# Patient Record
Sex: FEMALE | Race: WHITE | NOT HISPANIC OR LATINO | Employment: OTHER | URBAN - METROPOLITAN AREA
[De-identification: names, ages, dates, MRNs, and addresses within clinical notes are randomized per-mention and may not be internally consistent; named-entity substitution may affect disease eponyms.]

---

## 2017-01-12 ENCOUNTER — APPOINTMENT (OUTPATIENT)
Dept: GENERAL RADIOLOGY | Facility: HOSPITAL | Age: 82
End: 2017-01-12

## 2017-01-12 ENCOUNTER — HOSPITAL ENCOUNTER (INPATIENT)
Facility: HOSPITAL | Age: 82
LOS: 7 days | Discharge: NURSING, REHAB OR LONG-TERM CARE | End: 2017-01-19
Attending: EMERGENCY MEDICINE | Admitting: INTERNAL MEDICINE

## 2017-01-12 DIAGNOSIS — E86.0 DEHYDRATION: ICD-10-CM

## 2017-01-12 DIAGNOSIS — Z74.09 IMPAIRED FUNCTIONAL MOBILITY, BALANCE, GAIT, AND ENDURANCE: ICD-10-CM

## 2017-01-12 DIAGNOSIS — R62.7 FAILURE TO THRIVE IN ADULT: ICD-10-CM

## 2017-01-12 DIAGNOSIS — R53.1 WEAKNESS: ICD-10-CM

## 2017-01-12 DIAGNOSIS — Z78.9 IMPAIRED MOBILITY AND ADLS: ICD-10-CM

## 2017-01-12 DIAGNOSIS — Z74.09 IMPAIRED MOBILITY AND ADLS: ICD-10-CM

## 2017-01-12 DIAGNOSIS — D64.9 SYMPTOMATIC ANEMIA: Primary | ICD-10-CM

## 2017-01-12 PROBLEM — R07.9 CHEST PAIN: Status: ACTIVE | Noted: 2017-01-12

## 2017-01-12 PROBLEM — J96.01 ACUTE RESPIRATORY FAILURE WITH HYPOXIA (HCC): Status: ACTIVE | Noted: 2017-01-12

## 2017-01-12 LAB
ABO GROUP BLD: NORMAL
ABO GROUP BLD: NORMAL
ALBUMIN SERPL-MCNC: 3.4 G/DL (ref 3.2–4.8)
ALBUMIN/GLOB SERPL: 0.9 G/DL (ref 1.5–2.5)
ALP SERPL-CCNC: 70 U/L (ref 25–100)
ALT SERPL W P-5'-P-CCNC: 10 U/L (ref 7–40)
ANION GAP SERPL CALCULATED.3IONS-SCNC: 11 MMOL/L (ref 3–11)
APTT PPP: 28.8 SECONDS (ref 24–31)
AST SERPL-CCNC: 26 U/L (ref 0–33)
BACTERIA UR QL AUTO: ABNORMAL /HPF
BASOPHILS # BLD AUTO: 0.01 10*3/MM3 (ref 0–0.2)
BASOPHILS NFR BLD AUTO: 0.1 % (ref 0–1)
BILIRUB SERPL-MCNC: 0.8 MG/DL (ref 0.3–1.2)
BILIRUB UR QL STRIP: ABNORMAL
BLD GP AB SCN SERPL QL: NEGATIVE
BNP SERPL-MCNC: 237 PG/ML (ref 0–100)
BUN BLD-MCNC: 30 MG/DL (ref 9–23)
BUN/CREAT SERPL: 42.9 (ref 7–25)
CALCIUM SPEC-SCNC: 9.1 MG/DL (ref 8.7–10.4)
CHLORIDE SERPL-SCNC: 107 MMOL/L (ref 99–109)
CLARITY UR: ABNORMAL
CO2 SERPL-SCNC: 22 MMOL/L (ref 20–31)
COLOR UR: ABNORMAL
CREAT BLD-MCNC: 0.7 MG/DL (ref 0.6–1.3)
DEPRECATED RDW RBC AUTO: 62.8 FL (ref 37–54)
DEVELOPER EXPIRATION DATE: NORMAL
DEVELOPER LOT NUMBER: NORMAL
EOSINOPHIL # BLD AUTO: 0.05 10*3/MM3 (ref 0.1–0.3)
EOSINOPHIL NFR BLD AUTO: 0.6 % (ref 0–3)
ERYTHROCYTE [DISTWIDTH] IN BLOOD BY AUTOMATED COUNT: 17.5 % (ref 11.3–14.5)
ERYTHROCYTE [SEDIMENTATION RATE] IN BLOOD: 67 MM/HR (ref 0–30)
EXPIRATION DATE: NORMAL
FECAL OCCULT BLOOD SCREEN, POC: NEGATIVE
FOLATE SERPL-MCNC: >24 NG/ML (ref 3.2–20)
GFR SERPL CREATININE-BSD FRML MDRD: 80 ML/MIN/1.73
GLOBULIN UR ELPH-MCNC: 3.8 GM/DL
GLUCOSE BLD-MCNC: 84 MG/DL (ref 70–100)
GLUCOSE UR STRIP-MCNC: NEGATIVE MG/DL
HCT VFR BLD AUTO: 27.3 % (ref 34.5–44)
HGB BLD-MCNC: 8.2 G/DL (ref 11.5–15.5)
HGB UR QL STRIP.AUTO: NEGATIVE
HOLD SPECIMEN: NORMAL
HOLD SPECIMEN: NORMAL
HYALINE CASTS UR QL AUTO: ABNORMAL /LPF
IMM GRANULOCYTES # BLD: 0.06 10*3/MM3 (ref 0–0.03)
IMM GRANULOCYTES NFR BLD: 0.7 % (ref 0–0.6)
INR PPP: 1.06
IRON 24H UR-MRATE: 32 MCG/DL (ref 50–175)
IRON SATN MFR SERPL: 13 % (ref 15–50)
KETONES UR QL STRIP: ABNORMAL
LEUKOCYTE ESTERASE UR QL STRIP.AUTO: ABNORMAL
LIPASE SERPL-CCNC: 29 U/L (ref 6–51)
LYMPHOCYTES # BLD AUTO: 3.08 10*3/MM3 (ref 0.6–4.8)
LYMPHOCYTES NFR BLD AUTO: 36.4 % (ref 24–44)
Lab: NORMAL
MAGNESIUM SERPL-MCNC: 2.2 MG/DL (ref 1.3–2.7)
MCH RBC QN AUTO: 30.1 PG (ref 27–31)
MCHC RBC AUTO-ENTMCNC: 30 G/DL (ref 32–36)
MCV RBC AUTO: 100.4 FL (ref 80–99)
MONOCYTES # BLD AUTO: 0.73 10*3/MM3 (ref 0–1)
MONOCYTES NFR BLD AUTO: 8.6 % (ref 0–12)
NEGATIVE CONTROL: NEGATIVE
NEUTROPHILS # BLD AUTO: 4.54 10*3/MM3 (ref 1.5–8.3)
NEUTROPHILS NFR BLD AUTO: 53.6 % (ref 41–71)
NITRITE UR QL STRIP: POSITIVE
PH UR STRIP.AUTO: <=5 [PH] (ref 5–8)
PLATELET # BLD AUTO: 244 10*3/MM3 (ref 150–450)
PMV BLD AUTO: 9 FL (ref 6–12)
POSITIVE CONTROL: POSITIVE
POTASSIUM BLD-SCNC: 4.1 MMOL/L (ref 3.5–5.5)
PROT SERPL-MCNC: 7.2 G/DL (ref 5.7–8.2)
PROT UR QL STRIP: ABNORMAL
PROTHROMBIN TIME: 11.6 SECONDS (ref 9.6–11.5)
RBC # BLD AUTO: 2.72 10*6/MM3 (ref 3.89–5.14)
RBC # UR: ABNORMAL /HPF
REF LAB TEST METHOD: ABNORMAL
RETICS/RBC NFR AUTO: 5.96 % (ref 0.5–1.5)
RH BLD: POSITIVE
RH BLD: POSITIVE
SODIUM BLD-SCNC: 140 MMOL/L (ref 132–146)
SP GR UR STRIP: 1.02 (ref 1–1.03)
SQUAMOUS #/AREA URNS HPF: ABNORMAL /HPF
T4 FREE SERPL-MCNC: 1.33 NG/DL (ref 0.89–1.76)
TIBC SERPL-MCNC: 256 MCG/DL (ref 250–450)
TROPONIN I SERPL-MCNC: 0.26 NG/ML
TROPONIN I SERPL-MCNC: 0.29 NG/ML
TSH SERPL DL<=0.05 MIU/L-ACNC: 4.57 MIU/ML (ref 0.35–5.35)
UROBILINOGEN UR QL STRIP: ABNORMAL
VIT B12 BLD-MCNC: 513 PG/ML (ref 211–911)
WBC NRBC COR # BLD: 8.47 10*3/MM3 (ref 3.5–10.8)
WBC UR QL AUTO: ABNORMAL /HPF
WHOLE BLOOD HOLD SPECIMEN: NORMAL
WHOLE BLOOD HOLD SPECIMEN: NORMAL

## 2017-01-12 PROCEDURE — 85730 THROMBOPLASTIN TIME PARTIAL: CPT | Performed by: EMERGENCY MEDICINE

## 2017-01-12 PROCEDURE — 84443 ASSAY THYROID STIM HORMONE: CPT | Performed by: EMERGENCY MEDICINE

## 2017-01-12 PROCEDURE — 85025 COMPLETE CBC W/AUTO DIFF WBC: CPT | Performed by: EMERGENCY MEDICINE

## 2017-01-12 PROCEDURE — 83880 ASSAY OF NATRIURETIC PEPTIDE: CPT | Performed by: EMERGENCY MEDICINE

## 2017-01-12 PROCEDURE — 71010 HC CHEST PA OR AP: CPT

## 2017-01-12 PROCEDURE — 82607 VITAMIN B-12: CPT | Performed by: EMERGENCY MEDICINE

## 2017-01-12 PROCEDURE — 85045 AUTOMATED RETICULOCYTE COUNT: CPT | Performed by: EMERGENCY MEDICINE

## 2017-01-12 PROCEDURE — 80053 COMPREHEN METABOLIC PANEL: CPT | Performed by: EMERGENCY MEDICINE

## 2017-01-12 PROCEDURE — 83550 IRON BINDING TEST: CPT | Performed by: EMERGENCY MEDICINE

## 2017-01-12 PROCEDURE — P9016 RBC LEUKOCYTES REDUCED: HCPCS

## 2017-01-12 PROCEDURE — 36430 TRANSFUSION BLD/BLD COMPNT: CPT

## 2017-01-12 PROCEDURE — 86900 BLOOD TYPING SEROLOGIC ABO: CPT

## 2017-01-12 PROCEDURE — 86920 COMPATIBILITY TEST SPIN: CPT

## 2017-01-12 PROCEDURE — 99285 EMERGENCY DEPT VISIT HI MDM: CPT

## 2017-01-12 PROCEDURE — 99223 1ST HOSP IP/OBS HIGH 75: CPT | Performed by: INTERNAL MEDICINE

## 2017-01-12 PROCEDURE — 36415 COLL VENOUS BLD VENIPUNCTURE: CPT

## 2017-01-12 PROCEDURE — 87086 URINE CULTURE/COLONY COUNT: CPT | Performed by: EMERGENCY MEDICINE

## 2017-01-12 PROCEDURE — 84484 ASSAY OF TROPONIN QUANT: CPT | Performed by: EMERGENCY MEDICINE

## 2017-01-12 PROCEDURE — 86850 RBC ANTIBODY SCREEN: CPT

## 2017-01-12 PROCEDURE — 84439 ASSAY OF FREE THYROXINE: CPT | Performed by: EMERGENCY MEDICINE

## 2017-01-12 PROCEDURE — 93005 ELECTROCARDIOGRAM TRACING: CPT | Performed by: EMERGENCY MEDICINE

## 2017-01-12 PROCEDURE — 81001 URINALYSIS AUTO W/SCOPE: CPT | Performed by: EMERGENCY MEDICINE

## 2017-01-12 PROCEDURE — 86901 BLOOD TYPING SEROLOGIC RH(D): CPT

## 2017-01-12 PROCEDURE — 82746 ASSAY OF FOLIC ACID SERUM: CPT | Performed by: EMERGENCY MEDICINE

## 2017-01-12 PROCEDURE — 83735 ASSAY OF MAGNESIUM: CPT | Performed by: EMERGENCY MEDICINE

## 2017-01-12 PROCEDURE — 83540 ASSAY OF IRON: CPT | Performed by: EMERGENCY MEDICINE

## 2017-01-12 PROCEDURE — 85652 RBC SED RATE AUTOMATED: CPT | Performed by: EMERGENCY MEDICINE

## 2017-01-12 PROCEDURE — 85610 PROTHROMBIN TIME: CPT | Performed by: EMERGENCY MEDICINE

## 2017-01-12 PROCEDURE — 83690 ASSAY OF LIPASE: CPT | Performed by: EMERGENCY MEDICINE

## 2017-01-12 RX ORDER — MELATONIN
2000 DAILY
COMMUNITY
End: 2017-01-12

## 2017-01-12 RX ORDER — ACETAMINOPHEN 500 MG
1000 TABLET ORAL 4 TIMES DAILY PRN
Status: ON HOLD | COMMUNITY
End: 2017-07-05

## 2017-01-12 RX ORDER — LEVOTHYROXINE SODIUM 0.07 MG/1
75 TABLET ORAL
COMMUNITY

## 2017-01-12 RX ORDER — SODIUM CHLORIDE 9 MG/ML
125 INJECTION, SOLUTION INTRAVENOUS CONTINUOUS
Status: DISCONTINUED | OUTPATIENT
Start: 2017-01-12 | End: 2017-01-13

## 2017-01-12 RX ORDER — MIRTAZAPINE 30 MG/1
30 TABLET, FILM COATED ORAL NIGHTLY
COMMUNITY
End: 2019-03-08

## 2017-01-12 RX ORDER — POLYETHYLENE GLYCOL 3350 17 G/17G
17 POWDER, FOR SOLUTION ORAL DAILY PRN
COMMUNITY
End: 2017-07-12 | Stop reason: HOSPADM

## 2017-01-12 RX ORDER — MULTIVIT WITH MINERALS/LUTEIN
1000 TABLET ORAL DAILY
COMMUNITY
End: 2017-03-27

## 2017-01-12 RX ORDER — NITROGLYCERIN 0.4 MG/1
0.4 TABLET SUBLINGUAL
Status: DISCONTINUED | OUTPATIENT
Start: 2017-01-12 | End: 2017-01-19 | Stop reason: HOSPADM

## 2017-01-12 RX ORDER — SODIUM CHLORIDE 0.9 % (FLUSH) 0.9 %
1-10 SYRINGE (ML) INJECTION AS NEEDED
Status: DISCONTINUED | OUTPATIENT
Start: 2017-01-12 | End: 2017-01-19 | Stop reason: HOSPADM

## 2017-01-12 RX ORDER — ONDANSETRON 2 MG/ML
4 INJECTION INTRAMUSCULAR; INTRAVENOUS EVERY 6 HOURS PRN
Status: DISCONTINUED | OUTPATIENT
Start: 2017-01-12 | End: 2017-01-19 | Stop reason: HOSPADM

## 2017-01-12 RX ORDER — METHYLPHENIDATE HYDROCHLORIDE 5 MG/1
2.5 TABLET ORAL DAILY
COMMUNITY
End: 2017-01-19 | Stop reason: HOSPADM

## 2017-01-12 RX ORDER — FERROUS SULFATE 325(65) MG
325 TABLET ORAL
Status: ON HOLD | COMMUNITY
End: 2017-02-24

## 2017-01-12 RX ORDER — SODIUM CHLORIDE 0.9 % (FLUSH) 0.9 %
10 SYRINGE (ML) INJECTION AS NEEDED
Status: DISCONTINUED | OUTPATIENT
Start: 2017-01-12 | End: 2017-01-19 | Stop reason: HOSPADM

## 2017-01-12 RX ADMIN — SODIUM CHLORIDE 250 ML: 9 INJECTION, SOLUTION INTRAVENOUS at 18:29

## 2017-01-12 RX ADMIN — SODIUM CHLORIDE 125 ML/HR: 9 INJECTION, SOLUTION INTRAVENOUS at 18:30

## 2017-01-12 RX ADMIN — SODIUM CHLORIDE 125 ML/HR: 9 INJECTION, SOLUTION INTRAVENOUS at 20:18

## 2017-01-12 NOTE — ED PROVIDER NOTES
Subjective   HPI Comments: 85 y.o. female presents to the ED w/ c/o generalized weakness and low H&H. Pt is s/p left hip replacement and [facility]. Her son states she has had progressive decline over this period with progressive weakness and generalized fatigue. She has had a UTI for several weeks which has not improved on antibiotics. She was seen by her PCP Dr. Kieran Cottrell earlier today and received lab work which revealed an H&H of 8.0/24.8. Pt has a hx iron-deficiency anemia and required a transfusion at  last month. She states she has had a decreased appetite as well. She denies feeling dizzy or light-headed, but reports palpitations with exertion.    Patient is a 85 y.o. female presenting with weakness.   History provided by:  Patient and relative (son)  Weakness - Generalized   Severity:  Severe  Onset quality:  Gradual  Duration: several weeks.  Timing:  Constant  Progression:  Worsening  Chronicity:  New  Context: urinary tract infection    Associated symptoms: no chest pain, no dizziness and no shortness of breath    Risk factors: anemia        Review of Systems   Constitutional: Positive for fatigue.   Respiratory: Negative for shortness of breath.    Cardiovascular: Positive for palpitations. Negative for chest pain.   Neurological: Positive for weakness (generalized). Negative for dizziness.   All other systems reviewed and are negative.      Past Medical History   Diagnosis Date   • Anemia    • Anxiety    • Depression    • Disease of thyroid gland    • Failure to thrive in adult    • Hyperlipidemia    • Hypertension    • Osteoarthritis    • Stress incontinence    • Urinary retention        Allergies   Allergen Reactions   • Alendronate    • Aripiprazole    • Celecoxib    • Nitrofurantoin    • Procaine    • Sulfa Antibiotics        Past Surgical History   Procedure Laterality Date   • Appendectomy     • Back surgery     • Cataract extraction     • Elbow  procedure     • Knee surgery     • Joint replacement         Family History   Problem Relation Age of Onset   • Alcohol abuse Other    • Hypertension Other    • Stroke Other    • Heart disease Son        Social History     Social History   • Marital status:      Spouse name: N/A   • Number of children: N/A   • Years of education: N/A     Social History Main Topics   • Smoking status: Never Smoker   • Smokeless tobacco: None   • Alcohol use Yes      Comment: social   • Drug use: None   • Sexual activity: Not Asked     Other Topics Concern   • None     Social History Narrative   • None         Objective   Physical Exam   Constitutional: She appears well-developed. No distress.   Thin, frail appearing   HENT:   Head: Atraumatic.   Pale oropharynx   Eyes: Pupils are equal, round, and reactive to light.   Pale conjunctiva   Neck: Normal range of motion. Neck supple.   Cardiovascular: Normal rate, regular rhythm and normal heart sounds.    Pulmonary/Chest: Effort normal and breath sounds normal. No respiratory distress. She has no wheezes. She has no rales. She exhibits no tenderness.   Abdominal: Soft. There is no tenderness.   Mildly protuberant abdomen   Genitourinary:   Genitourinary Comments: Rectal exam reveals normal tone without mass or impaction.   Musculoskeletal: She exhibits no edema.   Neurological: She is alert.   Skin: Skin is warm and dry. She is not diaphoretic.   Psychiatric: She has a normal mood and affect. Her behavior is normal.   Nursing note and vitals reviewed.      Procedures         ED Course  ED Course   Comment By Time   Discussed case in depth over the phone with hospitalist Dr. Brand. Will admit to [facility]. [facility] Rose Ashley 01/12 2015   Discussed findings at length with patient and son.  Discussed with Dr. Brand will admit for definitive inpatient care. Ward Barkley MD 01/12 3905         Course of Care      Lab Results (last 24 hours)     Procedure Component Value Units Date/Time     POCT Occult Blood, stool [[id number]]  (Normal) Collected:  01/12/17 1823    Specimen:  Stool from Per Rectum Updated:  01/12/17 1825     Fecal Occult Blood Negative      Lot Number [id number]      Expiration Date 12/18      DEVELOPER LOT NUMBER [id number]      DEVELOPER EXPIRATION DATE 11/19      Positive Control Positive      Negative Control Negative     CBC & Differential [[id number]] Collected:  01/12/17 1845    Specimen:  Blood Updated:  01/12/17 1903    Narrative:       The following orders were created for panel order CBC & Differential.  Procedure                               Abnormality         Status                     ---------                               -----------         ------                     CBC Auto Differential[[id number]]         Abnormal            Final result                 Please view results for these tests on the individual orders.    Comprehensive Metabolic Panel [[id number]]  (Abnormal) Collected:  01/12/17 1845    Specimen:  Blood Updated:  01/12/17 1932     Glucose 84 mg/dL      BUN 30 (H) mg/dL      Creatinine 0.70 mg/dL      Sodium 140 mmol/L      Potassium 4.1 mmol/L      Chloride 107 mmol/L      CO2 22.0 mmol/L      Calcium 9.1 mg/dL      Total Protein 7.2 g/dL      Albumin 3.40 g/dL      ALT (SGPT) 10 U/L      AST (SGOT) 26 U/L      Alkaline Phosphatase 70 U/L      Total Bilirubin 0.8 mg/dL      eGFR Non African Amer 80 mL/min/1.73      Globulin 3.8 gm/dL      A/G Ratio 0.9 (L) g/dL      BUN/Creatinine Ratio 42.9 (H)      Anion Gap 11.0 mmol/L     Narrative:       [employer] Guidelines    Stage                           Description                             GFR                      1                               Normal or High                          90+  2                               Mild decrease                            60-89  3                               Moderate decrease                   30-59  4                               Severe decrease                        15-29  5                               Kidney failure                             <15    Protime-INR [[id number]]  (Abnormal) Collected:  01/12/17 1845    Specimen:  Blood Updated:  01/12/17 1933     Protime 11.6 (H) Seconds      INR 1.06     Narrative:       Therapeutic Ranges for INR: 2.0-3.0 (PT 20-30)                              2.5-3.5 (PT 25-34)    aPTT [[id number]]  (Normal) Collected:  01/12/17 1845    Specimen:  Blood Updated:  01/12/17 1933     PTT 28.8 seconds     Narrative:       PTT = The equivalent PTT values for the therapeutic range of heparin levels at 0.3 to 0.5 U/ml are 45 to 60 seconds.    Lipase [[id number]]  (Normal) Collected:  01/12/17 1845    Specimen:  Blood Updated:  01/12/17 1932     Lipase 29 U/L     Magnesium [[id number]]  (Normal) Collected:  01/12/17 1845    Specimen:  Blood Updated:  01/12/17 1932     Magnesium 2.2 mg/dL     TSH [[id number]]  (Normal) Collected:  01/12/17 1845    Specimen:  Blood Updated:  01/12/17 1932     TSH 4.567 mIU/mL     T4, Free [[id number]]  (Normal) Collected:  01/12/17 1845    Specimen:  Blood Updated:  01/12/17 1932     Free T4 1.33 ng/dL     BNP [[id number]]  (Abnormal) Collected:  01/12/17 1845    Specimen:  Blood Updated:  01/12/17 1933     .0 (H) pg/mL     Troponin [[id number]]  (Abnormal) Collected:  01/12/17 1845    Specimen:  Blood Updated:  01/12/17 1932     Troponin I 0.255 (H) ng/mL     Narrative:       Ultra Troponin I Reference Range:         <=0.039 ng/mL: Negative    0.04-0.779 ng/mL: Indeterminate Range. Suspicious of MI.  Clinical correlation required.       >=0.78  ng/mL: Consistent with myocardial injury.  Clinical correlation required.    CBC Auto Differential [[id number]]  (Abnormal) Collected:  01/12/17 1845    Specimen:  Blood Updated:  01/12/17 1903     WBC 8.47 10*3/mm3      RBC 2.72 (L) 10*6/mm3      Hemoglobin 8.2 (L) g/dL      Hematocrit 27.3 (L) %      .4 (H) fL      MCH 30.1 pg      MCHC 30.0 (L) g/dL      RDW 17.5 (H) %       RDW-SD 62.8 (H) fl      MPV 9.0 fL      Platelets 244 10*3/mm3      Neutrophil % 53.6 %      Lymphocyte % 36.4 %      Monocyte % 8.6 %      Eosinophil % 0.6 %      Basophil % 0.1 %      Immature Grans % 0.7 (H) %      Neutrophils, Absolute 4.54 10*3/mm3      Lymphocytes, Absolute 3.08 10*3/mm3      Monocytes, Absolute 0.73 10*3/mm3      Eosinophils, Absolute 0.05 (L) 10*3/mm3      Basophils, Absolute 0.01 10*3/mm3      Immature Grans, Absolute 0.06 (H) 10*3/mm3     Iron Profile [[id number]]  (Abnormal) Collected:  01/12/17 1845    Specimen:  Blood Updated:  01/12/17 1926     Iron 32 (L) mcg/dL      TIBC 256 mcg/dL      Iron Saturation 13 (L) %     Vitamin B12 [[id number]]  (Normal) Collected:  01/12/17 1845    Specimen:  Blood Updated:  01/12/17 1926     Vitamin B-12 513 pg/mL     Folate [[id number]]  (Abnormal) Collected:  01/12/17 1845    Specimen:  Blood Updated:  01/12/17 1926     Folate >24.00 (H) ng/mL     Narrative:         Folate Reference Ranges:    Deficient:            Less than 1.2 ng/mL  Indeterminant:        1.2-3.1 ng/mL  Normal:               3.2-20.0 ng/mL    Urinalysis With / Culture If Indicated [[id number]]  (Abnormal) Collected:  01/12/17 1855    Specimen:  Urine from Urine, Catheter Updated:  01/12/17 1917     Color, UA Red (A)      Appearance, UA Cloudy (A)      pH, UA <=5.0      Specific Gravity, UA 1.023      Glucose, UA Negative      Ketones, UA 40 mg/dL (2+) (A)      Bilirubin, UA Large (3+) (A)      Blood, UA Negative      Protein,  mg/dL (2+) (A)      Leuk Esterase, UA Moderate (2+) (A)      Nitrite, UA Positive (A)      Urobilinogen, UA >=8.0 E.U./dL (A)     Urinalysis, Microscopic Only [[id number]]  (Abnormal) Collected:  01/12/17 1855    Specimen:  Urine from Urine, Catheter Updated:  01/12/17 1918     RBC, UA 3-6 (A) /HPF      WBC, UA 3-5 (A) /HPF      Bacteria, UA None Seen /HPF      Squamous Epithelial Cells, UA 3-6 (A) /HPF      Hyaline Casts, UA 7-12 /LPF      Methodology Manual  Light Microscopy     Urine Culture [[id number]] Collected:  01/12/17 1855    Specimen:  Urine from Urine, Catheter Updated:  01/12/17 1916    Sedimentation Rate [[id number]]  (Abnormal) Collected:  01/12/17 1925    Specimen:  Blood Updated:  01/12/17 2012     Sed Rate 67 (H) mm/hr     Reticulocytes [[id number]]  (Abnormal) Collected:  01/12/17 1925    Specimen:  Blood Updated:  01/12/17 2001     Reticulocyte % 5.96 (H) %     Troponin [[id number]]  (Abnormal) Collected:  01/12/17 2114    Specimen:  Blood Updated:  01/12/17 2146     Troponin I 0.293 (H) ng/mL     Narrative:       Ultra Troponin I Reference Range:         <=0.039 ng/mL: Negative    0.04-0.779 ng/mL: Indeterminate Range. Suspicious of MI.  Clinical correlation required.       >=0.78  ng/mL: Consistent with myocardial injury.  Clinical correlation required.          Note: In addition to lab results from this visit, the labs listed above may include labs taken at another facility or during a different encounter within the last 24 hours. Please correlate lab times with ED admission and discharge times for further clarification of the services performed during this visit.    XR Chest 1 View   ED Interpretation   No acute disease          Vitals:    01/12/17 2159 01/12/17 2200 01/12/17 2230 01/12/17 2237   BP:  139/80  144/87   BP Location:    Left arm   Patient Position:    Lying   Pulse: 80  80 78   Resp:    16   Temp:    97.8 °F (36.6 °C)   TempSrc:    Oral   SpO2: 94%  94% 97%   Weight:       Height:           Medications   sodium chloride 0.9 % flush 10 mL (not administered)   sodium chloride 0.9 % infusion (0 mL/hr Intravenous Stopped 1/12/17 2131)   sodium chloride 0.9 % bolus 250 mL (250 mL Intravenous Not Given 1/12/17 2018)   sodium chloride 0.9 % bolus 250 mL (0 mL Intravenous Stopped 1/12/17 2017)       ECG/EMG Results (last 24 hours)     ** No results found for the last 24 hours. **                      MDM    Final diagnoses:   Symptomatic anemia    Weakness   Failure to thrive in adult   Dehydration       Documentation assistance provided by víctor Ashley.  Information recorded by the scribe was done at my direction and has been verified and validated by me.     Rose Ashley  01/12/17 8922       Ward Barkley MD  01/12/17 2281

## 2017-01-12 NOTE — Clinical Note
Level of Care: Telemetry [5]   Admitting Physician: MARIE KATZ [[id number]]   Attending Physician: MARIE KATZ [[id number]]

## 2017-01-12 NOTE — IP AVS SNAPSHOT
AFTER VISIT SUMMARY             Florinda Knapp           About your hospitalization     You were admitted on:  January 12, 2017 You last received care in the:  [facility]       Procedures & Surgeries         Medications    If you or your caregiver advised us that you are currently taking a medication and that medication is marked below as “Resume”, this simply indicates that we have reviewed those medications to make sure our new therapy recommendations do not interfere.  If you have concerns about medications other than those new ones which we are prescribing today, please consult the physician who prescribed them (or your primary physician).  Our review of your home medications is not meant to indicate that we are directing their use.             Your Medications      START taking these medications     amoxicillin-clavulanate 500-125 MG per tablet   Take 1 tablet by mouth Every 12 (Twelve) Hours for 3 days. Indications: Infection Within the Abdomen   Last time this was given:  1/19/2017  9:39 AM   Commonly known as:  AUGMENTIN           aspirin 81 MG chewable tablet   Chew 1 tablet Daily.   Last time this was given:  1/19/2017  9:39 AM   Replaces:  aspirin 325 MG tablet           atorvastatin 20 MG tablet   Take 1 tablet by mouth Every Night.   Last time this was given:  1/18/2017  9:29 PM   Commonly known as:  LIPITOR           loperamide 2 MG capsule   Take 1 capsule by mouth 3 (Three) Times a Day As Needed for diarrhea.   Commonly known as:  IMODIUM           metroNIDAZOLE 500 MG tablet   Take 1 tablet by mouth Every 8 (Eight) Hours for 3 days. Indications: Infection Within the Abdomen   Last time this was given:  1/19/2017  5:26 AM   Commonly known as:  FLAGYL           saccharomyces boulardii 250 MG capsule   Take 1 capsule by mouth 2 (Two) Times a Day.   Last time this was given:  1/19/2017  9:39 AM   Commonly known as:  FLORASTOR           tamsulosin 0.4 MG capsule 24 hr capsule   Take  1 capsule by mouth Daily.   Last time this was given:  1/19/2017  9:39 AM   Commonly known as:  FLOMAX             CONTINUE taking these medications     acetaminophen 500 MG tablet   Take 1,000 mg by mouth 4 (Four) Times a Day As Needed for mild pain (1-3).   Commonly known as:  TYLENOL           carbidopa-levodopa  MG per tablet   TAKE ONE TABLET BY MOUTH THREE TIMES A DAY **MAY TAKE 1 EXTRA TABLET BY MOUTH AT NIGHT AS NEEDED**   Last time this was given:  1/19/2017  5:26 AM   Commonly known as:  SINEMET           Cholecalciferol 2000 UNITS capsule   Take 2,000 Units by mouth Daily.           ferrous sulfate 325 (65 FE) MG tablet   Take 325 mg by mouth Daily With Breakfast.   Last time this was given:  1/19/2017  9:39 AM           levothyroxine 75 MCG tablet   Take 75 mcg by mouth Daily.   Last time this was given:  1/19/2017  5:26 AM   Commonly known as:  SYNTHROID, LEVOTHROID           mirtazapine 30 MG tablet   Take 30 mg by mouth Every Night.   Commonly known as:  REMERON           polyethylene glycol packet   Take 17 g by mouth Daily As Needed.   Last time this was given:  1/16/2017  8:57 AM   Commonly known as:  MIRALAX           vitamin E 1000 UNIT capsule   Take 1,000 Units by mouth Daily.             STOP taking these medications     amLODIPine 5 MG tablet   Commonly known as:  NORVASC           aspirin 325 MG tablet   Replaced by:  aspirin 81 MG chewable tablet           methylphenidate 5 MG tablet   Commonly known as:  RITALIN                Where to Get Your Medications      Information about where to get these medications is not yet available     ! Ask your nurse or doctor about these medications     amoxicillin-clavulanate 500-125 MG per tablet    aspirin 81 MG chewable tablet    atorvastatin 20 MG tablet    loperamide 2 MG capsule    metroNIDAZOLE 500 MG tablet    saccharomyces boulardii 250 MG capsule    tamsulosin 0.4 MG capsule 24 hr capsule                  Your Medications      Your  Medication List           Morning Noon Evening Bedtime As Needed    acetaminophen 500 MG tablet   Take 1,000 mg by mouth 4 (Four) Times a Day As Needed for mild pain (1-3).   Commonly known as:  TYLENOL                                amoxicillin-clavulanate 500-125 MG per tablet   Take 1 tablet by mouth Every 12 (Twelve) Hours for 3 days. Indications: Infection Within the Abdomen   Commonly known as:  AUGMENTIN                                aspirin 81 MG chewable tablet   Chew 1 tablet Daily.                                atorvastatin 20 MG tablet   Take 1 tablet by mouth Every Night.   Commonly known as:  LIPITOR                                carbidopa-levodopa  MG per tablet   TAKE ONE TABLET BY MOUTH THREE TIMES A DAY **MAY TAKE 1 EXTRA TABLET BY MOUTH AT NIGHT AS NEEDED**   Commonly known as:  SINEMET                                Cholecalciferol 2000 UNITS capsule   Take 2,000 Units by mouth Daily.                                ferrous sulfate 325 (65 FE) MG tablet   Take 325 mg by mouth Daily With Breakfast.                                levothyroxine 75 MCG tablet   Take 75 mcg by mouth Daily.   Commonly known as:  SYNTHROID, LEVOTHROID                                loperamide 2 MG capsule   Take 1 capsule by mouth 3 (Three) Times a Day As Needed for diarrhea.   Commonly known as:  IMODIUM                                metroNIDAZOLE 500 MG tablet   Take 1 tablet by mouth Every 8 (Eight) Hours for 3 days. Indications: Infection Within the Abdomen   Commonly known as:  FLAGYL                                mirtazapine 30 MG tablet   Take 30 mg by mouth Every Night.   Commonly known as:  REMERON                                polyethylene glycol packet   Take 17 g by mouth Daily As Needed.   Commonly known as:  MIRALAX                                saccharomyces boulardii 250 MG capsule   Take 1 capsule by mouth 2 (Two) Times a Day.   Commonly known as:  FLORASTOR                                 tamsulosin 0.4 MG capsule 24 hr capsule   Take 1 capsule by mouth Daily.   Commonly known as:  FLOMAX                                vitamin E 1000 UNIT capsule   Take 1,000 Units by mouth Daily.                                         Instructions for After Discharge        Activity Instructions     Activity as Tolerated                 Diet Instructions     Diet: Regular; Thin Liquids, No Restrictions       Discharge Diet:  Regular   Fluid Consistency:  Thin Liquids, No Restrictions             Discharge References/Attachments     ANEMIA, NONSPECIFIC (ENGLISH)       Follow-ups for After Discharge        Follow-up Information     Follow up with Dino Flynn MD. Schedule an appointment as soon as possible for a visit in 1 week(s).    Specialty:  Urology    Why:  please make appointment for Thursday or Friday next week  01/26/17  3 p.m.    Contact information:    [address] [place] [id number]  [phone]          Follow up with Kieran Cottrell MD. Schedule an appointment as soon as possible for a visit in 1 week(s).    Specialty:  Internal Medicine    Why:  7-10 days  01/26/17  1 p.m. with aicha martinez    Contact information:    [address] [facility] [address]  [place] [id number]  [phone]          Follow up with Drew Packer MD .    Specialty:  Orthopedic Surgery    Why:  as scheduled   01/25/17  3:15 p.m.    Contact information:    [address] [facility] [address]  [place] [id number]  [phone]        Scheduled Appointments     Feb 28, 2017 11:30 AM EST   Follow Up with Elisa Castro MD   [facility] (--)    [address]  [facility] [id number] [phone]           Arrive 15 minutes prior to appointment.              [employer] Signup     Our records indicate that your [facility] [employer] account has been deactivated. If you would like to reactivate your account, please email [email or web address] or call  [phone] to talk to our [employer] staff.         Summary of Your Hospitalization        Reason for Hospitalization     Your primary diagnosis was:  Symptomatic Anemia    Your diagnoses also included:  Parkinson Disease, Respiratory Insufficiency, Generalized Weakness, Chest Pain, Symptomatic Anemia, Elevated Troponin I Level, Status Post Total Replacement Of Left Hip, H/O Urinary Retention, History Of Recurrent Utis, High Blood Pressure, Anxiety Problem, Heart Failure, Inflammation Of Large Intestine, Hypokalemia      Care Providers     Provider Service Role Specialty    Sharee Travis MD Medicine Attending Provider Hospitalist    Dino Flynn MD Urology Consulting Physician  Urology      Your Allergies  Date Reviewed: 1/13/2017    Allergen Reactions    Alendronate Not Noted         Aripiprazole Not Noted         Celecoxib Not Noted         Nitrofurantoin Not Noted         Procaine Not Noted         Sulfa Antibiotics Not Noted      Patient Belongings Returned     Document Return of Belongings Flowsheet     Were the patient bedside belongings sent home?   --   Belongings Retrieved from Security & Sent Home   --    Belongings Sent to Safe   --   Medications Retrieved from Pharmacy & Sent Home   --              More Information      Anemia, Nonspecific  Anemia is a condition in which the concentration of red blood cells or hemoglobin in the blood is below normal. Hemoglobin is a substance in red blood cells that carries oxygen to the tissues of the body. Anemia results in not enough oxygen reaching these tissues.   CAUSES   Common causes of anemia include:   · Excessive bleeding. Bleeding may be internal or external. This includes excessive bleeding from periods (in women) or from the intestine.    · Poor nutrition.    · Chronic kidney, thyroid, and liver disease.   · Bone marrow disorders that decrease red blood cell production.  · Cancer and treatments for cancer.  · HIV, AIDS, and their treatments.  · Spleen  problems that increase red blood cell destruction.  · Blood disorders.  · Excess destruction of red blood cells due to infection, medicines, and autoimmune disorders.  SIGNS AND SYMPTOMS   · Minor weakness.    · Dizziness.    · Headache.  · Palpitations.    · Shortness of breath, especially with exercise.    · Paleness.  · Cold sensitivity.  · Indigestion.  · Nausea.  · Difficulty sleeping.  · Difficulty concentrating.  Symptoms may occur suddenly or they may develop slowly.   DIAGNOSIS   Additional blood tests are often needed. These help your health care provider determine the best treatment. Your health care provider will check your stool for blood and look for other causes of blood loss.   TREATMENT   Treatment varies depending on the cause of the anemia. Treatment can include:   · Supplements of iron, vitamin B12, or folic acid.    · Hormone medicines.    · A blood transfusion. This may be needed if blood loss is severe.    · Hospitalization. This may be needed if there is significant continual blood loss.    · Dietary changes.  · Spleen removal.  HOME CARE INSTRUCTIONS  Keep all follow-up appointments. It often takes many weeks to correct anemia, and having your health care provider check on your condition and your response to treatment is very important.  SEEK IMMEDIATE MEDICAL CARE IF:   · You develop extreme weakness, shortness of breath, or chest pain.    · You become dizzy or have trouble concentrating.  · You develop heavy vaginal bleeding.    · You develop a rash.    · You have bloody or black, tarry stools.    · You faint.    · You vomit up blood.    · You vomit repeatedly.    · You have abdominal pain.  · You have a fever or persistent symptoms for more than 2-3 days.    · You have a fever and your symptoms suddenly get worse.    · You are dehydrated.    MAKE SURE YOU:  · Understand these instructions.  · Will watch your condition.  · Will get help right away if you are not doing well or get worse.      This information is not intended to replace advice given to you by your health care provider. Make sure you discuss any questions you have with your health care provider.     Document Released: 01/25/2006 Document Revised: 08/20/2014 Document Reviewed: 06/13/2014  [employer] Interactive Patient Education ©2016 [employer]            SYMPTOMS OF A STROKE    Call 911 or have someone take you to the Emergency Department if you have any of the following:    · Sudden numbness or weakness of your face, arm or leg especially on one side of the body  · Sudden confusion, diffiiculty speaking or trouble understanding   · Changes in your vision or loss of sight in one eye  · Sudden severe headache with no known cause  · sudden dizziness, trouble walking, loss of balance or coordination    It is important to seek emergency care right away if you have further stroke symptoms. If you get emergency help quickly, the powerful clot-dissolving medicines can reduce the disabilities caused by a stroke.     For more information:    American Stroke Association  2-646-8-STROKE  www.strokeassociation.org           IF YOU SMOKE OR USE TOBACCO PLEASE READ THE FOLLOWING:    Why is smoking bad for me?  Smoking increases the risk of heart disease, lung disease, vascular disease, stroke, and cancer.     If you smoke, STOP!    If you would like more information on quitting smoking, please visit the GetMyRx website: [email or web address]   This link will provide additional resources including the QUIT line and the Beat the Pack support groups.     For more information:    American Cancer Society  (231) 390-8597    American Heart Association  1-352.816.3983               YOU ARE THE MOST IMPORTANT FACTOR IN YOUR RECOVERY.     Follow all instructions carefully.     I have reviewed my discharge instructions with my nurse, including the following information, if applicable:     Information about my  illness and diagnosis   Follow up appointments (including lab draws)   Wound Care   Equipment Needs   Medications (new and continuing) along with side effects   Preventative information such as vaccines and smoking cessations   Diet   Pain   I know when to contact my Doctor's office or seek emergency care      I want my nurse to describe the side effects of my medications: YES NO   If the answer is no, I understand the side effects of my medications: YES NO   My nurse described the side effects of my medications in a way that I could understand: YES NO   I have taken my personal belongings and my own medications with me at discharge: YES NO            I have received this information and my questions have been answered. I have discussed any concerns I see with this plan with the nurse or physician. I understand these instructions.    Signature of Patient or Responsible Person: _____________________________________    Date: _________________  Time: __________________    Signature of Healthcare Provider: _______________________________________  Date: _________________  Time: __________________

## 2017-01-13 ENCOUNTER — APPOINTMENT (OUTPATIENT)
Dept: CARDIOLOGY | Facility: HOSPITAL | Age: 82
End: 2017-01-13
Attending: INTERNAL MEDICINE

## 2017-01-13 ENCOUNTER — APPOINTMENT (OUTPATIENT)
Dept: CT IMAGING | Facility: HOSPITAL | Age: 82
End: 2017-01-13

## 2017-01-13 PROBLEM — R79.89 ELEVATED TROPONIN I LEVEL: Status: ACTIVE | Noted: 2017-01-13

## 2017-01-13 PROBLEM — Z87.898 H/O URINARY RETENTION: Status: ACTIVE | Noted: 2017-01-13

## 2017-01-13 PROBLEM — R77.8 ELEVATED TROPONIN I LEVEL: Status: ACTIVE | Noted: 2017-01-13

## 2017-01-13 PROBLEM — Z96.642 STATUS POST TOTAL REPLACEMENT OF LEFT HIP: Status: ACTIVE | Noted: 2017-01-13

## 2017-01-13 PROBLEM — Z87.440 HISTORY OF RECURRENT UTIS: Status: ACTIVE | Noted: 2017-01-13

## 2017-01-13 PROBLEM — I10 ESSENTIAL HYPERTENSION: Status: ACTIVE | Noted: 2017-01-13

## 2017-01-13 LAB
ANION GAP SERPL CALCULATED.3IONS-SCNC: 8 MMOL/L (ref 3–11)
ARTICHOKE IGE QN: 65 MG/DL (ref 0–130)
BACTERIA UR QL AUTO: ABNORMAL /HPF
BH CV ECHO MEAS - AI DEC SLOPE: 202 CM/SEC^2
BH CV ECHO MEAS - AI MAX PG: 59.3 MMHG
BH CV ECHO MEAS - AI MAX VEL: 384.9 CM/SEC
BH CV ECHO MEAS - AI P1/2T: 558 MSEC
BH CV ECHO MEAS - AO MAX PG (FULL): 3.7 MMHG
BH CV ECHO MEAS - AO MAX PG: 7 MMHG
BH CV ECHO MEAS - AO MEAN PG (FULL): 2.1 MMHG
BH CV ECHO MEAS - AO MEAN PG: 3.7 MMHG
BH CV ECHO MEAS - AO ROOT AREA (BSA CORRECTED): 8
BH CV ECHO MEAS - AO ROOT AREA: 8.9 CM^2
BH CV ECHO MEAS - AO ROOT DIAM: 3.4 CM
BH CV ECHO MEAS - AO V2 MAX: 132.7 CM/SEC
BH CV ECHO MEAS - AO V2 MEAN: 90.2 CM/SEC
BH CV ECHO MEAS - AO V2 VTI: 24.1 CM
BH CV ECHO MEAS - AVA(I,A): 1.9 CM^2
BH CV ECHO MEAS - AVA(I,D): 1.9 CM^2
BH CV ECHO MEAS - AVA(V,A): 2.1 CM^2
BH CV ECHO MEAS - AVA(V,D): 2.1 CM^2
BH CV ECHO MEAS - BSA(HAYCOCK): 0.74 M^2
BH CV ECHO MEAS - BSA: 0.42 M^2
BH CV ECHO MEAS - BZI_BMI: 639.2 KILOGRAMS/M^2
BH CV ECHO MEAS - BZI_METRIC_HEIGHT: 27.9 CM
BH CV ECHO MEAS - BZI_METRIC_WEIGHT: 49.9 KG
BH CV ECHO MEAS - CONTRAST EF (2CH): 53.9 ML/M^2
BH CV ECHO MEAS - CONTRAST EF 4CH: 65 ML/M^2
BH CV ECHO MEAS - EDV(CUBED): 133.4 ML
BH CV ECHO MEAS - EDV(MOD-SP2): 76 ML
BH CV ECHO MEAS - EDV(MOD-SP4): 100 ML
BH CV ECHO MEAS - EDV(TEICH): 124.3 ML
BH CV ECHO MEAS - EF(CUBED): 74.1 %
BH CV ECHO MEAS - EF(MOD-SP2): 53.9 %
BH CV ECHO MEAS - EF(MOD-SP4): 65 %
BH CV ECHO MEAS - EF(TEICH): 65.6 %
BH CV ECHO MEAS - ESV(CUBED): 34.5 ML
BH CV ECHO MEAS - ESV(MOD-SP2): 35 ML
BH CV ECHO MEAS - ESV(MOD-SP4): 35 ML
BH CV ECHO MEAS - ESV(TEICH): 42.7 ML
BH CV ECHO MEAS - FS: 36.3 %
BH CV ECHO MEAS - IVS/LVPW: 0.71
BH CV ECHO MEAS - IVSD: 1.1 CM
BH CV ECHO MEAS - LA DIMENSION: 2.6 CM
BH CV ECHO MEAS - LA/AO: 0.78
BH CV ECHO MEAS - LAT PEAK E' VEL: 7.9 CM/SEC
BH CV ECHO MEAS - LV DIASTOLIC VOL/BSA (35-75): 236.3 ML/M^2
BH CV ECHO MEAS - LV MASS(C)D: 173.2 GRAMS
BH CV ECHO MEAS - LV MASS(C)DI: 409.2 GRAMS/M^2
BH CV ECHO MEAS - LV MAX PG: 3.3 MMHG
BH CV ECHO MEAS - LV MEAN PG: 1.7 MMHG
BH CV ECHO MEAS - LV SYSTOLIC VOL/BSA (12-30): 82.7 ML/M^2
BH CV ECHO MEAS - LV V1 MAX: 91.4 CM/SEC
BH CV ECHO MEAS - LV V1 MEAN: 58.3 CM/SEC
BH CV ECHO MEAS - LV V1 VTI: 15.4 CM
BH CV ECHO MEAS - LVIDD: 5.1 CM
BH CV ECHO MEAS - LVIDS: 3.8 CM
BH CV ECHO MEAS - LVLD AP2: 7 CM
BH CV ECHO MEAS - LVLD AP4: 7 CM
BH CV ECHO MEAS - LVLS AP2: 6.2 CM
BH CV ECHO MEAS - LVLS AP4: 5.8 CM
BH CV ECHO MEAS - LVOT AREA (M): 3.1 CM^2
BH CV ECHO MEAS - LVOT AREA: 3 CM^2
BH CV ECHO MEAS - LVOT DIAM: 2 CM
BH CV ECHO MEAS - LVPWD: 1.1 CM
BH CV ECHO MEAS - MED PEAK E' VEL: 4.5 CM/SEC
BH CV ECHO MEAS - MV A MAX VEL: 77.5 CM/SEC
BH CV ECHO MEAS - MV E MAX VEL: 50.9 CM/SEC
BH CV ECHO MEAS - MV E/A: 0.66
BH CV ECHO MEAS - PA ACC SLOPE: 510.7 CM/SEC^2
BH CV ECHO MEAS - PA ACC TIME: 0.12 SEC
BH CV ECHO MEAS - PA MAX PG: 2.4 MMHG
BH CV ECHO MEAS - PA PR(ACCEL): 23.5 MMHG
BH CV ECHO MEAS - PA V2 MAX: 76.8 CM/SEC
BH CV ECHO MEAS - RVDD: 2.6 CM
BH CV ECHO MEAS - SI(AO): 509.6 ML/M^2
BH CV ECHO MEAS - SI(CUBED): 233.5 ML/M^2
BH CV ECHO MEAS - SI(LVOT): 109.2 ML/M^2
BH CV ECHO MEAS - SI(MOD-SP2): 96.9 ML/M^2
BH CV ECHO MEAS - SI(MOD-SP4): 153.6 ML/M^2
BH CV ECHO MEAS - SI(TEICH): 192.8 ML/M^2
BH CV ECHO MEAS - SV(AO): 215.7 ML
BH CV ECHO MEAS - SV(CUBED): 98.8 ML
BH CV ECHO MEAS - SV(LVOT): 46.2 ML
BH CV ECHO MEAS - SV(MOD-SP2): 41 ML
BH CV ECHO MEAS - SV(MOD-SP4): 65 ML
BH CV ECHO MEAS - SV(TEICH): 81.6 ML
BH CV XLRA - TDI S': 8.11 CM/SEC
BILIRUB UR QL STRIP: ABNORMAL
BUN BLD-MCNC: 22 MG/DL (ref 9–23)
BUN/CREAT SERPL: 44 (ref 7–25)
CALCIUM SPEC-SCNC: 8.3 MG/DL (ref 8.7–10.4)
CHLORIDE SERPL-SCNC: 108 MMOL/L (ref 99–109)
CHOLEST SERPL-MCNC: 131 MG/DL (ref 0–200)
CLARITY UR: ABNORMAL
CO2 SERPL-SCNC: 19 MMOL/L (ref 20–31)
COLOR UR: ABNORMAL
CREAT BLD-MCNC: 0.5 MG/DL (ref 0.6–1.3)
DEPRECATED RDW RBC AUTO: 62.3 FL (ref 37–54)
E/E' RATIO: 8.5
ERYTHROCYTE [DISTWIDTH] IN BLOOD BY AUTOMATED COUNT: 18.8 % (ref 11.3–14.5)
FERRITIN SERPL-MCNC: 465 NG/ML (ref 10–291)
GFR SERPL CREATININE-BSD FRML MDRD: 117 ML/MIN/1.73
GLUCOSE BLD-MCNC: 72 MG/DL (ref 70–100)
GLUCOSE UR STRIP-MCNC: NEGATIVE MG/DL
HCT VFR BLD AUTO: 32.1 % (ref 34.5–44)
HDLC SERPL-MCNC: 34 MG/DL (ref 40–60)
HGB BLD-MCNC: 10 G/DL (ref 11.5–15.5)
HGB UR QL STRIP.AUTO: NEGATIVE
HYALINE CASTS UR QL AUTO: ABNORMAL /LPF
KETONES UR QL STRIP: ABNORMAL
LEUKOCYTE ESTERASE UR QL STRIP.AUTO: ABNORMAL
LV EF 2D ECHO EST: 45 %
MCH RBC QN AUTO: 29.3 PG (ref 27–31)
MCHC RBC AUTO-ENTMCNC: 31.2 G/DL (ref 32–36)
MCV RBC AUTO: 94.1 FL (ref 80–99)
NITRITE UR QL STRIP: POSITIVE
PH UR STRIP.AUTO: 6 [PH] (ref 5–8)
PLATELET # BLD AUTO: 199 10*3/MM3 (ref 150–450)
PMV BLD AUTO: 9.1 FL (ref 6–12)
POTASSIUM BLD-SCNC: 4.1 MMOL/L (ref 3.5–5.5)
PROT UR QL STRIP: ABNORMAL
RBC # BLD AUTO: 3.41 10*6/MM3 (ref 3.89–5.14)
RBC # UR: ABNORMAL /HPF
REF LAB TEST METHOD: ABNORMAL
SODIUM BLD-SCNC: 135 MMOL/L (ref 132–146)
SP GR UR STRIP: 1.02 (ref 1–1.03)
SQUAMOUS #/AREA URNS HPF: ABNORMAL /HPF
TRIGL SERPL-MCNC: 94 MG/DL (ref 0–150)
TROPONIN I SERPL-MCNC: 0.32 NG/ML
TROPONIN I SERPL-MCNC: 0.46 NG/ML
UROBILINOGEN UR QL STRIP: ABNORMAL
WBC NRBC COR # BLD: 6.48 10*3/MM3 (ref 3.5–10.8)
WBC UR QL AUTO: ABNORMAL /HPF

## 2017-01-13 PROCEDURE — 74176 CT ABD & PELVIS W/O CONTRAST: CPT

## 2017-01-13 PROCEDURE — 85027 COMPLETE CBC AUTOMATED: CPT | Performed by: INTERNAL MEDICINE

## 2017-01-13 PROCEDURE — 87086 URINE CULTURE/COLONY COUNT: CPT | Performed by: NURSE PRACTITIONER

## 2017-01-13 PROCEDURE — 97162 PT EVAL MOD COMPLEX 30 MIN: CPT

## 2017-01-13 PROCEDURE — 25010000002 SULFUR HEXAFLUORIDE MICROSPH 60.7-25 MG RECONSTITUTED SUSPENSION: Performed by: HOSPITALIST

## 2017-01-13 PROCEDURE — 80048 BASIC METABOLIC PNL TOTAL CA: CPT | Performed by: NURSE PRACTITIONER

## 2017-01-13 PROCEDURE — 81001 URINALYSIS AUTO W/SCOPE: CPT | Performed by: NURSE PRACTITIONER

## 2017-01-13 PROCEDURE — 93306 TTE W/DOPPLER COMPLETE: CPT | Performed by: INTERNAL MEDICINE

## 2017-01-13 PROCEDURE — 86900 BLOOD TYPING SEROLOGIC ABO: CPT

## 2017-01-13 PROCEDURE — 80061 LIPID PANEL: CPT | Performed by: NURSE PRACTITIONER

## 2017-01-13 PROCEDURE — P9016 RBC LEUKOCYTES REDUCED: HCPCS

## 2017-01-13 PROCEDURE — 97116 GAIT TRAINING THERAPY: CPT

## 2017-01-13 PROCEDURE — C8929 TTE W OR WO FOL WCON,DOPPLER: HCPCS

## 2017-01-13 PROCEDURE — 36430 TRANSFUSION BLD/BLD COMPNT: CPT

## 2017-01-13 PROCEDURE — 84484 ASSAY OF TROPONIN QUANT: CPT | Performed by: NURSE PRACTITIONER

## 2017-01-13 PROCEDURE — 82728 ASSAY OF FERRITIN: CPT | Performed by: HOSPITALIST

## 2017-01-13 PROCEDURE — 99233 SBSQ HOSP IP/OBS HIGH 50: CPT | Performed by: HOSPITALIST

## 2017-01-13 RX ORDER — AMLODIPINE BESYLATE 2.5 MG/1
2.5 TABLET ORAL DAILY
Status: DISCONTINUED | OUTPATIENT
Start: 2017-01-13 | End: 2017-01-14

## 2017-01-13 RX ORDER — ASPIRIN 325 MG
325 TABLET ORAL DAILY
Status: DISCONTINUED | OUTPATIENT
Start: 2017-01-13 | End: 2017-01-14

## 2017-01-13 RX ORDER — ATORVASTATIN CALCIUM 20 MG/1
20 TABLET, FILM COATED ORAL NIGHTLY
Status: DISCONTINUED | OUTPATIENT
Start: 2017-01-13 | End: 2017-01-19 | Stop reason: HOSPADM

## 2017-01-13 RX ORDER — BISACODYL 10 MG
10 SUPPOSITORY, RECTAL RECTAL 2 TIMES DAILY PRN
Status: DISCONTINUED | OUTPATIENT
Start: 2017-01-13 | End: 2017-01-19 | Stop reason: HOSPADM

## 2017-01-13 RX ORDER — POLYETHYLENE GLYCOL 3350 17 G/17G
17 POWDER, FOR SOLUTION ORAL DAILY PRN
Status: DISCONTINUED | OUTPATIENT
Start: 2017-01-13 | End: 2017-01-19 | Stop reason: HOSPADM

## 2017-01-13 RX ORDER — FERROUS SULFATE 325(65) MG
325 TABLET ORAL
Status: DISCONTINUED | OUTPATIENT
Start: 2017-01-13 | End: 2017-01-19 | Stop reason: HOSPADM

## 2017-01-13 RX ORDER — CARVEDILOL 3.12 MG/1
3.12 TABLET ORAL EVERY 12 HOURS SCHEDULED
Status: DISCONTINUED | OUTPATIENT
Start: 2017-01-13 | End: 2017-01-16

## 2017-01-13 RX ORDER — LEVOTHYROXINE SODIUM 0.07 MG/1
75 TABLET ORAL DAILY
Status: DISCONTINUED | OUTPATIENT
Start: 2017-01-13 | End: 2017-01-19 | Stop reason: HOSPADM

## 2017-01-13 RX ADMIN — LEVOTHYROXINE SODIUM 75 MCG: 75 TABLET ORAL at 06:26

## 2017-01-13 RX ADMIN — AMLODIPINE BESYLATE 2.5 MG: 2.5 TABLET ORAL at 08:43

## 2017-01-13 RX ADMIN — CARBIDOPA AND LEVODOPA 1 TABLET: 25; 100 TABLET ORAL at 21:30

## 2017-01-13 RX ADMIN — CARVEDILOL 3.12 MG: 3.12 TABLET, FILM COATED ORAL at 08:43

## 2017-01-13 RX ADMIN — CARBIDOPA AND LEVODOPA 1 TABLET: 25; 100 TABLET ORAL at 14:08

## 2017-01-13 RX ADMIN — SULFUR HEXAFLUORIDE 2 ML: KIT at 15:52

## 2017-01-13 RX ADMIN — CARVEDILOL 3.12 MG: 3.12 TABLET, FILM COATED ORAL at 21:30

## 2017-01-13 RX ADMIN — Medication 325 MG: at 08:43

## 2017-01-13 RX ADMIN — CARBIDOPA AND LEVODOPA 1 TABLET: 25; 100 TABLET ORAL at 06:26

## 2017-01-13 RX ADMIN — ASPIRIN 325 MG ORAL TABLET 325 MG: 325 PILL ORAL at 08:44

## 2017-01-13 RX ADMIN — ATORVASTATIN CALCIUM 20 MG: 20 TABLET, FILM COATED ORAL at 21:30

## 2017-01-13 NOTE — PROGRESS NOTES
Discharge Planning Assessment  [facility]     Patient Name: Florinda Knapp  MRN: [id number]  Today's Date: 1/13/2017    Admit Date: 1/12/2017          Discharge Needs Assessment       01/13/17 1446    Living Environment    Lives With alone    Living Arrangements independent living facility    Provides Primary Care For no one, unable/limited ability to care for self    Quality Of Family Relationships supportive    Able to Return to Prior Living Arrangements yes    Discharge Needs Assessment    Concerns To Be Addressed no discharge needs identified    Readmission Within The Last 30 Days no previous admission in last 30 days    Equipment Currently Used at Home wheelchair;walker, rolling    Equipment Needed After Discharge none    Transportation Available car;family or friend will provide    Discharge Disposition rehab facility            Discharge Plan       01/13/17 1447    Case Management/Social Work Plan    Plan [facility]    Patient/Family In Agreement With Plan yes    Additional Comments Spoke with pt at bedside. Pt reports she lives alone at the [facility] in the independent living community, however had gone to the [facility] rehab unit recently. Pt reports she will probably need to return to rehab at the [facility]. SW called Ketty at the [facility] [phone] and updated her that pt is at [facility] and plans to return to the [facility].     Final Note    Final Note SW is following        Discharge Placement     No information found        Expected Discharge Date and Time     Expected Discharge Date Expected Discharge Time    Jan 14, 2017               Demographic Summary       01/13/17 1444    Referral Information    Reason For Consult discharge planning            Functional Status       01/13/17 1444    Functional Status Prior    Ambulation 1-->assistive equipment    Transferring 1-->assistive equipment    Toileting 1-->assistive equipment    Bathing 1-->assistive equipment    Dressing 0-->independent    Eating  0-->independent    Communication 0-->understands/communicates without difficulty    IADL    Medications independent    Meal Preparation assistive person    Housekeeping assistive person    Laundry assistive person    Shopping assistive person    Oral Care independent    Cognitive/Perceptual/Developmental    Current Mental Status/Cognitive Functioning no deficits noted    Recent Changes in Mental Status/Cognitive Functioning no changes    Developmental Stage (Eriksson's Stages of Development) Stage 8 (65 years-death/Late Adulthood) Integrity vs. Despair            Psychosocial     None            Abuse/Neglect     None            Legal     None            Substance Abuse     None            Patient Forms     None          UNIQUE Yadav

## 2017-01-13 NOTE — PLAN OF CARE
Problem: Patient Care Overview (Adult)  Goal: Plan of Care Review  Outcome: Ongoing (interventions implemented as appropriate)    01/13/17 0508   Coping/Psychosocial Response Interventions   Plan Of Care Reviewed With patient   Patient Care Overview   Progress no change   Outcome Evaluation   Outcome Summary/Follow up Plan pt received 2 units PRBC and tolerated it fine. pt in NSR with VSS. pt slept comfortably since having arrived on the floor. no distress noted at this time.         Problem: Functional Deficit (Adult,Obstetrics,Pediatric)  Goal: Signs and Symptoms of Listed Potential Problems Will be Absent or Manageable (Functional Deficit)  Outcome: Ongoing (interventions implemented as appropriate)

## 2017-01-13 NOTE — PROGRESS NOTES
[facility]  INPATIENT PROGRESS NOTE    Date of Admission: 1/12/2017  Length of Stay: 1  Primary Care Physician: Kieran Cottrell MD    Subjective     Chief Complaint: f/u anemia  HPI:  Pt resting in chair with sons at bedside when seen. She feels better overnight but reports drowsiness and fatigue today. Tolerated transfusion. Denies dyspnea and has had no further chest pain since admission. No abdominal pain. Denies melena or hematochezia. Reports last c-scope > 10 yrs ago by Dr. Montero.    Review Of Systems:   Review of Systems   Constitutional: Negative for chills and fever.   Respiratory: Negative for cough and shortness of breath.    Cardiovascular: Negative for chest pain.   Gastrointestinal: Negative for abdominal pain, blood in stool, nausea and vomiting.   Genitourinary: Negative for dysuria and hematuria.     Objective      Temp:  [97.3 °F (36.3 °C)-98.2 °F (36.8 °C)] 97.8 °F (36.6 °C)  Heart Rate:  [65-94] 82  Resp:  [16-20] 20  BP: (114-152)/(68-95) 114/68  Physical Exam  Constitutional: no acute distress, awake, alert, frail  Respiratory: Clear to auscultation bilaterally, nonlabored respirations   Cardiovascular: RRR, no murmurs, rubs, or gallops, palpable pedal pulses bilaterally  Gastrointestinal: Positive bowel sounds, soft, nontender, nondistended  Musculoskeletal: No bilateral ankle edema; left hip incision c/d/i, well-healed  Psychiatric: oriented x 3, flat affect, cooperative  Neurologic: Strength symmetric in all extremities       Results Review:    I have reviewed the labs, radiology results and diagnostic studies.      Results from last 7 days  Lab Units 01/13/17  0446 01/12/17  1845   WBC 10*3/mm3 6.48 8.47   HEMOGLOBIN g/dL 10.0* 8.2*   HEMATOCRIT % 32.1* 27.3*   PLATELETS 10*3/mm3 199 244         Results from last 7 days  Lab Units 01/13/17  0446 01/12/17  1845   SODIUM mmol/L 135 140   POTASSIUM mmol/L 4.1 4.1   CHLORIDE mmol/L 108 107    TOTAL CO2 mmol/L 19.0* 22.0   BUN mg/dL 22 30*   CREATININE mg/dL 0.50* 0.70   GLUCOSE mg/dL 72 84   CALCIUM mg/dL 8.3* 9.1       Results for VAIBHAV KNAPP (MRN [id number]) as of 1/13/2017 11:49   Ref. Range 1/12/2017 18:45 1/12/2017 21:14 1/13/2017 04:46   Troponin I Latest Ref Range: <=0.040 ng/mL 0.255 (H) 0.293 (H) 0.461 (H)         Culture Data:   Cultures:    URINE CULTURE   Date Value Ref Range Status   01/12/2017 No growth  Preliminary       Radiology Data: CT abd/pelvis completed with official interp pending. On my review/interp, I see no evidence of acute hemorrhage / hematoma    I have reviewed the medications.    Assessment/Plan   Mrs. Knapp is an 85 year-old F with a past medical history of parkinson's disease, recent left JALEN at [facility] 12/6/16, iron deficiency anemia, chronic urinary retention, HTN, HLP, anxiety/depression, and osteoarthritis who presented from the [facility] where she is undergoing rehab with symptomatic anemia, chest pain/dyspnea x 2-3 days, fatigue, and generalized weakness. ER evaluation with grey zone troponins. Pt also with recently diagnosed UTI which is a recurrent concern for her.       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 I suspect recent blood loss from surgery is contributing and she may also have some underlying myelodysplasia given high normal MCV. Unable to rule out occult  GI bleed without repeat C-scope. D/w pt and children. Given no active hemorrhage, recommend inpt monitoring to ensure stability of Hgb and then, after full physical recovery made, she can consider outpt c-scope.   - Troponins trended and flat but in grey zone. No ECG findings of ischemia. Will monitor for recurrent pain and involve Cardiology if warranted  - Continue aspirin, coreg, lipitor, and prn nitro  - Echo ordered and pending  - She remains weak and debilitated in spite of transfusion, suspect this is mainly deconditioning from recent surgery and in the setting of parkinson's   - PT/OT as able   - Follow up urine cx, treat based on its results  - Follow Bp   - Consult CM, pt should likely return to rehab on 1/15-1/16 depending on progress     DVT prophylaxis: lovenox as pt is high risk  AND/DNR but full medical treatment  Discharge Planning: I expect patient to be discharged to rehab in 2-3 days.    Alexandro Day MD   01/13/17   8:59 AM    Please note that portions of this note may have been completed with a voice recognition program. Efforts were made to edit the dictations, but occasionally words are mistranscribed.

## 2017-01-13 NOTE — PROGRESS NOTES
Acute Care - Physical Therapy Initial Evaluation  [facility]     Patient Name: Florinda Knapp  : 3/5/1931  MRN: [id number]  Today's Date: 2017   Onset of Illness/Injury or Date of Surgery Date: 17  Date of Referral to PT: 17  Referring Physician: ARIEL Oshea      Admit Date: 2017     Visit Dx:    ICD-10-CM ICD-9-CM   1. Symptomatic anemia D64.9 285.9   2. Weakness R53.1 780.79   3. Failure to thrive in adult R62.7 783.7   4. Dehydration E86.0 276.51   5. Impaired functional mobility, balance, gait, and endurance Z74.09 V49.89     Patient Active Problem List   Diagnosis   • Dysuria   • Elbow injury   • Fracture of left olecranon process   • Parkinson's disease   • Trauma left hip   • Symptomatic anemia   • Acute respiratory failure with hypoxia   • Generalized weakness   • Chest pain   • Symptomatic anemia     Past Medical History   Diagnosis Date   • Anemia    • Anxiety    • Depression    • Disease of thyroid gland    • Failure to thrive in adult    • Hyperlipidemia    • Hypertension    • Osteoarthritis    • Stress incontinence    • Urinary retention      Past Surgical History   Procedure Laterality Date   • Appendectomy     • Back surgery     • Cataract extraction     • Elbow procedure     • Knee surgery     • Joint replacement            PT ASSESSMENT (last 72 hours)      PT Evaluation       17 0850 17 2330    Rehab Evaluation    Document Type evaluation  -KM     Subjective Information agree to therapy;complains of;weakness;fatigue   Needing encouragement to get oob and participate in rx  -KM     Patient Effort, Rehab Treatment good  -KM     Symptoms Noted During/After Treatment --   expresses anxiety re: falls  -KM     General Information    Patient Profile Review yes  -KM     Onset of Illness/Injury or Date of Surgery Date 17  -KM     Referring Physician ARIEL Oshea  -KM     Pertinent History Of Current Problem Patient admiltted from SNF where she was  undergoing rehab following THR 12/06/2016 and [facility] stay. Pt admitted with fatigue, weakness and S.O.A. , lab work revealed low H and H. pt is s/p transfusion yesterday.  -KM     Precautions/Limitations fall precautions;hip precautions- left  -KM     Prior Level of Function mod assist:;gait;transfer;bed mobility;ADL's  -KM     Equipment Currently Used at Home walker, rolling;wheelchair  -KM wheelchair;bath bench;grab bar  -CB    Risks Reviewed patient and family:;increased discomfort  -KM     Benefits Reviewed patient and family:;improve function  -KM     Living Environment    Lives With facility resident   Previously lived in assisted living prior to fall  -KM facility resident  -CB    Living Arrangements assisted living   anticipate return to snf for rehab  -KM assisted living  -CB    Home Accessibility no concerns  -KM no concerns  -CB    Stair Railings at Home  none  -CB    Type of Financial/Environmental Concern  none  -CB    Transportation Available  family or friend will provide  -CB    Clinical Impression    Date of Referral to PT 01/12/17  -KM     PT Diagnosis impaired mobility  -KM     Patient/Family Goals Statement regain PLOF, return to assisted living  -KM     Criteria for Skilled Therapeutic Interventions Met yes  -KM     Rehab Potential fair, will monitor progress closely  -KM     Pain Assessment    Pain Assessment No/denies pain  -KM     Cognitive Assessment/Intervention    Current Cognitive/Communication Assessment functional  -KM     Follows Commands/Answers Questions able to follow single-step instructions;100% of the time  -KM     Personal Safety fully aware of deficits;WNL/WFL  -KM     Personal Safety Interventions fall prevention program maintained;gait belt;nonskid shoes/slippers when out of bed  -KM     ROM (Range of Motion)    General ROM lower extremity range of motion deficits identified   l ankle 10-45 degrees pf actively, passive df to 0 degrees  -KM     MMT (Manual Muscle Testing)     General MMT Assessment lower extremity strength deficits identified  -KM     General MMT Assessment Detail --   R l/e grossly 4/5, L l/e hip 3-/5, knee 4-/5, l ankle df 1/5  -KM     Bed Mobility, Assessment/Treatment    Bed Mobility, Assistive Device bed rails;head of bed elevated;draw sheet  -KM     Bed Mobility, Roll Right, [place] moderate assist (50% patient effort);verbal cues required  -KM     Bed Mobility, Scoot/Bridge, [place] moderate assist (50% patient effort)  -KM     Bed Mob, Supine to Sit, [place] moderate assist (50% patient effort)  -KM     Transfer Assessment/Treatment    Transfers, Sit-Stand [place] moderate assist (50% patient effort);2 person assist required;verbal cues required   cues to stand erect  -KM     Transfers, Sit-Stand-Sit, Assist Device rolling walker  -KM     Gait Assessment/Treatment    Gait, [place] Level moderate assist (50% patient effort);2 person assist required;verbal cues required  -KM     Gait, Assistive Device rolling walker  -KM     Gait, Distance (Feet) 5  -KM     Gait, Gait Deviations bernice decreased;decreased heel strike;left:;forward flexed posture;step length decreased  -KM     Gait, Safety Issues weight-shifting ability decreased;step length decreased;balance decreased during turns;loses balance backward  -KM     Gait, Impairments postural control impaired;impaired balance  -KM     Therapy Exercises    Bilateral Lower Extremities AAROM:;10 reps;sitting;ankle pumps/circles;calf stretch;hip flexion;LAQ;heel slides;hip abduction/adduction;quad sets;glut sets  -KM     Positioning and Restraints    Pre-Treatment Position in bed  -KM     Post Treatment Position chair  -KM     In Chair notified nsg;reclined;call light within reach;encouraged to call for assist;with family/caregiver  -KM       User Key  (r) = Recorded By, (t) = Taken By, (c) = Cosigned By    Initials Name Provider Type    PATEL Howe, PT Physical Therapist    CB  Abigail Mirza, RN Registered Nurse          Physical Therapy Education     Title: PT OT SLP Therapies (Done)     Topic: Physical Therapy (Done)     Point: Mobility training (Done)    Learning Progress Summary    Learner Readiness Method Response Comment Documented by Status   Patient Acceptance E VU,NR   01/13/17 0953 Done   Family Acceptance E VU,NR   01/13/17 0953 Done               Point: Home exercise program (Done)    Learning Progress Summary    Learner Readiness Method Response Comment Documented by Status   Patient Acceptance E VU,NR   01/13/17 0953 Done   Family Acceptance E VU,NR   01/13/17 0953 Done               Point: Body mechanics (Done)    Learning Progress Summary    Learner Readiness Method Response Comment Documented by Status   Patient Acceptance E VU,NR   01/13/17 0953 Done   Family Acceptance E VU,NR   01/13/17 0953 Done               Point: Precautions (Done)    Learning Progress Summary    Learner Readiness Method Response Comment Documented by Status   Patient Acceptance E VU,NR   01/13/17 0953 Done   Family Acceptance E VU,NR   01/13/17 0953 Done                      User Key     Initials Effective Dates Name Provider Type Discipline     06/19/15 -  Mary Howe, JINNY Physical Therapist PT                PT Recommendation and Plan  Anticipated Discharge Disposition: skilled nursing facility  PT Frequency: daily, per priority policy  Plan of Care Review  Plan Of Care Reviewed With: patient  Progress: no change  Outcome Summary/Follow up Plan: P.T. carlton completed revealing limitation in functional mobility, fear of falling, decreased postural control. Pt would benfit from skilled svcs to address instability in function  and address multiple complex problems affectting decline in function, assist with d/c plan to continue rx at snf.          IP PT Goals       01/13/17 0954          Bed Mobility PT LTG    Bed Mobility PT LTG, Date Established 01/13/17  -KM      Bed  Mobility PT LTG, Time to Achieve 1 wk  -KM      Bed Mobility PT LTG, Activity Type all bed mobility  -KM      Bed Mobility PT LTG, [place] Level verbal cues required  -KM      Bed Mobility PT Goal  LTG, Assist Device bed rails  -KM      Transfer Training PT LTG    Transfer Training PT LTG, Date Established 01/13/17  -KM      Transfer Training PT LTG, Time to Achieve 1 wk  -KM      Transfer Training PT LTG, Activity Type all transfers  -KM      Transfer Training PT LTG, [place] Level minimum assist (75% patient effort)  -KM      Transfer Training PT LTG, Assist Device walker, rolling  -KM      Gait Training PT LTG    Gait Training Goal PT LTG, Date Established 01/13/17  -KM      Gait Training Goal PT LTG, Time to Achieve 1 wk  -KM      Gait Training Goal PT LTG, [place] Level minimum assist (75% patient effort)  -KM      Gait Training Goal PT LTG, Assist Device walker, rolling  -KM      Gait Training Goal PT LTG, Distance to Achieve 50  -KM        User Key  (r) = Recorded By, (t) = Taken By, (c) = Cosigned By    Initials Name Provider Type    PATEL Howe, PT Physical Therapist                Outcome Measures       01/13/17 0850          How much help from another person do you currently need...    Turning from your back to your side while in flat bed without using bedrails? 2  -KM      Moving from lying on back to sitting on the side of a flat bed without bedrails? 2  -KM      Moving to and from a bed to a chair (including a wheelchair)? 2  -KM      Standing up from a chair using your arms (e.g., wheelchair, bedside chair)? 2  -KM      Climbing 3-5 steps with a railing? 1  -KM      To walk in hospital room? 2  -KM      AM-PAC 6 Clicks Score 11  -KM      Functional Assessment    Outcome Measure Options AM-PAC 6 Clicks Basic Mobility (PT)  -KM        User Key  (r) = Recorded By, (t) = Taken By, (c) = Cosigned By    Initials Name Provider Type    PATEL Howe, PT Physical  Therapist           Time Calculation:         PT Charges       01/13/17 0959          Time Calculation    Start Time 0850  -KM      PT Received On 01/13/17  -KM      PT Goal Re-Cert Due Date 01/23/17  -KM      Time Calculation- PT    Total Timed Code Minutes- PT 15 minute(s)  -KM        User Key  (r) = Recorded By, (t) = Taken By, (c) = Cosigned By    Initials Name Provider Type     Mary Howe, PT Physical Therapist          Therapy Charges for Today     Code Description Service Date Service Provider Modifiers Qty    33192608649 HC PT EVAL MOD COMPLEXITY 3 1/13/2017 Mary Howe, PT GP 1    77301291393 HC GAIT TRAINING EA 15 MIN 1/13/2017 Mary Howe, PT GP 1    49336537947 HC PT THER SUPP EA 15 MIN 1/13/2017 Mary Howe, PT GP 2          PT G-Codes  Outcome Measure Options: AM-PAC 6 Clicks Basic Mobility (PT)      Mary Howe, PT  1/13/2017

## 2017-01-13 NOTE — H&P
[facility] Medicine Services  HISTORY AND PHYSICAL    Primary Care Physician: Kieran Cottrell MD    Subjective     Chief Complaint:  Abnormal labs    History of Present Illness    85 year old female s/p left hip replacement 12/6/16 at  currently receiving rehab at the [facility], presents to the ED with generalized weakness and low H&H.  Was seen today by Dr. Cottrell who referred her to the ED secondary to her having symptomatic anemia.  Patient states that she has had intermittent chest pain described as heaviness, without radiation, with associated shortness of breath for the last 2-3 days.  She complains of extreme fatigue and generalized weakness.  She denies any n/v/d, abdominal pain, melena, diaphoresis, cough, or dysuria.  Son states that she has had a UTI for several weeks which has not improved on antibiotics. Pt has a hx iron-deficiency anemia and required a transfusion at  last month.  Labs reveals an H&H of 8.2/27.3 and the ED physician ordered 2uPRBC, first troponin was elevated second one is pending, EKG negative.  She is being admitted to the hospitalist for further evaluation.    Review of Systems   Constitutional: Positive for activity change, appetite change and fatigue. Negative for chills, diaphoresis and unexpected weight change.   HENT: Negative.    Eyes: Negative.    Respiratory: Positive for shortness of breath. Negative for apnea, cough, choking, chest tightness, wheezing and stridor.    Cardiovascular: Positive for chest pain and palpitations. Negative for leg swelling.   Gastrointestinal: Negative.    Endocrine: Negative.    Genitourinary: Negative.    Musculoskeletal: Negative.    Skin: Negative.    Allergic/Immunologic: Negative.    Neurological: Positive for weakness (generalized weakness). Negative for dizziness, tremors, seizures, syncope, facial asymmetry, speech difficulty, light-headedness, numbness and headaches.   Hematological: Negative.   "  Psychiatric/Behavioral: Negative.       Otherwise complete ROS reviewed and negative except as mentioned in the HPI.      Past Medical History:   Past Medical History   Diagnosis Date   • Anemia    • Anxiety    • Depression    • Disease of thyroid gland    • Failure to thrive in adult    • Hyperlipidemia    • Hypertension    • Osteoarthritis    • Stress incontinence    • Urinary retention        Past Surgical History:  Past Surgical History   Procedure Laterality Date   • Appendectomy     • Back surgery     • Cataract extraction     • Elbow procedure     • Knee surgery     • Joint replacement         Family History:   Family History   Problem Relation Age of Onset   • Alcohol abuse Other    • Hypertension Other    • Stroke Other      Social History:   Social History     Social History   • Marital status:      Spouse name: N/A   • Number of children: N/A   • Years of education: N/A     Occupational History   • Not on file.     Social History Main Topics   • Smoking status: Never Smoker   • Smokeless tobacco: Not on file   • Alcohol use Yes      Comment: social   • Drug use: Not on file   • Sexual activity: Not on file     Other Topics Concern   • Not on file     Social History Narrative   • No narrative on file       Medications:    (Not in a hospital admission)  Allergies:  Allergies   Allergen Reactions   • Alendronate    • Aripiprazole    • Celecoxib    • Nitrofurantoin    • Procaine    • Sulfa Antibiotics        Objective     Vital Signs:   Visit Vitals   • /83   • Pulse 84   • Temp 98.2 °F (36.8 °C)   • Resp 16   • Ht 59\" (149.9 cm)   • Wt 110 lb (49.9 kg)   • SpO2 93%   • BMI 22.22 kg/m2     Physical Exam   Constitutional: She is oriented to person, place, and time. She appears well-developed and well-nourished. No distress.   HENT:   Head: Normocephalic.   Eyes: Pupils are equal, round, and reactive to light.   Neck: Normal range of motion. Neck supple.   Cardiovascular: Normal rate, regular " rhythm, normal heart sounds and intact distal pulses.  Exam reveals no gallop and no friction rub.    No murmur heard.  Pulmonary/Chest: Effort normal and breath sounds normal. No respiratory distress. She has no wheezes. She has no rales. She exhibits no tenderness.   Abdominal: Soft. Bowel sounds are normal. She exhibits no distension. There is no tenderness. There is no rebound and no guarding.   Musculoskeletal: She exhibits no edema, tenderness or deformity.   Limited ROM LLE 2/2 s/p left hip repair   Neurological: She is oriented to person, place, and time. No cranial nerve deficit.   Very drowsy   Skin: Skin is warm and dry. No rash noted. She is not diaphoretic. No erythema. There is pallor.   Psychiatric: She has a normal mood and affect. Her behavior is normal. Judgment and thought content normal.             Results Reviewed:    Results from last 7 days  Lab Units 01/12/17  1845   WBC 10*3/mm3 8.47   HEMOGLOBIN g/dL 8.2*   PLATELETS 10*3/mm3 244       Results from last 7 days  Lab Units 01/12/17  1845   SODIUM mmol/L 140   POTASSIUM mmol/L 4.1   TOTAL CO2 mmol/L 22.0   CREATININE mg/dL 0.70   GLUCOSE mg/dL 84   CALCIUM mg/dL 9.1       I have personally reviewed and interpreted the radiology studies and ECG obtained at time of admission.     Assessment / Plan      Assessment & Plan  Principal Problem:    Chest pain  Active Problems:    Anemia    Acute respiratory failure with hypoxia    Generalized weakness    Parkinson's disease     85 year old female s/p left hip replacement 1 month ago, referred to the ED by Dr. Cottrell for symptomatic anemia.  Was ordered 2uPRBC by the ED physician.  Has had chest heaviness for 2-3 days, first troponin elevated at 0.255, EKG negative.  Upon arrival to ED her oxygen saturation was 86% on room air, c/o severe fatigue and generalized weakness.  Discussed with patient and her son who is her POA at length her code status, she does have a living will, and wishes to be made  "an AND but wants full medical treatment up to that point.    PLAN:  -recheck cbc after blood transfused  -serial troponins  -nitro prn  -EKG in the am  -cbc, bmp, flp in the am  -oxygen to keep O2 sat >90 %  -continuous pulse ox  -pt/ot consult in the am  -fall precautions  -consult case management for discharge planning    DVT prophylaxis:  Teds, scuds, hold anticoagulation 2/2 anemia  Code Status:  AND     I discussed the patients findings and my recommendations with:  Patient, family, primary care team    Marguerite Schilling, APRN 01/12/17 9:25 PM      Brief Attending Note   I have seen and examined the patient, performing an independent face-to-face diagnostic evaluation at bedside.    The plan of care reviewed and developed with the advanced practice clinician (APC):   Marguerite schilling  Brief Summary Statement/HPI:   86 yo f w/ alverto dz, htn, hypothyroidism who suffered left hip fracture last year and initially attempts to treat non-operatively/conservatively failed due to persistent pain. Ultimately had left [facility] at the [facility] early December 2016. Post operatively did have brief icu stay due to low bp, but after fluids and blood patient responded well and ultimately has been in rehab facilities (currently [facility]. ) since that time. Has had recurrent difficulties w/ urinary retention and uti's. Over past couple weeks has had increased generalized weakness and anxiety. At follow up appointment at dr. Cottrell office today told that she was anemia and due to lethargy was sent to e.r. For further evaluation. Much of history obtained from son and patient asks to be left alone.   Son states anxiety playing large role, memory has been \"getting worse\" over past months. Recently her fatigue is biggest issue. States no fever, no chills. Has been on multiple antibiotics for uti but no current dysuria or fever. Denies constipation or diarreha. Has occasional chest pain while pushing herself up from toilet " after moving her bowels but otherwise no dyspnea or chest pain.       Attending Physical Exam:  Alert, oriented to person, month, wrong year but re-oriented, eyes closed but opens them easily/fatigued appearing  Ncat, oroph clear  rrr  ctab  abd mildly distended, mild diffuse tenderness, +bs, no rebound, soft  No cce  No hematoma left hip  Speech clear, equal , face symmetric  Flat affect      Brief Assessment/Plan :    *Symptomatic Anemia, iron deficient   -guaic negative in e.r  *Mild elevation troponins/grey zone troponin (due to above)   -currently denies chest pain; ekg unchanged from prior  *Severe Generalized weakness  *hx left JALEN (at , early December 2016)  *Mild abd Pain  *Hx urinary retention  *Hx recurrent uti's  *Parkinson's disease  *Anxiety  *HTN  *Hypothyroidism (normal tsh)  *Hx picc  *DNR/AND (full support)   -per discussion w/ son patient would not want aggressive/invasive procedures/surgeries but otherwise treat aggressively at this point      Plan:  -transfuse 2 units prbc  -ct a/p (routine) to r/o inflammation/retroperitoneal bleed/constipation, etc  -continue asa, add coreg low dose, lipitor, prn nitroglycerin  -serial troponins to trend, a.m. Echo; hold on cardiology consultation unless developing chest pain/troponins trending up. I discussed this plan w/ son and patient and they in agreement. The mild elevation troponins most likely due to anemia (currently chest pain free and ekg unchanged prior 2016)  -p.t./o.t eval  -a.m. Cbc, bmp  -keep bowels moving    Sq lovenox for dvt prophylaxis    See above for further detailed assessment and plan developed with APC which I have reviewed and/or edited.    I believe this patient requires inpatient status due to need for blood transfusion, cardiac monitoring due to elevated troponins      Marguerite Oshea, APRN 01/12/17 9:25 PM

## 2017-01-13 NOTE — PLAN OF CARE
Problem: Patient Care Overview (Adult)  Goal: Plan of Care Review  Outcome: Ongoing (interventions implemented as appropriate)    01/13/17 3442   Coping/Psychosocial Response Interventions   Plan Of Care Reviewed With patient;family   Outcome Evaluation   Outcome Summary/Follow up Plan Pt SR on tele, vitals stable. No complaints voiced. Will continue to monitor labs. Case management consulted for DC placement.       Goal: Adult Individualization and Mutuality  Outcome: Ongoing (interventions implemented as appropriate)  Goal: Discharge Needs Assessment  Outcome: Ongoing (interventions implemented as appropriate)    Problem: Pressure Ulcer Risk (Estevan Scale) (Adult,Obstetrics,Pediatric)  Goal: Identify Related Risk Factors and Signs and Symptoms  Outcome: Ongoing (interventions implemented as appropriate)  Goal: Skin Integrity  Outcome: Ongoing (interventions implemented as appropriate)    Problem: Fall Risk (Adult)  Goal: Identify Related Risk Factors and Signs and Symptoms  Outcome: Ongoing (interventions implemented as appropriate)  Goal: Absence of Falls  Outcome: Ongoing (interventions implemented as appropriate)    Problem: Fluid Volume Deficit (Adult)  Goal: Identify Related Risk Factors and Signs and Symptoms  Outcome: Ongoing (interventions implemented as appropriate)  Goal: Fluid/Electrolyte Balance  Outcome: Ongoing (interventions implemented as appropriate)  Goal: Comfort/Well Being  Outcome: Ongoing (interventions implemented as appropriate)

## 2017-01-13 NOTE — PLAN OF CARE
Problem: Patient Care Overview (Adult)  Goal: Plan of Care Review  Outcome: Ongoing (interventions implemented as appropriate)    01/13/17 0954   Coping/Psychosocial Response Interventions   Plan Of Care Reviewed With patient   Outcome Evaluation   Outcome Summary/Follow up Plan KAIT carlton completed revealing limitation in functional mobility, fear of falling, decreased postural control. Pt would benefit from skilled svcs to address instability in function and  multiple complex problems affectting decline in function, assist with d/c plan to continue rx at snf.         Problem: Inpatient Physical Therapy  Goal: Bed Mobility Goal LTG- PT  Outcome: Ongoing (interventions implemented as appropriate)    01/13/17 0954   Bed Mobility PT LTG   Bed Mobility PT LTG, Date Established 01/13/17   Bed Mobility PT LTG, Time to Achieve 1 wk   Bed Mobility PT LTG, Activity Type all bed mobility   Bed Mobility PT LTG, [place] Level verbal cues required   Bed Mobility PT Goal LTG, Assist Device bed rails       Goal: Transfer Training Goal 1 LTG- PT  Outcome: Ongoing (interventions implemented as appropriate)    01/13/17 0954   Transfer Training PT LTG   Transfer Training PT LTG, Date Established 01/13/17   Transfer Training PT LTG, Time to Achieve 1 wk   Transfer Training PT LTG, Activity Type all transfers   Transfer Training PT LTG, [place] Level minimum assist (75% patient effort)   Transfer Training PT LTG, Assist Device walker, rolling       Goal: Gait Training Goal LTG- PT  Outcome: Ongoing (interventions implemented as appropriate)    01/13/17 0954   Gait Training PT LTG   Gait Training Goal PT LTG, Date Established 01/13/17   Gait Training Goal PT LTG, Time to Achieve 1 wk   Gait Training Goal PT LTG, [place] Level minimum assist (75% patient effort)   Gait Training Goal PT LTG, Assist Device walker, rolling   Gait Training Goal PT LTG, Distance to Achieve 50

## 2017-01-14 PROBLEM — K52.9 COLITIS: Status: ACTIVE | Noted: 2017-01-14

## 2017-01-14 PROBLEM — I50.9 CHF WITH CARDIOMYOPATHY (HCC): Status: ACTIVE | Noted: 2017-01-14

## 2017-01-14 PROBLEM — F41.9 ANXIETY: Status: ACTIVE | Noted: 2017-01-14

## 2017-01-14 PROBLEM — I42.9 CHF WITH CARDIOMYOPATHY (HCC): Status: ACTIVE | Noted: 2017-01-14

## 2017-01-14 LAB
ABO + RH BLD: NORMAL
ABO + RH BLD: NORMAL
ALBUMIN SERPL-MCNC: 2.7 G/DL (ref 3.2–4.8)
ALBUMIN/GLOB SERPL: 0.8 G/DL (ref 1.5–2.5)
ALP SERPL-CCNC: 54 U/L (ref 25–100)
ALT SERPL W P-5'-P-CCNC: 5 U/L (ref 7–40)
ANION GAP SERPL CALCULATED.3IONS-SCNC: 8 MMOL/L (ref 3–11)
AST SERPL-CCNC: 18 U/L (ref 0–33)
BACTERIA SPEC AEROBE CULT: NORMAL
BH BB BLOOD EXPIRATION DATE: NORMAL
BH BB BLOOD EXPIRATION DATE: NORMAL
BH BB BLOOD TYPE BARCODE: 5100
BH BB BLOOD TYPE BARCODE: 5100
BH BB DISPENSE STATUS: NORMAL
BH BB DISPENSE STATUS: NORMAL
BH BB PRODUCT CODE: NORMAL
BH BB PRODUCT CODE: NORMAL
BH BB UNIT NUMBER: NORMAL
BH BB UNIT NUMBER: NORMAL
BILIRUB SERPL-MCNC: 1.1 MG/DL (ref 0.3–1.2)
BUN BLD-MCNC: 23 MG/DL (ref 9–23)
BUN/CREAT SERPL: 46 (ref 7–25)
CALCIUM SPEC-SCNC: 8.3 MG/DL (ref 8.7–10.4)
CHLORIDE SERPL-SCNC: 110 MMOL/L (ref 99–109)
CO2 SERPL-SCNC: 22 MMOL/L (ref 20–31)
CREAT BLD-MCNC: 0.5 MG/DL (ref 0.6–1.3)
CROSSMATCH INTERPRETATION: NORMAL
CROSSMATCH INTERPRETATION: NORMAL
DEPRECATED RDW RBC AUTO: 67.2 FL (ref 37–54)
ERYTHROCYTE [DISTWIDTH] IN BLOOD BY AUTOMATED COUNT: 20.3 % (ref 11.3–14.5)
GFR SERPL CREATININE-BSD FRML MDRD: 117 ML/MIN/1.73
GLOBULIN UR ELPH-MCNC: 3.3 GM/DL
GLUCOSE BLD-MCNC: 79 MG/DL (ref 70–100)
HCT VFR BLD AUTO: 30.7 % (ref 34.5–44)
HGB BLD-MCNC: 9.7 G/DL (ref 11.5–15.5)
MCH RBC QN AUTO: 29.5 PG (ref 27–31)
MCHC RBC AUTO-ENTMCNC: 31.6 G/DL (ref 32–36)
MCV RBC AUTO: 93.3 FL (ref 80–99)
PLATELET # BLD AUTO: 177 10*3/MM3 (ref 150–450)
PMV BLD AUTO: 8.6 FL (ref 6–12)
POTASSIUM BLD-SCNC: 3.8 MMOL/L (ref 3.5–5.5)
PROT SERPL-MCNC: 6 G/DL (ref 5.7–8.2)
RBC # BLD AUTO: 3.29 10*6/MM3 (ref 3.89–5.14)
SODIUM BLD-SCNC: 140 MMOL/L (ref 132–146)
UNIT  ABO: NORMAL
UNIT  ABO: NORMAL
UNIT  RH: NORMAL
UNIT  RH: NORMAL
WBC NRBC COR # BLD: 5.23 10*3/MM3 (ref 3.5–10.8)

## 2017-01-14 PROCEDURE — 25010000002 ENOXAPARIN PER 10 MG: Performed by: INTERNAL MEDICINE

## 2017-01-14 PROCEDURE — 80053 COMPREHEN METABOLIC PANEL: CPT | Performed by: HOSPITALIST

## 2017-01-14 PROCEDURE — 97167 OT EVAL HIGH COMPLEX 60 MIN: CPT

## 2017-01-14 PROCEDURE — 85027 COMPLETE CBC AUTOMATED: CPT | Performed by: HOSPITALIST

## 2017-01-14 PROCEDURE — 97530 THERAPEUTIC ACTIVITIES: CPT

## 2017-01-14 PROCEDURE — 99233 SBSQ HOSP IP/OBS HIGH 50: CPT | Performed by: INTERNAL MEDICINE

## 2017-01-14 PROCEDURE — 25010000002 FUROSEMIDE PER 20 MG: Performed by: INTERNAL MEDICINE

## 2017-01-14 PROCEDURE — 25010000002 PIPERACILLIN SOD-TAZOBACTAM PER 1 G: Performed by: INTERNAL MEDICINE

## 2017-01-14 RX ORDER — LORAZEPAM 0.5 MG/1
0.25 TABLET ORAL 2 TIMES DAILY PRN
Status: DISCONTINUED | OUTPATIENT
Start: 2017-01-14 | End: 2017-01-19 | Stop reason: HOSPADM

## 2017-01-14 RX ORDER — LOSARTAN POTASSIUM 25 MG/1
25 TABLET ORAL
Status: DISCONTINUED | OUTPATIENT
Start: 2017-01-14 | End: 2017-01-15

## 2017-01-14 RX ORDER — ASPIRIN 81 MG/1
81 TABLET, CHEWABLE ORAL DAILY
Status: DISCONTINUED | OUTPATIENT
Start: 2017-01-15 | End: 2017-01-19 | Stop reason: HOSPADM

## 2017-01-14 RX ORDER — SACCHAROMYCES BOULARDII 250 MG
250 CAPSULE ORAL 2 TIMES DAILY
Status: DISCONTINUED | OUTPATIENT
Start: 2017-01-14 | End: 2017-01-14 | Stop reason: SDUPTHER

## 2017-01-14 RX ORDER — FUROSEMIDE 10 MG/ML
20 INJECTION INTRAMUSCULAR; INTRAVENOUS ONCE
Status: COMPLETED | OUTPATIENT
Start: 2017-01-14 | End: 2017-01-14

## 2017-01-14 RX ORDER — SACCHAROMYCES BOULARDII 250 MG
250 CAPSULE ORAL 2 TIMES DAILY
Status: DISCONTINUED | OUTPATIENT
Start: 2017-01-14 | End: 2017-01-19 | Stop reason: HOSPADM

## 2017-01-14 RX ADMIN — TAZOBACTAM SODIUM AND PIPERACILLIN SODIUM 4.5 G: 500; 4 INJECTION, SOLUTION INTRAVENOUS at 20:26

## 2017-01-14 RX ADMIN — CARBIDOPA AND LEVODOPA 1 TABLET: 25; 100 TABLET ORAL at 13:44

## 2017-01-14 RX ADMIN — CARBIDOPA AND LEVODOPA 1 TABLET: 25; 100 TABLET ORAL at 05:33

## 2017-01-14 RX ADMIN — TAZOBACTAM SODIUM AND PIPERACILLIN SODIUM 4.5 G: 500; 4 INJECTION, SOLUTION INTRAVENOUS at 13:44

## 2017-01-14 RX ADMIN — ENOXAPARIN SODIUM 30 MG: 30 INJECTION SUBCUTANEOUS at 05:33

## 2017-01-14 RX ADMIN — AMLODIPINE BESYLATE 2.5 MG: 2.5 TABLET ORAL at 09:01

## 2017-01-14 RX ADMIN — Medication 325 MG: at 09:01

## 2017-01-14 RX ADMIN — ASPIRIN 325 MG ORAL TABLET 325 MG: 325 PILL ORAL at 09:01

## 2017-01-14 RX ADMIN — CARBIDOPA AND LEVODOPA 1 TABLET: 25; 100 TABLET ORAL at 20:28

## 2017-01-14 RX ADMIN — FUROSEMIDE 20 MG: 10 INJECTION, SOLUTION INTRAMUSCULAR; INTRAVENOUS at 17:15

## 2017-01-14 RX ADMIN — ATORVASTATIN CALCIUM 20 MG: 20 TABLET, FILM COATED ORAL at 20:27

## 2017-01-14 RX ADMIN — Medication 250 MG: at 17:15

## 2017-01-14 RX ADMIN — Medication 250 MG: at 13:44

## 2017-01-14 RX ADMIN — LEVOTHYROXINE SODIUM 75 MCG: 75 TABLET ORAL at 05:33

## 2017-01-14 RX ADMIN — POLYETHYLENE GLYCOL 3350 17 G: 17 POWDER, FOR SOLUTION ORAL at 17:24

## 2017-01-14 RX ADMIN — CARVEDILOL 3.12 MG: 3.12 TABLET, FILM COATED ORAL at 09:01

## 2017-01-14 NOTE — PLAN OF CARE
Problem: Patient Care Overview (Adult)  Goal: Plan of Care Review  Outcome: Ongoing (interventions implemented as appropriate)    01/14/17 0428   Coping/Psychosocial Response Interventions   Plan Of Care Reviewed With patient   Patient Care Overview   Progress no change   Outcome Evaluation   Outcome Summary/Follow up Plan pt slept throughout the night with no complaints or distress noted. remains NSR with VSS.         Problem: Functional Deficit (Adult,Obstetrics,Pediatric)  Goal: Signs and Symptoms of Listed Potential Problems Will be Absent or Manageable (Functional Deficit)  Outcome: Ongoing (interventions implemented as appropriate)    Problem: Pressure Ulcer Risk (Estevan Scale) (Adult,Obstetrics,Pediatric)  Goal: Identify Related Risk Factors and Signs and Symptoms  Outcome: Ongoing (interventions implemented as appropriate)  Goal: Skin Integrity  Outcome: Ongoing (interventions implemented as appropriate)    Problem: Fall Risk (Adult)  Goal: Identify Related Risk Factors and Signs and Symptoms  Outcome: Ongoing (interventions implemented as appropriate)  Goal: Absence of Falls  Outcome: Ongoing (interventions implemented as appropriate)    Problem: Fluid Volume Deficit (Adult)  Goal: Identify Related Risk Factors and Signs and Symptoms  Outcome: Ongoing (interventions implemented as appropriate)  Goal: Fluid/Electrolyte Balance  Outcome: Ongoing (interventions implemented as appropriate)  Goal: Comfort/Well Being  Outcome: Ongoing (interventions implemented as appropriate)

## 2017-01-14 NOTE — PROGRESS NOTES
Continued Stay Note  [facility]     Patient Name: Florinda Knapp  MRN: [id number]  Today's Date: 1/14/2017    Admit Date: 1/12/2017          Discharge Plan       01/14/17 1444    Case Management/Social Work Plan    Plan [facility]    Patient/Family In Agreement With Plan yes    Additional Comments CM consulted to facilitate possible transfer back to the [facility]. Spoke with [facility] at [place], patient was there prior to admission, initially in Independent Living then moved to [facility], however patient does not have a bed hold. They do still have her bed available but bc there was no bed hold she will need to be reevaluted by their admissions before they will accept her back. This will not be done until Monday. CM will follow up at that time with Ketty, the [facility] rep and fax a new referral. CM will follow.               Discharge Codes     None        Expected Discharge Date and Time     Expected Discharge Date Expected Discharge Time    Jan 14, 2017             Gabriela Orlando RN

## 2017-01-14 NOTE — PLAN OF CARE
Problem: Patient Care Overview (Adult)  Goal: Plan of Care Review  Outcome: Ongoing (interventions implemented as appropriate)    01/14/17 1524   Coping/Psychosocial Response Interventions   Plan Of Care Reviewed With patient;son   Outcome Evaluation   Outcome Summary/Follow up Plan Pt presents with fxl decline from PLOF, deficits in ADL performance, fxl mobility, occupational endurance; fear of falling; will benefit from skilled OT services to address deficits, facilitate increased fxl I, safe transition to next level of care. Recommend SNF for rehab.         Problem: Inpatient Occupational Therapy  Goal: Bed Mobility Goal LTG- OT  Outcome: Ongoing (interventions implemented as appropriate)    01/14/17 1524   Bed Mobility OT LTG   Bed Mobility OT LTG, Date Established 01/14/17   Bed Mobility OT LTG, Time to Achieve 1 wk   Bed Mobility OT LTG, Activity Type supine to sit/sit to supine   Bed Mobility OT LTG, [place] Level supervision required;verbal cues required   Bed Mobility OT LTG, Outcome goal ongoing       Goal: Transfer Training Goal 1 LTG- OT  Outcome: Ongoing (interventions implemented as appropriate)    01/14/17 1524   Transfer Training OT LTG   Transfer Training OT LTG, Date Established 01/14/17   Transfer Training OT LTG, Time to Achieve 1 wk   Transfer Training OT LTG, Activity Type bed to chair /chair to bed;sit to stand/stand to sit;toilet   Transfer Training OT LTG, [place] Level supervision required;verbal cues required   Transfer Training OT LTG, Assist Device walker, rolling   Transfer Training OT LTG, Outcome goal ongoing       Goal: Strength Goal LTG- OT  Outcome: Ongoing (interventions implemented as appropriate)    01/14/17 1524   Strength OT LTG   Strength Goal OT LTG, Date Established 01/14/17   Strength Goal OT LTG, Time to Achieve 1 wk   Strength Goal OT LTG, Measure to Achieve (Increase BUE strength by 1/2 MMG to support fxl I.)   Strength Goal OT LTG, Outcome goal ongoing        Goal: Toileting Goal LTG- OT  Outcome: Ongoing (interventions implemented as appropriate)    01/14/17 1524   Toileting OT LTG   Toileting Goal OT LTG, Date Established 01/14/17   Toileting Goal OT LTG, Time to Achieve 1 wk   Toileting Goal OT LTG, [place] Level minimum assist (75% patient effort)   Toileting Goal OT LTG, Assist Device toilet seat, raised   Toileting Goal OT LTG, Outcome goal ongoing       Goal: LB Dressing Goal LTG- OT  Outcome: Ongoing (interventions implemented as appropriate)    01/14/17 1524   LB Dressing OT LTG   LB Dressing Goal OT LTG, Date Established 01/14/17   LB Dressing Goal OT LTG, Time to Achieve 1 wk   LB Dressing Goal OT LTG, [place] Level moderate assist (50% patient effort);verbal cues required   LB Dressing Goal OT LTG, Adaptive Equipment (AE PRN)   LB Dressing Goal OT LTG, Outcome goal ongoing

## 2017-01-14 NOTE — PROGRESS NOTES
"    [facility] Medicine Services  INPATIENT PROGRESS NOTE    Date of Admission: 1/12/2017  Length of Stay: 2  Primary Care Physician: Kieran Cottrell MD    Subjective     Chief Complaint: f/u anemia  HPI:  Resting in chair, 2 sons at bedside. No pain. No current dyspnea. No chest pain. No bm today feels \"might be constipated). No dysuria. No fever.    Review Of Systems:   Review of Systems   Constitutional: Negative for chills and fever.   Respiratory: Negative for cough and shortness of breath.    Cardiovascular: Negative for chest pain.   Gastrointestinal: Negative for abdominal pain, blood in stool, nausea and vomiting.   Genitourinary: Negative for dysuria and hematuria.     Objective      Temp:  [97.9 °F (36.6 °C)-98.6 °F (37 °C)] 98 °F (36.7 °C)  Heart Rate:  [73-77] 75  Resp:  [20-22] 20  BP: ()/(59-75) 110/71  Physical Exam  Constitutional: no acute distress, awake, alert, frail  Respiratory: Clear to auscultation bilaterally, nonlabored respirations   Cardiovascular: RRR, no murmurs, rubs, or gallops, palpable pedal pulses bilaterally  Gastrointestinal: mild left sided abd tenderness but no rebound, no guarding, +bs  Musculoskeletal: No bilateral ankle edema; left hip incision c/d/i, well-healed  Psychiatric: oriented x 3, flat affect, cooperative  Neurologic: Strength symmetric in all extremities       Results Review:    I have reviewed the labs, radiology results and diagnostic studies.      Results from last 7 days  Lab Units 01/14/17  0707 01/13/17 0446 01/12/17  1845   WBC 10*3/mm3 5.23 6.48 8.47   HEMOGLOBIN g/dL 9.7* 10.0* 8.2*   HEMATOCRIT % 30.7* 32.1* 27.3*   PLATELETS 10*3/mm3 177 199 244         Results from last 7 days  Lab Units 01/14/17  0707 01/13/17 0446 01/12/17  1845   SODIUM mmol/L 140 135 140   POTASSIUM mmol/L 3.8 4.1 4.1   CHLORIDE mmol/L 110* 108 107   TOTAL CO2 mmol/L 22.0 19.0* 22.0   BUN mg/dL 23 22 30*   CREATININE mg/dL 0.50* 0.50* 0.70 "   GLUCOSE mg/dL 79 72 84   CALCIUM mg/dL 8.3* 8.3* 9.1       Results for VAIBHAV KNAPP (MRN [id number]) as of 1/13/2017 11:49   Ref. Range 1/12/2017 18:45 1/12/2017 21:14 1/13/2017 04:46   Troponin I Latest Ref Range: <=0.040 ng/mL 0.255 (H) 0.293 (H) 0.461 (H)         URINE CULTURE   Date Value Ref Range Status   01/12/2017 No growth  Preliminary     Reviewed official radiology reports      I have reviewed the medications.    Assessment/Plan   Mrs. Knapp is an 85 year-old F with a past medical history of parkinson's disease, recent left JALEN at [facility] 12/6/16, iron deficiency anemia, chronic urinary retention, HTN, HLP, anxiety/depression, and osteoarthritis who presented from the [place] where she is undergoing rehab with symptomatic anemia, chest pain/dyspnea x 2-3 days, fatigue, and generalized weakness. ER evaluation with grey zone troponins but no active chest pain. CT a/p showed some descending colitis.       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 at [facility]    H/O urinary retention    History of recurrent UTIs    Essential hypertension    Anxiety   DNR/AND (no invasive procedures; no vent/no resuscitation in case of cardiopulmonary arrest)  Plan  - Pt appears to have responded to transfusion (2 units PRBCs), no active evidence of acute GI hemorrhage, guaiac negative in ER  -zosyn for the collitis  -low dose asa (81mg), coreg, cozaar for the mildly elevated troponin (trended down with blood transfusion) and mild chf; long discussion w/ patient and 2 sons at bedside; all agree to forego heart catheterization/invasive treatment at this time and puruse purely medical management  of the mildly elevated troponin  -single dose lasix 20mg iv once now  -follow urine culture (no growth)  -a.m. Cbc, bmp, mag  -due to anxiety, try  Very low dose ativan 0.25 bid prn (they understand change of confusion/agitation/falls but wish to pursue this for quality of life reasons)  -p.t./o.t.  -stool softeners  -goal is back to rehab in next few days once eating, labs stable, up w/ p.t.  -transition to po abx in next day or two if tolerating  -  DVT prophylaxis: lovenox as pt is high risk  AND/DNR but full medical treatment  Discharge Planning: I expect patient to be discharged to rehab in 2-3 days.    Evangelista Brand MD   01/14/17   1:42 PM    Please note that portions of this note may have been completed with a voice recognition program. Efforts were made to edit the dictations, but occasionally words are mistranscribed.

## 2017-01-15 LAB
ANION GAP SERPL CALCULATED.3IONS-SCNC: 10 MMOL/L (ref 3–11)
BACTERIA SPEC AEROBE CULT: NORMAL
BACTERIA UR QL AUTO: ABNORMAL /HPF
BILIRUB UR QL STRIP: NEGATIVE
BUN BLD-MCNC: 20 MG/DL (ref 9–23)
BUN/CREAT SERPL: 33.3 (ref 7–25)
CALCIUM SPEC-SCNC: 8.2 MG/DL (ref 8.7–10.4)
CHLORIDE SERPL-SCNC: 104 MMOL/L (ref 99–109)
CLARITY UR: CLEAR
CO2 SERPL-SCNC: 22 MMOL/L (ref 20–31)
COLOR UR: YELLOW
CREAT BLD-MCNC: 0.6 MG/DL (ref 0.6–1.3)
DEPRECATED RDW RBC AUTO: 65.5 FL (ref 37–54)
ERYTHROCYTE [DISTWIDTH] IN BLOOD BY AUTOMATED COUNT: 19.8 % (ref 11.3–14.5)
GFR SERPL CREATININE-BSD FRML MDRD: 95 ML/MIN/1.73
GLUCOSE BLD-MCNC: 76 MG/DL (ref 70–100)
GLUCOSE UR STRIP-MCNC: NEGATIVE MG/DL
HCT VFR BLD AUTO: 31.2 % (ref 34.5–44)
HGB BLD-MCNC: 9.9 G/DL (ref 11.5–15.5)
HGB UR QL STRIP.AUTO: NEGATIVE
HYALINE CASTS UR QL AUTO: ABNORMAL /LPF
KETONES UR QL STRIP: NEGATIVE
LEUKOCYTE ESTERASE UR QL STRIP.AUTO: NEGATIVE
MAGNESIUM SERPL-MCNC: 2.2 MG/DL (ref 1.3–2.7)
MCH RBC QN AUTO: 29.5 PG (ref 27–31)
MCHC RBC AUTO-ENTMCNC: 31.7 G/DL (ref 32–36)
MCV RBC AUTO: 92.9 FL (ref 80–99)
NITRITE UR QL STRIP: POSITIVE
PH UR STRIP.AUTO: 6 [PH] (ref 5–8)
PLATELET # BLD AUTO: 193 10*3/MM3 (ref 150–450)
PMV BLD AUTO: 8.8 FL (ref 6–12)
POTASSIUM BLD-SCNC: 3.6 MMOL/L (ref 3.5–5.5)
PROT UR QL STRIP: NEGATIVE
RBC # BLD AUTO: 3.36 10*6/MM3 (ref 3.89–5.14)
RBC # UR: ABNORMAL /HPF
REF LAB TEST METHOD: ABNORMAL
SODIUM BLD-SCNC: 136 MMOL/L (ref 132–146)
SP GR UR STRIP: 1.02 (ref 1–1.03)
SQUAMOUS #/AREA URNS HPF: ABNORMAL /HPF
UROBILINOGEN UR QL STRIP: ABNORMAL
WBC NRBC COR # BLD: 5.97 10*3/MM3 (ref 3.5–10.8)
WBC UR QL AUTO: ABNORMAL /HPF

## 2017-01-15 PROCEDURE — 80048 BASIC METABOLIC PNL TOTAL CA: CPT | Performed by: INTERNAL MEDICINE

## 2017-01-15 PROCEDURE — 25010000002 PIPERACILLIN SOD-TAZOBACTAM PER 1 G: Performed by: INTERNAL MEDICINE

## 2017-01-15 PROCEDURE — 83735 ASSAY OF MAGNESIUM: CPT | Performed by: INTERNAL MEDICINE

## 2017-01-15 PROCEDURE — 81001 URINALYSIS AUTO W/SCOPE: CPT | Performed by: INTERNAL MEDICINE

## 2017-01-15 PROCEDURE — 85027 COMPLETE CBC AUTOMATED: CPT | Performed by: INTERNAL MEDICINE

## 2017-01-15 PROCEDURE — 99232 SBSQ HOSP IP/OBS MODERATE 35: CPT | Performed by: INTERNAL MEDICINE

## 2017-01-15 PROCEDURE — 25010000002 ENOXAPARIN PER 10 MG: Performed by: INTERNAL MEDICINE

## 2017-01-15 RX ORDER — SODIUM CHLORIDE 9 MG/ML
50 INJECTION, SOLUTION INTRAVENOUS CONTINUOUS
Status: DISCONTINUED | OUTPATIENT
Start: 2017-01-15 | End: 2017-01-19 | Stop reason: HOSPADM

## 2017-01-15 RX ADMIN — TAZOBACTAM SODIUM AND PIPERACILLIN SODIUM 4.5 G: 500; 4 INJECTION, SOLUTION INTRAVENOUS at 18:26

## 2017-01-15 RX ADMIN — ENOXAPARIN SODIUM 30 MG: 30 INJECTION SUBCUTANEOUS at 06:17

## 2017-01-15 RX ADMIN — TAZOBACTAM SODIUM AND PIPERACILLIN SODIUM 4.5 G: 500; 4 INJECTION, SOLUTION INTRAVENOUS at 02:59

## 2017-01-15 RX ADMIN — CARVEDILOL 3.12 MG: 3.12 TABLET, FILM COATED ORAL at 08:25

## 2017-01-15 RX ADMIN — ATORVASTATIN CALCIUM 20 MG: 20 TABLET, FILM COATED ORAL at 22:52

## 2017-01-15 RX ADMIN — SODIUM CHLORIDE 1000 ML: 9 INJECTION, SOLUTION INTRAVENOUS at 11:45

## 2017-01-15 RX ADMIN — LOSARTAN POTASSIUM 25 MG: 25 TABLET, FILM COATED ORAL at 08:25

## 2017-01-15 RX ADMIN — LEVOTHYROXINE SODIUM 75 MCG: 75 TABLET ORAL at 06:17

## 2017-01-15 RX ADMIN — CARBIDOPA AND LEVODOPA 1 TABLET: 25; 100 TABLET ORAL at 22:52

## 2017-01-15 RX ADMIN — CARBIDOPA AND LEVODOPA 1 TABLET: 25; 100 TABLET ORAL at 06:17

## 2017-01-15 RX ADMIN — TAZOBACTAM SODIUM AND PIPERACILLIN SODIUM 4.5 G: 500; 4 INJECTION, SOLUTION INTRAVENOUS at 09:58

## 2017-01-15 RX ADMIN — Medication 250 MG: at 08:24

## 2017-01-15 RX ADMIN — CARBIDOPA AND LEVODOPA 1 TABLET: 25; 100 TABLET ORAL at 13:29

## 2017-01-15 RX ADMIN — ASPIRIN 81 MG 81 MG: 81 TABLET ORAL at 08:25

## 2017-01-15 RX ADMIN — Medication 325 MG: at 08:25

## 2017-01-15 RX ADMIN — SODIUM CHLORIDE 50 ML/HR: 9 INJECTION, SOLUTION INTRAVENOUS at 13:29

## 2017-01-15 RX ADMIN — Medication 250 MG: at 18:26

## 2017-01-15 NOTE — PROGRESS NOTES
[facility] Medicine Services  INPATIENT PROGRESS NOTE    Date of Admission: 1/12/2017  Length of Stay: 3  Primary Care Physician: Kieran Cottrell MD    Subjective     Chief Complaint: f/u anemia  HPI:  In bed feeling tired today; bp was low earlier (70's), no chest pain or overt dyspnea. bp improved w/ fluids. No n/v/d    Review Of Systems:   Review of Systems   Constitutional: Negative for chills and fever.   Respiratory: Negative for cough and shortness of breath.    Cardiovascular: Negative for chest pain.   Gastrointestinal: Negative for abdominal pain, blood in stool, nausea and vomiting.   Genitourinary: Negative for dysuria and hematuria.     Objective      Temp:  [97.7 °F (36.5 °C)-98 °F (36.7 °C)] 98 °F (36.7 °C)  Heart Rate:  [74-93] 78  Resp:  [16-20] 18  BP: ()/(42-80) 77/56  Physical Exam  Constitutional: no acute distress, eyes closed but alert, looks fatigued but nontoxic, elderly, frail  Respiratory: Clear to auscultation bilaterally, nonlabored respirations   Cardiovascular: RRR, no murmurs, rubs, or gallops, palpable pedal pulses bilaterally  Gastrointestinal: mild left sided abd tenderness but no rebound, no guarding, +bs  Musculoskeletal: No bilateral ankle edema; left hip incision c/d/i, well-healed  Psychiatric: oriented x 3, flat affect, cooperative  Neurologic: Strength symmetric in all extremities       Results Review:    I have reviewed the labs, radiology results and diagnostic studies.      Results from last 7 days  Lab Units 01/15/17  0517 01/14/17  0707 01/13/17 0446   WBC 10*3/mm3 5.97 5.23 6.48   HEMOGLOBIN g/dL 9.9* 9.7* 10.0*   HEMATOCRIT % 31.2* 30.7* 32.1*   PLATELETS 10*3/mm3 193 177 199         Results from last 7 days  Lab Units 01/15/17  0517 01/14/17  0707 01/13/17  0446   SODIUM mmol/L 136 140 135   POTASSIUM mmol/L 3.6 3.8 4.1   CHLORIDE mmol/L 104 110* 108   TOTAL CO2 mmol/L 22.0 22.0 19.0*   BUN mg/dL 20 23 22   CREATININE mg/dL 0.60  0.50* 0.50*   GLUCOSE mg/dL 76 79 72   CALCIUM mg/dL 8.2* 8.3* 8.3*       Results for VAIBHAV KNAPP (MRN [id number]) as of 1/13/2017 11:49   Ref. Range 1/12/2017 18:45 1/12/2017 21:14 1/13/2017 04:46   Troponin I Latest Ref Range: <=0.040 ng/mL 0.255 (H) 0.293 (H) 0.461 (H)         URINE CULTURE   Date Value Ref Range Status   01/12/2017 No growth  Preliminary     Reviewed official radiology reports      I have reviewed the medications.    Assessment/Plan   Mrs. Knapp is an 85 year-old F with a past medical history of parkinson's disease, recent left JALEN at [facility] 12/6/16, iron deficiency anemia, chronic urinary retention, HTN, HLP, anxiety/depression, and osteoarthritis who presented from the [place] where she is undergoing rehab with symptomatic anemia, chest pain/dyspnea x 2-3 days, fatigue, and generalized weakness. ER evaluation with grey zone troponins but no active chest pain. CT a/p showed some descending colitis.       Assessment/Problem List  Principal Problem:    Acute Symptomatic on Chronic Normocytic Anemia   -s/p 2 units prbc (now stable hgb 9)  Active Problems:    Elevated troponin I level, possibly due to anemia (type 2 nstemi due to anemia)   -no chest pain, trended down after blood transfusion   -treating medically after discussion w/ patient and family (did not want aggressive invasive workup)    CHF with cardiomyopathy (compensated systolic)   -ef 45%, diastolic dysfxn, mild-mod AI and MR    Colitis (on zosyn)    Hypotension (likely due to bp meds)   -did not tolerate cozaar/discontinued   -will attempt to continue coreg low dose     Parkinson's disease    Generalized weakness    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  -in/out cath prn (u/a)  -p.t/once feeling better work w/ p.t. Again and assess mobility  -once feeling better, bp stable, tolerating diet and labs stable plan back to n.h./rehab (possibly next couple days)  -family aware age/medical issues pose high risk for decompensation and they are appreciative of conservative/medical management and deferring invasive procedures      DVT prophylaxis: lovenox as pt is high risk  AND/DNR but full medical treatment  Discharge Planning: I expect patient to be discharged to rehab in 2-3 days.    Evangelista Brand MD   01/15/17   6:40 PM    Please note that portions of this note may have been completed with a voice recognition program. Efforts were made to edit the dictations, but occasionally words are mistranscribed.

## 2017-01-15 NOTE — PLAN OF CARE
Problem: Patient Care Overview (Adult)  Goal: Plan of Care Review  Outcome: Ongoing (interventions implemented as appropriate)    01/15/17 0549   Coping/Psychosocial Response Interventions   Plan Of Care Reviewed With patient   Patient Care Overview   Progress no change   Outcome Evaluation   Outcome Summary/Follow up Plan Pt. received IV Lasix at 1700 yesterday; had output nearly every hour until 23:30- using bedpan; Pt. using 3L nasal cannula; VSS; IV antibiotic therapy continued.          Problem: Pressure Ulcer Risk (Estevan Scale) (Adult,Obstetrics,Pediatric)  Goal: Identify Related Risk Factors and Signs and Symptoms  Outcome: Ongoing (interventions implemented as appropriate)  Goal: Skin Integrity  Outcome: Ongoing (interventions implemented as appropriate)

## 2017-01-15 NOTE — PLAN OF CARE
Problem: Patient Care Overview (Adult)  Goal: Plan of Care Review  Outcome: Ongoing (interventions implemented as appropriate)  Goal: Discharge Needs Assessment  Outcome: Ongoing (interventions implemented as appropriate)    01/15/17 1708   Discharge Needs Assessment   Concerns To Be Addressed no discharge needs identified   Readmission Within The Last 30 Days no previous admission in last 30 days   Discharge Disposition rehab facility   Discharge Planning Comments BP dropped today, was given 1 L bolus and then began at 50cc/hr. New orders received regarding BP medications. Has ox at 3L/NC. Plan is to return to the [facility], call light in reach.   Self-Care   Equipment Currently Used at Home walker, rolling;wheelchair   Living Environment   Transportation Available car;family or friend will provide

## 2017-01-15 NOTE — PLAN OF CARE
Problem: Patient Care Overview (Adult)  Goal: Discharge Needs Assessment  Outcome: Ongoing (interventions implemented as appropriate)

## 2017-01-16 LAB
ANION GAP SERPL CALCULATED.3IONS-SCNC: 8 MMOL/L (ref 3–11)
BUN BLD-MCNC: 19 MG/DL (ref 9–23)
BUN/CREAT SERPL: 31.7 (ref 7–25)
CALCIUM SPEC-SCNC: 8 MG/DL (ref 8.7–10.4)
CHLORIDE SERPL-SCNC: 107 MMOL/L (ref 99–109)
CO2 SERPL-SCNC: 25 MMOL/L (ref 20–31)
CREAT BLD-MCNC: 0.6 MG/DL (ref 0.6–1.3)
DEPRECATED RDW RBC AUTO: 63.4 FL (ref 37–54)
ERYTHROCYTE [DISTWIDTH] IN BLOOD BY AUTOMATED COUNT: 19.3 % (ref 11.3–14.5)
GFR SERPL CREATININE-BSD FRML MDRD: 95 ML/MIN/1.73
GLUCOSE BLD-MCNC: 79 MG/DL (ref 70–100)
HCT VFR BLD AUTO: 28.4 % (ref 34.5–44)
HGB BLD-MCNC: 9 G/DL (ref 11.5–15.5)
MCH RBC QN AUTO: 29.5 PG (ref 27–31)
MCHC RBC AUTO-ENTMCNC: 31.7 G/DL (ref 32–36)
MCV RBC AUTO: 93.1 FL (ref 80–99)
PLATELET # BLD AUTO: 174 10*3/MM3 (ref 150–450)
PMV BLD AUTO: 8.8 FL (ref 6–12)
POTASSIUM BLD-SCNC: 3.6 MMOL/L (ref 3.5–5.5)
RBC # BLD AUTO: 3.05 10*6/MM3 (ref 3.89–5.14)
SODIUM BLD-SCNC: 140 MMOL/L (ref 132–146)
WBC NRBC COR # BLD: 5.76 10*3/MM3 (ref 3.5–10.8)

## 2017-01-16 PROCEDURE — 85027 COMPLETE CBC AUTOMATED: CPT | Performed by: INTERNAL MEDICINE

## 2017-01-16 PROCEDURE — 25010000002 ENOXAPARIN PER 10 MG: Performed by: INTERNAL MEDICINE

## 2017-01-16 PROCEDURE — 99233 SBSQ HOSP IP/OBS HIGH 50: CPT | Performed by: INTERNAL MEDICINE

## 2017-01-16 PROCEDURE — 97110 THERAPEUTIC EXERCISES: CPT

## 2017-01-16 PROCEDURE — 25010000002 PIPERACILLIN SOD-TAZOBACTAM PER 1 G: Performed by: INTERNAL MEDICINE

## 2017-01-16 PROCEDURE — 80048 BASIC METABOLIC PNL TOTAL CA: CPT | Performed by: INTERNAL MEDICINE

## 2017-01-16 RX ADMIN — ASPIRIN 81 MG 81 MG: 81 TABLET ORAL at 08:57

## 2017-01-16 RX ADMIN — ENOXAPARIN SODIUM 30 MG: 30 INJECTION SUBCUTANEOUS at 06:17

## 2017-01-16 RX ADMIN — Medication 325 MG: at 08:57

## 2017-01-16 RX ADMIN — ATORVASTATIN CALCIUM 20 MG: 20 TABLET, FILM COATED ORAL at 21:29

## 2017-01-16 RX ADMIN — CARBIDOPA AND LEVODOPA 1 TABLET: 25; 100 TABLET ORAL at 21:29

## 2017-01-16 RX ADMIN — CARBIDOPA AND LEVODOPA 1 TABLET: 25; 100 TABLET ORAL at 06:17

## 2017-01-16 RX ADMIN — Medication 250 MG: at 08:57

## 2017-01-16 RX ADMIN — POLYETHYLENE GLYCOL 3350 17 G: 17 POWDER, FOR SOLUTION ORAL at 08:57

## 2017-01-16 RX ADMIN — TAZOBACTAM SODIUM AND PIPERACILLIN SODIUM 4.5 G: 500; 4 INJECTION, SOLUTION INTRAVENOUS at 19:04

## 2017-01-16 RX ADMIN — TAZOBACTAM SODIUM AND PIPERACILLIN SODIUM 4.5 G: 500; 4 INJECTION, SOLUTION INTRAVENOUS at 02:50

## 2017-01-16 RX ADMIN — LEVOTHYROXINE SODIUM 75 MCG: 75 TABLET ORAL at 06:17

## 2017-01-16 RX ADMIN — TAZOBACTAM SODIUM AND PIPERACILLIN SODIUM 4.5 G: 500; 4 INJECTION, SOLUTION INTRAVENOUS at 11:30

## 2017-01-16 RX ADMIN — CARBIDOPA AND LEVODOPA 1 TABLET: 25; 100 TABLET ORAL at 16:05

## 2017-01-16 RX ADMIN — Medication 250 MG: at 18:45

## 2017-01-16 NOTE — PROGRESS NOTES
Acute Care - Physical Therapy Treatment Note  [facility]     Patient Name: Florinda Knapp  : 3/5/1931  MRN: [id number]  Today's Date: 2017  Onset of Illness/Injury or Date of Surgery Date: 17  Date of Referral to PT: 17  Referring Physician: ARIEL Rodarte    Admit Date: 2017    Visit Dx:    ICD-10-CM ICD-9-CM   1. Symptomatic anemia D64.9 285.9   2. Weakness R53.1 780.79   3. Failure to thrive in adult R62.7 783.7   4. Dehydration E86.0 276.51   5. Impaired functional mobility, balance, gait, and endurance Z74.09 V49.89   6. Impaired mobility and ADLs Z74.09 799.89     Patient Active Problem List   Diagnosis   • Dysuria   • Elbow injury   • Fracture of left olecranon process   • Parkinson's disease   • Trauma left hip   • Acute Symptomatic on Chronic Normocytic Anemia   • Acute respiratory failure with hypoxia   • Generalized weakness   • Chest pain   • Symptomatic anemia   • Elevated troponin I level, possibly due to anemia   • Status post total replacement of left hip 2016 at [facility]   • H/O urinary retention   • History of recurrent UTIs   • Essential hypertension   • Anxiety   • CHF with cardiomyopathy   • Colitis               Adult Rehabilitation Note       17 1330          Rehab Assessment/Intervention    Discipline physical therapist  -SR      Document Type therapy note (daily note)  -SR      Subjective Information agree to therapy;complains of;weakness  -SR      Patient Effort, Rehab Treatment good  -SR      Symptoms Noted During/After Treatment other (see comments)   fear of falling  -SR      Precautions/Limitations fall precautions;hip precautions- left  -SR      Recorded by [SR] Namrata Barrett PT      Vital Signs    Pre Systolic BP Rehab --   vitals are WNL on RA  -SR      Recorded by [SR] Namrata Barrett PT      Pain Assessment    Pain Assessment No/denies pain  -SR      Recorded by [SR] Namrata Barrett PT      Cognitive Assessment/Intervention    Current  Cognitive/Communication Assessment functional  -SR      Orientation Status oriented x 4  -SR      Follows Commands/Answers Questions 100% of the time  -SR      Personal Safety WNL/WFL  -SR      Personal Safety Interventions gait belt;nonskid shoes/slippers when out of bed  -SR      Recorded by [SR] Namrata Barrett PT      Bed Mobility, Assessment/Treatment    Bed Mobility, Assistive Device bed rails;head of bed elevated;draw sheet  -SR      Bed Mob, Supine to Sit, [place] moderate assist (50% patient effort);2 person assist required;verbal cues required  -SR      Bed Mobility, Safety Issues decreased use of legs for bridging/pushing;decreased use of arms for pushing/pulling  -SR      Bed Mobility, Impairments strength decreased;impaired balance;postural control impaired   forward head  -SR      Recorded by [SR] Namrata Barrett PT      Transfer Assessment/Treatment    Transfers, Bed-Chair [place] maximum assist (25% patient effort);2 person assist required;verbal cues required  -SR      Transfers, Sit-Stand [place] maximum assist (25% patient effort);2 person assist required;verbal cues required  -SR      Transfers, Stand-Sit [place] maximum assist (25% patient effort);2 person assist required;verbal cues required  -SR      Transfers, Sit-Stand-Sit, Assist Device other (see comments)   BUE support, block both knees and feet  -SR      Transfer, Comment Pt's feet sliding forward, knees buckling, pt stands on toes and reluctant to stand on full foot. Pt reports fear of falling, 3 reps standing from EOB prior to SPS to chair.   -SR      Recorded by [SR] Namrata Barrett PT      Gait Assessment/Treatment    Gait, [place] Level unable to perform  -SR      Recorded by [SR] Namrata Barrett PT      Balance Skills Training    Sitting-Level of Assistance Contact guard;x2  -SR      Sitting-Balance Support Left upper extremity supported;Right upper extremity supported;Feet supported  -SR       Sitting-Balance Activities Trunk control activities  -SR      Sitting # of Minutes 5  -SR      Standing-Level of Assistance Maximum assistance;x2   pt only able to sustain stanind for a few seconds  -SR      Recorded by [SR] Namrata Barrett PT      Therapy Exercises    Bilateral Lower Extremities AAROM:;10 reps;supine;ankle pumps/circles;glut sets;quad sets;SLR;heel slides  -SR      Recorded by [SR] Namrata Barrett PT      Positioning and Restraints    Pre-Treatment Position in bed  -SR      Post Treatment Position chair  -SR      In Chair notified nsg;reclined;sitting;call light within reach;encouraged to call for assist;exit alarm on  -SR      Recorded by [SR] Namrata Barrett PT        User Key  (r) = Recorded By, (t) = Taken By, (c) = Cosigned By    Initials Name Effective Dates    SR Namrata Barrett, PT 06/19/15 -                 IP PT Goals       01/16/17 1357 01/13/17 0954       Bed Mobility PT LTG    Bed Mobility PT LTG, Date Established  01/13/17  -KM     Bed Mobility PT LTG, Time to Achieve  1 wk  -KM     Bed Mobility PT LTG, Activity Type  all bed mobility  -KM     Bed Mobility PT LTG, [place] Level  verbal cues required  -KM     Bed Mobility PT Goal  LTG, Assist Device  bed rails  -KM     Bed Mobility PT LTG, Date Goal Reviewed 01/16/17  -SR      Transfer Training PT LTG    Transfer Training PT LTG, Date Established  01/13/17  -KM     Transfer Training PT LTG, Time to Achieve  1 wk  -KM     Transfer Training PT LTG, Activity Type  all transfers  -KM     Transfer Training PT LTG, [place] Level  minimum assist (75% patient effort)  -KM     Transfer Training PT LTG, Assist Device  walker, rolling  -KM     Transfer Training PT  LTG, Date Goal Reviewed 01/16/17  -SR      Gait Training PT LTG    Gait Training Goal PT LTG, Date Established  01/13/17  -KM     Gait Training Goal PT LTG, Time to Achieve  1 wk  -KM     Gait Training Goal PT LTG, [place] Level  minimum assist (75% patient effort)  -KM      Gait Training Goal PT LTG, Assist Device  walker, rolling  -KM     Gait Training Goal PT LTG, Distance to Achieve  50  -KM     Gait Training Goal PT LTG, Date Goal Reviewed 01/16/17  -        User Key  (r) = Recorded By, (t) = Taken By, (c) = Cosigned By    Initials Name Provider Type     Mary Howe, PT Physical Therapist    SR Namrata Barrett, PT Physical Therapist          Physical Therapy Education     Title: PT OT SLP Therapies (Active)     Topic: Physical Therapy (Active)     Point: Mobility training (Active)    Learning Progress Summary    Learner Readiness Method Response Comment Documented by Status   Patient Acceptance E,D NR   01/16/17 1357 Active    Acceptance E VU,NR   01/13/17 0953 Done   Family Acceptance E VU,NR   01/13/17 0953 Done               Point: Home exercise program (Active)    Learning Progress Summary    Learner Readiness Method Response Comment Documented by Status   Patient Acceptance E,D NR  SR 01/16/17 1357 Active    Acceptance E VU,NR   01/13/17 0953 Done   Family Acceptance E VU,NR   01/13/17 0953 Done               Point: Body mechanics (Active)    Learning Progress Summary    Learner Readiness Method Response Comment Documented by Status   Patient Acceptance E,D NR  SR 01/16/17 1357 Active    Acceptance E VU,NR   01/13/17 0953 Done   Family Acceptance E VU,NR   01/13/17 0953 Done               Point: Precautions (Active)    Learning Progress Summary    Learner Readiness Method Response Comment Documented by Status   Patient Acceptance E,D NR  SR 01/16/17 1357 Active    Acceptance E VU,NR   01/13/17 0953 Done   Family Acceptance E VU,NR   01/13/17 0953 Done                      User Key     Initials Effective Dates Name Provider Type Discipline     06/19/15 -  Mary Howe, PT Physical Therapist PT     06/19/15 -  Namrata Barrett, PT Physical Therapist PT                    PT Recommendation and Plan  Anticipated Discharge Disposition:  skilled nursing facility  PT Frequency: daily, per priority policy  Plan of Care Review  Plan Of Care Reviewed With: patient  Progress: progress toward functional goals is gradual  Outcome Summary/Follow up Plan: Pt demo significant weakness with transfer training, fear of falling is also a limiting factor. Cont with IPPT to improve strength and I with mobility.          Outcome Measures       01/16/17 1330 01/14/17 1055       How much help from another person do you currently need...    Turning from your back to your side while in flat bed without using bedrails? 2  -SR      Moving from lying on back to sitting on the side of a flat bed without bedrails? 2  -SR      Moving to and from a bed to a chair (including a wheelchair)? 2  -SR      Standing up from a chair using your arms (e.g., wheelchair, bedside chair)? 2  -SR      Climbing 3-5 steps with a railing? 1  -SR      To walk in hospital room? 1  -SR      AM-PAC 6 Clicks Score 10  -SR      How much help from another is currently needed...    Putting on and taking off regular lower body clothing?  1  -TA     Bathing (including washing, rinsing, and drying)  2  -TA     Toileting (which includes using toilet bed pan or urinal)  1  -TA     Putting on and taking off regular upper body clothing  2  -TA     Taking care of personal grooming (such as brushing teeth)  3  -TA     Eating meals  3  -TA     Score  12  -TA     Functional Assessment    Outcome Measure Options AM-PAC 6 Clicks Basic Mobility (PT)  -SR AM-PAC 6 Clicks Daily Activity (OT)  -TA       User Key  (r) = Recorded By, (t) = Taken By, (c) = Cosigned By    Initials Name Provider Type    SR Namrata Barrett, PT Physical Therapist    STELLA Torres OT Occupational Therapist           Time Calculation:         PT Charges       01/16/17 1359          Time Calculation    Start Time 1330  -SR      PT Received On 01/16/17  -SR      Time Calculation- PT    Total Timed Code Minutes- PT 26 minute(s)  -SR         User Key  (r) = Recorded By, (t) = Taken By, (c) = Cosigned By    Initials Name Provider Type    SR Namrata Barrett, PT Physical Therapist          Therapy Charges for Today     Code Description Service Date Service Provider Modifiers Qty    57055442280 HC PT THER PROC EA 15 MIN 1/16/2017 Namrata Barrett, PT GP 2    04334917092 HC PT THER SUPP EA 15 MIN 1/16/2017 Namrata Barrett, PT GP 2          PT G-Codes  Outcome Measure Options: AM-PAC 6 Clicks Basic Mobility (PT)    Namrata Barrett, PT  1/16/2017

## 2017-01-16 NOTE — PLAN OF CARE
Problem: Patient Care Overview (Adult)  Goal: Plan of Care Review  Outcome: Ongoing (interventions implemented as appropriate)    01/16/17 1357   Coping/Psychosocial Response Interventions   Plan Of Care Reviewed With patient   Patient Care Overview   Progress progress toward functional goals is gradual   Outcome Evaluation   Outcome Summary/Follow up Plan Pt demo significant weakness with transfer training, fear of falling is also a limiting factor. Cont with IPPT to improve strength and I with mobility.         Problem: Inpatient Physical Therapy  Goal: Bed Mobility Goal LTG- PT  Outcome: Ongoing (interventions implemented as appropriate)    01/16/17 1357   Bed Mobility PT LTG   Bed Mobility PT LTG, Date Goal Reviewed 01/16/17       Goal: Transfer Training Goal 1 LTG- PT  Outcome: Ongoing (interventions implemented as appropriate)    01/16/17 1357   Transfer Training PT LTG   Transfer Training PT LTG, Date Goal Reviewed 01/16/17       Goal: Gait Training Goal LTG- PT  Outcome: Ongoing (interventions implemented as appropriate)    01/16/17 1357   Gait Training PT LTG   Gait Training Goal PT LTG, Date Goal Reviewed 01/16/17

## 2017-01-16 NOTE — PLAN OF CARE
Problem: Patient Care Overview (Adult)  Goal: Plan of Care Review  Outcome: Ongoing (interventions implemented as appropriate)    01/16/17 0403   Coping/Psychosocial Response Interventions   Plan Of Care Reviewed With patient   Patient Care Overview   Progress no change   Outcome Evaluation   Outcome Summary/Follow up Plan Pt. struggling with urinary retention- bladder scanned at 1800 & then straight cath with 800ml out; bladder scanned again at 0400- only had 250ml. Also, Coreg was held last night because of low BP; all other VSS.         Problem: Fall Risk (Adult)  Goal: Identify Related Risk Factors and Signs and Symptoms  Outcome: Ongoing (interventions implemented as appropriate)  Goal: Absence of Falls  Outcome: Ongoing (interventions implemented as appropriate)

## 2017-01-16 NOTE — PROGRESS NOTES
Continued Stay Note  [facility]     Patient Name: Florinda Knapp  MRN: [id number]  Today's Date: 1/16/2017    Admit Date: 1/12/2017          Discharge Plan       01/16/17 0848    Case Management/Social Work Plan    Plan [facility]    Patient/Family In Agreement With Plan yes    Additional Comments EULALIA sent new referral  to [facility]. EULALIA followed up and spoke with Ketty at [facility]. Ketty reports they will review the referral and get back with EULALIA.    Final Note    Final Note EULALIA is following              Discharge Codes     None        Expected Discharge Date and Time     Expected Discharge Date Expected Discharge Time    Jan 14, 2017             UNIQUE Yadav

## 2017-01-16 NOTE — PROGRESS NOTES
Continued Stay Note  [facility]     Patient Name: Florinda Knapp  MRN: [id number]  Today's Date: 1/16/2017    Admit Date: 1/12/2017          Discharge Plan       01/16/17 1103    Case Management/Social Work Plan    Additional Comments [facility] has a bed available for pt for skilled.       01/16/17 0876    Case Management/Social Work Plan    Plan [facility]    Patient/Family In Agreement With Plan yes    Additional Comments EULALIA sent new referral  to [facility]. EULALIA followed up and spoke with Ketty at [facility]. Ketty reports they will review the referral and get back with EULALIA.    Final Note    Final Note EULALIA is following              Discharge Codes     None        Expected Discharge Date and Time     Expected Discharge Date Expected Discharge Time    Jan 14, 2017             UNIQUE Yadav

## 2017-01-16 NOTE — DISCHARGE PLACEMENT REQUEST
"Vaibhav Knapp (85 y.o. Female)     Date of Birth Social Security Number Address Home Phone MRN    03/05/1931  [id number] [facility] [address]  [place] [id number] [phone] [id number]    Scientologist Marital Status          Mormon        Admission Date Admission Type Admitting Provider Attending Provider Department, Room/Bed    1/12/17 Emergency Evangelista Brand MD West, Christopher R, MD [facility] 6A, N623/1    Discharge Date Discharge Disposition Discharge Destination                      Attending Provider: Evangelista Brand MD     Allergies:  Alendronate, Aripiprazole, Celecoxib, Nitrofurantoin, Procaine, Sulfa Antibiotics    Isolation:  None   Infection:  None   Code Status:  Conditional    Ht:  59\" (149.9 cm)   Wt:  110 lb 4.8 oz (50 kg)    Admission Cmt:  None   Principal Problem:  Acute Symptomatic on Chronic Normocytic Anemia [D64.9]                 Active Insurance as of 1/12/2017     Primary Coverage     Payor Plan Insurance Group Employer/Plan Group    MEDICARE MEDICARE A & B      Payor Plan Address Payor Plan Phone Number Effective From Effective To    [address] [phone] 3/1/1996     [place] [address]       Subscriber Name Subscriber Birth Date Member ID       VAIBHAV KNAPP 3/5/1931 [id number]           Secondary Coverage     Payor Plan Insurance Group Employer/Plan Group    ANTH BLUE CROSS ANTHEM BLUE CROSS BLUE SHIELD PPO [id number]     Payor Plan Address Payor Plan Phone Number Effective From Effective To    [address] [phone] 7/1/2003     [place] [address]       Subscriber Name Subscriber Birth Date Member ID       VAIBHAV KNAPP P 3/5/1931 [id number]                 Emergency Contacts      (Rel.) Home Phone Work Phone Mobile Phone    Yogesh Knapp (Son) [phone] -- --               History & Physical      Evangelista Brand MD at 1/12/2017  8:51 PM            [facility] Medicine Services  HISTORY " AND PHYSICAL    Primary Care Physician: Kieran Cottrell MD    Subjective     Chief Complaint:  Abnormal labs    History of Present Illness    85 year old female s/p left hip replacement 12/6/16 at  currently receiving rehab at the [place], presents to the ED with generalized weakness and low H&H.  Was seen today by Dr. Cottrell who referred her to the ED secondary to her having symptomatic anemia.  Patient states that she has had intermittent chest pain described as heaviness, without radiation, with associated shortness of breath for the last 2-3 days.  She complains of extreme fatigue and generalized weakness.  She denies any n/v/d, abdominal pain, melena, diaphoresis, cough, or dysuria.  Son states that she has had a UTI for several weeks which has not improved on antibiotics. Pt has a hx iron-deficiency anemia and required a transfusion at  last month.  Labs reveals an H&H of 8.2/27.3 and the ED physician ordered 2uPRBC, first troponin was elevated second one is pending, EKG negative.  She is being admitted to the hospitalist for further evaluation.    Review of Systems   Constitutional: Positive for activity change, appetite change and fatigue. Negative for chills, diaphoresis and unexpected weight change.   HENT: Negative.    Eyes: Negative.    Respiratory: Positive for shortness of breath. Negative for apnea, cough, choking, chest tightness, wheezing and stridor.    Cardiovascular: Positive for chest pain and palpitations. Negative for leg swelling.   Gastrointestinal: Negative.    Endocrine: Negative.    Genitourinary: Negative.    Musculoskeletal: Negative.    Skin: Negative.    Allergic/Immunologic: Negative.    Neurological: Positive for weakness (generalized weakness). Negative for dizziness, tremors, seizures, syncope, facial asymmetry, speech difficulty, light-headedness, numbness and headaches.   Hematological: Negative.    Psychiatric/Behavioral: Negative.       Otherwise complete ROS  "reviewed and negative except as mentioned in the HPI.      Past Medical History:   Past Medical History   Diagnosis Date   • Anemia    • Anxiety    • Depression    • Disease of thyroid gland    • Failure to thrive in adult    • Hyperlipidemia    • Hypertension    • Osteoarthritis    • Stress incontinence    • Urinary retention        Past Surgical History:  Past Surgical History   Procedure Laterality Date   • Appendectomy     • Back surgery     • Cataract extraction     • Elbow procedure     • Knee surgery     • Joint replacement         Family History:   Family History   Problem Relation Age of Onset   • Alcohol abuse Other    • Hypertension Other    • Stroke Other      Social History:   Social History     Social History   • Marital status:      Spouse name: N/A   • Number of children: N/A   • Years of education: N/A     Occupational History   • Not on file.     Social History Main Topics   • Smoking status: Never Smoker   • Smokeless tobacco: Not on file   • Alcohol use Yes      Comment: social   • Drug use: Not on file   • Sexual activity: Not on file     Other Topics Concern   • Not on file     Social History Narrative   • No narrative on file       Medications:    (Not in a hospital admission)  Allergies:  Allergies   Allergen Reactions   • Alendronate    • Aripiprazole    • Celecoxib    • Nitrofurantoin    • Procaine    • Sulfa Antibiotics        Objective     Vital Signs:   Visit Vitals   • /83   • Pulse 84   • Temp 98.2 °F (36.8 °C)   • Resp 16   • Ht 59\" (149.9 cm)   • Wt 110 lb (49.9 kg)   • SpO2 93%   • BMI 22.22 kg/m2     Physical Exam   Constitutional: She is oriented to person, place, and time. She appears well-developed and well-nourished. No distress.   HENT:   Head: Normocephalic.   Eyes: Pupils are equal, round, and reactive to light.   Neck: Normal range of motion. Neck supple.   Cardiovascular: Normal rate, regular rhythm, normal heart sounds and intact distal pulses.  Exam reveals " no gallop and no friction rub.    No murmur heard.  Pulmonary/Chest: Effort normal and breath sounds normal. No respiratory distress. She has no wheezes. She has no rales. She exhibits no tenderness.   Abdominal: Soft. Bowel sounds are normal. She exhibits no distension. There is no tenderness. There is no rebound and no guarding.   Musculoskeletal: She exhibits no edema, tenderness or deformity.   Limited ROM LLE 2/2 s/p left hip repair   Neurological: She is oriented to person, place, and time. No cranial nerve deficit.   Very drowsy   Skin: Skin is warm and dry. No rash noted. She is not diaphoretic. No erythema. There is pallor.   Psychiatric: She has a normal mood and affect. Her behavior is normal. Judgment and thought content normal.             Results Reviewed:    Results from last 7 days  Lab Units 01/12/17  1845   WBC 10*3/mm3 8.47   HEMOGLOBIN g/dL 8.2*   PLATELETS 10*3/mm3 244       Results from last 7 days  Lab Units 01/12/17  1845   SODIUM mmol/L 140   POTASSIUM mmol/L 4.1   TOTAL CO2 mmol/L 22.0   CREATININE mg/dL 0.70   GLUCOSE mg/dL 84   CALCIUM mg/dL 9.1       I have personally reviewed and interpreted the radiology studies and ECG obtained at time of admission.     Assessment / Plan      Assessment & Plan  Principal Problem:    Chest pain  Active Problems:    Anemia    Acute respiratory failure with hypoxia    Generalized weakness    Parkinson's disease     85 year old female s/p left hip replacement 1 month ago, referred to the ED by Dr. Cottrell for symptomatic anemia.  Was ordered 2uPRBC by the ED physician.  Has had chest heaviness for 2-3 days, first troponin elevated at 0.255, EKG negative.  Upon arrival to ED her oxygen saturation was 86% on room air, c/o severe fatigue and generalized weakness.  Discussed with patient and her son who is her POA at length her code status, she does have a living will, and wishes to be made an AND but wants full medical treatment up to that  "point.    PLAN:  -recheck cbc after blood transfused  -serial troponins  -nitro prn  -EKG in the am  -cbc, bmp, flp in the am  -oxygen to keep O2 sat >90 %  -continuous pulse ox  -pt/ot consult in the am  -fall precautions  -consult case management for discharge planning    DVT prophylaxis:  Teds, scuds, hold anticoagulation 2/2 anemia  Code Status:  AND     I discussed the patients findings and my recommendations with:  Patient, family, primary care team    Marguerite Schilling, APRN 01/12/17 9:25 PM      Brief Attending Note   I have seen and examined the patient, performing an independent face-to-face diagnostic evaluation at bedside.    The plan of care reviewed and developed with the advanced practice clinician (APC):   Marguerite schilling  Brief Summary Statement/HPI:   84 yo f w/ alverto dz, htn, hypothyroidism who suffered left hip fracture last year and initially attempts to treat non-operatively/conservatively failed due to persistent pain. Ultimately had left JALEN at the [facility] early December 2016. Post operatively did have brief icu stay due to low bp, but after fluids and blood patient responded well and ultimately has been in rehab facilities (currently [facility] ) since that time. Has had recurrent difficulties w/ urinary retention and uti's. Over past couple weeks has had increased generalized weakness and anxiety. At follow up appointment at dr. Cottrell office today told that she was anemia and due to lethargy was sent to e.r. For further evaluation. Much of history obtained from son and patient asks to be left alone.   Son states anxiety playing large role, memory has been \"getting worse\" over past months. Recently her fatigue is biggest issue. States no fever, no chills. Has been on multiple antibiotics for uti but no current dysuria or fever. Denies constipation or diarreha. Has occasional chest pain while pushing herself up from toilet after moving her bowels but otherwise no dyspnea or " chest pain.       Attending Physical Exam:  Alert, oriented to person, month, wrong year but re-oriented, eyes closed but opens them easily/fatigued appearing  Ncat, oroph clear  rrr  ctab  abd mildly distended, mild diffuse tenderness, +bs, no rebound, soft  No cce  No hematoma left hip  Speech clear, equal , face symmetric  Flat affect      Brief Assessment/Plan :    *Symptomatic Anemia, iron deficient   -guaic negative in e.r  *Mild elevation troponins/grey zone troponin (due to above)   -currently denies chest pain; ekg unchanged from prior  *Severe Generalized weakness  *hx left JALEN (at , early December 2016)  *Mild abd Pain  *Hx urinary retention  *Hx recurrent uti's  *Parkinson's disease  *Anxiety  *HTN  *Hypothyroidism (normal tsh)  *Hx picc  *DNR/AND (full support)   -per discussion w/ son patient would not want aggressive/invasive procedures/surgeries but otherwise treat aggressively at this point      Plan:  -transfuse 2 units prbc  -ct a/p (routine) to r/o inflammation/retroperitoneal bleed/constipation, etc  -continue asa, add coreg low dose, lipitor, prn nitroglycerin  -serial troponins to trend, a.m. Echo; hold on cardiology consultation unless developing chest pain/troponins trending up. I discussed this plan w/ son and patient and they in agreement. The mild elevation troponins most likely due to anemia (currently chest pain free and ekg unchanged prior 2016)  -p.t./o.t eval  -a.m. Cbc, bmp  -keep bowels moving    Sq lovenox for dvt prophylaxis    See above for further detailed assessment and plan developed with APC which I have reviewed and/or edited.    I believe this patient requires inpatient status due to need for blood transfusion, cardiac monitoring due to elevated troponins      Marguerite Oshea, APRN 01/12/17 9:25 PM           Electronically signed by Evangelista Brand MD at 1/13/2017  1:32 AM        Prior to Admission Medications     Prescriptions Last Dose Informant Patient  Reported? Taking?    acetaminophen (TYLENOL) 500 MG tablet Past Week Nursing Home Yes Yes    Take 1,000 mg by mouth 4 (Four) Times a Day As Needed for mild pain (1-3).    amLODIPine (NORVASC) 5 MG tablet 1/11/2017  Yes Yes    Take 2.5 mg by mouth Daily.    aspirin 325 MG tablet 1/12/2017  Yes Yes    Take 81 mg by mouth Daily.    carbidopa-levodopa (SINEMET)  MG per tablet 1/12/2017  No Yes    TAKE ONE TABLET BY MOUTH THREE TIMES A DAY **MAY TAKE 1 EXTRA TABLET BY MOUTH AT NIGHT AS NEEDED**    Cholecalciferol 2000 UNITS capsule 1/12/2017 Nursing Home Yes Yes    Take 2,000 Units by mouth Daily.    ferrous sulfate 325 (65 FE) MG tablet 1/12/2017  Yes Yes    Take 325 mg by mouth Daily With Breakfast.    levothyroxine (SYNTHROID, LEVOTHROID) 75 MCG tablet 1/12/2017 Nursing Home Yes Yes    Take 75 mcg by mouth Daily.    methylphenidate (RITALIN) 5 MG tablet 1/12/2017 Nursing Home Yes Yes    Take 2.5 mg by mouth Daily.    mirtazapine (REMERON) 30 MG tablet 1/11/2017 Nursing Home Yes Yes    Take 30 mg by mouth Every Night.    polyethylene glycol (MIRALAX) packet 1/12/2017  Yes Yes    Take 17 g by mouth Daily As Needed.    vitamin E 1000 UNIT capsule 1/12/2017 Nursing Home Yes Yes    Take 1,000 Units by mouth Daily.             Physician Progress Notes (last 24 hours) (Notes from 1/15/2017  8:25 AM through 1/16/2017  8:25 AM)      Evangelista Brand MD at 1/15/2017  6:40 PM  Version 1 of 1             [facility] Medicine Services  INPATIENT PROGRESS NOTE    Date of Admission: 1/12/2017  Length of Stay: 3  Primary Care Physician: Kieran Cottrell MD    Subjective     Chief Complaint: f/u anemia  HPI:  In bed feeling tired today; bp was low earlier (70's), no chest pain or overt dyspnea. bp improved w/ fluids. No n/v/d    Review Of Systems:   Review of Systems   Constitutional: Negative for chills and fever.   Respiratory: Negative for cough and shortness of breath.    Cardiovascular: Negative  for chest pain.   Gastrointestinal: Negative for abdominal pain, blood in stool, nausea and vomiting.   Genitourinary: Negative for dysuria and hematuria.     Objective      Temp:  [97.7 °F (36.5 °C)-98 °F (36.7 °C)] 98 °F (36.7 °C)  Heart Rate:  [74-93] 78  Resp:  [16-20] 18  BP: ()/(42-80) 77/56  Physical Exam  Constitutional: no acute distress, eyes closed but alert, looks fatigued but nontoxic, elderly, frail  Respiratory: Clear to auscultation bilaterally, nonlabored respirations   Cardiovascular: RRR, no murmurs, rubs, or gallops, palpable pedal pulses bilaterally  Gastrointestinal: mild left sided abd tenderness but no rebound, no guarding, +bs  Musculoskeletal: No bilateral ankle edema; left hip incision c/d/i, well-healed  Psychiatric: oriented x 3, flat affect, cooperative  Neurologic: Strength symmetric in all extremities       Results Review:    I have reviewed the labs, radiology results and diagnostic studies.      Results from last 7 days  Lab Units 01/15/17  0517 01/14/17  0707 01/13/17  0446   WBC 10*3/mm3 5.97 5.23 6.48   HEMOGLOBIN g/dL 9.9* 9.7* 10.0*   HEMATOCRIT % 31.2* 30.7* 32.1*   PLATELETS 10*3/mm3 193 177 199         Results from last 7 days  Lab Units 01/15/17  0517 01/14/17  0707 01/13/17  0446   SODIUM mmol/L 136 140 135   POTASSIUM mmol/L 3.6 3.8 4.1   CHLORIDE mmol/L 104 110* 108   TOTAL CO2 mmol/L 22.0 22.0 19.0*   BUN mg/dL 20 23 22   CREATININE mg/dL 0.60 0.50* 0.50*   GLUCOSE mg/dL 76 79 72   CALCIUM mg/dL 8.2* 8.3* 8.3*       Results for VAIBHAV KNAPP (MRN [id number]) as of 1/13/2017 11:49   Ref. Range 1/12/2017 18:45 1/12/2017 21:14 1/13/2017 04:46   Troponin I Latest Ref Range: <=0.040 ng/mL 0.255 (H) 0.293 (H) 0.461 (H)         URINE CULTURE   Date Value Ref Range Status   01/12/2017 No growth  Preliminary     Reviewed official radiology reports      I have reviewed the medications.    Assessment/Plan   Mrs. Knapp is an 85 year-old F with a past medical history of  parkinson's disease, recent left JALEN at [facility] 12/6/16, iron deficiency anemia, chronic urinary retention, HTN, HLP, anxiety/depression, and osteoarthritis who presented from the [place] where she is undergoing rehab with symptomatic anemia, chest pain/dyspnea x 2-3 days, fatigue, and generalized weakness. ER evaluation with grey zone troponins but no active chest pain. CT a/p showed some descending colitis.       Assessment/Problem List  Principal Problem:    Acute Symptomatic on Chronic Normocytic Anemia   -s/p 2 units prbc (now stable hgb 9)  Active Problems:    Elevated troponin I level, possibly due to anemia (type 2 nstemi due to anemia)   -no chest pain, trended down after blood transfusion   -treating medically after discussion w/ patient and family (did not want aggressive invasive workup)    CHF with cardiomyopathy (compensated systolic)   -ef 45%, diastolic dysfxn, mild-mod AI and MR    Colitis (on zosyn)    Hypotension (likely due to bp meds)   -did not tolerate cozaar/discontinued   -will attempt to continue coreg low dose     Parkinson's disease    Generalized weakness    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  -in/out cath prn (u/a)  -p.t/once feeling better work w/ p.t. Again and assess mobility  -once feeling better, bp stable, tolerating diet and labs stable plan back to n.h./rehab (possibly next couple days)  -family aware age/medical issues pose high risk for decompensation and they are appreciative of conservative/medical management and deferring invasive procedures      DVT prophylaxis: lovenox as pt is high risk  AND/DNR but full medical treatment  Discharge Planning: I expect  patient to be discharged to rehab in 2-3 days.    Evangelista Brand MD   01/15/17   6:40 PM    Please note that portions of this note may have been completed with a voice recognition program. Efforts were made to edit the dictations, but occasionally words are mistranscribed.       Electronically signed by Evangelista Brand MD at 1/15/2017  6:48 PM           Physical Therapy Notes (most recent note)      Mary Howe, PT at 2017 10:00 AM  Version 1 of 1         Acute Care - Physical Therapy Initial Evaluation  [facility]     Patient Name: Florinda Knapp  : 3/5/1931  MRN: [id number]  Today's Date: 2017   Onset of Illness/Injury or Date of Surgery Date: 17  Date of Referral to PT: 17  Referring Physician: ARIEL Oshea      Admit Date: 2017     Visit Dx:    ICD-10-CM ICD-9-CM   1. Symptomatic anemia D64.9 285.9   2. Weakness R53.1 780.79   3. Failure to thrive in adult R62.7 783.7   4. Dehydration E86.0 276.51   5. Impaired functional mobility, balance, gait, and endurance Z74.09 V49.89     Patient Active Problem List   Diagnosis   • Dysuria   • Elbow injury   • Fracture of left olecranon process   • Parkinson's disease   • Trauma left hip   • Symptomatic anemia   • Acute respiratory failure with hypoxia   • Generalized weakness   • Chest pain   • Symptomatic anemia     Past Medical History   Diagnosis Date   • Anemia    • Anxiety    • Depression    • Disease of thyroid gland    • Failure to thrive in adult    • Hyperlipidemia    • Hypertension    • Osteoarthritis    • Stress incontinence    • Urinary retention      Past Surgical History   Procedure Laterality Date   • Appendectomy     • Back surgery     • Cataract extraction     • Elbow procedure     • Knee surgery     • Joint replacement            PT ASSESSMENT (last 72 hours)      PT Evaluation       17 0850 17 2330    Rehab Evaluation    Document Type evaluation  -KM     Subjective Information agree to  therapy;complains of;weakness;fatigue   Needing encouragement to get oob and participate in rx  -KM     Patient Effort, Rehab Treatment good  -KM     Symptoms Noted During/After Treatment --   expresses anxiety re: falls  -KM     General Information    Patient Profile Review yes  -KM     Onset of Illness/Injury or Date of Surgery Date 01/12/17  -KM     Referring Physician ARIEL Oshea  -KM     Pertinent History Of Current Problem Patient admiltted from SNF where she was undergoing rehab following THR 12/06/2016 and [facility] stay. Pt admitted with fatigue, weakness and S.O.A. , lab work revealed low H and H. pt is s/p transfusion yesterday.  -KM     Precautions/Limitations fall precautions;hip precautions- left  -KM     Prior Level of Function mod assist:;gait;transfer;bed mobility;ADL's  -KM     Equipment Currently Used at Home walker, rolling;wheelchair  -KM wheelchair;bath bench;grab bar  -CB    Risks Reviewed patient and family:;increased discomfort  -KM     Benefits Reviewed patient and family:;improve function  -KM     Living Environment    Lives With facility resident   Previously lived in assisted living prior to fall  -KM facility resident  -CB    Living Arrangements assisted living   anticipate return to snf for rehab  -KM assisted living  -CB    Home Accessibility no concerns  -KM no concerns  -CB    Stair Railings at Home  none  -CB    Type of Financial/Environmental Concern  none  -CB    Transportation Available  family or friend will provide  -CB    Clinical Impression    Date of Referral to PT 01/12/17  -KM     PT Diagnosis impaired mobility  -KM     Patient/Family Goals Statement regain PLOF, return to assisted living  -KM     Criteria for Skilled Therapeutic Interventions Met yes  -KM     Rehab Potential fair, will monitor progress closely  -KM     Pain Assessment    Pain Assessment No/denies pain  -KM     Cognitive Assessment/Intervention    Current Cognitive/Communication Assessment functional  -KM      Follows Commands/Answers Questions able to follow single-step instructions;100% of the time  -KM     Personal Safety fully aware of deficits;WNL/WFL  -KM     Personal Safety Interventions fall prevention program maintained;gait belt;nonskid shoes/slippers when out of bed  -KM     ROM (Range of Motion)    General ROM lower extremity range of motion deficits identified   l ankle 10-45 degrees pf actively, passive df to 0 degrees  -KM     MMT (Manual Muscle Testing)    General MMT Assessment lower extremity strength deficits identified  -KM     General MMT Assessment Detail --   R l/e grossly 4/5, L l/e hip 3-/5, knee 4-/5, l ankle df 1/5  -KM     Bed Mobility, Assessment/Treatment    Bed Mobility, Assistive Device bed rails;head of bed elevated;draw sheet  -KM     Bed Mobility, Roll Right, [place] moderate assist (50% patient effort);verbal cues required  -KM     Bed Mobility, Scoot/Bridge, [place] moderate assist (50% patient effort)  -KM     Bed Mob, Supine to Sit, [place] moderate assist (50% patient effort)  -KM     Transfer Assessment/Treatment    Transfers, Sit-Stand [place] moderate assist (50% patient effort);2 person assist required;verbal cues required   cues to stand erect  -KM     Transfers, Sit-Stand-Sit, Assist Device rolling walker  -KM     Gait Assessment/Treatment    Gait, [place] Level moderate assist (50% patient effort);2 person assist required;verbal cues required  -KM     Gait, Assistive Device rolling walker  -KM     Gait, Distance (Feet) 5  -KM     Gait, Gait Deviations bernice decreased;decreased heel strike;left:;forward flexed posture;step length decreased  -KM     Gait, Safety Issues weight-shifting ability decreased;step length decreased;balance decreased during turns;loses balance backward  -KM     Gait, Impairments postural control impaired;impaired balance  -KM     Therapy Exercises    Bilateral Lower Extremities AAROM:;10 reps;sitting;ankle  pumps/circles;calf stretch;hip flexion;LAQ;heel slides;hip abduction/adduction;quad sets;glut sets  -KM     Positioning and Restraints    Pre-Treatment Position in bed  -KM     Post Treatment Position chair  -KM     In Chair notified nsg;reclined;call light within reach;encouraged to call for assist;with family/caregiver  -KM       User Key  (r) = Recorded By, (t) = Taken By, (c) = Cosigned By    Initials Name Provider Type     Mary Howe PT Physical Therapist    ELAINE Mirza, RN Registered Nurse          Physical Therapy Education     Title: PT OT SLP Therapies (Done)     Topic: Physical Therapy (Done)     Point: Mobility training (Done)    Learning Progress Summary    Learner Readiness Method Response Comment Documented by Status   Patient Acceptance E VU,NR   01/13/17 0953 Done   Family Acceptance E VU,NR   01/13/17 0953 Done               Point: Home exercise program (Done)    Learning Progress Summary    Learner Readiness Method Response Comment Documented by Status   Patient Acceptance E VU,NR   01/13/17 0953 Done   Family Acceptance E VU,NR   01/13/17 0953 Done               Point: Body mechanics (Done)    Learning Progress Summary    Learner Readiness Method Response Comment Documented by Status   Patient Acceptance E VU,NR   01/13/17 0953 Done   Family Acceptance E VU,NR   01/13/17 0953 Done               Point: Precautions (Done)    Learning Progress Summary    Learner Readiness Method Response Comment Documented by Status   Patient Acceptance E VU,NR   01/13/17 0953 Done   Family Acceptance E VU,NR   01/13/17 0953 Done                      User Key     Initials Effective Dates Name Provider Type [facility] 06/19/15 -  Mary Howe PT Physical Therapist PT                PT Recommendation and Plan  Anticipated Discharge Disposition: skilled nursing facility  PT Frequency: daily, per priority policy  Plan of Care Review  Plan Of Care Reviewed With:  patient  Progress: no change  Outcome Summary/Follow up Plan: KAIT vincent completed revealing limitation in functional mobility, fear of falling, decreased postural control. Pt would benfit from skilled svcs to address instability in function  and address multiple complex problems affectting decline in function, assist with d/c plan to continue rx at snf.          IP PT Goals       01/13/17 0954          Bed Mobility PT LTG    Bed Mobility PT LTG, Date Established 01/13/17  -KM      Bed Mobility PT LTG, Time to Achieve 1 wk  -KM      Bed Mobility PT LTG, Activity Type all bed mobility  -KM      Bed Mobility PT LTG, [place] Level verbal cues required  -KM      Bed Mobility PT Goal  LTG, Assist Device bed rails  -KM      Transfer Training PT LTG    Transfer Training PT LTG, Date Established 01/13/17  -KM      Transfer Training PT LTG, Time to Achieve 1 wk  -KM      Transfer Training PT LTG, Activity Type all transfers  -KM      Transfer Training PT LTG, [place] Level minimum assist (75% patient effort)  -KM      Transfer Training PT LTG, Assist Device walker, rolling  -KM      Gait Training PT LTG    Gait Training Goal PT LTG, Date Established 01/13/17  -KM      Gait Training Goal PT LTG, Time to Achieve 1 wk  -KM      Gait Training Goal PT LTG, [place] Level minimum assist (75% patient effort)  -KM      Gait Training Goal PT LTG, Assist Device walker, rolling  -KM      Gait Training Goal PT LTG, Distance to Achieve 50  -KM        User Key  (r) = Recorded By, (t) = Taken By, (c) = Cosigned By    Initials Name Provider Type    PATEL Howe, PT Physical Therapist                Outcome Measures       01/13/17 0850          How much help from another person do you currently need...    Turning from your back to your side while in flat bed without using bedrails? 2  -KM      Moving from lying on back to sitting on the side of a flat bed without bedrails? 2  -KM      Moving to and from a bed to a  chair (including a wheelchair)? 2  -KM      Standing up from a chair using your arms (e.g., wheelchair, bedside chair)? 2  -KM      Climbing 3-5 steps with a railing? 1  -KM      To walk in hospital room? 2  -KM      AM-PAC 6 Clicks Score 11  -KM      Functional Assessment    Outcome Measure Options AM-PAC 6 Clicks Basic Mobility (PT)  -KM        User Key  (r) = Recorded By, (t) = Taken By, (c) = Cosigned By    Initials Name Provider Type    PATEL Howe PT Physical Therapist           Time Calculation:         PT Charges       17 0959          Time Calculation    Start Time 0850  -KM      PT Received On 17  -KM      PT Goal Re-Cert Due Date 17  -KM      Time Calculation- PT    Total Timed Code Minutes- PT 15 minute(s)  -KM        User Key  (r) = Recorded By, (t) = Taken By, (c) = Cosigned By    Initials Name Provider Type    PATEL Howe PT Physical Therapist          Therapy Charges for Today     Code Description Service Date Service Provider Modifiers Qty    60981888652 HC PT EVAL MOD COMPLEXITY 3 2017 Mary Howe, PT GP 1    98514234368 HC GAIT TRAINING EA 15 MIN 2017 Mary Howe, PT GP 1    53062295381 HC PT THER SUPP EA 15 MIN 2017 Mary Howe, PT GP 2          PT G-Codes  Outcome Measure Options: AM-PAC 6 Clicks Basic Mobility (PT)      Mary Howe PT  2017          Electronically signed by Mary Howe PT at 2017 10:00 AM           Occupational Therapy Notes (most recent note)      Linwood Torres OT at 2017  3:29 PM  Version 1 of 1         Acute Care - Occupational Therapy Initial Evaluation  [facility]     Patient Name: Florinda Knapp  : 3/5/1931  MRN: [id number]  Today's Date: 2017  Onset of Illness/Injury or Date of Surgery Date: 17  Date of Referral to OT: 17  Referring Physician: ARIEL Rodarte    Admit Date: 2017       ICD-10-CM ICD-9-CM    1. Symptomatic anemia D64.9 285.9   2. Weakness R53.1 780.79   3. Failure to thrive in adult R62.7 783.7   4. Dehydration E86.0 276.51   5. Impaired functional mobility, balance, gait, and endurance Z74.09 V49.89   6. 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 at [facility]   • H/O urinary retention   • History of recurrent UTIs   • Essential hypertension   • Anxiety   • CHF with cardiomyopathy   • Colitis     Past Medical History   Diagnosis Date   • Anemia    • Anxiety    • Depression    • Disease of thyroid gland    • Failure to thrive in adult    • Hyperlipidemia    • Hypertension    • Osteoarthritis    • Stress incontinence    • Urinary retention      Past Surgical History   Procedure Laterality Date   • Appendectomy     • Back surgery     • Cataract extraction     • Elbow procedure     • Knee surgery     • Joint replacement            OT ASSESSMENT FLOWSHEET (last 72 hours)      OT Evaluation       01/14/17 1055 01/13/17 1446 01/13/17 1444 01/13/17 1344 01/13/17 0850    Rehab Evaluation    Document Type evaluation  -TA    evaluation  -KM    Subjective Information agree to therapy;complains of;weakness  -TA    agree to therapy;complains of;weakness;fatigue   Needing encouragement to get oob and participate in rx  -KM    Evaluation Not Performed    patient unavailable for evaluation   Pt undergoing echo, will check back as time allows.   -CL     Patient Effort, Rehab Treatment good  -TA    good  -KM    Symptoms Noted During/After Treatment other (see comments)   increased anxiety with movement  -TA    --    expresses anxiety re: falls  -KM    General Information    Patient Profile Review yes  -TA    yes  -KM    Onset of Illness/Injury or Date of Surgery Date 01/12/17  -TA    01/12/17  -KM    Referring Physician ARIEL Rodarte  -ARIEL Rodriguez  -PATEL    General Observations Pt supine, O2 per NC; sons present in room.  -TA        Pertinent History Of Current Problem Pt admitted from SNF where she was engaging in rehab following [facility] 12/06/2016 and [facility] stay. Pt admitted with fatigue, weakness, and dyspnea; labs revealed low H&H. pt was transfused 2 days ago.  -TA    Patient admiltted from SNF where she was undergoing rehab following [facility]R 12/06/2016 and [facility] stay. Pt admitted with fatigue, weakness and S.O.A. , lab work revealed low H and H. pt is s/p transfusion yesterday.  -KM    Precautions/Limitations fall precautions;hip precautions- left  -TA    fall precautions;hip precautions- left  -KM    Prior Level of Function mod assist:;gait;transfer;bed mobility;ADL's  -TA    mod assist:;gait;transfer;bed mobility;ADL's  -KM    Equipment Currently Used at Home walker, rolling;wheelchair  -TA wheelchair;walker, rolling  -BG   walker, rolling;wheelchair  -KM    Plans/Goals Discussed With patient and family;agreed upon  -TA        Risks Reviewed patient and family:;LOB;dizziness;increased discomfort;change in vital signs  -TA    patient and family:;increased discomfort  -KM    Benefits Reviewed patient and family:;improve function;increase independence;increase strength;increase balance;decrease pain;increase knowledge  -TA    patient and family:;improve function  -KM    Barriers to Rehab previous functional deficit  -TA        Living Environment    Lives With alone  -TA alone  -BG   facility resident   Previously lived in assisted living prior to fall  -KM    Living Arrangements independent living facility  -TA independent living facility  -BG   assisted living   anticipate return to snf for rehab  -KM    Home  Accessibility no concerns  -TA    no concerns  -KM    Transportation Available  car;family or friend will provide  -BG       Clinical Impression    Date of Referral to OT 01/13/17  -TA        OT Diagnosis Impaired mobility and ADLs  -TA        Impairments Found (describe specific impairments) aerobic capacity/endurance;ergonomics and body mechanics;gait, locomotion, and balance;muscle performance  -TA        Patient/Family Goals Statement Get stronger; return for rehab  -TA        Criteria for Skilled Therapeutic Interventions Met yes;treatment indicated  -TA        Rehab Potential good, to achieve stated therapy goals  -TA        Therapy Frequency daily   Per priority policy  -TA        Anticipated Equipment Needs At Discharge --   AE TBD  -TA        Anticipated Discharge Disposition skilled nursing facility;other (see comments)   further rehab  -TA        Functional Level Prior    Ambulation   1-->assistive equipment  -BG      Transferring   1-->assistive equipment  -BG      Toileting   1-->assistive equipment  -BG      Bathing   1-->assistive equipment  -BG      Dressing   0-->independent  -BG      Eating   0-->independent  -BG      Communication   0-->understands/communicates without difficulty  -BG      Vital Signs    Pre Systolic BP Rehab --   Nurse cleared pt for tx, vitals stable.  -TA        Pre Patient Position Supine  -TA        Intra Patient Position Standing  -TA        Post Patient Position Sitting  -TA        Pain Assessment    Pain Assessment No/denies pain  -TA    No/denies pain  -KM    Vision Assessment/Intervention    Visual Impairment WFL with corrective lenses  -TA        Cognitive Assessment/Intervention    Current Cognitive/Communication Assessment functional  -TA    functional  -KM    Orientation Status oriented x 4  -TA        Follows Commands/Answers Questions 100% of the time;able to follow single-step instructions;needs cueing;needs repetition  -TA    able to follow single-step  instructions;100% of the time  -KM    Personal Safety fully aware of deficits  -TA    fully aware of deficits;WNL/WFL  -KM    Personal Safety Interventions fall prevention program maintained;gait belt;nonskid shoes/slippers when out of bed;supervised activity  -TA    fall prevention program maintained;gait belt;nonskid shoes/slippers when out of bed  -KM    ROM (Range of Motion)    General ROM no range of motion deficits identified  -TA    lower extremity range of motion deficits identified   l ankle 10-45 degrees pf actively, passive df to 0 degrees  -KM    General ROM Detail BUEs WFLs, except L elbow ext -15 degrees  -TA        MMT (Manual Muscle Testing)    General MMT Assessment upper extremity strength deficits identified  -TA    lower extremity strength deficits identified  -KM    General MMT Assessment Detail BUEs 3+/5  -TA    --   R l/e grossly 4/5, L l/e hip 3-/5, knee 4-/5, l ankle df 1/5  -KM    Bed Mobility, Assessment/Treatment    Bed Mobility, Assistive Device bed rails;head of bed elevated;draw sheet  -TA    bed rails;head of bed elevated;draw sheet  -KM    Bed Mobility, Roll Right, [place]     moderate assist (50% patient effort);verbal cues required  -KM    Bed Mobility, Scoot/Bridge, [place] moderate assist (50% patient effort)  -TA    moderate assist (50% patient effort)  -KM    Bed Mob, Supine to Sit, [place] moderate assist (50% patient effort);verbal cues required  -TA    moderate assist (50% patient effort)  -KM    Bed Mobility, Safety Issues decreased use of arms for pushing/pulling;decreased use of legs for bridging/pushing;other (see comments)   fear of falling  -TA        Bed Mobility, Impairments strength decreased;impaired balance;postural control impaired  -TA        Transfer Assessment/Treatment    Transfers, Sit-Stand [place] verbal cues required;maximum assist (25% patient effort)  -TA    moderate assist (50% patient effort);2 person assist required;verbal cues  required   cues to stand erect  -KM    Transfers, Stand-Sit [place] verbal cues required;maximum assist (25% patient effort)  -TA        Transfers, Sit-Stand-Sit, Assist Device other (see comments)   gait belt, BUE support  -TA    rolling walker  -KM    Transfer, Safety Issues balance decreased during turns;sequencing ability decreased;step length decreased;weight-shifting ability decreased  -TA        Transfer, Impairments strength decreased;impaired balance  -TA        Lower Body Dressing Assessment/Training    LB Dressing Assess/Train, Clothing Type donning:;slipper socks  -TA        LB Dressing Assess/Train, Position supine  -TA        LB Dressing Assess/Train, [place] dependent (less than 25% patient effort)  -TA        LB Dressing Assess/Train, Impairments decreased flexibility;strength decreased  -TA        Grooming Assessment/Training    Grooming Assess/Train, Position edge of bed;sitting  -TA        Grooming Assess/Train, Indepen Level contact guard assist  -TA        Grooming Assess/Train, Impairments strength decreased;impaired balance  -TA        Grooming Assess/Train, Comment Pt combed hair  -TA        Motor Skills/Interventions    Additional Documentation Balance Skills Training (Group)  -TA        Balance Skills Training    Sitting-Level of Assistance Contact guard;Close supervision  -TA        Sitting-Balance Support Feet supported  -TA        Sitting-Balance Activities Forward lean;Reaching for objects;Reaching across midline;Trunk control activities  -TA        Sitting # of Minutes 6  -TA        Standing-Level of Assistance Maximum assistance  -TA        Static Standing Balance Support Right upper extremity supported;Left upper extremity supported  -TA        Standing-Balance Activities Weight Shift A-P;Weight Shift R-L;Reaching for objects  -TA        Therapy Exercises    Bilateral Lower Extremities     AAROM:;10 reps;sitting;ankle pumps/circles;calf stretch;hip flexion;LAQ;heel  slides;hip abduction/adduction;quad sets;glut sets  -KM    Left Upper Extremity AROM:;5 reps;sitting;elbow flexion/extension;hand pumps;shoulder extension/flexion;shoulder horizontal abd/add  -TA        Sensory Assessment/Intervention    Light Touch LUE;RUE  -TA        LUE Light Touch WNL  -TA        RUE Light Touch WNL  -TA        General Therapy Interventions    Planned Therapy Interventions activity intolerance;adaptive equipment training;ADL retraining;balance training;bed mobility training;home exercise program;ROM (Range of Motion);strengthening;transfer training  -TA        Positioning and Restraints    Pre-Treatment Position in bed  -TA    in bed  -KM    Post Treatment Position chair  -TA    chair  -KM    In Chair notified nsg;reclined;call light within reach;encouraged to call for assist;exit alarm on;RUE elevated;LUE elevated;waffle cushion;legs elevated  -TA    notified nsg;reclined;call light within reach;encouraged to call for assist;with family/caregiver  -KM      01/12/17 2952                General Information    Equipment Currently Used at Home wheelchair;bath bench;grab bar  -CB        Living Environment    Lives With facility resident  -CB        Living Arrangements assisted living  -CB        Home Accessibility no concerns  -CB        Stair Railings at Home none  -CB        Type of Financial/Environmental Concern none  -CB        Transportation Available family or friend will provide  -CB        Functional Level Prior    Ambulation 3-->assistive equipment and person  -CB        Transferring 3-->assistive equipment and person  -CB        Toileting 3-->assistive equipment and person  -CB        Bathing 2-->assistive person  -CB        Dressing 2-->assistive person  -CB        Eating 2-->assistive person  -CB        Communication 0-->understands/communicates without difficulty  -CB        Swallowing 0-->swallows foods/liquids without difficulty  -CB        Prior Functional Level Comment see above   -CB          User Key  (r) = Recorded By, (t) = Taken By, (c) = Cosigned By    Initials Name Effective Dates    KM Mary Howe, PT 06/19/15 -     TA Linwood Torres, OT 03/14/16 -     BG Linda Leigh, MSW 07/18/16 -     CB Abigail Mirza RN 06/16/16 -     CL Elif Rossi, OT 06/08/16 -            Occupational Therapy Education     Title: PT OT SLP Therapies (Active)     Topic: Occupational Therapy (Active)     Point: Home exercise program (Done)    Description: Instruct learner(s) on appropriate technique for monitoring, assisting and/or progressing therapeutic exercises/activities.    Learning Progress Summary    Learner Readiness Method Response Comment Documented by Status   Patient Acceptance E,D VU,NR BUE HEP, body mechanics with bed mobility/fxl transfers, reinforced need for call for assist for OOB activities. TA 01/14/17 1523 Done               Point: Precautions (Done)    Description: Instruct learner(s) on prescribed precautions during self-care and functional transfers.    Learning Progress Summary    Learner Readiness Method Response Comment Documented by Status   Patient Acceptance E,D VU,NR BUE HEP, body mechanics with bed mobility/fxl transfers, reinforced need for call for assist for OOB activities. TA 01/14/17 1523 Done               Point: Body mechanics (Done)    Description: Instruct learner(s) on proper positioning and spine alignment during self-care, functional mobility activities and/or exercises.    Learning Progress Summary    Learner Readiness Method Response Comment Documented by Status   Patient Acceptance E,D VU,NR BUE HEP, body mechanics with bed mobility/fxl transfers, reinforced need for call for assist for OOB activities. TA 01/14/17 1523 Done                      User Key     Initials Effective Dates Name Provider Type Discipline    TA 03/14/16 -  Linwood Torres, OT Occupational Therapist OT                  OT Recommendation and Plan  Anticipated Discharge  Disposition: skilled nursing facility, other (see comments) (further rehab)  Planned Therapy Interventions: activity intolerance, adaptive equipment training, ADL retraining, balance training, bed mobility training, home exercise program, ROM (Range of Motion), strengthening, transfer training  Therapy Frequency: daily (Per priority policy)  Plan of Care Review  Plan Of Care Reviewed With: patient, son  Progress: no change  Outcome Summary/Follow up Plan: Pt presents with fxl decline from PLOF, deficits in ADL performance, fxl mobility, occupational endurance; fear of falling; will benefit from skilled OT services to address deficits, facilitate increased fxl I, safe transition to next level of care. Recommend SNF for rehab.          OT Goals       01/14/17 1524          Bed Mobility OT LTG    Bed Mobility OT LTG, Date Established 01/14/17  -TA      Bed Mobility OT LTG, Time to Achieve 1 wk  -TA      Bed Mobility OT LTG, Activity Type supine to sit/sit to supine  -TA      Bed Mobility OT LTG, [place] Level supervision required;verbal cues required  -TA      Bed Mobility OT LTG, Outcome goal ongoing  -TA      Transfer Training OT LTG    Transfer Training OT LTG, Date Established 01/14/17  -TA      Transfer Training OT LTG, Time to Achieve 1 wk  -TA      Transfer Training OT LTG, Activity Type bed to chair /chair to bed;sit to stand/stand to sit;toilet  -TA      Transfer Training OT LTG, [place] Level supervision required;verbal cues required  -TA      Transfer Training OT LTG, Assist Device walker, rolling  -TA      Transfer Training OT LTG, Outcome goal ongoing  -TA      Strength OT LTG    Strength Goal OT LTG, Date Established 01/14/17  -TA      Strength Goal OT LTG, Time to Achieve 1 wk  -TA      Strength Goal OT LTG, Measure to Achieve --   Increase BUE strength by 1/2 MMG to support fxl I.  -TA      Strength Goal OT LTG, Outcome goal ongoing  -TA      Toileting OT LTG    Toileting Goal OT LTG, Date  Established 01/14/17  -TA      Toileting Goal OT LTG, Time to Achieve 1 wk  -TA      Toileting Goal OT LTG, [place] Level minimum assist (75% patient effort)  -TA      Toileting Goal OT LTG, Assist Device toilet seat, raised  -TA      Toileting Goal OT LTG, Outcome goal ongoing  -TA      LB Dressing OT LTG    LB Dressing Goal OT LTG, Date Established 01/14/17  -TA      LB Dressing Goal OT LTG, Time to Achieve 1 wk  -TA      LB Dressing Goal OT LTG, [place] Level moderate assist (50% patient effort);verbal cues required  -TA      LB Dressing Goal OT LTG, Adaptive Equipment --   AE PRN  -TA      LB Dressing Goal OT LTG, Outcome goal ongoing  -TA        User Key  (r) = Recorded By, (t) = Taken By, (c) = Cosigned By    Initials Name Provider Type    STELLA Torres OT Occupational Therapist                Outcome Measures       01/14/17 1055 01/13/17 0850       How much help from another person do you currently need...    Turning from your back to your side while in flat bed without using bedrails?  2  -KM     Moving from lying on back to sitting on the side of a flat bed without bedrails?  2  -KM     Moving to and from a bed to a chair (including a wheelchair)?  2  -KM     Standing up from a chair using your arms (e.g., wheelchair, bedside chair)?  2  -KM     Climbing 3-5 steps with a railing?  1  -KM     To walk in hospital room?  2  -KM     AM-PAC 6 Clicks Score  11  -KM     How much help from another is currently needed...    Putting on and taking off regular lower body clothing? 1  -TA      Bathing (including washing, rinsing, and drying) 2  -TA      Toileting (which includes using toilet bed pan or urinal) 1  -TA      Putting on and taking off regular upper body clothing 2  -TA      Taking care of personal grooming (such as brushing teeth) 3  -TA      Eating meals 3  -TA      Score 12  -TA      Functional Assessment    Outcome Measure Options AM-PAC 6 Clicks Daily Activity (OT)  -TA AM-PAC 6  Clicks Basic Mobility (PT)  -KM       User Key  (r) = Recorded By, (t) = Taken By, (c) = Cosigned By    Initials Name Provider Type    PATEL Howe, PT Physical Therapist    TA Linwood Torres OT Occupational Therapist          Time Calculation:   OT Start Time: 1055 (ttc 11 minutes)    Therapy Charges for Today     Code Description Service Date Service Provider Modifiers Qty    59584312271  OT EVAL HIGH COMPLEXITY 4 1/14/2017 Linwood Torres OT GO 1    [id number]  OT THERAPEUTIC ACT EA 15 MIN 1/14/2017 Linwood Torres OT GO 1               Linwood Torres OT  1/14/2017     Electronically signed by Linwood Torres OT at 1/14/2017  3:29 PM

## 2017-01-16 NOTE — PLAN OF CARE
Problem: Patient Care Overview (Adult)  Goal: Plan of Care Review  Outcome: Ongoing (interventions implemented as appropriate)    01/16/17 5015   Outcome Evaluation   Outcome Summary/Follow up Plan pt had a good day. Had large bowel movement today and feels much better. very weak still but feels much better       Goal: Adult Individualization and Mutuality  Outcome: Ongoing (interventions implemented as appropriate)  Goal: Discharge Needs Assessment  Outcome: Ongoing (interventions implemented as appropriate)    Problem: Functional Deficit (Adult,Obstetrics,Pediatric)  Goal: Signs and Symptoms of Listed Potential Problems Will be Absent or Manageable (Functional Deficit)  Outcome: Ongoing (interventions implemented as appropriate)

## 2017-01-17 LAB
C DIFF TOX GENS STL QL NAA+PROBE: NOT DETECTED
HCT VFR BLD AUTO: 30.8 % (ref 34.5–44)
HGB BLD-MCNC: 9.7 G/DL (ref 11.5–15.5)

## 2017-01-17 PROCEDURE — 85018 HEMOGLOBIN: CPT | Performed by: INTERNAL MEDICINE

## 2017-01-17 PROCEDURE — 25010000002 PIPERACILLIN SOD-TAZOBACTAM PER 1 G: Performed by: INTERNAL MEDICINE

## 2017-01-17 PROCEDURE — 99233 SBSQ HOSP IP/OBS HIGH 50: CPT | Performed by: FAMILY MEDICINE

## 2017-01-17 PROCEDURE — 85014 HEMATOCRIT: CPT | Performed by: INTERNAL MEDICINE

## 2017-01-17 PROCEDURE — 25010000002 ENOXAPARIN PER 10 MG: Performed by: INTERNAL MEDICINE

## 2017-01-17 PROCEDURE — 87493 C DIFF AMPLIFIED PROBE: CPT | Performed by: FAMILY MEDICINE

## 2017-01-17 PROCEDURE — 97530 THERAPEUTIC ACTIVITIES: CPT

## 2017-01-17 RX ORDER — METRONIDAZOLE 500 MG/1
500 TABLET ORAL EVERY 8 HOURS SCHEDULED
Status: DISCONTINUED | OUTPATIENT
Start: 2017-01-17 | End: 2017-01-19 | Stop reason: HOSPADM

## 2017-01-17 RX ORDER — AMOXICILLIN AND CLAVULANATE POTASSIUM 500; 125 MG/1; MG/1
1 TABLET, FILM COATED ORAL EVERY 12 HOURS SCHEDULED
Status: DISCONTINUED | OUTPATIENT
Start: 2017-01-17 | End: 2017-01-19 | Stop reason: HOSPADM

## 2017-01-17 RX ADMIN — CARBIDOPA AND LEVODOPA 1 TABLET: 25; 100 TABLET ORAL at 22:33

## 2017-01-17 RX ADMIN — TAZOBACTAM SODIUM AND PIPERACILLIN SODIUM 4.5 G: 500; 4 INJECTION, SOLUTION INTRAVENOUS at 01:54

## 2017-01-17 RX ADMIN — METRONIDAZOLE 500 MG: 500 TABLET ORAL at 17:31

## 2017-01-17 RX ADMIN — METRONIDAZOLE 500 MG: 500 TABLET ORAL at 22:33

## 2017-01-17 RX ADMIN — SODIUM CHLORIDE 50 ML/HR: 9 INJECTION, SOLUTION INTRAVENOUS at 14:09

## 2017-01-17 RX ADMIN — Medication 325 MG: at 08:17

## 2017-01-17 RX ADMIN — ASPIRIN 81 MG 81 MG: 81 TABLET ORAL at 08:17

## 2017-01-17 RX ADMIN — ATORVASTATIN CALCIUM 20 MG: 20 TABLET, FILM COATED ORAL at 22:33

## 2017-01-17 RX ADMIN — TAZOBACTAM SODIUM AND PIPERACILLIN SODIUM 4.5 G: 500; 4 INJECTION, SOLUTION INTRAVENOUS at 10:30

## 2017-01-17 RX ADMIN — LEVOTHYROXINE SODIUM 75 MCG: 75 TABLET ORAL at 05:02

## 2017-01-17 RX ADMIN — Medication 250 MG: at 17:31

## 2017-01-17 RX ADMIN — CARBIDOPA AND LEVODOPA 1 TABLET: 25; 100 TABLET ORAL at 14:09

## 2017-01-17 RX ADMIN — AMOXICILLIN AND CLAVULANATE POTASSIUM 500 MG: 500; 125 TABLET, FILM COATED ORAL at 22:33

## 2017-01-17 RX ADMIN — CARBIDOPA AND LEVODOPA 1 TABLET: 25; 100 TABLET ORAL at 05:02

## 2017-01-17 RX ADMIN — ENOXAPARIN SODIUM 30 MG: 30 INJECTION SUBCUTANEOUS at 05:02

## 2017-01-17 RX ADMIN — Medication 250 MG: at 08:17

## 2017-01-17 NOTE — PROGRESS NOTES
Continued Stay Note  [facility]     Patient Name: Florinda Knapp  MRN: [id number]  Today's Date: 1/17/2017    Admit Date: 1/12/2017          Discharge Plan       01/17/17 7100    Case Management/Social Work Plan    Plan [facility]    Patient/Family In Agreement With Plan yes    Additional Comments EULALIA spoke with Ketty at the [facility]. Bed is still available for pt to return to the [facility] tomorrow. EULALIA set up and mabulance for pt tomorrow at 5:00pm as pt's son had requested she transfer by ambulance. EULALIA updated pt's son and physician. Please provide transfer summary and any hard scripts for controlled substances. Transfer summary to be faxed to [phone] and nurse to call report to [phone].     Final Note    Final Note SW is following              Discharge Codes     None        Expected Discharge Date and Time     Expected Discharge Date Expected Discharge Time    Jan 18, 2017             UNIQUE Yadav

## 2017-01-17 NOTE — PROGRESS NOTES
[facility] Medicine Services    ASSESSMENT / PLAN          Mrs. Knapp is an 85 year-old F with a past medical history of parkinson's disease, recent left JALEN at [facility] 12/6/16, iron deficiency anemia, chronic urinary retention, HTN, HLP, anxiety/depression, and osteoarthritis who presented from the [facility] where she is undergoing rehab with symptomatic anemia, chest pain/dyspnea x 2-3 days, fatigue, and generalized weakness. ER evaluation with grey zone troponins but no active chest pain. CT a/p showed some descending colitis.            Acute Symptomatic on Chronic Normocytic Anemia  -s/p 2 units prbc (now stable hgb 9)    Elevated troponin I level, possibly due to anemia (type 2 nstemi due to anemia)  -no chest pain, trended down after blood transfusion, only minimally elevated  -treating medically after discussion w/ patient and family (did not want aggressive invasive workup)  Colitis, on Zosyn patient had 2 episodes of watery diarrhea last night and now feels better denies any abdominal pain B check CBC in morning, C. Difficile testing is negative, we'll change Zosyn broad-spectrum antibiotic to by mouth Augmentin and Flagyl for 5 more days counseled in regards to the same with the patient as well as son at the bedside.  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  Anxiety  DNR/AND (no invasive procedures; no vent/no resuscitation in case of cardiopulmonary arrest)    -continue p.t.  -encourage po intake     -No invasive procedures, no feeding tubes, no heart caths, etc. Periodic/occasional blood transfusions are ok.  Currently functionally improving/now eating but if deteriorated would consult palliative/consider hospice     if feels better and continued to improve and diarrhea resolves and probable discharge to rehabilitation tomorrow      DVT prophylaxis: lovenox as pt is high risk  AND/DNR but full medical treatment  Discharge Planning: I expect patient to be discharged to rehab in1-2 days.  Discussed with son at the bedside  --Highly complex set of issues with high risk for further serious morbidity and other serious sequela, Time spent >35 minutes with > 50% time spent in the room face to face with patient and relatives           SUBJECTIVE--HPI/ Events overnight / CC- Hospital Follow up visit/ ROS-not detailed ,  as performed below    Says  had 2 watery diarrhea episodes last night but the diarrhea seems to have resolved at this point in time denies any nausea or vomiting chest and abdominal pain, says that breathing is okay say is that she has not really used oxygen in the past at home, patient has been in   rehabs for long period of time, says had to use in and out catheter overnight 2 times  Gen -no fevers, chills  CV-no chest pain, palpitations  Resp-no cough, dyspnea         OBJECTIVE        Vital Sign Min/Max for last 24 hours  Temp  Min: 97.9 °F (36.6 °C)  Max: 98.1 °F (36.7 °C)   BP  Min: 110/62  Max: 133/78   Pulse  Min: 62  Max: 83   Resp  Min: 16  Max: 18   SpO2  Min: 93 %  Max: 96 %   Flow (L/min)  Min: 2  Max: 2      Intake/Output Summary (Last 24 hours) at 01/17/17 1630  Last data filed at 01/17/17 1409   Gross per 24 hour   Intake   1436 ml   Output   2100 ml   Net   -664 ml           Gen/Constitutional-no acute distress  CV-RRR, S1 S2 normal, no m/r/g  Resp-CTAB, no wheezes  Abd-soft, mild left lower quadrant tenderness, ND, +BS  Ext-no edema  Neuro-A&Ox3, forgetful with poor insight, flat affect, no focal deficits  Psych-appropriate mood  Skin, no new rashes over last 24 hours     Scheduled  Medications    amoxicillin-clavulanate 1 tablet Oral Q12H   aspirin 81 mg Oral Daily   atorvastatin 20 mg Oral Nightly   carbidopa-levodopa 1 tablet Oral Q8H   enoxaparin 30 mg Subcutaneous Q24H   ferrous sulfate 325 mg Oral Daily With Breakfast   levothyroxine 75 mcg Oral Daily   metroNIDAZOLE 500 mg Oral Q8H   saccharomyces boulardii 250 mg Oral BID   sodium chloride 250 mL Intravenous Once     I have reviewed the labs, culture data, radiology results, and diagnostic studies.    Results from last 7 days  Lab Units 01/16/17  0536 01/15/17  0517 01/14/17  0707  01/12/17  1845   SODIUM mmol/L 140 136 140  < > 140   POTASSIUM mmol/L 3.6 3.6 3.8  < > 4.1   CHLORIDE mmol/L 107 104 110*  < > 107   TOTAL CO2 mmol/L 25.0 22.0 22.0  < > 22.0   BUN mg/dL 19 20 23  < > 30*   CREATININE mg/dL 0.60 0.60 0.50*  < > 0.70   CALCIUM mg/dL 8.0* 8.2* 8.3*  < > 9.1   BILIRUBIN mg/dL  --   --  1.1  --  0.8   ALK PHOS U/L  --   --  54  --  70   ALT (SGPT) U/L  --   --  5*  --  10   AST (SGOT) U/L  --   --  18  --  26   GLUCOSE mg/dL 79 76 79  < > 84   < > = values in this interval not displayed.    Results from last 7 days  Lab Units 01/17/17  0515 01/16/17  0536 01/15/17  0517 01/14/17  0707   WBC 10*3/mm3  --  5.76 5.97 5.23   HEMOGLOBIN g/dL 9.7* 9.0* 9.9* 9.7*   HEMATOCRIT % 30.8* 28.4* 31.2* 30.7*   PLATELETS 10*3/mm3  --  174 193 177           Culture Data:    Radiology Results:  Imaging Results (last 24 hours)     ** No results found for the last 24 hours. **        *. Please note that portions of this note were completed with a voice recognition program. Efforts were made to edit the dictations, but occasionally words are mistranscribed.  Reji Burris MD01/17/174:30 PM

## 2017-01-17 NOTE — PLAN OF CARE
Problem: Patient Care Overview (Adult)  Goal: Plan of Care Review  Outcome: Ongoing (interventions implemented as appropriate)    01/17/17 1438   Coping/Psychosocial Response Interventions   Plan Of Care Reviewed With patient   Patient Care Overview   Progress progress toward functional goals as expected   Outcome Evaluation   Outcome Summary/Follow up Plan Pt progressing with bed mobilty and transfers as well as improving with ADL skills. Pt continues to require max A x2 for transfers and needs max encouragement to participate with therapy.         Problem: Inpatient Occupational Therapy  Goal: Bed Mobility Goal LTG- OT  Outcome: Ongoing (interventions implemented as appropriate)    01/14/17 1524 01/17/17 1438   Bed Mobility OT LTG   Bed Mobility OT LTG, Date Established 01/14/17 --    Bed Mobility OT LTG, Time to Achieve 1 wk --    Bed Mobility OT LTG, Activity Type supine to sit/sit to supine --    Bed Mobility OT LTG, [place] Level supervision required;verbal cues required --    Bed Mobility OT LTG, Outcome --  goal ongoing       Goal: Transfer Training Goal 1 LTG- OT  Outcome: Ongoing (interventions implemented as appropriate)    01/14/17 1524 01/17/17 1438   Transfer Training OT LTG   Transfer Training OT LTG, Date Established 01/14/17 --    Transfer Training OT LTG, Time to Achieve 1 wk --    Transfer Training OT LTG, Activity Type bed to chair /chair to bed;sit to stand/stand to sit;toilet --    Transfer Training OT LTG, [place] Level supervision required;verbal cues required --    Transfer Training OT LTG, Assist Device walker, rolling --    Transfer Training OT LTG, Outcome --  goal ongoing       Goal: Strength Goal LTG- OT  Outcome: Ongoing (interventions implemented as appropriate)    01/14/17 1524 01/17/17 1438   Strength OT LTG   Strength Goal OT LTG, Date Established 01/14/17 --    Strength Goal OT LTG, Time to Achieve 1 wk --    Strength Goal OT LTG, Measure to Achieve (Increase BUE  strength by 1/2 MMG to support fxl I.) --    Strength Goal OT LTG, Functional Goal --  Pt to increase B UE strength by 1/2 muscle grade to support ADL independence   Strength Goal OT LTG, Outcome --  goal ongoing       Goal: Toileting Goal LTG- OT  Outcome: Ongoing (interventions implemented as appropriate)    01/14/17 1524 01/17/17 1438   Toileting OT LTG   Toileting Goal OT LTG, Date Established 01/14/17 --    Toileting Goal OT LTG, Time to Achieve 1 wk --    Toileting Goal OT LTG, [place] Level minimum assist (75% patient effort) --    Toileting Goal OT LTG, Assist Device toilet seat, raised --    Toileting Goal OT LTG, Outcome --  goal ongoing       Goal: LB Dressing Goal LTG- OT  Outcome: Ongoing (interventions implemented as appropriate)    01/14/17 1524 01/17/17 1438   LB Dressing OT LTG   LB Dressing Goal OT LTG, Date Established 01/14/17 --    LB Dressing Goal OT LTG, Time to Achieve 1 wk --    LB Dressing Goal OT LTG, [place] Level moderate assist (50% patient effort);verbal cues required --    LB Dressing Goal OT LTG, Adaptive Equipment (AE PRN) --    LB Dressing Goal OT LTG, Outcome --  goal ongoing

## 2017-01-17 NOTE — PLAN OF CARE
Problem: Patient Care Overview (Adult)  Goal: Plan of Care Review  Outcome: Ongoing (interventions implemented as appropriate)    01/17/17 5365   Coping/Psychosocial Response Interventions   Plan Of Care Reviewed With patient;son   Patient Care Overview   Progress no change   Outcome Evaluation   Outcome Summary/Follow up Plan VSS throughout shift. Continue IV fluids and switch to PO antibiotics per MD order. Pt had several loose stools today, cdiff culture was negative. Pt anxious about plan of care and worried about not getting better. No other complaints from pt at this time.         Problem: Fluid Volume Deficit (Adult)  Goal: Identify Related Risk Factors and Signs and Symptoms  Outcome: Ongoing (interventions implemented as appropriate)  Goal: Fluid/Electrolyte Balance  Outcome: Ongoing (interventions implemented as appropriate)  Goal: Comfort/Well Being  Outcome: Ongoing (interventions implemented as appropriate)

## 2017-01-17 NOTE — PLAN OF CARE
Problem: Patient Care Overview (Adult)  Goal: Plan of Care Review  Outcome: Ongoing (interventions implemented as appropriate)    01/17/17 0638   Coping/Psychosocial Response Interventions   Plan Of Care Reviewed With patient   Patient Care Overview   Progress no change   Outcome Evaluation   Outcome Summary/Follow up Plan Pt. rested well through the night; continue IV Abx. therapy; Pt. had two loose stools but continues to retain urine; bladder scanned Pt. and then straight cathed with 800ml out; Pt. depressed about the retention.

## 2017-01-17 NOTE — PROGRESS NOTES
[facility] Medicine Services  INPATIENT PROGRESS NOTE    Date of Admission: 1/12/2017  Length of Stay: 4  Primary Care Physician: Kieran Cottrell MD    Subjective     Chief Complaint: f/u anemia  HPI:  Felt better after held antihypertensives and bp higher, no blood in stool, ate better today and more alert, had bm, worked w/ p.t. No dyspnea currently. Overall improved    Review Of Systems:   Review of Systems   Constitutional: Negative for chills and fever.   Respiratory: Negative for cough and shortness of breath.    Cardiovascular: Negative for chest pain.   Gastrointestinal: Negative for abdominal pain, blood in stool, nausea and vomiting.   Genitourinary: Negative for dysuria and hematuria.     Objective      Temp:  [97.6 °F (36.4 °C)-97.9 °F (36.6 °C)] 97.8 °F (36.6 °C)  Heart Rate:  [66-81] 81  Resp:  [16-18] 16  BP: ()/(48-88) 129/81  Physical Exam  Constitutional: alert today, appropriate in responses, no distress, frail appearing  Respiratory: Clear to auscultation bilaterally, nonlabored respirations   Cardiovascular: RRR, no murmurs, rubs, or gallops, palpable pedal pulses bilaterally  Gastrointestinal: minimal left lower abd tenderness, no rebound, no guarding  Musculoskeletal: No bilateral ankle edema; left hip incision c/d/i, well-healed  Psychiatric: oriented x 3, flat affect, cooperative  Neurologic: Strength symmetric in all extremities       Results Review:    I have reviewed the labs, radiology results and diagnostic studies.      Results from last 7 days  Lab Units 01/16/17  0536 01/15/17  0517 01/14/17  0707   WBC 10*3/mm3 5.76 5.97 5.23   HEMOGLOBIN g/dL 9.0* 9.9* 9.7*   HEMATOCRIT % 28.4* 31.2* 30.7*   PLATELETS 10*3/mm3 174 193 177         Results from last 7 days  Lab Units 01/16/17  0536 01/15/17  0517 01/14/17  0707   SODIUM mmol/L 140 136 140   POTASSIUM mmol/L 3.6 3.6 3.8   CHLORIDE mmol/L 107 104 110*   TOTAL CO2 mmol/L 25.0 22.0 22.0   BUN mg/dL  19 20 23   CREATININE mg/dL 0.60 0.60 0.50*   GLUCOSE mg/dL 79 76 79   CALCIUM mg/dL 8.0* 8.2* 8.3*       Results for VAIBHAV KNAPP (MRN [id number]) as of 1/13/2017 11:49   Ref. Range 1/12/2017 18:45 1/12/2017 21:14 1/13/2017 04:46   Troponin I Latest Ref Range: <=0.040 ng/mL 0.255 (H) 0.293 (H) 0.461 (H)         URINE CULTURE   Date Value Ref Range Status   01/12/2017 No growth  Preliminary     Reviewed official radiology reports      I have reviewed the medications.    Assessment/Plan   Mrs. Knapp is an 85 year-old F with a past medical history of parkinson's disease, recent left JALEN at [facility] 12/6/16, iron deficiency anemia, chronic urinary retention, HTN, HLP, anxiety/depression, and osteoarthritis who presented from the [facility] where she is undergoing rehab with symptomatic anemia, chest pain/dyspnea x 2-3 days, fatigue, and generalized weakness. ER evaluation with grey zone troponins but no active chest pain. CT a/p showed some descending colitis.       Assessment/Problem List  Principal Problem:    Acute Symptomatic on Chronic Normocytic Anemia   -s/p 2 units prbc (now stable hgb 9)  Active Problems:    Elevated troponin I level, possibly due to anemia (type 2 nstemi due to anemia)   -no chest pain, trended down after blood transfusion, only minimally elevated   -treating medically after discussion w/ patient and family (did not want aggressive invasive workup)    Colitis (on zosyn)    Urinary retention (in/out cath)    CHF with cardiomyopathy (compensated systolic), unknown chronicity   -ef 45%, diastolic dysfxn, mild-mod AI and MR    Hypotension (not tolerating any antihypertensives)   -did not tolerate low dose cozaar or coreg so stopped    Parkinson's disease    Generalized weakness    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 (to ensure hgb stability; s/p 2 units prbc this admission w/ no overt bleeding)  -in/out cath prn; ambulate; could trial flomax but will wait for now as BP very tenuous and drops quite precipitously, so could try once bp stable >110 consistently)  -continue p.t.  -encourage po intake    -No invasive procedures, no feeding tubes, no heart caths, etc. Periodic/occasional  blood transfusions are ok. Currently functionally improving/now eating but if deteriorated would consult palliative/consider hospice    *i brought up potential transfer back to nursing facility tomorrow, and both patient and son were apprehensive about this and felt another day might give more confidence that will continue \"trending in the right direction\"; I believe this is reasonable so tentatively plan Wednesday if clinically stable, labs stable, eating, etc      DVT prophylaxis: lovenox as pt is high risk  AND/DNR but full medical treatment  Discharge Planning: I expect patient to be discharged to rehab in 2-3 days.    Evangelista Brand MD   01/16/17   7:40 PM    Please note that portions of this note may have been completed with a voice recognition program. Efforts were made to edit the dictations, but occasionally words are mistranscribed.    "

## 2017-01-17 NOTE — PROGRESS NOTES
Continued Stay Note  [facility]     Patient Name: Florinda Knapp  MRN: [id number]  Today's Date: 1/17/2017    Admit Date: 1/12/2017          Discharge Plan       01/17/17 0839    Case Management/Social Work Plan    Additional Comments SW received consult that pt may be ready to return to the [facility] tomorrow. EULALIA called and updated the [facility] coordinator. Pt still currently has a bed open but there is no bed hold for pt. SW will keep in touch with the [facility] and bed availability.               Discharge Codes     None        Expected Discharge Date and Time     Expected Discharge Date Expected Discharge Time    Jan 18, 2017             UNIQUE Yadav

## 2017-01-17 NOTE — PROGRESS NOTES
Acute Care - Occupational Therapy Treatment Note  [facility]     Patient Name: Florinda Knapp  : 3/5/1931  MRN: [id number]  Today's Date: 2017  Onset of Illness/Injury or Date of Surgery Date: 17  Date of Referral to OT: 17  Referring Physician: ARIEL Rodarte      Admit Date: 2017    Visit Dx:     ICD-10-CM ICD-9-CM   1. Symptomatic anemia D64.9 285.9   2. Weakness R53.1 780.79   3. Failure to thrive in adult R62.7 783.7   4. Dehydration E86.0 276.51   5. Impaired functional mobility, balance, gait, and endurance Z74.09 V49.89   6. Impaired mobility and ADLs Z74.09 799.89     Patient Active Problem List   Diagnosis   • Dysuria   • Elbow injury   • Fracture of left olecranon process   • Parkinson's disease   • Trauma left hip   • Acute Symptomatic on Chronic Normocytic Anemia   • Acute respiratory failure with hypoxia   • Generalized weakness   • Chest pain   • Symptomatic anemia   • Elevated troponin I level, possibly due to anemia   • Status post total replacement of left hip 2016 at [facility]   • H/O urinary retention   • History of recurrent UTIs   • Essential hypertension   • Anxiety   • CHF with cardiomyopathy   • Colitis             Adult Rehabilitation Note       17 1400 17 1330       Rehab Assessment/Intervention    Discipline occupational therapist  -JR physical therapist  -SR     Document Type therapy note (daily note)  -JR therapy note (daily note)  -SR     Subjective Information no complaints;agree to therapy  -JR agree to therapy;complains of;weakness  -SR     Patient Effort, Rehab Treatment good  -JR good  -SR     Symptoms Noted During/After Treatment  other (see comments)   fear of falling  -SR     Precautions/Limitations fall precautions;hip precautions- left  -JR fall precautions;hip precautions- left  -SR     Recorded by [JR] Rosalba Martino OT [SR] Namrata Barrett PT     Vital Signs    Pre Systolic BP Rehab 118  -JR --   vitals are WNL on RA  -SR      Pre Treatment Diastolic BP 79  -JR      Post Systolic BP Rehab 124  -JR      Post Treatment Diastolic BP 91  -JR      Pretreatment Heart Rate (beats/min) 78  -JR      Posttreatment Heart Rate (beats/min) 84  -JR      Pre SpO2 (%) 94  -JR      O2 Delivery Pre Treatment supplemental O2   2L  -JR      Post SpO2 (%) 91  -JR      O2 Delivery Post Treatment supplemental O2  -JR      Pre Patient Position Supine  -JR      Intra Patient Position Standing  -JR      Post Patient Position Sitting  -JR      Recorded by [JR] Rosalba Martino, OT [SR] Namrata Barrett, PT     Pain Assessment    Pain Assessment 0-10  -JR No/denies pain  -SR     Pain Score 0  -JR      Post Pain Score 0  -JR      Recorded by [JR] Rosalba Martino, OT [SR] Namrata Barrett PT     Cognitive Assessment/Intervention    Current Cognitive/Communication Assessment functional  -JR functional  -SR     Orientation Status oriented x 4  -JR oriented x 4  -SR     Follows Commands/Answers Questions 100% of the time;able to follow single-step instructions  -% of the time  -SR     Personal Safety WNL/WFL  -JR WNL/WFL  -SR     Personal Safety Interventions  gait belt;nonskid shoes/slippers when out of bed  -SR     Recorded by [JR] Rosalba Martino, OT [SR] Namrata Barrett PT     Bed Mobility, Assessment/Treatment    Bed Mobility, Assistive Device bed rails;head of bed elevated  -JR bed rails;head of bed elevated;draw sheet  -SR     Bed Mob, Supine to Sit, [place] moderate assist (50% patient effort);2 person assist required  -JR moderate assist (50% patient effort);2 person assist required;verbal cues required  -SR     Bed Mobility, Safety Issues decreased use of arms for pushing/pulling;decreased use of legs for bridging/pushing  -JR decreased use of legs for bridging/pushing;decreased use of arms for pushing/pulling  -SR     Bed Mobility, Impairments strength decreased;impaired balance  -JR strength decreased;impaired balance;postural control impaired    forward head  -SR     Bed Mobility, Comment Pt required assist to bring LE's to EOB as well as bring trunk to sitting. Pt required verbal cues for technique  -JR      Recorded by [JR] Rosalba Martino, OT [SR] Namrata Barrett, PT     Transfer Assessment/Treatment    Transfers, Bed-Chair [place] maximum assist (25% patient effort);2 person assist required  -JR maximum assist (25% patient effort);2 person assist required;verbal cues required  -SR     Transfers, Sit-Stand [place] maximum assist (25% patient effort);2 person assist required  -JR maximum assist (25% patient effort);2 person assist required;verbal cues required  -SR     Transfers, Stand-Sit [place] maximum assist (25% patient effort);2 person assist required  -JR maximum assist (25% patient effort);2 person assist required;verbal cues required  -SR     Transfers, Sit-Stand-Sit, Assist Device --   UE support  -JR other (see comments)   BUE support, block both knees and feet  -SR     Transfer, Comment Pt required verbal cues for foot placement with transfer as well as verbal cues for upright posture. Pt with narrow [place], leaning posterior. Pt participated with sit to stand x3, then stand pivot transfer to chair.  -JR Pt's feet sliding forward, knees buckling, pt stands on toes and reluctant to stand on full foot. Pt reports fear of falling, 3 reps standing from EOB prior to SPS to chair.   -SR     Recorded by [JR] Rosalba Martino, OT [SR] Namrata Barrett, PT     Gait Assessment/Treatment    Gait, [place] Level  unable to perform  -SR     Recorded by  [SR] Namrata Barrett, PT     Grooming Assessment/Training    Grooming Assess/Train, Position sitting  -JR      Grooming Assess/Train, Indepen Level set up required  -JR      Grooming Assess/Train, Comment brushing hair  -JR      Recorded by [JR] Rosalba Martino, OT      Motor Skills/Interventions    Additional Documentation Balance Skills Training (Group)  -JR      Recorded by [JR] Rosalba  R Gunner, OT      Balance Skills Training    Sitting-Level of Assistance Contact guard  -JR Contact guard;x2  -SR     Sitting-Balance Support Right upper extremity supported;Left upper extremity supported  -JR Left upper extremity supported;Right upper extremity supported;Feet supported  -SR     Sitting-Balance Activities  Trunk control activities  -SR     Sitting # of Minutes  5  -SR     Standing-Level of Assistance Maximum assistance;x2  -JR Maximum assistance;x2   pt only able to sustain stanind for a few seconds  -SR     Static Standing Balance Support Left upper extremity supported;Right upper extremity supported  -JR      Recorded by [JR] Rosalba Martino, OT [SR] Namrata Barrett, JINNY     Therapy Exercises    Bilateral Lower Extremities  AAROM:;10 reps;supine;ankle pumps/circles;glut sets;quad sets;SLR;heel slides  -SR     Bilateral Upper Extremity AROM:;10 reps;elbow flexion/extension;pronation/supination;shoulder abduction/adduction;shoulder extension/flexion  -JR      Recorded by [JR] Rosalba Martino, OT [SR] Namrata Barrett PT     Positioning and Restraints    Pre-Treatment Position in bed  -JR in bed  -SR     Post Treatment Position chair  -JR chair  -SR     In Chair notified nsg;reclined;call light within reach;encouraged to call for assist;exit alarm on;with family/caregiver;RLE elevated;LLE elevated  -JR notified nsg;reclined;sitting;call light within reach;encouraged to call for assist;exit alarm on  -SR     Recorded by [JR] Rosalba Martino, OT [SR] Namrata Barrett PT       User Key  (r) = Recorded By, (t) = Taken By, (c) = Cosigned By    Initials Name Effective Dates    JR Rosalba Martino, OT 06/22/15 -     SR Namrata Barrett PT 06/19/15 -                 OT Goals       01/17/17 1438 01/14/17 1524       Bed Mobility OT LTG    Bed Mobility OT LTG, Date Established  01/14/17  -TA     Bed Mobility OT LTG, Time to Achieve  1 wk  -TA     Bed Mobility OT LTG, Activity Type  supine to sit/sit to supine  -TA      Bed Mobility OT LTG, [place] Level  supervision required;verbal cues required  -TA     Bed Mobility OT LTG, Outcome goal ongoing  -JR goal ongoing  -TA     Transfer Training OT LTG    Transfer Training OT LTG, Date Established  01/14/17  -TA     Transfer Training OT LTG, Time to Achieve  1 wk  -TA     Transfer Training OT LTG, Activity Type  bed to chair /chair to bed;sit to stand/stand to sit;toilet  -TA     Transfer Training OT LTG, [place] Level  supervision required;verbal cues required  -TA     Transfer Training OT LTG, Assist Device  walker, rolling  -TA     Transfer Training OT LTG, Outcome goal ongoing  -JR goal ongoing  -TA     Strength OT LTG    Strength Goal OT LTG, Date Established  01/14/17  -TA     Strength Goal OT LTG, Time to Achieve  1 wk  -TA     Strength Goal OT LTG, Measure to Achieve  --   Increase BUE strength by 1/2 MMG to support fxl I.  -TA     Strength Goal OT LTG, Functional Goal Pt to increase B UE strength by 1/2 muscle grade to support ADL independence  -JR      Strength Goal OT LTG, Outcome goal ongoing  -JR goal ongoing  -TA     Toileting OT LTG    Toileting Goal OT LTG, Date Established  01/14/17  -TA     Toileting Goal OT LTG, Time to Achieve  1 wk  -TA     Toileting Goal OT LTG, [place] Level  minimum assist (75% patient effort)  -TA     Toileting Goal OT LTG, Assist Device  toilet seat, raised  -TA     Toileting Goal OT LTG, Outcome goal ongoing  -JR goal ongoing  -TA     LB Dressing OT LTG    LB Dressing Goal OT LTG, Date Established  01/14/17  -TA     LB Dressing Goal OT LTG, Time to Achieve  1 wk  -TA     LB Dressing Goal OT LTG, [place] Level  moderate assist (50% patient effort);verbal cues required  -TA     LB Dressing Goal OT LTG, Adaptive Equipment  --   AE PRN  -TA     LB Dressing Goal OT LTG, Outcome goal ongoing  -JR goal ongoing  -TA       User Key  (r) = Recorded By, (t) = Taken By, (c) = Cosigned By    Initials Name Provider Type    JR  Rosalba Martino, OT Occupational Therapist    STELLA Torres, OT Occupational Therapist          Occupational Therapy Education     Title: PT OT SLP Therapies (Active)     Topic: Occupational Therapy (Active)     Point: ADL training (Active)    Description: Instruct learner(s) on proper safety adaptation and remediation techniques during self care or transfers.   Instruct in proper use of assistive devices.    Learning Progress Summary    Learner Readiness Method Response Comment Documented by Status   Patient Acceptance E NR Educated on safe transfer techniques and benefits of activity JR 01/17/17 1437 Active               Point: Home exercise program (Done)    Description: Instruct learner(s) on appropriate technique for monitoring, assisting and/or progressing therapeutic exercises/activities.    Learning Progress Summary    Learner Readiness Method Response Comment Documented by Status   Patient Acceptance E,D VU,NR BUE HEP, body mechanics with bed mobility/fxl transfers, reinforced need for call for assist for OOB activities. TA 01/14/17 1523 Done               Point: Precautions (Done)    Description: Instruct learner(s) on prescribed precautions during self-care and functional transfers.    Learning Progress Summary    Learner Readiness Method Response Comment Documented by Status   Patient Acceptance E,D VU,NR BUE HEP, body mechanics with bed mobility/fxl transfers, reinforced need for call for assist for OOB activities. TA 01/14/17 1523 Done               Point: Body mechanics (Done)    Description: Instruct learner(s) on proper positioning and spine alignment during self-care, functional mobility activities and/or exercises.    Learning Progress Summary    Learner Readiness Method Response Comment Documented by Status   Patient Acceptance E,D VU,NR BUE HEP, body mechanics with bed mobility/fxl transfers, reinforced need for call for assist for OOB activities. TA 01/14/17 1523 Done                       User Key     Initials Effective Dates Name Provider Type Discipline     06/22/15 -  Rosalba Martino OT Occupational Therapist OT    TA 03/14/16 -  Linwood Torres, OT Occupational Therapist OT                  OT Recommendation and Plan  Anticipated Discharge Disposition: skilled nursing facility, other (see comments) (further rehab)  Planned Therapy Interventions: activity intolerance, adaptive equipment training, ADL retraining, balance training, bed mobility training, home exercise program, ROM (Range of Motion), strengthening, transfer training  Therapy Frequency: daily (Per priority policy)  Plan of Care Review  Plan Of Care Reviewed With: patient  Progress: progress toward functional goals as expected  Outcome Summary/Follow up Plan: Pt progressing with bed mobilty and transfers as well as improving with ADL skills. Pt continues to require max A x2 for transfers and needs max encouragement to participate with therapy.        Outcome Measures       01/17/17 1400 01/16/17 1330       How much help from another person do you currently need...    Turning from your back to your side while in flat bed without using bedrails?  2  -SR     Moving from lying on back to sitting on the side of a flat bed without bedrails?  2  -SR     Moving to and from a bed to a chair (including a wheelchair)?  2  -SR     Standing up from a chair using your arms (e.g., wheelchair, bedside chair)?  2  -SR     Climbing 3-5 steps with a railing?  1  -SR     To walk in hospital room?  1  -SR     AM-PAC 6 Clicks Score  10  -SR     How much help from another is currently needed...    Putting on and taking off regular lower body clothing? 1  -JR      Bathing (including washing, rinsing, and drying) 2  -JR      Toileting (which includes using toilet bed pan or urinal) 1  -JR      Putting on and taking off regular upper body clothing 2  -JR      Taking care of personal grooming (such as brushing teeth) 3  -JR      Eating meals 3  -JR       Score 12  -      Functional Assessment    Outcome Measure Options AM-PAC 6 Clicks Daily Activity (OT)  -JR AM-PAC 6 Clicks Basic Mobility (PT)  -SR       User Key  (r) = Recorded By, (t) = Taken By, (c) = Cosigned By    Initials Name Provider Type    JR Roaslba Martino, OT Occupational Therapist    SR Namrata Barrett, PT Physical Therapist           Time Calculation:         Time Calculation- OT       01/17/17 1440          Time Calculation- OT    OT Start Time 1400  -      Total Timed Code Minutes- OT 25 minute(s)  -      OT Received On 01/17/17  -      OT Goal Re-Cert Due Date 01/24/17  -        User Key  (r) = Recorded By, (t) = Taken By, (c) = Cosigned By    Initials Name Provider Type    JR Rosalba Martino, OT Occupational Therapist           Therapy Charges for Today     Code Description Service Date Service Provider Modifiers Qty    20180471257 HC OT THERAPEUTIC ACT EA 15 MIN 1/17/2017 Rosalba Martino, OT GO 2    09385981946 HC OT THER SUPP EA 15 MIN 1/17/2017 Rosalba Martino OT GO 1               Rosalba Martino, OT  1/17/2017

## 2017-01-18 PROBLEM — E87.6 HYPOKALEMIA: Status: ACTIVE | Noted: 2017-01-18

## 2017-01-18 LAB
ANION GAP SERPL CALCULATED.3IONS-SCNC: 6 MMOL/L (ref 3–11)
BASOPHILS # BLD AUTO: 0.02 10*3/MM3 (ref 0–0.2)
BASOPHILS NFR BLD AUTO: 0.4 % (ref 0–1)
BUN BLD-MCNC: 8 MG/DL (ref 9–23)
BUN/CREAT SERPL: 16 (ref 7–25)
CALCIUM SPEC-SCNC: 8.5 MG/DL (ref 8.7–10.4)
CHLORIDE SERPL-SCNC: 105 MMOL/L (ref 99–109)
CO2 SERPL-SCNC: 24 MMOL/L (ref 20–31)
CREAT BLD-MCNC: 0.5 MG/DL (ref 0.6–1.3)
DEPRECATED RDW RBC AUTO: 61.8 FL (ref 37–54)
EOSINOPHIL # BLD AUTO: 0.14 10*3/MM3 (ref 0.1–0.3)
EOSINOPHIL NFR BLD AUTO: 2.9 % (ref 0–3)
ERYTHROCYTE [DISTWIDTH] IN BLOOD BY AUTOMATED COUNT: 18.4 % (ref 11.3–14.5)
GFR SERPL CREATININE-BSD FRML MDRD: 117 ML/MIN/1.73
GLUCOSE BLD-MCNC: 75 MG/DL (ref 70–100)
HCT VFR BLD AUTO: 31 % (ref 34.5–44)
HGB BLD-MCNC: 9.9 G/DL (ref 11.5–15.5)
IMM GRANULOCYTES # BLD: 0.03 10*3/MM3 (ref 0–0.03)
IMM GRANULOCYTES NFR BLD: 0.6 % (ref 0–0.6)
LYMPHOCYTES # BLD AUTO: 1.98 10*3/MM3 (ref 0.6–4.8)
LYMPHOCYTES NFR BLD AUTO: 41.3 % (ref 24–44)
MAGNESIUM SERPL-MCNC: 2 MG/DL (ref 1.3–2.7)
MCH RBC QN AUTO: 29.6 PG (ref 27–31)
MCHC RBC AUTO-ENTMCNC: 31.9 G/DL (ref 32–36)
MCV RBC AUTO: 92.5 FL (ref 80–99)
MONOCYTES # BLD AUTO: 0.37 10*3/MM3 (ref 0–1)
MONOCYTES NFR BLD AUTO: 7.7 % (ref 0–12)
NEUTROPHILS # BLD AUTO: 2.26 10*3/MM3 (ref 1.5–8.3)
NEUTROPHILS NFR BLD AUTO: 47.1 % (ref 41–71)
PLATELET # BLD AUTO: 201 10*3/MM3 (ref 150–450)
PMV BLD AUTO: 9.2 FL (ref 6–12)
POTASSIUM BLD-SCNC: 3.1 MMOL/L (ref 3.5–5.5)
RBC # BLD AUTO: 3.35 10*6/MM3 (ref 3.89–5.14)
SODIUM BLD-SCNC: 135 MMOL/L (ref 132–146)
WBC NRBC COR # BLD: 4.8 10*3/MM3 (ref 3.5–10.8)

## 2017-01-18 PROCEDURE — 97530 THERAPEUTIC ACTIVITIES: CPT

## 2017-01-18 PROCEDURE — 97110 THERAPEUTIC EXERCISES: CPT

## 2017-01-18 PROCEDURE — 99233 SBSQ HOSP IP/OBS HIGH 50: CPT | Performed by: NURSE PRACTITIONER

## 2017-01-18 PROCEDURE — 25010000002 ENOXAPARIN PER 10 MG: Performed by: INTERNAL MEDICINE

## 2017-01-18 PROCEDURE — 83735 ASSAY OF MAGNESIUM: CPT | Performed by: FAMILY MEDICINE

## 2017-01-18 PROCEDURE — 85025 COMPLETE CBC W/AUTO DIFF WBC: CPT | Performed by: FAMILY MEDICINE

## 2017-01-18 PROCEDURE — 80048 BASIC METABOLIC PNL TOTAL CA: CPT | Performed by: FAMILY MEDICINE

## 2017-01-18 RX ORDER — LOPERAMIDE HYDROCHLORIDE 2 MG/1
2 CAPSULE ORAL 3 TIMES DAILY PRN
Status: DISCONTINUED | OUTPATIENT
Start: 2017-01-18 | End: 2017-01-19 | Stop reason: HOSPADM

## 2017-01-18 RX ORDER — POTASSIUM CHLORIDE 750 MG/1
40 CAPSULE, EXTENDED RELEASE ORAL AS NEEDED
Status: DISCONTINUED | OUTPATIENT
Start: 2017-01-18 | End: 2017-01-19 | Stop reason: HOSPADM

## 2017-01-18 RX ORDER — TAMSULOSIN HYDROCHLORIDE 0.4 MG/1
0.4 CAPSULE ORAL DAILY
Status: DISCONTINUED | OUTPATIENT
Start: 2017-01-18 | End: 2017-01-19 | Stop reason: HOSPADM

## 2017-01-18 RX ORDER — POTASSIUM CHLORIDE 7.45 MG/ML
10 INJECTION INTRAVENOUS
Status: DISCONTINUED | OUTPATIENT
Start: 2017-01-18 | End: 2017-01-19 | Stop reason: HOSPADM

## 2017-01-18 RX ORDER — POTASSIUM CHLORIDE 1.5 G/1.77G
40 POWDER, FOR SOLUTION ORAL AS NEEDED
Status: DISCONTINUED | OUTPATIENT
Start: 2017-01-18 | End: 2017-01-19 | Stop reason: HOSPADM

## 2017-01-18 RX ADMIN — Medication 250 MG: at 18:20

## 2017-01-18 RX ADMIN — AMOXICILLIN AND CLAVULANATE POTASSIUM 500 MG: 500; 125 TABLET, FILM COATED ORAL at 08:52

## 2017-01-18 RX ADMIN — POTASSIUM CHLORIDE 40 MEQ: 750 CAPSULE, EXTENDED RELEASE ORAL at 14:54

## 2017-01-18 RX ADMIN — METRONIDAZOLE 500 MG: 500 TABLET ORAL at 13:57

## 2017-01-18 RX ADMIN — AMOXICILLIN AND CLAVULANATE POTASSIUM 500 MG: 500; 125 TABLET, FILM COATED ORAL at 21:30

## 2017-01-18 RX ADMIN — CARBIDOPA AND LEVODOPA 1 TABLET: 25; 100 TABLET ORAL at 06:20

## 2017-01-18 RX ADMIN — POTASSIUM CHLORIDE 40 MEQ: 750 CAPSULE, EXTENDED RELEASE ORAL at 18:21

## 2017-01-18 RX ADMIN — METRONIDAZOLE 500 MG: 500 TABLET ORAL at 06:20

## 2017-01-18 RX ADMIN — LEVOTHYROXINE SODIUM 75 MCG: 75 TABLET ORAL at 06:20

## 2017-01-18 RX ADMIN — CARBIDOPA AND LEVODOPA 1 TABLET: 25; 100 TABLET ORAL at 21:30

## 2017-01-18 RX ADMIN — Medication 325 MG: at 08:52

## 2017-01-18 RX ADMIN — LORAZEPAM 0.25 MG: 0.5 TABLET ORAL at 22:51

## 2017-01-18 RX ADMIN — Medication 250 MG: at 08:52

## 2017-01-18 RX ADMIN — ATORVASTATIN CALCIUM 20 MG: 20 TABLET, FILM COATED ORAL at 21:29

## 2017-01-18 RX ADMIN — METRONIDAZOLE 500 MG: 500 TABLET ORAL at 21:30

## 2017-01-18 RX ADMIN — ASPIRIN 81 MG 81 MG: 81 TABLET ORAL at 08:52

## 2017-01-18 RX ADMIN — CARBIDOPA AND LEVODOPA 1 TABLET: 25; 100 TABLET ORAL at 13:57

## 2017-01-18 RX ADMIN — TAMSULOSIN HYDROCHLORIDE 0.4 MG: 0.4 CAPSULE ORAL at 10:39

## 2017-01-18 RX ADMIN — ENOXAPARIN SODIUM 30 MG: 30 INJECTION SUBCUTANEOUS at 06:20

## 2017-01-18 RX ADMIN — POTASSIUM CHLORIDE 40 MEQ: 750 CAPSULE, EXTENDED RELEASE ORAL at 10:39

## 2017-01-18 NOTE — CONSULTS
Urology Consult Note    Florinda Knapp  LOS: 6      ASSESSMENT:    85 y.o. female with urinary retention following recent hip fracture and subsequent prolonged hospitalization and difficult convalescence.  She does have Hx of urinary retention last year in setting of acute illness as well.  I have followed jerzy in office for recurrent UTI's which have not been a problem for some time.     DISCUSSION/RECOMMENDATIONS/PLAN:  She will be unlikely to void until bowel function normal and becoming ambulatory.  She will require either intermittent catheterization or indwelling FC until that time.  If she is dismissed prior to her attaining voiding status, then I will see her in my office 1 week after d/c for a voiding trial.    HPI:  Florinda Knapp is a 85 y.o. female who was admitted 1/12/2017  for Symptomatic anemia [D64.9]. Patient was referred by Dr. Evangelista Brand for evaluation of urinary retention. Patient has had no symptoms.  Patient has significant prior urologic history of previous infection. Patient denies history of urolithiasis,  trauma,  cancer,  surgery and chronic Farris catheter. Patient has seen a urologist in the past. I have seen her over the years for occasional uncomplicated UTI's - these have not been problematic for some time..    Past Medical History   Diagnosis Date   • Anemia    • Anxiety    • Depression    • Disease of thyroid gland    • Failure to thrive in adult    • Hyperlipidemia    • Hypertension    • Osteoarthritis    • Stress incontinence    • Urinary retention      Past Surgical History   Procedure Laterality Date   • Appendectomy     • Back surgery     • Cataract extraction     • Elbow procedure     • Knee surgery     • Joint replacement       Prescriptions Prior to Admission   Medication Sig Dispense Refill Last Dose   • acetaminophen (TYLENOL) 500 MG tablet Take 1,000 mg by mouth 4 (Four) Times a Day As Needed for mild pain (1-3).   Past Week at Unknown time   • amLODIPine  (NORVASC) 5 MG tablet Take 2.5 mg by mouth Daily.   1/11/2017 at Unknown time   • aspirin 325 MG tablet Take 81 mg by mouth Daily.   1/12/2017 at Unknown time   • carbidopa-levodopa (SINEMET)  MG per tablet TAKE ONE TABLET BY MOUTH THREE TIMES A DAY **MAY TAKE 1 EXTRA TABLET BY MOUTH AT NIGHT AS NEEDED** 120 tablet 4 1/12/2017 at Unknown time   • Cholecalciferol 2000 UNITS capsule Take 2,000 Units by mouth Daily.   1/12/2017 at Unknown time   • ferrous sulfate 325 (65 FE) MG tablet Take 325 mg by mouth Daily With Breakfast.   1/12/2017 at Unknown time   • levothyroxine (SYNTHROID, LEVOTHROID) 75 MCG tablet Take 75 mcg by mouth Daily.   1/12/2017 at Unknown time   • methylphenidate (RITALIN) 5 MG tablet Take 2.5 mg by mouth Daily.   1/12/2017 at Unknown time   • mirtazapine (REMERON) 30 MG tablet Take 30 mg by mouth Every Night.   1/11/2017 at Unknown time   • polyethylene glycol (MIRALAX) packet Take 17 g by mouth Daily As Needed.   1/12/2017 at Unknown time   • vitamin E 1000 UNIT capsule Take 1,000 Units by mouth Daily.   1/12/2017 at Unknown time     Allergies   Allergen Reactions   • Alendronate    • Aripiprazole    • Celecoxib    • Nitrofurantoin    • Procaine    • Sulfa Antibiotics      Family History   Problem Relation Age of Onset   • Alcohol abuse Other    • Hypertension Other    • Stroke Other    • Heart disease Son      Social History     Social History   • Marital status:      Spouse name: N/A   • Number of children: N/A   • Years of education: N/A     Occupational History   • Not on file.     Social History Main Topics   • Smoking status: Never Smoker   • Smokeless tobacco: Not on file   • Alcohol use Yes      Comment: social   • Drug use: Not on file   • Sexual activity: Not on file     Other Topics Concern   • Not on file     Social History Narrative   • No narrative on file         Review of Systems  Constitutional: negative for chills, fevers and  GI positive for diarrea with no  "constipation,.    Objective     Blood pressure 142/79, pulse 70, temperature 97.9 °F (36.6 °C), temperature source Oral, resp. rate 16, height 59\" (149.9 cm), weight 110 lb 4.8 oz (50 kg), SpO2 96 %.  Visit Vitals   • /79 (BP Location: Left arm, Patient Position: Lying)   • Pulse 70   • Temp 97.9 °F (36.6 °C) (Oral)   • Resp 16   • Ht 59\" (149.9 cm)   • Wt 110 lb 4.8 oz (50 kg)   • SpO2 96%   • BMI 22.28 kg/m2       Physicial Exam    General: WDWN female in NAD  Neck: Supple with no masses or adenopathy  Back: No CVAT, spine linear and non-tender   Abdomen: Soft and non-tender with no masses, organomegaly or guarding.  Extremities: Limited ROM of LE with no edema, pulses full  Neuro: Nonfocal        Imaging  CT Abdomen: Large L renal cyst and distended bladder    Labs  Urine Microscopy:   Results from last 7 days  Lab Units 01/15/17  1919   RBC UA /HPF 0-2   WBC UA /HPF 0-2*   , Urinalysis:   Results from last 7 days  Lab Units 01/15/17  1919   PH, URINE  6.0   PROTEIN UA  Negative   GLUCOSE UA  Negative   KETONES UA  Negative   , BMP:   Results from last 7 days  Lab Units 01/18/17  0627  01/14/17  0707   SODIUM mmol/L 135  < > 140   POTASSIUM mmol/L 3.1*  < > 3.8   CALCIUM mg/dL 8.5*  < > 8.3*   CHLORIDE mmol/L 105  < > 110*   TOTAL CO2 mmol/L 24.0  < > 22.0   BUN mg/dL 8*  < > 23   CREATININE mg/dL 0.50*  < > 0.50*   ALBUMIN g/dL  --   --  2.70*   BILIRUBIN mg/dL  --   --  1.1   ALK PHOS U/L  --   --  54   < > = values in this interval not displayed. and CBC:   Results from last 7 days  Lab Units 01/18/17 0627   WBC 10*3/mm3 4.80   RBC 10*6/mm3 3.35*   HEMOGLOBIN g/dL 9.9*   HEMATOCRIT % 31.0*   PLATELETS 10*3/mm3 201       Dino Flynn MD - 1/18/2017, NOW@  "

## 2017-01-18 NOTE — PLAN OF CARE
Problem: Patient Care Overview (Adult)  Goal: Plan of Care Review  Outcome: Ongoing (interventions implemented as appropriate)    01/18/17 0500   Coping/Psychosocial Response Interventions   Plan Of Care Reviewed With patient   Patient Care Overview   Progress no change   Outcome Evaluation   Outcome Summary/Follow up Plan VSS. had several loose stools this evening. pt required in and out cath, got 900 mL out. patient anxious about treatment plan. no other complaints of pain or discomfort.          Problem: Fall Risk (Adult)  Goal: Identify Related Risk Factors and Signs and Symptoms  Outcome: Ongoing (interventions implemented as appropriate)    01/14/17 0428   Fall Risk   Fall Risk: Related Risk Factors age-related changes;environment unfamiliar;fear of falling;gait/mobility problems;history of falls   Fall Risk: Signs and Symptoms presence of risk factors       Goal: Absence of Falls  Outcome: Ongoing (interventions implemented as appropriate)    01/18/17 0500   Fall Risk (Adult)   Absence of Falls making progress toward outcome

## 2017-01-18 NOTE — PLAN OF CARE
Problem: Patient Care Overview (Adult)  Goal: Plan of Care Review  Outcome: Ongoing (interventions implemented as appropriate)    01/18/17 1313   Coping/Psychosocial Response Interventions   Plan Of Care Reviewed With patient   Patient Care Overview   Progress progress toward functional goals as expected   Outcome Evaluation   Outcome Summary/Follow up Plan Pt with good participation, continues to be limited by weakness, balance and coordination deficits, fear of falling. Continue POC.         Problem: Inpatient Physical Therapy  Goal: Bed Mobility Goal LTG- PT  Outcome: Ongoing (interventions implemented as appropriate)    01/13/17 0954 01/16/17 1357 01/18/17 1313   Bed Mobility PT LTG   Bed Mobility PT LTG, Date Established 01/13/17 --  --    Bed Mobility PT LTG, Time to Achieve 1 wk --  --    Bed Mobility PT LTG, Activity Type all bed mobility --  --    Bed Mobility PT LTG, [place] Level verbal cues required --  --    Bed Mobility PT Goal LTG, Assist Device bed rails --  --    Bed Mobility PT LTG, Date Goal Reviewed --  01/16/17 --    Bed Mobility PT LTG, Outcome --  --  goal ongoing       Goal: Transfer Training Goal 1 LTG- PT  Outcome: Ongoing (interventions implemented as appropriate)    01/13/17 0954 01/16/17 1357 01/18/17 1313   Transfer Training PT LTG   Transfer Training PT LTG, Date Established 01/13/17 --  --    Transfer Training PT LTG, Time to Achieve 1 wk --  --    Transfer Training PT LTG, Activity Type all transfers --  --    Transfer Training PT LTG, [place] Level minimum assist (75% patient effort) --  --    Transfer Training PT LTG, Assist Device walker, rolling --  --    Transfer Training PT LTG, Date Goal Reviewed --  01/16/17 --    Transfer Training PT LTG, Outcome --  --  goal ongoing       Goal: Gait Training Goal LTG- PT  Outcome: Ongoing (interventions implemented as appropriate)    01/13/17 0954 01/16/17 1357 01/18/17 1313   Gait Training PT LTG   Gait Training Goal PT LTG, Date  Established 01/13/17 --  --    Gait Training Goal PT LTG, Time to Achieve 1 wk --  --    Gait Training Goal PT LTG, [place] Level minimum assist (75% patient effort) --  --    Gait Training Goal PT LTG, Assist Device walker, rolling --  --    Gait Training Goal PT LTG, Distance to Achieve 50 --  --    Gait Training Goal PT LTG, Date Goal Reviewed --  01/16/17 --    Gait Training Goal PT LTG, Outcome --  --  goal ongoing

## 2017-01-18 NOTE — PROGRESS NOTES
Hospitalist  NOTE     Date of Admission: 1/12/2017   LOS: 6 days   PCP: Kieran Cottrell MD  Ms. Florinda Knapp, 85 y.o. female is followed for:  Chief Complaint:weakness           SUBJECTIVE   HPI:  Subjective:  Symptoms:  She reports weakness and diarrhea.    Diet:  She reports  nausea.  No vomiting.      Patient feeling very tired and fatigued today. Increased diarrhea only at night and kept awake. Mild nausea, but no vomiting. Appetite not great. Unable to urinate well. Does not feel she is emptying bladder. I&O cath 900ml this morning.    The patient's relevant past medical, surgical and social history were reviewed and updated in Epic as appropriate.    Review of Systems:  Review of Systems   Constitutional: Positive for appetite change and fatigue. Negative for fever.   HENT: Negative.    Respiratory: Negative.    Cardiovascular: Negative.    Gastrointestinal: Positive for diarrhea and nausea. Negative for abdominal pain and vomiting.   Endocrine: Negative.    Genitourinary: Positive for difficulty urinating.   Musculoskeletal: Negative.    Neurological: Positive for weakness.   Hematological: Negative.    Psychiatric/Behavioral: Negative.      Otherwise complete ROS negative except as mentioned in the HPI        OBJECTIVE     Vital Sign Min/Max for last 24 hours  Temp  Min: 97.9 °F (36.6 °C)  Max: 98.4 °F (36.9 °C)   BP  Min: 118/79  Max: 145/84   Pulse  Min: 70  Max: 82   Resp  Min: 16  Max: 18   SpO2  Min: 94 %  Max: 96 %   Flow (L/min)  Min: 2  Max: 2   No Data Recorded      Intake/Output Summary (Last 24 hours) at 01/18/17 1010  Last data filed at 01/18/17 0400   Gross per 24 hour   Intake    818 ml   Output   2150 ml   Net  -1332 ml      Telemetry: Last 3 weights    01/12/17  1755 01/12/17  2339   Weight: 110 lb (49.9 kg) 110 lb 4.8 oz (50 kg)          Objective:  Vital signs: (most recent): Blood pressure 142/79, pulse 70, temperature 97.9 °F (36.6 °C), temperature source Oral, resp. rate 16,  "height 59\" (149.9 cm), weight 110 lb 4.8 oz (50 kg), SpO2 96 %.  No fever.              Physical Exam:   Constitutional: Oriented to person, place, and time. Well-developed and well-nourished. No distress.   Head: Normocephalic and atraumatic.   Mouth/Throat: No oropharyngeal exudate.   Eyes: Conjunctivae and EOM are normal. No scleral icterus.   Neck: Normal range of motion. No mass or lymphadenopathy  Cardiovascular: Normal rate, regular rhythm, normal heart sounds and intact distal pulses.  Exam reveals no gallop and no friction rub.  No murmur appreciated.  Pulmonary/Chest: Effort normal and breath sounds normal. No respiratory distress.  No rales rhonchi or wheezing   Abdominal: Soft.  Normal bowel sounds, no distention, no mass, no tenderness  Musculoskeletal: Normal range of motion. He exhibits no edema.   Neurological: No focal deficits   Skin: Skin is warm and dry. No rash noted. Nails show no clubbing.   Mood: Appropriate and pleasant  Results:    Results from last 7 days  Lab Units 01/18/17  0627 01/17/17  0515 01/16/17  0536 01/15/17  0517   WBC 10*3/mm3 4.80  --  5.76 5.97   HEMOGLOBIN g/dL 9.9* 9.7* 9.0* 9.9*   HEMATOCRIT % 31.0* 30.8* 28.4* 31.2*   PLATELETS 10*3/mm3 201  --  174 193       Results from last 7 days  Lab Units 01/18/17  0627   SODIUM mmol/L 135   POTASSIUM mmol/L 3.1*   CHLORIDE mmol/L 105   TOTAL CO2 mmol/L 24.0   BUN mg/dL 8*   CREATININE mg/dL 0.50*   GLUCOSE mg/dL 75   CALCIUM mg/dL 8.5*       Results from last 7 days  Lab Units 01/14/17  0707   ALK PHOS U/L 54   BILIRUBIN mg/dL 1.1   ALT (SGPT) U/L 5*   AST (SGOT) U/L 18         Imaging Results (last 24 hours)     ** No results found for the last 24 hours. **        Lab Results   Component Value Date    GLUCOSE 75 01/18/2017    GLUCOSE 79 01/16/2017    GLUCOSE 76 01/15/2017    GLUCOSE 79 01/14/2017    GLUCOSE 72 01/13/2017     Current Medications:    amoxicillin-clavulanate 1 tablet Oral Q12H   aspirin 81 mg Oral Daily "   atorvastatin 20 mg Oral Nightly   carbidopa-levodopa 1 tablet Oral Q8H   enoxaparin 30 mg Subcutaneous Q24H   ferrous sulfate 325 mg Oral Daily With Breakfast   levothyroxine 75 mcg Oral Daily   metroNIDAZOLE 500 mg Oral Q8H   saccharomyces boulardii 250 mg Oral BID   sodium chloride 250 mL Intravenous Once   tamsulosin 0.4 mg Oral Daily       Pharmacy Consult - Pharmacy to dose     sodium chloride 50 mL/hr Last Rate: 50 mL/hr (01/17/17 7760)       I reviewed the patient's results and images. Images:    Medications reviewed.      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 Knapp is an 85 year-old F with a past medical history of parkinson's disease, recent left JALEN at [facility] 12/6/16, iron deficiency anemia, chronic urinary retention, HTN, HLP, anxiety/depression, and osteoarthritis who presented from the [facility] where she is undergoing rehab with symptomatic anemia, chest pain/dyspnea x 2-3 days, fatigue, and generalized weakness. ER evaluation with grey zone troponins but no active chest pain. CT a/p showed some descending colitis.     Plan:   Assessment & Plan   Acute Symptomatic on Chronic Normocytic Anemia  -s/p 2 units prbc (now stable hgb 9.9)     Elevated troponin I level, possibly due to anemia (type 2 nstemi due to anemia)  -no chest pain, trended down after blood transfusion, only minimally elevated  -treating medically after discussion w/ patient and family (did not want aggressive  invasive workup)    Descending Colitis,  --CDT negative, but continued diarrhea, not much improvement  -- prn imodium  -- Augmentin/ flagyl 4 more days    Urinary retention (in/out cath When necessary)  -- schedule I&O caths to reduce night time interruption.  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  -- scheduled to go to rehab at the [facility]  DNR/AND (no invasive procedures; no vent/no resuscitation in case of cardiopulmonary arrest)    --continue p.t.  -encourage po intake      -No invasive procedures, no feeding tubes, no heart caths, etc. Periodic/occasional blood transfusions are ok. Currently functionally improving/now eating but if deteriorated would consult palliative/consider hospice      if feels better and continued to improve and diarrhea resolves and probable discharge to rehabilitation tomorrow     Am bmp, add K+ and mag replacement      DVT prophylaxis: lovenox as pt is high risk  AND/DNR but full medical treatment   Plan of care and goals reviewed with patient and staff.      I discussed the patient's findings and my recommendations with patient and Dr. Fatuma Wheatley, APRN 01/18/17 10:10 AM

## 2017-01-18 NOTE — PLAN OF CARE
Problem: Patient Care Overview (Adult)  Goal: Plan of Care Review  Outcome: Ongoing (interventions implemented as appropriate)    01/18/17 2907   Outcome Evaluation   Outcome Summary/Follow up Plan Pt states she is fearful and lonely. Pt appears anxious. Pt still retaining urine. Pt also complained of be awakened during the night numerous times being bladder scanned. Wendy GEORGE ordered scheduled bladder scans at 0600 and 2100 during the night. VSS. No s/s of distress.        Goal: Adult Individualization and Mutuality  Outcome: Ongoing (interventions implemented as appropriate)  Goal: Discharge Needs Assessment  Outcome: Ongoing (interventions implemented as appropriate)    Problem: Functional Deficit (Adult,Obstetrics,Pediatric)  Goal: Signs and Symptoms of Listed Potential Problems Will be Absent or Manageable (Functional Deficit)  Outcome: Ongoing (interventions implemented as appropriate)    Problem: Pressure Ulcer Risk ([place]evan Scale) (Adult,Obstetrics,Pediatric)  Goal: Identify Related Risk Factors and Signs and Symptoms  Outcome: Ongoing (interventions implemented as appropriate)  Goal: Skin Integrity  Outcome: Ongoing (interventions implemented as appropriate)    Problem: Fall Risk (Adult)  Goal: Identify Related Risk Factors and Signs and Symptoms  Outcome: Ongoing (interventions implemented as appropriate)  Goal: Absence of Falls  Outcome: Ongoing (interventions implemented as appropriate)    Problem: Fluid Volume Deficit (Adult)  Goal: Identify Related Risk Factors and Signs and Symptoms  Outcome: Ongoing (interventions implemented as appropriate)  Goal: Fluid/Electrolyte Balance  Outcome: Ongoing (interventions implemented as appropriate)  Goal: Comfort/Well Being  Outcome: Ongoing (interventions implemented as appropriate)

## 2017-01-18 NOTE — PLAN OF CARE
Problem: Patient Care Overview (Adult)  Goal: Plan of Care Review  Outcome: Ongoing (interventions implemented as appropriate)    01/18/17 1201   Coping/Psychosocial Response Interventions   Plan Of Care Reviewed With patient   Patient Care Overview   Progress progress toward functional goals as expected   Outcome Evaluation   Outcome Summary/Follow up Plan Limited progress toward goals this date. Pt remains afraid of falling which limits progress toward goals. Pt would benefit from SNF at d/c.         Problem: Inpatient Occupational Therapy  Goal: Bed Mobility Goal LTG- OT  Outcome: Ongoing (interventions implemented as appropriate)    01/14/17 1524 01/18/17 1201   Bed Mobility OT LTG   Bed Mobility OT LTG, Date Established 01/14/17 --    Bed Mobility OT LTG, Time to Achieve 1 wk --    Bed Mobility OT LTG, Activity Type supine to sit/sit to supine --    Bed Mobility OT LTG, [place] Level supervision required;verbal cues required --    Bed Mobility OT LTG, Outcome --  goal ongoing       Goal: Transfer Training Goal 1 LTG- OT  Outcome: Ongoing (interventions implemented as appropriate)    01/14/17 1524 01/18/17 1201   Transfer Training OT LTG   Transfer Training OT LTG, Date Established 01/14/17 --    Transfer Training OT LTG, Time to Achieve 1 wk --    Transfer Training OT LTG, Activity Type bed to chair /chair to bed;sit to stand/stand to sit;toilet --    Transfer Training OT LTG, [place] Level supervision required;verbal cues required --    Transfer Training OT LTG, Assist Device walker, rolling --    Transfer Training OT LTG, Outcome --  goal ongoing       Goal: Strength Goal LTG- OT  Outcome: Ongoing (interventions implemented as appropriate)    01/14/17 1524 01/17/17 1438 01/18/17 1201   Strength OT LTG   Strength Goal OT LTG, Date Established 01/14/17 --  --    Strength Goal OT LTG, Time to Achieve 1 wk --  --    Strength Goal OT LTG, Measure to Achieve (Increase BUE strength by 1/2 MMG to support  fxl I.) --  --    Strength Goal OT LTG, Functional Goal --  Pt to increase B UE strength by 1/2 muscle grade to support ADL independence --    Strength Goal OT LTG, Outcome --  --  goal ongoing       Goal: Toileting Goal LTG- OT  Outcome: Ongoing (interventions implemented as appropriate)    01/14/17 1524 01/18/17 1201   Toileting OT LTG   Toileting Goal OT LTG, Date Established 01/14/17 --    Toileting Goal OT LTG, Time to Achieve 1 wk --    Toileting Goal OT LTG, [place] Level minimum assist (75% patient effort) --    Toileting Goal OT LTG, Assist Device toilet seat, raised --    Toileting Goal OT LTG, Outcome --  goal ongoing       Goal: LB Dressing Goal LTG- OT  Outcome: Ongoing (interventions implemented as appropriate)    01/14/17 1524 01/18/17 1201   LB Dressing OT LTG   LB Dressing Goal OT LTG, Date Established 01/14/17 --    LB Dressing Goal OT LTG, Time to Achieve 1 wk --    LB Dressing Goal OT LTG, [place] Level moderate assist (50% patient effort);verbal cues required --    LB Dressing Goal OT LTG, Adaptive Equipment (AE PRN) --    LB Dressing Goal OT LTG, Outcome --  goal ongoing

## 2017-01-18 NOTE — PROGRESS NOTES
Continued Stay Note  [facility]     Patient Name: Florinda Knapp  MRN: [id number]  Today's Date: 1/18/2017    Admit Date: 1/12/2017          Discharge Plan       01/18/17 1351    Case Management/Social Work Plan    Additional Comments Pt not ready for discharge today. SW rescheduled ambulance for tomorrow at 3:00pm as the bed at the [facility] is still available tomorrow.               Discharge Codes     None        Expected Discharge Date and Time     Expected Discharge Date Expected Discharge Time    Jan 18, 2017             UNIQUE Yadav

## 2017-01-18 NOTE — PROGRESS NOTES
Acute Care - Physical Therapy Treatment Note  [facility]     Patient Name: Florinda Knapp  : 3/5/1931  MRN: [id number]  Today's Date: 2017  Onset of Illness/Injury or Date of Surgery Date: 17  Date of Referral to PT: 17  Referring Physician: ARIEL Rodarte    Admit Date: 2017    Visit Dx:    ICD-10-CM ICD-9-CM   1. Symptomatic anemia D64.9 285.9   2. Weakness R53.1 780.79   3. Failure to thrive in adult R62.7 783.7   4. Dehydration E86.0 276.51   5. Impaired functional mobility, balance, gait, and endurance Z74.09 V49.89   6. Impaired mobility and ADLs Z74.09 799.89     Patient Active Problem List   Diagnosis   • Dysuria   • Elbow injury   • Fracture of left olecranon process   • Parkinson's disease   • Trauma left hip   • Acute Symptomatic on Chronic Normocytic Anemia   • Acute respiratory failure with hypoxia   • Generalized weakness   • Chest pain   • Symptomatic anemia   • Elevated troponin I level, possibly due to anemia   • Status post total replacement of left hip 2016 at [facility]   • H/O urinary retention   • History of recurrent UTIs   • Essential hypertension   • Anxiety   • CHF with cardiomyopathy   • Colitis   • Hypokalemia               Adult Rehabilitation Note       17 1125 17 1121 17 1400    Rehab Assessment/Intervention    Discipline physical therapist  -SJ occupational therapist  -JR occupational therapist  -JR    Document Type therapy note (daily note)  -SJ therapy note (daily note)  -JR therapy note (daily note)  -JR    Subjective Information agree to therapy;complains of;fatigue  -SJ agree to therapy;complains of;fatigue  -JR no complaints;agree to therapy  -JR    Patient Effort, Rehab Treatment good  -SJ good  -JR good  -JR    Precautions/Limitations fall precautions;hip precautions- left;oxygen therapy device and L/min  -SJ fall precautions;hip precautions- left  -JR fall precautions;hip precautions- left  -JR    Recorded by [] Elisa  Haley, PT [JR] Rosalba WHEAT Gunner, OT [JR] Rosalba WHEAT Gunner, OT    Vital Signs    Pre Systolic BP Rehab 122  -  -  -JR    Pre Treatment Diastolic BP 80  -SJ 80  -JR 79  -JR    Post Systolic BP Rehab  131  -  -JR    Post Treatment Diastolic BP  88  -JR 91  -JR    Pretreatment Heart Rate (beats/min) 91  -SJ 90  -JR 78  -JR    Posttreatment Heart Rate (beats/min)  91  -JR 84  -JR    Pre SpO2 (%) 89  -SJ 89  -JR 94  -JR    O2 Delivery Pre Treatment supplemental O2  -SJ supplemental O2   2L  -JR supplemental O2   2L  -JR    Post SpO2 (%)  93  -JR 91  -JR    O2 Delivery Post Treatment  supplemental O2  -JR supplemental O2  -JR    Pre Patient Position  Supine  -JR Supine  -JR    Intra Patient Position  Standing  -JR Standing  -JR    Post Patient Position  Sitting  -JR Sitting  -JR    Recorded by [SJ] Elisa De Leon, PT [JR] Rosalba WHEAT Gunner, OT [JR] Rosalba WHEAT Gunner, OT    Pain Assessment    Pain Assessment 0-10  -SJ 0-10  -JR 0-10  -JR    Pain Score 0  -SJ 0  -JR 0  -JR    Post Pain Score 0  -SJ 0  -JR 0  -JR    Recorded by [SJ] Elisa De Leon PT [JR] Rosalba WHEAT Gunner, OT [JR] Rosalba WHEAT Gunner, OT    Vision Assessment/Intervention    Visual Impairment WFL with corrective lenses  -SJ      Recorded by [SJ] Elisa De Leon PT      Cognitive Assessment/Intervention    Current Cognitive/Communication Assessment functional  -SJ functional  -JR functional  -JR    Orientation Status oriented x 4  -SJ oriented x 4  -JR oriented x 4  -JR    Follows Commands/Answers Questions 100% of the time;able to follow single-step instructions;needs cueing;needs increased time  -% of the time;able to follow single-step instructions  -% of the time;able to follow single-step instructions  -JR    Personal Safety mild impairment;decreased awareness, need for safety  -SJ mild impairment  -JR WNL/WFL  -JR    Personal Safety Interventions fall prevention program maintained;gait belt;muscle strengthening  facilitated;nonskid shoes/slippers when out of bed  -SJ      Recorded by [SJ] Elisa De Leon, PT [JR] Rosalba WHEAT Gunner, OT [JR] Rosalba WHEAT Gunner, OT    Bed Mobility, Assessment/Treatment    Bed Mobility, Assistive Device bed rails;head of bed elevated;draw sheet  -SJ bed rails;head of bed elevated  -JR bed rails;head of bed elevated  -JR    Bed Mobility, Roll Left, [place] moderate assist (50% patient effort);verbal cues required  -SJ maximum assist (25% patient effort);2 person assist required  -JR     Bed Mobility, Roll Right, [place] moderate assist (50% patient effort);verbal cues required  -SJ maximum assist (25% patient effort);2 person assist required  -JR     Bed Mob, Supine to Sit, [place] moderate assist (50% patient effort);2 person assist required  -SJ moderate assist (50% patient effort);2 person assist required  -JR moderate assist (50% patient effort);2 person assist required  -JR    Bed Mob, Sit to Supine, [place] moderate assist (50% patient effort);2 person assist required;verbal cues required  -SJ      Bed Mobility, Safety Issues decreased use of arms for pushing/pulling;decreased use of legs for bridging/pushing  -SJ decreased use of arms for pushing/pulling;decreased use of legs for bridging/pushing  -JR decreased use of arms for pushing/pulling;decreased use of legs for bridging/pushing  -JR    Bed Mobility, Impairments strength decreased;impaired balance;coordination impaired  -SJ strength decreased  -JR strength decreased;impaired balance  -JR    Bed Mobility, Comment x2 reps due to need for bed pan  -SJ Pt required assist to roll, bring LE's to EOB and bring trunk into sitting. Pt required verbal cues for technique  -JR Pt required assist to bring LE's to EOB as well as bring trunk to sitting. Pt required verbal cues for technique  -JR    Recorded by [SJ] Elisa De Leon, PT [JR] Rosalba WHEAT Gunner, OT [JR] Rosalba WHEAT Gunner, OT    Transfer Assessment/Treatment     Transfers, Bed-Chair [place] maximum assist (25% patient effort);2 person assist required  -SJ maximum assist (25% patient effort);2 person assist required  -JR maximum assist (25% patient effort);2 person assist required  -JR    Transfers, Sit-Stand [place] maximum assist (25% patient effort);2 person assist required  -SJ maximum assist (25% patient effort);2 person assist required  -JR maximum assist (25% patient effort);2 person assist required  -JR    Transfers, Stand-Sit [place] maximum assist (25% patient effort);2 person assist required  -SJ maximum assist (25% patient effort);2 person assist required  -JR maximum assist (25% patient effort);2 person assist required  -JR    Transfers, Sit-Stand-Sit, Assist Device   --   UE support  -JR    Transfer, Safety Issues balance decreased during turns;loses balance backward  -SJ balance decreased during turns;loses balance backward  -JR     Transfer, Impairments strength decreased;impaired balance;coordination impaired;postural control impaired  -SJ strength decreased;impaired balance  -JR     Transfer, Comment sit <--> stand x2 reps, max verbal and tactile cues for posture and facilitation through LE's and low back to promote trunk ext. Pt very fearful of falling, strong backwards lean and difficulty getting heels down on ground.  -SJ Pt required verbal cues for hand placement with transfers. Pt stood from EOB x2, reported she needed to have a BM. Pt returned to supine. Rolled to place bedpan and for cleaning, then assisted with transfer to chair. Pt with narrow [place], difficulty getting heels to floor. Pt with extreme posterior lean, verbal cues for upright posture and head control in standing. Despite cues pt unable to obtain upright posture.  -JR Pt required verbal cues for foot placement with transfer as well as verbal cues for upright posture. Pt with narrow [place], leaning posterior. Pt participated with sit to stand x3, then stand pivot transfer to  chair.  -JR    Recorded by [SJ] Elisa De Leon, PT [JR] Rosalba WHEAT Gunner, OT [JR] Rosalba WHEAT Gunner, OT    Gait Assessment/Treatment    Gait, [place] Level unable to perform  -SJ      Recorded by [SJ] Elisa De Leon, PT      Toileting Assessment/Training    Toileting Assess/Train, Assistive Device  --   bedpan  -JR     Toileting Assess/Train, Position  supine  -JR     Toileting Assess/Train, Indepen Level  dependent (less than 25% patient effort)  -JR     Toileting Assess/Train, Comment  Pt incontinent of bowel, dependent for hygiene  -JR     Recorded by  [JR] Rosalba WHEAT Gunner, OT     Grooming Assessment/Training    Grooming Assess/Train, Position  sitting  -JR sitting  -JR    Grooming Assess/Train, Indepen Level  set up required  -JR set up required  -JR    Grooming Assess/Train, Comment  brushing hair  -JR brushing hair  -JR    Recorded by  [JR] Rosalba WHEAT Gunner, OT [JR] Rosalba WHEAT Gunner, OT    Motor Skills/Interventions    Additional Documentation   Balance Skills Training (Group)  -JR    Recorded by   [JR] Rosalba WHEAT Gunner, OT    Balance Skills Training    Sitting-Level of Assistance Contact guard  -SJ Contact guard  -JR Contact guard  -JR    Sitting-Balance Support Right upper extremity supported;Left upper extremity supported  -SJ Right upper extremity supported;Left upper extremity supported  -JR Right upper extremity supported;Left upper extremity supported  -JR    Sitting-Balance Activities Trunk control activities  -SJ      Sitting # of Minutes 5  -SJ      Standing-Level of Assistance Maximum assistance;x2  -SJ Maximum assistance;x2  -JR Maximum assistance;x2  -JR    Static Standing Balance Support Right upper extremity supported;Left upper extremity supported  -SJ Right upper extremity supported;Left upper extremity supported  -JR Left upper extremity supported;Right upper extremity supported  -JR    Standing-Balance Activities Weight Shift A-P;Weight Shift R-L;Reaching for objects  -SJ       Standing Balance # of Minutes 1  -SJ      Recorded by [SJ] Elisa De Leon, PT [JR] Rosalba WHEAT Gunner, OT [JR] Rosalba Martino, OT    Therapy Exercises    Bilateral Upper Extremity   AROM:;10 reps;elbow flexion/extension;pronation/supination;shoulder abduction/adduction;shoulder extension/flexion  -JR    Recorded by   [JR] Rosalba Martino OT    Positioning and Restraints    Pre-Treatment Position in bed  -SJ in bed  -JR in bed  -JR    Post Treatment Position chair  -SJ chair  -JR chair  -JR    In Chair notified nsg;reclined;call light within reach;encouraged to call for assist;exit alarm on;waffle cushion;on mechanical lift sling;heels elevated  -SJ notified nsg;reclined;call light within reach;encouraged to call for assist;exit alarm on;on mechanical lift sling  -JR notified nsg;reclined;call light within reach;encouraged to call for assist;exit alarm on;with family/caregiver;RLE elevated;LLE elevated  -JR    Recorded by [SJ] Elisa De Leon, PT [JR] Rosalba Martino, OT [JR] Rosalba Martino, OT      01/16/17 1330          Rehab Assessment/Intervention    Discipline physical therapist  -SR      Document Type therapy note (daily note)  -SR      Subjective Information agree to therapy;complains of;weakness  -SR      Patient Effort, Rehab Treatment good  -SR      Symptoms Noted During/After Treatment other (see comments)   fear of falling  -SR      Precautions/Limitations fall precautions;hip precautions- left  -SR      Recorded by [SR] Namrata Barrett PT      Vital Signs    Pre Systolic BP Rehab --   vitals are WNL on RA  -SR      Recorded by [SR] Namrata Barrett PT      Pain Assessment    Pain Assessment No/denies pain  -SR      Recorded by [SR] Namrata Barrett PT      Cognitive Assessment/Intervention    Current Cognitive/Communication Assessment functional  -SR      Orientation Status oriented x 4  -SR      Follows Commands/Answers Questions 100% of the time  -SR      Personal Safety WNL/WFL  -SR      Personal  Safety Interventions gait belt;nonskid shoes/slippers when out of bed  -SR      Recorded by [SR] Namrata Barrett PT      Bed Mobility, Assessment/Treatment    Bed Mobility, Assistive Device bed rails;head of bed elevated;draw sheet  -SR      Bed Mob, Supine to Sit, [place] moderate assist (50% patient effort);2 person assist required;verbal cues required  -SR      Bed Mobility, Safety Issues decreased use of legs for bridging/pushing;decreased use of arms for pushing/pulling  -SR      Bed Mobility, Impairments strength decreased;impaired balance;postural control impaired   forward head  -SR      Recorded by [SR] Namrata Barrett PT      Transfer Assessment/Treatment    Transfers, Bed-Chair [place] maximum assist (25% patient effort);2 person assist required;verbal cues required  -SR      Transfers, Sit-Stand [place] maximum assist (25% patient effort);2 person assist required;verbal cues required  -SR      Transfers, Stand-Sit [place] maximum assist (25% patient effort);2 person assist required;verbal cues required  -SR      Transfers, Sit-Stand-Sit, Assist Device other (see comments)   BUE support, block both knees and feet  -SR      Transfer, Comment Pt's feet sliding forward, knees buckling, pt stands on toes and reluctant to stand on full foot. Pt reports fear of falling, 3 reps standing from EOB prior to SPS to chair.   -SR      Recorded by [SR] Namrata Barrett PT      Gait Assessment/Treatment    Gait, [place] Level unable to perform  -SR      Recorded by [SR] Namrata Barrett PT      Balance Skills Training    Sitting-Level of Assistance Contact guard;x2  -SR      Sitting-Balance Support Left upper extremity supported;Right upper extremity supported;Feet supported  -SR      Sitting-Balance Activities Trunk control activities  -SR      Sitting # of Minutes 5  -SR      Standing-Level of Assistance Maximum assistance;x2   pt only able to sustain stanind for a few seconds  -SR      Recorded  by [SR] Namrata Barrett, PT      Therapy Exercises    Bilateral Lower Extremities AAROM:;10 reps;supine;ankle pumps/circles;glut sets;quad sets;SLR;heel slides  -SR      Recorded by [SR] Namrata Barrett, PT      Positioning and Restraints    Pre-Treatment Position in bed  -SR      Post Treatment Position chair  -SR      In Chair notified nsg;reclined;sitting;call light within reach;encouraged to call for assist;exit alarm on  -SR      Recorded by [SR] Namrata Barrett, PT        User Key  (r) = Recorded By, (t) = Taken By, (c) = Cosigned By    Initials Name Effective Dates    SJ Elisa Haley, PT 06/19/15 -     JR Roaslba Martino, OT 06/22/15 -     SR Namrata Barrett, PT 06/19/15 -                 IP PT Goals       01/18/17 1313 01/16/17 1357 01/13/17 0954    Bed Mobility PT LTG    Bed Mobility PT LTG, Date Established   01/13/17  -KM    Bed Mobility PT LTG, Time to Achieve   1 wk  -KM    Bed Mobility PT LTG, Activity Type   all bed mobility  -KM    Bed Mobility PT LTG, [place] Level   verbal cues required  -KM    Bed Mobility PT Goal  LTG, Assist Device   bed rails  -KM    Bed Mobility PT LTG, Date Goal Reviewed  01/16/17  -SR     Bed Mobility PT LTG, Outcome goal ongoing  -SJ      Transfer Training PT LTG    Transfer Training PT LTG, Date Established   01/13/17  -KM    Transfer Training PT LTG, Time to Achieve   1 wk  -KM    Transfer Training PT LTG, Activity Type   all transfers  -KM    Transfer Training PT LTG, [place] Level   minimum assist (75% patient effort)  -KM    Transfer Training PT LTG, Assist Device   walker, rolling  -KM    Transfer Training PT  LTG, Date Goal Reviewed  01/16/17  -SR     Transfer Training PT LTG, Outcome goal ongoing  -SJ      Gait Training PT LTG    Gait Training Goal PT LTG, Date Established   01/13/17  -KM    Gait Training Goal PT LTG, Time to Achieve   1 wk  -KM    Gait Training Goal PT LTG, [place] Level   minimum assist (75% patient effort)  -KM    Gait Training  Goal PT LTG, Assist Device   walker, rolling  -KM    Gait Training Goal PT LTG, Distance to Achieve   50  -KM    Gait Training Goal PT LTG, Date Goal Reviewed  01/16/17  -     Gait Training Goal PT LTG, Outcome goal ongoing  -        User Key  (r) = Recorded By, (t) = Taken By, (c) = Cosigned By    Initials Name Provider Type    SJ Elisa De Leon, PT Physical Therapist    KM Mary Howe, PT Physical Therapist    SR Namrata Barrett, PT Physical Therapist          Physical Therapy Education     Title: PT OT SLP Therapies (Active)     Topic: Physical Therapy (Active)     Point: Mobility training (Active)    Learning Progress Summary    Learner Readiness Method Response Comment Documented by Status   Patient Acceptance E NR   01/18/17 1313 Active    Acceptance E,D NR   01/16/17 1357 Active    Acceptance E VU,NR   01/13/17 0953 Done   Family Acceptance E VU,NR   01/13/17 0953 Done               Point: Home exercise program (Active)    Learning Progress Summary    Learner Readiness Method Response Comment Documented by Status   Patient Acceptance E NR   01/18/17 1313 Active    Acceptance E,D NR   01/16/17 1357 Active    Acceptance E VU,NR  KM 01/13/17 0953 Done   Family Acceptance E VU,NR  KM 01/13/17 0953 Done               Point: Body mechanics (Active)    Learning Progress Summary    Learner Readiness Method Response Comment Documented by Status   Patient Acceptance E NR   01/18/17 1313 Active    Acceptance E,D NR   01/16/17 1357 Active    Acceptance E VU,NR  KM 01/13/17 0953 Done   Family Acceptance E VU,NR   01/13/17 0953 Done               Point: Precautions (Active)    Learning Progress Summary    Learner Readiness Method Response Comment Documented by Status   Patient Acceptance E NR   01/18/17 1313 Active    Acceptance E,D NR   01/16/17 1357 Active    Acceptance E VU,NR  KM 01/13/17 0953 Done   Family Acceptance E VU,NR   01/13/17 0953 Done                      User Key      Initials Effective Dates Name Provider Type Discipline     06/19/15 -  Elisa De Leon, PT Physical Therapist PT    KM 06/19/15 -  Mary Howe, PT Physical Therapist PT    SR 06/19/15 -  Namrata Barrett, PT Physical Therapist PT                    PT Recommendation and Plan  Anticipated Discharge Disposition: skilled nursing facility  PT Frequency: daily, per priority policy  Plan of Care Review  Plan Of Care Reviewed With: patient  Progress: progress toward functional goals as expected  Outcome Summary/Follow up Plan: Pt with good participation, continues to be limited by weakness, balance and coordination deficits, fear of falling. Continue POC.          Outcome Measures       01/18/17 1125 01/18/17 1121 01/17/17 1400    How much help from another person do you currently need...    Turning from your back to your side while in flat bed without using bedrails? 2  -SJ      Moving from lying on back to sitting on the side of a flat bed without bedrails? 2  -SJ      Moving to and from a bed to a chair (including a wheelchair)? 2  -SJ      Standing up from a chair using your arms (e.g., wheelchair, bedside chair)? 2  -SJ      Climbing 3-5 steps with a railing? 1  -SJ      To walk in hospital room? 1  -SJ      AM-PAC 6 Clicks Score 10  -SJ      How much help from another is currently needed...    Putting on and taking off regular lower body clothing?  1  -JR 1  -JR    Bathing (including washing, rinsing, and drying)  2  -JR 2  -JR    Toileting (which includes using toilet bed pan or urinal)  1  -JR 1  -JR    Putting on and taking off regular upper body clothing  2  -JR 2  -JR    Taking care of personal grooming (such as brushing teeth)  3  -JR 3  -JR    Eating meals  3  -JR 3  -JR    Score  12  -JR 12  -JR    Functional Assessment    Outcome Measure Options AM-PAC 6 Clicks Basic Mobility (PT)  -SJ AM-PAC 6 Clicks Daily Activity (OT)  -JR AM-PAC 6 Clicks Daily Activity (OT)  -JR      01/16/17 1330          How  much help from another person do you currently need...    Turning from your back to your side while in flat bed without using bedrails? 2  -SR      Moving from lying on back to sitting on the side of a flat bed without bedrails? 2  -SR      Moving to and from a bed to a chair (including a wheelchair)? 2  -SR      Standing up from a chair using your arms (e.g., wheelchair, bedside chair)? 2  -SR      Climbing 3-5 steps with a railing? 1  -SR      To walk in hospital room? 1  -SR      AM-PAC 6 Clicks Score 10  -SR      Functional Assessment    Outcome Measure Options AM-PAC 6 Clicks Basic Mobility (PT)  -SR        User Key  (r) = Recorded By, (t) = Taken By, (c) = Cosigned By    Initials Name Provider Type    DREW De Leon PT Physical Therapist    JR Roaslba Martino, OT Occupational Therapist    SR Namrata Barrett, PT Physical Therapist           Time Calculation:         PT Charges       01/18/17 1315          Time Calculation    Start Time 1125  -SJ      PT Received On 01/18/17  -      PT Goal Re-Cert Due Date 01/23/17  -      Time Calculation- PT    Total Timed Code Minutes- PT 26 minute(s)  -        User Key  (r) = Recorded By, (t) = Taken By, (c) = Cosigned By    Initials Name Provider Type    DREW De Leon PT Physical Therapist          Therapy Charges for Today     Code Description Service Date Service Provider Modifiers Qty    [id number] HC PT THER PROC EA 15 MIN 1/18/2017 Elisa De Leon PT GP 1          PT G-Codes  Outcome Measure Options: AM-PAC 6 Clicks Basic Mobility (PT)    Elisa De Leon PT  1/18/2017

## 2017-01-18 NOTE — PROGRESS NOTES
Acute Care - Occupational Therapy Treatment Note  [facility]     Patient Name: Florinda Knapp  : 3/5/1931  MRN: [id number]  Today's Date: 2017  Onset of Illness/Injury or Date of Surgery Date: 17  Date of Referral to OT: 17  Referring Physician: ARIEL Rodarte      Admit Date: 2017    Visit Dx:     ICD-10-CM ICD-9-CM   1. Symptomatic anemia D64.9 285.9   2. Weakness R53.1 780.79   3. Failure to thrive in adult R62.7 783.7   4. Dehydration E86.0 276.51   5. Impaired functional mobility, balance, gait, and endurance Z74.09 V49.89   6. Impaired mobility and ADLs Z74.09 799.89     Patient Active Problem List   Diagnosis   • Dysuria   • Elbow injury   • Fracture of left olecranon process   • Parkinson's disease   • Trauma left hip   • Acute Symptomatic on Chronic Normocytic Anemia   • Acute respiratory failure with hypoxia   • Generalized weakness   • Chest pain   • Symptomatic anemia   • Elevated troponin I level, possibly due to anemia   • Status post total replacement of left hip 2016 at [facility]   • H/O urinary retention   • History of recurrent UTIs   • Essential hypertension   • Anxiety   • CHF with cardiomyopathy   • Colitis   • Hypokalemia             Adult Rehabilitation Note       17 1125 17 1121 17 1400    Rehab Assessment/Intervention    Discipline physical therapist  -SJ occupational therapist  -JR occupational therapist  -JR    Document Type therapy note (daily note)  -SJ therapy note (daily note)  -JR therapy note (daily note)  -JR    Subjective Information agree to therapy;complains of;fatigue  -SJ agree to therapy;complains of;fatigue  -JR no complaints;agree to therapy  -JR    Patient Effort, Rehab Treatment good  -SJ good  -JR good  -JR    Precautions/Limitations fall precautions;hip precautions- left;oxygen therapy device and L/min  -SJ fall precautions;hip precautions- left  -JR fall precautions;hip precautions- left  -JR    Recorded by [] Elisa  Haley, PT [JR] Rosalba WHEAT Gunner, OT [JR] Rosalba WHEAT Gunner, OT    Vital Signs    Pre Systolic BP Rehab 122  -  -  -JR    Pre Treatment Diastolic BP 80  -SJ 80  -JR 79  -JR    Post Systolic BP Rehab  131  -  -JR    Post Treatment Diastolic BP  88  -JR 91  -JR    Pretreatment Heart Rate (beats/min) 91  -SJ 90  -JR 78  -JR    Posttreatment Heart Rate (beats/min)  91  -JR 84  -JR    Pre SpO2 (%) 89  -SJ 89  -JR 94  -JR    O2 Delivery Pre Treatment supplemental O2  -SJ supplemental O2   2L  -JR supplemental O2   2L  -JR    Post SpO2 (%)  93  -JR 91  -JR    O2 Delivery Post Treatment  supplemental O2  -JR supplemental O2  -JR    Pre Patient Position  Supine  -JR Supine  -JR    Intra Patient Position  Standing  -JR Standing  -JR    Post Patient Position  Sitting  -JR Sitting  -JR    Recorded by [SJ] Elisa De Leon, PT [JR] Rosalba WHEAT Gunner, OT [JR] Rosalba WHEAT Gunner, OT    Pain Assessment    Pain Assessment 0-10  -SJ 0-10  -JR 0-10  -JR    Pain Score 0  -SJ 0  -JR 0  -JR    Post Pain Score 0  -SJ 0  -JR 0  -JR    Recorded by [SJ] Elisa De Leon PT [JR] Rosalba WHEAT Gunner, OT [JR] Rosalba WHEAT Gunner, OT    Vision Assessment/Intervention    Visual Impairment WFL with corrective lenses  -SJ      Recorded by [SJ] Elisa De Leon PT      Cognitive Assessment/Intervention    Current Cognitive/Communication Assessment functional  -SJ functional  -JR functional  -JR    Orientation Status oriented x 4  -SJ oriented x 4  -JR oriented x 4  -JR    Follows Commands/Answers Questions 100% of the time;able to follow single-step instructions;needs cueing;needs increased time  -% of the time;able to follow single-step instructions  -% of the time;able to follow single-step instructions  -JR    Personal Safety mild impairment;decreased awareness, need for safety  -SJ mild impairment  -JR WNL/WFL  -JR    Personal Safety Interventions fall prevention program maintained;gait belt;muscle strengthening  facilitated;nonskid shoes/slippers when out of bed  -SJ      Recorded by [SJ] Elisa De Leon, PT [JR] Rosalba Martino, OT [JR] Rosalba Martino, OT    Bed Mobility, Assessment/Treatment    Bed Mobility, Assistive Device  bed rails;head of bed elevated  -JR bed rails;head of bed elevated  -JR    Bed Mobility, Roll Left, [place]  maximum assist (25% patient effort);2 person assist required  -JR     Bed Mobility, Roll Right, [place]  maximum assist (25% patient effort);2 person assist required  -JR     Bed Mob, Supine to Sit, [place]  moderate assist (50% patient effort);2 person assist required  -JR moderate assist (50% patient effort);2 person assist required  -JR    Bed Mobility, Safety Issues  decreased use of arms for pushing/pulling;decreased use of legs for bridging/pushing  -JR decreased use of arms for pushing/pulling;decreased use of legs for bridging/pushing  -JR    Bed Mobility, Impairments  strength decreased  -JR strength decreased;impaired balance  -JR    Bed Mobility, Comment  Pt required assist to roll, bring LE's to EOB and bring trunk into sitting. Pt required verbal cues for technique  -JR Pt required assist to bring LE's to EOB as well as bring trunk to sitting. Pt required verbal cues for technique  -JR    Recorded by  [JR] Rosalba Martino, OT [JR] Rosalba Martino, OT    Transfer Assessment/Treatment    Transfers, Bed-Chair [place]  maximum assist (25% patient effort);2 person assist required  -JR maximum assist (25% patient effort);2 person assist required  -JR    Transfers, Sit-Stand [place]  maximum assist (25% patient effort);2 person assist required  -JR maximum assist (25% patient effort);2 person assist required  -JR    Transfers, Stand-Sit [place]  maximum assist (25% patient effort);2 person assist required  -JR maximum assist (25% patient effort);2 person assist required  -JR    Transfers, Sit-Stand-Sit, Assist Device   --   UE support  -JR    Transfer,  Safety Issues  balance decreased during turns;loses balance backward  -JR     Transfer, Impairments  strength decreased;impaired balance  -JR     Transfer, Comment  Pt required verbal cues for hand placement with transfers. Pt stood from EOB x2, reported she needed to have a BM. Pt returned to supine. Rolled to place bedpan and for cleaning, then assisted with transfer to chair. Pt with narrow [place]AR[place], difficulty getting heels to floor. Pt with extreme posterior lean, verbal cues for upright posture and head control in standing. Despite cues pt unable to obtain upright posture.  -JR Pt required verbal cues for foot placement with transfer as well as verbal cues for upright posture. Pt with narrow [place], leaning posterior. Pt participated with sit to stand x3, then stand pivot transfer to chair.  -JR    Recorded by  [JR] Rosalba Martino, OT [JR] Rosalba Martino, OT    Toileting Assessment/Training    Toileting Assess/Train, Assistive Device  --   bedpan  -JR     Toileting Assess/Train, Position  supine  -JR     Toileting Assess/Train, Indepen Level  dependent (less than 25% patient effort)  -JR     Toileting Assess/Train, Comment  Pt incontinent of bowel, dependent for hygiene  -JR     Recorded by  [JR] Rosalba Martino, OT     Grooming Assessment/Training    Grooming Assess/Train, Position  sitting  -JR sitting  -JR    Grooming Assess/Train, Indepen Level  set up required  -JR set up required  -JR    Grooming Assess/Train, Comment  brushing hair  -JR brushing hair  -JR    Recorded by  [JR] Rosalba Martino, OT [JR] Rosalba Martino, OT    Motor Skills/Interventions    Additional Documentation   Balance Skills Training (Group)  -JR    Recorded by   [JR] Rosalba Martino, OT    Balance Skills Training    Sitting-Level of Assistance  Contact guard  -JR Contact guard  -JR    Sitting-Balance Support  Right upper extremity supported;Left upper extremity supported  -JR Right upper extremity supported;Left upper extremity  supported  -JR    Standing-Level of Assistance  Maximum assistance;x2  -JR Maximum assistance;x2  -JR    Static Standing Balance Support  Right upper extremity supported;Left upper extremity supported  -JR Left upper extremity supported;Right upper extremity supported  -JR    Recorded by  [JR] Rosalba Martino, OT [JR] Rosalba Martino, OT    Therapy Exercises    Bilateral Upper Extremity   AROM:;10 reps;elbow flexion/extension;pronation/supination;shoulder abduction/adduction;shoulder extension/flexion  -JR    Recorded by   [JR] Rosalba Martino, OT    Positioning and Restraints    Pre-Treatment Position  in bed  -JR in bed  -JR    Post Treatment Position  chair  -JR chair  -JR    In Chair  notified nsg;reclined;call light within reach;encouraged to call for assist;exit alarm on;on mechanical lift sling  -JR notified nsg;reclined;call light within reach;encouraged to call for assist;exit alarm on;with family/caregiver;RLE elevated;LLE elevated  -JR    Recorded by  [JR] Rosalba Martino, OT [JR] Rosalba Martino, OT      01/16/17 1330          Rehab Assessment/Intervention    Discipline physical therapist  -SR      Document Type therapy note (daily note)  -SR      Subjective Information agree to therapy;complains of;weakness  -SR      Patient Effort, Rehab Treatment good  -SR      Symptoms Noted During/After Treatment other (see comments)   fear of falling  -SR      Precautions/Limitations fall precautions;hip precautions- left  -SR      Recorded by [SR] Namrata Barrett PT      Vital Signs    Pre Systolic BP Rehab --   vitals are WNL on RA  -SR      Recorded by [SR] Namrata Barrett PT      Pain Assessment    Pain Assessment No/denies pain  -SR      Recorded by [SR] Namraat Barrett PT      Cognitive Assessment/Intervention    Current Cognitive/Communication Assessment functional  -SR      Orientation Status oriented x 4  -SR      Follows Commands/Answers Questions 100% of the time  -SR      Personal Safety WNL/WFL   -SR      Personal Safety Interventions gait belt;nonskid shoes/slippers when out of bed  -SR      Recorded by [SR] Namrata Barrett PT      Bed Mobility, Assessment/Treatment    Bed Mobility, Assistive Device bed rails;head of bed elevated;draw sheet  -SR      Bed Mob, Supine to Sit, [place] moderate assist (50% patient effort);2 person assist required;verbal cues required  -SR      Bed Mobility, Safety Issues decreased use of legs for bridging/pushing;decreased use of arms for pushing/pulling  -SR      Bed Mobility, Impairments strength decreased;impaired balance;postural control impaired   forward head  -SR      Recorded by [SR] Namrata Barrett PT      Transfer Assessment/Treatment    Transfers, Bed-Chair [place] maximum assist (25% patient effort);2 person assist required;verbal cues required  -SR      Transfers, Sit-Stand [place] maximum assist (25% patient effort);2 person assist required;verbal cues required  -SR      Transfers, Stand-Sit [place] maximum assist (25% patient effort);2 person assist required;verbal cues required  -SR      Transfers, Sit-Stand-Sit, Assist Device other (see comments)   BUE support, block both knees and feet  -SR      Transfer, Comment Pt's feet sliding forward, knees buckling, pt stands on toes and reluctant to stand on full foot. Pt reports fear of falling, 3 reps standing from EOB prior to SPS to chair.   -SR      Recorded by [SR] Namrata Barrett PT      Gait Assessment/Treatment    Gait, [place] Level unable to perform  -SR      Recorded by [SR] Namrata Barrett PT      Balance Skills Training    Sitting-Level of Assistance Contact guard;x2  -SR      Sitting-Balance Support Left upper extremity supported;Right upper extremity supported;Feet supported  -SR      Sitting-Balance Activities Trunk control activities  -SR      Sitting # of Minutes 5  -SR      Standing-Level of Assistance Maximum assistance;x2   pt only able to sustain stanind for a few seconds   -SR      Recorded by [SR] Namrata Barrett PT      Therapy Exercises    Bilateral Lower Extremities AAROM:;10 reps;supine;ankle pumps/circles;glut sets;quad sets;SLR;heel slides  -SR      Recorded by [SR] Namrata Barrett PT      Positioning and Restraints    Pre-Treatment Position in bed  -SR      Post Treatment Position chair  -SR      In Chair notified nsg;reclined;sitting;call light within reach;encouraged to call for assist;exit alarm on  -SR      Recorded by [SR] Namrata Barrett PT        User Key  (r) = Recorded By, (t) = Taken By, (c) = Cosigned By    Initials Name Effective Dates    SJ Elisa Haley, PT 06/19/15 -     JR Rosalba Martino, OT 06/22/15 -     SR Namrata Barrett, PT 06/19/15 -                 OT Goals       01/18/17 1201 01/17/17 1438 01/14/17 1524    Bed Mobility OT LTG    Bed Mobility OT LTG, Date Established   01/14/17  -TA    Bed Mobility OT LTG, Time to Achieve   1 wk  -TA    Bed Mobility OT LTG, Activity Type   supine to sit/sit to supine  -TA    Bed Mobility OT LTG, [place] Level   supervision required;verbal cues required  -TA    Bed Mobility OT LTG, Outcome goal ongoing  -JR goal ongoing  -JR goal ongoing  -TA    Transfer Training OT LTG    Transfer Training OT LTG, Date Established   01/14/17  -TA    Transfer Training OT LTG, Time to Achieve   1 wk  -TA    Transfer Training OT LTG, Activity Type   bed to chair /chair to bed;sit to stand/stand to sit;toilet  -TA    Transfer Training OT LTG, [place] Level   supervision required;verbal cues required  -TA    Transfer Training OT LTG, Assist Device   walker, rolling  -TA    Transfer Training OT LTG, Outcome goal ongoing  -JR goal ongoing  -JR goal ongoing  -TA    Strength OT LTG    Strength Goal OT LTG, Date Established   01/14/17  -TA    Strength Goal OT LTG, Time to Achieve   1 wk  -TA    Strength Goal OT LTG, Measure to Achieve   --   Increase BUE strength by 1/2 MMG to support fxl I.  -TA    Strength Goal OT LTG, Functional  Goal  Pt to increase B UE strength by 1/2 muscle grade to support ADL independence  -JR     Strength Goal OT LTG, Outcome goal ongoing  -JR goal ongoing  -JR goal ongoing  -TA    Toileting OT LTG    Toileting Goal OT LTG, Date Established   01/14/17  -TA    Toileting Goal OT LTG, Time to Achieve   1 wk  -TA    Toileting Goal OT LTG, [place] Level   minimum assist (75% patient effort)  -TA    Toileting Goal OT LTG, Assist Device   toilet seat, raised  -TA    Toileting Goal OT LTG, Outcome goal ongoing  -JR goal ongoing  -JR goal ongoing  -TA    LB Dressing OT LTG    LB Dressing Goal OT LTG, Date Established   01/14/17  -TA    LB Dressing Goal OT LTG, Time to Achieve   1 wk  -TA    LB Dressing Goal OT LTG, [place] Level   moderate assist (50% patient effort);verbal cues required  -TA    LB Dressing Goal OT LTG, Adaptive Equipment   --   AE PRN  -TA    LB Dressing Goal OT LTG, Outcome goal ongoing  -JR goal ongoing  -JR goal ongoing  -TA      User Key  (r) = Recorded By, (t) = Taken By, (c) = Cosigned By    Initials Name Provider Type    JR Rosalba Martino, OT Occupational Therapist    STELLA Torres, OT Occupational Therapist          Occupational Therapy Education     Title: PT OT SLP Therapies (Active)     Topic: Occupational Therapy (Active)     Point: ADL training (Active)    Description: Instruct learner(s) on proper safety adaptation and remediation techniques during self care or transfers.   Instruct in proper use of assistive devices.    Learning Progress Summary    Learner Readiness Method Response Comment Documented by Status   Patient Acceptance E NR Educated pt regaring bed mobility and transfers. Educated pt on benefits of activity.  01/18/17 1200 Active    Acceptance E NR Educated on safe transfer techniques and benefits of activity  01/17/17 1437 Active               Point: Home exercise program (Done)    Description: Instruct learner(s) on appropriate technique for monitoring,  assisting and/or progressing therapeutic exercises/activities.    Learning Progress Summary    Learner Readiness Method Response Comment Documented by Status   Patient Acceptance E,D VU,NR BUE HEP, body mechanics with bed mobility/fxl transfers, reinforced need for call for assist for OOB activities. TA 01/14/17 1523 Done               Point: Precautions (Done)    Description: Instruct learner(s) on prescribed precautions during self-care and functional transfers.    Learning Progress Summary    Learner Readiness Method Response Comment Documented by Status   Patient Acceptance E,D VU,NR BUE HEP, body mechanics with bed mobility/fxl transfers, reinforced need for call for assist for OOB activities. TA 01/14/17 1523 Done               Point: Body mechanics (Done)    Description: Instruct learner(s) on proper positioning and spine alignment during self-care, functional mobility activities and/or exercises.    Learning Progress Summary    Learner Readiness Method Response Comment Documented by Status   Patient Acceptance E,D VU,NR BUE HEP, body mechanics with bed mobility/fxl transfers, reinforced need for call for assist for OOB activities. TA 01/14/17 1523 Done                      User Key     Initials Effective Dates Name Provider Type Discipline     06/22/15 -  Rosalba Martino, OT Occupational Therapist OT    TA 03/14/16 -  Linwood Torres, OT Occupational Therapist OT                  OT Recommendation and Plan  Anticipated Discharge Disposition: skilled nursing facility, other (see comments) (further rehab)  Planned Therapy Interventions: activity intolerance, adaptive equipment training, ADL retraining, balance training, bed mobility training, home exercise program, ROM (Range of Motion), strengthening, transfer training  Therapy Frequency: daily (Per priority policy)  Plan of Care Review  Plan Of Care Reviewed With: patient  Progress: progress toward functional goals as expected  Outcome Summary/Follow  up Plan: Limited progress toward goals this date. Pt remains afraid of falling which limits progress toward goals. Pt would benefit from SNF at d/c.        Outcome Measures       01/18/17 1121 01/17/17 1400 01/16/17 1330    How much help from another person do you currently need...    Turning from your back to your side while in flat bed without using bedrails?   2  -SR    Moving from lying on back to sitting on the side of a flat bed without bedrails?   2  -SR    Moving to and from a bed to a chair (including a wheelchair)?   2  -SR    Standing up from a chair using your arms (e.g., wheelchair, bedside chair)?   2  -SR    Climbing 3-5 steps with a railing?   1  -SR    To walk in hospital room?   1  -SR    AM-PAC 6 Clicks Score   10  -SR    How much help from another is currently needed...    Putting on and taking off regular lower body clothing? 1  -JR 1  -JR     Bathing (including washing, rinsing, and drying) 2  -JR 2  -JR     Toileting (which includes using toilet bed pan or urinal) 1  -JR 1  -JR     Putting on and taking off regular upper body clothing 2  -JR 2  -JR     Taking care of personal grooming (such as brushing teeth) 3  -JR 3  -JR     Eating meals 3  -JR 3  -JR     Score 12  -JR 12  -JR     Functional Assessment    Outcome Measure Options AM-PAC 6 Clicks Daily Activity (OT)  -JR AM-PAC 6 Clicks Daily Activity (OT)  -JR AM-PAC 6 Clicks Basic Mobility (PT)  -SR      User Key  (r) = Recorded By, (t) = Taken By, (c) = Cosigned By    Initials Name Provider Type    JR Rosalba Martino, OT Occupational Therapist    SR Namrata Barrett, PT Physical Therapist           Time Calculation:         Time Calculation- OT       01/18/17 1202          Time Calculation- OT    OT Start Time 1121  -JR      Total Timed Code Minutes- OT 15 minute(s)  -JR      OT Received On 01/18/17  -JR      OT Goal Re-Cert Due Date 01/24/17  -JR        User Key  (r) = Recorded By, (t) = Taken By, (c) = Cosigned By    Initials Name Provider  Type    JR Rosalba Martino, OT Occupational Therapist           Therapy Charges for Today     Code Description Service Date Service Provider Modifiers Qty    41074762190 HC OT THERAPEUTIC ACT EA 15 MIN 1/17/2017 Rosalba Martino, OT GO 2    27989910658 HC OT THER SUPP EA 15 MIN 1/17/2017 Rosalba Martino, OT GO 1    94145923643 HC OT THERAPEUTIC ACT EA 15 MIN 1/18/2017 Rosalba Martino, OT GO 1               Rosalba Martino, OT  1/18/2017

## 2017-01-19 VITALS
OXYGEN SATURATION: 94 % | SYSTOLIC BLOOD PRESSURE: 137 MMHG | HEART RATE: 80 BPM | BODY MASS INDEX: 22.24 KG/M2 | RESPIRATION RATE: 18 BRPM | DIASTOLIC BLOOD PRESSURE: 85 MMHG | HEIGHT: 59 IN | WEIGHT: 110.3 LBS | TEMPERATURE: 97.5 F

## 2017-01-19 LAB
ANION GAP SERPL CALCULATED.3IONS-SCNC: 6 MMOL/L (ref 3–11)
BUN BLD-MCNC: 7 MG/DL (ref 9–23)
BUN/CREAT SERPL: 17.5 (ref 7–25)
CALCIUM SPEC-SCNC: 8.4 MG/DL (ref 8.7–10.4)
CHLORIDE SERPL-SCNC: 107 MMOL/L (ref 99–109)
CO2 SERPL-SCNC: 22 MMOL/L (ref 20–31)
CREAT BLD-MCNC: 0.4 MG/DL (ref 0.6–1.3)
GFR SERPL CREATININE-BSD FRML MDRD: >150 ML/MIN/1.73
GLUCOSE BLD-MCNC: 81 MG/DL (ref 70–100)
MAGNESIUM SERPL-MCNC: 1.8 MG/DL (ref 1.3–2.7)
POTASSIUM BLD-SCNC: 4.2 MMOL/L (ref 3.5–5.5)
SODIUM BLD-SCNC: 135 MMOL/L (ref 132–146)

## 2017-01-19 PROCEDURE — 80048 BASIC METABOLIC PNL TOTAL CA: CPT | Performed by: NURSE PRACTITIONER

## 2017-01-19 PROCEDURE — 83735 ASSAY OF MAGNESIUM: CPT | Performed by: NURSE PRACTITIONER

## 2017-01-19 PROCEDURE — 97110 THERAPEUTIC EXERCISES: CPT

## 2017-01-19 PROCEDURE — 25010000002 ENOXAPARIN PER 10 MG: Performed by: INTERNAL MEDICINE

## 2017-01-19 PROCEDURE — 99239 HOSP IP/OBS DSCHRG MGMT >30: CPT | Performed by: NURSE PRACTITIONER

## 2017-01-19 PROCEDURE — 97530 THERAPEUTIC ACTIVITIES: CPT

## 2017-01-19 RX ORDER — METRONIDAZOLE 500 MG/1
500 TABLET ORAL EVERY 8 HOURS SCHEDULED
Qty: 9 TABLET | Refills: 0
Start: 2017-01-19 | End: 2017-01-22

## 2017-01-19 RX ORDER — ASPIRIN 81 MG/1
81 TABLET, CHEWABLE ORAL DAILY
Start: 2017-01-19 | End: 2017-02-24 | Stop reason: HOSPADM

## 2017-01-19 RX ORDER — SACCHAROMYCES BOULARDII 250 MG
250 CAPSULE ORAL 2 TIMES DAILY
Status: ON HOLD
Start: 2017-01-19 | End: 2017-07-05

## 2017-01-19 RX ORDER — LOPERAMIDE HYDROCHLORIDE 2 MG/1
2 CAPSULE ORAL 3 TIMES DAILY PRN
Start: 2017-01-19 | End: 2017-02-24 | Stop reason: HOSPADM

## 2017-01-19 RX ORDER — TAMSULOSIN HYDROCHLORIDE 0.4 MG/1
0.4 CAPSULE ORAL DAILY
Start: 2017-01-19 | End: 2017-02-24 | Stop reason: HOSPADM

## 2017-01-19 RX ORDER — ATORVASTATIN CALCIUM 20 MG/1
20 TABLET, FILM COATED ORAL NIGHTLY
Start: 2017-01-19 | End: 2017-02-24 | Stop reason: HOSPADM

## 2017-01-19 RX ORDER — AMOXICILLIN AND CLAVULANATE POTASSIUM 500; 125 MG/1; MG/1
1 TABLET, FILM COATED ORAL EVERY 12 HOURS SCHEDULED
Qty: 6 TABLET | Refills: 0
Start: 2017-01-19 | End: 2017-01-22

## 2017-01-19 RX ADMIN — Medication 325 MG: at 09:39

## 2017-01-19 RX ADMIN — LEVOTHYROXINE SODIUM 75 MCG: 75 TABLET ORAL at 05:26

## 2017-01-19 RX ADMIN — TAMSULOSIN HYDROCHLORIDE 0.4 MG: 0.4 CAPSULE ORAL at 09:39

## 2017-01-19 RX ADMIN — ENOXAPARIN SODIUM 30 MG: 30 INJECTION SUBCUTANEOUS at 05:26

## 2017-01-19 RX ADMIN — CARBIDOPA AND LEVODOPA 1 TABLET: 25; 100 TABLET ORAL at 05:26

## 2017-01-19 RX ADMIN — AMOXICILLIN AND CLAVULANATE POTASSIUM 500 MG: 500; 125 TABLET, FILM COATED ORAL at 09:39

## 2017-01-19 RX ADMIN — ASPIRIN 81 MG 81 MG: 81 TABLET ORAL at 09:39

## 2017-01-19 RX ADMIN — Medication 250 MG: at 09:39

## 2017-01-19 RX ADMIN — METRONIDAZOLE 500 MG: 500 TABLET ORAL at 05:26

## 2017-01-19 NOTE — PLAN OF CARE
Problem: Inpatient Occupational Therapy  Goal: Transfer Training Goal 1 LTG- OT  Outcome: Unable to achieve outcome(s) by discharge Date Met:  01/19/17

## 2017-01-19 NOTE — THERAPY DISCHARGE NOTE
Acute Care - Occupational Therapy Treatment Note/Discharge   Tamara     Patient Name: Florinda Knapp  : 3/5/1931  MRN: [id number]  Today's Date: 2017  Onset of Illness/Injury or Date of Surgery Date: 17  Date of Referral to OT: 17  Referring Physician: ARIEL Rodarte      Admit Date: 2017    Visit Dx:     ICD-10-CM ICD-9-CM   1. Symptomatic anemia D64.9 285.9   2. Weakness R53.1 780.79   3. Failure to thrive in adult R62.7 783.7   4. Dehydration E86.0 276.51   5. Impaired functional mobility, balance, gait, and endurance Z74.09 V49.89   6. Impaired mobility and ADLs Z74.09 799.89     Patient Active Problem List   Diagnosis   • Dysuria   • Elbow injury   • Fracture of left olecranon process   • Parkinson's disease   • Trauma left hip   • Acute Symptomatic on Chronic Normocytic Anemia   • Acute respiratory failure with hypoxia   • Generalized weakness   • Chest pain   • Symptomatic anemia   • Elevated troponin I level, possibly due to anemia   • Status post total replacement of left hip 2016 at [facility]   • H/O urinary retention   • History of recurrent UTIs   • Essential hypertension   • Anxiety   • CHF with cardiomyopathy   • Colitis   • Hypokalemia             Adult Rehabilitation Note       17 1349 17 1341 17 1125    Rehab Assessment/Intervention    Discipline physical therapy assistant  -AS occupational therapist  -DUKE physical therapist  -SJ    Document Type therapy note (daily note)  -AS therapy note (daily note);discharge summary  -DUKE therapy note (daily note)  -    Subjective Information agree to therapy;complains of;weakness;fatigue  -AS agree to therapy;complains of;weakness  -DUKE agree to therapy;complains of;fatigue  -SJ    Patient Effort, Rehab Treatment good  -AS good  -DUKE good  -SJ    Symptoms Noted During/After Treatment  --   some anxiety she would fall  -DUKE     Precautions/Limitations fall precautions;hip precautions- left   recent left hip  surgery  -AS hip precautions- left;fall precautions   L THR in december  -DUKE fall precautions;hip precautions- left;oxygen therapy device and L/min  -SJ    Patient Response to Treatment  Pt. stating she felt like the tx helped her.  -DUKE     Recorded by [AS] Tammy Ayala PTA [DUKE] Abigail Camarena, OT [SJ] Elisa De Leon, PT    Vital Signs    Pre Systolic BP Rehab   122  -SJ    Pre Treatment Diastolic BP   80  -SJ    Pretreatment Heart Rate (beats/min)   91  -SJ    Pre SpO2 (%)   89  -SJ    O2 Delivery Pre Treatment   supplemental O2  -SJ    Recorded by   [SJ] Elisa De Leon, PT    Pain Assessment    Pain Assessment No/denies pain  -AS No/denies pain  -DUKE 0-10  -SJ    Pain Score 0  -AS  0  -SJ    Post Pain Score 0  -AS  0  -SJ    Recorded by [AS] Tammy Ayala PTA [DUKE] Abigail Camarena, OT [SJ] Elisa De Leon, PT    Vision Assessment/Intervention    Visual Impairment  WFL with corrective lenses  -DUKE WFL with corrective lenses  -SJ    Recorded by  [DUKE] Abigail Camarena, OT [SJ] Elisa De Leon, PT    Cognitive Assessment/Intervention    Current Cognitive/Communication Assessment functional  -AS functional  -DUKE functional  -SJ    Orientation Status oriented x 4  -AS oriented x 4  -DUKE oriented x 4  -SJ    Follows Commands/Answers Questions 100% of the time;able to follow single-step instructions  -% of the time;able to follow single-step instructions  -DUKE 100% of the time;able to follow single-step instructions;needs cueing;needs increased time  -SJ    Personal Safety WNL/WFL  -AS fully aware of deficits;WNL/WFL   Pt. does have high anxiety will fall.  -DUKE mild impairment;decreased awareness, need for safety  -SJ    Personal Safety Interventions elopement precautions initiated;fall prevention program maintained;gait belt;nonskid shoes/slippers when out of bed  -AS fall prevention program maintained;gait belt;nonskid shoes/slippers when out of bed  -DUKE fall prevention program maintained;gait belt;muscle  strengthening facilitated;nonskid shoes/slippers when out of bed  -SJ    Recorded by [AS] Tammy Ayala PTA [DUKE] Abigail Camarena OT [SJ] Elisa De Leon PT    Bed Mobility, Assessment/Treatment    Bed Mobility, Assistive Device bed rails;head of bed elevated;draw sheet  -AS bed rails;head of bed elevated;draw sheet  -DUKE bed rails;head of bed elevated;draw sheet  -SJ    Bed Mobility, Roll Left, [place]   moderate assist (50% patient effort);verbal cues required  -SJ    Bed Mobility, Roll Right, [place]   moderate assist (50% patient effort);verbal cues required  -SJ    Bed Mobility, Scoot/Bridge, [place]  moderate assist (50% patient effort);2 person assist required;verbal cues required;nonverbal cues required (demo/gesture)  -DUKE     Bed Mob, Supine to Sit, [place] verbal cues required;moderate assist (50% patient effort);2 person assist required  -AS moderate assist (50% patient effort);1 person + 1 person to manage equipment;verbal cues required;nonverbal cues required (demo/gesture)  -DUKE moderate assist (50% patient effort);2 person assist required  -SJ    Bed Mob, Sit to Supine, [place] verbal cues required;maximum assist (25% patient effort);2 person assist required  -AS maximum assist (25% patient effort);2 person assist required;nonverbal cues required (demo/gesture);verbal cues required  -DUKE moderate assist (50% patient effort);2 person assist required;verbal cues required  -SJ    Bed Mobility, Safety Issues decreased use of arms for pushing/pulling;decreased use of legs for bridging/pushing  -AS decreased use of arms for pushing/pulling;decreased use of legs for bridging/pushing;impaired trunk control for bed mobility  -DUKE decreased use of arms for pushing/pulling;decreased use of legs for bridging/pushing  -SJ    Bed Mobility, Impairments strength decreased;impaired balance  -AS strength decreased;impaired balance;postural control impaired  -DUKE strength decreased;impaired  balance;coordination impaired  -SJ    Bed Mobility, Comment cues for sequencing and maintaining hip precautions  -AS Pt. needed step by step cues to sequence task and how to position self to assist the most.  -DUKE x2 reps due to need for bed pan  -SJ    Recorded by [AS] Tammy Ayala PTA [DUKE] Abigail Camarena OT [SJ] Elisa De Leon, PT    Transfer Assessment/Treatment    Transfers, Bed-Chair [place]   maximum assist (25% patient effort);2 person assist required  -SJ    Transfers, Sit-Stand [place] verbal cues required;moderate assist (50% patient effort);2 person assist required  -AS moderate assist (50% patient effort);2 person assist required;verbal cues required;nonverbal cues required (demo/gesture)  -DUKE maximum assist (25% patient effort);2 person assist required  -SJ    Transfers, Stand-Sit [place] verbal cues required;moderate assist (50% patient effort);2 person assist required  -AS moderate assist (50% patient effort);2 person assist required;verbal cues required;nonverbal cues required (demo/gesture)  -DUKE maximum assist (25% patient effort);2 person assist required  -SJ    Transfers, Sit-Stand-Sit, Assist Device rolling walker  -AS rolling walker  -DUKE     Transfer, Safety Issues weight-shifting ability decreased  -AS balance decreased during turns;sequencing ability decreased;step length decreased;weight-shifting ability decreased;loses balance backward  -DUKE balance decreased during turns;loses balance backward  -SJ    Transfer, Impairments strength decreased;impaired balance  -AS decreased flexibility;strength decreased;impaired balance;postural control impaired  -DUKE strength decreased;impaired balance;coordination impaired;postural control impaired  -SJ    Transfer, Comment stood from EOB x2, with mod assist x2, manual assist to block BLE from therapist and to correct posterior lean. stood ~ 20 seconds each stand  -AS Pt. still tending to thrust hips back and put weight toward back of  feet.  Much tactile anc verbal cues to get upright posture.  -DUKE sit <--> stand x2 reps, max verbal and tactile cues for posture and facilitation through LE's and low back to promote trunk ext. Pt very fearful of falling, strong backwards lean and difficulty getting heels down on ground.  -SJ    Recorded by [AS] Tammy Ayala PTA [DUKE] Abigail Camarena, OT [SJ] Elisa De Leon, PT    Gait Assessment/Treatment    Gait, [place] Level verbal cues required;maximum assist (25% patient effort);2 person assist required  -AS  unable to perform  -SJ    Gait, Assistive Device rolling walker  -AS      Gait, Distance (Feet) 6  -AS      Gait, Gait Deviations bilateral:;ber[place] decreased;forward flexed posture;step length decreased;weight-shifting ability decreased  -AS      Gait, Safety Issues step length decreased;sequencing ability decreased;balance decreased during turns;loses balance backward;weight-shifting ability decreased  -AS      Gait, Impairments strength decreased;impaired balance;coordination impaired  -AS      Gait, Comment 5 side steps to head of bed with UE support on walker. Cues for posture adn step sequence  -AS      Recorded by [AS] Tammy Ayala PTA  [SJ] Elisa De Leon, PT    Functional Mobility    Functional Mobility- Ind. Level  moderate assist (50% patient effort);2 person assist required;nonverbal cues required (demo/gesture);verbal cues required  -DUKE     Functional Mobility- Device  rolling walker  -DUKE     Functional Mobility-Distance (Feet)  2  -DUKE     Functional Mobility- Safety Issues  loses balance backward;weight-shifting ability decreased;step length decreased;sequencing ability decreased;balance decreased during turns  -DUKE     Functional Mobility- Comment  Pt. was able to take some side steps to right up HOB.  Pt. also stepped one foot forward and back twice.  Cues to sequence.  -DUKE     Recorded by  [DUKE] Abigail Camarena, OT     Upper Body Dressing Assessment/Training    UB Dressing  Assess/Train, Clothing Type  donning:;t-shirt;pull over  -DUKE     UB Dressing Assess/Train, Position  sitting  -DUKE     UB Dressing Assess/Train, [place]  minimum assist (75% patient effort);moderate assist (50% patient effort)  -DUKE     UB Dressing Assess/Train, Impairments  decreased flexibility;coordination impaired  -DUKE     UB Dressing Assess/Train, Comment  Pt. needed some assist around lines and getting uncurled on back.  Sleeveless shirt and jacket.  Mod assist jacket to get around back and start zipper.  -DUKE     Recorded by  [DUKE] Abigail Camarena OT     Lower Body Dressing Assessment/Training    LB Dressing Assess/Train, Clothing Type  donning:;pants;socks;shoes  -DUKE     LB Dressing Assess/Train, Assist Device  --   AE not at hospital  -DUKE     LB Dressing Assess/Train, Position  sitting;standing  -DUKE     LB Dressing Assess/Train, [place]  moderate assist (50% patient effort);maximum assist (25% patient effort);verbal cues required;nonverbal cues required (demo/gesture)  -DUKE     LB Dressing Assess/Train, Impairments  ROM decreased;decreased flexibility;strength decreased;impaired balance;motor control impaired  -DUKE     LB Dressing Assess/Train, Comment  Pt. able to pull pants to hips once started over feet.  Pt. able to pull up in front, but assist in back..Pt. dependent with socks, due to no AE brought in.  Pt. slipped feet into shoes, but unable to get heel on without assist.  -DUKE     Recorded by  [DUKE] Abigail Camarena OT     Balance Skills Training    Sitting-Level of Assistance  Close supervision  -DUKE Contact guard  -SJ    Sitting-Balance Support  Feet supported  -DUKE Right upper extremity supported;Left upper extremity supported  -SJ    Sitting-Balance Activities   Trunk control activities  -SJ    Sitting # of Minutes   5  -SJ    Standing-Level of Assistance  Moderate assistance;x2  -DUKE Maximum assistance;x2  -SJ    Static Standing Balance Support  assistive device  -DUKE Right upper extremity  supported;Left upper extremity supported  -SJ    Standing-Balance Activities   Weight Shift A-P;Weight Shift R-L;Reaching for objects  -SJ    Standing Balance # of Minutes   1  -SJ    Recorded by  [DUKE] Abigail Camarena OT [SJ] Elisa De Leon, PT    Therapy Exercises    Bilateral Lower Extremities AROM:;10 reps;sitting;ankle pumps/circles;hip flexion;LAQ  -AS      Bilateral Upper Extremity  AROM:;10 reps;sitting;elbow flexion/extension;shoulder abduction/adduction;shoulder extension/flexion;shoulder horizontal abd/add;shoulder protraction/retraction;shoulder rolls/shrugs  -DUKE     Recorded by [AS] Tammy Ayala PTA [DUKE] Abigail Camarena OT     Positioning and Restraints    Pre-Treatment Position in bed  -AS in bed  -DUKE in bed  -SJ    Post Treatment Position bed  -AS bed  -DUKE chair  -SJ    In Bed supine;call light within reach;encouraged to call for assist;exit alarm on;with OT;with family/caregiver  -AS      In Chair  sitting;call light within reach;encouraged to call for assist;exit alarm on  -DUKE notified nsg;reclined;call light within reach;encouraged to call for assist;exit alarm on;waffle cushion;on mechanical lift sling;heels elevated  -SJ    Recorded by [AS] Tammy Ayala PTA [DUKE] Abigail Camarena, SUZANNE [SJ] Elisa De Leon, PT      01/18/17 1121 01/17/17 1400       Rehab Assessment/Intervention    Discipline occupational therapist  -JR occupational therapist  -JR     Document Type therapy note (daily note)  -JR therapy note (daily note)  -JR     Subjective Information agree to therapy;complains of;fatigue  -JR no complaints;agree to therapy  -JR     Patient Effort, Rehab Treatment good  -JR good  -JR     Precautions/Limitations fall precautions;hip precautions- left  -JR fall precautions;hip precautions- left  -JR     Recorded by [JR] Rosalba Martino, OT [JR] Rosalba Martino, OT     Vital Signs    Pre Systolic BP Rehab 122  -  -JR     Pre Treatment Diastolic BP 80  -JR 79  -JR     Post Systolic  BP Rehab 131  -  -JR     Post Treatment Diastolic BP 88  -JR 91  -JR     Pretreatment Heart Rate (beats/min) 90  -JR 78  -JR     Posttreatment Heart Rate (beats/min) 91  -JR 84  -JR     Pre SpO2 (%) 89  -JR 94  -JR     O2 Delivery Pre Treatment supplemental O2   2L  -JR supplemental O2   2L  -JR     Post SpO2 (%) 93  -JR 91  -JR     O2 Delivery Post Treatment supplemental O2  -JR supplemental O2  -JR     Pre Patient Position Supine  -JR Supine  -JR     Intra Patient Position Standing  -JR Standing  -JR     Post Patient Position Sitting  -JR Sitting  -JR     Recorded by [JR] Rosalba WHEAT Gunner, OT [JR] Rosalba WHEAT Gunner, OT     Pain Assessment    Pain Assessment 0-10  -JR 0-10  -JR     Pain Score 0  -JR 0  -JR     Post Pain Score 0  -JR 0  -JR     Recorded by [JR] Rosalba WHEAT Gunner, OT [JR] Rosalba WHEAT Gunner, OT     Cognitive Assessment/Intervention    Current Cognitive/Communication Assessment functional  -JR functional  -JR     Orientation Status oriented x 4  -JR oriented x 4  -JR     Follows Commands/Answers Questions 100% of the time;able to follow single-step instructions  -% of the time;able to follow single-step instructions  -JR     Personal Safety mild impairment  -JR WNL/WFL  -JR     Recorded by [JR] Rosalba WHEAT Gunner, OT [JR] Rosalba WHEAT Gunner, OT     Bed Mobility, Assessment/Treatment    Bed Mobility, Assistive Device bed rails;head of bed elevated  -JR bed rails;head of bed elevated  -JR     Bed Mobility, Roll Left, [place] maximum assist (25% patient effort);2 person assist required  -JR      Bed Mobility, Roll Right, [place] maximum assist (25% patient effort);2 person assist required  -JR      Bed Mob, Supine to Sit, [place] moderate assist (50% patient effort);2 person assist required  -JR moderate assist (50% patient effort);2 person assist required  -JR     Bed Mobility, Safety Issues decreased use of arms for pushing/pulling;decreased use of legs for bridging/pushing  -JR  decreased use of arms for pushing/pulling;decreased use of legs for bridging/pushing  -JR     Bed Mobility, Impairments strength decreased  -JR strength decreased;impaired balance  -JR     Bed Mobility, Comment Pt required assist to roll, bring LE's to EOB and bring trunk into sitting. Pt required verbal cues for technique  -JR Pt required assist to bring LE's to EOB as well as bring trunk to sitting. Pt required verbal cues for technique  -JR     Recorded by [JR] Rosalba Martino, OT [JR] Rosalba Martino, OT     Transfer Assessment/Treatment    Transfers, Bed-Chair [place] maximum assist (25% patient effort);2 person assist required  -JR maximum assist (25% patient effort);2 person assist required  -JR     Transfers, Sit-Stand [place] maximum assist (25% patient effort);2 person assist required  -JR maximum assist (25% patient effort);2 person assist required  -JR     Transfers, Stand-Sit [place] maximum assist (25% patient effort);2 person assist required  -JR maximum assist (25% patient effort);2 person assist required  -JR     Transfers, Sit-Stand-Sit, Assist Device  --   UE support  -JR     Transfer, Safety Issues balance decreased during turns;loses balance backward  -JR      Transfer, Impairments strength decreased;impaired balance  -JR      Transfer, Comment Pt required verbal cues for hand placement with transfers. Pt stood from EOB x2, reported she needed to have a BM. Pt returned to supine. Rolled to place bedpan and for cleaning, then assisted with transfer to chair. Pt with narrow [place], difficulty getting heels to floor. Pt with extreme posterior lean, verbal cues for upright posture and head control in standing. Despite cues pt unable to obtain upright posture.  -JR Pt required verbal cues for foot placement with transfer as well as verbal cues for upright posture. Pt with narrow [place], leaning posterior. Pt participated with sit to stand x3, then stand pivot transfer to chair.  -JR      Recorded by [JR] Rosalba Martino, OT [JR] Rosalba Martino, OT     Toileting Assessment/Training    Toileting Assess/Train, Assistive Device --   bedpan  -JR      Toileting Assess/Train, Position supine  -JR      Toileting Assess/Train, Indepen Level dependent (less than 25% patient effort)  -JR      Toileting Assess/Train, Comment Pt incontinent of bowel, dependent for hygiene  -JR      Recorded by [JR] Rosalba Martino, OT      Grooming Assessment/Training    Grooming Assess/Train, Position sitting  -JR sitting  -JR     Grooming Assess/Train, Indepen Level set up required  -JR set up required  -JR     Grooming Assess/Train, Comment brushing hair  -JR brushing hair  -JR     Recorded by [JR] Rosalba Martino, OT [JR] Rosalba Martino, OT     Motor Skills/Interventions    Additional Documentation  Balance Skills Training (Group)  -JR     Recorded by  [JR] Rosalba Martino OT     Balance Skills Training    Sitting-Level of Assistance Contact guard  -JR Contact guard  -JR     Sitting-Balance Support Right upper extremity supported;Left upper extremity supported  -JR Right upper extremity supported;Left upper extremity supported  -JR     Standing-Level of Assistance Maximum assistance;x2  -JR Maximum assistance;x2  -JR     Static Standing Balance Support Right upper extremity supported;Left upper extremity supported  -JR Left upper extremity supported;Right upper extremity supported  -JR     Recorded by [JR] Rosalba Martino, OT [JR] Rosalba Martino, OT     Therapy Exercises    Bilateral Upper Extremity  AROM:;10 reps;elbow flexion/extension;pronation/supination;shoulder abduction/adduction;shoulder extension/flexion  -JR     Recorded by  [JR] Rosalba Martino OT     Positioning and Restraints    Pre-Treatment Position in bed  -JR in bed  -JR     Post Treatment Position chair  -JR chair  -JR     In Chair notified nsg;reclined;call light within reach;encouraged to call for assist;exit alarm on;on mechanical lift  sling  -JR notified nsg;reclined;call light within reach;encouraged to call for assist;exit alarm on;with family/caregiver;RLE elevated;LLE elevated  -JR     Recorded by [JR] Rosalba R Gunner, OT [JR] Rosalba R Gunner, OT       User Key  (r) = Recorded By, (t) = Taken By, (c) = Cosigned By    Initials Name Effective Dates    DUKE Abigail Gaona Migue, OT 06/22/15 -     SJ Elisa De Leon, PT 06/19/15 -     JR Rosalba Martino, OT 06/22/15 -     AS Tammy Ayala, PTA 06/22/15 -                 OT Goals       01/19/17 1422 01/18/17 1201 01/17/17 1438    Bed Mobility OT LTG    Bed Mobility OT LTG, Outcome goal not met  -DUKE goal ongoing  -JR goal ongoing  -JR    Bed Mobility OT LTG, Reason Goal Not Met discharged from facility   progressed from max of 2 to mod of 2 OOB  -DUKE      Transfer Training OT LTG    Transfer Training OT LTG, Outcome goal not met  -DUKE goal ongoing  -JR goal ongoing  -JR    Transfer Training OT LTG, Reason Goal Not Met discharged from facility   progressed from max of 2 to mod of 2.  -DUKE      Strength OT LTG    Strength Goal OT LTG, Functional Goal   Pt to increase B UE strength by 1/2 muscle grade to support ADL independence  -JR    Strength Goal OT LTG, Outcome goal not met  -DUKE goal ongoing  -JR goal ongoing  -JR    Strength Goal OT LTG, Reason Goal Not Met discharged from facility   progressing distally.  -DUKE      Toileting OT LTG    Toileting Goal OT LTG, Outcome goal not met  -DUKE goal ongoing  -JR goal ongoing  -JR    Toileting Goal OT LTG, Reason Goal Not Met discharged from facility  -DUKE      LB Dressing OT LTG    LB Dressing Goal OT LTG, Outcome goal not met   max A without AE brought in to use.  -DUKE goal ongoing  -JR goal ongoing  -JR    LB Dressing Goal OT LTG, Reason Goal Not Met discharged from facility  -DUKE        01/14/17 1524          Bed Mobility OT LTG    Bed Mobility OT LTG, Date Established 01/14/17  -TA      Bed Mobility OT LTG, Time to Achieve 1 wk  -TA      Bed Mobility OT LTG,  Activity Type supine to sit/sit to supine  -TA      Bed Mobility OT LTG, [place] Level supervision required;verbal cues required  -TA      Bed Mobility OT LTG, Outcome goal ongoing  -TA      Transfer Training OT LTG    Transfer Training OT LTG, Date Established 01/14/17  -TA      Transfer Training OT LTG, Time to Achieve 1 wk  -TA      Transfer Training OT LTG, Activity Type bed to chair /chair to bed;sit to stand/stand to sit;toilet  -TA      Transfer Training OT LTG, [place] Level supervision required;verbal cues required  -TA      Transfer Training OT LTG, Assist Device walker, rolling  -TA      Transfer Training OT LTG, Outcome goal ongoing  -TA      Strength OT LTG    Strength Goal OT LTG, Date Established 01/14/17  -TA      Strength Goal OT LTG, Time to Achieve 1 wk  -TA      Strength Goal OT LTG, Measure to Achieve --   Increase BUE strength by 1/2 MMG to support fxl I.  -TA      Strength Goal OT LTG, Outcome goal ongoing  -TA      Toileting OT LTG    Toileting Goal OT LTG, Date Established 01/14/17  -TA      Toileting Goal OT LTG, Time to Achieve 1 wk  -TA      Toileting Goal OT LTG, [place] Level minimum assist (75% patient effort)  -TA      Toileting Goal OT LTG, Assist Device toilet seat, raised  -TA      Toileting Goal OT LTG, Outcome goal ongoing  -TA      LB Dressing OT LTG    LB Dressing Goal OT LTG, Date Established 01/14/17  -TA      LB Dressing Goal OT LTG, Time to Achieve 1 wk  -TA      LB Dressing Goal OT LTG, [place] Level moderate assist (50% patient effort);verbal cues required  -TA      LB Dressing Goal OT LTG, Adaptive Equipment --   AE PRN  -TA      LB Dressing Goal OT LTG, Outcome goal ongoing  -TA        User Key  (r) = Recorded By, (t) = Taken By, (c) = Cosigned By    Initials Name Provider Type    DUEK Camarena, OT Occupational Therapist    JR Rosalba Martino, OT Occupational Therapist    STELLA Torres, OT Occupational Therapist          Occupational  Therapy Education     Title: PT OT SLP Therapies (Active)     Topic: Occupational Therapy (Active)     Point: ADL training (Active)    Description: Instruct learner(s) on proper safety adaptation and remediation techniques during self care or transfers.   Instruct in proper use of assistive devices.    Learning Progress Summary    Learner Readiness Method Response Comment Documented by Status   Patient Acceptance E NR Cont. with educ. on bed mobility, transfer safety, posture and LBD with hip prec. DUKE 01/19/17 1421 Active    Acceptance E NR Educated pt regaring bed mobility and transfers. Educated pt on benefits of activity. JR 01/18/17 1200 Active    Acceptance E NR Educated on safe transfer techniques and benefits of activity JR 01/17/17 1437 Active               Point: Home exercise program (Resolved)    Description: Instruct learner(s) on appropriate technique for monitoring, assisting and/or progressing therapeutic exercises/activities.    Learning Progress Summary    Learner Readiness Method Response Comment Documented by Status   Patient Acceptance ECATHIE VU,NR BUE HEP, body mechanics with bed mobility/fxl transfers, reinforced need for call for assist for OOB activities. TA 01/14/17 1523 Done               Point: Precautions (Resolved)    Description: Instruct learner(s) on prescribed precautions during self-care and functional transfers.    Learning Progress Summary    Learner Readiness Method Response Comment Documented by Status   Patient Acceptance CATHIE VIVEROS VU,NR BUE HEP, body mechanics with bed mobility/fxl transfers, reinforced need for call for assist for OOB activities. TA 01/14/17 1523 Done               Point: Body mechanics (Resolved)    Description: Instruct learner(s) on proper positioning and spine alignment during self-care, functional mobility activities and/or exercises.    Learning Progress Summary    Learner Readiness Method Response Comment Documented by Status   Patient Acceptance ECATHIE VU,NR BUE  HEP, body mechanics with bed mobility/fxl transfers, reinforced need for call for assist for OOB activities. TA 01/14/17 1523 Done                      User Key     Initials Effective Dates Name Provider Type Discipline    [facility] 06/22/15 -  Abigail Camarena, OT Occupational Therapist OT    [facility] 06/22/15 -  Rosalba Martino, OT Occupational Therapist OT    TA 03/14/16 -  Linwood Torres, OT Occupational Therapist OT                OT Recommendation and Plan  Anticipated Discharge Disposition: skilled nursing facility  Planned Therapy Interventions: activity intolerance, adaptive equipment training, ADL retraining, balance training, bed mobility training, home exercise program, ROM (Range of Motion), strengthening, transfer training  Therapy Frequency: daily (Per priority policy)  Plan of Care Review  Plan Of Care Reviewed With: patient  Progress: progress toward functional goals is gradual  Outcome Summary/Follow up Plan: Pt. with improved transfer indep and able to take some side steps and work on dressing self today.  Ready for SNF.          Outcome Measures       01/19/17 1349 01/19/17 1341 01/18/17 1125    How much help from another person do you currently need...    Turning from your back to your side while in flat bed without using bedrails? 2  -AS  2  -SJ    Moving from lying on back to sitting on the side of a flat bed without bedrails? 2  -AS  2  -SJ    Moving to and from a bed to a chair (including a wheelchair)? 2  -AS  2  -SJ    Standing up from a chair using your arms (e.g., wheelchair, bedside chair)? 2  -AS  2  -SJ    Climbing 3-5 steps with a railing? 1  -AS  1  -SJ    To walk in hospital room? 2  -AS  1  -SJ    AM-PAC 6 Clicks Score 11  -AS  10  -SJ    How much help from another is currently needed...    Putting on and taking off regular lower body clothing?  2  -[facility]     Bathing (including washing, rinsing, and drying)  2  -[facility]     Toileting (which includes using toilet bed pan or urinal)  2  -[facility]      Putting on and taking off regular upper body clothing  3  -[facility]     Taking care of personal grooming (such as brushing teeth)  3  -[facility]     Eating meals  3  -[facility]     Score  15  -[facility]     Functional Assessment    Outcome Measure Options AM-PAC 6 Clicks Basic Mobility (PT)  -AS AM-PAC 6 Clicks Daily Activity (OT)  -[facility] AM-PAC 6 Clicks Basic Mobility (PT)  -SJ      01/18/17 1121 01/17/17 1400       How much help from another is currently needed...    Putting on and taking off regular lower body clothing? 1  -JR 1  -JR     Bathing (including washing, rinsing, and drying) 2  -JR 2  -JR     Toileting (which includes using toilet bed pan or urinal) 1  -JR 1  -JR     Putting on and taking off regular upper body clothing 2  -JR 2  -JR     Taking care of personal grooming (such as brushing teeth) 3  -JR 3  -JR     Eating meals 3  -JR 3  -JR     Score 12  -JR 12  -JR     Functional Assessment    Outcome Measure Options AM-PAC 6 Clicks Daily Activity (OT)  -JR AM-PAC 6 Clicks Daily Activity (OT)  -JR       User Key  (r) = Recorded By, (t) = Taken By, (c) = Cosigned By    Initials Name Provider Type    [facility] Camarena, OT Occupational Therapist    DREW De Leon, PT Physical Therapist    JR Rosalba Martino, OT Occupational Therapist    AS Tammy Ayala, PTA Physical Therapy Assistant           Time Calculation:          Time Calculation- OT       01/19/17 1428          Time Calculation- OT    OT Start Time 1341  -[facility]      Total Timed Code Minutes- OT 23 minute(s)  -[facility]      OT Received On 01/19/17  -[facility]      OT Goal Re-Cert Due Date 01/24/17  -        User Key  (r) = Recorded By, (t) = Taken By, (c) = Cosigned By    Initials Name Provider Type    [facility] Camarena OT Occupational Therapist          Therapy Charges for Today     Code Description Service Date Service Provider Modifiers Qty    [id number]  OT THERAPEUTIC ACT EA 15 MIN 1/19/2017 Abigail Camarena OT GO 2               OT Discharge Summary  Anticipated Discharge  Disposition: skilled nursing facility  Reason for Discharge: Discharge from facility  Outcomes Achieved: Patient able to partially acheive established goals  Discharge Destination: SNF    Abigail Camarena OT  1/19/2017

## 2017-01-19 NOTE — PLAN OF CARE
Problem: Patient Care Overview (Adult)  Goal: Plan of Care Review  Outcome: Ongoing (interventions implemented as appropriate)    01/19/17 0504   Coping/Psychosocial Response Interventions   Plan Of Care Reviewed With patient   Patient Care Overview   Progress progress toward functional goals as expected   Outcome Evaluation   Outcome Summary/Follow up Plan Pt is lonely. Pt still having urine retaining. Bladder scaning at 0600 and 2100.          Problem: Fall Risk (Adult)  Goal: Identify Related Risk Factors and Signs and Symptoms  Outcome: Ongoing (interventions implemented as appropriate)

## 2017-01-19 NOTE — PLAN OF CARE
Problem: Patient Care Overview (Adult)  Goal: Plan of Care Review  Outcome: Unable to achieve outcome(s) by discharge Date Met:  01/19/17    Problem: Inpatient Occupational Therapy  Goal: Bed Mobility Goal LTG- OT  Outcome: Unable to achieve outcome(s) by discharge Date Met:  01/19/17 01/14/17 1524 01/19/17 1422   Bed Mobility OT LTG   Bed Mobility OT LTG, Date Established 01/14/17 --    Bed Mobility OT LTG, Time to Achieve 1 wk --    Bed Mobility OT LTG, Activity Type supine to sit/sit to supine --    Bed Mobility OT LTG, [place] Level supervision required;verbal cues required --    Bed Mobility OT LTG, Outcome --  goal not met   Bed Mobility OT LTG, Reason Goal Not Met --  discharged from facility  (progressed from max of 2 to mod of 2 OOB)       Goal: Transfer Training Goal 1 LTG- OT  Outcome: Ongoing (interventions implemented as appropriate)    01/14/17 1524 01/18/17 1201 01/19/17 1422   Transfer Training OT LTG   Transfer Training OT LTG, Date Established 01/14/17 --  --    Transfer Training OT LTG, Time to Achieve 1 wk --  --    Transfer Training OT LTG, Activity Type bed to chair /chair to bed;sit to stand/stand to sit;toilet --  --    Transfer Training OT LTG, [place] Level supervision required;verbal cues required --  --    Transfer Training OT LTG, Assist Device walker, rolling --  --    Transfer Training OT LTG, Outcome --  goal ongoing --    Transfer Training OT LTG, Reason Goal Not Met --  --  discharged from facility  (       01/14/17 1524 01/19/17 1422   Transfer Training OT LTG   Transfer Training OT LTG, Date Established 01/14/17 --    Transfer Training OT LTG, Time to Achieve 1 wk --    Transfer Training OT LTG, Activity Type bed to chair /chair to bed;sit to stand/stand to sit;toilet --    Transfer Training OT LTG, [place] Level supervision required;verbal cues required --    Transfer Training OT LTG, Assist Device walker, rolling --    Transfer Training OT LTG, Outcome --  goal  not met   Transfer Training OT LTG, Reason Goal Not Met --  discharged from facility  (progressed from max of 2 to mod of 2.)   progressed from max of 2 to mod of 2.)       Goal: Strength Goal LTG- OT  Outcome: Unable to achieve outcome(s) by discharge Date Met:  01/19/17 01/14/17 1524 01/17/17 1438 01/18/17 1201   Strength OT LTG   Strength Goal OT LTG, Date Established 01/14/17 --  --    Strength Goal OT LTG, Time to Achieve 1 wk --  --    Strength Goal OT LTG, Measure to Achieve (Increase BUE strength by 1/2 MMG to support fxl I.) --  --    Strength Goal OT LTG, Functional Goal --  Pt to increase B UE strength by 1/2 muscle grade to support ADL independence --    Strength Goal OT LTG, Outcome --  --  goal ongoing   Strength Goal OT LTG, Reason Goal Not Met --  --  --      01/19/17 1422   Strength OT LTG   Strength Goal OT LTG, Date Established --    Strength Goal OT LTG, Time to Achieve --    Strength Goal OT LTG, Measure to Achieve --    Strength Goal OT LTG, Functional Goal --    Strength Goal OT LTG, Outcome --    Strength Goal OT LTG, Reason Goal No       01/14/17 1524 01/17/17 1438 01/19/17 1422   Strength OT LTG   Strength Goal OT LTG, Date Established 01/14/17 --  --    Strength Goal OT LTG, Time to Achieve 1 wk --  --    Strength Goal OT LTG, Measure to Achieve (Increase BUE strength by 1/2 MMG to support fxl I.) --  --    Strength Goal OT LTG, Functional Goal --  Pt to increase B UE strength by 1/2 muscle grade to support ADL independence --    Strength Goal OT LTG, Outcome --  --  goal not met   Strength Goal OT LTG, Reason Goal Not Met --  --  discharged from facility  (progressing distally.)   t Met discharged from facility  (progressing distally.)       Goal: Toileting Goal LTG- OT  Outcome: Unable to achieve outcome(s) by discharge Date Met:  01/19/17 01/14/17 1524 01/18/17 1201 01/19/17 1422   Toileting OT LTG   Toileting Goal OT LTG, Date Established 01/14/17 --  --    Toileting Goal OT LTG,  Time to Achieve 1 wk --  --    Toileting Goal OT LTG, [place] Level minimum assist (75% patient effort) --  --    Toileting Goal OT LTG, Assist Device toilet seat, raised --  --    Toileting Goal OT LTG, Outcome --  goal ongoing --    Toileting Goal OT LTG, Reason Goal Not Met -       01/14/17 1524 01/19/17 1422   Toileting OT LTG   Toileting Goal OT LTG, Date Established 01/14/17 --    Toileting Goal OT LTG, Time to Achieve 1 wk --    Toileting Goal OT LTG, [place] Level minimum assist (75% patient effort) --    Toileting Goal OT LTG, Assist Device toilet seat, raised --    Toileting Goal OT LTG, Outcome --  goal not met   Toileting Goal OT LTG, Reason Goal Not Met --  discharged from facility   -  --  discharged from facility       Goal: LB Dressing Goal LTG- OT  Outcome: Unable to achieve outcome(s) by discharge Date Met:  01/19/17 01/14/17 1524 01/19/17 1422   LB Dressing OT LTG   LB Dressing Goal OT LTG, Date Established 01/14/17 --    LB Dressing Goal OT LTG, Time to Achieve 1 wk --    LB Dressing Goal OT LTG, [place] Level moderate assist (50% patient effort);verbal cues required --    LB Dressing Goal OT LTG, Adaptive Equipment (AE PRN) --    LB Dressing Goal OT LTG, Outcome --  goal not met  (max A without AE brought in to use.)   LB Dressing Goal OT LTG, Reason Goal Not Met --  discharged from facility

## 2017-01-19 NOTE — PROGRESS NOTES
Acute Care - Physical Therapy Treatment Note  [facility]     Patient Name: Florinda Knapp  : 3/5/1931  MRN: [id number]  Today's Date: 2017  Onset of Illness/Injury or Date of Surgery Date: 17  Date of Referral to PT: 17  Referring Physician: ARIEL Rodarte    Admit Date: 2017    Visit Dx:    ICD-10-CM ICD-9-CM   1. Symptomatic anemia D64.9 285.9   2. Weakness R53.1 780.79   3. Failure to thrive in adult R62.7 783.7   4. Dehydration E86.0 276.51   5. Impaired functional mobility, balance, gait, and endurance Z74.09 V49.89   6. Impaired mobility and ADLs Z74.09 799.89     Patient Active Problem List   Diagnosis   • Dysuria   • Elbow injury   • Fracture of left olecranon process   • Parkinson's disease   • Trauma left hip   • Acute Symptomatic on Chronic Normocytic Anemia   • Acute respiratory failure with hypoxia   • Generalized weakness   • Chest pain   • Symptomatic anemia   • Elevated troponin I level, possibly due to anemia   • Status post total replacement of left hip 2016 at [facility]   • H/O urinary retention   • History of recurrent UTIs   • Essential hypertension   • Anxiety   • CHF with cardiomyopathy   • Colitis   • Hypokalemia               Adult Rehabilitation Note       17 1349 17 1341 17 1125    Rehab Assessment/Intervention    Discipline physical therapy assistant  -AS occupational therapist  -DUKE physical therapist  -    Document Type therapy note (daily note)  -AS therapy note (daily note)  -DUKE therapy note (daily note)  -    Subjective Information agree to therapy;complains of;weakness;fatigue  -AS agree to therapy;complains of;weakness  -DUKE agree to therapy;complains of;fatigue  -SJ    Patient Effort, Rehab Treatment good  -AS good  -DUKE good  -SJ    Symptoms Noted During/After Treatment  --   some anxiety she would fall  -DUKE     Precautions/Limitations fall precautions;hip precautions- left   recent left hip surgery  -AS hip precautions- left;fall  precautions   L THR in december  -DUKE fall precautions;hip precautions- left;oxygen therapy device and L/min  -SJ    Patient Response to Treatment  Pt. stating she felt like the tx helped her.  -DUKE     Recorded by [AS] Tammy Ayala PTA [DUKE] Abigail Camarena, OT [SJ] Elisa De Leon, PT    Vital Signs    Pre Systolic BP Rehab   122  -SJ    Pre Treatment Diastolic BP   80  -SJ    Pretreatment Heart Rate (beats/min)   91  -SJ    Pre SpO2 (%)   89  -SJ    O2 Delivery Pre Treatment   supplemental O2  -SJ    Recorded by   [SJ] Elisa De Leon, PT    Pain Assessment    Pain Assessment No/denies pain  -AS No/denies pain  -DUKE 0-10  -SJ    Pain Score 0  -AS  0  -SJ    Post Pain Score 0  -AS  0  -SJ    Recorded by [AS] Tammy Ayala PTA [DUKE] Abigail Camarena, OT [SJ] Elisa De Leon, PT    Vision Assessment/Intervention    Visual Impairment  WFL with corrective lenses  -DUKE WFL with corrective lenses  -SJ    Recorded by  [DUKE] Abigail Camarena, OT [SJ] Elisa De Leon, PT    Cognitive Assessment/Intervention    Current Cognitive/Communication Assessment functional  -AS functional  -DUKE functional  -SJ    Orientation Status oriented x 4  -AS oriented x 4  -DUKE oriented x 4  -SJ    Follows Commands/Answers Questions 100% of the time;able to follow single-step instructions  -% of the time;able to follow single-step instructions  -DUKE 100% of the time;able to follow single-step instructions;needs cueing;needs increased time  -SJ    Personal Safety WNL/WFL  -AS fully aware of deficits;WNL/WFL   Pt. does have high anxiety will fall.  -DUKE mild impairment;decreased awareness, need for safety  -SJ    Personal Safety Interventions elopement precautions initiated;fall prevention program maintained;gait belt;nonskid shoes/slippers when out of bed  -AS fall prevention program maintained;gait belt;nonskid shoes/slippers when out of bed  -DUKE fall prevention program maintained;gait belt;muscle strengthening facilitated;nonskid  shoes/slippers when out of bed  -SJ    Recorded by [AS] Tammy Ayala PTA [DUKE] Abigail Camarena OT [SJ] Elisa De Leon PT    Bed Mobility, Assessment/Treatment    Bed Mobility, Assistive Device bed rails;head of bed elevated;draw sheet  -AS bed rails;head of bed elevated;draw sheet  -DUKE bed rails;head of bed elevated;draw sheet  -SJ    Bed Mobility, Roll Left, [place]   moderate assist (50% patient effort);verbal cues required  -SJ    Bed Mobility, Roll Right, [place]   moderate assist (50% patient effort);verbal cues required  -SJ    Bed Mobility, Scoot/Bridge, [place]  moderate assist (50% patient effort);2 person assist required;verbal cues required;nonverbal cues required (demo/gesture)  -DUKE     Bed Mob, Supine to Sit, [place] verbal cues required;moderate assist (50% patient effort);2 person assist required  -AS moderate assist (50% patient effort);1 person + 1 person to manage equipment;verbal cues required;nonverbal cues required (demo/gesture)  -DUKE moderate assist (50% patient effort);2 person assist required  -SJ    Bed Mob, Sit to Supine, [place] verbal cues required;maximum assist (25% patient effort);2 person assist required  -AS maximum assist (25% patient effort);2 person assist required;nonverbal cues required (demo/gesture);verbal cues required  -DUKE moderate assist (50% patient effort);2 person assist required;verbal cues required  -SJ    Bed Mobility, Safety Issues decreased use of arms for pushing/pulling;decreased use of legs for bridging/pushing  -AS decreased use of arms for pushing/pulling;decreased use of legs for bridging/pushing;impaired trunk control for bed mobility  -DUKE decreased use of arms for pushing/pulling;decreased use of legs for bridging/pushing  -SJ    Bed Mobility, Impairments strength decreased;impaired balance  -AS strength decreased;impaired balance;postural control impaired  -DUKE strength decreased;impaired balance;coordination impaired  -SJ     Bed Mobility, Comment cues for sequencing and maintaining hip precautions  -AS Pt. needed step by step cues to sequence task and how to position self to assist the most.  -DUKE x2 reps due to need for bed pan  -SJ    Recorded by [AS] Tammy Ayala PTA [DUKE] Abigail Camarena OT [SJ] Elisa De Leon, PT    Transfer Assessment/Treatment    Transfers, Bed-Chair [place]   maximum assist (25% patient effort);2 person assist required  -SJ    Transfers, Sit-Stand [place] verbal cues required;moderate assist (50% patient effort);2 person assist required  -AS moderate assist (50% patient effort);2 person assist required;verbal cues required;nonverbal cues required (demo/gesture)  -DUKE maximum assist (25% patient effort);2 person assist required  -SJ    Transfers, Stand-Sit [place] verbal cues required;moderate assist (50% patient effort);2 person assist required  -AS moderate assist (50% patient effort);2 person assist required;verbal cues required;nonverbal cues required (demo/gesture)  -DUKE maximum assist (25% patient effort);2 person assist required  -SJ    Transfers, Sit-Stand-Sit, Assist Device rolling walker  -AS rolling walker  -DUKE     Transfer, Safety Issues weight-shifting ability decreased  -AS balance decreased during turns;sequencing ability decreased;step length decreased;weight-shifting ability decreased;loses balance backward  -DUKE balance decreased during turns;loses balance backward  -SJ    Transfer, Impairments strength decreased;impaired balance  -AS decreased flexibility;strength decreased;impaired balance;postural control impaired  -DUKE strength decreased;impaired balance;coordination impaired;postural control impaired  -SJ    Transfer, Comment stood from EOB x2, with mod assist x2, manual assist to block BLE from therapist and to correct posterior lean. stood ~ 20 seconds each stand  -AS Pt. still tending to thrust hips back and put weight toward back of feet.  Much tactile anc verbal cues  to get upright posture.  -DUKE sit <--> stand x2 reps, max verbal and tactile cues for posture and facilitation through LE's and low back to promote trunk ext. Pt very fearful of falling, strong backwards lean and difficulty getting heels down on ground.  -SJ    Recorded by [AS] Tammy Ayala PTA [DUKE] Abigail Camarena, OT [SJ] Elisa De Leon, PT    Gait Assessment/Treatment    Gait, [place] Level verbal cues required;maximum assist (25% patient effort);2 person assist required  -AS  unable to perform  -SJ    Gait, Assistive Device rolling walker  -AS      Gait, Distance (Feet) 6  -AS      Gait, Gait Deviations bilateral:;ber[place] decreased;forward flexed posture;step length decreased;weight-shifting ability decreased  -AS      Gait, Safety Issues step length decreased;sequencing ability decreased;balance decreased during turns;loses balance backward;weight-shifting ability decreased  -AS      Gait, Impairments strength decreased;impaired balance;coordination impaired  -AS      Gait, Comment 5 side steps to head of bed with UE support on walker. Cues for posture adn step sequence  -AS      Recorded by [AS] Tammy Ayala PTA  [SJ] Elisa De Leon, PT    Functional Mobility    Functional Mobility- Ind. Level  moderate assist (50% patient effort);2 person assist required;nonverbal cues required (demo/gesture);verbal cues required  -DUKE     Functional Mobility- Device  rolling walker  -DUKE     Functional Mobility-Distance (Feet)  2  -DUKE     Functional Mobility- Safety Issues  loses balance backward;weight-shifting ability decreased;step length decreased;sequencing ability decreased;balance decreased during turns  -DUKE     Functional Mobility- Comment  Pt. was able to take some side steps to right up HOB.  Pt. also stepped one foot forward and back twice.  Cues to sequence.  -DUKE     Recorded by  [DUKE] Abigail Camarena, OT     Upper Body Dressing Assessment/Training    UB Dressing Assess/Train, Clothing Type   donning:;t-shirt;pull over  -DUKE     UB Dressing Assess/Train, Position  sitting  -DUKE     UB Dressing Assess/Train, [place]  minimum assist (75% patient effort);moderate assist (50% patient effort)  -DUKE     UB Dressing Assess/Train, Impairments  decreased flexibility;coordination impaired  -DUKE     UB Dressing Assess/Train, Comment  Pt. needed some assist around lines and getting uncurled on back.  Sleeveless shirt and jacket.  Mod assist jacket to get around back and start zipper.  -DUKE     Recorded by  [DUKE] Abigail Camarena OT     Lower Body Dressing Assessment/Training    LB Dressing Assess/Train, Clothing Type  donning:;pants;socks;shoes  -DUKE     LB Dressing Assess/Train, Assist Device  --   AE not at hospital  -DUKE     LB Dressing Assess/Train, Position  sitting;standing  -DUKE     LB Dressing Assess/Train, [place]  moderate assist (50% patient effort);maximum assist (25% patient effort);verbal cues required;nonverbal cues required (demo/gesture)  -DUKE     LB Dressing Assess/Train, Impairments  ROM decreased;decreased flexibility;strength decreased;impaired balance;motor control impaired  -DUKE     LB Dressing Assess/Train, Comment  Pt. able to pull pants to hips once started over feet.  Pt. able to pull up in front, but assist in back..Pt. dependent with socks, due to no AE brought in.  Pt. slipped feet into shoes, but unable to get heel on without assist.  -DUKE     Recorded by  [DUKE] Abigail Camarena, OT     Balance Skills Training    Sitting-Level of Assistance  Close supervision  -DUKE Contact guard  -SJ    Sitting-Balance Support  Feet supported  -DUKE Right upper extremity supported;Left upper extremity supported  -SJ    Sitting-Balance Activities   Trunk control activities  -SJ    Sitting # of Minutes   5  -SJ    Standing-Level of Assistance  Moderate assistance;x2  -DUKE Maximum assistance;x2  -SJ    Static Standing Balance Support  assistive device  -DUKE Right upper extremity supported;Left upper extremity  supported  -SJ    Standing-Balance Activities   Weight Shift A-P;Weight Shift R-L;Reaching for objects  -SJ    Standing Balance # of Minutes   1  -SJ    Recorded by  [DUKE] Abigail Camarena OT [SJ] Elisa De Leon, PT    Therapy Exercises    Bilateral Lower Extremities AROM:;10 reps;sitting;ankle pumps/circles;hip flexion;LAQ  -AS      Bilateral Upper Extremity  AROM:;10 reps;sitting;elbow flexion/extension;shoulder abduction/adduction;shoulder extension/flexion;shoulder horizontal abd/add;shoulder protraction/retraction;shoulder rolls/shrugs  -DUKE     Recorded by [AS] Tammy Ayala PTA [DUKE] Abigail Camarena OT     Positioning and Restraints    Pre-Treatment Position in bed  -AS in bed  -DUKE in bed  -SJ    Post Treatment Position bed  -AS bed  -DUKE chair  -SJ    In Bed supine;call light within reach;encouraged to call for assist;exit alarm on;with OT;with family/caregiver  -AS      In Chair  sitting;call light within reach;encouraged to call for assist;exit alarm on  -DUKE notified nsg;reclined;call light within reach;encouraged to call for assist;exit alarm on;waffle cushion;on mechanical lift sling;heels elevated  -SJ    Recorded by [AS] Tammy Ayala, JOE [DUKE] Abigail Camarena, SUZANNE [SJ] Elisa De Leon, PT      01/18/17 1121 01/17/17 1400       Rehab Assessment/Intervention    Discipline occupational therapist  -JR occupational therapist  -JR     Document Type therapy note (daily note)  -JR therapy note (daily note)  -JR     Subjective Information agree to therapy;complains of;fatigue  -JR no complaints;agree to therapy  -JR     Patient Effort, Rehab Treatment good  -JR good  -JR     Precautions/Limitations fall precautions;hip precautions- left  -JR fall precautions;hip precautions- left  -JR     Recorded by [JR] Rosalba Martino, OT [JR] Rosalba Martino, OT     Vital Signs    Pre Systolic BP Rehab 122  -  -JR     Pre Treatment Diastolic BP 80  -JR 79  -JR     Post Systolic BP Rehab 131  -  -JR      Post Treatment Diastolic BP 88  -JR 91  -JR     Pretreatment Heart Rate (beats/min) 90  -JR 78  -JR     Posttreatment Heart Rate (beats/min) 91  -JR 84  -JR     Pre SpO2 (%) 89  -JR 94  -JR     O2 Delivery Pre Treatment supplemental O2   2L  -JR supplemental O2   2L  -JR     Post SpO2 (%) 93  -JR 91  -JR     O2 Delivery Post Treatment supplemental O2  -JR supplemental O2  -JR     Pre Patient Position Supine  -JR Supine  -JR     Intra Patient Position Standing  -JR Standing  -JR     Post Patient Position Sitting  -JR Sitting  -JR     Recorded by [JR] Rosalba WHEAT Gunner, OT [JR] Rosalba WHEAT Gunner, OT     Pain Assessment    Pain Assessment 0-10  -JR 0-10  -JR     Pain Score 0  -JR 0  -JR     Post Pain Score 0  -JR 0  -JR     Recorded by [JR] Rosalba WHEAT Gunner, OT [JR] Rosalba WHEAT Gunner, OT     Cognitive Assessment/Intervention    Current Cognitive/Communication Assessment functional  -JR functional  -JR     Orientation Status oriented x 4  -JR oriented x 4  -JR     Follows Commands/Answers Questions 100% of the time;able to follow single-step instructions  -% of the time;able to follow single-step instructions  -JR     Personal Safety mild impairment  -JR WNL/WFL  -JR     Recorded by [JR] Rosalba WHEAT Gunner, OT [JR] Rosalba WHEAT Gunner, OT     Bed Mobility, Assessment/Treatment    Bed Mobility, Assistive Device bed rails;head of bed elevated  -JR bed rails;head of bed elevated  -JR     Bed Mobility, Roll Left, [place] maximum assist (25% patient effort);2 person assist required  -JR      Bed Mobility, Roll Right, [place] maximum assist (25% patient effort);2 person assist required  -JR      Bed Mob, Supine to Sit, [place] moderate assist (50% patient effort);2 person assist required  -JR moderate assist (50% patient effort);2 person assist required  -JR     Bed Mobility, Safety Issues decreased use of arms for pushing/pulling;decreased use of legs for bridging/pushing  -JR decreased use of arms for  pushing/pulling;decreased use of legs for bridging/pushing  -JR     Bed Mobility, Impairments strength decreased  -JR strength decreased;impaired balance  -JR     Bed Mobility, Comment Pt required assist to roll, bring LE's to EOB and bring trunk into sitting. Pt required verbal cues for technique  -JR Pt required assist to bring LE's to EOB as well as bring trunk to sitting. Pt required verbal cues for technique  -JR     Recorded by [JR] Rosalba Martino OT [JR] Rosalba Martino, OT     Transfer Assessment/Treatment    Transfers, Bed-Chair [place] maximum assist (25% patient effort);2 person assist required  -JR maximum assist (25% patient effort);2 person assist required  -JR     Transfers, Sit-Stand [place] maximum assist (25% patient effort);2 person assist required  -JR maximum assist (25% patient effort);2 person assist required  -JR     Transfers, Stand-Sit [place] maximum assist (25% patient effort);2 person assist required  -JR maximum assist (25% patient effort);2 person assist required  -JR     Transfers, Sit-Stand-Sit, Assist Device  --   UE support  -JR     Transfer, Safety Issues balance decreased during turns;loses balance backward  -JR      Transfer, Impairments strength decreased;impaired balance  -JR      Transfer, Comment Pt required verbal cues for hand placement with transfers. Pt stood from EOB x2, reported she needed to have a BM. Pt returned to supine. Rolled to place bedpan and for cleaning, then assisted with transfer to chair. Pt with narrow [place], difficulty getting heels to floor. Pt with extreme posterior lean, verbal cues for upright posture and head control in standing. Despite cues pt unable to obtain upright posture.  -JR Pt required verbal cues for foot placement with transfer as well as verbal cues for upright posture. Pt with narrow [place], leaning posterior. Pt participated with sit to stand x3, then stand pivot transfer to chair.  -JR     Recorded by [JR] Rosalba WHEAT  Gunner, OT [JR] Rosalba Martino, OT     Toileting Assessment/Training    Toileting Assess/Train, Assistive Device --   bedpan  -JR      Toileting Assess/Train, Position supine  -JR      Toileting Assess/Train, Indepen Level dependent (less than 25% patient effort)  -JR      Toileting Assess/Train, Comment Pt incontinent of bowel, dependent for hygiene  -JR      Recorded by [JR] Rosalba Martino, OT      Grooming Assessment/Training    Grooming Assess/Train, Position sitting  -JR sitting  -JR     Grooming Assess/Train, Indepen Level set up required  -JR set up required  -JR     Grooming Assess/Train, Comment brushing hair  -JR brushing hair  -JR     Recorded by [JR] Rosalba Martino, OT [JR] Rosalba Martino, OT     Motor Skills/Interventions    Additional Documentation  Balance Skills Training (Group)  -JR     Recorded by  [JR] Rosalba Martino, OT     Balance Skills Training    Sitting-Level of Assistance Contact guard  -JR Contact guard  -JR     Sitting-Balance Support Right upper extremity supported;Left upper extremity supported  -JR Right upper extremity supported;Left upper extremity supported  -JR     Standing-Level of Assistance Maximum assistance;x2  -JR Maximum assistance;x2  -JR     Static Standing Balance Support Right upper extremity supported;Left upper extremity supported  -JR Left upper extremity supported;Right upper extremity supported  -JR     Recorded by [JR] Rosalba Martino, OT [JR] Rosalba Martino, OT     Therapy Exercises    Bilateral Upper Extremity  AROM:;10 reps;elbow flexion/extension;pronation/supination;shoulder abduction/adduction;shoulder extension/flexion  -JR     Recorded by  [JR] Rosalba Martino, OT     Positioning and Restraints    Pre-Treatment Position in bed  -JR in bed  -JR     Post Treatment Position chair  -JR chair  -JR     In Chair notified nsg;reclined;call light within reach;encouraged to call for assist;exit alarm on;on mechanical lift sling  -JR notified  nsg;reclined;call light within reach;encouraged to call for assist;exit alarm on;with family/caregiver;RLE elevated;LLE elevated  -JR     Recorded by [JR] Rosalba WHEAT Gunner, OT [JR] Rosalba WHEAT Gunner, OT       User Key  (r) = Recorded By, (t) = Taken By, (c) = Cosigned By    Initials Name Effective Dates    DUKE Abigail Gaona Migue, OT 06/22/15 -     SJ Elisa De Leon, PT 06/19/15 -     JR Rosalba Martino, OT 06/22/15 -     AS Tammy Ayala, PTA 06/22/15 -                 IP PT Goals       01/19/17 1420 01/18/17 1313 01/16/17 1357    Bed Mobility PT LTG    Bed Mobility PT LTG, Date Goal Reviewed 01/19/17  -AS  01/16/17  -SR    Bed Mobility PT LTG, Outcome goal ongoing  -AS goal ongoing  -SJ     Transfer Training PT LTG    Transfer Training PT  LTG, Date Goal Reviewed 01/19/17  -AS  01/16/17  -SR    Transfer Training PT LTG, Outcome goal ongoing  -AS goal ongoing  -SJ     Gait Training PT LTG    Gait Training Goal PT LTG, Date Goal Reviewed 01/19/17  -AS  01/16/17  -SR    Gait Training Goal PT LTG, Outcome goal ongoing  -AS goal ongoing  -SJ       01/13/17 0954          Bed Mobility PT LTG    Bed Mobility PT LTG, Date Established 01/13/17  -KM      Bed Mobility PT LTG, Time to Achieve 1 wk  -KM      Bed Mobility PT LTG, Activity Type all bed mobility  -KM      Bed Mobility PT LTG, [place] Level verbal cues required  -KM      Bed Mobility PT Goal  LTG, Assist Device bed rails  -KM      Transfer Training PT LTG    Transfer Training PT LTG, Date Established 01/13/17  -KM      Transfer Training PT LTG, Time to Achieve 1 wk  -KM      Transfer Training PT LTG, Activity Type all transfers  -KM      Transfer Training PT LTG, [place] Level minimum assist (75% patient effort)  -KM      Transfer Training PT LTG, Assist Device walker, rolling  -KM      Gait Training PT LTG    Gait Training Goal PT LTG, Date Established 01/13/17  -KM      Gait Training Goal PT LTG, Time to Achieve 1 wk  -KM      Gait Training Goal PT LTG,  [place] Level minimum assist (75% patient effort)  -KM      Gait Training Goal PT LTG, Assist Device walker, rolling  -KM      Gait Training Goal PT LTG, Distance to Achieve 50  -KM        User Key  (r) = Recorded By, (t) = Taken By, (c) = Cosigned By    Initials Name Provider Type    SJ Elisa De Leon, PT Physical Therapist    KM Mary Howe, PT Physical Therapist    SR Namrata Barrett, PT Physical Therapist    AS Tammy Ayala, PTA Physical Therapy Assistant          Physical Therapy Education     Title: PT OT SLP Therapies (Active)     Topic: Physical Therapy (Active)     Point: Mobility training (Active)    Learning Progress Summary    Learner Readiness Method Response Comment Documented by Status   Patient Acceptance E NR  AS 01/19/17 1420 Active    Acceptance E NR   01/18/17 1313 Active    Acceptance E,D NR   01/16/17 1357 Active    Acceptance E VU,NR   01/13/17 0953 Done   Family Acceptance E NR  AS 01/19/17 1420 Active    Acceptance E VU,NR   01/13/17 0953 Done               Point: Home exercise program (Active)    Learning Progress Summary    Learner Readiness Method Response Comment Documented by Status   Patient Acceptance E NR  AS 01/19/17 1420 Active    Acceptance E NR   01/18/17 1313 Active    Acceptance E,D NR   01/16/17 1357 Active    Acceptance E VU,NR   01/13/17 0953 Done   Family Acceptance E NR  AS 01/19/17 1420 Active    Acceptance E VU,NR   01/13/17 0953 Done               Point: Body mechanics (Active)    Learning Progress Summary    Learner Readiness Method Response Comment Documented by Status   Patient Acceptance E NR  AS 01/19/17 1420 Active    Acceptance E NR   01/18/17 1313 Active    Acceptance E,D NR   01/16/17 1357 Active    Acceptance E VU,NR   01/13/17 0953 Done   Family Acceptance E NR  AS 01/19/17 1420 Active    Acceptance E VU,NR   01/13/17 0953 Done               Point: Precautions (Active)    Learning Progress Summary    Learner  Readiness Method Response Comment Documented by Status   Patient Acceptance E NR  AS 01/19/17 1420 Active    Acceptance E NR   01/18/17 1313 Active    Acceptance E,D NR  SR 01/16/17 1357 Active    Acceptance E VU,NR   01/13/17 0953 Done   Family Acceptance E NR  AS 01/19/17 1420 Active    Acceptance E VU,NR   01/13/17 0953 Done                      User Key     Initials Effective Dates Name Provider Type Discipline     06/19/15 -  Elisa De Leon, PT Physical Therapist PT     06/19/15 -  Mary Howe, PT Physical Therapist PT    SR 06/19/15 -  Namrata Barrett, PT Physical Therapist PT    AS 06/22/15 -  Tammy Ayala, PTA Physical Therapy Assistant PT                    PT Recommendation and Plan  Anticipated Discharge Disposition: skilled nursing facility  PT Frequency: daily, per priority policy  Plan of Care Review  Plan Of Care Reviewed With: patient, family  Progress: progress toward functional goals is gradual  Outcome Summary/Follow up Plan: patient able to take a few side steps this day with encouragement, needs assist for all activity due to general weakness and fatigue          Outcome Measures       01/19/17 1349 01/18/17 1125 01/18/17 1121    How much help from another person do you currently need...    Turning from your back to your side while in flat bed without using bedrails? 2  -AS 2  -SJ     Moving from lying on back to sitting on the side of a flat bed without bedrails? 2  -AS 2  -SJ     Moving to and from a bed to a chair (including a wheelchair)? 2  -AS 2  -SJ     Standing up from a chair using your arms (e.g., wheelchair, bedside chair)? 2  -AS 2  -SJ     Climbing 3-5 steps with a railing? 1  -AS 1  -SJ     To walk in hospital room? 2  -AS 1  -SJ     AM-PAC 6 Clicks Score 11  -AS 10  -SJ     How much help from another is currently needed...    Putting on and taking off regular lower body clothing?   1  -JR    Bathing (including washing, rinsing, and drying)   2  -JR     Toileting (which includes using toilet bed pan or urinal)   1  -JR    Putting on and taking off regular upper body clothing   2  -JR    Taking care of personal grooming (such as brushing teeth)   3  -JR    Eating meals   3  -JR    Score   12  -JR    Functional Assessment    Outcome Measure Options AM-PAC 6 Clicks Basic Mobility (PT)  -AS AM-PAC 6 Clicks Basic Mobility (PT)  - AM-PAC 6 Clicks Daily Activity (OT)  -JR      01/17/17 1400          How much help from another is currently needed...    Putting on and taking off regular lower body clothing? 1  -JR      Bathing (including washing, rinsing, and drying) 2  -JR      Toileting (which includes using toilet bed pan or urinal) 1  -JR      Putting on and taking off regular upper body clothing 2  -JR      Taking care of personal grooming (such as brushing teeth) 3  -JR      Eating meals 3  -JR      Score 12  -      Functional Assessment    Outcome Measure Options AM-PAC 6 Clicks Daily Activity (OT)  -JR        User Key  (r) = Recorded By, (t) = Taken By, (c) = Cosigned By    Initials Name Provider Type    DREW De Leon, PT Physical Therapist    JR Rosalba Martino, OT Occupational Therapist    AS Tammy Ayala PTA Physical Therapy Assistant           Time Calculation:         PT Charges       01/19/17 1422          Time Calculation    Start Time 1349  -AS      PT Received On 01/19/17  -AS      PT Goal Re-Cert Due Date 01/23/17  -AS      Time Calculation- PT    Total Timed Code Minutes- PT 15 minute(s)  -AS        User Key  (r) = Recorded By, (t) = Taken By, (c) = Cosigned By    Initials Name Provider Type    AS Tammy Ayala PTA Physical Therapy Assistant          Therapy Charges for Today     Code Description Service Date Service Provider Modifiers Qty    08467373681 HC PT THER PROC EA 15 MIN 1/19/2017 Tammy Ayala PTA GP 1          PT G-Codes  Outcome Measure Options: AM-PAC 6 Clicks Basic Mobility (PT)    Tammy Ayala  PTA  1/19/2017

## 2017-01-19 NOTE — DISCHARGE SUMMARY
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       Presenting Problem/History of Present Illness  Symptomatic anemia [D64.9]     Chief Complaint on Day of Discharge:   Hospital follow up  History of Present Illness on Day of Discharge:   Patient tearful  Reports diarrhea x2 in night, but nursing not aware of this  Poor appetite without vomiting or abdominal pain    Hospital Course  Patient is a 85 y.o. female presented to ED with generalized weakness and symptomatic anemia.  Additionally, she had reported intermittent chest pain and shortness of breath.  Patient's recent past medical history includes left hip replacement on 12/6/2016 at  and was currently receiving rehabilitation at the [facility].  Also has history of frequent UTIs and follows with Dr. Flynn on an outpatient basis.  Her H&H on admission was a 8.2 and 27.3.  She was  admitted to the hospitalist service for further evaluation and treatment.  She was transfused.  Elevated troponin on admission was felt to be related to her acute anemia.  CT of her abdomen and pelvis revealed colitis for which she was started on IV Zosyn.  It was felt that this possibly caused her diarrhea and antibiotics were changed to oral.  She will complete a total of 10 days antibiotic treatment.  C. Difficile toxin has remained negative.  She has no abdominal pain.  No evidence of active bleeding.  She is felt to be stable and ready for transfer back to the [facility] for continued rehabilitation on 1/19/2017.  This plan was discussed with the patient who was initially hesitant, but later agreeable.  Long discussion with his son over the phone (15 minutes) was had as well and he is agreeable with plan.  She is to follow-up with Dr. Packer at  as scheduled, with Dr. Flynn in one week for follow-up on urinary retention and with PCP.  In regards to her urinary retention, it was felt that this would not normalize until her bowel function was back to normal and she was ambulating normally.  Until that time the intermittent catheterization or indwelling Farris catheter should be used.      Procedures Performed  none    Consults:   Consults     Date and Time Order Name Status Description    1/18/2017 0726 Inpatient Consult to Urology Completed           Pertinent Test Results:  Component      Latest Ref Rng 1/15/2017 1/18/2017 1/19/2017   WBC      3.50 - 10.80 10*3/mm3 5.97 4.80    RBC      3.89 - 5.14 10*6/mm3 3.36 (L) 3.35 (L)    Hemoglobin      11.5 - 15.5 g/dL 9.9 (L) 9.9 (L)    Hematocrit      34.5 - 44.0 % 31.2 (L) 31.0 (L)    MCV      80.0 - 99.0 fL 92.9 92.5    MCH      27.0 - 31.0 pg 29.5 29.6    MCHC      32.0 - 36.0 g/dL 31.7 (L) 31.9 (L)    RDW      11.3 - 14.5 % 19.8 (H) 18.4 (H)    RDW-SD      37.0 - 54.0 fl 65.5 (H) 61.8 (H)    MPV      6.0 - 12.0 fL 8.8 9.2    Platelets      150 - 450 10*3/mm3 193 201     Neutrophil %      41.0 - 71.0 %  47.1    Lymphocyte %      24.0 - 44.0 %  41.3    Monocyte %      0.0 - 12.0 %  7.7    Eosinophil %      0.0 - 3.0 %  2.9    Basophil %      0.0 - 1.0 %  0.4    Immature Grans %      0.0 - 0.6 %  0.6    Neutrophils, Absolute      1.50 - 8.30 10*3/mm3  2.26    Lymphocytes, Absolute      0.60 - 4.80 10*3/mm3  1.98    Monocytes, Absolute      0.00 - 1.00 10*3/mm3  0.37    Eosinophils, Absolute      0.10 - 0.30 10*3/mm3  0.14    Basophils, Absolute      0.00 - 0.20 10*3/mm3  0.02    Immature Grans, Absolute      0.00 - 0.03 10*3/mm3  0.03    Glucose      70 - 100 mg/dL 76  81   BUN      9 - 23 mg/dL 20  7 (L)   Creatinine      0.60 - 1.30 mg/dL 0.60  0.40 (L)   Sodium      132 - 146 mmol/L 136  135   Potassium      3.5 - 5.5 mmol/L 3.6  4.2   Chloride      99 - 109 mmol/L 104  107   CO2      20.0 - 31.0 mmol/L 22.0  22.0   Calcium      8.7 - 10.4 mg/dL 8.2 (L)  8.4 (L)   eGFR Non African Amer      >60 mL/min/1.73 95  >150   BUN/Creatinine Ratio      7.0 - 25.0 33.3 (H)  17.5   Anion Gap      3.0 - 11.0 mmol/L 10.0  6.0   Magnesium      1.3 - 2.7 mg/dL   1.8     Component      Latest Ref Rng 1/12/2017 1/13/2017   Total Cholesterol      0 - 200 mg/dL  131   Triglycerides      0 - 150 mg/dL  94   HDL Cholesterol      40 - 60 mg/dL  34 (L)   LDL Cholesterol       0 - 130 mg/dL  65   Iron      50 - 175 mcg/dL 32 (L)    TIBC      250 - 450 mcg/dL 256    Iron Saturation      15 - 50 % 13 (L)    Sed Rate      0 - 30 mm/hr 67 (H)    TSH Baseline      0.350 - 5.350 mIU/mL 4.567    Free T4      0.89 - 1.76 ng/dL 1.33    BNP      0.0 - 100.0 pg/mL 237.0 (H)    Vitamin B-12      211 - 911 pg/mL 513    Reticulocyte %      0.50 - 1.50 % 5.96 (H)    Ferritin      10.00 - 291.00 ng/mL  465.00 (H)   Cultures:    URINE CULTURE   Date Value Ref Range Status   01/13/2017 20,000-30,000 CFU/mL Normal Urogenital Vesta  Final   01/12/2017 No growth at 2 days  Final   CT abd/pelvis:  1. Small bilateral pleural  "effusions with possible bibasilar aspiration  or atelectatic change. Clinical correlation is needed.  2. Colitis identified within the descending colon and proximal sigmoid  colon with a small amount of fluid in the paracolic gutter on the left.  There is no evidence of retroperitoneal hematoma.  CXR:  No acute chest pathology.       Clostridium Difficile Toxin, PCR [[id number]] (Normal) Collected: 01/17/17 1148       Lab Status: Final result Specimen: Stool from Per Rectum Updated: 01/17/17 1319        C. Difficile Toxins by PCR Not Detected        Narrative:                   Condition on Discharge:  Stable/improved    Physical Exam on Discharge:  Visit Vitals   • /85 (BP Location: Left arm, Patient Position: Lying)   • Pulse 80   • Temp 97.5 °F (36.4 °C) (Oral)   • Resp 18   • Ht 59\" (149.9 cm)   • Wt 110 lb 4.8 oz (50 kg)   • SpO2 94%   • BMI 22.28 kg/m2     Physical Exam   Constitutional: She is oriented to person, place, and time. She appears well-developed and well-nourished. No distress.   HENT:   Head: Normocephalic.   Mouth/Throat: Oropharynx is clear and moist.   Cardiovascular: Normal rate and regular rhythm.  Exam reveals no gallop and no friction rub.    No murmur heard.  Pulmonary/Chest: Effort normal. No respiratory distress. She has no wheezes.   Diminished in bases     Abdominal: Soft. Bowel sounds are normal. She exhibits no distension. There is no tenderness.   Neurological: She is alert and oriented to person, place, and time.   Skin: Skin is warm and dry.   Psychiatric:   Pt tearful and anxious about progress   Vitals reviewed.        Discharge Disposition  Skilled Nursing Facility (DC - External)    Discharge Medications   Florinda Knapp   Home Medication Instructions HARDY:[id number]    Printed on:01/19/17 1200   Medication Information                      acetaminophen (TYLENOL) 500 MG tablet  Take 1,000 mg by mouth 4 (Four) Times a Day As Needed for mild pain (1-3).           "   amoxicillin-clavulanate (AUGMENTIN) 500-125 MG per tablet  Take 1 tablet by mouth Every 12 (Twelve) Hours for 3 days. Indications: Infection Within the Abdomen             aspirin 81 MG chewable tablet  Chew 1 tablet Daily.             atorvastatin (LIPITOR) 20 MG tablet  Take 1 tablet by mouth Every Night.             carbidopa-levodopa (SINEMET)  MG per tablet  TAKE ONE TABLET BY MOUTH THREE TIMES A DAY **MAY TAKE 1 EXTRA TABLET BY MOUTH AT NIGHT AS NEEDED**             Cholecalciferol 2000 UNITS capsule  Take 2,000 Units by mouth Daily.             ferrous sulfate 325 (65 FE) MG tablet  Take 325 mg by mouth Daily With Breakfast.             levothyroxine (SYNTHROID, LEVOTHROID) 75 MCG tablet  Take 75 mcg by mouth Daily.             loperamide (IMODIUM) 2 MG capsule  Take 1 capsule by mouth 3 (Three) Times a Day As Needed for diarrhea.             metroNIDAZOLE (FLAGYL) 500 MG tablet  Take 1 tablet by mouth Every 8 (Eight) Hours for 3 days. Indications: Infection Within the Abdomen             mirtazapine (REMERON) 30 MG tablet  Take 30 mg by mouth Every Night.             polyethylene glycol (MIRALAX) packet  Take 17 g by mouth Daily As Needed.             saccharomyces boulardii (FLORASTOR) 250 MG capsule  Take 1 capsule by mouth 2 (Two) Times a Day.             tamsulosin (FLOMAX) 0.4 MG capsule 24 hr capsule  Take 1 capsule by mouth Daily.             vitamin E 1000 UNIT capsule  Take 1,000 Units by mouth Daily.                 Discharge Diet:   Diet Instructions     Diet: Regular; Thin Liquids, No Restrictions       Discharge Diet:  Regular   Fluid Consistency:  Thin Liquids, No Restrictions                 Discharge Care Plan / Instructions:    Activity at Discharge:   Activity Instructions     Activity as Tolerated                     Follow-up Appointments  Future Appointments  Date Time Provider Department Center   2/28/2017 11:30 AM Elisa Castro MD [facility] None           Shefali  ARIEL Brown 01/19/17 12:00 PM    Time: Discharge 35 min    Please note that portions of this note may have been completed with a voice recognition program. Efforts were made to edit the dictations, but occasionally words are mistranscribed.

## 2017-01-19 NOTE — PROGRESS NOTES
Continued Stay Note  [facility]     Patient Name: Florinda Knapp  MRN: [id number]  Today's Date: 1/19/2017    Admit Date: 1/12/2017          Discharge Plan     Ms. Knapp consented to the participation in the [facility]. Sonya Hernandez RN  1/19/17@1110.               Discharge Codes     None        Expected Discharge Date and Time     Expected Discharge Date Expected Discharge Time    Jan 19, 2017             Sonya Hernandez RN

## 2017-01-19 NOTE — PLAN OF CARE
Problem: Patient Care Overview (Adult)  Goal: Plan of Care Review  Outcome: Ongoing (interventions implemented as appropriate)    01/19/17 1420   Coping/Psychosocial Response Interventions   Plan Of Care Reviewed With patient;family   Patient Care Overview   Progress progress toward functional goals is gradual   Outcome Evaluation   Outcome Summary/Follow up Plan patient able to take a few side steps this day with encouragement, needs assist for all activity due to general weakness and fatigue         Problem: Inpatient Physical Therapy  Goal: Bed Mobility Goal LTG- PT  Outcome: Unable to achieve outcome(s) by discharge Date Met:  01/19/17 01/13/17 0954 01/19/17 1420   Bed Mobility PT LTG   Bed Mobility PT LTG, Date Established 01/13/17 --    Bed Mobility PT LTG, Time to Achieve 1 wk --    Bed Mobility PT LTG, Activity Type all bed mobility --    Bed Mobility PT LTG, [place] Level verbal cues required --    Bed Mobility PT Goal LTG, Assist Device bed rails --    Bed Mobility PT LTG, Date Goal Reviewed --  01/19/17   Bed Mobility PT LTG, Outcome --  goal ongoing       Goal: Transfer Training Goal 1 LTG- PT  Outcome: Unable to achieve outcome(s) by discharge Date Met:  01/19/17 01/13/17 0954 01/19/17 1420   Transfer Training PT LTG   Transfer Training PT LTG, Date Established 01/13/17 --    Transfer Training PT LTG, Time to Achieve 1 wk --    Transfer Training PT LTG, Activity Type all transfers --    Transfer Training PT LTG, [place] Level minimum assist (75% patient effort) --    Transfer Training PT LTG, Assist Device walker, rolling --    Transfer Training PT LTG, Date Goal Reviewed --  01/19/17   Transfer Training PT LTG, Outcome --  goal ongoing       Goal: Gait Training Goal LTG- PT  Outcome: Unable to achieve outcome(s) by discharge Date Met:  01/19/17 01/13/17 0954 01/19/17 1420   Gait Training PT LTG   Gait Training Goal PT LTG, Date Established 01/13/17 --    Gait Training Goal PT LTG,  Time to Achieve 1 wk --    Gait Training Goal PT LTG, [place] Level minimum assist (75% patient effort) --    Gait Training Goal PT LTG, Assist Device walker, rolling --    Gait Training Goal PT LTG, Distance to Achieve 50 --    Gait Training Goal PT LTG, Date Goal Reviewed --  01/19/17   Gait Training Goal PT LTG, Outcome --  goal ongoing

## 2017-01-20 NOTE — THERAPY DISCHARGE NOTE
Acute Care - Physical Therapy Discharge Summary  [facility]       Patient Name: Florinda Knapp  : 3/5/1931  MRN: [id number]    Today's Date: 2017  Onset of Illness/Injury or Date of Surgery Date: 17    Date of Referral to PT: 17  Referring Physician: ARIEL Rodarte      Admit Date: 2017      PT Recommendation and Plan    Visit Dx:    ICD-10-CM ICD-9-CM   1. Symptomatic anemia D64.9 285.9   2. Weakness R53.1 780.79   3. Failure to thrive in adult R62.7 783.7   4. Dehydration E86.0 276.51   5. Impaired functional mobility, balance, gait, and endurance Z74.09 V49.89   6. Impaired mobility and ADLs Z74.09 799.89             Outcome Measures       17 1349 17 1341 17 1125    How much help from another person do you currently need...    Turning from your back to your side while in flat bed without using bedrails? 2  -AS  2  -SJ    Moving from lying on back to sitting on the side of a flat bed without bedrails? 2  -AS  2  -SJ    Moving to and from a bed to a chair (including a wheelchair)? 2  -AS  2  -SJ    Standing up from a chair using your arms (e.g., wheelchair, bedside chair)? 2  -AS  2  -SJ    Climbing 3-5 steps with a railing? 1  -AS  1  -SJ    To walk in hospital room? 2  -AS  1  -SJ    AM-PAC 6 Clicks Score 11  -AS  10  -SJ    How much help from another is currently needed...    Putting on and taking off regular lower body clothing?  2  -DUKE     Bathing (including washing, rinsing, and drying)  2  -DUKE     Toileting (which includes using toilet bed pan or urinal)  2  -DUKE     Putting on and taking off regular upper body clothing  3  -DUKE     Taking care of personal grooming (such as brushing teeth)  3  -DUKE     Eating meals  3  -DUKE     Score  15  -DUKE     Functional Assessment    Outcome Measure Options AM-PAC 6 Clicks Basic Mobility (PT)  -AS AM-PAC 6 Clicks Daily Activity (OT)  -DUKE AM-PAC 6 Clicks Basic Mobility (PT)  -SJ      17 1121 17 1400       How much  help from another is currently needed...    Putting on and taking off regular lower body clothing? 1  -JR 1  -JR     Bathing (including washing, rinsing, and drying) 2  -JR 2  -JR     Toileting (which includes using toilet bed pan or urinal) 1  -JR 1  -JR     Putting on and taking off regular upper body clothing 2  -JR 2  -JR     Taking care of personal grooming (such as brushing teeth) 3  -JR 3  -JR     Eating meals 3  -JR 3  -JR     Score 12  -JR 12  -JR     Functional Assessment    Outcome Measure Options AM-PAC 6 Clicks Daily Activity (OT)  -JR AM-PAC 6 Clicks Daily Activity (OT)  -JR       User Key  (r) = Recorded By, (t) = Taken By, (c) = Cosigned By    Initials Name Provider Type    DUKE Abigail Camarena, OT Occupational Therapist    SJ Elisa De Leon, PT Physical Therapist    JR Rosalba Martino, OT Occupational Therapist    AS Tammy Ayala, PTA Physical Therapy Assistant                      IP PT Goals       01/19/17 1420 01/18/17 1313 01/16/17 1357    Bed Mobility PT LTG    Bed Mobility PT LTG, Date Goal Reviewed 01/19/17  -AS  01/16/17  -SR    Bed Mobility PT LTG, Outcome goal ongoing  -AS goal ongoing  -SJ     Transfer Training PT LTG    Transfer Training PT  LTG, Date Goal Reviewed 01/19/17  -AS  01/16/17  -SR    Transfer Training PT LTG, Outcome goal ongoing  -AS goal ongoing  -SJ     Gait Training PT LTG    Gait Training Goal PT LTG, Date Goal Reviewed 01/19/17  -AS  01/16/17  -SR    Gait Training Goal PT LTG, Outcome goal ongoing  -AS goal ongoing  -SJ       01/13/17 0954          Bed Mobility PT LTG    Bed Mobility PT LTG, Date Established 01/13/17  -KM      Bed Mobility PT LTG, Time to Achieve 1 wk  -KM      Bed Mobility PT LTG, Activity Type all bed mobility  -KM      Bed Mobility PT LTG, [place] Level verbal cues required  -KM      Bed Mobility PT Goal  LTG, Assist Device bed rails  -KM      Transfer Training PT LTG    Transfer Training PT LTG, Date Established 01/13/17  -KM       Transfer Training PT LTG, Time to Achieve 1 wk  -KM      Transfer Training PT LTG, Activity Type all transfers  -KM      Transfer Training PT LTG, [place] Level minimum assist (75% patient effort)  -KM      Transfer Training PT LTG, Assist Device walker, rolling  -KM      Gait Training PT LTG    Gait Training Goal PT LTG, Date Established 01/13/17  -KM      Gait Training Goal PT LTG, Time to Achieve 1 wk  -KM      Gait Training Goal PT LTG, [place] Level minimum assist (75% patient effort)  -KM      Gait Training Goal PT LTG, Assist Device walker, rolling  -KM      Gait Training Goal PT LTG, Distance to Achieve 50  -KM        User Key  (r) = Recorded By, (t) = Taken By, (c) = Cosigned By    Initials Name Provider Type    SJ Elisa De Leon, PT Physical Therapist    KM Mary Howe, PT Physical Therapist    SR Namrata Barrett, PT Physical Therapist    AS Tammy Ayala, PTA Physical Therapy Assistant           Goals Status: Treatment plan discontinued secondary to discharge from acute facility.      PT Discharge Summary  Anticipated Discharge Disposition: skilled nursing facility  Reason for Discharge: Discharge from facility  Discharge Destination: SNF      Faith Rowland, PT   1/20/2017

## 2017-02-01 ENCOUNTER — OFFICE VISIT (OUTPATIENT)
Dept: NEUROLOGY | Facility: CLINIC | Age: 82
End: 2017-02-01

## 2017-02-01 VITALS — BODY MASS INDEX: 21.4 KG/M2 | WEIGHT: 109 LBS | HEIGHT: 60 IN | RESPIRATION RATE: 16 BRPM

## 2017-02-01 DIAGNOSIS — G20 PARKINSON'S DISEASE (HCC): Primary | ICD-10-CM

## 2017-02-01 DIAGNOSIS — G47.52 REM SLEEP BEHAVIOR DISORDER: ICD-10-CM

## 2017-02-01 PROCEDURE — 99214 OFFICE O/P EST MOD 30 MIN: CPT | Performed by: PSYCHIATRY & NEUROLOGY

## 2017-02-01 NOTE — PROGRESS NOTES
"Subjective   Florinda Knapp is a 85 y.o. female.     History of Present Illness   Patient followed since October 2013 or Parkinson's disease that initially responded well to Sinemet although she had trouble tolerating it at first. Prior to the Parkinson's diagnosis she had had problems with frequent falls since at least 2010.  8/16 noted: When seen in December she noted some restlessness in her legs at night and was on Sinemet 25/100 tid at 9:30, 1:30 and 5:30pm. In May she fractured her left hip 2/2 fall, and underwent conservative treatment rather than surgery. She called in the interval about worsening cramping and we added in Sinemet CR at bedtime which has been tremendously helpful. She and her son both feel she is doing extremely well with the Parkinson's. Ongoing left hip pain only when walking is the biggest problem now.  Today: Since last seen has had hip surgery, then in ICU, has been at  and other hospitals. Has had colitis and UTI. Now at the [facility]. Has had \"crazy\" dreams, also leg cramping, and has had confusion since the hip surgery 12/6/16. Was very confused while hospitalized, improved but not quite baseline.  Frustrated with waiting for meds to be brought to her, and if she doesn't get them on time, legs cramp. Wonders about increasing the dose a bit, as she notes it wearing off.  On Remeron a long time. Bad and vivid dreams had started before hospitalization, but have worsened. Can't relate to any medication. Often violent, and horrible. Thinks she may act them out -- not falling out of bed, but bedclothes messed up.  Takes C/L 25/100 at 9, 2, 5 and CR at bedtime (about 9).    The following portions of the patient's history were reviewed and updated as appropriate: allergies, current medications, past family history, past medical history, past social history, past surgical history and problem list.    Review of Systems   Constitutional: Positive for fatigue. Negative for fever.   Respiratory: " Negative for cough and shortness of breath.    Cardiovascular: Negative for chest pain.   Gastrointestinal:        As in hpi       Objective   Physical Exam   Constitutional: She is oriented to person, place, and time. She appears well-developed.   HENT:   Head: Normocephalic and atraumatic.   Eyes: EOM are normal. Pupils are equal, round, and reactive to light.   Pulmonary/Chest: Effort normal.   Musculoskeletal: Normal range of motion.   Neurological: She is alert and oriented to person, place, and time. She has a normal Finger-Nose-Finger Test.   Skin: Skin is warm and dry.   Psychiatric: Her speech is normal.   Nursing note and vitals reviewed.      Neurologic Exam     Mental Status   Oriented to person, place, and time.   Speech: speech is normal   Level of consciousness: alert  Knowledge: consistent with education.   Able to name object. Normal comprehension.     Cranial Nerves     CN II   Visual fields full to confrontation.     CN III, IV, VI   Pupils are equal, round, and reactive to light.  Extraocular motions are normal.     CN VII   Facial expression full, symmetric.     CN IX, X   CN IX normal.   CN X normal.   Palate: symmetric    CN XI   CN XI normal.     CN XII   CN XII normal.     Motor Exam   Muscle bulk: normal  Right arm tone: normal  Left arm tone: increased  Right arm pronator drift: absent  Left arm pronator drift: absent       No focal weakness on MMT, but 5- throughout  Mild rigidity LUE     Sensory Exam   Light touch normal.     Gait, Coordination, and Reflexes     Gait  Gait: (NT; wheelchair, not back to walking yet)    Coordination   Finger to nose coordination: normal    Tremor   Intention tremor: absent  Action tremor: absent       FT impaired left>right       Assessment/Plan   Florinda was seen today for parkinson's disease.    Diagnoses and all orders for this visit:    Parkinson's disease    REM sleep behavior disorder    Other orders  -     carbidopa-levodopa (SINEMET)  MG per  tablet; Increase dose to four times a day: on rising, noon, 3pm and 6pm        Discussion/Summary:  Long discussion regarding multiple issues. The daytime sinemet wears off, so we will increase to qid dosing, about 3 hours apart, maintain qhs sinemet CR. This should help daytime sx, and with overall dose increase, there's a chance that dreams may improved. Concerned however about possible RBD, discussed this can be dangerous from injury perspective. If no improvement in next week or two on higher sinemet, they are to phone, and will likely try very low dose of clonazepam (eg start at 0.25mg).   Also discussed hospital delirium, slow recovery.  30 minute visit, more than 50% spent in discussion as above    Return in about 3 months (around 5/1/2017).

## 2017-02-19 ENCOUNTER — APPOINTMENT (OUTPATIENT)
Dept: CT IMAGING | Facility: HOSPITAL | Age: 82
End: 2017-02-19

## 2017-02-19 ENCOUNTER — APPOINTMENT (OUTPATIENT)
Dept: GENERAL RADIOLOGY | Facility: HOSPITAL | Age: 82
End: 2017-02-19

## 2017-02-19 ENCOUNTER — HOSPITAL ENCOUNTER (INPATIENT)
Facility: HOSPITAL | Age: 82
LOS: 5 days | Discharge: NURSING, REHAB OR LONG-TERM CARE | End: 2017-02-24
Attending: EMERGENCY MEDICINE | Admitting: INTERNAL MEDICINE

## 2017-02-19 DIAGNOSIS — Z74.09 IMPAIRED MOBILITY AND ADLS: ICD-10-CM

## 2017-02-19 DIAGNOSIS — I65.22 LEFT CAROTID ARTERY OCCLUSION: ICD-10-CM

## 2017-02-19 DIAGNOSIS — I42.9 CHF WITH CARDIOMYOPATHY (HCC): ICD-10-CM

## 2017-02-19 DIAGNOSIS — I63.9 ACUTE CVA (CEREBROVASCULAR ACCIDENT) (HCC): Primary | ICD-10-CM

## 2017-02-19 DIAGNOSIS — I50.9 CHF WITH CARDIOMYOPATHY (HCC): ICD-10-CM

## 2017-02-19 DIAGNOSIS — Z78.9 IMPAIRED MOBILITY AND ADLS: ICD-10-CM

## 2017-02-19 DIAGNOSIS — Z74.09 IMPAIRED FUNCTIONAL MOBILITY, BALANCE, GAIT, AND ENDURANCE: ICD-10-CM

## 2017-02-19 PROBLEM — K52.9 COLITIS: Status: ACTIVE | Noted: 2017-02-19

## 2017-02-19 PROBLEM — I10 HYPERTENSION: Status: ACTIVE | Noted: 2017-02-19

## 2017-02-19 PROBLEM — Z96.642 STATUS POST LEFT HIP REPLACEMENT: Status: ACTIVE | Noted: 2017-02-19

## 2017-02-19 PROBLEM — E03.9 HYPOTHYROIDISM: Status: ACTIVE | Noted: 2017-02-19

## 2017-02-19 PROBLEM — E78.5 HYPERLIPIDEMIA: Status: ACTIVE | Noted: 2017-02-19

## 2017-02-19 PROBLEM — R33.9 URINARY RETENTION: Status: ACTIVE | Noted: 2017-02-19

## 2017-02-19 PROBLEM — I63.512 ACUTE ISCHEMIC LEFT MCA STROKE (HCC): Status: ACTIVE | Noted: 2017-02-19

## 2017-02-19 LAB
ANION GAP SERPL CALCULATED.3IONS-SCNC: 5 MMOL/L (ref 3–11)
BACTERIA UR QL AUTO: ABNORMAL /HPF
BASOPHILS # BLD AUTO: 0.01 10*3/MM3 (ref 0–0.2)
BASOPHILS NFR BLD AUTO: 0.2 % (ref 0–1)
BILIRUB UR QL STRIP: NEGATIVE
BILIRUB UR QL STRIP: NEGATIVE
BUN BLD-MCNC: 9 MG/DL (ref 9–23)
BUN/CREAT SERPL: 15 (ref 7–25)
CALCIUM SPEC-SCNC: 8.7 MG/DL (ref 8.7–10.4)
CHLORIDE SERPL-SCNC: 111 MMOL/L (ref 99–109)
CLARITY UR: ABNORMAL
CLARITY UR: CLEAR
CO2 SERPL-SCNC: 23 MMOL/L (ref 20–31)
COLOR UR: YELLOW
COLOR UR: YELLOW
CREAT BLD-MCNC: 0.6 MG/DL (ref 0.6–1.3)
DEPRECATED RDW RBC AUTO: 63 FL (ref 37–54)
EOSINOPHIL # BLD AUTO: 0.1 10*3/MM3 (ref 0.1–0.3)
EOSINOPHIL NFR BLD AUTO: 2.1 % (ref 0–3)
ERYTHROCYTE [DISTWIDTH] IN BLOOD BY AUTOMATED COUNT: 17.4 % (ref 11.3–14.5)
GFR SERPL CREATININE-BSD FRML MDRD: 95 ML/MIN/1.73
GLUCOSE BLD-MCNC: 95 MG/DL (ref 70–100)
GLUCOSE BLDC GLUCOMTR-MCNC: 85 MG/DL (ref 70–130)
GLUCOSE UR STRIP-MCNC: NEGATIVE MG/DL
GLUCOSE UR STRIP-MCNC: NEGATIVE MG/DL
HCT VFR BLD AUTO: 34.8 % (ref 34.5–44)
HGB BLD-MCNC: 10.9 G/DL (ref 11.5–15.5)
HGB UR QL STRIP.AUTO: NEGATIVE
HGB UR QL STRIP.AUTO: NEGATIVE
HOLD SPECIMEN: NORMAL
HOLD SPECIMEN: NORMAL
HYALINE CASTS UR QL AUTO: ABNORMAL /LPF
IMM GRANULOCYTES # BLD: 0.02 10*3/MM3 (ref 0–0.03)
IMM GRANULOCYTES NFR BLD: 0.4 % (ref 0–0.6)
INR PPP: 1.1 (ref 0.8–1.2)
KETONES UR QL STRIP: NEGATIVE
KETONES UR QL STRIP: NEGATIVE
LEUKOCYTE ESTERASE UR QL STRIP.AUTO: ABNORMAL
LEUKOCYTE ESTERASE UR QL STRIP.AUTO: NEGATIVE
LYMPHOCYTES # BLD AUTO: 1.98 10*3/MM3 (ref 0.6–4.8)
LYMPHOCYTES NFR BLD AUTO: 40.8 % (ref 24–44)
MCH RBC QN AUTO: 30.7 PG (ref 27–31)
MCHC RBC AUTO-ENTMCNC: 31.3 G/DL (ref 32–36)
MCV RBC AUTO: 98 FL (ref 80–99)
MONOCYTES # BLD AUTO: 0.34 10*3/MM3 (ref 0–1)
MONOCYTES NFR BLD AUTO: 7 % (ref 0–12)
NEUTROPHILS # BLD AUTO: 2.4 10*3/MM3 (ref 1.5–8.3)
NEUTROPHILS NFR BLD AUTO: 49.5 % (ref 41–71)
NITRITE UR QL STRIP: NEGATIVE
NITRITE UR QL STRIP: NEGATIVE
PH UR STRIP.AUTO: 6.5 [PH] (ref 5–8)
PH UR STRIP.AUTO: 7 [PH] (ref 5–8)
PLATELET # BLD AUTO: 191 10*3/MM3 (ref 150–450)
PMV BLD AUTO: 9.2 FL (ref 6–12)
POTASSIUM BLD-SCNC: 4.1 MMOL/L (ref 3.5–5.5)
PROT UR QL STRIP: NEGATIVE
PROT UR QL STRIP: NEGATIVE
PROTHROMBIN TIME: 13.2 SECONDS (ref 12.8–15.2)
RBC # BLD AUTO: 3.55 10*6/MM3 (ref 3.89–5.14)
RBC # UR: ABNORMAL /HPF
REF LAB TEST METHOD: ABNORMAL
SODIUM BLD-SCNC: 139 MMOL/L (ref 132–146)
SP GR UR STRIP: 1.01 (ref 1–1.03)
SP GR UR STRIP: 1.01 (ref 1–1.03)
SQUAMOUS #/AREA URNS HPF: ABNORMAL /HPF
UROBILINOGEN UR QL STRIP: ABNORMAL
UROBILINOGEN UR QL STRIP: NORMAL
WBC NRBC COR # BLD: 4.85 10*3/MM3 (ref 3.5–10.8)
WBC UR QL AUTO: ABNORMAL /HPF
WHOLE BLOOD HOLD SPECIMEN: NORMAL
WHOLE BLOOD HOLD SPECIMEN: NORMAL

## 2017-02-19 PROCEDURE — 99291 CRITICAL CARE FIRST HOUR: CPT | Performed by: INTERNAL MEDICINE

## 2017-02-19 PROCEDURE — 87086 URINE CULTURE/COLONY COUNT: CPT | Performed by: EMERGENCY MEDICINE

## 2017-02-19 PROCEDURE — C1887 CATHETER, GUIDING: HCPCS | Performed by: RADIOLOGY

## 2017-02-19 PROCEDURE — C1760 CLOSURE DEV, VASC: HCPCS | Performed by: RADIOLOGY

## 2017-02-19 PROCEDURE — C1769 GUIDE WIRE: HCPCS | Performed by: RADIOLOGY

## 2017-02-19 PROCEDURE — 3E03317 INTRODUCTION OF OTHER THROMBOLYTIC INTO PERIPHERAL VEIN, PERCUTANEOUS APPROACH: ICD-10-PCS | Performed by: EMERGENCY MEDICINE

## 2017-02-19 PROCEDURE — 87186 SC STD MICRODIL/AGAR DIL: CPT | Performed by: EMERGENCY MEDICINE

## 2017-02-19 PROCEDURE — 93005 ELECTROCARDIOGRAM TRACING: CPT | Performed by: EMERGENCY MEDICINE

## 2017-02-19 PROCEDURE — 36224 PLACE CATH CAROTD ART: CPT | Performed by: RADIOLOGY

## 2017-02-19 PROCEDURE — 0 IOPAMIDOL PER 1 ML: Performed by: EMERGENCY MEDICINE

## 2017-02-19 PROCEDURE — 87077 CULTURE AEROBIC IDENTIFY: CPT | Performed by: NURSE PRACTITIONER

## 2017-02-19 PROCEDURE — 71010 HC CHEST PA OR AP: CPT

## 2017-02-19 PROCEDURE — 85025 COMPLETE CBC W/AUTO DIFF WBC: CPT | Performed by: EMERGENCY MEDICINE

## 2017-02-19 PROCEDURE — 25010000002 ALTEPLASE PER 1 MG: Performed by: EMERGENCY MEDICINE

## 2017-02-19 PROCEDURE — C1894 INTRO/SHEATH, NON-LASER: HCPCS | Performed by: RADIOLOGY

## 2017-02-19 PROCEDURE — C2628 CATHETER, OCCLUSION: HCPCS | Performed by: RADIOLOGY

## 2017-02-19 PROCEDURE — 80048 BASIC METABOLIC PNL TOTAL CA: CPT | Performed by: RADIOLOGY

## 2017-02-19 PROCEDURE — 0 IODIXANOL PER 1 ML: Performed by: RADIOLOGY

## 2017-02-19 PROCEDURE — 87106 FUNGI IDENTIFICATION YEAST: CPT | Performed by: EMERGENCY MEDICINE

## 2017-02-19 PROCEDURE — 87086 URINE CULTURE/COLONY COUNT: CPT | Performed by: NURSE PRACTITIONER

## 2017-02-19 PROCEDURE — 81003 URINALYSIS AUTO W/O SCOPE: CPT | Performed by: NURSE PRACTITIONER

## 2017-02-19 PROCEDURE — B317YZZ FLUOROSCOPY OF LEFT INTERNAL CAROTID ARTERY USING OTHER CONTRAST: ICD-10-PCS | Performed by: RADIOLOGY

## 2017-02-19 PROCEDURE — 87186 SC STD MICRODIL/AGAR DIL: CPT | Performed by: NURSE PRACTITIONER

## 2017-02-19 PROCEDURE — 99221 1ST HOSP IP/OBS SF/LOW 40: CPT | Performed by: RADIOLOGY

## 2017-02-19 PROCEDURE — 85610 PROTHROMBIN TIME: CPT

## 2017-02-19 PROCEDURE — 0042T HC CT CEREBRAL PERFUSION W/WO CONTRAST: CPT

## 2017-02-19 PROCEDURE — 87077 CULTURE AEROBIC IDENTIFY: CPT | Performed by: EMERGENCY MEDICINE

## 2017-02-19 PROCEDURE — 82962 GLUCOSE BLOOD TEST: CPT

## 2017-02-19 PROCEDURE — 61645 PERQ ART M-THROMBECT &/NFS: CPT | Performed by: RADIOLOGY

## 2017-02-19 PROCEDURE — 81001 URINALYSIS AUTO W/SCOPE: CPT | Performed by: EMERGENCY MEDICINE

## 2017-02-19 PROCEDURE — 99291 CRITICAL CARE FIRST HOUR: CPT

## 2017-02-19 PROCEDURE — 70450 CT HEAD/BRAIN W/O DYE: CPT

## 2017-02-19 RX ORDER — ASPIRIN 300 MG/1
300 SUPPOSITORY RECTAL DAILY
Status: DISCONTINUED | OUTPATIENT
Start: 2017-02-20 | End: 2017-02-22

## 2017-02-19 RX ORDER — SODIUM CHLORIDE 9 MG/ML
100 INJECTION, SOLUTION INTRAVENOUS ONCE
Status: DISCONTINUED | OUTPATIENT
Start: 2017-02-19 | End: 2017-02-24 | Stop reason: HOSPADM

## 2017-02-19 RX ORDER — ATORVASTATIN CALCIUM 40 MG/1
40 TABLET, FILM COATED ORAL NIGHTLY
Status: DISCONTINUED | OUTPATIENT
Start: 2017-02-19 | End: 2017-02-24 | Stop reason: HOSPADM

## 2017-02-19 RX ORDER — LIDOCAINE HYDROCHLORIDE 10 MG/ML
INJECTION, SOLUTION INFILTRATION; PERINEURAL AS NEEDED
Status: DISCONTINUED | OUTPATIENT
Start: 2017-02-19 | End: 2017-02-19 | Stop reason: HOSPADM

## 2017-02-19 RX ORDER — ASPIRIN 325 MG
325 TABLET ORAL DAILY
Status: DISCONTINUED | OUTPATIENT
Start: 2017-02-20 | End: 2017-02-24 | Stop reason: HOSPADM

## 2017-02-19 RX ORDER — ONDANSETRON 2 MG/ML
4 INJECTION INTRAMUSCULAR; INTRAVENOUS EVERY 6 HOURS PRN
Status: DISCONTINUED | OUTPATIENT
Start: 2017-02-19 | End: 2017-02-19 | Stop reason: SDUPTHER

## 2017-02-19 RX ORDER — ATORVASTATIN CALCIUM 40 MG/1
80 TABLET, FILM COATED ORAL NIGHTLY
Status: DISCONTINUED | OUTPATIENT
Start: 2017-02-19 | End: 2017-02-19 | Stop reason: SDUPTHER

## 2017-02-19 RX ORDER — IODIXANOL 320 MG/ML
INJECTION, SOLUTION INTRAVASCULAR AS NEEDED
Status: DISCONTINUED | OUTPATIENT
Start: 2017-02-19 | End: 2017-02-19 | Stop reason: HOSPADM

## 2017-02-19 RX ORDER — ASPIRIN 300 MG/1
300 SUPPOSITORY RECTAL DAILY
Status: DISCONTINUED | OUTPATIENT
Start: 2017-02-20 | End: 2017-02-19 | Stop reason: SDUPTHER

## 2017-02-19 RX ORDER — SODIUM CHLORIDE 0.9 % (FLUSH) 0.9 %
1-10 SYRINGE (ML) INJECTION AS NEEDED
Status: DISCONTINUED | OUTPATIENT
Start: 2017-02-19 | End: 2017-02-24 | Stop reason: HOSPADM

## 2017-02-19 RX ORDER — SODIUM CHLORIDE 0.9 % (FLUSH) 0.9 %
10 SYRINGE (ML) INJECTION AS NEEDED
Status: DISCONTINUED | OUTPATIENT
Start: 2017-02-19 | End: 2017-02-24 | Stop reason: HOSPADM

## 2017-02-19 RX ORDER — ONDANSETRON 2 MG/ML
4 INJECTION INTRAMUSCULAR; INTRAVENOUS EVERY 6 HOURS PRN
Status: DISCONTINUED | OUTPATIENT
Start: 2017-02-19 | End: 2017-02-24 | Stop reason: HOSPADM

## 2017-02-19 RX ORDER — SODIUM CHLORIDE 9 MG/ML
75 INJECTION, SOLUTION INTRAVENOUS CONTINUOUS
Status: DISCONTINUED | OUTPATIENT
Start: 2017-02-19 | End: 2017-02-20

## 2017-02-19 RX ORDER — ASPIRIN 325 MG
325 TABLET ORAL DAILY
Status: DISCONTINUED | OUTPATIENT
Start: 2017-02-20 | End: 2017-02-19 | Stop reason: SDUPTHER

## 2017-02-19 RX ADMIN — SODIUM CHLORIDE 50 ML/HR: 9 INJECTION, SOLUTION INTRAVENOUS at 13:39

## 2017-02-19 RX ADMIN — IOPAMIDOL 40 ML: 755 INJECTION, SOLUTION INTRAVENOUS at 10:17

## 2017-02-19 RX ADMIN — ALTEPLASE 38.6 MG: KIT at 10:45

## 2017-02-19 RX ADMIN — ALTEPLASE 4.45 MG: KIT at 10:33

## 2017-02-19 NOTE — ED PROVIDER NOTES
Subjective   HPI Comments: 85 y.o. female presents to ED with c/o altered mental status. EMS repotrs that around 0745 this morning she went to the toilet and then went flaccid on her right side. EMS states that when he got there she was lying supine in bed, wasn't talking and would squeeze with her left hand but not her right. According to EMS the patient's nursing home stated the patient has not been getting her morning medications. Her bp was 152/90 en route and her heart rate was 80. Her son reports that she is normally alert and oriented and able to carry on normal conversation. He also reports that she has had a UTI since Dec 10th and just finished a series of 4 shots for her UTI around 5 days ago. He states that she spoke with her on the phone yesterday but she didn't complain of anything bothering her yesterday. She has no hx of a-fib but possible hx of TIA's.          Patient is a 85 y.o. female presenting with altered mental status.   History provided by:  Patient  Altered Mental Status   Presenting symptoms: confusion, lethargy and partial responsiveness    Severity:  Moderate  Most recent episode:  Today  Episode history:  Single  Timing:  Constant  Progression:  Unchanged  Chronicity:  New  Associated symptoms: abnormal movement and weakness    Associated symptoms: no vomiting        Review of Systems   Respiratory: Negative for cough.    Gastrointestinal: Negative for diarrhea and vomiting.   Neurological: Positive for weakness.   Psychiatric/Behavioral: Positive for confusion.   All other systems reviewed and are negative.      Past Medical History   Diagnosis Date   • Anemia    • Anxiety    • Depression    • Disease of thyroid gland    • Failure to thrive in adult    • Hyperlipidemia    • Hypertension    • Osteoarthritis    • Stress incontinence    • Urinary retention        Allergies   Allergen Reactions   • Alendronate    • Aripiprazole    • Celecoxib    • Nitrofurantoin    • Procaine    • Sulfa  Antibiotics        Past Surgical History   Procedure Laterality Date   • Appendectomy     • Back surgery     • Cataract extraction     • Elbow procedure     • Knee surgery     • Joint replacement         Family History   Problem Relation Age of Onset   • Alcohol abuse Other    • Hypertension Other    • Stroke Other    • Heart disease Son        Social History     Social History   • Marital status:      Spouse name: N/A   • Number of children: N/A   • Years of education: N/A     Social History Main Topics   • Smoking status: Never Smoker   • Smokeless tobacco: Not on file   • Alcohol use Yes      Comment: social   • Drug use: Not on file   • Sexual activity: Not on file     Other Topics Concern   • Not on file     Social History Narrative         Objective   Physical Exam   Constitutional: She appears well-developed and well-nourished. She appears lethargic. No distress.   Lethargic appearing elderly female.   HENT:   Head: Normocephalic and atraumatic.   Mouth/Throat: Oropharynx is clear and moist and mucous membranes are normal.   Eyes: Pupils are equal, round, and reactive to light.   Cardiovascular: Normal rate, regular rhythm and normal heart sounds.  Exam reveals no gallop and no friction rub.    No murmur heard.  Pulmonary/Chest: Effort normal. No respiratory distress. She has no wheezes. She has no rales. She exhibits no tenderness.   Abdominal: Soft. She exhibits no mass. There is no tenderness. There is no rebound and no guarding.   Musculoskeletal: She exhibits no edema, tenderness or deformity.   Patient has a PICC line in upper extremity.   Lymphadenopathy:     She has no cervical adenopathy.   Neurological: She appears lethargic.   Does not keep eyes open during exam. Patient does not follow commands well for most exams, especially for cranial nerve testing. She has slight preference to left side when her eyelids are held open but is able to track movement visually to the right past midline. She  has drift bilaterally but her left leg is worse than her right leg, although arms are equal.     Skin: Skin is warm and dry.   Nursing note and vitals reviewed.      Critical Care  Performed by: TROY HERNANDEZ  Authorized by: TROY HERNANDEZ   Total critical care time: 55 minutes  Critical care time was exclusive of separately billable procedures and treating other patients.  Critical care was necessary to treat or prevent imminent or life-threatening deterioration of the following conditions: CNS failure or compromise.  Critical care was time spent personally by me on the following activities: discussions with consultants, evaluation of patient's response to treatment, examination of patient, obtaining history from patient or surrogate, ordering and performing treatments and interventions, ordering and review of laboratory studies, ordering and review of radiographic studies, pulse oximetry, re-evaluation of patient's condition, development of treatment plan with patient or surrogate and review of old charts.               ED Course  ED Course   Comment By Time   Spoke with radiologist about CT findings. Going to do a cerebral perfusion scan -FAMILIA Zamorano 02/19 1003   Spoke with Dr. Gabriel, neuro interventionalist, and Dr. Jett, radiologist, we discussed the case and decided to proceed with IV TPA as well as a possible thrombectomy by Dr. Gabriel. -FAMILIA Zamorano 02/19 1027   Paged Dr. Vasquez, stroke neurologist, to discuss the patient. Discussed risks and benefits of TPA with the patient's son who expressed understanding and states that patient would not want to live in her current state and agrees with the administration of IV TPA. TPA has been ordered pharmacist has been called and is coming to administer the medicine.    I held off on IV TPA soon after arrival secondary to patient's symmetry of findings of concern was her greater weakness in left leg than right. At this point she remains aphasic and  not following commands. -FAMILIA Zamorano 02/19 1031   Spoke Dr. Vasquez in detail and he agrees with current plan and suggests keppra IV. We will be admitting the patient to the hospitalist. -FAMILIA Zamorano 02/19 1244                     [facility]    Final diagnoses:   Acute CVA (cerebrovascular accident)   Left carotid artery occlusion       Documentation assistance provided by víctor Zamorano.  Information recorded by the scribe was done at my direction and has been verified and validated by me.     Marguerite Zamorano  02/19/17 1020       Price Apple MD  02/21/17 1208

## 2017-02-19 NOTE — H&P
Critical Care History & Physical    Patient name: Florinda Knapp  CSN: [id number]  MRN: [id number]  : 3/5/1931  Today's date: 2017    Primary Care Physician: Kieran Cottrell MD    Chief complaint: aphasia, right sided paralysis and rt facial droop    HPI: Mrs. Knapp is a 85-year-old white female who presented to our emergency Department with aphasia, right facial droop and right sided paralysis.  According to her son, she had a left hip replacement at  in 2016.  She was transferred to the [facility] for rehabilitation.  Then, she was hospitalized in our hospital from  through 2017 for anemia requiring transfusion and dehydration.  She was discharged back to the [facility].  Her son stated that she was doing well in rehabilitation and they thought she would be coming home within the next week or 2.  This morning, ZOLTAN mauro was helping her to the bathroom.  She developed right sided paralysis, right facial droop and nonverbal speech.  The time this started is in question.  The son was contacted at 9 AM, but the EMS sheet stated it happened at 7:45 AM.  She was brought to [facility]'s emergency room at 9:40 AM.  Her initial NIH was 26.  She had a temperature of 97.6, pulse 77, respirations 16, blood pressure 156/91 and a room air sat of 93%.  A CT of the head was negative.  CT cerebral perfusion scan showed a left MCA CVA.  She had a WBC of 4.8, hemoglobin 10.9, hematocrit 34.8, platelets 191, PT 13.2, INR 1.1, glucose 95, BUN 9, creatinine 0.6, sodium 139 and potassium 4.1.  A urinalysis had cloudy urine with moderate leuk esterase, no nitrates, no bacteria and 21-30 WBCs.  She was given TPA at 1033 and it was stopped at 11:30.  She was taken emergently to the Cath Lab with plans for a mechanical thrombectomy.  However, after the exchange of catheters to access the distal internal carotid artery, there was  "only a small amount of residual \"distal\" clots.  She was transferred to the ICU.  She will be admitted per the intensivist service.  Her current NIH is 15.    PMH:   Past Medical History   Diagnosis Date   • Anemia    • Anxiety    • Asthma    • Depression    • Failure to thrive in adult    • Hyperlipidemia    • Hypertension    • Hypothyroidism    • Osteoarthritis    • Parkinson's disease    • Stress incontinence    • Urinary retention      PSH:   Past Surgical History   Procedure Laterality Date   • Appendectomy     • Back surgery     • Cataract extraction     • Elbow procedure     • Knee surgery     • Total hip arthroplasty Left 12/2016     PFH:   Family History   Problem Relation Age of Onset   • Alcohol abuse Other    • Hypertension Other    • Stroke Other    • Heart disease Son      SH:   Social History     Social History   • Marital status:      Spouse name: N/A   • Number of children: N/A   • Years of education: N/A     Social History Main Topics   • Smoking status: Never Smoker   • Smokeless tobacco: None   • Alcohol use Yes      Comment: social   • Drug use: None   • Sexual activity: Not Asked     Other Topics Concern   • None     Social History Narrative   • None     Allergies:   Allergies   Allergen Reactions   • Alendronate    • Aripiprazole    • Celecoxib    • Nitrofurantoin    • Procaine    • Sulfa Antibiotics      Code Status:  Prior    Home Medications:  Prescriptions Prior to Admission   Medication Sig Dispense Refill Last Dose   • acetaminophen (TYLENOL) 500 MG tablet Take 1,000 mg by mouth 4 (Four) Times a Day As Needed for mild pain (1-3).   Past Week at Unknown time   • aspirin 81 MG chewable tablet Chew 1 tablet Daily.      • atorvastatin (LIPITOR) 20 MG tablet Take 1 tablet by mouth Every Night.      • carbidopa-levodopa (SINEMET)  MG per tablet Increase dose to four times a day: on rising, noon, 3pm and 6pm 120 tablet 4    • Cholecalciferol 2000 UNITS capsule Take 2,000 Units by " "mouth Daily.   1/12/2017 at Unknown time   • ferrous sulfate 325 (65 FE) MG tablet Take 325 mg by mouth Daily With Breakfast.   1/12/2017 at Unknown time   • levothyroxine (SYNTHROID, LEVOTHROID) 75 MCG tablet Take 75 mcg by mouth Daily.   1/12/2017 at Unknown time   • loperamide (IMODIUM) 2 MG capsule Take 1 capsule by mouth 3 (Three) Times a Day As Needed for diarrhea.      • mirtazapine (REMERON) 30 MG tablet Take 30 mg by mouth Every Night.   1/11/2017 at Unknown time   • polyethylene glycol (MIRALAX) packet Take 17 g by mouth Daily As Needed.   1/12/2017 at Unknown time   • saccharomyces boulardii (FLORASTOR) 250 MG capsule Take 1 capsule by mouth 2 (Two) Times a Day.      • tamsulosin (FLOMAX) 0.4 MG capsule 24 hr capsule Take 1 capsule by mouth Daily.      • vitamin E 1000 UNIT capsule Take 1,000 Units by mouth Daily.   1/12/2017 at Unknown time     Review of Systems  Negative systems except for what is noted in HPI     Vital Signs:  Blood pressure 130/87, pulse 101, temperature 97.6 °F (36.4 °C), temperature source Oral, resp. rate 14, height 59\" (149.9 cm), weight 105 lb (47.6 kg), SpO2 92 %.    Physical Exam:  Constitutional:  Appears well-developed and well-nourished. No distress.   HEENT:  Normocephalic and atraumatic. PERRL  Neck:  Neck supple. No JVD present.   CV: Normal rate, regular rhythm, intact distal pulses.  No gallop, murmur or rub  Pulmonary/Chest: Effort normal and breath sounds normal. No respiratory distress. No wheezes, rhonchi or rales.   Abdominal: Soft. +BS.  Rounded and distended in lower abd. No mass. Grimaces with palpation  Musculoskeletal: follows commands  Neurological: Alert and nonverbal.  Rt sided weakness, Rt facial droop decreased right  strength  Skin: Skin is warm and dry. No rash noted. large hematoma on right hand  Extremities:  No clubbing, edema or cyanosis    Labs:    Results from last 7 days  Lab Units 02/19/17  1003   WBC 10*3/mm3 4.85   HEMOGLOBIN g/dL 10.9* "   HEMATOCRIT % 34.8   PLATELETS 10*3/mm3 191       Results from last 7 days  Lab Units 02/19/17  1313   SODIUM mmol/L 139   POTASSIUM mmol/L 4.1   CHLORIDE mmol/L 111*   TOTAL CO2 mmol/L 23.0   BUN mg/dL 9   CREATININE mg/dL 0.60   CALCIUM mg/dL 8.7   GLUCOSE mg/dL 95     No results found for: MG, PHOS    Imaging:  CT Head was negative   CT Cerebral Perfusion Scan revealed acute infarct in the left posterior middle cerebral artery distribution with some ischemic tissue.      ECHO: 1/13/17 revealed Left ventricular wall segments contract abnormally. Refer to wall scoring diagram for more information. Left ventricular function is mildly decreased. Estimated EF = 45%. Left ventricular diastolic dysfunction (grade I) consistent with impaired relaxation. Right ventricular cavity is borderline dilated. Mild to moderate aortic valve regurgitation is present. Mild-to-moderate mitral valve regurgitation is present. There is a trivial circumferential pericardial effusion. Estimated right ventricular systolic pressure from tricuspid regurgitation is normal (<35 mmHg). The aortic valve exhibits sclerosis    Assessment:  Hospital Problem List     * (Principal)Acute ischemic left MCA stroke    Overview Signed 2/19/2017  2:19 PM by ARIEL Allen     S/p tpa and mechanical thrombectomy 2/19/17         Hypertension    Hyperlipidemia    Urinary retention/Frequent UTIs    Overview Addendum 2/19/2017  2:44 PM by ARIEL Allen     Takes flomax at home  Follows with Dr. Flynn and per d/c summary on 12/2016, she is to have I&O cath vs navarro cath prn         Hypothyroidism    Overview Signed 2/19/2017  2:21 PM by ARIEL Allen     On replacement         Recent Colitis    Overview Signed 2/19/2017  2:48 PM by ARIEL Allen     Per CT a/p 1/13/2017, treated with Zosyn, had negative C diff 1/17/17         Status post left hip replacement    Overview Signed 2/19/2017  2:45 PM by Elisa MARINELLI  ARIEL Stokes     12/2016 at                Plan:   ICU admit  Initiate CVA s/p tpa pathway  Bladder scan  Ok with Dr. Gabriel to place navarro if needed  If abd pain/distention does not improve, she will need CT a/p    ARIEL Fontana, [facility]-BC, FNP-BC  Pulmonary & Critical Care Medicine  02/19/17 2:52 PM  I have reviewed hx and did physical exam. Agree with hx, physical and plan as above. Discussed with family and Dr. Nery Mueller MD [facility]  Sleep Medicine  Pulmonary and Critical Care Medicine  Time: Critical care 45 min                                                   *Please note that portions of this note were completed with a voice recognition program. Efforts were made to edit the dictations, but occasionally words are mistranscribed.

## 2017-02-19 NOTE — CONSULTS
"NAME: VAIBHAV RUTHERFORD  : 3/5/1931  PCP: Kieran Cottrell MD  Attending MD: Price Apple MD    Date of Admission:  2017  Date of Service: 2017    History of Present Illness:  85 y.o.  female who was recovering from a recent \"hip replacement\" at an inpatient rehabilitation/nursing home, when she developed acute onset of aphasia and right-sided weakness (witnessed event) this morning.  She was ambulating at the nursing home/rehabilitation yesterday, with anticipation of returning \"home\".  She presented to [facility] with symptoms of a severe stroke/large vessel occlusion (NIH stroke score 26).  She presented within appropriate time frame, and was administered IV tPA, per protocol.  She had a CT perfusion scan which demonstrates evidence of a left MCA occlusion with relatively little \"core\" infarct, and a large area of ischemic penumbra.  After lengthy discussion with the patient's son (power of ), in regards to the risk/benefit of mechanical thrombectomy, he wishes to proceed with emergent/salvage thrombectomy.    Past Medical History:  Past Medical History   Diagnosis Date   • Anemia    • Anxiety    • Asthma    • Depression    • Disease of thyroid gland    • Failure to thrive in adult    • Hyperlipidemia    • Hypertension    • Osteoarthritis    • Parkinson's disease    • Stress incontinence    • Urinary retention        Past Surgical History:  Past Surgical History   Procedure Laterality Date   • Appendectomy     • Back surgery     • Cataract extraction     • Elbow procedure     • Knee surgery     • Joint replacement           Medications  No current facility-administered medications on file prior to encounter.      Current Outpatient Prescriptions on File Prior to Encounter   Medication Sig Dispense Refill   • acetaminophen (TYLENOL) 500 MG tablet Take 1,000 mg by mouth 4 (Four) Times a Day As Needed for mild pain (1-3).     • aspirin 81 MG chewable tablet Chew 1 tablet " "Daily.     • atorvastatin (LIPITOR) 20 MG tablet Take 1 tablet by mouth Every Night.     • carbidopa-levodopa (SINEMET)  MG per tablet Increase dose to four times a day: on rising, noon, 3pm and 6pm 120 tablet 4   • Cholecalciferol 2000 UNITS capsule Take 2,000 Units by mouth Daily.     • ferrous sulfate 325 (65 FE) MG tablet Take 325 mg by mouth Daily With Breakfast.     • levothyroxine (SYNTHROID, LEVOTHROID) 75 MCG tablet Take 75 mcg by mouth Daily.     • loperamide (IMODIUM) 2 MG capsule Take 1 capsule by mouth 3 (Three) Times a Day As Needed for diarrhea.     • mirtazapine (REMERON) 30 MG tablet Take 30 mg by mouth Every Night.     • polyethylene glycol (MIRALAX) packet Take 17 g by mouth Daily As Needed.     • saccharomyces boulardii (FLORASTOR) 250 MG capsule Take 1 capsule by mouth 2 (Two) Times a Day.     • tamsulosin (FLOMAX) 0.4 MG capsule 24 hr capsule Take 1 capsule by mouth Daily.     • vitamin E 1000 UNIT capsule Take 1,000 Units by mouth Daily.         Allergies:  Allergies   Allergen Reactions   • Alendronate    • Aripiprazole    • Celecoxib    • Nitrofurantoin    • Procaine    • Sulfa Antibiotics        Social Hx:  Social History     Social History   • Marital status:      Spouse name: N/A   • Number of children: N/A   • Years of education: N/A     Occupational History   • Not on file.     Social History Main Topics   • Smoking status: Never Smoker   • Smokeless tobacco: Not on file   • Alcohol use Yes      Comment: social   • Drug use: Not on file   • Sexual activity: Not on file     Other Topics Concern   • Not on file     Social History Narrative   • No narrative on file       Family Hx:  Family History   Problem Relation Age of Onset   • Alcohol abuse Other    • Hypertension Other    • Stroke Other    • Heart disease Son        Review of Imaging:  CT perfusion scan demonstrates evidence of a distal M1 segment left MCA occlusion with large area of ischemic \"penumbra\" in the left MCA " "vascular territory with relatively small area of \"core\" infarction.     Laboratory Result:  Lab Results   Component Value Date    WBC 4.85 02/19/2017    HGB 10.9 (L) 02/19/2017    HCT 34.8 02/19/2017    MCV 98.0 02/19/2017     02/19/2017     Lab Results   Component Value Date    GLUCOSE 81 01/19/2017    CALCIUM 8.4 (L) 01/19/2017     01/19/2017    K 4.2 01/19/2017    CO2 22.0 01/19/2017     01/19/2017    BUN 7 (L) 01/19/2017    CREATININE 0.40 (L) 01/19/2017    EGFRIFNONA >150 01/19/2017    BCR 17.5 01/19/2017    ANIONGAP 6.0 01/19/2017       Physical Examination:  Vitals:    02/19/17 1055   BP: 136/88   Pulse:    Resp:    Temp:    SpO2: 92%          Neurological examination:  Aphasic with parietal neglect.  Positive right facial droop.  Right Hemiparesis    Diagnoses/Plan:    Ms. Knapp is a 85 y.o. female with acute left MCA occlusion and large area of ischemic penumbra left MCA territory.  Getting IV tPA, given LVO and severe stroke symptoms (NIHSS 26) we will proceed with emergent/salvage thrombectomy.                "

## 2017-02-20 ENCOUNTER — APPOINTMENT (OUTPATIENT)
Dept: CARDIOLOGY | Facility: HOSPITAL | Age: 82
End: 2017-02-20

## 2017-02-20 ENCOUNTER — APPOINTMENT (OUTPATIENT)
Dept: CT IMAGING | Facility: HOSPITAL | Age: 82
End: 2017-02-20

## 2017-02-20 LAB
ALBUMIN SERPL-MCNC: 3 G/DL (ref 3.2–4.8)
ALBUMIN/GLOB SERPL: 1 G/DL (ref 1.5–2.5)
ALP SERPL-CCNC: 55 U/L (ref 25–100)
ALT SERPL W P-5'-P-CCNC: 6 U/L (ref 7–40)
ANION GAP SERPL CALCULATED.3IONS-SCNC: 5 MMOL/L (ref 3–11)
ARTICHOKE IGE QN: 80 MG/DL (ref 0–130)
AST SERPL-CCNC: 15 U/L (ref 0–33)
BASOPHILS # BLD AUTO: 0.02 10*3/MM3 (ref 0–0.2)
BASOPHILS NFR BLD AUTO: 0.3 % (ref 0–1)
BH CV ECHO MEAS - AI DEC SLOPE: 244.5 CM/SEC^2
BH CV ECHO MEAS - AI MAX PG: 83.4 MMHG
BH CV ECHO MEAS - AI MAX VEL: 456.5 CM/SEC
BH CV ECHO MEAS - AI P1/2T: 546.9 MSEC
BH CV ECHO MEAS - AO MAX PG (FULL): 2.9 MMHG
BH CV ECHO MEAS - AO MAX PG: 5.2 MMHG
BH CV ECHO MEAS - AO MEAN PG (FULL): 2 MMHG
BH CV ECHO MEAS - AO MEAN PG: 3.1 MMHG
BH CV ECHO MEAS - AO ROOT AREA (BSA CORRECTED): 2.2
BH CV ECHO MEAS - AO ROOT AREA: 7.5 CM^2
BH CV ECHO MEAS - AO ROOT DIAM: 3.1 CM
BH CV ECHO MEAS - AO V2 MAX: 114 CM/SEC
BH CV ECHO MEAS - AO V2 MEAN: 82.5 CM/SEC
BH CV ECHO MEAS - AO V2 VTI: 19.2 CM
BH CV ECHO MEAS - AVA(I,A): 2.2 CM^2
BH CV ECHO MEAS - AVA(I,D): 2.2 CM^2
BH CV ECHO MEAS - AVA(V,A): 2.1 CM^2
BH CV ECHO MEAS - AVA(V,D): 2.1 CM^2
BH CV ECHO MEAS - BSA(HAYCOCK): 1.4 M^2
BH CV ECHO MEAS - BSA(HAYCOCK): 1.4 M^2
BH CV ECHO MEAS - BSA: 1.4 M^2
BH CV ECHO MEAS - BSA: 1.4 M^2
BH CV ECHO MEAS - BZI_BMI: 21.2 KILOGRAMS/M^2
BH CV ECHO MEAS - BZI_BMI: 21.2 KILOGRAMS/M^2
BH CV ECHO MEAS - BZI_METRIC_HEIGHT: 149.9 CM
BH CV ECHO MEAS - BZI_METRIC_HEIGHT: 149.9 CM
BH CV ECHO MEAS - BZI_METRIC_WEIGHT: 47.6 KG
BH CV ECHO MEAS - BZI_METRIC_WEIGHT: 47.6 KG
BH CV ECHO MEAS - CONTRAST EF (2CH): 38.5 ML/M^2
BH CV ECHO MEAS - CONTRAST EF 4CH: 43.9 ML/M^2
BH CV ECHO MEAS - EDV(CUBED): 77.9 ML
BH CV ECHO MEAS - EDV(MOD-SP2): 39 ML
BH CV ECHO MEAS - EDV(MOD-SP4): 57 ML
BH CV ECHO MEAS - EDV(TEICH): 81.8 ML
BH CV ECHO MEAS - EF(CUBED): 62.7 %
BH CV ECHO MEAS - EF(MOD-SP2): 38.5 %
BH CV ECHO MEAS - EF(MOD-SP4): 43.9 %
BH CV ECHO MEAS - EF(TEICH): 54.5 %
BH CV ECHO MEAS - ESV(CUBED): 29.1 ML
BH CV ECHO MEAS - ESV(MOD-SP2): 24 ML
BH CV ECHO MEAS - ESV(MOD-SP4): 32 ML
BH CV ECHO MEAS - ESV(TEICH): 37.2 ML
BH CV ECHO MEAS - FS: 28 %
BH CV ECHO MEAS - IVS/LVPW: 0.86
BH CV ECHO MEAS - IVSD: 1.2 CM
BH CV ECHO MEAS - LA DIMENSION: 3.2 CM
BH CV ECHO MEAS - LA/AO: 1
BH CV ECHO MEAS - LAT PEAK E' VEL: 7.4 CM/SEC
BH CV ECHO MEAS - LV DIASTOLIC VOL/BSA (35-75): 40.6 ML/M^2
BH CV ECHO MEAS - LV MASS(C)D: 160.4 GRAMS
BH CV ECHO MEAS - LV MASS(C)DI: 114.4 GRAMS/M^2
BH CV ECHO MEAS - LV MAX PG: 2.3 MMHG
BH CV ECHO MEAS - LV MEAN PG: 1.1 MMHG
BH CV ECHO MEAS - LV SYSTOLIC VOL/BSA (12-30): 22.8 ML/M^2
BH CV ECHO MEAS - LV V1 MAX: 75 CM/SEC
BH CV ECHO MEAS - LV V1 MEAN: 47 CM/SEC
BH CV ECHO MEAS - LV V1 VTI: 13.1 CM
BH CV ECHO MEAS - LVIDD: 4.3 CM
BH CV ECHO MEAS - LVIDS: 3.1 CM
BH CV ECHO MEAS - LVLD AP2: 6.2 CM
BH CV ECHO MEAS - LVLD AP4: 5.8 CM
BH CV ECHO MEAS - LVLS AP2: 5.6 CM
BH CV ECHO MEAS - LVLS AP4: 5.1 CM
BH CV ECHO MEAS - LVOT AREA (M): 3.1 CM^2
BH CV ECHO MEAS - LVOT AREA: 3.2 CM^2
BH CV ECHO MEAS - LVOT DIAM: 2 CM
BH CV ECHO MEAS - LVPWD: 1.2 CM
BH CV ECHO MEAS - MED PEAK E' VEL: 10 CM/SEC
BH CV ECHO MEAS - MV E MAX VEL: 86.9 CM/SEC
BH CV ECHO MEAS - PA MAX PG: 2.3 MMHG
BH CV ECHO MEAS - PA V2 MAX: 75 CM/SEC
BH CV ECHO MEAS - RVDD: 1.8 CM
BH CV ECHO MEAS - SI(AO): 102.1 ML/M^2
BH CV ECHO MEAS - SI(CUBED): 34.8 ML/M^2
BH CV ECHO MEAS - SI(LVOT): 30 ML/M^2
BH CV ECHO MEAS - SI(MOD-SP2): 10.7 ML/M^2
BH CV ECHO MEAS - SI(MOD-SP4): 17.8 ML/M^2
BH CV ECHO MEAS - SI(TEICH): 31.8 ML/M^2
BH CV ECHO MEAS - SV(AO): 143.2 ML
BH CV ECHO MEAS - SV(CUBED): 48.8 ML
BH CV ECHO MEAS - SV(LVOT): 42 ML
BH CV ECHO MEAS - SV(MOD-SP2): 15 ML
BH CV ECHO MEAS - SV(MOD-SP4): 25 ML
BH CV ECHO MEAS - SV(TEICH): 44.6 ML
BH CV XLRA MEAS LEFT CCA RATIO VEL: 63.7 CM/SEC
BH CV XLRA MEAS LEFT DIST CCA EDV: 21.4 CM/SEC
BH CV XLRA MEAS LEFT DIST CCA PSV: 64.2 CM/SEC
BH CV XLRA MEAS LEFT DIST ICA EDV: 17.3 CM/SEC
BH CV XLRA MEAS LEFT DIST ICA PSV: 43.6 CM/SEC
BH CV XLRA MEAS LEFT ICA RATIO VEL: 83.4 CM/SEC
BH CV XLRA MEAS LEFT ICA/CCA RATIO: 1.3
BH CV XLRA MEAS LEFT MID ICA EDV: 35.4 CM/SEC
BH CV XLRA MEAS LEFT MID ICA PSV: 83.8 CM/SEC
BH CV XLRA MEAS LEFT PROX CCA EDV: 19.2 CM/SEC
BH CV XLRA MEAS LEFT PROX CCA PSV: 59.4 CM/SEC
BH CV XLRA MEAS LEFT PROX ECA PSV: 42.7 CM/SEC
BH CV XLRA MEAS LEFT PROX ICA EDV: 18.6 CM/SEC
BH CV XLRA MEAS LEFT PROX ICA PSV: 44 CM/SEC
BH CV XLRA MEAS LEFT PROX SCLA PSV: 88.2 CM/SEC
BH CV XLRA MEAS LEFT VERTEBRAL A EDV: 14.1 CM/SEC
BH CV XLRA MEAS LEFT VERTEBRAL A PSV: 41.3 CM/SEC
BH CV XLRA MEAS RIGHT CCA RATIO VEL: 60.9 CM/SEC
BH CV XLRA MEAS RIGHT DIST CCA EDV: 18.7 CM/SEC
BH CV XLRA MEAS RIGHT DIST CCA PSV: 61.4 CM/SEC
BH CV XLRA MEAS RIGHT DIST ICA EDV: 17.9 CM/SEC
BH CV XLRA MEAS RIGHT DIST ICA PSV: 35.5 CM/SEC
BH CV XLRA MEAS RIGHT ICA RATIO VEL: 51.7 CM/SEC
BH CV XLRA MEAS RIGHT ICA/CCA RATIO: 0.9
BH CV XLRA MEAS RIGHT MID ICA EDV: 22 CM/SEC
BH CV XLRA MEAS RIGHT MID ICA PSV: 52 CM/SEC
BH CV XLRA MEAS RIGHT PROX CCA EDV: 19.2 CM/SEC
BH CV XLRA MEAS RIGHT PROX CCA PSV: 60.4 CM/SEC
BH CV XLRA MEAS RIGHT PROX ICA EDV: 18.9 CM/SEC
BH CV XLRA MEAS RIGHT PROX ICA PSV: 44 CM/SEC
BH CV XLRA MEAS RIGHT PROX SCLA PSV: 77.9 CM/SEC
BH CV XLRA MEAS RIGHT VERTEBRAL A EDV: 10.8 CM/SEC
BH CV XLRA MEAS RIGHT VERTEBRAL A PSV: 33.7 CM/SEC
BILIRUB SERPL-MCNC: 0.5 MG/DL (ref 0.3–1.2)
BUN BLD-MCNC: 8 MG/DL (ref 9–23)
BUN/CREAT SERPL: 13.3 (ref 7–25)
CALCIUM SPEC-SCNC: 8.2 MG/DL (ref 8.7–10.4)
CHLORIDE SERPL-SCNC: 111 MMOL/L (ref 99–109)
CHOLEST SERPL-MCNC: 161 MG/DL (ref 0–200)
CO2 SERPL-SCNC: 24 MMOL/L (ref 20–31)
CREAT BLD-MCNC: 0.6 MG/DL (ref 0.6–1.3)
DEPRECATED RDW RBC AUTO: 64.4 FL (ref 37–54)
E/E' RATIO: 9.5
EOSINOPHIL # BLD AUTO: 0.07 10*3/MM3 (ref 0.1–0.3)
EOSINOPHIL NFR BLD AUTO: 1.2 % (ref 0–3)
ERYTHROCYTE [DISTWIDTH] IN BLOOD BY AUTOMATED COUNT: 17.7 % (ref 11.3–14.5)
GFR SERPL CREATININE-BSD FRML MDRD: 95 ML/MIN/1.73
GLOBULIN UR ELPH-MCNC: 3.1 GM/DL
GLUCOSE BLD-MCNC: 77 MG/DL (ref 70–100)
GLUCOSE BLDC GLUCOMTR-MCNC: 90 MG/DL (ref 70–130)
HBA1C MFR BLD: 4.8 % (ref 4.8–5.6)
HCT VFR BLD AUTO: 30.3 % (ref 34.5–44)
HDLC SERPL-MCNC: 48 MG/DL (ref 40–60)
HGB BLD-MCNC: 9.5 G/DL (ref 11.5–15.5)
IMM GRANULOCYTES # BLD: 0.01 10*3/MM3 (ref 0–0.03)
IMM GRANULOCYTES NFR BLD: 0.2 % (ref 0–0.6)
LEFT ARM BP: NORMAL MMHG
LEFT ATRIUM VOLUME INDEX: 19 ML/M2
LEFT ATRIUM VOLUME: 27 CM3
LV EF 2D ECHO EST: 45 %
LYMPHOCYTES # BLD AUTO: 2.18 10*3/MM3 (ref 0.6–4.8)
LYMPHOCYTES NFR BLD AUTO: 37.5 % (ref 24–44)
MAGNESIUM SERPL-MCNC: 1.9 MG/DL (ref 1.3–2.7)
MCH RBC QN AUTO: 30.9 PG (ref 27–31)
MCHC RBC AUTO-ENTMCNC: 31.4 G/DL (ref 32–36)
MCV RBC AUTO: 98.7 FL (ref 80–99)
MONOCYTES # BLD AUTO: 0.42 10*3/MM3 (ref 0–1)
MONOCYTES NFR BLD AUTO: 7.2 % (ref 0–12)
NEUTROPHILS # BLD AUTO: 3.11 10*3/MM3 (ref 1.5–8.3)
NEUTROPHILS NFR BLD AUTO: 53.6 % (ref 41–71)
PHOSPHATE SERPL-MCNC: 3.2 MG/DL (ref 2.4–5.1)
PLATELET # BLD AUTO: 173 10*3/MM3 (ref 150–450)
PMV BLD AUTO: 9 FL (ref 6–12)
POTASSIUM BLD-SCNC: 3.6 MMOL/L (ref 3.5–5.5)
POTASSIUM BLD-SCNC: 3.6 MMOL/L (ref 3.5–5.5)
PROT SERPL-MCNC: 6.1 G/DL (ref 5.7–8.2)
RBC # BLD AUTO: 3.07 10*6/MM3 (ref 3.89–5.14)
SODIUM BLD-SCNC: 140 MMOL/L (ref 132–146)
TRIGL SERPL-MCNC: 110 MG/DL (ref 0–150)
TSH SERPL DL<=0.05 MIU/L-ACNC: 5.88 MIU/ML (ref 0.35–5.35)
WBC NRBC COR # BLD: 5.81 10*3/MM3 (ref 3.5–10.8)

## 2017-02-20 PROCEDURE — 82962 GLUCOSE BLOOD TEST: CPT

## 2017-02-20 PROCEDURE — 92612 ENDOSCOPY SWALLOW (FEES) VID: CPT

## 2017-02-20 PROCEDURE — C8929 TTE W OR WO FOL WCON,DOPPLER: HCPCS

## 2017-02-20 PROCEDURE — 93880 EXTRACRANIAL BILAT STUDY: CPT | Performed by: INTERNAL MEDICINE

## 2017-02-20 PROCEDURE — 97110 THERAPEUTIC EXERCISES: CPT

## 2017-02-20 PROCEDURE — 93880 EXTRACRANIAL BILAT STUDY: CPT

## 2017-02-20 PROCEDURE — 92610 EVALUATE SWALLOWING FUNCTION: CPT

## 2017-02-20 PROCEDURE — 97167 OT EVAL HIGH COMPLEX 60 MIN: CPT

## 2017-02-20 PROCEDURE — 99232 SBSQ HOSP IP/OBS MODERATE 35: CPT | Performed by: RADIOLOGY

## 2017-02-20 PROCEDURE — 25010000002 SULFUR HEXAFLUORIDE MICROSPH 60.7-25 MG RECONSTITUTED SUSPENSION: Performed by: INTERNAL MEDICINE

## 2017-02-20 PROCEDURE — 99291 CRITICAL CARE FIRST HOUR: CPT | Performed by: INTERNAL MEDICINE

## 2017-02-20 PROCEDURE — 70450 CT HEAD/BRAIN W/O DYE: CPT

## 2017-02-20 PROCEDURE — 80061 LIPID PANEL: CPT | Performed by: NURSE PRACTITIONER

## 2017-02-20 PROCEDURE — 83036 HEMOGLOBIN GLYCOSYLATED A1C: CPT | Performed by: NURSE PRACTITIONER

## 2017-02-20 PROCEDURE — 83735 ASSAY OF MAGNESIUM: CPT | Performed by: NURSE PRACTITIONER

## 2017-02-20 PROCEDURE — G8978 MOBILITY CURRENT STATUS: HCPCS

## 2017-02-20 PROCEDURE — 84132 ASSAY OF SERUM POTASSIUM: CPT | Performed by: NURSE PRACTITIONER

## 2017-02-20 PROCEDURE — 97530 THERAPEUTIC ACTIVITIES: CPT

## 2017-02-20 PROCEDURE — 84443 ASSAY THYROID STIM HORMONE: CPT | Performed by: NURSE PRACTITIONER

## 2017-02-20 PROCEDURE — 85025 COMPLETE CBC W/AUTO DIFF WBC: CPT | Performed by: RADIOLOGY

## 2017-02-20 PROCEDURE — G8979 MOBILITY GOAL STATUS: HCPCS

## 2017-02-20 PROCEDURE — 84100 ASSAY OF PHOSPHORUS: CPT | Performed by: NURSE PRACTITIONER

## 2017-02-20 PROCEDURE — 25010000003 POTASSIUM CHLORIDE 10 MEQ/100ML SOLUTION: Performed by: INTERNAL MEDICINE

## 2017-02-20 PROCEDURE — 97162 PT EVAL MOD COMPLEX 30 MIN: CPT

## 2017-02-20 PROCEDURE — 80053 COMPREHEN METABOLIC PANEL: CPT | Performed by: INTERNAL MEDICINE

## 2017-02-20 PROCEDURE — 93306 TTE W/DOPPLER COMPLETE: CPT | Performed by: INTERNAL MEDICINE

## 2017-02-20 RX ORDER — TAMSULOSIN HYDROCHLORIDE 0.4 MG/1
0.4 CAPSULE ORAL DAILY
Status: DISCONTINUED | OUTPATIENT
Start: 2017-02-20 | End: 2017-02-20

## 2017-02-20 RX ORDER — LEVOTHYROXINE SODIUM ANHYDROUS 100 UG/5ML
37.5 INJECTION, POWDER, LYOPHILIZED, FOR SOLUTION INTRAVENOUS
Status: DISCONTINUED | OUTPATIENT
Start: 2017-02-21 | End: 2017-02-21

## 2017-02-20 RX ORDER — TAMSULOSIN HYDROCHLORIDE 0.4 MG/1
0.8 CAPSULE ORAL DAILY
Status: DISCONTINUED | OUTPATIENT
Start: 2017-02-20 | End: 2017-02-24 | Stop reason: HOSPADM

## 2017-02-20 RX ORDER — LEVOTHYROXINE SODIUM 0.07 MG/1
75 TABLET ORAL
Status: DISCONTINUED | OUTPATIENT
Start: 2017-02-21 | End: 2017-02-20

## 2017-02-20 RX ORDER — POTASSIUM CHLORIDE 7.45 MG/ML
10 INJECTION INTRAVENOUS
Status: DISCONTINUED | OUTPATIENT
Start: 2017-02-20 | End: 2017-02-24 | Stop reason: HOSPADM

## 2017-02-20 RX ORDER — POLYETHYLENE GLYCOL 3350 17 G/17G
17 POWDER, FOR SOLUTION ORAL DAILY PRN
Status: DISCONTINUED | OUTPATIENT
Start: 2017-02-20 | End: 2017-02-22

## 2017-02-20 RX ORDER — MIRTAZAPINE 15 MG/1
30 TABLET, FILM COATED ORAL NIGHTLY
Status: DISCONTINUED | OUTPATIENT
Start: 2017-02-20 | End: 2017-02-24 | Stop reason: HOSPADM

## 2017-02-20 RX ORDER — SACCHAROMYCES BOULARDII 250 MG
250 CAPSULE ORAL 2 TIMES DAILY
Status: DISCONTINUED | OUTPATIENT
Start: 2017-02-20 | End: 2017-02-24 | Stop reason: HOSPADM

## 2017-02-20 RX ORDER — FERROUS SULFATE 325(65) MG
325 TABLET ORAL
Status: DISCONTINUED | OUTPATIENT
Start: 2017-02-21 | End: 2017-02-24 | Stop reason: HOSPADM

## 2017-02-20 RX ORDER — LOPERAMIDE HYDROCHLORIDE 2 MG/1
2 CAPSULE ORAL 3 TIMES DAILY PRN
Status: DISCONTINUED | OUTPATIENT
Start: 2017-02-20 | End: 2017-02-23

## 2017-02-20 RX ORDER — MELATONIN
2000 DAILY
Status: DISCONTINUED | OUTPATIENT
Start: 2017-02-20 | End: 2017-02-24 | Stop reason: HOSPADM

## 2017-02-20 RX ORDER — ACETAMINOPHEN 500 MG
1000 TABLET ORAL 4 TIMES DAILY PRN
Status: DISCONTINUED | OUTPATIENT
Start: 2017-02-20 | End: 2017-02-24 | Stop reason: HOSPADM

## 2017-02-20 RX ORDER — MULTIVIT WITH MINERALS/LUTEIN
1000 TABLET ORAL DAILY
Status: DISCONTINUED | OUTPATIENT
Start: 2017-02-20 | End: 2017-02-21

## 2017-02-20 RX ADMIN — ASPIRIN 300 MG: 300 SUPPOSITORY RECTAL at 14:14

## 2017-02-20 RX ADMIN — SODIUM CHLORIDE 75 ML/HR: 9 INJECTION, SOLUTION INTRAVENOUS at 02:40

## 2017-02-20 RX ADMIN — SULFUR HEXAFLUORIDE 2 ML: KIT at 14:00

## 2017-02-20 RX ADMIN — POTASSIUM CHLORIDE 10 MEQ: 7.46 INJECTION, SOLUTION INTRAVENOUS at 22:50

## 2017-02-20 NOTE — PROGRESS NOTES
Acute Care - Physical Therapy Initial Evaluation  [facility]     Patient Name: Florinda Knapp  : 3/5/1931  MRN: [id number]  Today's Date: 2017   Onset of Illness/Injury or Date of Surgery Date: 17  Date of Referral to PT: 17  Referring Physician: ARIEL Stokes      Admit Date: 2017     Visit Dx:    ICD-10-CM ICD-9-CM   1. Acute CVA (cerebrovascular accident) I63.9 434.91   2. Left carotid artery occlusion I65.22 433.10   3. Impaired functional mobility, balance, gait, and endurance Z74.09 V49.89   4. Impaired mobility and ADLs Z74.09 799.89     Patient Active Problem List   Diagnosis   • Dysuria   • Elbow injury   • Fracture of left olecranon process   • Parkinson's disease   • Trauma left hip   • Acute Symptomatic on Chronic Normocytic Anemia   • Acute respiratory failure with hypoxia   • Generalized weakness   • Chest pain   • Symptomatic anemia   • Elevated troponin I level, possibly due to anemia   • Status post total replacement of left hip 2016 at [facility]   • H/O urinary retention   • History of recurrent UTIs   • Essential hypertension   • Anxiety   • CHF with cardiomyopathy   • Colitis   • Hypokalemia   • Acute ischemic left MCA stroke   • Hypertension   • Hyperlipidemia   • Urinary retention/Frequent UTIs   • Hypothyroidism   • Status post left hip replacement   • Recent Colitis   • Acute CVA (cerebrovascular accident)     Past Medical History   Diagnosis Date   • Anemia    • Anxiety    • Asthma    • Depression    • Failure to thrive in adult    • Hyperlipidemia    • Hypertension    • Hypothyroidism    • Osteoarthritis    • Parkinson's disease    • Stress incontinence    • Urinary retention      Past Surgical History   Procedure Laterality Date   • Appendectomy     • Back surgery     • Cataract extraction     • Elbow procedure     • Knee surgery     • Total hip arthroplasty Left 2016          PT ASSESSMENT (last 72 hours)      PT Evaluation       17 0845 17 0840     Rehab Evaluation    Document Type evaluation  -CL evaluation  -DM    Subjective Information agree to therapy;no complaints  -CL agree to therapy;no complaints   per son,takes Remorex (sleep aid)& levadopa(Park.med)  -DM    Patient Effort, Rehab Treatment good  -CL fair  -DM    Symptoms Noted During/After Treatment  fatigue   incr. intermitt. tremors R LE;drowsy/dozing  -DM    General Information    Patient Profile Review yes  -CL yes  -DM    Onset of Illness/Injury or Date of Surgery Date 02/19/17  -CL 02/19/17  -DM    Referring Physician ARIEL Stokes  -CL MD Davi  -DM    General Observations  sup.in ICU bed;TELE;PICC R UE;IV LUE hep locked;SCUD sleeves in place but no pump(will initiate 24 hr s/p tPA);o2;navarro;depends open under pt.;mult NSG proced.;son present/long discussion re:PLOF,freq falls,recent hosp. stays;Dr. Gabriel came to assess;nsg hep locked IV for transf.;issued gt belt,chair alarm,CVA Ed BK.(explained to son)    -DM    Pertinent History Of Current Problem Pt presented to ED w/ aphasia, R fascial droop, and R sided paralysis. CT perfusion (+) L MCA CVA, s/p tPA and emergent mechanical thrombectomy. PMH: Pt s/p L JALEN w/ PHP at OSH on 12/06/2016, recent hospitalization at [facility] from 01/12/17-01/19/2017 d/t anemia/dehydration, Parkinson's Disease  -CL recent L THR 12-6-16@;d/c'd to [facility] for 2 wks(till end of Dec.),then transf to [place] [facility] for further rehab;hosp.1-12 to 1-19-17 for anemia (2 units PRBC's),then back to [place];onset sudden R sided weakness, R facial droop & aphasia 2-19 & collapsed while on toilet; adm to [facility] ER;given tPA approx 10:30 am on 2-19;CT perfusion scan: L MCA CVA & ischemic penumbra; has passed min.of 6 hr timeframe for init.of therapy, & is ok to transf to chair per nsg;will have repeat CT @ 11 am       -DM    Precautions/Limitations fall precautions;NPO;oxygen therapy device and L/min;hip precautions- left   s/p L JALEN 12/06/16, still on PHP per Son  -CL hip  precautions- left;oxygen therapy device and L/min;other (see comments);fall precautions;NPO   Park Dz (on levadopa);L THR 12-6-17(son:still w/HIP precaut.  -DM    Prior Level of Function mod assist:;all household mobility;transfer;max assist:;ADL's;dressing;bathing   Per Son while at [place] for rehab after 01/19 DC.   -CL mod assist:;gait;transfer;bed mobility;ADL's;dependent:;home management;driving;shopping   Needing asst for mobil.w/ R wx at [place] SNF;h/o freq falls  -DM    Equipment Currently Used at Home walker, rolling;shower chair;raised toilet  -CL walker, rolling;shower chair;raised toilet  -DM    Plans/Goals Discussed With patient and family;agreed upon  -CL patient and family;agreed upon  -DM    Risks Reviewed patient and family:;LOB;nausea/vomiting;dizziness;increased discomfort;lines disloged  -CL patient and family:;LOB;increased discomfort;change in vital signs;lines disloged  -DM    Benefits Reviewed patient and family:;improve function;increase independence;increase strength;increase balance;decrease pain;increase knowledge  -CL patient and family:;improve function;increase independence;increase strength;increase balance;decrease pain;increase knowledge  -DM    Barriers to Rehab medically complex;previous functional deficit  -CL medically complex;previous functional deficit;ineffective coping   h/o anxiety/Park.;fearful of falling(per son,h/o mult falls)  -DM    Living Environment    Lives With facility resident  -CL facility resident   PREV. lived at [facility];now at [facility] for rehab  -DM    Living Arrangements assisted living   Prior to L JAELN, has been in SNF s/p procedure  -CL assisted living   [facility] p/t recent [facility] stay  -DM    Type of Financial/Environmental Concern  none  -DM    Transportation Available  ambulance  -DM    Living Environment Comment Per son, pt DC to [facility] after L JALEN, then t/f to [place] for SNF rehab. Prior to L JALEN, pt was a resident at [facility] however  must be able to ambulate independently prior to return.    -CL per son,must be able to amb indep to return to [facility]   -DM    Clinical Impression    Date of Referral to PT  02/19/17  -DM    PT Diagnosis  impaired funct mobil.  -DM    Criteria for Skilled Therapeutic Interventions Met  yes;treatment indicated  -DM    Pathology/Pathophysiology Noted (Describe Specifically for Each System)  neuromuscular  -DM    Impairments Found (describe specific impairments)  gait, locomotion, and balance  -DM    Rehab Potential  good, to achieve stated therapy goals  -DM    Vital Signs    Pre Systolic BP Rehab 126  -  -DM    Pre Treatment Diastolic BP 77  -CL 77  -DM    Post Systolic BP Rehab 123  -  -DM    Post Treatment Diastolic BP 84  -CL 84  -DM    Pretreatment Heart Rate (beats/min) 82  -CL 82  -DM    Intratreatment Heart Rate (beats/min)  86  -DM    Posttreatment Heart Rate (beats/min) 81  -CL 81  -DM    Pre SpO2 (%) 96  -CL 96  -DM    O2 Delivery Pre Treatment supplemental O2   2L  -CL supplemental O2   2 L  -DM    Post SpO2 (%) 95  -CL 95  -DM    O2 Delivery Post Treatment supplemental O2   2L  -CL supplemental O2  -DM    Pre Patient Position Supine  -CL Supine  -DM    Intra Patient Position Sitting  -CL Sitting  -DM    Post Patient Position Sitting  -CL Sitting  -DM    Pain Assessment    Pain Assessment [place] FACES   Pt shook head no when asked if in pain  -CL [place] FACES  -DM    [place] FACES Pain Rating 0  -CL 0  -DM    Vision Assessment/Intervention    Visual Tracking Comment Pt tracked appropriately in all visual fields. Difficult to formally assess d/t lethargy and cognitive status.   -CL able to visually track in all fields  -DM    Cognitive Assessment/Intervention    Current Cognitive/Communication Assessment impaired   Pt w/ aphasia, though nods yes/no appropriately  -CL impaired  -DM    Orientation Status oriented to;person;place;disoriented to;time;situation   Pt oriented to  "hospital, name,  w/ choices and yes/no  -CL other (see comments)   aphasia;nods yes/no;said\"I can't talk\";asked\"are you Radha?\"  -DM    Follows Commands/Answers Questions 75% of the time;able to follow single-step instructions;needs cueing;needs increased time;needs repetition   Pt [place] and very lethargic  -CL 75% of the time;able to follow single-step instructions;needs cueing;needs increased time;needs repetition  -DM    Personal Safety moderate impairment;decreased awareness, need for assist;decreased awareness, need for safety;decreased insight to deficits  -CL moderate impairment;decreased awareness, need for assist;decreased awareness, need for safety;decreased insight to deficits  -DM    Personal Safety Interventions fall prevention program maintained;gait belt;nonskid shoes/slippers when out of bed  -CL elopement precautions initiated;fall prevention program maintained;gait belt;nonskid shoes/slippers when out of bed  -DM    ROM (Range of Motion)    General ROM  lower extremity range of motion deficits identified   L hip limited 50% d/t THR ;R hip limit.25-50%;R knee joshi.25%  -DM    General ROM Detail RUE shldr FE AAROM WFL, remainder grossly WFL. LUE shldr FE AAROM ~90 degrees, elbow F WFL/E limited by ~50% d/t previous ORIF,  WFL.   -CL     MMT (Manual Muscle Testing)    General MMT Assessment  lower extremity strength deficits identified   hip flex:L grossly 2-/5;R grossly 2/5; R knee grossly 2-/5  -DM    General MMT Assessment Detail RUE shldr FE 3-/5, elbow 3/5,  3+/5. LUE shldr FE 3-/5, elbow 3/5,  3+/5.   -CL     Mobility Assessment/Training    Extremity Weight-Bearing Status  left lower extremity  -DM    Left Lower Extremity Weight-Bearing  weight-bearing as tolerated   L THR  -DM    Bed Mobility, Assessment/Treatment    Bed Mobility, Assistive Device bed rails;head of bed elevated  -CL bed rails;head of bed elevated  -DM    Bed Mobility, Scoot/Bridge, [place]  verbal cues " required;maximum assist (25% patient effort);2 person assist required  -DM    Bed Mob, Supine to Sit, [place] maximum assist (25% patient effort);2 person assist required;verbal cues required  -CL verbal cues required;maximum assist (25% patient effort);2 person assist required  -DM    Bed Mobility, Safety Issues  decreased use of arms for pushing/pulling;decreased use of legs for bridging/pushing  -DM    Bed Mobility, Impairments  ROM decreased;strength decreased;impaired balance;motor control impaired  -DM    Bed Mobility, Comment Pt assisted in moving BLE towards EOB, however required assist to fully move off EOB and reach upright sitting position, and to maintain PHP.   -CL Init moving LE'S,then needed max asst 2 to complete transf to EOB  -DM    Transfer Assessment/Treatment    Transfers, Bed-Chair [place] dependent (less than 25% patient effort);2 person assist required;verbal cues required  -CL verbal cues required;dependent (less than 25% patient effort);2 person assist required   SIT PIV.transf toward R side  -DM    Transfers, Chair-Bed [place] not tested  -CL     Transfers, Bed-Chair-Bed, Assist Device --   BUE support.   -CL --   B UE's SUPP. BY p.t./o.t.  -DM    Transfers, Sit-Stand [place] dependent (less than 25% patient effort);2 person assist required;verbal cues required  -CL verbal cues required;dependent (less than 25% patient effort);2 person assist required  -DM    Transfers, Stand-Sit [place] dependent (less than 25% patient effort);2 person assist required;verbal cues required  -CL verbal cues required;dependent (less than 25% patient effort);2 person assist required  -DM    Transfers, Sit-Stand-Sit, Assist Device --   BUE support.   -CL other (see comments)   unable to wt bear  -DM    Transfer, Maintain Weight Bearing Status  unable to maintain weight bearing status  -DM    Transfer, Safety Issues  weight-shifting ability decreased;loses balance backward;balance  decreased during turns  -DM    Transfer, Impairments  ROM decreased;strength decreased;impaired balance;motor control impaired  -DM    Transfer, Comment Pt performed sit-pivot to chair, only stood ~50% upright w/ hips unweighted from bed, MAX VCs for sequencing of steps.   -CL too weak/drowsy to attempt 2nd stand trial  -DM    Gait Assessment/Treatment    Gait, [place] Level  unable to perform  -DM    Motor Skills/Interventions    Additional Documentation Balance Skills Training (Group);Fine Motor Coordination Training (Group);Gross Motor Coordination Training (Group)  -CL Balance Skills Training (Group)  -DM    Balance Skills Training    Sitting-Level of Assistance Moderate assistance;x2   Progressed to moments of Close Supervision at EOB  -CL Moderate assistance;x2   improved to brief periods of close supv. of 2  -DM    Sitting-Balance Support Right upper extremity supported;Left upper extremity supported;Feet supported  -CL Right upper extremity supported;Left upper extremity supported;Feet supported  -DM    Sitting-Balance Activities --   static sitting  -CL Trunk control activities;Head control activities (Comment)  -DM    Sitting # of Minutes  5  -DM    Standing-Level of Assistance Dependent;x2  -CL Dependent;x2  -DM    Static Standing Balance Support Right upper extremity supported;Left upper extremity supported  -CL Right upper extremity supported;Left upper extremity supported  -DM    Standing-Balance Activities Weight Shift R-L  -CL Weight Shift A-P;Weight Shift R-L  -DM    Therapy Exercises    Bilateral Lower Extremities  AAROM:;AROM:;10 reps;supine;sitting;ankle pumps/circles;heel slides;hip abduction/adduction;hip flexion;LAQ;SAQ;calf stretch   def. hip flex & rot. L LE d/t recent L THR  -DM    Fine Motor Coordination Training    Detail (Fine Motor Coordination Training) --   Difficult to formally assess d/t cognitive status.   -CL     Gross Motor Coordination Training    Gross Motor Skill,  Impairments Detail RUE finger to mouth w/ eyes open impaired. Difficult to formally assess d/t cognitive status.   -CL     Sensory Assessment/Intervention    Light Touch LUE;RUE  -CL     LUE Light Touch WNL   Per pt report, difficult to formally assess.   -CL     RUE Light Touch WNL  -CL     Positioning and Restraints    Pre-Treatment Position in bed  -CL in bed  -DM    Post Treatment Position chair  -CL chair  -DM    In Chair notified nsg;reclined;call light within reach;encouraged to call for assist;with family/caregiver;RUE elevated;LUE elevated;on mechanical lift sling;legs elevated   No exit alarm system in place, RN notified. Pt w/ family  -CL notified nsg;reclined;call light within reach;exit alarm on;with family/caregiver;with nsg;with other staff;RUE elevated;LUE elevated;on mechanical lift sling;legs elevated   exit alarm pad placed;nsg brought alarm box;son returned  -      02/19/17 1333       General Information    Equipment Currently Used at Home raised toilet;shower chair;walker, standard  -LC     Living Environment    Lives With facility resident  -     Living Arrangements assisted living  -     Home Accessibility bed and bath on same level  -     Stair Railings at Home inside, present at both sides  -     Type of Financial/Environmental Concern none  -     Transportation Available car  -       User Key  (r) = Recorded By, (t) = Taken By, (c) = Cosigned By    Initials Name Provider Type    NAHUM Puri, PT Physical Therapist     Alida Ward, RN Registered Nurse    LAMONT Rossi, OT Occupational Therapist          Physical Therapy Education     Title: PT OT SLP Therapies (Active)     Topic: Physical Therapy (Active)     Point: Mobility training (Active)    Learning Progress Summary    Learner Readiness Method Response Comment Documented by Status   Patient Acceptance E,D,H NR   02/20/17 1042 Active   Family Acceptance E,D,H NR   02/20/17 1042 Active               Point:  Home exercise program (Active)    Learning Progress Summary    Learner Readiness Method Response Comment Documented by Status   Patient Acceptance E,D,H NR  DM 02/20/17 1042 Active   Family Acceptance E,D,H NR  DM 02/20/17 1042 Active               Point: Body mechanics (Active)    Learning Progress Summary    Learner Readiness Method Response Comment Documented by Status   Patient Acceptance E,D,H NR  DM 02/20/17 1042 Active   Family Acceptance E,D,H NR  DM 02/20/17 1042 Active               Point: Precautions (Active)    Learning Progress Summary    Learner Readiness Method Response Comment Documented by Status   Patient Acceptance E,D,H NR  DM 02/20/17 1042 Active   Family Acceptance E,D,H NR  DM 02/20/17 1042 Active                      User Key     Initials Effective Dates Name Provider Type Discipline    DM 06/19/15 -  Sravani Puir, PT Physical Therapist PT                PT Recommendation and Plan  Anticipated Discharge Disposition: inpatient rehabilitation facility  PT Frequency: daily  Plan of Care Review  Plan Of Care Reviewed With: patient, son  Progress: progress towards functional goals is fair  Outcome Summary/Follow up Plan: Adm w/ L MCA CVA (tPA given approx 1030 am yest & MCA thrombectomy performed);also recent L THR 12-6-16 & rehosp.for anemia;h/o Evaristo Sosa , w/ freq. falls & mult fxs.; presents w/ evolving sympt. ,incl. fluct. BP ,and drowsiness/lethargy;unable to WB w/ LE'S for transf UIC(sit pivot performed);will benefit from IPPT for strengthening, prog.mobil. trng & instruct. in fall prevention; recommend resuming S.T. rehab s/p D/C from [facility], w/ eventual return to longterm              IP PT Goals       02/20/17 1042          Bed Mobility PT LTG    Bed Mobility PT LTG, Date Established 02/20/17  -DM      Bed Mobility PT LTG, Time to Achieve 1 wk  -DM      Bed Mobility PT LTG, Activity Type all bed mobility  -DM      Bed Mobility PT LTG, [place] Level 2 person assist required;moderate assist  (50% patient effort)  -DM      Transfer Training PT LTG    Transfer Training PT LTG, Date Established 02/20/17  -DM      Transfer Training PT LTG, Time to Achieve 1 wk  -DM      Transfer Training PT LTG, Activity Type bed to chair /chair to bed;sit to stand/stand to sit  -DM      Transfer Training PT LTG, [place] Level maximum assist (25% patient effort);2 person assist required  -DM      Transfer Training PT LTG, Assist Device walker, rolling   WBAT L LE;stable VS  -DM      Gait Training PT LTG    Gait Training Goal PT LTG, Date Established 02/20/17  -DM      Gait Training Goal PT LTG, Time to Achieve 1 wk  -DM      Gait Training Goal PT LTG, [place] Level maximum assist (25% patient effort);2 person assist required  -DM      Gait Training Goal PT LTG, Assist Device walker, rolling   WBAT L LE;stable VS  -DM      Gait Training Goal PT LTG, Distance to Achieve 25  -DM        User Key  (r) = Recorded By, (t) = Taken By, (c) = Cosigned By    Initials Name Provider Type    DM Sravani Puri, PT Physical Therapist                Outcome Measures       02/20/17 0845 02/20/17 0840       How much help from another person do you currently need...    Turning from your back to your side while in flat bed without using bedrails?  2  -DM     Moving from lying on back to sitting on the side of a flat bed without bedrails?  2  -DM     Moving to and from a bed to a chair (including a wheelchair)?  1  -DM     Standing up from a chair using your arms (e.g., wheelchair, bedside chair)?  1  -DM     Climbing 3-5 steps with a railing?  1  -DM     To walk in hospital room?  1  -DM     AM-PAC 6 Clicks Score  8  -DM     How much help from another is currently needed...    Putting on and taking off regular lower body clothing? 1  -CL      Bathing (including washing, rinsing, and drying) 1  -CL      Toileting (which includes using toilet bed pan or urinal) 1  -CL      Putting on and taking off regular upper body clothing 1  -CL       Taking care of personal grooming (such as brushing teeth) 2  -CL      Eating meals 1  -CL      Score 7  -CL      Modified Ashby Scale    Modified Jessica Scale 5 - Severe disability.  Bedridden, incontinent, and requiring constant nursing care and attention.  -CL 5 - Severe disability.  Bedridden, incontinent, and requiring constant nursing care and attention.  -DM     Functional Assessment    Outcome Measure Options AM-PAC 6 Clicks Daily Activity (OT);Modified Ashby  -CL AM-PAC 6 Clicks Basic Mobility (PT);Modified [place]  -DM       User Key  (r) = Recorded By, (t) = Taken By, (c) = Cosigned By    Initials Name Provider Type    NAHUM Puri, PT Physical Therapist    CL Elif Rossi, OT Occupational Therapist           Time Calculation:         PT Charges       02/20/17 1054          Time Calculation    Start Time 0840  -DM      PT Received On 02/20/17  -DM      PT Goal Re-Cert Due Date 03/02/17  -DM      Time Calculation- PT    Total Timed Code Minutes- PT 55 minute(s)  -DM        User Key  (r) = Recorded By, (t) = Taken By, (c) = Cosigned By    Initials Name Provider Type    NAHUM Puri, PT Physical Therapist          Therapy Charges for Today     Code Description Service Date Service Provider Modifiers Qty    00815339833 HC PT MOBILITY CURRENT 2/20/2017 Sravani Puri, PT GP, CM 1    05056124114 HC PT MOBILITY PROJECTED 2/20/2017 Sravani Puri, PT GP, CL 1    01699891536 HC PT EVAL MOD COMPLEXITY 4 2/20/2017 Sravani Puri, PT GP 1    58255185846 HC PT THER PROC EA 15 MIN 2/20/2017 Sravani Puri PT GP 4          PT G-Codes  PT Professional Judgement Used?: Yes  Outcome Measure Options: AM-PAC 6 Clicks Basic Mobility (PT), Modified Jessica  Score: 8  Functional Limitation: Mobility: Walking and moving around  Mobility: Walking and Moving Around Current Status (): At least 80 percent but less than 100 percent impaired, limited or restricted  Mobility: Walking and Moving Around Goal Status  (): At least 60 percent but less than 80 percent impaired, limited or restricted      Sravani Puri, PT  2/20/2017

## 2017-02-20 NOTE — NURSING NOTE
Chart reviewed for Stroke Metrics.    Patient to have 24hr post-tPA CT scan at 1100 today.  ASA scheduled to be given at 1200 pending CT results.  The patient's care is being managed by the Intensivist and Dr. Gabriel.    We will continue to follow.    Mabel Stephens RN, Stroke Navigator/Nurse Extender

## 2017-02-20 NOTE — PROGRESS NOTES
"NAME: VAIBHAV RUTHERFORD  : 3/5/1931  PCP: Kieran Cottrell MD  ADMITTING PHYSICIAN: Kieran Mueller MD    DATE OF ADMISSION:  2017  DATE OF SERVICE: 2017    HOSPITAL DAY:  1 day    HISTORY OF PRESENT ILLNESS:  85 y.o. female who was recovering from a recent \"hip replacement\" at an inpatient rehabilitation/nursing home, when she developed acute onset of aphasia and right-sided weakness (witnessed event) this morning. She was ambulating at the nursing home/rehabilitation yesterday, with anticipation of returning \"home\". She presented to [facility] with symptoms of a severe stroke/large vessel occlusion (NIH stroke score 26). She presented within appropriate time frame, and was administered IV tPA, per protocol. She had a CT perfusion scan which demonstrates evidence of a left MCA occlusion with relatively little \"core\" infarct, and a large area of ischemic penumbra.  During planned thrombectomy, she was found to have essentially complete lysis of thrombus within the left middle cerebral artery with restoration of near normal (TICI 2B) flow to the left cerebral hemisphere.  As such, no thrombectomy was performed (diagnostic angiography only).      REVIEW OF IMAGIN-hour head CT demonstrates chronic small vessel ischemic changes with no definite new areas of infarction identified.  Specifically, the large area of ischemic \"penumbra\" appears largely salvaged with the IV TPA therapy.  There are no acute intracranial hemorrhages.    Transthoracic echo demonstrates a positive \"shunting\" with bubble study, but the exact source is not identified.    LABS:  Lab Results   Component Value Date    WBC 5.81 2017    HGB 9.5 (L) 2017    HCT 30.3 (L) 2017    MCV 98.7 2017     2017     Lab Results   Component Value Date    GLUCOSE 77 2017    CALCIUM 8.2 (L) 2017     2017    K 3.6 2017    CO2 24.0 2017     (H) 2017    " BUN 8 (L) 02/20/2017    CREATININE 0.60 02/20/2017    EGFRIFNONA 95 02/20/2017    BCR 13.3 02/20/2017    ANIONGAP 5.0 02/20/2017     Lab Results   Component Value Date    HGBA1C 4.80 02/20/2017     Lab Results   Component Value Date    CHOL 161 02/20/2017     Lab Results   Component Value Date    TRIG 110 02/20/2017     Lab Results   Component Value Date    HDL 48 02/20/2017       CURRENT MEDS:  Current Facility-Administered Medications   Medication Dose Route Frequency Provider Last Rate Last Dose   • acetaminophen (TYLENOL) tablet 1,000 mg  1,000 mg Oral 4x Daily PRN Zi Khan MD       • aspirin tablet 325 mg  325 mg Oral Daily Francisco Javier Gabriel MD        Or   • aspirin suppository 300 mg  300 mg Rectal Daily Francisco Javier Gabriel MD   300 mg at 02/20/17 1414   • atorvastatin (LIPITOR) tablet 40 mg  40 mg Oral Nightly Francisco Javier Gabriel MD   40 mg at 02/19/17 2002   • carbidopa-levodopa (SINEMET)  MG per tablet 1 tablet  1 tablet Oral 4x Daily Zi Khan MD       • cholecalciferol (VITAMIN D3) tablet 2,000 Units  2,000 Units Oral Daily Zi Khan MD       • [START ON 2/21/2017] ferrous sulfate tablet 325 mg  325 mg Oral Daily With Breakfast Zi Khan MD       • [START ON 2/21/2017] levothyroxine sodium injection 37.5 mcg  37.5 mcg Intravenous Q AM See [facility]       • loperamide (IMODIUM) capsule 2 mg  2 mg Oral TID PRN Zi Khan MD       • mirtazapine (REMERON) tablet 30 mg  30 mg Oral Nightly Zi Khan MD       • niCARdipine (CARDENE) 25 mg/250 mL 0.9% NS VTB (mbp)  5-15 mg/hr Intravenous Titrated Francisco Javier Gabriel MD   5 mg/hr at 02/19/17 1315   • ondansetron (ZOFRAN) injection 4 mg  4 mg Intravenous Q6H PRN Francisco Javier Gabriel MD       • phenylephrine (KASSIDY-SYNEPHRINE) 50 mg in sodium chloride 0.9 % 250 mL infusion  0.5-3 mcg/kg/min Intravenous Titrated Francisco Javier A Nery MD   0.5 mcg/kg/min at 02/19/17 1315   •  "polyethylene glycol (MIRALAX) powder 17 g  17 g Oral Daily PRN Zi Khan MD       • saccharomyces boulardii (FLORASTOR) capsule 250 mg  250 mg Oral BID Zi Khan MD       • sodium chloride 0.9 % flush 1-10 mL  1-10 mL Intravenous PRN Francisco Javier Gabriel MD       • sodium chloride 0.9 % flush 10 mL  10 mL Intravenous PRN Price Apple MD       • sodium chloride 0.9 % infusion  100 mL/hr Intravenous Once Price Apple MD   100 mL/hr at 02/19/17 1024   • tamsulosin (FLOMAX) 24 hr capsule 0.8 mg  0.8 mg Oral Daily Zi Khan MD       • vitamin E capsule 1,000 Units  1,000 Units Oral Daily Zi Khan MD           PHYSICAL EXAM:  Vitals:    02/20/17 1400   BP: 130/84   Pulse: 84   Resp: 14   Temp: 98.8 °F (37.1 °C)   SpO2: 95%      She follows commands and is getting out a few words sentences, but still has a fairly significant expressive aphasia.  Her NIH stroke scale is a documented as a 10 (presenting 26-30), but I suspect that is actually better than this as her left lower extremity \"deficit\" is resultant from recent left hip surgery (rather than stroke).      ASSESSMENT/PLAN:  Ms. Knapp is a 85 y.o. female status post IV TPA therapy for a left M1 MCA occlusion with large ischemic penumbra within the left middle cerebral vascular territory.  Planned thrombectomy was aborted when diagnostic angiography demonstrated essentially complete lysis of thrombus with IV TPA therapy in restoration of near normal (TICI 2B) flow to the left cerebral hemisphere.    Overall, she is progressing very nicely, and I'm hopeful that she will make a significant recovery.  Her 24-hour head CT demonstrates no convincing areas of infarction and no intracranial hemorrhages.  She starting antiplatelet therapy with and aspirin.    Her carotid vasculature is \"normal\" and I'm strongly suspicious for a cardiac thromboembolic source.  Her transthoracic echocardiogram demonstrates " "evidence of \"shunting\".  She has had recent hip surgery, and certainly a intracardiac \"shunting\" would be high on the differential, along with paroxysmal atrial fibrillation.  I'm going to ask cardiology to evaluate her for likely cardiac source.  Given the \"negative\" findings on her head CT, she would be a candidate for anticoagulation, as deemed clinically indicated.              "

## 2017-02-20 NOTE — PROGRESS NOTES
Acute Care - Occupational Therapy Initial Evaluation  [facility]     Patient Name: Florinda Knapp  : 3/5/1931  MRN: [id number]  Today's Date: 2017  Onset of Illness/Injury or Date of Surgery Date: 17  Date of Referral to OT: 17  Referring Physician: ARIEL Stokes    Admit Date: 2017       ICD-10-CM ICD-9-CM   1. Acute CVA (cerebrovascular accident) I63.9 434.91   2. Left carotid artery occlusion I65.22 433.10   3. Impaired functional mobility, balance, gait, and endurance Z74.09 V49.89   4. Impaired mobility and ADLs Z74.09 799.89     Patient Active Problem List   Diagnosis   • Dysuria   • Elbow injury   • Fracture of left olecranon process   • Parkinson's disease   • Trauma left hip   • Acute Symptomatic on Chronic Normocytic Anemia   • Acute respiratory failure with hypoxia   • Generalized weakness   • Chest pain   • Symptomatic anemia   • Elevated troponin I level, possibly due to anemia   • Status post total replacement of left hip 2016 at [facility]   • H/O urinary retention   • History of recurrent UTIs   • Essential hypertension   • Anxiety   • CHF with cardiomyopathy   • Colitis   • Hypokalemia   • Acute ischemic left MCA stroke   • Hypertension   • Hyperlipidemia   • Urinary retention/Frequent UTIs   • Hypothyroidism   • Status post left hip replacement   • Recent Colitis   • Acute CVA (cerebrovascular accident)     Past Medical History   Diagnosis Date   • Anemia    • Anxiety    • Asthma    • Depression    • Failure to thrive in adult    • Hyperlipidemia    • Hypertension    • Hypothyroidism    • Osteoarthritis    • Parkinson's disease    • Stress incontinence    • Urinary retention      Past Surgical History   Procedure Laterality Date   • Appendectomy     • Back surgery     • Cataract extraction     • Elbow procedure     • Knee surgery     • Total hip arthroplasty Left 2016          OT ASSESSMENT FLOWSHEET (last 72 hours)      OT Evaluation       17 0845 17 0840  02/19/17 2200 02/19/17 1333       Rehab Evaluation    Document Type evaluation  -CL evaluation  -DM       Subjective Information agree to therapy;no complaints  -CL agree to therapy;no complaints   per son,takes Remorex (sleep aid)& levadopa(Park.med)  -DM       Patient Effort, Rehab Treatment good  -CL fair  -DM       Symptoms Noted During/After Treatment  fatigue   incr. intermitt. tremors R LE;drowsy/dozing  -DM       General Information    Patient Profile Review yes  -CL yes  -DM       Onset of Illness/Injury or Date of Surgery Date 02/19/17  -CL 02/19/17  -DM       Referring Physician ARIEL Stokes  -CL MD Davi  -DM       General Observations  sup.in ICU bed;TELE;PICC R UE;IV LUE hep locked;SCUD sleeves in place but no pump(will initiate 24 hr s/p tPA);o2;navarro;depends open under pt.;mult NSG proced.;son present/long discussion re:PLOF,freq falls,recent hosp. stays;Dr. Gabriel came to assess;nsg hep locked IV for transf.;issued gt belt,chair alarm,CVA Ed BK.(explained to son)    -DM       Pertinent History Of Current Problem Pt presented to ED w/ aphasia, R fascial droop, and R sided paralysis. CT perfusion (+) L MCA CVA, s/p tPA and emergent mechanical thrombectomy. PMH: Pt s/p L JALEN w/ PHP at OSH on 12/06/2016, recent hospitalization at [facility] from 01/12/17-01/19/2017 d/t anemia/dehydration, Parkinson's Disease  -CL recent L THR 12-6-16@;d/c'd to [facility] for 2 wks(till end of Dec.),then transf to [place] [facility] for further rehab;hosp.1-12 to 1-19-17 for anemia (2 units PRBC's),then back to [place];onset sudden R sided weakness, R facial droop & aphasia 2-19 & collapsed while on toilet; adm to [facility] ER;given tPA approx 10:30 am on 2-19;CT perfusion scan: L MCA CVA & ischemic penumbra; has passed min.of 6 hr timeframe for init.of therapy, & is ok to transf to chair per nsg;will have repeat CT @ 11 am       -DM       Precautions/Limitations fall precautions;NPO;oxygen therapy device and L/min;hip precautions- left    s/p L JALEN 12/06/16, still on PHP per Son  -CL (P)  hip precautions- left;oxygen therapy device and L/min;other (see comments);fall precautions;NPO   Park Dz (on levadopa);L THR 12-6-17(son:still w/HIP precaut.  -DM       Prior Level of Function mod assist:;all household mobility;transfer;max assist:;ADL's;dressing;bathing   Per Son while at [facility] for rehab after 01/19 DC.   -CL (P)  mod assist:;gait;transfer;bed mobility;ADL's;dependent:;home management;driving;shopping   Needing asst for mobil.w/ R wx at [facility];h/o freq falls  -DM       Equipment Currently Used at Home walker, rolling;shower chair;raised toilet  -CL (P)  walker, rolling;shower chair;raised toilet  -DM  raised toilet;shower chair;walker, standard  -     Plans/Goals Discussed With patient and family;agreed upon  -CL (P)  patient and family;agreed upon  -DM       Risks Reviewed patient and family:;LOB;nausea/vomiting;dizziness;increased discomfort;lines disloged  -CL (P)  patient and family:;LOB;increased discomfort;change in vital signs;lines disloged  -DM       Benefits Reviewed patient and family:;improve function;increase independence;increase strength;increase balance;decrease pain;increase knowledge  -CL (P)  patient and family:;improve function;increase independence;increase strength;increase balance;decrease pain;increase knowledge  -DM       Barriers to Rehab medically complex;previous functional deficit  -CL (P)  medically complex;previous functional deficit;ineffective coping   h/o anxiety/Park.;fearful of falling(per son,h/o mult falls)  -DM       Living Environment    Lives With facility resident  -CL (P)  facility resident   PREV. lived at [facility];now at [facility] for rehab  -DM  facility resident  -LC     Living Arrangements assisted living   Prior to L JALEN, has been in SNF s/p procedure  -CL (P)  assisted living   [facility] p/t recent [facility] stay  -DM  assisted living  -     Home Accessibility    bed and bath on same  level  -LC     Stair Railings at Home    inside, present at both sides  -LC     Type of Financial/Environmental Concern  (P)  none  -DM  none  -LC     Transportation Available  (P)  ambulance  -DM  car  -LC     Living Environment Comment Per son, pt DC to [facility] after L JALEN, then t/f to [place] for SNF rehab. Prior to L JALEN, pt was a resident at [facility] however must be able to ambulate independently prior to return.    -CL (P)  per son,must be able to amb indep to return to [facility]   -DM       Clinical Impression    Date of Referral to OT 02/19/17  -CL        OT Diagnosis Decreased independence in ADLs.   -CL        Patient/Family Goals Statement To get stronger to return to [facility].   -CL        Criteria for Skilled Therapeutic Interventions Met yes;treatment indicated  -CL        Rehab Potential good, to achieve stated therapy goals  -CL        Therapy Frequency daily  -CL        Anticipated Equipment Needs At Discharge --   TBD  -CL        Anticipated Discharge Disposition skilled nursing facility  -CL        Functional Level Prior    Ambulation   1-->assistive equipment  -RR 1-->assistive equipment  -LC     Transferring   1-->assistive equipment  -RR 1-->assistive equipment  -LC     Toileting   1-->assistive equipment  -RR 1-->assistive equipment  -LC     Bathing   3-->assistive equipment and person  -RR 2-->assistive person  -LC     Dressing   2-->assistive person  -RR 2-->assistive person  -LC     Eating   0-->independent  -RR 0-->independent  -LC     Communication   0-->understands/communicates without difficulty  -RR 0-->understands/communicates without difficulty  -LC     Swallowing   0-->swallows foods/liquids without difficulty  -RR 0-->swallows foods/liquids without difficulty  -LC     Prior Functional Level Comment   .  -RR      Vital Signs    Pre Systolic BP Rehab 126  -CL (P)  126  -DM       Pre Treatment Diastolic BP 77  -CL (P)  77  -DM       Post Systolic BP Rehab 123  -CL (P)  123  -DM   "     Post Treatment Diastolic BP 84  -CL (P)  84  -DM       Pretreatment Heart Rate (beats/min) 82  -CL (P)  82  -DM       Intratreatment Heart Rate (beats/min)  (P)  86  -DM       Posttreatment Heart Rate (beats/min) 81  -CL (P)  81  -DM       Pre SpO2 (%) 96  -CL (P)  96  -DM       O2 Delivery Pre Treatment supplemental O2   2L  -CL (P)  supplemental O2   2 L  -DM       Post SpO2 (%) 95  -CL (P)  95  -DM       O2 Delivery Post Treatment supplemental O2   2L  -CL (P)  supplemental O2  -DM       Pre Patient Position Supine  -CL (P)  Supine  -DM       Intra Patient Position Sitting  -CL (P)  Sitting  -DM       Post Patient Position Sitting  -CL (P)  Sitting  -DM       Pain Assessment    Pain Assessment Headley-Anderson FACES   Pt shook head no when asked if in pain  -CL (P)  Headley-Anderson FACES  -DM       Headley-Anderson FACES Pain Rating 0  -CL (P)  0  -DM       Vision Assessment/Intervention    Visual Tracking Comment Pt tracked appropriately in all visual fields. Difficult to formally assess d/t lethargy and cognitive status.   -CL (P)  able to visually track in all fields  -DM       Cognitive Assessment/Intervention    Current Cognitive/Communication Assessment impaired   Pt w/ aphasia, though nods yes/no appropriately  -CL (P)  impaired  -DM       Orientation Status oriented to;person;place;disoriented to;time;situation   Pt oriented to hospital, name,  w/ choices and yes/no  -CL (P)  other (see comments)   aphasia;nods yes/no;said\"I can't talk\";asked\"are you Radha?\"  -DM       Follows Commands/Answers Questions 75% of the time;able to follow single-step instructions;needs cueing;needs increased time;needs repetition   Pt [place] and very lethargic  -CL (P)  75% of the time;able to follow single-step instructions;needs cueing;needs increased time;needs repetition  -DM       Personal Safety moderate impairment;decreased awareness, need for assist;decreased awareness, need for safety;decreased insight to deficits  -CL (P)  " moderate impairment;decreased awareness, need for assist;decreased awareness, need for safety;decreased insight to deficits  -DM       Personal Safety Interventions fall prevention program maintained;gait belt;nonskid shoes/slippers when out of bed  -CL (P)  elopement precautions initiated;fall prevention program maintained;gait belt;nonskid shoes/slippers when out of bed  -DM       ROM (Range of Motion)    General ROM  (P)  lower extremity range of motion deficits identified  -DM       General ROM Detail RUE shldr FE AAROM WFL, remainder grossly WFL. LUE shldr FE AAROM ~90 degrees, elbow F WFL/E limited by ~50% d/t previous ORIF,  WFL.   -CL        MMT (Manual Muscle Testing)    General MMT Assessment Detail RUE shldr FE 3-/5, elbow 3/5,  3+/5. LUE shldr FE 3-/5, elbow 3/5,  3+/5.   -CL        Bed Mobility, Assessment/Treatment    Bed Mobility, Assistive Device bed rails;head of bed elevated  -CL        Bed Mob, Supine to Sit, [place] maximum assist (25% patient effort);2 person assist required;verbal cues required  -CL        Bed Mobility, Comment Pt assisted in moving BLE towards EOB, however required assist to fully move off EOB and reach upright sitting position, and to maintain PHP.   -CL        Transfer Assessment/Treatment    Transfers, Bed-Chair [place] dependent (less than 25% patient effort);2 person assist required;verbal cues required  -CL        Transfers, Chair-Bed [place] not tested  -CL        Transfers, Bed-Chair-Bed, Assist Device --   BUE support.   -CL        Transfers, Sit-Stand [place] dependent (less than 25% patient effort);2 person assist required;verbal cues required  -CL        Transfers, Stand-Sit [place] dependent (less than 25% patient effort);2 person assist required;verbal cues required  -CL        Transfers, Sit-Stand-Sit, Assist Device --   BUE support.   -CL        Transfer, Comment Pt performed sit-pivot to chair, only stood ~50% upright w/  hips unweighted from bed, MAX VCs for sequencing of steps.   -CL        Functional Mobility    Functional Mobility- Ind. Level unable to perform  -CL        Grooming Assessment/Training    Grooming Assess/Train, Position supine  -CL        Grooming Assess/Train, Indepen Level verbal cues required;moderate assist (50% patient effort)  -CL        Grooming Assess/Train, Comment Pt required assist to wash face w/ wash cloth. VCs for attention to task.   -CL        Motor Skills/Interventions    Additional Documentation Balance Skills Training (Group);Fine Motor Coordination Training (Group);Gross Motor Coordination Training (Group)  -CL        Balance Skills Training    Sitting-Level of Assistance Moderate assistance;x2   Progressed to moments of Close Supervision at EOB  -CL        Sitting-Balance Support Right upper extremity supported;Left upper extremity supported;Feet supported  -CL        Sitting-Balance Activities --   static sitting  -CL        Standing-Level of Assistance Dependent;x2  -CL        Static Standing Balance Support Right upper extremity supported;Left upper extremity supported  -CL        Standing-Balance Activities Weight Shift R-L  -CL        Gross Motor Coordination Training    Gross Motor Skill, Impairments Detail RUE finger to mouth w/ eyes open impaired. Difficult to formally assess d/t cognitive status.   -CL        Fine Motor Coordination Training    Detail (Fine Motor Coordination Training) --   Difficult to formally assess d/t cognitive status.   -CL        Sensory Assessment/Intervention    Light Touch LUE;RUE  -CL        LUE Light Touch WNL   Per pt report, difficult to formally assess.   -CL        RUE Light Touch WNL  -CL        General Therapy Interventions    Planned Therapy Interventions ADL retraining;balance training;energy conservation;home exercise program;transfer training  -CL        Positioning and Restraints    Pre-Treatment Position in bed  -CL        Post Treatment  Position chair  -CL        In Chair notified nsg;reclined;call light within reach;encouraged to call for assist;with family/caregiver;RUE elevated;LUE elevated;on mechanical lift sling;legs elevated   No exit alarm system in place, RN notified. Pt w/ family  -CL          User Key  (r) = Recorded By, (t) = Taken By, (c) = Cosigned By    Initials Name Effective Dates    DM Sravani Puri, PT 06/19/15 -     LC Alida Ward, QUINTEN 06/16/16 -     RR Karon Yeboah, RN 06/16/16 -     CL Elif Rossi OT 06/08/16 -            Occupational Therapy Education     Title: PT OT SLP Therapies (Active)     Topic: Occupational Therapy (Active)     Point: ADL training (Active)    Description: Instruct learner(s) on proper safety adaptation and remediation techniques during self care or transfers.   Instruct in proper use of assistive devices.    Learning Progress Summary    Learner Readiness Method Response Comment Documented by Status   Patient Acceptance E,D NR Pt educated on PHP, appropriate safety precautions, appropriate t/f techniques, role of OT, and benefits of therapy.  02/20/17 1019 Active   Family Acceptance E,D NR Pt educated on PHP, appropriate safety precautions, appropriate t/f techniques, role of OT, and benefits of therapy.  02/20/17 1019 Active               Point: Home exercise program (Active)    Description: Instruct learner(s) on appropriate technique for monitoring, assisting and/or progressing therapeutic exercises/activities.    Learning Progress Summary    Learner Readiness Method Response Comment Documented by Status   Patient Acceptance E,D NR Pt educated on PHP, appropriate safety precautions, appropriate t/f techniques, role of OT, and benefits of therapy.  02/20/17 1019 Active   Family Acceptance E,D NR Pt educated on PHP, appropriate safety precautions, appropriate t/f techniques, role of OT, and benefits of therapy.  02/20/17 1019 Active               Point: Precautions (Active)     Description: Instruct learner(s) on prescribed precautions during self-care and functional transfers.    Learning Progress Summary    Learner Readiness Method Response Comment Documented by Status   Patient Acceptance E,D NR Pt educated on PHP, appropriate safety precautions, appropriate t/f techniques, role of OT, and benefits of therapy.  02/20/17 1019 Active   Family Acceptance E,D NR Pt educated on PHP, appropriate safety precautions, appropriate t/f techniques, role of OT, and benefits of therapy. CL 02/20/17 1019 Active               Point: Body mechanics (Active)    Description: Instruct learner(s) on proper positioning and spine alignment during self-care, functional mobility activities and/or exercises.    Learning Progress Summary    Learner Readiness Method Response Comment Documented by Status   Patient Acceptance E,D NR Pt educated on PHP, appropriate safety precautions, appropriate t/f techniques, role of OT, and benefits of therapy.  02/20/17 1019 Active   Family Acceptance E,D NR Pt educated on PHP, appropriate safety precautions, appropriate t/f techniques, role of OT, and benefits of therapy.  02/20/17 1019 Active                      User Key     Initials Effective Dates Name Provider Type Discipline     06/08/16 -  Elif Rossi OT Occupational Therapist OT                  OT Recommendation and Plan  Anticipated Equipment Needs At Discharge:  (TBD)  Anticipated Discharge Disposition: skilled nursing facility  Planned Therapy Interventions: ADL retraining, balance training, energy conservation, home exercise program, transfer training  Therapy Frequency: daily  Plan of Care Review  Plan Of Care Reviewed With: patient, family  Progress: progress toward functional goals as expected  Outcome Summary/Follow up Plan: OT eval complete, though session limited d/t lethargy and cognitive status. Pt appropriately answers yes/no questions w/ nods, however pt quickly fatigues and requires VCs to stay  alert. Pt dependentx2 for sit/pivot t/f from bed to chair this date. Recommend pt DC to SNF for rehab placement. Recommend cont skilled IPOT intervention to increase safety/functional independence.           OT Goals       02/20/17 1020          Transfer Training OT LTG    Transfer Training OT LTG, Date Established 02/20/17  -CL      Transfer Training OT LTG, Time to Achieve by discharge  -CL      Transfer Training OT LTG, Activity Type bed to chair /chair to bed;sit to stand/stand to sit;toilet  -CL      Transfer Training OT LTG, [place] Level maximum assist (25% patient effort);2 person assist required  -CL      Transfer Training OT LTG, Additional Goal AAD  -CL      Strength OT LTG    Strength Goal OT LTG, Date Established 02/20/17  -CL      Strength Goal OT LTG, Time to Achieve by discharge  -CL      Strength Goal OT LTG, Measure to Achieve Pt will tolerate BUE AROM/AAROM HEP x15 reps to increase strength/endurance to promote ADL performance.   -CL      Dynamic Sitting Balance OT LTG    Dynamic Sitting Balance OT LTG, Date Established 02/20/17  -CL      Dynamic Sitting Balance OT LTG, Time to Achieve by discharge  -CL      Dynamic Sitting Balance OT LTG, [place] Level contact guard assist  -CL      Dynamic Sitting Balance OT LTG, Additional Goal AAD   -CL      Static Standing Balance OT LTG    Static Standing Balance OT LTG, Date Established 02/20/17  -CL      Static Standing Balance OT LTG, Time to Achieve by discharge  -CL      Static Standing Balance OT LTG, [place] Level moderate assist (50% patient effort);2 person assist required  -CL      Static Standing Balance OT LTG, Assist Device UE Support  -CL      Static Standing Balance OT LTG, Additional Goal AAD  -CL      Orientation OT LTG    Orientation OT LTG, Date Established 02/20/17  -CL      Orientation OT LTG, Time to Achieve by discharge  -CL      Orientation OT LTG, Activity Type person;place;time;100% treatment session;verbal cues  -CL         User Key  (r) = Recorded By, (t) = Taken By, (c) = Cosigned By    Initials Name Provider Type    CL Elif Rossi OT Occupational Therapist                Outcome Measures       02/20/17 0845          How much help from another is currently needed...    Putting on and taking off regular lower body clothing? 1  -CL      Bathing (including washing, rinsing, and drying) 1  -CL      Toileting (which includes using toilet bed pan or urinal) 1  -CL      Putting on and taking off regular upper body clothing 1  -CL      Taking care of personal grooming (such as brushing teeth) 2  -CL      Eating meals 1  -CL      Score 7  -CL      Modified Erie Scale    Modified Erie Scale 5 - Severe disability.  Bedridden, incontinent, and requiring constant nursing care and attention.  -CL      Functional Assessment    Outcome Measure Options AM-PAC 6 Clicks Daily Activity (OT);Modified [place]  -CL        User Key  (r) = Recorded By, (t) = Taken By, (c) = Cosigned By    Initials Name Provider Type    CL Elif Rossi OT Occupational Therapist          Time Calculation:   OT Start Time: 0845    Therapy Charges for Today     Code Description Service Date Service Provider Modifiers Qty    [id number]  OT EVAL HIGH COMPLEXITY 4 2/20/2017 Elif Rossi OT GO 1    [id number]  OT THERAPEUTIC ACT EA 15 MIN 2/20/2017 Elif Rossi OT GO 1               Elif Rossi OT  2/20/2017

## 2017-02-20 NOTE — PLAN OF CARE
Problem: Patient Care Overview (Adult)  Goal: Plan of Care Review  Outcome: Ongoing (interventions implemented as appropriate)    02/20/17 0820   Coping/Psychosocial Response Interventions   Plan Of Care Reviewed With patient   Patient Care Overview   Progress no change       Goal: Adult Individualization and Mutuality  Outcome: Ongoing (interventions implemented as appropriate)  Goal: Discharge Needs Assessment  Outcome: Ongoing (interventions implemented as appropriate)    Problem: Stroke (Ischemic) (Adult)  Goal: Signs and Symptoms of Listed Potential Problems Will be Absent or Manageable (Stroke)  Outcome: Ongoing (interventions implemented as appropriate)    02/20/17 0820   Stroke (Ischemic)   Problems Assessed (Stroke (Ischemic)/TIA) acute neurologic deterioration;communication impairment;eating/swallowing impairment;motor/sensory impairment   Problems Present (Stroke (Ischemic)/TIA) acute neurologic deterioration;communication impairment;eating/swallowing impairment;motor/sensory impairment         Problem: Mobility, Physical Impaired (Adult)  Goal: Identify Related Risk Factors and Signs and Symptoms  Outcome: Ongoing (interventions implemented as appropriate)    02/20/17 0820   Mobility, Physical Impaired   Physical Mobility, Impaired: Related Risk Factors cardiovascular impairment;fear of falling;pain/discomfort;surgery/procedure   Signs and Symptoms (Physical Mobility Impaired) decreased balance;limited ability to perform gross/fine motor skills;unsafe transfers/ambulation       Goal: Enhanced Mobility Skills  Outcome: Ongoing (interventions implemented as appropriate)    02/20/17 0820   Mobility, Physical Impaired (Adult)   Enhanced Mobility Skills making progress toward outcome       Goal: Enhanced Functionality Ability  Outcome: Ongoing (interventions implemented as appropriate)    02/20/17 0820   Mobility, Physical Impaired (Adult)   Enhanced Functionality Ability making progress toward outcome          Problem: Pressure Ulcer Risk (Estevan Scale) (Adult,Obstetrics,Pediatric)  Goal: Identify Related Risk Factors and Signs and Symptoms  Outcome: Ongoing (interventions implemented as appropriate)    02/20/17 0820   Pressure Ulcer Risk (Estevan Scale)   Related Risk Factors (Pressure Ulcer Risk (Estevan Scale)) age extremes;cognitive impairment;critical care admission;excretions/secretions;mobility impaired       Goal: Skin Integrity  Outcome: Ongoing (interventions implemented as appropriate)    02/20/17 0820   Pressure Ulcer Risk (Estevan Scale) (Adult,Obstetrics,Pediatric)   Skin Integrity making progress toward outcome         Problem: Fall Risk (Adult)  Goal: Identify Related Risk Factors and Signs and Symptoms  Outcome: Ongoing (interventions implemented as appropriate)    02/20/17 0820   Fall Risk   Fall Risk: Related Risk Factors bladder function altered;fatigue/slow reaction;fear of falling;gait/mobility problems;history of falls;neuro disease/injury;impaired vision;sensory deficits;environment unfamiliar   Fall Risk: Signs and Symptoms presence of risk factors       Goal: Absence of Falls  Outcome: Ongoing (interventions implemented as appropriate)    02/20/17 0820   Fall Risk (Adult)   Absence of Falls making progress toward outcome

## 2017-02-20 NOTE — PROGRESS NOTES
"Adult Nutrition  Assessment/PES    Patient Name:  Florinda Knapp  YOB: 1931  MRN: [id number]  Admit Date:  2/19/2017    Assessment Date:  2/20/2017  If req TF suggest Fibersource HN begin 25 ml/hr advance by 25 ml/hr ev 8 hr w tolerance to  Goal 60 ml/hr Give water at 25 ml/hr.       Reason for Assessment       02/20/17 1538    Reason for Assessment    Reason For Assessment/Visit admission assessment    Time Spent (min) 20    Cardiac HTN;Dyslipidemia    Endocrine Hypothyroid    Gastrointestinal Other (comment)   recent colitis    Hematological Anemia    Infectious Disease Other (comment)   freq UTI    Neurological Parkinson's;CVA   per hx    Ortho Osteoarthritis   hx fx's    Pulmonary/Critical Care Asthma    Other diagnosis FTT ; aphasia, dysphagia              Nutrition/Diet History       02/20/17 1541    Nutrition/Diet History    Reported/Observed By RN    Other Failed dyspagia eval            Anthropometrics       02/20/17 1542    Anthropometrics    Height 149.9 cm (59\")    Weight 47.6 kg (105 lb)    Ideal Body Weight (IBW)    Ideal Body Weight (IBW), Female 43.97    % Ideal Body Weight 108.54    Body Mass Index (BMI)    BMI (kg/m2) 21.25            Labs/Tests/Procedures/Meds       02/20/17 1542    Labs/Tests/Procedures/Meds    Labs/Tests Review Reviewed              Estimated/Assessed Needs       02/20/17 1543    Calculation Measurements    Weight Used For Calculations 47.6 kg (105 lb)    Height Used for Calculations 1.499 m (4' 11\")    Estimated/Assessed Energy Needs    Energy Need Method Wheatley [place]    Estimated REE  (Wheatley [place]) 986    Activity Factors 1.3    Repletion Factors + 250    Estimated Kcal Range  1045-5805    Estimated/Assessed Protein Needs    Weight Used for Protein Calculation 47.6 kg (105 lb)    Protein (gm/kg) 1.2    1.2 Gm Protein (gm) 57.15            Nutrition Prescription Ordered       02/20/17 1544    Nutrition Prescription PO    Current PO Diet NPO    "             Problem/Interventions:        Problem 1       02/20/17 1544    Nutrition Diagnoses Problem 1    Problem 1 Needs Alternate Route   potential     Neurological CVA    Signs/Symptoms (evidenced by) NPO                    Intervention Goal       02/20/17 1544    Intervention Goal    General Nutrition support treatment    TF/PN Inititiate TF/PN   as approp            Nutrition Intervention       02/20/17 1545    Nutrition Intervention    RD/Tech Action Follow Tx progress            Nutrition Prescription       02/20/17 1545    Nutrition Prescription EN    Enteral Prescription Enteral begin/change;Enteral to supply    Enteral Route ND    Product Fibersource HN    TF Delivery Method Continuous    Continuous TF Goal Rate (mL/hr) 60 mL/hr    Continuous TF Starting Rate (mL/hr) 25 mL/hr    Continuous TF Goal Volume (mL) 1200 mL   based on 20 hr delivery    Water flush (mL)  25 mL    Water Flush Frequency Per hour    EN to Supply    Kcal/Day 1440 Kcal/Day    Protein (gm/day) 65 gm/day    Meet Estimated Kcal Need (%) 102 %    Meet Estimated Protein Need (%) 114 %    TF Free H2O (mL) 972 mL    Total Free H2O (mL/day) 1472 mL/day    Fiber Per Day (gm/day) 12 gm/day            Education/Evaluation       02/20/17 1547    Monitor/Evaluation    Monitor Per protocol        Comments:      Electronically signed by:  Yuni Merida RD  02/20/17 3:48 PM

## 2017-02-20 NOTE — PLAN OF CARE
Problem: Patient Care Overview (Adult)  Goal: Plan of Care Review  Outcome: Ongoing (interventions implemented as appropriate)    02/20/17 1153   Coping/Psychosocial Response Interventions   Plan Of Care Reviewed With patient   Outcome Evaluation   Outcome Summary/Follow up Plan Clinical swallow eval and FEES complete. moderate-severe pharyngeal dysphagia. Delayed initiation with thins resulting in asp before the swallow. Asp during the swallow with nectar thick 2' decreased airway closure and timing. Delayed initiation to the lateral channels with pudding and honey thick via spoon. No pen asp w/ honey thick via spoon, pudding, solid. Lateral channel and pyriform residue cleared w/ spontaneous second swallow. Asp was mostly silent during the evaluation. REC: honey thick liquids via spoon, Dysphagia level 4. Rn may downgrade to dysphagia level 3 per pt preference. Meds crushed in pureed. Dysphagia tx.         Problem: Inpatient SLP  Goal: Dysphagia- Patient will improve swallowing skills to begin to take some PO safely  Outcome: Ongoing (interventions implemented as appropriate)    02/20/17 1153   Begin to Take Some PO Safely   Begin to Take Some PO Safely- SLP, Date Established 02/20/17   Begin to Take Some PO Safely- SLP, Time to Achieve by discharge   Begin to Take Some PO Safely- SLP, Outcome goal ongoing

## 2017-02-20 NOTE — PROGRESS NOTES
Continued Stay Note  [facility]     Patient Name: Florinda Knapp  MRN: [id number]  Today's Date: 2/20/2017    Admit Date: 2/19/2017          Discharge Plan       02/20/17 1424    Case Management/Social Work Plan    Additional Comments Referral made to [facility].               Discharge Codes     None            Matthew Thakkar RN

## 2017-02-20 NOTE — PROGRESS NOTES
Acute Care - Speech Language Pathology   Swallow Initial Evaluation [facility]   Clinical Swallow Evaluation  Fiberoptic Endoscopic Evaluation of Swallowing (FEES)       Patient Name: Florinda Knapp  : 3/5/1931  MRN: [id number]  Today's Date: 2017  Onset of Illness/Injury or Date of Surgery Date: 17            Admit Date: 2017    Visit Dx:     ICD-10-CM ICD-9-CM   1. Acute CVA (cerebrovascular accident) I63.9 434.91   2. Left carotid artery occlusion I65.22 433.10   3. Impaired functional mobility, balance, gait, and endurance Z74.09 V49.89   4. Impaired mobility and ADLs Z74.09 799.89     Patient Active Problem List   Diagnosis   • Dysuria   • Elbow injury   • Fracture of left olecranon process   • Parkinson's disease   • Trauma left hip   • Acute Symptomatic on Chronic Normocytic Anemia   • Acute respiratory failure with hypoxia   • Generalized weakness   • Chest pain   • Symptomatic anemia   • Elevated troponin I level, possibly due to anemia   • Status post total replacement of left hip 2016 at [facility]   • H/O urinary retention   • History of recurrent UTIs   • Essential hypertension   • Anxiety   • CHF with cardiomyopathy   • Colitis   • Hypokalemia   • Acute ischemic left MCA stroke   • Hypertension   • Hyperlipidemia   • Urinary retention/Frequent UTIs   • Hypothyroidism   • Status post left hip replacement   • Recent Colitis   • Acute CVA (cerebrovascular accident)     Past Medical History   Diagnosis Date   • Anemia    • Anxiety    • Asthma    • Depression    • Failure to thrive in adult    • Hyperlipidemia    • Hypertension    • Hypothyroidism    • Osteoarthritis    • Parkinson's disease    • Stress incontinence    • Urinary retention      Past Surgical History   Procedure Laterality Date   • Appendectomy     • Back surgery     • Cataract extraction     • Elbow procedure     • Knee surgery     • Total hip arthroplasty Left 2016          SWALLOW EVALUATION (last 72 hours)       Swallow Evaluation       02/20/17 1000                Rehab Evaluation    Document Type evaluation  -LS        Subjective Information no complaints;agree to therapy  -        General Information    Patient Profile Review yes  -LS        Onset of Illness/Injury 02/19/17  -        Subjective Patient Observations alert, cooperative, lethargic  -        Pertinent History Of Current Problem admit per CVA pathway. s/p tPA, Hip replacement 12/16; 1/17 hospital admit 2' anemia  -LS        Current Diet Limitations NPO  -LS        Precautions/Limitations, Vision WFL   for purpose of this eval  -LS        Precautions/Limitations, Hearing WFL  -LS        Prior Level of Function- Communication functional in all spheres  -LS        Prior Level of Function- Swallowing no diet consistency restrictions  -        Plans/Goals Discussed With patient and family;agreed upon  -        Barriers to Rehab none identified  -        Clinical Impression    Patient's Goals For Discharge patient did not state  -        Family Goals For Discharge patient able to return to PO diet  -LS        SLP Swallowing Diagnosis severe dysphagia;pharyngeal dysfunction  -LS        Rehab Potential/Prognosis, Swallowing good, to achieve stated therapy goals  -LS        Criteria for Skilled Therapeutic Interventions Met skilled criteria for dysphagia intervention met  -LS        FCM, Swallowing 1-->Level 1  -LS        Therapy Frequency 5 times/wk  -LS        SLP Diet Recommendation NPO: unsafe for food/liquid intake  -        Recommended Diagnostics FEES   completed 2/20/17  -        SLP Rec. for Method of Medication Administration meds via alternate route  -LS        Oral Motor Structure and Function    Dentition Assessment present and adequate  -LS        Secretion Management WNL/WFL  -LS        Mucosal Quality moist, healthy  -LS        Volitional Swallow no difficulties initiating volitional swallow  -        Oral Motor Assessment Comment  genrealy weak with decreased lingual and labial ROM  -LS        Clinical Swallow Exam    Mode of Presentation fed by clinician;straw  -LS        Oral Preparation Concerns thin:;nectar:;pudding:  -LS        Oral Phase Results intact oral phase without signs of dysfunction  -LS        Pharyngeal Phase Results sign/symptoms of pharyngeal impairment;cough;multiple swallows;throat clear  -LS        Summary of Clinical Exam r/o pharyngeal dysphagia with FEES. Cough and multiple swallows with thins via spoon. Delayed throat clear after all trials. REC: NPO until FEES.  -LS        Fiberoptic Endoscopic Swallowing Exam    Risks/Benefits Reviewed risks/benefits explained;agreed to eval;patient  -LS        Positioning Needs for Swallow Exam upright at 90 degrees in chair  -LS        Anatomy and Physiology    Velopharyngeal WFL  -LS        Base of Tongue symmetrical  -LS        Epiglottis WFL  -LS        Laryngeal Function Breathing symmetrical  -LS        Laryngeal Function Phonation symmetrical  -LS        Laryngeal Function Breath Hold CNA  -LS        Secretions 0- Normal, no visible secretions  -LS        Secretion Description thin;clear  -LS        Spontaneous Swallow frequency reduced  -LS        Sensory reduced sensation  -LS        Oral-Phary Transit increased transit time  -LS        Anatomy Hypopharynx    Observation Anatomic Considerations no anatomic structural deviation via FEES  -LS        FEES    Mode of Presentation thin:;nectar:;pudding:;fed by clinician;spoon  -LS        Pharyngeal Phase Impairment thin:;nectar:;impaired timing with aspiration during;aspiration after from residue;pudding:  -LS        Post Swallow Residue thin:;nectar:;pudding:;residue present pyriform sinuses;residue present on pharyngeal walls;residue present in valleculae  -LS        Rosenbek's Scale thin:;nectar:;pudding:;8-->Level 8  -LS        Response to Aspiration absent response, silent aspiration  -LS        Attempted Compensatory  Maneuvers bolus size  -LS        Pharyngeal Phase Results impaired pharyngeal phase of swallowing  -        FEES Summary FEES complete. Severe dysphagia. Delayed initiation to the pyriforms with thins. Asp during the swallow with thins and nectar thick 2' timing and decreased airway closure. Timing improved w/ pureed. Pen during the swallow with pudding. Diffused residue after the swallow. Residue spilling from the posterior commissure resulting in asp after the swallow. Pt at high risk for asp w/ all trials. REC: NPO with meds via alt route.Dysphagia tx.  -LS        FEES Swallow Recommendations    Oral Care oral care with toothbrush and dentifrice BID and PRN  -        Recommended Diet NPO: unsafe for food/liquid intake  -          User Key  (r) = Recorded By, (t) = Taken By, (c) = Cosigned By    Initials Name Effective Dates     Ame Enrique MS [facility]-SLP 06/22/15 -         EDUCATION  The patient has been educated in the following areas:   Dysphagia (Swallowing Impairment) Oral Care/Hydration NPO rationale.    SLP Recommendation and Plan  SLP Swallowing Diagnosis: severe dysphagia, pharyngeal dysfunction  SLP Diet Recommendation: NPO: unsafe for food/liquid intake     SLP Rec. for Method of Medication Administration: meds via alternate route     Recommended Diagnostics: FEES (completed 2/20/17)  Criteria for Skilled Therapeutic Interventions Met: skilled criteria for dysphagia intervention met     Rehab Potential/Prognosis, Swallowing: good, to achieve stated therapy goals  Therapy Frequency: 5 times/wk             Plan of Care Review  Plan Of Care Reviewed With: patient  Outcome Summary/Follow up Plan: Clincial swallow eval and FEES complete. Severe dysphagia. Delayed initiation to the pyriforms with thins. Asp during the swallow with thins and nectar thick 2' timing and decreased airway closure. Timing improved w/ pureed. Pen during the swallow with pudding. Diffused residue after the swallow. Residue  spilling from the posterior commissure resulting in asp after the swallow. Pt at high risk for asp w/ all trials. REC: NPO with meds via alt route.Dysphagia tx.          IP SLP Goals       02/20/17 1153          Begin to Take Some PO Safely    Begin to Take Some PO Safely- SLP, Date Established 02/20/17  -LS      Begin to Take Some PO Safely- SLP, Time to Achieve by discharge  -LS      Begin to Take Some PO Safely- SLP, Outcome goal ongoing  -LS        User Key  (r) = Recorded By, (t) = Taken By, (c) = Cosigned By    Initials Name Provider Type    AMINA Enrique MS CCC-SLP Speech and Language Pathologist               Time Calculation:         Time Calculation- SLP       02/20/17 1155          Time Calculation- SLP    SLP Start Time 1000  -LS      SLP Received On 02/20/17  -LS        User Key  (r) = Recorded By, (t) = Taken By, (c) = Cosigned By    Initials Name Provider Type    AMINA Enrique MS CCC-SLP Speech and Language Pathologist          Therapy Charges for Today     Code Description Service Date Service Provider Modifiers Qty    99703883041 HC ST EVAL ORAL PHARYNG SWALLOW 3 2/20/2017 Aem Enrique MS CCC-SLP GN 1    [id number] HC ST FIBEROPTIC ENDO EVAL SWALL 6 2/20/2017 Ame Enrique MS CCC-SLP GN 1               Ame Enrique MS CCC-JUDITH  2/20/2017

## 2017-02-20 NOTE — PATIENT CARE CONFERENCE
ICU Rounds: [facility] 9. PT/OT evaluations pending this date.

## 2017-02-20 NOTE — PROGRESS NOTES
INTENSIVIST / PULMONARY FOLLOW UP NOTE     Hospital:  LOS: 1 day   Ms. Florinda Knapp, 85 y.o. female is followed for:   Principal Problem:    Acute ischemic left MCA stroke  Active Problems:    Hypertension    Hyperlipidemia    Urinary retention/Frequent UTIs    Hypothyroidism    Status post left hip replacement    Recent Colitis    Acute CVA (cerebrovascular accident)          SUBJECTIVE   Sitting in chair alert, no fever, no pain    The patient's relevant past medical, surgical, family, and social history were reviewed    Allergies and medications were reviewed    ROS:  Per subjective, all other systems were reviewed and were negative        OBJECTIVE     Vitals:    02/20/17 1300   BP: 131/84   Pulse: 78   Resp:    Temp:    SpO2: 99%     General Appearance:  Conversant, in no acute distress  Eyes:  No scleral icterus or pallor, PERRLA  Ears, Nose, Mouth, Throat:  Atraumatic, oropharynx clear  Neck:  Trachea midline, thyroid normal  Respiratory:  Clear to auscultation bilaterally, normal effort  Cardiovascular:  Regular rate and rhythm, no murmurs, no peripheral edema  Gastrointestinal:  Soft, non-tender, non-distended, no hepatosplenomegaly  Skin:  Normal temperature, no rash  Psychiatric:  Alert, no agitation  Neuro: slight right sided facial droop    Relevant imaging studies and labs from 02/20/17 were reviewed and interpreted by me    Assessment/Plan   IMPRESSION / PLAN     Inpatient Problem List:  85 y.o.female:  Principal Problem:    Acute ischemic left MCA stroke  Active Problems:    Hypertension    Hyperlipidemia    Urinary retention/Frequent UTIs    Hypothyroidism    Status post left hip replacement    Recent Colitis    Acute CVA (cerebrovascular accident)       Impression:  85 y.o.female with relevant PMH of HTN, Urinary Retention, Frequent UTI's, Chronic Systolic (45%) and Grade 1 Diastolic HF, Parkinsons, recent Hip Replacement admitted 2/19/2017 w/ Acute Left MCA CVA s/p TPA (initial deficits were  aphasia, right facial droop, right sided hemiparesis), Cath w/ no intervention required    Plan:  -Stroke - per Neuro    -Urinary Retention - keep navarro (1700 cc), increase flomax    -Resume / continue appropriate home meds    -Monitor for complications    -Speech/PT/OT    -Pepcid/SCDs/TEDs, start lovenox when CT head read as negative for bleeding    Plan of care and goals reviewed with mulitdisciplinary team at daily rounds    Critical care time: 30 min       Zi Khan MD  Intensive Care Medicine  02/20/17 1:25 PM

## 2017-02-20 NOTE — CONSULTS
Diabetes Education    Patient Name:  Florinda Knapp  YOB: 1931  MRN: [id number]  Admit Date:  2/19/2017        Patient does not meet criteria for diabetes education order. Therefore, patient was not seen by diabetes education.       Electronically signed by:  Landy Watkins RN  02/20/17 8:39 AM

## 2017-02-20 NOTE — NURSING NOTE
[facility] consult for specialty bed. Ordered bed: low air loss bed/mattress. Please contact [facility] if any other needs arise. Thanks

## 2017-02-20 NOTE — PROGRESS NOTES
"Discharge Planning Assessment  [facility]     Patient Name: Florinda Knapp  MRN: [id number]  Today's Date: 2/20/2017    Admit Date: 2/19/2017          Discharge Needs Assessment       02/20/17 1058    Living Environment    Lives With facility resident   Transferred to [facility] from [facility].  She has been there few weeks for rehab prior to returning back to [facility].  She uses a straight walker there.      Living Arrangements assisted living    Provides Primary Care For no one, unable/limited ability to care for self    Quality Of Family Relationships supportive    Able to Return to Prior Living Arrangements yes    Discharge Needs Assessment    Concerns To Be Addressed basic needs concerns;adjustment to diagnosis/illness concerns    Readmission Within The Last 30 Days previous discharge plan unsuccessful    Outpatient/Agency/Support Group Needs --   She has been a patient with [facility] in the past, but not currently.  Her son states they would use [facility] again if needed.    Equipment Currently Used at Home walker, rolling;walker, standard;raised toilet    Transportation Available car;family or friend will provide            Discharge Plan       02/20/17 1108    Case Management/Social Work Plan    Plan Rehab    Patient/Family In Agreement With Plan yes    Additional Comments Ms. Knapp is sleeping and I talked with her 2 sons 2 BS.  She has been living at [facility] last few weeks.  Prior to that, she was at [facility] in [facility].  She uses a straight walker at the facility.  The son's state they do not have a \"bed hold\" at the [facility].  She used [facility] in past, but not currently.  The son's goal is for Ms. Knapp to go to [facility] prior to returning to [facility].  CM will follow.          Discharge Placement     No information found                Demographic Summary       02/20/17 1051    Referral Information    Admission Type " inpatient    Arrived From admitted as an inpatient    Referral Source admission list    Reason For Consult discharge planning    Contact Information    Permission Granted to Share Information With             Functional Status       02/20/17 1051    Functional Status Current    Ambulation 3-->assistive equipment and person    Transferring 3-->assistive equipment and person    Toileting 3-->assistive equipment and person    Bathing 3-->assistive equipment and person    Dressing 3-->assistive equipment and person    Eating 2-->assistive person    Communication 2-->difficulty speaking (not related to language barrier)    Functional Status Prior    Ambulation 1-->assistive equipment    Transferring 1-->assistive equipment    Toileting 1-->assistive equipment    Bathing 2-->assistive person    Dressing 2-->assistive person    Eating 0-->independent    Communication 0-->understands/communicates without difficulty    Swallowing 0-->swallows foods/liquids without difficulty    IADL    Medications assistive person    Meal Preparation assistive person    Housekeeping assistive person    Laundry assistive person    Shopping assistive person    Oral Care independent            Psychosocial     None            Abuse/Neglect     None            Legal     None            Substance Abuse     None            Patient Forms     None          Matthew Thakkar RN

## 2017-02-20 NOTE — PLAN OF CARE
Problem: Patient Care Overview (Adult)  Goal: Plan of Care Review  Outcome: Ongoing (interventions implemented as appropriate)    02/20/17 1042   Coping/Psychosocial Response Interventions   Plan Of Care Reviewed With patient;edgard   Patient Care Overview   Progress progress towards functional goals is fair   Outcome Evaluation   Outcome Summary/Follow up Plan Adm w/ L MCA CVA (tPA given approx 1030 am yest & MCA thrombectomy performed);also recent L THR 12-6-16 & rehosp.for anemia;h/o Evaristo Sosa , w/ freq. falls & mult fxs.; presents w/ evolving sympt. ,incl. fluct. BP ,and drowsiness/lethargy;unable to WB w/ LE'S for transf UIC(sit pivot performed);will benefit from IPPT for strengthening, prog.mobil. trng & instruct. in fall prevention; recommend resuming S.T. rehab s/p D/C from [facility], w/ eventual return to [facility]          Problem: Stroke (Ischemic) (Adult)  Goal: Signs and Symptoms of Listed Potential Problems Will be Absent or Manageable (Stroke)  Outcome: Ongoing (interventions implemented as appropriate)    02/20/17 1042   Stroke (Ischemic)   Problems Assessed (Stroke (Ischemic)/TIA) motor/sensory impairment   Problems Present (Stroke (Ischemic)/TIA) motor/sensory impairment         Problem: Inpatient Physical Therapy  Goal: Bed Mobility Goal LTG- PT  Outcome: Ongoing (interventions implemented as appropriate)    02/20/17 1042   Bed Mobility PT LTG   Bed Mobility PT LTG, Date Established 02/20/17   Bed Mobility PT LTG, Time to Achieve 1 wk   Bed Mobility PT LTG, Activity Type all bed mobility   Bed Mobility PT LTG, [place] Level 2 person assist required;moderate assist (50% patient effort)         02/20/17 1042   Bed Mobility PT LTG   Bed Mobility PT LTG, Date Established 02/20/17   Bed Mobility PT LTG, Time to Achieve 1 wk   Bed Mobility PT LTG, Activity Type all bed mobility   Bed Mobility PT LTG, [place] Level 2 person assist required;moderate assist (50% patient effort)       Goal: Transfer Training Goal  1 LTG- PT  Outcome: Ongoing (interventions implemented as appropriate)    02/20/17 1042   Transfer Training PT LTG   Transfer Training PT LTG, Date Established 02/20/17   Transfer Training PT LTG, Time to Achieve 1 wk   Transfer Training PT LTG, Activity Type bed to chair /chair to bed;sit to stand/stand to sit   Transfer Training PT LTG, [place] Level maximum assist (25% patient effort);2 person assist required   Transfer Training PT LTG, Assist Device walker, rolling  (WBAT L LE;stable VS)       Goal: Gait Training Goal LTG- PT  Outcome: Ongoing (interventions implemented as appropriate)    02/20/17 1042   Gait Training PT LTG   Gait Training Goal PT LTG, Date Established 02/20/17   Gait Training Goal PT LTG, Time to Achieve 1 wk   Gait Training Goal PT LTG, [place] Level maximum assist (25% patient effort);2 person assist required   Gait Training Goal PT LTG, Assist Device walker, rolling  (WBAT L LE;stable VS)   Gait Training Goal PT LTG, Distance to Achieve 25

## 2017-02-21 ENCOUNTER — APPOINTMENT (OUTPATIENT)
Dept: GENERAL RADIOLOGY | Facility: HOSPITAL | Age: 82
End: 2017-02-21

## 2017-02-21 LAB
ALBUMIN SERPL-MCNC: 2.9 G/DL (ref 3.2–4.8)
ALBUMIN/GLOB SERPL: 0.9 G/DL (ref 1.5–2.5)
ALP SERPL-CCNC: 54 U/L (ref 25–100)
ALT SERPL W P-5'-P-CCNC: 13 U/L (ref 7–40)
ANION GAP SERPL CALCULATED.3IONS-SCNC: 7 MMOL/L (ref 3–11)
AST SERPL-CCNC: 17 U/L (ref 0–33)
BASOPHILS # BLD AUTO: 0.01 10*3/MM3 (ref 0–0.2)
BASOPHILS NFR BLD AUTO: 0.1 % (ref 0–1)
BILIRUB SERPL-MCNC: 0.6 MG/DL (ref 0.3–1.2)
BUN BLD-MCNC: 13 MG/DL (ref 9–23)
BUN/CREAT SERPL: 21.7 (ref 7–25)
CALCIUM SPEC-SCNC: 8.4 MG/DL (ref 8.7–10.4)
CHLORIDE SERPL-SCNC: 109 MMOL/L (ref 99–109)
CO2 SERPL-SCNC: 21 MMOL/L (ref 20–31)
CREAT BLD-MCNC: 0.6 MG/DL (ref 0.6–1.3)
DEPRECATED RDW RBC AUTO: 61.4 FL (ref 37–54)
EOSINOPHIL # BLD AUTO: 0.14 10*3/MM3 (ref 0.1–0.3)
EOSINOPHIL NFR BLD AUTO: 2 % (ref 0–3)
ERYTHROCYTE [DISTWIDTH] IN BLOOD BY AUTOMATED COUNT: 17.1 % (ref 11.3–14.5)
GFR SERPL CREATININE-BSD FRML MDRD: 95 ML/MIN/1.73
GLOBULIN UR ELPH-MCNC: 3.1 GM/DL
GLUCOSE BLD-MCNC: 52 MG/DL (ref 70–100)
GLUCOSE BLDC GLUCOMTR-MCNC: 123 MG/DL (ref 70–130)
GLUCOSE BLDC GLUCOMTR-MCNC: 55 MG/DL (ref 70–130)
HCT VFR BLD AUTO: 28.1 % (ref 34.5–44)
HGB BLD-MCNC: 8.7 G/DL (ref 11.5–15.5)
IMM GRANULOCYTES # BLD: 0 10*3/MM3 (ref 0–0.03)
IMM GRANULOCYTES NFR BLD: 0 % (ref 0–0.6)
LYMPHOCYTES # BLD AUTO: 2.89 10*3/MM3 (ref 0.6–4.8)
LYMPHOCYTES NFR BLD AUTO: 42.2 % (ref 24–44)
MAGNESIUM SERPL-MCNC: 1.8 MG/DL (ref 1.3–2.7)
MCH RBC QN AUTO: 30.9 PG (ref 27–31)
MCHC RBC AUTO-ENTMCNC: 31 G/DL (ref 32–36)
MCV RBC AUTO: 99.6 FL (ref 80–99)
MONOCYTES # BLD AUTO: 0.52 10*3/MM3 (ref 0–1)
MONOCYTES NFR BLD AUTO: 7.6 % (ref 0–12)
NEUTROPHILS # BLD AUTO: 3.29 10*3/MM3 (ref 1.5–8.3)
NEUTROPHILS NFR BLD AUTO: 48.1 % (ref 41–71)
PHOSPHATE SERPL-MCNC: 2.9 MG/DL (ref 2.4–5.1)
PLATELET # BLD AUTO: 165 10*3/MM3 (ref 150–450)
PMV BLD AUTO: 9.4 FL (ref 6–12)
POTASSIUM BLD-SCNC: 4.7 MMOL/L (ref 3.5–5.5)
PROT SERPL-MCNC: 6 G/DL (ref 5.7–8.2)
RBC # BLD AUTO: 2.82 10*6/MM3 (ref 3.89–5.14)
SODIUM BLD-SCNC: 137 MMOL/L (ref 132–146)
WBC NRBC COR # BLD: 6.85 10*3/MM3 (ref 3.5–10.8)

## 2017-02-21 PROCEDURE — 25010000002 ENOXAPARIN PER 10 MG: Performed by: INTERNAL MEDICINE

## 2017-02-21 PROCEDURE — 74000 HC ABDOMEN KUB: CPT

## 2017-02-21 PROCEDURE — 92523 SPEECH SOUND LANG COMPREHEN: CPT

## 2017-02-21 PROCEDURE — 99222 1ST HOSP IP/OBS MODERATE 55: CPT | Performed by: INTERNAL MEDICINE

## 2017-02-21 PROCEDURE — 92526 ORAL FUNCTION THERAPY: CPT

## 2017-02-21 PROCEDURE — 99232 SBSQ HOSP IP/OBS MODERATE 35: CPT | Performed by: RADIOLOGY

## 2017-02-21 PROCEDURE — 85025 COMPLETE CBC W/AUTO DIFF WBC: CPT | Performed by: INTERNAL MEDICINE

## 2017-02-21 PROCEDURE — 84100 ASSAY OF PHOSPHORUS: CPT | Performed by: INTERNAL MEDICINE

## 2017-02-21 PROCEDURE — 83735 ASSAY OF MAGNESIUM: CPT | Performed by: INTERNAL MEDICINE

## 2017-02-21 PROCEDURE — 99233 SBSQ HOSP IP/OBS HIGH 50: CPT | Performed by: INTERNAL MEDICINE

## 2017-02-21 PROCEDURE — 25010000003 POTASSIUM CHLORIDE 10 MEQ/100ML SOLUTION: Performed by: INTERNAL MEDICINE

## 2017-02-21 PROCEDURE — 25810000003 DEXTROSE-NACL PER 500 ML: Performed by: INTERNAL MEDICINE

## 2017-02-21 PROCEDURE — 80053 COMPREHEN METABOLIC PANEL: CPT | Performed by: INTERNAL MEDICINE

## 2017-02-21 PROCEDURE — 82962 GLUCOSE BLOOD TEST: CPT

## 2017-02-21 PROCEDURE — 3E0G76Z INTRODUCTION OF NUTRITIONAL SUBSTANCE INTO UPPER GI, VIA NATURAL OR ARTIFICIAL OPENING: ICD-10-PCS | Performed by: INTERNAL MEDICINE

## 2017-02-21 RX ORDER — LEVOTHYROXINE SODIUM ANHYDROUS 100 UG/5ML
37.5 INJECTION, POWDER, LYOPHILIZED, FOR SOLUTION INTRAVENOUS
Status: DISCONTINUED | OUTPATIENT
Start: 2017-02-21 | End: 2017-02-24 | Stop reason: HOSPADM

## 2017-02-21 RX ORDER — MAGNESIUM SULFATE HEPTAHYDRATE 40 MG/ML
4 INJECTION, SOLUTION INTRAVENOUS ONCE
Status: COMPLETED | OUTPATIENT
Start: 2017-02-21 | End: 2017-02-21

## 2017-02-21 RX ORDER — CARVEDILOL 6.25 MG/1
6.25 TABLET ORAL EVERY 12 HOURS SCHEDULED
Status: DISCONTINUED | OUTPATIENT
Start: 2017-02-21 | End: 2017-02-24 | Stop reason: HOSPADM

## 2017-02-21 RX ORDER — DEXTROSE AND SODIUM CHLORIDE 5; .9 G/100ML; G/100ML
75 INJECTION, SOLUTION INTRAVENOUS CONTINUOUS
Status: DISCONTINUED | OUTPATIENT
Start: 2017-02-21 | End: 2017-02-22

## 2017-02-21 RX ORDER — SODIUM CHLORIDE 9 MG/ML
50 INJECTION, SOLUTION INTRAVENOUS CONTINUOUS
Status: DISCONTINUED | OUTPATIENT
Start: 2017-02-21 | End: 2017-02-21

## 2017-02-21 RX ORDER — DEXTROSE MONOHYDRATE 25 G/50ML
INJECTION, SOLUTION INTRAVENOUS
Status: COMPLETED
Start: 2017-02-21 | End: 2017-02-21

## 2017-02-21 RX ADMIN — POTASSIUM CHLORIDE 10 MEQ: 7.46 INJECTION, SOLUTION INTRAVENOUS at 01:16

## 2017-02-21 RX ADMIN — ASPIRIN 300 MG: 300 SUPPOSITORY RECTAL at 09:21

## 2017-02-21 RX ADMIN — CARVEDILOL 6.25 MG: 6.25 TABLET, FILM COATED ORAL at 22:00

## 2017-02-21 RX ADMIN — DEXTROSE AND SODIUM CHLORIDE 75 ML/HR: 5; 900 INJECTION, SOLUTION INTRAVENOUS at 10:30

## 2017-02-21 RX ADMIN — DESMOPRESSIN ACETATE 40 MG: 0.2 TABLET ORAL at 22:00

## 2017-02-21 RX ADMIN — LEVOTHYROXINE SODIUM ANHYDROUS 37.5 MCG: 100 INJECTION, POWDER, LYOPHILIZED, FOR SOLUTION INTRAVENOUS at 08:50

## 2017-02-21 RX ADMIN — POTASSIUM CHLORIDE 10 MEQ: 7.46 INJECTION, SOLUTION INTRAVENOUS at 01:55

## 2017-02-21 RX ADMIN — CARBIDOPA AND LEVODOPA 1 TABLET: 25; 100 TABLET ORAL at 22:00

## 2017-02-21 RX ADMIN — POTASSIUM CHLORIDE 10 MEQ: 7.46 INJECTION, SOLUTION INTRAVENOUS at 00:05

## 2017-02-21 RX ADMIN — MIRTAZAPINE 30 MG: 15 TABLET, FILM COATED ORAL at 22:00

## 2017-02-21 RX ADMIN — DEXTROSE MONOHYDRATE 25 ML: 25 INJECTION, SOLUTION INTRAVENOUS at 07:43

## 2017-02-21 RX ADMIN — MAGNESIUM SULFATE HEPTAHYDRATE 4 G: 40 INJECTION, SOLUTION INTRAVENOUS at 10:30

## 2017-02-21 RX ADMIN — DEXTROSE AND SODIUM CHLORIDE 75 ML/HR: 5; 900 INJECTION, SOLUTION INTRAVENOUS at 18:49

## 2017-02-21 RX ADMIN — ENOXAPARIN SODIUM 40 MG: 40 INJECTION SUBCUTANEOUS at 16:06

## 2017-02-21 NOTE — PROGRESS NOTES
INTENSIVIST / PULMONARY FOLLOW UP NOTE     Hospital:  LOS: 2 days   Ms. Florinda Knapp, 85 y.o. female is followed for:   Principal Problem:    Acute ischemic left MCA stroke  Active Problems:    Hypertension    Hyperlipidemia    Urinary retention/Frequent UTIs    Hypothyroidism    Status post left hip replacement    Recent Colitis    Acute CVA (cerebrovascular accident)          SUBJECTIVE   Sitting in chair alert, no fever, no pain    The patient's relevant past medical, surgical, family, and social history were reviewed    Allergies and medications were reviewed    ROS:  Per subjective, all other systems were reviewed and were negative        OBJECTIVE     Vitals:    02/21/17 1147   BP: 128/75   Pulse: 76   Resp:    Temp:    SpO2:      General Appearance:  Conversant, in no acute distress  Eyes:  No scleral icterus or pallor, PERRLA  Ears, Nose, Mouth, Throat:  Atraumatic, oropharynx clear  Neck:  Trachea midline, thyroid normal  Respiratory:  Clear to auscultation bilaterally, normal effort  Cardiovascular:  Regular rate and rhythm, no murmurs, no peripheral edema  Gastrointestinal:  Soft, non-tender, non-distended, no hepatosplenomegaly  Skin:  Normal temperature, no rash  Psychiatric:  Alert, no agitation  Neuro: slight right sided facial droop    Relevant imaging studies and labs from 02/21/17 were reviewed and interpreted by me    Assessment/Plan   IMPRESSION / PLAN     Inpatient Problem List:  85 y.o.female:  Principal Problem:    Acute ischemic left MCA stroke  Active Problems:    Hypertension    Hyperlipidemia    Urinary retention/Frequent UTIs    Hypothyroidism    Status post left hip replacement    Recent Colitis    Acute CVA (cerebrovascular accident)       Impression:  85 y.o.female with relevant PMH of HTN, Urinary Retention, Frequent UTI's, Chronic Systolic (45%) and Grade 1 Diastolic HF, Parkinsons, recent Hip Replacement admitted 2/19/2017 w/ Acute Left MCA CVA s/p TPA (initial deficits were  aphasia, right facial droop, right sided hemiparesis), Cath w/ no intervention required    Plan:  -Stroke - per Neuro, likely embolic, for now no evidence of afib cards recs no NOAC    -Urinary Retention - keep navarro (1700 cc), increased flomax    -Resume / continue appropriate home meds    -Monitor for complications    -Speech/PT/OT    -Pepcid/SCDs/TEDs, start lovenox when CT head read as negative for bleeding    -Txf to tele    Plan of care and goals reviewed with mulitdisciplinary team at daily rounds           Zi Khan MD  Intensive Care Medicine  02/21/17 1:05 PM

## 2017-02-21 NOTE — PLAN OF CARE
Problem: Patient Care Overview (Adult)  Goal: Plan of Care Review    02/20/17 1200   Coping/Psychosocial Response Interventions   Plan Of Care Reviewed With patient;family   Patient Care Overview   Progress progress toward functional goals is gradual   Outcome Evaluation   Outcome Summary/Follow up Plan Clincial swallow eval and FEES complete. Severe dysphagia. Delayed initiation to the pyriforms with thins. Asp during the swallow with thins and nectar thick 2' timing and decreased airway closure. Timing improved w/ pureed. Pen during the swallow with pudding. Diffused residue after the swallow. Residue spilling from the posterior commissure resulting in asp after the swallow. Pt at high risk for asp w/ all trials. REC: NPO with meds via alt route.Dysphagia tx.

## 2017-02-21 NOTE — CONSULTS
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    Chief Complaint:  CVA/? possible a fib    Problem List:  1. CVA 2/19/17; Left MCA thrombus with no evidence of atrial fibrillation on tele or ECG  2. Possible Nonischemic cardiomyopathy  A. Echocardiogram 1/13/17; LVEF 0.45, LV wall segments contract abnormally, LV diastolic dysfunction grade 1 consistent with impaired relaxation, RV borderline dilated, mild to moderate AR, MR, trivial circumferential pericardial effusion, RVSP less than 35 mmHg, aortic valve exhibits sclerosis  B. Echocardiogram 2/20/17; LVEF 0.45, LV wall segments contract abnormally, mild AR, LV wall thickness consistent with mild concentric hypertrophy, small less than 1 cm pericardial effusion adjacent to the left ventricle, saline test results are positive and indicate an atrial level shunt  3. Hypertension, essential  4. Hyperlipidemia  5. Hypothyroidism/replacement therapy  6. Anxiety and depression  7. Parkinson's disease  8. Failure to thrive in adult  9. Osteoarthritis  10. Stress urinary incontinence  11. [facility] hospitalization for symptomatic normocytic anemia 1/12/17-1/19/17  12. History of recurrent UTI's  13. Surgical history  A. Appendectomy  B. Back surgery  C. Elbow surgery  D. Knee surgery  E. Cataract extraction  F. Left Hip replacement 12/6/2016-[facility]      Allergies   Allergen Reactions   • Alendronate unknown   • Aripiprazole unknown   • Celecoxib unknown   • Nitrofurantoin unknown   • Procaine unknown   • Sulfa Antibiotics unknown     Prescriptions Prior to Admission   Medication Sig Dispense Refill Last Dose   • acetaminophen (TYLENOL) 500 MG tablet Take 1,000 mg by mouth 4 (Four) Times a Day As Needed for mild pain  (1-3).   Past Week at Unknown time   • aspirin 81 MG chewable tablet Chew 1 tablet Daily.      • atorvastatin (LIPITOR) 20 MG tablet Take 1 tablet by mouth Every Night.      • carbidopa-levodopa (SINEMET)  MG per tablet Increase dose to four times a day: on rising, noon, 3pm and 6pm 120 tablet 4    • Cholecalciferol 2000 UNITS capsule Take 2,000 Units by mouth Daily.   1/12/2017 at Unknown time   • ferrous sulfate 325 (65 FE) MG tablet Take 325 mg by mouth Daily With Breakfast.   1/12/2017 at Unknown time   • levothyroxine (SYNTHROID, LEVOTHROID) 75 MCG tablet Take 75 mcg by mouth Daily.   1/12/2017 at Unknown time   • loperamide (IMODIUM) 2 MG capsule Take 1 capsule by mouth 3 (Three) Times a Day As Needed for diarrhea.      • mirtazapine (REMERON) 30 MG tablet Take 30 mg by mouth Every Night.   1/11/2017 at Unknown time   • polyethylene glycol (MIRALAX) packet Take 17 g by mouth Daily As Needed.   1/12/2017 at Unknown time   • saccharomyces boulardii (FLORASTOR) 250 MG capsule Take 1 capsule by mouth 2 (Two) Times a Day.      • tamsulosin (FLOMAX) 0.4 MG capsule 24 hr capsule Take 1 capsule by mouth Daily.      • vitamin E 1000 UNIT capsule Take 1,000 Units by mouth Daily.   1/12/2017 at Unknown time     Scheduled Meds:  aspirin 325 mg Oral Daily   Or      aspirin 300 mg Rectal Daily   atorvastatin 40 mg Oral Nightly   carbidopa-levodopa 1 tablet Oral 4x Daily   cholecalciferol 2,000 Units Oral Daily   ferrous sulfate 325 mg Oral Daily With Breakfast   levothyroxine sodium 37.5 mcg Intravenous Q AM   Magnesium Sulfate 4 g Intravenous Once   mirtazapine 30 mg Oral Nightly   saccharomyces boulardii 250 mg Oral BID   sodium chloride 100 mL/hr Intravenous Once   tamsulosin 0.8 mg Oral Daily   vitamin E 1,000 Units Oral Daily     Continuous Infusions:  niCARdipine 5-15 mg/hr   phenylephrine (KASSIDY-SYNEPHRINE) 50 mg/250 (0.2 mg/mL) infusion 0.5-3 mcg/kg/min        History of Present Illness:    85-year-old white  "female presents to [facility] ED 2/19/17 with complaints of altered mental status.  EMS reports that around 745 that morning she went to the toilet and went flaccid on her right side and when they got there she was lying in bed, not talking, and would squeeze with her left hand but not her right.  According to EMS the patient's nursing home stated the patient had not been getting her morning medications and her blood pressure in route was 152/90 and heart rate 80.  She had a UTI since December 10, 2016 and just finished a series of 4 shots for her UTI around 2/14/17.  She was given tPA therapy and sent for an emergent neuro intervention with mechanical thrombectomy.  While in the neuro interventional suite, angiography demonstrated marked interval \"lysis\" of the thromboembolus with the tPA therapy and restoration to near normal flow to the left cerebral hemisphere, so procedure was terminated and no mechanical thrombectomy was performed.  During her hospitalization she had no evidence of atrial fibrillation on telemetry monitoring or ECG. She has never had a heart attack, LHC, CHF, DM, rheumatic fever, atrial fib, arrhythmias, CVA, TIA, DVT, PE. She has never had a cardiologist until now. She states today that she had noticed Friday morning she was having difficulty holding her fork and eating at breakfast that morning before she collapsed.  She denies chest pain, SOB, presyncope, syncope, palpitations. She has HTN and HL. She is still having some difficulty speaking very loud, and has some weakness on the right side. She is unable to swallow well but can tolerate applesauce. Will get feeding tube today. Both her sisters have had TIA's and both of her sons have CAD, one son with a MI and stents at 49 yo. Never a smoker. She recently had a left hip replacement 12/6/16 at [facility]. She originally fractured her left hip in May 2016 and tried PT to try and avoid surgery but the pain was too severe and she eventually consented for " "surgery. She has hypothyroidism but no kidney, GI, or lung problems.     Cardiac risk factors: advanced age (older than 55 for men, 65 for women), dyslipidemia, hypertension and sedentary lifestyle.    Social History     Social History   • Marital status:      Spouse name: N/A   • Number of children: 2   • Years of education: N/A     Occupational History   • Not on file.     Social History Main Topics   • Smoking status: Never Smoker   • Smokeless tobacco: Not on file   • Alcohol use Yes      Comment: social   • Drug use: Not on file   • Sexual activity: Not on file     Other Topics Concern   • Not on file     Social History Narrative     Family History   Problem Relation Age of Onset   • Alcohol abuse Other    • Hypertension Other    • Stroke Other    • Heart disease Son        Review of Systems  Pertinent items are noted in HPI and problem list.     Objective:       Physical Exam  Visit Vitals   • /82   • Pulse 78   • Temp 98.7 °F (37.1 °C) (Oral)   • Resp 16   • Ht 59\" (149.9 cm)   • Wt 105 lb (47.6 kg)   • SpO2 96%   • BMI 21.21 kg/m2     Last 2 weights    02/20/17  1542 02/20/17  1827   Weight: 105 lb (47.6 kg) 105 lb (47.6 kg)     Body mass index is 21.21 kg/(m^2).    Intake/Output Summary (Last 24 hours) at 02/21/17 0922  Last data filed at 02/21/17 0529   Gross per 24 hour   Intake 1707.4 ml   Output    950 ml   Net  757.4 ml       General Appearance:  Alert, cooperative, no distress, appears stated age   Head:  Normocephalic, without obvious abnormality, atraumatic   Eyes:  PERRL, conjunctiva/corneas clear, EOM's intact, fundi benign, both eyes   Throat: Lips, mucosa, and tongue normal; teeth and gums normal   Neck: Supple, symmetrical, trachea midline, no adenopathy, thyroid: not enlarged, symmetric, no tenderness/mass/nodules, no carotid bruit or JVD   Lungs:   Clear to auscultation bilaterally, respirations unlabored   Heart:  Regular rate and rhythm, S1, S2 normal, grade 1/6; no rub or " gallop   Abdomen:   Soft, non-tender, no masses, no organomegaly   Extremities: No edema, SCD in place   Pulses: 2+ and symmetric   Skin: Skin color, texture, turgor normal, no rashes or lesions   Neurologic: Borderline with mild right sided weakness and dysphagia       Cardiographics  · EK17; Normal sinus rhythm, Left axis deviation,Possible Lateral infarct , age undetermined  Abnormal ECG, 79 bpm; reviewed.  · Echocardiogram 17; LV wall segments contract abnormally, mild AR, LVEF 0.45, LV wall thickness consistent with mild concentric hypertrophy, small less than 1 cm pericardial effusion adjacent to the left ventricle, saline test results positive and indicate an atrial level shunt  · Carotid duplex to 17; normal proximal BARB,LICA stenosis 0-49%    Imaging  · Chest x-ray: 17; cardiomegaly, mild pulmonary vascular congestion; reviewed.  · CT head 17; old infarcts of the posterior fossa, no acute intracranial process  · CT head 17; no change since 17  · Left carotid angiography 17; left internal carotid catheterization with subsequent diagnostic left extracranial and intracranial carotid angiography, placement of an arterial closure device    Lab Review     Results from last 7 days  Lab Units 17  0431 17  2036 17  0629 17  1313   SODIUM mmol/L 137  --  140 139   POTASSIUM mmol/L 4.7 3.6 3.6 4.1   CHLORIDE mmol/L 109  --  111* 111*   TOTAL CO2 mmol/L 21.0  --  24.0 23.0   BUN mg/dL 13  --  8* 9   CREATININE mg/dL 0.60  --  0.60 0.60   GLUCOSE mg/dL 52*  --  77 95   CALCIUM mg/dL 8.4*  --  8.2* 8.7       Results from last 7 days  Lab Units 17  0431 17  0629 17  1003   WBC 10*3/mm3 6.85 5.81 4.85   HEMOGLOBIN g/dL 8.7* 9.5* 10.9*   HEMATOCRIT % 28.1* 30.3* 34.8   PLATELETS 10*3/mm3 165 173 191       Results from last 7 days  Lab Units 17  0629   CHOLESTEROL mg/dL 161   TRIGLYCERIDES mg/dL 110   HDL CHOL mg/dL 48   LDL  80    Results from last 7 days  Lab Units 02/20/17  0629   HEMOGLOBIN A1C % 4.80             Assessment:    Patient with left MCA thrombus with no evidence of atrial fibrillation but positive saline test on echocardiogram for an atrial shunt. No past cardiac history with acceptable echocardiogram with possible non-iscehemic cardiomyopathy with LVEF 0.45. Continued anemia, but stable. NSR on tele monitoring. We recommend a ZioXT on discharge to evaluate for any PAF and/or how frequently if she is going into this. She will need rehab. Continued efforts with OT to help her speech, weakness, and ability to swallow. Current anticoagulation  mg suppository. Recommend MRI to assess for the possibility of other areas of abnormal findings from remote embolic events. Discontinue vitamin E because it increases the likelihood of clot formation and CHF. At her age, would lean towards empiric therapy with full anticoagulation opposed to PFO closure. In addition there is no documentation of atrial fibrillation on prior [facility] hospitalization visits.  Thank you allowing us to participate in her cardiology evaluation and care; we will follow with you while in hospital.      Plan:   1. Defer NOAC currently; no evidence of atrial fibrillation   2. ZioXT on discharge  3. Rehab after discharge  4. Discontinue vitamin E  5. Order ECG in morning   7. Consider brain MRI for any areas of previous CNS infarct/ischemia  8. Defer MPS/LHC  9. Coreg 6.25 mg bid        Discussed with patient and 2 sons in room.    Scribed for Mak Chou MD by ARIEL Read. 2/21/2017  10:31 AM     I, Mak Chou MD, [facility], personally performed the services described in this documentation as scribed by the above named individual in my presence, and it is both accurate and complete.

## 2017-02-21 NOTE — PATIENT CARE CONFERENCE
ICU Rounds: [facility] 10. H&H dropping-being watched by MD. Continue OT/PT per treatment plan.

## 2017-02-21 NOTE — PLAN OF CARE
Problem: Patient Care Overview (Adult)  Goal: Plan of Care Review  Outcome: Ongoing (interventions implemented as appropriate)    02/21/17 1031   Coping/Psychosocial Response Interventions   Plan Of Care Reviewed With patient;family   Patient Care Overview   Progress progress toward functional goals is gradual   Outcome Evaluation   Outcome Summary/Follow up Plan Dys tx initiated. Pt is being re-assessed for instrumental readiness daily in tx. Very weak vocal quality but improved alertness today. Wet vocal quality and swallows x5 with small tsp of puree. Not ready for repeat instumental eval, recommend proceeding w/ keofeed and initiating alt nutrition until pt's strength improves. Will re-assess daily in tx. ST will also be initiated to address pt's cog/com impairments with dx tx to determine baseline functioning         Problem: Stroke (Ischemic) (Adult)  Goal: Signs and Symptoms of Listed Potential Problems Will be Absent or Manageable (Stroke)  Outcome: Ongoing (interventions implemented as appropriate)    02/21/17 1031   Stroke (Ischemic)   Problems Assessed (Stroke (Ischemic)/TIA) cognitive impairment;communication impairment;eating/swallowing impairment   Problems Present (Stroke (Ischemic)/TIA) cognitive impairment;communication impairment;eating/swallowing impairment         Problem: Inpatient SLP  Goal: Dysphagia- Patient will improve swallowing skills to begin to take some PO safely  Outcome: Ongoing (interventions implemented as appropriate)    02/21/17 1031   Begin to Take Some PO Safely   Begin to Take Some PO Safely- SLP, Date Established 02/21/17   Begin to Take Some PO Safely- SLP, Time to Achieve by discharge   Begin to Take Some PO Safely- SLP, Date Goal Reviewed 02/21/17   Begin to Take Some PO Safely- SLP, Outcome goal ongoing       Goal: Dysarthria-Patient will improve motor speech skills to allow optimal participation in care  Outcome: Ongoing (interventions implemented as appropriate)     02/21/17 1031   Dysarthria- Optimal Particpation in Care   Dysarthria Optimal Participation in Care- SLP, Date Established 02/21/17   Dysarthria Optimal Participation in Care- SLP, Time to Achieve by discharge   Dysarthria Optimal Participation in Care- SLP, Date Goal Reviewed 02/21/17   Dysarthria Optimal Participation in Care- SLP, Outcome goal ongoing       Goal: Expressive-Patient will improve expressive language skills to allow optimal participation in care  Outcome: Ongoing (interventions implemented as appropriate)    02/21/17 1031   Expressive- Optimal Participation in Care   Expressive Optimal Participation in Care- SLP, Date Established 02/21/17   Expressive Optimal Participation in Care- SLP, Time to Achieve by discharge   Expressive Optimal Participation in Care- SLP, Date Goal Reviewed 02/14/17   Expressive Optimal Participation in Care- SLP, Outcome goal ongoing       Goal: Cognitive-linguistic-Patient will improve Cognitive-linguistic skills to allow optimal participation in care  Outcome: Ongoing (interventions implemented as appropriate)    02/21/17 1031   Cognitive Linguistic- Optimal Participation in Care   Cognitive Linguistic Optimal Participation in Care- SLP, Date Established 02/21/17   Cognitive Linguistic Optimal Participation in Care- SLP, Time to Achieve by discharge   Cognitive Linguistic Optimal Participation in Care- SLP, Date Goal Reviewed 02/21/17   Cognitive Linguistic Optimal Participation in Care- SLP, Outcome goal ongoing       Goal: Receptive Language-Patient will improve receptive language skills to allow optimal participation in care  Outcome: Ongoing (interventions implemented as appropriate)    02/21/17 1031   Receptive Language- Optimal Participation in Care   Receptive Language Optimal Participation in Care- SLP, Date Established 02/21/17   Receptive Language Optimal Participation in Care- SLP, Time to Achieve by discharge   Receptive Language Optimal Participation in Care-  SLP, Date Goal Reviewed 02/21/17   Receptive Language Optimal Participation in Care- SLP, Outcome goal ongoing

## 2017-02-21 NOTE — PLAN OF CARE
Problem: Mobility, Physical Impaired (Adult)  Goal: Identify Related Risk Factors and Signs and Symptoms  Outcome: Ongoing (interventions implemented as appropriate)  Goal: Enhanced Mobility Skills  Outcome: Ongoing (interventions implemented as appropriate)    Problem: Pressure Ulcer Risk (Estevan Scale) (Adult,Obstetrics,Pediatric)  Goal: Identify Related Risk Factors and Signs and Symptoms  Outcome: Ongoing (interventions implemented as appropriate)    Problem: Fall Risk (Adult)  Goal: Identify Related Risk Factors and Signs and Symptoms  Outcome: Ongoing (interventions implemented as appropriate)  Goal: Absence of Falls  Outcome: Ongoing (interventions implemented as appropriate)

## 2017-02-21 NOTE — PLAN OF CARE
Problem: Patient Care Overview (Adult)  Goal: Plan of Care Review  Outcome: Ongoing (interventions implemented as appropriate)    02/21/17 0403   Coping/Psychosocial Response Interventions   Plan Of Care Reviewed With patient;family   Patient Care Overview   Progress no change   Outcome Evaluation   Outcome Summary/Follow up Plan pt very drowsy tonight, but no problems through shift. vitals stable. pt comfortable.        Goal: Adult Individualization and Mutuality  Outcome: Ongoing (interventions implemented as appropriate)  Goal: Discharge Needs Assessment  Outcome: Ongoing (interventions implemented as appropriate)    Problem: Stroke (Ischemic) (Adult)  Goal: Signs and Symptoms of Listed Potential Problems Will be Absent or Manageable (Stroke)  Outcome: Ongoing (interventions implemented as appropriate)    02/21/17 0403   Stroke (Ischemic)   Problems Assessed (Stroke (Ischemic)/TIA) communication impairment;eating/swallowing impairment;motor/sensory impairment   Problems Present (Stroke (Ischemic)/TIA) communication impairment;eating/swallowing impairment;motor/sensory impairment         Problem: Pressure Ulcer Risk (Estevan Scale) (Adult,Obstetrics,Pediatric)  Goal: Identify Related Risk Factors and Signs and Symptoms  Outcome: Ongoing (interventions implemented as appropriate)    02/20/17 2015   Pressure Ulcer Risk (Estevan Scale)   Related Risk Factors (Pressure Ulcer Risk (Estevan Scale)) age extremes;cognitive impairment;critical care admission;fluid intake inadequate;mobility impaired;nutritional deficiencies       Goal: Skin Integrity  Outcome: Ongoing (interventions implemented as appropriate)    02/21/17 0403   Pressure Ulcer Risk (Estevan Scale) (Adult,Obstetrics,Pediatric)   Skin Integrity making progress toward outcome         Problem: Fall Risk (Adult)  Goal: Identify Related Risk Factors and Signs and Symptoms  Outcome: Ongoing (interventions implemented as appropriate)    02/20/17 2015   Fall Risk    Fall Risk: Related Risk Factors gait/mobility problems;fatigue/slow reaction;fear of falling;history of falls;impaired vision;environment unfamiliar;age-related changes   Fall Risk: Signs and Symptoms presence of risk factors       Goal: Absence of Falls  Outcome: Ongoing (interventions implemented as appropriate)    02/21/17 0403   Fall Risk (Adult)   Absence of Falls making progress toward outcome

## 2017-02-21 NOTE — PROGRESS NOTES
Acute Care - Speech Language Pathology Initial Evaluation  [facility]     Patient Name: Florinda Knapp  : 3/5/1931  MRN: [id number]  Today's Date: 2017  Onset of Illness/Injury or Date of Surgery Date: 17  Date of Referral to SLP: 17         Admit Date: 2017     Visit Dx:    ICD-10-CM ICD-9-CM   1. Acute CVA (cerebrovascular accident) I63.9 434.91   2. Left carotid artery occlusion I65.22 433.10   3. Impaired functional mobility, balance, gait, and endurance Z74.09 V49.89   4. Impaired mobility and ADLs Z74.09 799.89     Patient Active Problem List   Diagnosis   • Dysuria   • Elbow injury   • Fracture of left olecranon process   • Parkinson's disease   • Trauma left hip   • Acute Symptomatic on Chronic Normocytic Anemia   • Acute respiratory failure with hypoxia   • Generalized weakness   • Chest pain   • Symptomatic anemia   • Elevated troponin I level, possibly due to anemia   • Status post total replacement of left hip 2016 at [facility]   • H/O urinary retention   • History of recurrent UTIs   • Essential hypertension   • Anxiety   • CHF with cardiomyopathy   • Colitis   • Hypokalemia   • Acute ischemic left MCA stroke   • Hypertension   • Hyperlipidemia   • Urinary retention/Frequent UTIs   • Hypothyroidism   • Status post left hip replacement   • Recent Colitis   • Acute CVA (cerebrovascular accident)     Past Medical History   Diagnosis Date   • Anemia    • Anxiety    • Asthma    • Depression    • Failure to thrive in adult    • Hyperlipidemia    • Hypertension    • Hypothyroidism    • Osteoarthritis    • Parkinson's disease    • Stress incontinence    • Urinary retention      Past Surgical History   Procedure Laterality Date   • Appendectomy     • Back surgery     • Cataract extraction     • Elbow procedure     • Knee surgery     • Total hip arthroplasty Left 2016   • Pr prim prq trluml mchnl thrmbc n-cor n-icra 1st Left 2017     Procedure: Left MCA thrombectomy;  Surgeon:  Francisco Javier Gabriel MD;  Location:  [facility];  Service: Interventional Radiology          SLP EVALUATION (last 72 hours)      SLP Evaluation       02/21/17 1000                General Information    Patient Profile Review yes  -SG        Onset of Illness/Injury 02/19/17  -SG        Subjective Patient Observations weak but cooperative for evaluation  -SG        Pertinent History Of Current Problem eval per CVA pathway  -SG        Current Diet Limitations NPO  -SG        Prior Level of Function- Communication functional in all spheres  -SG        Prior Level of Function- Swallowing no diet consistency restrictions  -SG        Plans/Goals Discussed With patient and family;agreed upon  -SG        Barriers to Rehab none identified  -SG        Clinical Impression    Date of Referral to SLP 02/20/17  -SG        SLP Diagnosis moderate cog-com impairments  -SG        Functional Level At Time Of Evaluation impaired  -SG        Patient's Goals For Discharge return to all previous roles/activities  -SG        Family Goals For Discharge family did not state  -SG        Criteria for Skilled Therapeutic Interventions Met skilled criteria for cognitive linguistic intervention met  -SG        Rehab Potential fair, will monitor progress closely  -SG        Therapy Frequency 5 times/wk  -SG        Predicted Duration of Therapy Intervention (days/wks) until discharge  -SG        Anticipated Discharge Disposition skilled nursing facility  -SG        Cognitive Assessment Intervention    Behavior/Mood Observations cooperative;confused  -SG        Attention mild impairment  -SG        Pragmatics WNL/WFL  -SG        Problem Solving mild impairment  -SG        Executive Function Skills mild impairment  -SG        Reasoning mild impairment  -SG        Auditory Comprehen Assess/Intervention    Auditory Comprehension mild impairment  -SG        Auditory Comprehen Assess/Intervention    Able to Identify Objects mild impairment   "-SG        Answers Yes/No Questions mild impairment  -SG        Able to Follow Commands mild impairment  -SG        Verbal Expression Assess/Intervention    Speech Repetition WNL/WFL  -SG        Speech Fluency fluent speech  -SG        Conversational Speech mild impairment  -SG        Initiation of Phonation WNL/WFL  -SG        Sustained Phonation WNL/WFL  -SG        Motor Speech Assess/Intervention    Motor Speech-Apraxia Observations no concerns  -SG        Motor Speech-Dysarthria Observations distortions;slow rate;slurred speech  -SG        Motor Speech- Dysarthria successful, words  -SG        Dysarthria Treatment Objectives    Improve respiratory support by: increasing length of exhalation;80%;with consistent cues  -SG        Respiratory Support Progress continue to address  -SG        Improve phonation by: using loud speech;80%;with consistent cues  -SG        Phonation Progress continue to address  -SG        Reduce perception of hypernasality by: opening mouth wider for speech;80%;with consistent cues  -SG        Perception of Hypernasality Progress continue to address  -SG        Improve articulation: of specific sounds in words;by over-articulating at word level;80%;with consistent cues  -SG        Articulation Progress continue to address  -SG        Expressive Treatment Objectives    Improve connected speech to express thoughts by: describing a picture;80%;with consistent cues  -SG        Connected Speech Progress continue to address  -SG        Cognitive Linguistic Treatment Objectives    Improve awareness of basic personal information through: answering open-ended questions regarding personal/biographical information;answering questions about cognitive/physical changes;80%;with consistent cues  -SG        Awareness of Basic Personal Information Progress continue to address  -SG        Improve functional problem solving through: answer questions about ADL problems;answer \"what if\" questions;80%;with " consistent cues  -SG        Functional Problem Solving Progress continue to address  -SG        Improve executive function skills: demonstrate awareness of deficit;identify strategies, strengths, limitations;80%;with consistent cues  -SG        Executive Function Skills Progress continue to address  -SG        Receptive Language Treatment Objectives    Improve ability to comprehend simple conversation respond appropriately to simple conversational statements;respond appropriately to simple conversational questions;80%;with consistent cues  -SG        Ability to Comprehend Simple Conversation Progress continue to address  -SG          User Key  (r) = Recorded By, (t) = Taken By, (c) = Cosigned By    Initials Name Effective Dates    SG Ketty Hubbardritu-Marlon, MS [facility]-SLP 06/22/15 -            EDUCATION  The patient has been educated in the following areas:   Cognitive Impairment Communication Impairment.    SLP Recommendation and Plan  SLP Diagnosis: moderate cog-com impairments     Rehab Potential: fair, will monitor progress closely  Criteria for Skilled Therapeutic Interventions Met: skilled criteria for cognitive linguistic intervention met  Anticipated Discharge Disposition: skilled nursing facility     Therapy Frequency: 5 times/wk  Predicted Duration of Therapy Intervention (days/wks): until discharge       Plan of Care Review  Plan Of Care Reviewed With: patient, family  Progress: progress toward functional goals is gradual  Outcome Summary/Follow up Plan: Dys tx initiated. Pt is being re-assessed for instrumental readiness daily in tx. Very weak vocal quality but improved alertness today. Wet vocal quality and swallows x5 with small tsp of puree. Not ready for repeat instumental eval, recommend proceeding w/ keofeed and initiating alt nutrition until pt's strength improves. St will also be initiated to address pt's cog/com impairments with dx tx to determine baseline functioning          IP SLP Goals        02/21/17 1031 02/20/17 1153       Begin to Take Some PO Safely    Begin to Take Some PO Safely- SLP, Date Established 02/21/17  -SG 02/20/17  -LS     Begin to Take Some PO Safely- SLP, Time to Achieve by discharge  -SG by discharge  -LS     Begin to Take Some PO Safely- SLP, Date Goal Reviewed 02/21/17  -SG      Begin to Take Some PO Safely- SLP, Outcome goal ongoing  -SG goal ongoing  -LS     Receptive Language- Optimal Participation in Care    Receptive Language Optimal Participation in Care- SLP, Date Established 02/21/17  -SG      Receptive Language Optimal Participation in Care- SLP, Time to Achieve by discharge  -SG      Receptive Language Optimal Participation in Care- SLP, Date Goal Reviewed 02/21/17  -SG      Receptive Language Optimal Participation in Care- SLP, Outcome goal ongoing  -SG      Expressive- Optimal Participation in Care    Expressive Optimal Participation in Care- SLP, Date Established 02/21/17  -SG      Expressive Optimal Participation in Care- SLP, Time to Achieve by discharge  -SG      Expressive Optimal Participation in Care- SLP, Date Goal Reviewed 02/14/17  -SG      Expressive Optimal Participation in Care- SLP, Outcome goal ongoing  -SG      Cognitive Linguistic- Optimal Participation in Care    Cognitive Linguistic Optimal Participation in Care- SLP, Date Established 02/21/17  -SG      Cognitive Linguistic Optimal Participation in Care- SLP, Time to Achieve by discharge  -SG      Cognitive Linguistic Optimal Participation in Care- SLP, Date Goal Reviewed 02/21/17  -SG      Cognitive Linguistic Optimal Participation in Care- SLP, Outcome goal ongoing  -SG      Dysarthria- Optimal Particpation in Care    Dysarthria Optimal Participation in Care- SLP, Date Established 02/21/17  -SG      Dysarthria Optimal Participation in Care- SLP, Time to Achieve by discharge  -SG      Dysarthria Optimal Participation in Care- SLP, Date Goal Reviewed 02/21/17  -SG      Dysarthria Optimal Participation  in Care- SLP, Outcome goal ongoing  -SG        User Key  (r) = Recorded By, (t) = Taken By, (c) = Cosigned By    Initials Name Provider Type    URIAH HubbardTim, MS CCC-SLP Speech and Language Pathologist    AMINA Enrique, MS CCC-SLP Speech and Language Pathologist              Time Calculation:         Time Calculation- SLP       17 1033          Time Calculation- SLP    SLP Start Time 1030  -SG      SLP Received On 17  -SG        User Key  (r) = Recorded By, (t) = Taken By, (c) = Cosigned By    Initials Name Provider Type    URIAH HubbardelodiaancaMarlon, MS CCC-SLP Speech and Language Pathologist          Therapy Charges for Today     Code Description Service Date Service Provider Modifiers Qty    26881580784 HC ST TREATMENT SWALLOW 2 2017 Ketty HubbardrituAnneMarlon, MS CCC-SLP GN 1    14647114097 HC ST EVAL SPEECH AND PROD W LANG  3 2017 Ketty Rosemary George MS CCC-SLP GN 1                     Ketty Rosemary MS Ariel CCC-SLP  2017 and [facility] - Speech Language Pathology   Swallow Treatment Note  [facility]     Patient Name: Florinda Knapp  : 3/5/1931  MRN: [id number]  Today's Date: 2017  Onset of Illness/Injury or Date of Surgery Date: 17  Date of Referral to SLP: 17         Admit Date: 2017    Visit Dx:      ICD-10-CM ICD-9-CM   1. Acute CVA (cerebrovascular accident) I63.9 434.91   2. Left carotid artery occlusion I65.22 433.10   3. Impaired functional mobility, balance, gait, and endurance Z74.09 V49.89   4. Impaired mobility and ADLs Z74.09 799.89     Patient Active Problem List   Diagnosis   • Dysuria   • Elbow injury   • Fracture of left olecranon process   • Parkinson's disease   • Trauma left hip   • Acute Symptomatic on Chronic Normocytic Anemia   • Acute respiratory failure with hypoxia   • Generalized weakness   • Chest pain   • Symptomatic anemia   • Elevated troponin I level, possibly due to anemia   •  Status post [facility]   • H/O urinary retention   • History of recurrent UTIs   • Essential hypertension   • Anxiety   • CHF with cardiomyopathy   • Colitis   • Hypokalemia   • Acute ischemic left MCA stroke   • Hypertension   • Hyperlipidemia   • Urinary retention/Frequent UTIs   • Hypothyroidism   • Status post left hip replacement   • Recent Colitis   • Acute CVA (cerebrovascular accident)             Adult Rehabilitation Note       02/21/17 1000 02/20/17 1000       Rehab Assessment/Intervention    Discipline speech language pathologist  -SG      Document Type therapy note (daily note)   dys re-assessment in tx  -SG      Subjective Information complains of;weakness  -SG      Patient Effort, Rehab Treatment good  -SG      Symptoms Noted During/After Treatment fatigue  -SG      Precautions/Limitations, Vision WFL  -SG      Precautions/Limitations, Hearing WFL  -SG      Recorded by [SG] Ketty George MS CCC-SLP      Pain Assessment    Pain Assessment No/denies pain  -SG      [place] FACES Pain Rating 0  -SG      Recorded by [SG] Ketty George MS CCC-JUDITH      Dysphagia/Swallow Intervention    Dysphagia/Swallow Intervention Dys tx initiated. Pt is being re-assessed for instrumental readiness daily in tx. Very weak vocal quality but improved alertness today. Wet vocal quality and swallows x5 with small tsp of puree. Not ready for repeat instumental eval, recommend proceeding w/ keofeed and initiating alt nutrition until pt's strength improves.   -SG      Recorded by [SG] Ketty George MS CCC-JUDITH      Dysphagia Treatment Objectives and Progress    Dysphagia Treatment Objectives Improve timing of pharyngeal response;Improve laryngeal closure;Improve hyolaryngeal excursion;Improve closure at entrance to airway;Improve tongue base & pharyngeal wall squeeze  -SG Improve timing of pharyngeal response;Improve laryngeal closure;Improve  hyolaryngeal excursion;Improve closure at entrance to airway;Improve tongue base & pharyngeal wall squeeze  -LS     To improve timing of pharyngeal response, patient will: Swallow in timely way using three second prep;80%;without cues  -SG Swallow in timely way using three second prep;80%;without cues  -LS     Timing of Pharyngeal Response Progress 50%;with consistent cues;continue to adress  -SG continue to adress  -LS     To improve laryngeal closure, patient will: Complete supraglottic swallow;80%;without cues  -SG Complete supraglottic swallow;80%;without cues  -LS     Laryngeal Closure Progress continue to adress  -SG continue to adress  -LS     To improve closure at entrance to airway, patient will: Complete super-supraglottic swallow;80%;without cues  -SG Complete super-supraglottic swallow;80%;without cues  -LS     Closure at Entrance to Airway Progress continue to adress  -SG continue to adress  -LS     To improve hyolaryngeal excursion, patient will: Complete head lift sustained (comment number of seconds);Complete head lift repetitive (comment number of lifts);80%;without cues  -SG Complete head lift sustained (comment number of seconds);Complete head lift repetitive (comment number of lifts);80%;without cues  -LS     Hyolaryngeal Excursion Progress continue to adress  -SG continue to adress  -LS     To improve tongue base & pharyngeal wall squeeze, patient will: Complete effortful swallow;80%;without cues  -SG Complete effortful swallow;80%;without cues  -LS     Tongue Base/Pharyngeal Wall Squeeze Progress 60%;with consistent cues;continue to adress  -SG continue to adress  -LS     Recorded by [SG] Ketty George MS CCC-SLP [LS] Ame Enrique MS CCC-SLP       User Key  (r) = Recorded By, (t) = Taken By, (c) = Cosigned By    Initials Name Effective Dates    SG MS EBONY KhanSLP 06/22/15 -     LS MS BHASKAR Pena 06/22/15 -                    SLP Goals        02/21/17 1031 02/20/17 1153       Begin to Take Some PO Safely    Begin to Take Some PO Safely- SLP, Date Established 02/21/17  -SG 02/20/17  -LS     Begin to Take Some PO Safely- SLP, Time to Achieve by discharge  -SG by discharge  -LS     Begin to Take Some PO Safely- SLP, Date Goal Reviewed 02/21/17  -SG      Begin to Take Some PO Safely- SLP, Outcome goal ongoing  -SG goal ongoing  -LS     Receptive Language- Optimal Participation in Care    Receptive Language Optimal Participation in Care- SLP, Date Established 02/21/17  -SG      Receptive Language Optimal Participation in Care- SLP, Time to Achieve by discharge  -SG      Receptive Language Optimal Participation in Care- SLP, Date Goal Reviewed 02/21/17  -SG      Receptive Language Optimal Participation in Care- SLP, Outcome goal ongoing  -SG      Expressive- Optimal Participation in Care    Expressive Optimal Participation in Care- SLP, Date Established 02/21/17  -SG      Expressive Optimal Participation in Care- SLP, Time to Achieve by discharge  -SG      Expressive Optimal Participation in Care- SLP, Date Goal Reviewed 02/14/17  -SG      Expressive Optimal Participation in Care- SLP, Outcome goal ongoing  -SG      Cognitive Linguistic- Optimal Participation in Care    Cognitive Linguistic Optimal Participation in Care- SLP, Date Established 02/21/17  -SG      Cognitive Linguistic Optimal Participation in Care- SLP, Time to Achieve by discharge  -SG      Cognitive Linguistic Optimal Participation in Care- SLP, Date Goal Reviewed 02/21/17  -SG      Cognitive Linguistic Optimal Participation in Care- SLP, Outcome goal ongoing  -SG      Dysarthria- Optimal Particpation in Care    Dysarthria Optimal Participation in Care- SLP, Date Established 02/21/17  -SG      Dysarthria Optimal Participation in Care- SLP, Time to Achieve by discharge  -SG      Dysarthria Optimal Participation in Care- SLP, Date Goal Reviewed 02/21/17  -SG      Dysarthria Optimal  Participation in Care- SLP, Outcome goal ongoing  -SG        User Key  (r) = Recorded By, (t) = Taken By, (c) = Cosigned By    Initials Name Provider Type    URIAH Ketty Rosemary George MS CCC-SLP Speech and Language Pathologist    AMNIA Enrique, MS CCC-SLP Speech and Language Pathologist          EDUCATION  The patient has been educated in the following areas:   Dysphagia (Swallowing Impairment) Oral Care/Hydration NPO rationale.    SLP Recommendation and Plan                       Anticipated Discharge Disposition: skilled nursing facility        Predicted Duration of Therapy Intervention (days/wks): until discharge          Plan of Care Review  Plan Of Care Reviewed With: patient, family  Progress: progress toward functional goals is gradual  Outcome Summary/Follow up Plan: Dys tx initiated. Pt is being re-assessed for instrumental readiness daily in tx. Very weak vocal quality but improved alertness today. Wet vocal quality and swallows x5 with small tsp of puree. Not ready for repeat instumental eval, recommend proceeding w/ keofeed and initiating alt nutrition until pt's strength improves. St will also be initiated to address pt's cog/com impairments with dx tx to determine baseline functioning           Time Calculation:         Time Calculation- SLP       02/21/17 1033          Time Calculation- SLP    SLP Start Time 1030  -SG      SLP Received On 02/21/17  -        User Key  (r) = Recorded By, (t) = Taken By, (c) = Cosigned By    Initials Name Provider Type    URIAH Riley MS Ariel CCC-SLP Speech and Language Pathologist          Therapy Charges for Today     Code Description Service Date Service Provider Modifiers Qty    [id number]  ST TREATMENT SWALLOW 2 2/21/2017 Ketty George, MS CCC-SLP GN 1    [id number]  ST EVAL SPEECH AND PROD W LANG  3 2/21/2017 Ketty George, MS [facility]-SLP GN 1                 Ketty George, MS  CCC-SLP  2/21/2017

## 2017-02-21 NOTE — PROGRESS NOTES
"NAME: VAIBHAV RUTHERFORD  : 3/5/1931  PCP: Kieran Cottrell MD  ADMITTING PHYSICIAN: Kieran Mueller MD    DATE OF ADMISSION:  2017  DATE OF SERVICE: 2017    HOSPITAL DAY:  2 days    HISTORY OF PRESENT ILLNESS:  85 y.o. female who was recovering from a recent \"hip replacement\" at an inpatient rehabilitation/nursing home, when she developed acute onset of aphasia and right-sided weakness (witnessed event) this morning. She was ambulating at the nursing home/rehabilitation yesterday, with anticipation of returning \"home\". She presented to [facility] with symptoms of a severe stroke/large vessel occlusion (NIH stroke score 26). She presented within appropriate time frame, and was administered IV tPA, per protocol. She had a CT perfusion scan which demonstrates evidence of a left MCA occlusion with relatively little \"core\" infarct, and a large area of ischemic penumbra. During planned thrombectomy, she was found to have essentially complete lysis of thrombus within the left middle cerebral artery with restoration of near normal (TICI 2B) flow to the left cerebral hemisphere. As such, no thrombectomy was performed (diagnostic angiography only).       REVIEW OF IMAGING:  No new imaging    LABS:  Lab Results   Component Value Date    WBC 6.85 2017    HGB 8.7 (L) 2017    HCT 28.1 (L) 2017    MCV 99.6 (H) 2017     2017     Lab Results   Component Value Date    GLUCOSE 52 (L) 2017    CALCIUM 8.4 (L) 2017     2017    K 4.7 2017    CO2 21.0 2017     2017    BUN 13 2017    CREATININE 0.60 2017    EGFRIFNONA 95 2017    BCR 21.7 2017    ANIONGAP 7.0 2017       CURRENT MEDS:  Current Facility-Administered Medications   Medication Dose Route Frequency Provider Last Rate Last Dose   • acetaminophen (TYLENOL) tablet 1,000 mg  1,000 mg Oral 4x Daily PRN Zi Khan MD       • " aspirin tablet 325 mg  325 mg Oral Daily Francisco Javier Gabriel MD        Or   • aspirin suppository 300 mg  300 mg Rectal Daily Francisco Javier Gabriel MD   300 mg at 02/21/17 0921   • atorvastatin (LIPITOR) tablet 40 mg  40 mg Oral Nightly Francisco Javier Gabriel MD   40 mg at 02/19/17 2002   • carbidopa-levodopa (SINEMET)  MG per tablet 1 tablet  1 tablet Oral 4x Daily Zi Khan MD   Stopped at 02/21/17 1022   • carvedilol (COREG) tablet 6.25 mg  6.25 mg Oral Q12H ARIEL Read   Stopped at 02/21/17 1147   • cholecalciferol (VITAMIN D3) tablet 2,000 Units  2,000 Units Oral Daily Zi Khan MD   Stopped at 02/21/17 0919   • dextrose 5 % and sodium chloride 0.9 % infusion  75 mL/hr Intravenous Continuous Zi Khan MD 75 mL/hr at 02/21/17 1030 75 mL/hr at 02/21/17 1030   • enoxaparin (LOVENOX) syringe 40 mg  40 mg Subcutaneous Q24H Zi Khan MD       • ferrous sulfate tablet 325 mg  325 mg Oral Daily With Breakfast Zi Khan MD   325 mg at 02/21/17 0850   • levothyroxine sodium injection 37.5 mcg  37.5 mcg Intravenous Q AM Zi Khan MD   37.5 mcg at 02/21/17 0850   • loperamide (IMODIUM) capsule 2 mg  2 mg Oral TID PRN Zi Khan MD       • Magnesium Sulfate 4 GM/100ML infusion 4 g  4 g Intravenous Once See [facility]   4 g at 02/21/17 1030   • magnesium sulfate in D5W 1g/100mL (PREMIX) IVPB 1 g  1 g Intravenous PRN Zi Khan MD   1 g at 02/21/17 1023    Or   • magnesium sulfate 8 g in dextrose (D5W) 5 % 250 mL infusion  8 g Intravenous PRN Zi Khan MD        Or   • magnesium sulfate 6 g in dextrose (D5W) 5 % 250 mL infusion  6 g Intravenous PRN Zi Khan MD        Or   • magnesium sulfate in D5W 1g/100mL (PREMIX) IVPB 1 g  1 g Intravenous PRN Zi Khan MD       • mirtazapine (REMERON) tablet 30 mg  30 mg Oral Nightly Zi  "Janice Khan MD   30 mg at 02/20/17 2007   • ondansetron (ZOFRAN) injection 4 mg  4 mg Intravenous Q6H PRN Francisco Javier Gabriel MD       • polyethylene glycol (MIRALAX) powder 17 g  17 g Oral Daily PRN Zi Khan MD       • potassium chloride 10 mEq in 100 mL IVPB  10 mEq Intravenous Q1H PRN Zi Khan  mL/hr at 02/21/17 0155 10 mEq at 02/21/17 0155   • saccharomyces boulardii (FLORASTOR) capsule 250 mg  250 mg Oral BID Zi Khan MD   Stopped at 02/21/17 1023   • sodium chloride 0.9 % flush 1-10 mL  1-10 mL Intravenous PRN Francisco Javier Gabriel MD       • sodium chloride 0.9 % flush 10 mL  10 mL Intravenous PRN Price Apple MD       • sodium chloride 0.9 % infusion  100 mL/hr Intravenous Once Price Apple MD   100 mL/hr at 02/19/17 1024   • tamsulosin (FLOMAX) 24 hr capsule 0.8 mg  0.8 mg Oral Daily Zi Khan MD   Stopped at 02/21/17 1024       PHYSICAL EXAM:  Vitals:    02/21/17 1147   BP: 128/75   Pulse: 76   Resp:    Temp:    SpO2:       She follows commands and is getting out a few words sentences, and was able to tell me her name today. Her NIH stroke scale is a documented as a 10 (presenting 26-30), but I suspect that is actually better than this as her left lower extremity \"deficit\" is resultant from recent left hip surgery (rather than stroke).       ASSESSMENT/PLAN:  Ms. Knapp is a 85 y.o. female status post IV TPA therapy for a left M1 MCA occlusion with large ischemic penumbra within the left middle cerebral vascular territory. Planned thrombectomy was aborted when diagnostic angiography demonstrated essentially complete lysis of thrombus with IV TPA therapy in restoration of near normal (TICI 2B) flow to the left cerebral hemisphere.     Overall, she is progressing very nicely, and I'm hopeful that she will make a significant recovery. Her 24-hour head CT demonstrates no convincing areas of infarction and no intracranial " "hemorrhages. She starting antiplatelet therapy with and aspirin.     Her carotid vasculature is \"normal\" and I'm strongly suspicious for a cardiac thromboembolic source. Her transthoracic echocardiogram demonstrates evidence of \"shunting\", but I have spoken with Dr. Chou and this represents a small PFO (possibly incidental).  She was recently admitted (a month or so ago) to the hospital for \"anemia\" of uncertain etiology, and did require 2 units of blood transfusion.  As such, we are somewhat reluctant to initiate anticoagulation therapy, without a definitive \"source\", and we will continue with aspirin antiplatelet regimen for now.  We are going to obtain an MRI of the head to assess for evidence of other/prior thromboembolic events which might lean more towards paroxysmal atrial fibrillation.  There is also a plan to have a Trell XT \"patch monitor\" placed at time of discharge, with then the patient to follow-up with cardiology (2 weeks after discharge) to evaluate for any evidence of atrial fibrillation during the interval.    PT/OT are working with the patient, and we will have  evaluate for [facility] rehabilitation.  She is okay to transfer to the floor from a neuro interventional standpoint, and rehabilitation when a bed is available.              "

## 2017-02-21 NOTE — PROGRESS NOTES
Adult Nutrition  Assessment/PES    Patient Name:  Florinda Knapp  YOB: 1931  MRN: [id number]  Admit Date:  2/19/2017    Assessment Date:  2/21/2017     Comments: RD follow-up. Noted SLP recs continue npo with alternate means of nutrition. Ok per MD to start tube feeding. Will order in Epic- Fiber[employer] HN to goal rate of 60 ml/hr (goal volume= 1200 ml/day) with free water @ 20 ml/hr. Will continue to follow.           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   • H/O urinary retention   • History of recurrent UTIs   • Essential hypertension   • Anxiety   • CHF with cardiomyopathy   • Colitis   • Hypokalemia   • Acute ischemic left MCA stroke   • Hypertension   • Hyperlipidemia   • Urinary retention/Frequent UTIs   • Hypothyroidism   • Status post left hip replacement   • Recent Colitis   • Acute CVA (cerebrovascular accident)              Nutrition/Diet History       02/21/17 1448    Nutrition/Diet History    Reported/Observed By MD    Other per MD- ok to start TF per RD recs              Labs/Tests/Procedures/Meds       02/21/17 1448    Labs/Tests/Procedures/Meds    Labs/Tests Review Reviewed;Glucose;Mg++   replacing Mg++, starting D5W    Procedure Review SLP   per SLP (2/21)- not ready for repeat instrumental eval, rec proceeding with keofeed and initiating alt nutrition until pt's strength improves     "Swallow eval status Done    Type of SLP Evaluation Bedside    Medication Review Reviewed, pertinent              Estimated/Assessed Needs       02/21/17 1450    Calculation Measurements    Weight Used For Calculations 47.6 kg (105 lb)    Height Used for Calculations 1.499 m (4' 11\")    Estimated/Assessed Energy Needs    Energy Need Method Kcal/kg    kcal/kg 25   25-30 kcal/kg= 7927-5435 kcal/day    25 Kcal/Kg (kcal) 1190.7    Estimated/Assessed Protein Needs    Weight Used for Protein Calculation 47.6 kg (105 lb)    Protein (gm/kg) 1.2    1.2 Gm Protein (gm) 57.15            Nutrition Prescription Ordered       02/21/17 1450    Nutrition Prescription PO    Current PO Diet NPO                Problem/Interventions:        Problem 1       02/21/17 1450    Nutrition Diagnoses Problem 1    Problem 1 Needs Alternate Route   potential     Etiology (related to) Functional Diagnosis   clinical condition    Functional Diagnosis Dysphagia    Signs/Symptoms (evidenced by) NPO;SLP/Swallow eval                    Intervention Goal       02/21/17 1451    Intervention Goal    General Nutrition support treatment    TF/PN Inititiate TF/PN            Nutrition Intervention       02/21/17 1451    Nutrition Intervention    RD/Tech Action Follow Tx progress;Care plan reviewd;Recommend/ordered    Recommended/Ordered EN            Nutrition Prescription       02/21/17 1451    Nutrition Prescription EN    Enteral Prescription Enteral begin/change;Enteral to supply    Enteral Route NG    Product Fibersource HN    TF Delivery Method Continuous    Continuous TF Goal Rate (mL/hr) 60 mL/hr    Continuous TF Starting Rate (mL/hr) 5 mL/hr    Continuous TF Goal Volume (mL) 1200 mL    Water flush (mL)  20 mL    Water Flush Frequency Per hour    New EN Prescription Ordered? Yes    EN to Supply    Kcal/Day 1440 Kcal/Day    Protein (gm/day) 64 gm/day    Meet Estimated Kcal Need (%) 103 %    Meet Estimated Protein Need (%) 110 %    TF Free H2O (mL) 972 " mL    Total Free H2O (mL/day) 1372 mL/day    Fiber Per Day (gm/day) 18 gm/day            Education/Evaluation       02/21/17 1452    Monitor/Evaluation    Monitor Per protocol;Pertinent labs;TF delivery/tolerance          Electronically signed by:  Maty Bahena MS RD/LD [facility]  02/21/17 2:52 PM

## 2017-02-22 ENCOUNTER — APPOINTMENT (OUTPATIENT)
Dept: GENERAL RADIOLOGY | Facility: HOSPITAL | Age: 82
End: 2017-02-22

## 2017-02-22 ENCOUNTER — APPOINTMENT (OUTPATIENT)
Dept: MRI IMAGING | Facility: HOSPITAL | Age: 82
End: 2017-02-22

## 2017-02-22 LAB
ALBUMIN SERPL-MCNC: 2.8 G/DL (ref 3.2–4.8)
ALBUMIN/GLOB SERPL: 1 G/DL (ref 1.5–2.5)
ALP SERPL-CCNC: 46 U/L (ref 25–100)
ALT SERPL W P-5'-P-CCNC: 2 U/L (ref 7–40)
ANION GAP SERPL CALCULATED.3IONS-SCNC: 5 MMOL/L (ref 3–11)
AST SERPL-CCNC: 12 U/L (ref 0–33)
BACTERIA SPEC AEROBE CULT: ABNORMAL
BASOPHILS # BLD AUTO: 0.01 10*3/MM3 (ref 0–0.2)
BASOPHILS NFR BLD AUTO: 0.2 % (ref 0–1)
BILIRUB SERPL-MCNC: 0.5 MG/DL (ref 0.3–1.2)
BUN BLD-MCNC: 10 MG/DL (ref 9–23)
BUN/CREAT SERPL: 20 (ref 7–25)
CALCIUM SPEC-SCNC: 7.8 MG/DL (ref 8.7–10.4)
CHLORIDE SERPL-SCNC: 106 MMOL/L (ref 99–109)
CO2 SERPL-SCNC: 24 MMOL/L (ref 20–31)
CREAT BLD-MCNC: 0.5 MG/DL (ref 0.6–1.3)
DEPRECATED RDW RBC AUTO: 60.4 FL (ref 37–54)
EOSINOPHIL # BLD AUTO: 0.2 10*3/MM3 (ref 0.1–0.3)
EOSINOPHIL NFR BLD AUTO: 4.4 % (ref 0–3)
ERYTHROCYTE [DISTWIDTH] IN BLOOD BY AUTOMATED COUNT: 16.6 % (ref 11.3–14.5)
FOLATE SERPL-MCNC: 8.53 NG/ML (ref 3.2–20)
GFR SERPL CREATININE-BSD FRML MDRD: 117 ML/MIN/1.73
GLOBULIN UR ELPH-MCNC: 2.9 GM/DL
GLUCOSE BLD-MCNC: 111 MG/DL (ref 70–100)
HCT VFR BLD AUTO: 26.7 % (ref 34.5–44)
HGB BLD-MCNC: 8.3 G/DL (ref 11.5–15.5)
IMM GRANULOCYTES # BLD: 0.01 10*3/MM3 (ref 0–0.03)
IMM GRANULOCYTES NFR BLD: 0.2 % (ref 0–0.6)
IRON 24H UR-MRATE: 24 MCG/DL (ref 50–175)
IRON SATN MFR SERPL: 12 % (ref 15–50)
LYMPHOCYTES # BLD AUTO: 1.98 10*3/MM3 (ref 0.6–4.8)
LYMPHOCYTES NFR BLD AUTO: 43.5 % (ref 24–44)
MAGNESIUM SERPL-MCNC: 2.2 MG/DL (ref 1.3–2.7)
MCH RBC QN AUTO: 30.9 PG (ref 27–31)
MCHC RBC AUTO-ENTMCNC: 31.1 G/DL (ref 32–36)
MCV RBC AUTO: 99.3 FL (ref 80–99)
MONOCYTES # BLD AUTO: 0.39 10*3/MM3 (ref 0–1)
MONOCYTES NFR BLD AUTO: 8.6 % (ref 0–12)
NEUTROPHILS # BLD AUTO: 1.96 10*3/MM3 (ref 1.5–8.3)
NEUTROPHILS NFR BLD AUTO: 43.1 % (ref 41–71)
PHOSPHATE SERPL-MCNC: 2.3 MG/DL (ref 2.4–5.1)
PLATELET # BLD AUTO: 147 10*3/MM3 (ref 150–450)
PMV BLD AUTO: 9 FL (ref 6–12)
POTASSIUM BLD-SCNC: 3.7 MMOL/L (ref 3.5–5.5)
PROT SERPL-MCNC: 5.7 G/DL (ref 5.7–8.2)
RBC # BLD AUTO: 2.69 10*6/MM3 (ref 3.89–5.14)
SODIUM BLD-SCNC: 135 MMOL/L (ref 132–146)
TIBC SERPL-MCNC: 198 MCG/DL (ref 250–450)
VIT B12 BLD-MCNC: 301 PG/ML (ref 211–911)
WBC NRBC COR # BLD: 4.55 10*3/MM3 (ref 3.5–10.8)

## 2017-02-22 PROCEDURE — 83735 ASSAY OF MAGNESIUM: CPT | Performed by: INTERNAL MEDICINE

## 2017-02-22 PROCEDURE — 80053 COMPREHEN METABOLIC PANEL: CPT | Performed by: INTERNAL MEDICINE

## 2017-02-22 PROCEDURE — 82607 VITAMIN B-12: CPT | Performed by: INTERNAL MEDICINE

## 2017-02-22 PROCEDURE — 97110 THERAPEUTIC EXERCISES: CPT

## 2017-02-22 PROCEDURE — 99231 SBSQ HOSP IP/OBS SF/LOW 25: CPT | Performed by: RADIOLOGY

## 2017-02-22 PROCEDURE — 93005 ELECTROCARDIOGRAM TRACING: CPT | Performed by: NURSE PRACTITIONER

## 2017-02-22 PROCEDURE — 82746 ASSAY OF FOLIC ACID SERUM: CPT | Performed by: INTERNAL MEDICINE

## 2017-02-22 PROCEDURE — 92612 ENDOSCOPY SWALLOW (FEES) VID: CPT

## 2017-02-22 PROCEDURE — 84100 ASSAY OF PHOSPHORUS: CPT | Performed by: INTERNAL MEDICINE

## 2017-02-22 PROCEDURE — 93010 ELECTROCARDIOGRAM REPORT: CPT | Performed by: INTERNAL MEDICINE

## 2017-02-22 PROCEDURE — 25010000002 ENOXAPARIN PER 10 MG: Performed by: INTERNAL MEDICINE

## 2017-02-22 PROCEDURE — 25010000002 NA FERRIC GLUC CPLX PER 12.5 MG: Performed by: INTERNAL MEDICINE

## 2017-02-22 PROCEDURE — 25810000003 DEXTROSE-NACL PER 500 ML: Performed by: INTERNAL MEDICINE

## 2017-02-22 PROCEDURE — 71010 HC CHEST PA OR AP: CPT

## 2017-02-22 PROCEDURE — 92610 EVALUATE SWALLOWING FUNCTION: CPT

## 2017-02-22 PROCEDURE — 85025 COMPLETE CBC W/AUTO DIFF WBC: CPT | Performed by: INTERNAL MEDICINE

## 2017-02-22 PROCEDURE — 97530 THERAPEUTIC ACTIVITIES: CPT | Performed by: OCCUPATIONAL THERAPIST

## 2017-02-22 PROCEDURE — 99233 SBSQ HOSP IP/OBS HIGH 50: CPT | Performed by: INTERNAL MEDICINE

## 2017-02-22 PROCEDURE — 83540 ASSAY OF IRON: CPT | Performed by: INTERNAL MEDICINE

## 2017-02-22 PROCEDURE — 74000 HC ABDOMEN KUB: CPT

## 2017-02-22 PROCEDURE — 70551 MRI BRAIN STEM W/O DYE: CPT

## 2017-02-22 PROCEDURE — 83550 IRON BINDING TEST: CPT | Performed by: INTERNAL MEDICINE

## 2017-02-22 RX ORDER — SENNA AND DOCUSATE SODIUM 50; 8.6 MG/1; MG/1
2 TABLET, FILM COATED ORAL 2 TIMES DAILY
Status: DISCONTINUED | OUTPATIENT
Start: 2017-02-22 | End: 2017-02-24 | Stop reason: HOSPADM

## 2017-02-22 RX ORDER — POLYETHYLENE GLYCOL 3350 17 G/17G
17 POWDER, FOR SOLUTION ORAL 2 TIMES DAILY PRN
Status: DISCONTINUED | OUTPATIENT
Start: 2017-02-22 | End: 2017-02-24 | Stop reason: HOSPADM

## 2017-02-22 RX ADMIN — CARBIDOPA AND LEVODOPA 1 TABLET: 25; 100 TABLET ORAL at 21:26

## 2017-02-22 RX ADMIN — CARVEDILOL 6.25 MG: 6.25 TABLET, FILM COATED ORAL at 21:26

## 2017-02-22 RX ADMIN — MIRTAZAPINE 30 MG: 15 TABLET, FILM COATED ORAL at 21:26

## 2017-02-22 RX ADMIN — NYSTATIN 500000 UNITS: 500000 SUSPENSION ORAL at 12:15

## 2017-02-22 RX ADMIN — SODIUM CHLORIDE 62.5 MG: 9 INJECTION, SOLUTION INTRAVENOUS at 18:30

## 2017-02-22 RX ADMIN — NYSTATIN 500000 UNITS: 500000 SUSPENSION ORAL at 21:27

## 2017-02-22 RX ADMIN — DEXTROSE AND SODIUM CHLORIDE 75 ML/HR: 5; 900 INJECTION, SOLUTION INTRAVENOUS at 14:17

## 2017-02-22 RX ADMIN — DOCUSATE SODIUM AND SENNOSIDES 2 TABLET: 8.6; 5 TABLET, FILM COATED ORAL at 18:49

## 2017-02-22 RX ADMIN — ENOXAPARIN SODIUM 40 MG: 40 INJECTION SUBCUTANEOUS at 14:13

## 2017-02-22 RX ADMIN — NYSTATIN 500000 UNITS: 500000 SUSPENSION ORAL at 17:43

## 2017-02-22 RX ADMIN — GLYCERIN 2 G: 2.1 SUPPOSITORY RECTAL at 21:27

## 2017-02-22 RX ADMIN — ASPIRIN 300 MG: 300 SUPPOSITORY RECTAL at 08:16

## 2017-02-22 RX ADMIN — Medication 250 MG: at 17:44

## 2017-02-22 RX ADMIN — DESMOPRESSIN ACETATE 40 MG: 0.2 TABLET ORAL at 21:26

## 2017-02-22 RX ADMIN — LEVOTHYROXINE SODIUM ANHYDROUS 37.5 MCG: 100 INJECTION, POWDER, LYOPHILIZED, FOR SOLUTION INTRAVENOUS at 12:15

## 2017-02-22 RX ADMIN — CARBIDOPA AND LEVODOPA 1 TABLET: 25; 100 TABLET ORAL at 17:44

## 2017-02-22 RX ADMIN — POLYETHYLENE GLYCOL 3350 17 G: 17 POWDER, FOR SOLUTION ORAL at 14:13

## 2017-02-22 RX ADMIN — DEXTROSE AND SODIUM CHLORIDE 75 ML/HR: 5; 900 INJECTION, SOLUTION INTRAVENOUS at 01:28

## 2017-02-22 NOTE — PROGRESS NOTES
Patient consented to [facility] transition program. Henry Combs 2/22/2017

## 2017-02-22 NOTE — PROGRESS NOTES
"NAME: VAIBHAV RUTHERFORD  : 3/5/1931  PCP: Kieran Cottrell MD  ADMITTING PHYSICIAN: Evangelista Brand MD    DATE OF ADMISSION:  2017  DATE OF SERVICE: 2017    HOSPITAL DAY:  3 days    HISTORY OF PRESENT ILLNESS:  85 y.o. female who was recovering from a recent \"hip replacement\" at an inpatient rehabilitation/nursing home, when she developed acute onset of aphasia and right-sided weakness (witnessed event) this morning. She was ambulating at the nursing home/rehabilitation yesterday, with anticipation of returning \"home\". She presented to [facility] with symptoms of a severe stroke/large vessel occlusion (NIH stroke score 26). She presented within appropriate time frame, and was administered IV tPA, per protocol. She had a CT perfusion scan which demonstrates evidence of a left MCA occlusion with relatively little \"core\" infarct, and a large area of ischemic penumbra. During planned thrombectomy, she was found to have essentially complete lysis of thrombus within the left middle cerebral artery with restoration of near normal (TICI 2B) flow to the left cerebral hemisphere. As such, no thrombectomy was performed (diagnostic angiography only).       REVIEW OF IMAGING:  MRI of the head dated 2017 was reviewed along with its corresponding radiologic report.  This study demonstrates relatively small areas of cortical infarction involving the left insular region and the left frontal/parietal cortex, but the large area of ischemic \"penumbra\" identified on the presenting CT perfusion scan appears largely \"salvaged\" with IV TPA therapy.  There are age-appropriate chronic small vessel ischemic changes, but no evidence of prior embolic infarct.    LABS:  Lab Results   Component Value Date    WBC 4.55 2017    HGB 8.3 (L) 2017    HCT 26.7 (L) 2017    MCV 99.3 (H) 2017     (L) 2017     Lab Results   Component Value Date    GLUCOSE 111 (H) 2017    " CALCIUM 7.8 (L) 02/22/2017     02/22/2017    K 3.7 02/22/2017    CO2 24.0 02/22/2017     02/22/2017    BUN 10 02/22/2017    CREATININE 0.50 (L) 02/22/2017    EGFRIFNONA 117 02/22/2017    BCR 20.0 02/22/2017    ANIONGAP 5.0 02/22/2017       CURRENT MEDS:  Current Facility-Administered Medications   Medication Dose Route Frequency Provider Last Rate Last Dose   • acetaminophen (TYLENOL) tablet 1,000 mg  1,000 mg Oral 4x Daily PRN Zi Khan MD       • aspirin tablet 325 mg  325 mg Oral Daily Francisco Javier Gabriel MD        Or   • aspirin suppository 300 mg  300 mg Rectal Daily Francisco Javier Gabriel MD   300 mg at 02/22/17 0816   • atorvastatin (LIPITOR) tablet 40 mg  40 mg Oral Nightly Francisco Javier Gabriel MD   40 mg at 02/21/17 2200   • carbidopa-levodopa (SINEMET)  MG per tablet 1 tablet  1 tablet Oral 4x Daily Zi Khan MD   1 tablet at 02/21/17 2200   • carvedilol (COREG) tablet 6.25 mg  6.25 mg Oral Q12H ARIEL Read   6.25 mg at 02/21/17 2200   • cholecalciferol (VITAMIN D3) tablet 2,000 Units  2,000 Units Oral Daily Zi Khan MD   Stopped at 02/21/17 0919   • dextrose 5 % and sodium chloride 0.9 % infusion  75 mL/hr Intravenous Continuous Zi Khan MD 75 mL/hr at 02/22/17 0128 75 mL/hr at 02/22/17 0128   • enoxaparin (LOVENOX) syringe 40 mg  40 mg Subcutaneous Q24H Zi Khan MD   40 mg at 02/21/17 1606   • ferrous sulfate tablet 325 mg  325 mg Oral Daily With Breakfast Zi Khan MD   325 mg at 02/21/17 0850   • levothyroxine sodium injection 37.5 mcg  37.5 mcg Intravenous Q AM Zi Khan MD   37.5 mcg at 02/21/17 0850   • loperamide (IMODIUM) capsule 2 mg  2 mg Oral TID PRN Zi Khan MD       • magnesium sulfate in D5W 1g/100mL (PREMIX) IVPB 1 g  1 g Intravenous PRN Zi Khan MD   1 g at 02/21/17 1023    Or   • magnesium sulfate 8 g in dextrose  (D5W) 5 % 250 mL infusion  8 g Intravenous PRN Zi Khan MD        Or   • magnesium sulfate 6 g in dextrose (D5W) 5 % 250 mL infusion  6 g Intravenous PRN Zi Khan MD        Or   • magnesium sulfate in D5W 1g/100mL (PREMIX) IVPB 1 g  1 g Intravenous PRN Zi Khan MD       • mirtazapine (REMERON) tablet 30 mg  30 mg Oral Nightly Zi Khan MD   30 mg at 02/21/17 2200   • ondansetron (ZOFRAN) injection 4 mg  4 mg Intravenous Q6H PRN Francisco Javier Gabriel MD       • polyethylene glycol (MIRALAX) powder 17 g  17 g Oral Daily PRN Zi Khan MD       • potassium chloride 10 mEq in 100 mL IVPB  10 mEq Intravenous Q1H PRN Zi Khan  mL/hr at 02/21/17 0155 10 mEq at 02/21/17 0155   • saccharomyces boulardii (FLORASTOR) capsule 250 mg  250 mg Oral BID Zi Khan MD   Stopped at 02/21/17 1023   • sodium chloride 0.9 % flush 1-10 mL  1-10 mL Intravenous PRN Francisco Javier Gabriel MD       • sodium chloride 0.9 % flush 10 mL  10 mL Intravenous PRN Price Apple MD       • sodium chloride 0.9 % infusion  100 mL/hr Intravenous Once Price Apple MD   100 mL/hr at 02/19/17 1024   • tamsulosin (FLOMAX) 24 hr capsule 0.8 mg  0.8 mg Oral Daily Zi Khan MD   Stopped at 02/21/17 1024       PHYSICAL EXAM:  Vitals:    02/22/17 0426   BP: 126/70   Pulse: 75   Resp: 16   Temp: 97.5 °F (36.4 °C)   SpO2: 98%      Alert and oriented ×3.  She looks much better this morning.  Her speech has markedly improved, and is nearly back to normal.  She has antigravity strength in the upper extremities.  She has some baseline weakness in the left lower extremity, status post recent hip replacement.  She has yet to pass dysphasia screening, but her speech is markedly improved today, and I am optimistic that she will be able to pass in the near future.    NIH stroke scale this morning is documented as a 7 (presenting  "26-30), albeit I think it is likely a 5 (as left lower extremity deficits is baseline from hip replacement).    ASSESSMENT/PLAN:  Ms. Knapp is a 85 y.o. female status post IV TPA therapy for a left M1 MCA occlusion with large ischemic penumbra within the left middle cerebral vascular territory. Planned thrombectomy was aborted when diagnostic angiography demonstrated essentially complete lysis of thrombus with IV TPA therapy in restoration of near normal (TICI 2B) flow to the left cerebral hemisphere.      Overall, she is progressing very nicely, and her speech and strength are markedly improved this morning.  MRI of the head demonstrates relatively small areas of cortical infarction, with the vast majority of ischemic \"penumbra\" identified on the presenting CT perfusion scan being \"salvaged\" with IV TPA therapy.  There are age-appropriate chronic small vessel ischemic changes, without convincing evidence of prior thromboembolic event.        Her carotid vasculature is \"normal\" and I'm strongly suspicious for a cardiac thromboembolic source. Her transthoracic echocardiogram demonstrates evidence of \"shunting\", but I have spoken with Dr. Chou and this represents a small PFO (possibly incidental). She was recently admitted (a month or so ago) to the hospital for \"anemia\" of uncertain etiology, and did require 2 units of blood transfusion. As such, we are somewhat reluctant to initiate anticoagulation therapy, without a definitive \"source\", and we will continue with aspirin antiplatelet regimen for now.     We will plan on placing a Trell XT \"patch monitor\" at time of discharge, with then the patient to follow-up with cardiology (2 weeks after discharge) to evaluate for any evidence of atrial fibrillation during the interval.     PT/OT are working with the patient, and [facility] from [facility] standpoint.  "     I would like to have her follow-up with myself, Dr. Gabriel, and the neuro interventional clinic 3-4 weeks after discharge.

## 2017-02-22 NOTE — PLAN OF CARE
Problem: Patient Care Overview (Adult)  Goal: Plan of Care Review  Outcome: Ongoing (interventions implemented as appropriate)    02/22/17 1442   Coping/Psychosocial Response Interventions   Plan Of Care Reviewed With patient   Patient Care Overview   Progress progress toward functional goals as expected   Outcome Evaluation   Outcome Summary/Follow up Plan Clinical swallow eval and FEES complete. Functional swallow. Mild increased mastication time w/ solid. Mild inconsistent delayed initiation of the swallow to the pyriforms w/ thin liquids. Pen before the swallow x1 that cleared with the completion of the swallow. No asp during this eval even when pushed for fatigue with thins and pureed. No significant residue. REC: soft textures, ground, thins liquids, meds whole or crushed in pureed         Problem: Inpatient SLP  Goal: Dysphagia- Patient will improve swallowing skills to begin to take some PO safely  Outcome: Outcome(s) achieved Date Met:  02/22/17 02/21/17 1031   Begin to Take Some PO Safely   Begin to Take Some PO Safely- SLP, Date Established 02/21/17   Begin to Take Some PO Safely- SLP, Time to Achieve by discharge   Begin to Take Some PO Safely- SLP, Date Goal Reviewed 02/21/17   Begin to Take Some PO Safely- SLP, Outcome goal ongoing

## 2017-02-22 NOTE — PROGRESS NOTES
Acute Care - Physical Therapy Treatment Note  [facility]     Patient Name: Florinda Knapp  : 3/5/1931  MRN: [id number]  Today's Date: 2017  Onset of Illness/Injury or Date of Surgery Date: 17  Date of Referral to PT: 17  Referring Physician: ARIEL Stokes    Admit Date: 2017    Visit Dx:    ICD-10-CM ICD-9-CM   1. Acute CVA (cerebrovascular accident) I63.9 434.91   2. Left carotid artery occlusion I65.22 433.10   3. Impaired functional mobility, balance, gait, and endurance Z74.09 V49.89   4. Impaired mobility and ADLs Z74.09 799.89   5. CHF with cardiomyopathy I50.9 428.0    I42.9 425.4     Patient Active Problem List   Diagnosis   • Dysuria   • Elbow injury   • Fracture of left olecranon process   • Parkinson's disease   • Trauma left hip   • Acute Symptomatic on Chronic Normocytic Anemia   • Acute respiratory failure with hypoxia   • Generalized weakness   • Chest pain   • Symptomatic anemia   • Elevated troponin I level, possibly due to anemia   • Status post total replacement of left hip 2016 at [facility]   • H/O urinary retention   • History of recurrent UTIs   • Essential hypertension   • Anxiety   • CHF with cardiomyopathy   • Colitis   • Hypokalemia   • Acute ischemic left MCA stroke   • Hypertension   • Hyperlipidemia   • Urinary retention/Frequent UTIs   • Hypothyroidism   • Status post left hip replacement   • Recent Colitis   • Acute CVA (cerebrovascular accident)               Adult Rehabilitation Note       17 0805 17 0804 17 1000    Rehab Assessment/Intervention    Discipline physical therapy assistant  -AS occupational therapist  -AR speech language pathologist  -SG    Document Type therapy note (daily note)  -AS  therapy note (daily note)   dys re-assessment in tx  -SG    Subjective Information agree to therapy;no complaints  -AS  complains of;weakness  -SG    Patient Effort, Rehab Treatment good  -AS  good  -SG    Symptoms Noted During/After Treatment  fatigue  -AS  fatigue  -SG    Precautions/Limitations fall precautions;oxygen therapy device and L/min;other (see comments)   NG tube, L THR with precautions 12-6-17  -AS      Precautions/Limitations, Vision   WFL  -SG    Precautions/Limitations, Hearing   WFL  -SG    Recorded by [AS] Tammy Ayala PTA [AR] Radha Tapia, OT [SG] Ketty George, MS CCC-SLP    Vital Signs    Pre Systolic BP Rehab 144  -AS      Pre Treatment Diastolic BP 84  -AS      Post Systolic BP Rehab 134  -AS      Post Treatment Diastolic BP 83  -AS      Pre SpO2 (%) 99  -AS      O2 Delivery Pre Treatment supplemental O2  -AS      Post SpO2 (%) 97  -AS      O2 Delivery Post Treatment supplemental O2  -AS      Pre Patient Position Supine  -AS      Intra Patient Position Sitting  -AS      Post Patient Position Sitting  -AS      Recorded by [AS] Tammy Ayala PTA      Pain Assessment    Pain Assessment No/denies pain  -AS  No/denies pain  -SG    [place] FACES Pain Rating   0  -SG    Recorded by [AS] Tammy Ayala PTA  [SG] Ketty George, MS CCC-SLP    Cognitive Assessment/Intervention    Current Cognitive/Communication Assessment impaired  -AS      Orientation Status oriented to;person;place;time  -AS      Follows Commands/Answers Questions 75% of the time;able to follow single-step instructions  -AS      Personal Safety decreased awareness, need for assist;decreased awareness, need for safety;mild impairment  -AS      Personal Safety Interventions elopement precautions initiated;fall prevention program maintained;gait belt;nonskid shoes/slippers when out of bed  -AS      Recorded by [AS] Tammy Ayala PTA      Dysphagia/Swallow Intervention    Dysphagia/Swallow Intervention   Dys tx initiated. Pt is being re-assessed for instrumental readiness daily in tx. Very weak vocal quality but improved alertness today. Wet vocal quality and swallows x5 with small tsp of puree. Not ready for repeat  instumental eval, recommend proceeding w/ keofeed and initiating alt nutrition until pt's strength improves.   -SG    Recorded by   [SG] Ketty George MS CCC-SLP    Dysphagia Treatment Objectives and Progress    Dysphagia Treatment Objectives   Improve timing of pharyngeal response;Improve laryngeal closure;Improve hyolaryngeal excursion;Improve closure at entrance to airway;Improve tongue base & pharyngeal wall squeeze  -SG    To improve timing of pharyngeal response, patient will:   Swallow in timely way using three second prep;80%;without cues  -SG    Timing of Pharyngeal Response Progress   50%;with consistent cues;continue to adress  -SG    To improve laryngeal closure, patient will:   Complete supraglottic swallow;80%;without cues  -SG    Laryngeal Closure Progress   continue to adress  -SG    To improve closure at entrance to airway, patient will:   Complete super-supraglottic swallow;80%;without cues  -SG    Closure at Entrance to Airway Progress   continue to adress  -SG    To improve hyolaryngeal excursion, patient will:   Complete head lift sustained (comment number of seconds);Complete head lift repetitive (comment number of lifts);80%;without cues  -SG    Hyolaryngeal Excursion Progress   continue to adress  -SG    To improve tongue base & pharyngeal wall squeeze, patient will:   Complete effortful swallow;80%;without cues  -SG    Tongue Base/Pharyngeal Wall Squeeze Progress   60%;with consistent cues;continue to adress  -SG    Recorded by   [SG] Ketty George MS CCC-SLP    Bed Mobility, Assessment/Treatment    Bed Mob, Supine to Sit, [place] verbal cues required;maximum assist (25% patient effort);2 person assist required  -AS      Bed Mob, Sit to Supine, [place] not tested  -AS      Bed Mobility, Safety Issues decreased use of arms for pushing/pulling;decreased use of legs for bridging/pushing  -AS      Bed Mobility, Impairments strength decreased;impaired  balance;coordination impaired;motor control impaired  -AS      Bed Mobility, Comment patient able to slide right leg to edge of bed, assist needed for LLE and trunk to reach upright position  -AS      Recorded by [AS] Tammy Ayala PTA      Transfer Assessment/Treatment    Transfers, Bed-Chair [place] verbal cues required;maximum assist (25% patient effort);2 person assist required  -AS      Transfer, Safety Issues step length decreased;weight-shifting ability decreased  -AS      Transfer, Impairments strength decreased;impaired balance;motor control impaired  -AS      Transfer, Comment patient to weak to attmept standing secondary to increase sitting EOB  -AS      Recorded by [AS] Tammy Ayala PTA      Balance Skills Training    Sitting-Level of Assistance Minimum assistance   progressed to CGA, leans to the right.  -AS      Sitting-Balance Support Right upper extremity supported;Left upper extremity supported;Feet supported  -AS      Sitting # of Minutes 15  -AS      Recorded by [AS] Tammy Ayala PTA      Positioning and Restraints    Pre-Treatment Position in bed  -AS      Post Treatment Position chair  -AS      In Chair reclined;call light within reach;encouraged to call for assist;exit alarm on;with family/caregiver;RUE elevated;LUE elevated;compression device;waffle cushion;on mechanical lift sling  -AS      Recorded by [AS] Tammy Ayala PTA        02/20/17 1000          Dysphagia Treatment Objectives and Progress    Dysphagia Treatment Objectives Improve timing of pharyngeal response;Improve laryngeal closure;Improve hyolaryngeal excursion;Improve closure at entrance to airway;Improve tongue base & pharyngeal wall squeeze  -LS      To improve timing of pharyngeal response, patient will: Swallow in timely way using three second prep;80%;without cues  -LS      Timing of Pharyngeal Response Progress continue to adress  -LS      To improve laryngeal closure, patient will: Complete  supraglottic swallow;80%;without cues  -LS      Laryngeal Closure Progress continue to adress  -LS      To improve closure at entrance to airway, patient will: Complete super-supraglottic swallow;80%;without cues  -LS      Closure at Entrance to Airway Progress continue to adress  -LS      To improve hyolaryngeal excursion, patient will: Complete head lift sustained (comment number of seconds);Complete head lift repetitive (comment number of lifts);80%;without cues  -LS      Hyolaryngeal Excursion Progress continue to adress  -LS      To improve tongue base & pharyngeal wall squeeze, patient will: Complete effortful swallow;80%;without cues  -LS      Tongue Base/Pharyngeal Wall Squeeze Progress continue to adress  -LS      Recorded by [LS] Ame Enrique, MS CCC-SLP        User Key  (r) = Recorded By, (t) = Taken By, (c) = Cosigned By    Initials Name Effective Dates    SG Ketty George, MS CCC-SLP 06/22/15 -     AR Radha Tapia, OT 06/22/15 -     AS Tammy Ayala, PTA 06/22/15 -     LS Ame Enrique, MS CCC-SLP 06/22/15 -                 IP PT Goals       02/22/17 0914 02/20/17 1042       Bed Mobility PT LTG    Bed Mobility PT LTG, Date Established  02/20/17  -DM     Bed Mobility PT LTG, Time to Achieve  1 wk  -DM     Bed Mobility PT LTG, Activity Type  all bed mobility  -DM     Bed Mobility PT LTG, [place] Level  2 person assist required;moderate assist (50% patient effort)  -DM     Bed Mobility PT LTG, Date Goal Reviewed 02/22/17  -AS      Bed Mobility PT LTG, Outcome goal ongoing  -AS      Transfer Training PT LTG    Transfer Training PT LTG, Date Established  02/20/17  -DM     Transfer Training PT LTG, Time to Achieve  1 wk  -DM     Transfer Training PT LTG, Activity Type  bed to chair /chair to bed;sit to stand/stand to sit  -DM     Transfer Training PT LTG, [place] Level  maximum assist (25% patient effort);2 person assist required  -DM     Transfer Training PT LTG,  Assist Device  walker, rolling   WBAT L LE;stable VS  -DM     Transfer Training PT  LTG, Date Goal Reviewed 02/22/17  -AS      Transfer Training PT LTG, Outcome goal ongoing  -AS      Gait Training PT LTG    Gait Training Goal PT LTG, Date Established  02/20/17  -DM     Gait Training Goal PT LTG, Time to Achieve  1 wk  -DM     Gait Training Goal PT LTG, [place] Level  maximum assist (25% patient effort);2 person assist required  -DM     Gait Training Goal PT LTG, Assist Device  walker, rolling   WBAT L LE;stable VS  -DM     Gait Training Goal PT LTG, Distance to Achieve  25  -DM     Gait Training Goal PT LTG, Date Goal Reviewed 02/22/17  -AS      Gait Training Goal PT LTG, Outcome goal ongoing  -AS        User Key  (r) = Recorded By, (t) = Taken By, (c) = Cosigned By    Initials Name Provider Type    NAHUM Puri, PT Physical Therapist    AS Tammy Ayala, PTA Physical Therapy Assistant          Physical Therapy Education     Title: PT OT SLP Therapies (Active)     Topic: Physical Therapy (Active)     Point: Mobility training (Active)    Learning Progress Summary    Learner Readiness Method Response Comment Documented by Status   Patient Acceptance E NR  AS 02/22/17 0914 Active    Acceptance E,D,H NR  DM 02/20/17 1042 Active   Family Acceptance E NR  AS 02/22/17 0914 Active    Acceptance E,D,H NR  DM 02/20/17 1042 Active               Point: Home exercise program (Active)    Learning Progress Summary    Learner Readiness Method Response Comment Documented by Status   Patient Acceptance E NR  AS 02/22/17 0914 Active    Acceptance E,D,H NR  DM 02/20/17 1042 Active   Family Acceptance E NR  AS 02/22/17 0914 Active    Acceptance E,D,H NR  DM 02/20/17 1042 Active               Point: Body mechanics (Active)    Learning Progress Summary    Learner Readiness Method Response Comment Documented by Status   Patient Acceptance E NR  AS 02/22/17 0914 Active    Acceptance E,D,H NR  DM 02/20/17 1042 Active    Family Acceptance E NR  AS 02/22/17 0914 Active    Acceptance E,D,H NR  DM 02/20/17 1042 Active               Point: Precautions (Active)    Learning Progress Summary    Learner Readiness Method Response Comment Documented by Status   Patient Acceptance E NR  AS 02/22/17 0914 Active    Acceptance E,D,H NR  DM 02/20/17 1042 Active   Family Acceptance E NR  AS 02/22/17 0914 Active    Acceptance E,D,H NR  DM 02/20/17 1042 Active                      User Key     Initials Effective Dates Name Provider Type Discipline    DM 06/19/15 -  Sravani Puri, PT Physical Therapist PT    AS 06/22/15 -  Tammy Ayala, PTA Physical Therapy Assistant PT                    PT Recommendation and Plan  Anticipated Discharge Disposition: inpatient rehabilitation facility  PT Frequency: daily  Plan of Care Review  Plan Of Care Reviewed With: patient, family  Progress: progress toward functional goals is gradual  Outcome Summary/Follow up Plan: Patient with improved sitting balance with verbal and tactile cueing from min assist to CGA.           Outcome Measures       02/22/17 0805 02/20/17 0845 02/20/17 0840    How much help from another person do you currently need...    Turning from your back to your side while in flat bed without using bedrails? 2  -AS  2  -DM    Moving from lying on back to sitting on the side of a flat bed without bedrails? 2  -AS  2  -DM    Moving to and from a bed to a chair (including a wheelchair)? 2  -AS  1  -DM    Standing up from a chair using your arms (e.g., wheelchair, bedside chair)? 1  -AS  1  -DM    Climbing 3-5 steps with a railing? 1  -AS  1  -DM    To walk in hospital room? 1  -AS  1  -DM    AM-PAC 6 Clicks Score 9  -AS  8  -DM    How much help from another is currently needed...    Putting on and taking off regular lower body clothing?  1  -CL     Bathing (including washing, rinsing, and drying)  1  -CL     Toileting (which includes using toilet bed pan or urinal)  1  -CL     Putting on and  taking off regular upper body clothing  1  -CL     Taking care of personal grooming (such as brushing teeth)  2  -CL     Eating meals  1  -CL     Score  7  -CL     Modified [place] Scale    Modified [place] Scale  5 - Severe disability.  Bedridden, incontinent, and requiring constant nursing care and attention.  -CL 5 - Severe disability.  Bedridden, incontinent, and requiring constant nursing care and attention.  -DM    Functional Assessment    Outcome Measure Options AM-PAC 6 Clicks Basic Mobility (PT)  -AS AM-PAC 6 Clicks Daily Activity (OT);Modified [place]  -CL AM-PAC 6 Clicks Basic Mobility (PT);Modified [place]  -DM      User Key  (r) = Recorded By, (t) = Taken By, (c) = Cosigned By    Initials Name Provider Type    DM Sravani Puri, PT Physical Therapist    AS Tammy Ayala PTA Physical Therapy Assistant    CL Elif Rossi, OT Occupational Therapist           Time Calculation:         PT Charges       02/22/17 0918          Time Calculation    Start Time 0805  -AS      PT Received On 02/22/17  -AS      PT Goal Re-Cert Due Date 03/02/17  -AS      Time Calculation- PT    Total Timed Code Minutes- PT 30 minute(s)  -AS        User Key  (r) = Recorded By, (t) = Taken By, (c) = Cosigned By    Initials Name Provider Type    AS Tammy Ayala PTA Physical Therapy Assistant          Therapy Charges for Today     Code Description Service Date Service Provider Modifiers Qty    25261610045 HC PT THER PROC EA 15 MIN 2/22/2017 Tammy Ayala PTA GP 2          PT G-Codes  PT Professional Judgement Used?: Yes  Outcome Measure Options: AM-PAC 6 Clicks Basic Mobility (PT)  Score: 8  Functional Limitation: Mobility: Walking and moving around  Mobility: Walking and Moving Around Current Status (): At least 80 percent but less than 100 percent impaired, limited or restricted  Mobility: Walking and Moving Around Goal Status (): At least 60 percent but less than 80 percent impaired, limited or  restricted    Tammy Ayala, PTA  2/22/2017

## 2017-02-22 NOTE — PROGRESS NOTES
Acute Care - Occupational Therapy Treatment Note  [facility]     Patient Name: Florinda Knapp  : 3/5/1931  MRN: [id number]  Today's Date: 2017  Onset of Illness/Injury or Date of Surgery Date: 17  Date of Referral to OT: 17  Referring Physician: ARIEL Stokes      Admit Date: 2017    Visit Dx:     ICD-10-CM ICD-9-CM   1. Acute CVA (cerebrovascular accident) I63.9 434.91   2. Left carotid artery occlusion I65.22 433.10   3. Impaired functional mobility, balance, gait, and endurance Z74.09 V49.89   4. Impaired mobility and ADLs Z74.09 799.89   5. CHF with cardiomyopathy I50.9 428.0    I42.9 425.4     Patient Active Problem List   Diagnosis   • Dysuria   • Elbow injury   • Fracture of left olecranon process   • Parkinson's disease   • Trauma left hip   • Acute Symptomatic on Chronic Normocytic Anemia   • Acute respiratory failure with hypoxia   • Generalized weakness   • Chest pain   • Symptomatic anemia   • Elevated troponin I level, possibly due to anemia   • Status post total replacement of left hip 2016 at [facility]   • H/O urinary retention   • History of recurrent UTIs   • Essential hypertension   • Anxiety   • CHF with cardiomyopathy   • Colitis   • Hypokalemia   • Acute ischemic left MCA stroke   • Hypertension   • Hyperlipidemia   • Urinary retention/Frequent UTIs   • Hypothyroidism   • Status post left hip replacement   • Recent Colitis   • Acute CVA (cerebrovascular accident)             Adult Rehabilitation Note       17 0805 17 0804 17 1000    Rehab Assessment/Intervention    Discipline physical therapy assistant  -AS occupational therapist  -AR speech language pathologist  -SG    Document Type therapy note (daily note)  -AS therapy note (daily note)  -AR therapy note (daily note)   dys re-assessment in tx  -SG    Subjective Information agree to therapy;no complaints  -AS  complains of;weakness  -SG    Patient Effort, Rehab Treatment good  -AS excellent  -AR  good  -SG    Symptoms Noted During/After Treatment fatigue  -AS fatigue  -AR fatigue  -SG    Precautions/Limitations fall precautions;oxygen therapy device and L/min;other (see comments)   NG tube, L THR with precautions 12-6-17  -AS fall precautions;hip precautions- left;oxygen therapy device and L/min  -AR     Precautions/Limitations, Vision   WFL  -SG    Precautions/Limitations, Hearing   WFL  -SG    Recorded by [AS] Tammy Ayala PTA [AR] Radha Tapia, OT [SG] Ketty George, MS CCC-SLP    Vital Signs    Pre Systolic BP Rehab 144  -  -AR     Pre Treatment Diastolic BP 84  -AS 84  -AR     Post Systolic BP Rehab 134  -  -AR     Post Treatment Diastolic BP 83  -AS 83  -AR     Pretreatment Heart Rate (beats/min)  74  -AR     Posttreatment Heart Rate (beats/min)  68  -AR     Pre SpO2 (%) 99  -AS 99  -AR     O2 Delivery Pre Treatment supplemental O2  -AS supplemental O2   1.5L NC  -AR     Post SpO2 (%) 97  -AS 94  -AR     O2 Delivery Post Treatment supplemental O2  -AS supplemental O2  -AR     Pre Patient Position Supine  -AS Supine  -AR     Intra Patient Position Sitting  -AS      Post Patient Position Sitting  -AS Sitting  -AR     Recorded by [AS] Tammy Ayala PTA [AR] Radha Tapia OT     Pain Assessment    Pain Assessment No/denies pain  -AS No/denies pain  -AR No/denies pain  -SG    [place] FACES Pain Rating   0  -SG    Recorded by [AS] Tammy Ayala PTA [AR] Radha Tapia OT [SG] Ketty George, MS CCC-SLP    Vision Assessment/Intervention    Visual Impairment  --   tracking WNL  -AR     Recorded by  [AR] Radha Tapia OT     Cognitive Assessment/Intervention    Current Cognitive/Communication Assessment impaired  -AS functional  -AR     Orientation Status oriented to;person;place;time  -AS oriented x 4;required verbal cueing (specifiy in comments)   cues for year only  -AR     Follows Commands/Answers Questions 75% of the  time;able to follow single-step instructions  -% of the time;able to follow single-step instructions  -AR     Personal Safety decreased awareness, need for assist;decreased awareness, need for safety;mild impairment  -AS WNL/WFL  -AR     Personal Safety Interventions elopement precautions initiated;fall prevention program maintained;gait belt;nonskid shoes/slippers when out of bed  -AS fall prevention program maintained  -AR     Recorded by [AS] Tammy Ayala PTA [AR] Radha Tapia OT     Dysphagia/Swallow Intervention    Dysphagia/Swallow Intervention   Dys tx initiated. Pt is being re-assessed for instrumental readiness daily in tx. Very weak vocal quality but improved alertness today. Wet vocal quality and swallows x5 with small tsp of puree. Not ready for repeat instumental eval, recommend proceeding w/ keofeed and initiating alt nutrition until pt's strength improves.   -SG    Recorded by   [SG] Ketty George MS CCC-SLP    Dysphagia Treatment Objectives and Progress    Dysphagia Treatment Objectives   Improve timing of pharyngeal response;Improve laryngeal closure;Improve hyolaryngeal excursion;Improve closure at entrance to airway;Improve tongue base & pharyngeal wall squeeze  -SG    To improve timing of pharyngeal response, patient will:   Swallow in timely way using three second prep;80%;without cues  -SG    Timing of Pharyngeal Response Progress   50%;with consistent cues;continue to adress  -SG    To improve laryngeal closure, patient will:   Complete supraglottic swallow;80%;without cues  -SG    Laryngeal Closure Progress   continue to adress  -SG    To improve closure at entrance to airway, patient will:   Complete super-supraglottic swallow;80%;without cues  -SG    Closure at Entrance to Airway Progress   continue to adress  -SG    To improve hyolaryngeal excursion, patient will:   Complete head lift sustained (comment number of seconds);Complete head lift repetitive  (comment number of lifts);80%;without cues  -SG    Hyolaryngeal Excursion Progress   continue to adress  -SG    To improve tongue base & pharyngeal wall squeeze, patient will:   Complete effortful swallow;80%;without cues  -SG    Tongue Base/Pharyngeal Wall Squeeze Progress   60%;with consistent cues;continue to adress  -SG    Recorded by   [SG] Ketty George MS CCC-SLP    ROM (Range of Motion)    General ROM  no range of motion deficits identified   grossly WN:  -AR     Recorded by  [AR] Radha Tapia OT     Mobility Assessment/Training    Extremity Weight-Bearing Status  left lower extremity  -AR     Left Lower Extremity Weight-Bearing  weight-bearing as tolerated  -AR     Recorded by  [AR] Radha Tapia OT     Bed Mobility, Assessment/Treatment    Bed Mobility, Assistive Device  bed rails;head of bed elevated;draw sheet  -AR     Bed Mobility, Scoot/Bridge, Hall  dependent (less than 25% patient effort)  -AR     Bed Mob, Supine to Sit, Hall verbal cues required;maximum assist (25% patient effort);2 person assist required  -AS maximum assist (25% patient effort);2 person assist required;verbal cues required  -AR     Bed Mob, Sit to Supine, Hall not tested  -AS maximum assist (25% patient effort);2 person assist required;verbal cues required  -AR     Bed Mobility, Safety Issues decreased use of arms for pushing/pulling;decreased use of legs for bridging/pushing  -AS      Bed Mobility, Impairments strength decreased;impaired balance;coordination impaired;motor control impaired  -AS ROM decreased;sensation decreased;pain;impaired balance  -AR     Bed Mobility, Comment patient able to slide right leg to edge of bed, assist needed for LLE and trunk to reach upright position  -AS      Recorded by [AS] Tammy Ayala, PTA [AR] Radha Tapia OT     Transfer Assessment/Treatment    Transfers, Bed-Chair Hall verbal cues required;maximum assist (25% patient  effort);2 person assist required  -AS maximum assist (25% patient effort);2 person assist required;verbal cues required  -AR     Transfers, Chair-Bed [place]  maximum assist (25% patient effort);2 person assist required;verbal cues required  -AR     Transfers, Bed-Chair-Bed, Assist Device  elevated surface;other (see comments)   BUE support  -AR     Transfers, Sit-Stand [place]  maximum assist (25% patient effort);2 person assist required;verbal cues required  -AR     Transfers, Stand-Sit [place]  verbal cues required;maximum assist (25% patient effort);2 person assist required  -AR     Transfers, Sit-Stand-Sit, Assist Device  elevated surface;other (see comments)   BUE support  -AR     Transfer, Safety Issues step length decreased;weight-shifting ability decreased  -AS      Transfer, Impairments strength decreased;impaired balance;motor control impaired  -AS ROM decreased;strength decreased;pain  -AR     Transfer, Comment patient to weak to attmept standing secondary to increase sitting EOB  -AS Pt able to participate in stand-pivot and required cues to sequence and maintain THP  -AR     Recorded by [AS] Tammy Ayala, JOE [AR] Radha Tapia OT     Lower Body Dressing Assessment/Training    LB Dressing Assess/Train, Clothing Type  donning:  -AR     LB Dressing Assess/Train, Position  supine  -AR     LB Dressing Assess/Train, [place]  dependent (less than 25% patient effort)  -AR     Recorded by  [AR] Radha Tapia OT     Grooming Assessment/Training    Grooming Assess/Train, Position  edge of bed  -AR     Grooming Assess/Train, Indepen Level  moderate assist (50% patient effort)  -AR     Recorded by  [AR] Radha Tapia OT     Motor Skills/Interventions    Additional Documentation  Balance Skills Training (Group)  -AR     Recorded by  [AR] Radha Tapia OT     Balance Skills Training    Sitting-Level of Assistance Minimum assistance   progressed to CGA, leans to the  right.  -AS Minimum assistance   progressed to CGA  -AR     Sitting-Balance Support Right upper extremity supported;Left upper extremity supported;Feet supported  -AS Feet supported;Right upper extremity supported   R lateral lean noted  -AR     Sitting-Balance Activities  Trunk control activities  -AR     Sitting # of Minutes 15  -AS 15  -AR     Standing-Level of Assistance  Maximum assistance;x2  -AR     Static Standing Balance Support  Right upper extremity supported;Left upper extremity supported  -AR     Recorded by [AS] Tammy Ayala PTA [AR] Radha Tapia OT     Therapy Exercises    Bilateral Upper Extremity  AROM:;10 reps;supine;elbow flexion/extension;hand pumps;shoulder extension/flexion  -AR     Recorded by  [AR] Radha Tapia OT     Sensory Assessment/Intervention    Sensory Impairment  --   light touch intact  -AR     Recorded by  [AR] Radha Tapia OT     Positioning and Restraints    Pre-Treatment Position in bed  -AS in bed  -AR     Post Treatment Position chair  -AS chair  -AR     In Chair reclined;call light within reach;encouraged to call for assist;exit alarm on;with family/caregiver;RUE elevated;LUE elevated;compression device;waffle cushion;on mechanical lift sling  -AS notified nsg;reclined;call light within reach;encouraged to call for assist;exit alarm on;with family/caregiver;compression device;waffle cushion;on mechanical lift sling;RUE elevated;LUE elevated  -AR     Recorded by [AS] Tammy Ayala PTA [AR] Radha Tapia OT       02/20/17 1000          Dysphagia Treatment Objectives and Progress    Dysphagia Treatment Objectives Improve timing of pharyngeal response;Improve laryngeal closure;Improve hyolaryngeal excursion;Improve closure at entrance to airway;Improve tongue base & pharyngeal wall squeeze  -LS      To improve timing of pharyngeal response, patient will: Swallow in timely way using three second prep;80%;without cues  -LS      Timing of  Pharyngeal Response Progress continue to adress  -LS      To improve laryngeal closure, patient will: Complete supraglottic swallow;80%;without cues  -LS      Laryngeal Closure Progress continue to adress  -LS      To improve closure at entrance to airway, patient will: Complete super-supraglottic swallow;80%;without cues  -LS      Closure at Entrance to Airway Progress continue to adress  -LS      To improve hyolaryngeal excursion, patient will: Complete head lift sustained (comment number of seconds);Complete head lift repetitive (comment number of lifts);80%;without cues  -LS      Hyolaryngeal Excursion Progress continue to adress  -LS      To improve tongue base & pharyngeal wall squeeze, patient will: Complete effortful swallow;80%;without cues  -LS      Tongue Base/Pharyngeal Wall Squeeze Progress continue to adress  -LS      Recorded by [LS] Ame Enrique, MS CCC-SLP        User Key  (r) = Recorded By, (t) = Taken By, (c) = Cosigned By    Initials Name Effective Dates    SG Ketty George, MS CCC-SLP 06/22/15 -     AR Radha Tapia, OT 06/22/15 -     AS Tammy Ayala, PTA 06/22/15 -     LS Ame Enrique, MS CCC-SLP 06/22/15 -                 OT Goals       02/22/17 0926 02/20/17 1020       Transfer Training OT LTG    Transfer Training OT LTG, Date Established  02/20/17  -CL     Transfer Training OT LTG, Time to Achieve  by discharge  -CL     Transfer Training OT LTG, Activity Type  bed to chair /chair to bed;sit to stand/stand to sit;toilet  -CL     Transfer Training OT LTG, [place] Level  maximum assist (25% patient effort);2 person assist required  -CL     Transfer Training OT LTG, Additional Goal  AAD  -CL     Transfer Training OT LTG, Outcome goal partially met  -AR      Strength OT LTG    Strength Goal OT LTG, Date Established  02/20/17  -CL     Strength Goal OT LTG, Time to Achieve  by discharge  -CL     Strength Goal OT LTG, Measure to Achieve  Pt will tolerate BUE  AROM/AAROM HEP x15 reps to increase strength/endurance to promote ADL performance.   -CL     Strength Goal OT LTG, Outcome goal ongoing  -AR      Dynamic Sitting Balance OT LTG    Dynamic Sitting Balance OT LTG, Date Established  02/20/17  -CL     Dynamic Sitting Balance OT LTG, Time to Achieve  by discharge  -CL     Dynamic Sitting Balance OT LTG, [place] Level  contact guard assist  -CL     Dynamic Sitting Balance OT LTG, Additional Goal  AAD   -CL     Dynamic Sitting Balance OT LTG, Outcome goal ongoing  -AR      Static Standing Balance OT LTG    Static Standing Balance OT LTG, Date Established  02/20/17  -CL     Static Standing Balance OT LTG, Time to Achieve  by discharge  -CL     Static Standing Balance OT LTG, [place] Level  moderate assist (50% patient effort);2 person assist required  -CL     Static Standing Balance OT LTG, Assist Device  UE Support  -CL     Static Standing Balance OT LTG, Additional Goal  AAD  -CL     Static Standing Balance OT LTG, Outcome goal ongoing  -AR      Orientation OT LTG    Orientation OT LTG, Date Established  02/20/17  -CL     Orientation OT LTG, Time to Achieve  by discharge  -CL     Orientation OT LTG, Activity Type  person;place;time;100% treatment session;verbal cues  -CL     Orientation OT LTG, Outcome goal met  -AR        User Key  (r) = Recorded By, (t) = Taken By, (c) = Cosigned By    Initials Name Provider Type    AR Radha Tapia, OT Occupational Therapist    LAMONT Rossi, OT Occupational Therapist          Occupational Therapy Education     Title: PT OT SLP Therapies (Active)     Topic: Occupational Therapy (Done)     Point: ADL training (Done)    Description: Instruct learner(s) on proper safety adaptation and remediation techniques during self care or transfers.   Instruct in proper use of assistive devices.    Learning Progress Summary    Learner Readiness Method Response Comment Documented by Status   Patient Eager E,TB,D CAROL,ANA MARIA Pt educated on  LLE THP, bed mobility, transfer training, ADL reraining, NMR AR 02/22/17 0926 Done    Acceptance E,D NR Pt educated on PHP, appropriate safety precautions, appropriate t/f techniques, role of OT, and benefits of therapy. CL 02/20/17 1019 Active   Family Eager E,TB,CATHIE MEDINA,DU Pt educated on LLE THP, bed mobility, transfer training, ADL reraining, NMR AR 02/22/17 0926 Done    Acceptance E,D NR Pt educated on PHP, appropriate safety precautions, appropriate t/f techniques, role of OT, and benefits of therapy. CL 02/20/17 1019 Active               Point: Home exercise program (Done)    Description: Instruct learner(s) on appropriate technique for monitoring, assisting and/or progressing therapeutic exercises/activities.    Learning Progress Summary    Learner Readiness Method Response Comment Documented by Status   Patient Eager E,TB,CATHIE MEDINA,DU Pt educated on LLE THP, bed mobility, transfer training, ADL reraining, NMR AR 02/22/17 0926 Done    Acceptance E,D NR Pt educated on PHP, appropriate safety precautions, appropriate t/f techniques, role of OT, and benefits of therapy.  02/20/17 1019 Active   Family Eager E,TBCATHIE,DU Pt educated on LLE THP, bed mobility, transfer training, ADL reraining, NMR AR 02/22/17 0926 Done    Acceptance E,D NR Pt educated on PHP, appropriate safety precautions, appropriate t/f techniques, role of OT, and benefits of therapy. CL 02/20/17 1019 Active               Point: Precautions (Done)    Description: Instruct learner(s) on prescribed precautions during self-care and functional transfers.    Learning Progress Summary    Learner Readiness Method Response Comment Documented by Status   Patient Eager E,TB,CATHIE MEDINA,DU Pt educated on LLE THP, bed mobility, transfer training, ADL reraining, NMR AR 02/22/17 0926 Done    Acceptance E,D NR Pt educated on PHP, appropriate safety precautions, appropriate t/f techniques, role of OT, and benefits of therapy. CL 02/20/17 1019 Active   Family Eager E,CATHIE MULLER  ANA MARIA MEDINA Pt educated on LLE THP, bed mobility, transfer training, ADL reraining, NMR AR 02/22/17 0926 Done    Acceptance E,D NR Pt educated on PHP, appropriate safety precautions, appropriate t/f techniques, role of OT, and benefits of therapy.  02/20/17 1019 Active               Point: Body mechanics (Done)    Description: Instruct learner(s) on proper positioning and spine alignment during self-care, functional mobility activities and/or exercises.    Learning Progress Summary    Learner Readiness Method Response Comment Documented by Status   Patient Eager ERENZO,ANA MARIA ROSENBAUM Pt educated on LLE THP, bed mobility, transfer training, ADL reraining, NMR AR 02/22/17 0926 Done    Acceptance E,D NR Pt educated on PHP, appropriate safety precautions, appropriate t/f techniques, role of OT, and benefits of therapy.  02/20/17 1019 Active   Family Eager RENZO VIVEROS D VU, DU Pt educated on LLE THP, bed mobility, transfer training, ADL reraining, NMR AR 02/22/17 0926 Done    Acceptance E,D NR Pt educated on PHP, appropriate safety precautions, appropriate t/f techniques, role of OT, and benefits of therapy.  02/20/17 1019 Active                      User Key     Initials Effective Dates Name Provider Type Discipline    AR 06/22/15 -  Radha Tapia, OT Occupational Therapist OT    CL 06/08/16 -  Elif Rossi OT Occupational Therapist OT                  OT Recommendation and Plan  Anticipated Equipment Needs At Discharge:  (TBD)  Anticipated Discharge Disposition: skilled nursing facility  Planned Therapy Interventions: ADL retraining, balance training, energy conservation, home exercise program, transfer training  Therapy Frequency: daily  Plan of Care Review  Plan Of Care Reviewed With: patient, son  Progress: progress toward functional goals as expected  Outcome Summary/Follow up Plan: Pt achieved orientation goal and partially achieved transfer goal. Pt had no c/o pain. Recommend SNF-level rehab.         Outcome Measures        02/22/17 0805 02/22/17 0804 02/20/17 0845    How much help from another person do you currently need...    Turning from your back to your side while in flat bed without using bedrails? 2  -AS      Moving from lying on back to sitting on the side of a flat bed without bedrails? 2  -AS      Moving to and from a bed to a chair (including a wheelchair)? 2  -AS      Standing up from a chair using your arms (e.g., wheelchair, bedside chair)? 1  -AS      Climbing 3-5 steps with a railing? 1  -AS      To walk in hospital room? 1  -AS      AM-PAC 6 Clicks Score 9  -AS      How much help from another is currently needed...    Putting on and taking off regular lower body clothing?  1  -AR 1  -CL    Bathing (including washing, rinsing, and drying)  1  -AR 1  -CL    Toileting (which includes using toilet bed pan or urinal)  1  -AR 1  -CL    Putting on and taking off regular upper body clothing  2  -AR 1  -CL    Taking care of personal grooming (such as brushing teeth)  2  -AR 2  -CL    Eating meals  1  -AR 1  -CL    Score  8  -AR 7  -CL    Modified [place] Scale    Modified [place] Scale   5 - Severe disability.  Bedridden, incontinent, and requiring constant nursing care and attention.  -CL    Functional Assessment    Outcome Measure Options AM-PAC 6 Clicks Basic Mobility (PT)  -AS AM-PAC 6 Clicks Daily Activity (OT)  -AR AM-PAC 6 Clicks Daily Activity (OT);Modified [place]  -CL      02/20/17 0840          How much help from another person do you currently need...    Turning from your back to your side while in flat bed without using bedrails? 2  -DM      Moving from lying on back to sitting on the side of a flat bed without bedrails? 2  -DM      Moving to and from a bed to a chair (including a wheelchair)? 1  -DM      Standing up from a chair using your arms (e.g., wheelchair, bedside chair)? 1  -DM      Climbing 3-5 steps with a railing? 1  -DM      To walk in hospital room? 1  -DM      AM-PAC 6 Clicks Score 8  -DM      Modified  [place] Scale    Modified [place] Scale 5 - Severe disability.  Bedridden, incontinent, and requiring constant nursing care and attention.  -DM      Functional Assessment    Outcome Measure Options AM-PAC 6 Clicks Basic Mobility (PT);Modified [place]  -DM        User Key  (r) = Recorded By, (t) = Taken By, (c) = Cosigned By    Initials Name Provider Type    NAHUM Puri, PT Physical Therapist    AR Radha Tapia, OT Occupational Therapist    AS Tammy Ayala, PTA Physical Therapy Assistant    LMAONT Rossi, OT Occupational Therapist           Time Calculation:         Time Calculation- OT       02/22/17 0930          Time Calculation- OT    OT Start Time 0804  -AR      Total Timed Code Minutes- OT 33 minute(s)  -AR      OT Received On 02/22/17  -AR      OT Goal Re-Cert Due Date 03/02/17  -AR        User Key  (r) = Recorded By, (t) = Taken By, (c) = Cosigned By    Initials Name Provider Type    BRADLEY Tapia OT Occupational Therapist           Therapy Charges for Today     Code Description Service Date Service Provider Modifiers Qty    41403050735 HC OT THER SUPP EA 15 MIN 2/22/2017 Radha Tapia OT GO 2    74091960421 HC OT THERAPEUTIC ACT EA 15 MIN 2/22/2017 Radha Tapia OT GO 2               Radha Tapia OT  2/22/2017

## 2017-02-22 NOTE — PROGRESS NOTES
Continued Stay Note  [facility]     Patient Name: Florinda Knapp  MRN: [id number]  Today's Date: 2/22/2017    Admit Date: 2/19/2017          Discharge Plan       02/22/17 1415    Case Management/Social Work Plan    Plan [facility] vs. [facility]    Additional Comments Spoke with Garima from [facility], and she states that their MD is going to have to evaluate patient for admission given patient's current level of functioning. She will let us know if [facility] will take patient as soon as she can. CM will continue to follow.              Discharge Codes     None        Expected Discharge Date and Time     Expected Discharge Date Expected Discharge Time    Feb 24, 2017             Ana Greene

## 2017-02-22 NOTE — PLAN OF CARE
Problem: Patient Care Overview (Adult)  Goal: Plan of Care Review  Outcome: Ongoing (interventions implemented as appropriate)    02/22/17 0914   Coping/Psychosocial Response Interventions   Plan Of Care Reviewed With patient;family   Patient Care Overview   Progress progress toward functional goals is gradual   Outcome Evaluation   Outcome Summary/Follow up Plan Patient with improved sitting balance with verbal and tactile cueing from min assist to CGA.          Problem: Inpatient Physical Therapy  Goal: Bed Mobility Goal LTG- PT  Outcome: Ongoing (interventions implemented as appropriate)    02/20/17 1042 02/22/17 0914   Bed Mobility PT LTG   Bed Mobility PT LTG, Date Established 02/20/17 --    Bed Mobility PT LTG, Time to Achieve 1 wk --    Bed Mobility PT LTG, Activity Type all bed mobility --    Bed Mobility PT LTG, [place] Level 2 person assist required;moderate assist (50% patient effort) --    Bed Mobility PT LTG, Date Goal Reviewed --  02/22/17   Bed Mobility PT LTG, Outcome --  goal ongoing       Goal: Transfer Training Goal 1 LTG- PT  Outcome: Ongoing (interventions implemented as appropriate)    02/20/17 1042 02/22/17 0914   Transfer Training PT LTG   Transfer Training PT LTG, Date Established 02/20/17 --    Transfer Training PT LTG, Time to Achieve 1 wk --    Transfer Training PT LTG, Activity Type bed to chair /chair to bed;sit to stand/stand to sit --    Transfer Training PT LTG, [place] Level maximum assist (25% patient effort);2 person assist required --    Transfer Training PT LTG, Assist Device walker, rolling  (WBAT L LE;stable VS) --    Transfer Training PT LTG, Date Goal Reviewed --  02/22/17   Transfer Training PT LTG, Outcome --  goal ongoing       Goal: Gait Training Goal LTG- PT  Outcome: Ongoing (interventions implemented as appropriate)    02/20/17 1042 02/22/17 0914   Gait Training PT LTG   Gait Training Goal PT LTG, Date Established 02/20/17 --    Gait Training Goal PT LTG,  Time to Achieve 1 wk --    Gait Training Goal PT LTG, [place] Level maximum assist (25% patient effort);2 person assist required --    Gait Training Goal PT LTG, Assist Device walker, rolling  (WBAT L LE;stable VS) --    Gait Training Goal PT LTG, Distance to Achieve 25 --    Gait Training Goal PT LTG, Date Goal Reviewed --  02/22/17   Gait Training Goal PT LTG, Outcome --  goal ongoing

## 2017-02-22 NOTE — PROGRESS NOTES
Acute Care - Speech Language Pathology   Swallow Re-Evaluation [facility]   Clinical Swallow Evaluation  Fiberoptic Endoscopic Evaluation of Swallowing (FEES)       Patient Name: Florinda Knapp  : 3/5/1931  MRN: [id number]  Today's Date: 2017  Onset of Illness/Injury or Date of Surgery Date: 17  Date of Referral to SLP: 17         Admit Date: 2017    Visit Dx:     ICD-10-CM ICD-9-CM   1. Acute CVA (cerebrovascular accident) I63.9 434.91   2. Left carotid artery occlusion I65.22 433.10   3. Impaired functional mobility, balance, gait, and endurance Z74.09 V49.89   4. Impaired mobility and ADLs Z74.09 799.89   5. CHF with cardiomyopathy I50.9 428.0    I42.9 425.4     Patient Active Problem List   Diagnosis   • Dysuria   • Elbow injury   • Fracture of left olecranon process   • Parkinson's disease   • Trauma left hip   • Acute Symptomatic on Chronic Normocytic Anemia   • Acute respiratory failure with hypoxia   • Generalized weakness   • Chest pain   • Symptomatic anemia   • Elevated troponin I level, possibly due to anemia   • Status post total replacement of left hip 2016 at [facility]   • H/O urinary retention   • History of recurrent UTIs   • Essential hypertension   • Anxiety   • CHF with cardiomyopathy   • Colitis   • Hypokalemia   • Acute ischemic left MCA stroke   • Hypertension   • Hyperlipidemia   • Urinary retention/Frequent UTIs   • Hypothyroidism   • Status post left hip replacement   • Recent Colitis   • Acute CVA (cerebrovascular accident)     Past Medical History   Diagnosis Date   • Anemia    • Anxiety    • Asthma    • Depression    • Failure to thrive in adult    • Hyperlipidemia    • Hypertension    • Hypothyroidism    • Osteoarthritis    • Parkinson's disease    • Stress incontinence    • Urinary retention      Past Surgical History   Procedure Laterality Date   • Appendectomy     • Back surgery     • Cataract extraction     • Elbow procedure     • Knee surgery     • Total  hip arthroplasty Left 12/2016   • Pr prim prq trluml mchnl thrmbc n-cor n-icra 1st Left 2/19/2017     Procedure: Left MCA thrombectomy;  Surgeon: Francisco Javier Gabriel MD;  Location: [facility] CATH INVASIVE LOCATION;  Service: Interventional Radiology          SWALLOW EVALUATION (last 72 hours)      Swallow Evaluation       02/22/17 1130 02/20/17 1000             Rehab Evaluation    Document Type therapy note (daily note)  -LS evaluation  -LS       Subjective Information no complaints;agree to therapy  -LS no complaints;agree to therapy  -LS       General Information    Patient Profile Review other (see comments)  -LS yes  -LS       Onset of Illness/Injury 02/19/17  -LS 02/19/17  -LS       Subjective Patient Observations improved alertness, voice, oriented x4  -LS alert, cooperative, lethargic  -LS       Pertinent History Of Current Problem admit per CVA pathway. s/p tPA, Hip replacement 12/16; 1/17 hospital admit 2' anemia  -LS admit per CVA pathway. s/p tPA, Hip replacement 12/16; 1/17 hospital admit 2' anemia  -LS       Current Diet Limitations NPO  -LS NPO  -LS       Precautions/Limitations, Vision WFL  -LS WFL   for purpose of this eval  -LS       Precautions/Limitations, Hearing WFL  -LS WFL  -LS       Prior Level of Function- Communication functional in all spheres  -LS functional in all spheres  -LS       Prior Level of Function- Swallowing no diet consistency restrictions  -LS no diet consistency restrictions  -LS       Plans/Goals Discussed With patient and family;agreed upon  -LS patient and family;agreed upon  -LS       Barriers to Rehab none identified  -LS none identified  -LS       Clinical Impression    Patient's Goals For Discharge return to all previous roles/activities  -LS patient did not state  -LS       Family Goals For Discharge patient able to return to all previous activities/roles  -LS patient able to return to PO diet  -LS       SLP Swallowing Diagnosis other (see comments)   WFL  -LS severe  dysphagia;pharyngeal dysfunction  -LS       Rehab Potential/Prognosis, Swallowing good, to achieve stated therapy goals  -LS good, to achieve stated therapy goals  -LS       Criteria for Skilled Therapeutic Interventions Met no problems identified which require skilled intervention  -LS skilled criteria for dysphagia intervention met  -LS       FCM, Swallowing 7-->Level 7  -LS 1-->Level 1  -LS       Therapy Frequency 5 times/wk  -LS 5 times/wk  -LS       SLP Diet Recommendation soft textures;ground;thin liquids  -LS NPO: unsafe for food/liquid intake  -LS       Recommended Diagnostics FEES   completed 2/22/17  -LS FEES   completed 2/20/17  -LS       SLP Rec. for Method of Medication Administration meds whole in pudding/applesauce;meds crushed in pudding/applesauce  -LS meds via alternate route  -LS       Anticipated Discharge Disposition [facility]  -LS        Pain Assessment    Pain Assessment No/denies pain  -LS        Oral Motor Structure and Function    Dentition Assessment present and adequate  -LS present and adequate  -LS       Secretion Management WNL/WFL  -LS WNL/WFL  -LS       Mucosal Quality moist, healthy  -LS moist, healthy  -LS       Volitional Swallow no difficulties initiating volitional swallow  -LS no difficulties initiating volitional swallow  -LS       Oral Motor Assessment Comment Right sided labial weakness which is improving  -LS genrealy weak with decreased lingual and labial ROM  -LS       Clinical Swallow Exam    Mode of Presentation fed by clinician;cup;spoon;straw  -LS fed by clinician;straw  -LS       Oral Preparation Concerns thin:;pudding:;cohesive solid:  -LS thin:;nectar:;pudding:  -LS       Oral Phase Results intact oral phase without signs of dysfunction   mild R side weakness but able to accept PO from straw  -LS intact oral phase without signs of dysfunction  -LS       Pharyngeal Phase Results no signs/symptoms of pharyngeal impairment  -LS sign/symptoms of  pharyngeal impairment;cough;multiple swallows;throat clear  -LS       Summary of Clinical Exam r/o pharyngeal dysphagia. No overt s/s of asp at the bedside however, silent asp with all trials on previous FEEs. REC: NPO until FEES.  -LS r/o pharyngeal dysphagia with FEES. Cough and multiple swallows with thins via spoon. Delayed throat clear after all trials. REC: NPO until FEES.  -LS       Fiberoptic Endoscopic Swallowing Exam    Risks/Benefits Reviewed risks/benefits explained;agreed to eval  -LS risks/benefits explained;agreed to eval;patient  -LS       Positioning Needs for Swallow Exam upright at 90 degrees in chair  -LS upright at 90 degrees in chair  -LS       Anatomy and Physiology    Velopharyngeal WFL  -LS WFL  -LS       Base of Tongue symmetrical  -LS symmetrical  -LS       Epiglottis WFL  -LS WFL  -LS       Laryngeal Function Breathing symmetrical  -LS symmetrical  -LS       Laryngeal Function Phonation symmetrical  -LS symmetrical  -LS       Laryngeal Function Breath Hold incomplete closure  -LS CNA  -LS       Secretions 0- Normal, no visible secretions  -LS 0- Normal, no visible secretions  -LS       Secretion Description thin;clear  -LS thin;clear  -LS       Spontaneous Swallow frequency adequate  -LS frequency reduced  -LS       Sensory senses scope  -LS reduced sensation  -LS       Oral-Phary Transit increased transit time   mild  -LS increased transit time  -LS       Anatomy Hypopharynx    Observation Anatomic Considerations no anatomic structural deviation via FEES  -LS no anatomic structural deviation via FEES  -LS       FEES    Mode of Presentation thin:;pudding:;cohesive solid:  -LS thin:;nectar:;pudding:;fed by clinician;spoon  -LS       Pharyngeal Phase Impairment  thin:;nectar:;impaired timing with aspiration during;aspiration after from residue;pudding:  -LS       Post Swallow Residue  thin:;nectar:;pudding:;residue present pyriform sinuses;residue present on pharyngeal walls;residue present  in valleculae  -LS       Rosenbek's Scale thin:;2-->Level 2;pudding:;mixed:;1-->Level 1  -LS thin:;nectar:;pudding:;8-->Level 8  -LS       Response to Aspiration  absent response, silent aspiration  -LS       Attempted Compensatory Maneuvers  bolus size  -       Pharyngeal Phase Results swallow WNL, no aspiration, penetration, or residue  -LS impaired pharyngeal phase of swallowing  -       FEES Summary Functional swallow. Mild increased mastication time w/ solid. Mild inconsistent delayed initiation of the swallow to the pyriforms w/ thin liquids. Pen before the swallow x1 that cleared with the completion of the swallow.  No asp during this eval even when pushed for fatigue with thins and pureed. No significant residue. REC: soft textures, ground, thins liquids, meds whole or crushed in pureed  -LS moderate-severe pharyngeal dysphagia. Deleyed initiation with thins resulting in asp before the swallow. Asp during the swallow with nectar thick 2' decreased airway closure and timing. Delayed initiation to the lateral channels with pudding and honey thick via spoon. No pen asp w/ honey thick via spoon, pudding, solid. Lateral channel and pyriform residue cleared w/ spontaneous second swallow. Asp was mostly silent during the evaluation. REC: honey thick liquids via spoon, Dysphagia level 4. Rn may downgrade to dysphagia level 3 per pt preference. Meds crushed in pureed. Dysphagia tx.  -       FEES Swallow Recommendations    Oral Care oral care with toothbrush and dentifrice BID and PRN  -LS oral care with toothbrush and dentifrice BID and PRN  -LS       Recommended Diet soft textures;ground;thin liquids  - NPO: unsafe for food/liquid intake  -         User Key  (r) = Recorded By, (t) = Taken By, (c) = Cosigned By    Initials Name Effective Dates    AMINA Enrique MS [facility]-SLP 06/22/15 -         EDUCATION  The patient has been educated in the following areas:   Dysphagia (Swallowing Impairment) Oral  Care/Hydration Modified Diet Instruction.    SLP Recommendation and Plan  SLP Swallowing Diagnosis: other (see comments) (WFL)  SLP Diet Recommendation: soft textures, ground, thin liquids     SLP Rec. for Method of Medication Administration: meds whole in pudding/applesauce, meds crushed in pudding/applesauce     Recommended Diagnostics: FEES (completed 2/22/17)  Criteria for Skilled Therapeutic Interventions Met: no problems identified which require skilled intervention  Anticipated Discharge Disposition: skilled nursing facility  Rehab Potential/Prognosis, Swallowing: good, to achieve stated therapy goals  Therapy Frequency: 5 times/wk             Plan of Care Review  Plan Of Care Reviewed With: patient  Progress: progress toward functional goals as expected  Outcome Summary/Follow up Plan: Clinical swallow eval and FEES complete. Functional swallow. Mild increased mastication time w/ solid. Mild inconsistent delayed initiation of the swallow to the pyriforms w/ thin liquids. Pen before the swallow x1 that cleared with the completion of the swallow.  No asp during this eval even when pushed for fatigue with thins and pureed. No significant residue. REC: soft textures, ground, thins liquids, meds whole or crushed in pureed          IP SLP Goals       02/21/17 1031 02/20/17 1153       Begin to Take Some PO Safely    Begin to Take Some PO Safely- SLP, Date Established 02/21/17  -SG 02/20/17  -LS     Begin to Take Some PO Safely- SLP, Time to Achieve by discharge  -SG by discharge  -LS     Begin to Take Some PO Safely- SLP, Date Goal Reviewed 02/21/17  -SG      Begin to Take Some PO Safely- SLP, Outcome goal ongoing  -SG goal ongoing  -LS     Receptive Language- Optimal Participation in Care    Receptive Language Optimal Participation in Care- SLP, Date Established 02/21/17  -SG      Receptive Language Optimal Participation in Care- SLP, Time to Achieve by discharge  -SG      Receptive Language Optimal Participation  in Care- SLP, Date Goal Reviewed 02/21/17  -SG      Receptive Language Optimal Participation in Care- SLP, Outcome goal ongoing  -SG      Expressive- Optimal Participation in Care    Expressive Optimal Participation in Care- SLP, Date Established 02/21/17  -SG      Expressive Optimal Participation in Care- SLP, Time to Achieve by discharge  -SG      Expressive Optimal Participation in Care- SLP, Date Goal Reviewed 02/14/17  -SG      Expressive Optimal Participation in Care- SLP, Outcome goal ongoing  -SG      Cognitive Linguistic- Optimal Participation in Care    Cognitive Linguistic Optimal Participation in Care- SLP, Date Established 02/21/17  -SG      Cognitive Linguistic Optimal Participation in Care- SLP, Time to Achieve by discharge  -SG      Cognitive Linguistic Optimal Participation in Care- SLP, Date Goal Reviewed 02/21/17  -SG      Cognitive Linguistic Optimal Participation in Care- SLP, Outcome goal ongoing  -SG      Dysarthria- Optimal Particpation in Care    Dysarthria Optimal Participation in Care- SLP, Date Established 02/21/17  -SG      Dysarthria Optimal Participation in Care- SLP, Time to Achieve by discharge  -SG      Dysarthria Optimal Participation in Care- SLP, Date Goal Reviewed 02/21/17  -SG      Dysarthria Optimal Participation in Care- SLP, Outcome goal ongoing  -SG        User Key  (r) = Recorded By, (t) = Taken By, (c) = Cosigned By    Initials Name Provider Type    URIAH George, MS CCC-SLP Speech and Language Pathologist    AMINA Enrique, MS CCC-SLP Speech and Language Pathologist               Time Calculation:         Time Calculation- SLP       02/22/17 1443          Time Calculation- SLP    SLP Start Time 1130  -LS      SLP Received On 02/22/17  -        User Key  (r) = Recorded By, (t) = Taken By, (c) = Cosigned By    Initials Name Provider Type    AMINA Enrique, MS CCC-SLP Speech and Language Pathologist          Therapy Charges for Today     Code  Description Service Date Service Provider Modifiers Qty    57244124187 HC ST EVAL ORAL PHARYNG SWALLOW 3 2/22/2017 Ame Enrique, MS CCC-SLP GN 1    [id number] HC ST FIBEROPTIC ENDO EVAL SWALL 5 2/22/2017 Ame Enrique, MS CCC-SLP GN 1               Ame Enrique, MS CCC-SLP  2/22/2017

## 2017-02-22 NOTE — PROGRESS NOTES
[facility] Medicine Services  INPATIENT PROGRESS NOTE    Date of Admission: 2/19/2017  Length of Stay: 3  Primary Care Physician: Kieran Cottrell MD    Subjective     Chief Complaint: stroke  HPI:  Some mild left weakness improving; no fever, wants navarro out. Constipated for few days. No brbpr recently, no melena. No fever, no chills, no dyspnea. Worked w/ pt    Review Of Systems:   Review of Systems    No f/c, no headache, no visoin changes. No n/v/d. No rash. No chest pain, no dyspnea  Objective      Temp:  [97.5 °F (36.4 °C)-99.1 °F (37.3 °C)] 97.5 °F (36.4 °C)  Heart Rate:  [71-81] 75  Resp:  [14-18] 16  BP: (107-145)/(67-88) 126/70  Physical Exam  Alert, oriented to year, month, answers most questions appropriately, nontoxic  Ncat, oroph clear  Perhaps very minimal left lip droop, pupils equal, 4/5 left  and leg knee flexion  rrr  ctab  abd soft, nontender  No cce  No extremity rash    Results Review:    I have reviewed the labs, radiology results and diagnostic studies.      Results from last 7 days  Lab Units 02/22/17  0524   WBC 10*3/mm3 4.55   HEMOGLOBIN g/dL 8.3*   PLATELETS 10*3/mm3 147*       Results from last 7 days  Lab Units 02/22/17  0524   SODIUM mmol/L 135   POTASSIUM mmol/L 3.7   TOTAL CO2 mmol/L 24.0   CREATININE mg/dL 0.50*   GLUCOSE mg/dL 111*       Culture Data:   URINE CULTURE   Date Value Ref Range Status   02/19/2017 40,000-50,000 CFU/mL Enterococcus species (A)  Preliminary   02/19/2017 10,000-20,000 CFU/mL Enterococcus species (A)  Preliminary   02/19/2017 10,000-20,000 CFU/mL Yeast isolated (A)  Preliminary         I have reviewed the medications.    Assessment/Plan     Assessment/Problem List  Principal Problem:    Acute ischemic left MCA stroke  Active Problems:    Hypertension    Hyperlipidemia    Urinary retention/Frequent UTIs    Hypothyroidism    Status post left hip replacement    Recent Colitis    Acute CVA (cerebrovascular accident)    ICU  transfer to floor 2/22/17, hospitalists assuming care:   85 y.o.female with relevant PMH of HTN, Urinary Retention, Frequent UTI's, Chronic Systolic (45%) and Grade 1 Diastolic HF, Parkinsons, recent Hip Replacement (december 2016 at ), recent admission w/ colitis and occult gi loss anemia (s/p blood transfusion last month; treated w/ antibiotics), urinary retention.   Admitted 2/19/2017 w/ Acute Left MCA CVA s/p TPA (initial deficits were aphasia, right facial droop, right sided hemiparesis), s/p cerebral angiogram w/ no intervention required as near complete lysis of thrombus w/ iv tpa.    Plan    *Left MCA cva (s/p TPA w/ near complete lysis of thrombus per cerebral angiogram, so no intervention needed)   -mild carotid dz   -echo w/ ef 45% ef, small pfo, small pericardial  effusion   -cannot r/o cardioembolic source but no signs afib here; in light of recent significant anemia requiring blood transfusion, per cards defer anticoagulation for no, zioxt patch at discharge, f/u card 2 weeks   -antiplatelet rx for now  *Dysphagia (resolved)   -passed dysphagia eval; keofeed d/c'd  *Chronic shf (ef 45%) w/ small pfo   -favour treat w/ asa for now over closure pfo per cards  *Iron def Anemia, w/ recent admission w/ colitis (s/p blood transfusion at that time, completed antibiotics)  *hx Parkinson Dz  *Hx urinary retention   -w/ navarro; flomax increased; voiding trial   *Hx Left hip replacement (dec '16 at )  *enterococcus in urine, likely colonization   -only 40-50,000cfu initially; repeat u/a negative  *constipation    Plan:  -Asa, high dose statin (defering noac currently, no evidence afib per cards)  -Tele while here, zioxt patch at discharge per cards recs; follow up cards 2 weeks after discharge w/ cards  -d/c navarro, monitor for retention  -IV iron x 3 days (62)  -get bowels moving (glycerin, senokot-s, miralax)  -will need rehab after d/c  -cbc, bmp, mag in a.m; guaiac pending            DVT prophylaxis: sq  lovenox  Discharge Planning: I expect patient to be discharged to rehab couple days    Evangelista Brand MD   02/22/17   9:04 AM    Please note that portions of this note may have been completed with a voice recognition program. Efforts were made to edit the dictations, but occasionally words are mistranscribed.

## 2017-02-22 NOTE — PLAN OF CARE
Problem: Patient Care Overview (Adult)  Goal: Plan of Care Review  Outcome: Ongoing (interventions implemented as appropriate)    02/22/17 0926   Coping/Psychosocial Response Interventions   Plan Of Care Reviewed With patient;edgard   Patient Care Overview   Progress progress toward functional goals as expected   Outcome Evaluation   Outcome Summary/Follow up Plan Pt achieved orientation goal and partially achieved transfer goal. Pt had no c/o pain. Recommend SNF-level rehab.          Problem: Stroke (Ischemic) (Adult)  Goal: Signs and Symptoms of Listed Potential Problems Will be Absent or Manageable (Stroke)  Outcome: Ongoing (interventions implemented as appropriate)    02/22/17 0926   Stroke (Ischemic)   Problems Assessed (Stroke (Ischemic)/TIA) motor/sensory impairment   Problems Present (Stroke (Ischemic)/TIA) motor/sensory impairment         Problem: Inpatient Occupational Therapy  Goal: Transfer Training Goal 1 LTG- OT  Outcome: Ongoing (interventions implemented as appropriate)    02/20/17 1020 02/22/17 0926   Transfer Training OT LTG   Transfer Training OT LTG, Date Established 02/20/17 --    Transfer Training OT LTG, Time to Achieve by discharge --    Transfer Training OT LTG, Activity Type bed to chair /chair to bed;sit to stand/stand to sit;toilet --    Transfer Training OT LTG, [place] Level maximum assist (25% patient effort);2 person assist required --    Transfer Training OT LTG, Additional Goal AAD --    Transfer Training OT LTG, Outcome --  goal partially met       Goal: Strength Goal LTG- OT  Outcome: Ongoing (interventions implemented as appropriate)    02/20/17 1020 02/22/17 0926   Strength OT LTG   Strength Goal OT LTG, Date Established 02/20/17 --    Strength Goal OT LTG, Time to Achieve by discharge --    Strength Goal OT LTG, Measure to Achieve Pt will tolerate BUE AROM/AAROM HEP x15 reps to increase strength/endurance to promote ADL performance.  --    Strength Goal OT LTG, Outcome --   goal ongoing       Goal: Dynamic Sitting Balance Goal LTG- OT  Outcome: Ongoing (interventions implemented as appropriate)    02/20/17 1020 02/22/17 0926   Dynamic Sitting Balance OT LTG   Dynamic Sitting Balance OT LTG, Date Established 02/20/17 --    Dynamic Sitting Balance OT LTG, Time to Achieve by discharge --    Dynamic Sitting Balance OT LTG, [place] Level contact guard assist --    Dynamic Sitting Balance OT LTG, Additional Goal AAD  --    Dynamic Sitting Balance OT LTG, Outcome --  goal ongoing       Goal: Static Standing Balance Goal LTG- OT  Outcome: Ongoing (interventions implemented as appropriate)    02/20/17 1020 02/22/17 0926   Static Standing Balance OT LTG   Static Standing Balance OT LTG, Date Established 02/20/17 --    Static Standing Balance OT LTG, Time to Achieve by discharge --    Static Standing Balance OT LTG, [place] Level moderate assist (50% patient effort);2 person assist required --    Static Standing Balance OT LTG, Assist Device UE Support --    Static Standing Balance OT LTG, Additional Goal AAD --    Static Standing Balance OT LTG, Outcome --  goal ongoing       Goal: Orientation Goal LTG- OT  Outcome: Outcome(s) achieved Date Met:  02/22/17 02/20/17 1020 02/22/17 0926   Orientation OT LTG   Orientation OT LTG, Date Established 02/20/17 --    Orientation OT LTG, Time to Achieve by discharge --    Orientation OT LTG, Activity Type person;place;time;100% treatment session;verbal cues --    Orientation OT LTG, Outcome --  goal met

## 2017-02-22 NOTE — NURSING NOTE
Chart reviewed for Stroke Metrics.    **Spoke with CM re: status of [facility] referral.  They will check into this today.  **Continue ASA and Lipitor  **30 day event monitor to be placed prior to discharge to monitor for possible AFib.  Patient not felt to be a candidate for anticoagulation due to not knowing definitive source of CVA and recent anemia per Cardiology.  **She is to follow-up with Dr. Gabriel in clinic 3-4 weeks after discharge.    We will continue to follow.    Mabel Stephens RN, Stroke Navigator/Nurse Extender

## 2017-02-23 ENCOUNTER — APPOINTMENT (OUTPATIENT)
Dept: GENERAL RADIOLOGY | Facility: HOSPITAL | Age: 82
End: 2017-02-23

## 2017-02-23 LAB
ANION GAP SERPL CALCULATED.3IONS-SCNC: 3 MMOL/L (ref 3–11)
BUN BLD-MCNC: 8 MG/DL (ref 9–23)
BUN/CREAT SERPL: 16 (ref 7–25)
CALCIUM SPEC-SCNC: 8.1 MG/DL (ref 8.7–10.4)
CHLORIDE SERPL-SCNC: 107 MMOL/L (ref 99–109)
CO2 SERPL-SCNC: 27 MMOL/L (ref 20–31)
CREAT BLD-MCNC: 0.5 MG/DL (ref 0.6–1.3)
GFR SERPL CREATININE-BSD FRML MDRD: 117 ML/MIN/1.73
GLUCOSE BLD-MCNC: 76 MG/DL (ref 70–100)
HCT VFR BLD AUTO: 26.8 % (ref 34.5–44)
HGB BLD-MCNC: 8.4 G/DL (ref 11.5–15.5)
MAGNESIUM SERPL-MCNC: 1.9 MG/DL (ref 1.3–2.7)
POTASSIUM BLD-SCNC: 3.9 MMOL/L (ref 3.5–5.5)
SODIUM BLD-SCNC: 137 MMOL/L (ref 132–146)

## 2017-02-23 PROCEDURE — 25010000002 NA FERRIC GLUC CPLX PER 12.5 MG: Performed by: INTERNAL MEDICINE

## 2017-02-23 PROCEDURE — 71010 HC CHEST PA OR AP: CPT

## 2017-02-23 PROCEDURE — 85014 HEMATOCRIT: CPT | Performed by: INTERNAL MEDICINE

## 2017-02-23 PROCEDURE — 99233 SBSQ HOSP IP/OBS HIGH 50: CPT | Performed by: INTERNAL MEDICINE

## 2017-02-23 PROCEDURE — 25010000002 ENOXAPARIN PER 10 MG: Performed by: INTERNAL MEDICINE

## 2017-02-23 PROCEDURE — 83735 ASSAY OF MAGNESIUM: CPT | Performed by: INTERNAL MEDICINE

## 2017-02-23 PROCEDURE — 85018 HEMOGLOBIN: CPT | Performed by: INTERNAL MEDICINE

## 2017-02-23 PROCEDURE — 99232 SBSQ HOSP IP/OBS MODERATE 35: CPT | Performed by: INTERNAL MEDICINE

## 2017-02-23 PROCEDURE — 97110 THERAPEUTIC EXERCISES: CPT

## 2017-02-23 PROCEDURE — 80048 BASIC METABOLIC PNL TOTAL CA: CPT | Performed by: INTERNAL MEDICINE

## 2017-02-23 RX ADMIN — NYSTATIN 500000 UNITS: 500000 SUSPENSION ORAL at 12:00

## 2017-02-23 RX ADMIN — MIRTAZAPINE 30 MG: 15 TABLET, FILM COATED ORAL at 20:57

## 2017-02-23 RX ADMIN — ASPIRIN 325 MG ORAL TABLET 325 MG: 325 PILL ORAL at 08:48

## 2017-02-23 RX ADMIN — CARVEDILOL 6.25 MG: 6.25 TABLET, FILM COATED ORAL at 08:48

## 2017-02-23 RX ADMIN — TAMSULOSIN HYDROCHLORIDE 0.8 MG: 0.4 CAPSULE ORAL at 09:03

## 2017-02-23 RX ADMIN — DOCUSATE SODIUM AND SENNOSIDES 2 TABLET: 8.6; 5 TABLET, FILM COATED ORAL at 08:49

## 2017-02-23 RX ADMIN — CARBIDOPA AND LEVODOPA 1 TABLET: 25; 100 TABLET ORAL at 12:01

## 2017-02-23 RX ADMIN — CARBIDOPA AND LEVODOPA 1 TABLET: 25; 100 TABLET ORAL at 17:15

## 2017-02-23 RX ADMIN — DESMOPRESSIN ACETATE 40 MG: 0.2 TABLET ORAL at 20:58

## 2017-02-23 RX ADMIN — NYSTATIN 500000 UNITS: 500000 SUSPENSION ORAL at 17:13

## 2017-02-23 RX ADMIN — CARVEDILOL 6.25 MG: 6.25 TABLET, FILM COATED ORAL at 20:58

## 2017-02-23 RX ADMIN — Medication 250 MG: at 17:13

## 2017-02-23 RX ADMIN — CARBIDOPA AND LEVODOPA 1 TABLET: 25; 100 TABLET ORAL at 05:30

## 2017-02-23 RX ADMIN — ENOXAPARIN SODIUM 40 MG: 40 INJECTION SUBCUTANEOUS at 14:23

## 2017-02-23 RX ADMIN — Medication 325 MG: at 08:49

## 2017-02-23 RX ADMIN — VITAMIN D, TAB 1000IU (100/BT) 2000 UNITS: 25 TAB at 08:49

## 2017-02-23 RX ADMIN — CARBIDOPA AND LEVODOPA 1 TABLET: 25; 100 TABLET ORAL at 20:58

## 2017-02-23 RX ADMIN — LEVOTHYROXINE SODIUM ANHYDROUS 37.5 MCG: 100 INJECTION, POWDER, LYOPHILIZED, FOR SOLUTION INTRAVENOUS at 12:01

## 2017-02-23 RX ADMIN — DOCUSATE SODIUM AND SENNOSIDES 2 TABLET: 8.6; 5 TABLET, FILM COATED ORAL at 17:13

## 2017-02-23 RX ADMIN — NYSTATIN 500000 UNITS: 500000 SUSPENSION ORAL at 08:49

## 2017-02-23 RX ADMIN — SODIUM CHLORIDE 62.5 MG: 9 INJECTION, SOLUTION INTRAVENOUS at 08:48

## 2017-02-23 RX ADMIN — Medication 250 MG: at 08:49

## 2017-02-23 RX ADMIN — NYSTATIN 500000 UNITS: 500000 SUSPENSION ORAL at 20:57

## 2017-02-23 NOTE — PROGRESS NOTES
Florinda Knapp  [id number]  3/5/1931   LOS: 4 days   Patient Care Team:  Kieran Cottrell MD as PCP - General (Internal Medicine)    Chief Complaint:  CVA/?A fib    Subjective      She denies any chest pain shortness of breath, presyncope.  She is able to eat soft foods now and has not been able to ambulate but physical therapy has put her from the bed to the chair.  She is able to speak more clearly and more audibly currently.    Objective     Vital Sign Min/Max for last 24 hours  Temp  Min: 97.1 °F (36.2 °C)  Max: 99.2 °F (37.3 °C)   BP  Min: 118/67  Max: 149/89   Pulse  Min: 66  Max: 78   Resp  Min: 16  Max: 18   SpO2  Min: 97 %  Max: 99 %   Flow (L/min)  Min: 2  Max: 2        Last 2 weights    02/20/17  1542 02/20/17  1827   Weight: 105 lb (47.6 kg) 105 lb (47.6 kg)         Intake/Output Summary (Last 24 hours) at 02/23/17 0746  Last data filed at 02/23/17 0629   Gross per 24 hour   Intake   1290 ml   Output    825 ml   Net    465 ml       Physical Exam:     General Appearance:    Alert, cooperative, in no acute distress   Lungs:     Clear to auscultation,respirations regular, even and                   unlabored    Heart:    Regular and normal rate, normal S1 and S2, no            murmur, no gallop, no rub, no click   Abdomen:  Extremities:   Soft, non-tender        No edema             Pulses:   Pulses palpable and equal bilaterally      Results Review:     Results from last 7 days  Lab Units 02/23/17  0616 02/22/17 0524 02/21/17  0431   SODIUM mmol/L 137 135 137   POTASSIUM mmol/L 3.9 3.7 4.7   CHLORIDE mmol/L 107 106 109   TOTAL CO2 mmol/L 27.0 24.0 21.0   BUN mg/dL 8* 10 13   CREATININE mg/dL 0.50* 0.50* 0.60   GLUCOSE mg/dL 76 111* 52*   CALCIUM mg/dL 8.1* 7.8* 8.4*       Results from last 7 days  Lab Units 02/23/17  0616 02/22/17  0524 02/21/17  0431 02/20/17  0629   WBC 10*3/mm3  --  4.55 6.85 5.81   HEMOGLOBIN g/dL 8.4* 8.3* 8.7* 9.5*   HEMATOCRIT % 26.8* 26.7* 28.1* 30.3*   PLATELETS 10*3/mm3  --   147* 165 173       Results from last 7 days  Lab Units 02/20/17  0629   CHOLESTEROL mg/dL 161   TRIGLYCERIDES mg/dL 110   HDL CHOL mg/dL 48       Results from last 7 days  Lab Units 02/20/17  0629   HEMOGLOBIN A1C % 4.80       CXR: Mild cardiomegaly with left ventricular prominence and ectatic thoracic aorta with minimal right costophrenic blunting in the absence of overt CHF or pneumothorax or infiltrates; formal official report pending.      NO EKG.    Medication Review: Reviewed    Assessment/Plan      Patient with MCA occlusion with diagnostic angiography showing lysis of thrombus with tPA.  Strength and speech are improving this morning.  Patient to be discharged to [facility] rehabilitation today.  She will need to continue current cardiac medications.  Before discharge she will need to have a ZioXT placed to wear for 2 weeks to assess for any atrial fibrillation.  She will need follow-up with her PCP.  She will need to continue aspirin 325 mg daily, atorvastatin 40 mg nightly, Coreg 6.25 bid.  No convincing evidence on brain MRI of prior thromboembolic events in view of recent anemia requiring blood transfusion last month we will defer formal anticoagulation therapy at this time as noted yesterday and Dr. Morales note; I concur.  In view of her small PFO and advanced age I would not feel that closure of her PFO would be indicated or necessary at this time, particularly in view of the lack of concomitant findings of prior brain thromboembolic events.  We will standby; concur with tentative transfer to [facility] today for rehabilitation. THANKS.    Principal Problem:    Acute ischemic left MCA stroke  Active Problems:    Hypertension    Hyperlipidemia    Urinary retention/Frequent UTIs    Hypothyroidism    Status post left hip replacement    Recent Colitis    Acute CVA (cerebrovascular accident)    Scribed for Mak Chou MD by ARIEL Read. 2/23/2017  7:46 AM     IMak,  MD, FACC, personally performed the services described in this documentation as scribed by the above named individual in my presence, and it is both accurate and complete.       02/23/17  7:46 AM

## 2017-02-23 NOTE — PLAN OF CARE
Problem: Patient Care Overview (Adult)  Goal: Plan of Care Review  Outcome: Ongoing (interventions implemented as appropriate)    02/23/17 1531   Coping/Psychosocial Response Interventions   Plan Of Care Reviewed With patient   Patient Care Overview   Progress progress toward functional goals as expected   Outcome Evaluation   Outcome Summary/Follow up Plan PT GIVES GOOD EFFORT WITH BED MOBILITY AND TRANSFERS. NEEDS ENCOURAGEMENT DUE TO FEARFUL OF FALLING BUT FOLLOWS COMMANDS. NEEDS REHAB PLACEMENT. B HEEL CORDS TIGHT.          Problem: Stroke (Ischemic) (Adult)  Goal: Signs and Symptoms of Listed Potential Problems Will be Absent or Manageable (Stroke)  Outcome: Ongoing (interventions implemented as appropriate)    02/23/17 1531   Stroke (Ischemic)   Problems Assessed (Stroke (Ischemic)/TIA) motor/sensory impairment   Problems Present (Stroke (Ischemic)/TIA) motor/sensory impairment         Problem: Inpatient Physical Therapy  Goal: Bed Mobility Goal LTG- PT  Outcome: Ongoing (interventions implemented as appropriate)    02/20/17 1042 02/22/17 0914 02/23/17 1531   Bed Mobility PT LTG   Bed Mobility PT LTG, Date Established 02/20/17 --  --    Bed Mobility PT LTG, Time to Achieve 1 wk --  --    Bed Mobility PT LTG, Activity Type all bed mobility --  --    Bed Mobility PT LTG, [place] Level 2 person assist required;moderate assist (50% patient effort) --  --    Bed Mobility PT LTG, Date Goal Reviewed --  02/22/17 --    Bed Mobility PT LTG, Outcome --  --  goal ongoing       Goal: Transfer Training Goal 1 LTG- PT  Outcome: Ongoing (interventions implemented as appropriate)    02/20/17 1042 02/22/17 0914 02/23/17 1531   Transfer Training PT LTG   Transfer Training PT LTG, Date Established 02/20/17 --  --    Transfer Training PT LTG, Time to Achieve 1 wk --  --    Transfer Training PT LTG, Activity Type bed to chair /chair to bed;sit to stand/stand to sit --  --    Transfer Training PT LTG, [place] Level  maximum assist (25% patient effort);2 person assist required --  --    Transfer Training PT LTG, Assist Device walker, rolling  (WBAT L LE;stable VS) --  --    Transfer Training PT LTG, Date Goal Reviewed --  02/22/17 --    Transfer Training PT LTG, Outcome --  --  goal ongoing       Goal: Gait Training Goal LTG- PT  Outcome: Ongoing (interventions implemented as appropriate)    02/20/17 1042 02/22/17 0914 02/23/17 1531   Gait Training PT LTG   Gait Training Goal PT LTG, Date Established 02/20/17 --  --    Gait Training Goal PT LTG, Time to Achieve 1 wk --  --    Gait Training Goal PT LTG, [place] Level maximum assist (25% patient effort);2 person assist required --  --    Gait Training Goal PT LTG, Assist Device walker, rolling  (WBAT L LE;stable VS) --  --    Gait Training Goal PT LTG, Distance to Achieve 25 --  --    Gait Training Goal PT LTG, Date Goal Reviewed --  02/22/17 --    Gait Training Goal PT LTG, Outcome --  --  goal ongoing

## 2017-02-23 NOTE — PLAN OF CARE
Problem: Fall Risk (Adult)  Goal: Absence of Falls  Outcome: Ongoing (interventions implemented as appropriate)    02/22/17 4917   Fall Risk (Adult)   Absence of Falls making progress toward outcome

## 2017-02-23 NOTE — PROGRESS NOTES
Adult Nutrition  Assessment/PES    Patient Name:  Florinda Knapp  YOB: 1931  MRN: [id number]  Admit Date:  2/19/2017    Assessment Date:  2/23/2017        Reason for Assessment       02/23/17 1516    Reason for Assessment    Reason For Assessment/Visit follow up protocol    Time Spent (min) 20                        Nutrition Prescription Ordered       02/23/17 1517    Nutrition Prescription PO    Current PO Diet Soft Texture    Texture Ground    Fluid Consistency Thin            Evaluation of Received Nutrient/Fluid Intake       02/23/17 1517    PO Evaluation    Number of Days PO Intake Evaluated Insufficient Data    Number of Meals 1    % PO Intake 25              Problem/Interventions:          Problem 2       02/23/17 1518    Nutrition Diagnoses Problem 2    Problem 2 Predicted Suboptimal Intake    Etiology (related to) --   clinical condition  see dx list    Signs/Symptoms (evidenced by) PO Intake    Percent (%) intake recorded 25 %    Over number of meals 1                  Intervention Goal       02/23/17 1518    Intervention Goal    PO Establish PO            Nutrition Intervention       02/23/17 1519    Nutrition Intervention    RD/Tech Action Follow Tx progress;Supplement provided;Interview for preference;Encourage intake            Nutrition Prescription       02/23/17 1520    Nutrition Prescription PO    New PO Prescription Ordered? Yes      02/23/17 1519    Nutrition Prescription PO    PO Prescription Begin/change supplement    Supplement Boost Plus   pt states she loves Boost supplements; flavor pref obtained    Supplement Frequency 3 times a day            Education/Evaluation       02/23/17 1519    Monitor/Evaluation    Monitor Per protocol        Comments:      Electronically signed by:  Dara Cabrales, MS,RD,LD  02/23/17 3:20 PM

## 2017-02-23 NOTE — PROGRESS NOTES
Adult Nutrition  Assessment/PES    Patient Name:  Florinda Knapp  YOB: 1931  MRN: [id number]  Admit Date:  2/19/2017    Assessment Date:  2/22/2017        Reason for Assessment       02/22/17 1936    Reason for Assessment    Reason For Assessment/Visit follow up protocol    Time Spent (min) 30    Diagnosis --   per notes this adm               Nutrition/Diet History       02/22/17 1937    Nutrition/Diet History    Reported/Observed By Patient;RN    Other RN rpt did start TF but tube coiled would wait on FEES to determine if attempt restart; later pt obs starting to take diet - happy to be eating.              Labs/Tests/Procedures/Meds       02/22/17 1938    Labs/Tests/Procedures/Meds    Labs/Tests Review Reviewed    Procedure Review SLP                Nutrition Prescription Ordered       02/22/17 1939    Nutrition Prescription PO    Current PO Diet Soft Texture    Texture Ground            Evaluation of Received Nutrient/Fluid Intake       02/22/17 1939    PO Evaluation    Number of Meals 1    % PO Intake 25              Problem/Interventions:          Problem 2       02/22/17 1939    Nutrition Diagnoses Problem 2    Problem 2 Predicted Suboptimal Intake    Etiology (related to) --   clinical condition  see dx list    Signs/Symptoms (evidenced by) PO Intake    Percent (%) intake recorded 25 %    Over number of meals 1                  Intervention Goal       02/22/17 1940    Intervention Goal    General Nutrition support treatment    PO Establish PO            Nutrition Intervention       02/22/17 1940    Nutrition Intervention    RD/Tech Action Follow Tx progress   see pt for possible suppl               Education/Evaluation       02/22/17 1941    Monitor/Evaluation    Monitor Per protocol;PO intake        Comments:      Electronically signed by:  Yuni Merida RD  02/22/17 7:41 PM

## 2017-02-23 NOTE — PLAN OF CARE
Problem: Patient Care Overview (Adult)  Goal: Plan of Care Review  Outcome: Ongoing (interventions implemented as appropriate)    02/22/17 1916   Coping/Psychosocial Response Interventions   Plan Of Care Reviewed With patient   Patient Care Overview   Progress improving   Outcome Evaluation   Outcome Summary/Follow up Plan Patient is now able to eat soft foods and take medications whole in apple sauce.         Problem: Pressure Ulcer Risk (Estevan Scale) (Adult,Obstetrics,Pediatric)  Goal: Identify Related Risk Factors and Signs and Symptoms  Outcome: Ongoing (interventions implemented as appropriate)    02/22/17 1916   Pressure Ulcer Risk (Estevan Scale)   Related Risk Factors (Pressure Ulcer Risk (Estevan Scale)) cognitive impairment;fluid intake inadequate

## 2017-02-23 NOTE — PROGRESS NOTES
Continued Stay Note  [facility]     Patient Name: Florinda Knapp  MRN: [id number]  Today's Date: 2/23/2017    Admit Date: 2/19/2017          Discharge Plan       02/23/17 1523    Case Management/Social Work Plan    Plan [facility]    Patient/Family In Agreement With Plan yes    Additional Comments Per Garima with [facility], patient has a bed tomorrow on the stroke unit if medically ready.  has notified MD and extenders. Met with patient and her son in the room to update them on the plan and they are both in agreement. Ambulance arranged with [facility] for 11:00. PCS in chart. RN, please call report to [phone]. Thank you.               Discharge Codes     None        Expected Discharge Date and Time     Expected Discharge Date Expected Discharge Time    Feb 25, 2017             Ana Greene

## 2017-02-23 NOTE — PROGRESS NOTES
Acute Care - Physical Therapy Treatment Note  [facility]     Patient Name: Florinda Knapp  : 3/5/1931  MRN: [id number]  Today's Date: 2017  Onset of Illness/Injury or Date of Surgery Date: 17  Date of Referral to PT: 17  Referring Physician: ARIEL Stokes    Admit Date: 2017    Visit Dx:    ICD-10-CM ICD-9-CM   1. Acute CVA (cerebrovascular accident) I63.9 434.91   2. Left carotid artery occlusion I65.22 433.10   3. Impaired functional mobility, balance, gait, and endurance Z74.09 V49.89   4. Impaired mobility and ADLs Z74.09 799.89   5. CHF with cardiomyopathy I50.9 428.0    I42.9 425.4     Patient Active Problem List   Diagnosis   • Dysuria   • Elbow injury   • Fracture of left olecranon process   • Parkinson's disease   • Trauma left hip   • Acute Symptomatic on Chronic Normocytic Anemia   • Acute respiratory failure with hypoxia   • Generalized weakness   • Chest pain   • Symptomatic anemia   • Elevated troponin I level, possibly due to anemia   • Status post total replacement of left hip 2016 at [facility]   • H/O urinary retention   • History of recurrent UTIs   • Essential hypertension   • Anxiety   • CHF with cardiomyopathy   • Colitis   • Hypokalemia   • Acute ischemic left MCA stroke   • Hypertension   • Hyperlipidemia   • Urinary retention/Frequent UTIs   • Hypothyroidism   • Status post left hip replacement   • Recent Colitis   • Acute CVA (cerebrovascular accident)               Adult Rehabilitation Note       17 1425 17 0805 17 0804    Rehab Assessment/Intervention    Discipline physical therapist  -CD physical therapy assistant  -AS occupational therapist  -AR    Document Type therapy note (daily note)  -CD therapy note (daily note)  -AS therapy note (daily note)  -AR    Subjective Information agree to therapy   WANTING TO USE THE BATHROOM- UNSUCCESSFUL ON BEDPAN.   -CD agree to therapy;no complaints  -AS     Patient Effort, Rehab Treatment good  -CD good   -AS excellent  -AR    Symptoms Noted During/After Treatment fatigue   REPORTS FEARFUL OF FALLING BUT RESPONDED WELL WITH P.T.   -CD fatigue  -AS fatigue  -AR    Precautions/Limitations fall precautions   L THR WITH PRECAUTIONS 12/6/16, [place]  -CD fall precautions;oxygen therapy device and L/min;other (see comments)   NG tube, L THR with precautions 12-6-17  -AS fall precautions;hip precautions- left;oxygen therapy device and L/min  -AR    Recorded by [CD] Janina Johnson, PT [AS] Tammy Ayala, PTA [AR] Radha Tapia, OT    Vital Signs    Pre Systolic BP Rehab --   VITALS STABLE.   -  -  -AR    Pre Treatment Diastolic BP  84  -AS 84  -AR    Post Systolic BP Rehab  134  -  -AR    Post Treatment Diastolic BP  83  -AS 83  -AR    Pretreatment Heart Rate (beats/min)   74  -AR    Posttreatment Heart Rate (beats/min)   68  -AR    Pre SpO2 (%)  99  -AS 99  -AR    O2 Delivery Pre Treatment  supplemental O2  -AS supplemental O2   1.5L NC  -AR    Post SpO2 (%)  97  -AS 94  -AR    O2 Delivery Post Treatment  supplemental O2  -AS supplemental O2  -AR    Pre Patient Position  Supine  -AS Supine  -AR    Intra Patient Position  Sitting  -AS     Post Patient Position  Sitting  -AS Sitting  -AR    Recorded by [CD] Janina Johnson, PT [AS] Tammy Ayala, PTA [AR] Radha Tapia, OT    Pain Assessment    Pain Assessment No/denies pain  -CD No/denies pain  -AS No/denies pain  -AR    Recorded by [CD] Janina Johnson, PT [AS] Tammy Ayala, JOE [AR] Radha Tapia, OT    Vision Assessment/Intervention    Visual Impairment   --   tracking WNL  -AR    Recorded by   [AR] Radha Tapia, SUZANNE    Cognitive Assessment/Intervention    Current Cognitive/Communication Assessment impaired  -CD impaired  -AS functional  -AR    Orientation Status disoriented to;person;place;time  -CD oriented to;person;place;time  -AS oriented x 4;required verbal cueing (specifiy in comments)   cues for year only  -AR     Follows Commands/Answers Questions 75% of the time;needs repetition;needs cueing  -CD 75% of the time;able to follow single-step instructions  -% of the time;able to follow single-step instructions  -AR    Personal Safety mild impairment;decreased awareness, need for assist;decreased awareness, need for safety  -CD decreased awareness, need for assist;decreased awareness, need for safety;mild impairment  -AS WNL/WFL  -AR    Personal Safety Interventions elopement precautions initiated;fall prevention program maintained;gait belt;muscle strengthening facilitated;nonskid shoes/slippers when out of bed;supervised activity  -CD elopement precautions initiated;fall prevention program maintained;gait belt;nonskid shoes/slippers when out of bed  -AS fall prevention program maintained  -AR    Recorded by [CD] Janina Johnson, PT [AS] Tammy Ayala PTA [AR] Radha Tapia OT    Cognitive Assessment Intervention    Behavior/Mood Observations cooperative;alert  -CD      Recorded by [CD] Janina Johnson, PT      ROM (Range of Motion)    General ROM   no range of motion deficits identified   grossly WN:  -AR    Recorded by   [AR] Radha Tapia OT    Mobility Assessment/Training    Extremity Weight-Bearing Status   left lower extremity  -AR    Left Lower Extremity Weight-Bearing   weight-bearing as tolerated  -AR    Recorded by   [AR] Radha Tapia OT    Bed Mobility, Assessment/Treatment    Bed Mobility, Assistive Device bed rails;draw sheet;head of bed elevated  -CD  bed rails;head of bed elevated;draw sheet  -AR    Bed Mobility, Scoot/Bridge, [place]   dependent (less than 25% patient effort)  -AR    Bed Mob, Supine to Sit, [place] maximum assist (25% patient effort);2 person assist required;verbal cues required  -CD verbal cues required;maximum assist (25% patient effort);2 person assist required  -AS maximum assist (25% patient effort);2 person assist required;verbal cues required  -AR     Bed Mob, Sit to Supine, [place]  not tested  -AS maximum assist (25% patient effort);2 person assist required;verbal cues required  -AR    Bed Mobility, Safety Issues  decreased use of arms for pushing/pulling;decreased use of legs for bridging/pushing  -AS     Bed Mobility, Impairments  strength decreased;impaired balance;coordination impaired;motor control impaired  -AS ROM decreased;sensation decreased;pain;impaired balance  -AR    Bed Mobility, Comment  patient able to slide right leg to edge of bed, assist needed for LLE and trunk to reach upright position  -AS     Recorded by [CD] Janina Johnson, PT [AS] Tammy Ayala, PTA [AR] Radha Tapia, OT    Transfer Assessment/Treatment    Transfers, Bed-Chair [place] maximum assist (25% patient effort);verbal cues required  -CD verbal cues required;maximum assist (25% patient effort);2 person assist required  -AS maximum assist (25% patient effort);2 person assist required;verbal cues required  -AR    Transfers, Chair-Bed [place]   maximum assist (25% patient effort);2 person assist required;verbal cues required  -AR    Transfers, Bed-Chair-Bed, Assist Device --   PT HOLDING TO GAIT BELT AND PT HOLDING TO THERAPIST.   -CD  elevated surface;other (see comments)   BUE support  -AR    Transfers, Sit-Stand [place]   maximum assist (25% patient effort);2 person assist required;verbal cues required  -AR    Transfers, Stand-Sit [place]   verbal cues required;maximum assist (25% patient effort);2 person assist required  -AR    Transfers, Sit-Stand-Sit, Assist Device   elevated surface;other (see comments)   BUE support  -AR    Toilet Transfer, [place] maximum assist (25% patient effort);verbal cues required   GAIT BELT AND PT HOLDING TO THERAPISTS ARMS.   -CD      Toilet Transfer, Assistive Device --   DEPENDENT WITH HYGIENE.   -CD      Transfer, Safety Issues  step length decreased;weight-shifting ability decreased  -AS     Transfer,  Impairments  strength decreased;impaired balance;motor control impaired  -AS ROM decreased;strength decreased;pain  -AR    Transfer, Comment EOB TO BSC TO RECLINER TO THE L. CARE TO MAINTAIN L HIP PRECAUTIONS.   -CD patient to weak to attmept standing secondary to increase sitting EOB  -AS Pt able to participate in stand-pivot and required cues to sequence and maintain THP  -AR    Recorded by [CD] Janina Johnson, PT [AS] Tammy Ayala, PTA [AR] Radha Tapia, OT    Lower Body Dressing Assessment/Training    LB Dressing Assess/Train, Clothing Type   donning:  -AR    LB Dressing Assess/Train, Position   supine  -AR    LB Dressing Assess/Train, [place]   dependent (less than 25% patient effort)  -AR    Recorded by   [AR] Radha Tapia, SUZANNE    Grooming Assessment/Training    Grooming Assess/Train, Position   edge of bed  -AR    Grooming Assess/Train, Indepen Level   moderate assist (50% patient effort)  -AR    Recorded by   [AR] Radha Tapia, SUZANNE    Motor Skills/Interventions    Additional Documentation Balance Skills Training (Group)  -CD  Balance Skills Training (Group)  -AR    Recorded by [CD] Janina Johnson, PT  [AR] Radha Tapia, OT    Balance Skills Training    Sitting-Level of Assistance Minimum assistance   PROGRESSED TO CGA. LEANS TO THE R.   -CD Minimum assistance   progressed to CGA, leans to the right.  -AS Minimum assistance   progressed to CGA  -AR    Sitting-Balance Support Feet supported;Left upper extremity supported;Right upper extremity supported  -CD Right upper extremity supported;Left upper extremity supported;Feet supported  -AS Feet supported;Right upper extremity supported   R lateral lean noted  -AR    Sitting-Balance Activities Trunk control activities  -CD  Trunk control activities  -AR    Sitting # of Minutes 15    10 MINUTES EOB AND 5 MIN ON BS COMMODE.   -CD 15  -AS 15  -AR    Standing-Level of Assistance   Maximum assistance;x2  -AR    Static Standing Balance  Support   Right upper extremity supported;Left upper extremity supported  -AR    Recorded by [CD] Janina Johnson, PT [AS] Tammy Ayala, PTA [AR] Radha Tapia, OT    Therapy Exercises    Bilateral Lower Extremities AAROM:;10 reps;sitting;supine;ankle pumps/circles;heel slides;hip abduction/adduction;LAQ  -CD      Bilateral Upper Extremity   AROM:;10 reps;supine;elbow flexion/extension;hand pumps;shoulder extension/flexion  -AR    Recorded by [CD] Janina Johnson, PT  [AR] Radha Tapia, OT    Sensory Assessment/Intervention    Sensory Impairment   --   light touch intact  -AR    Recorded by   [AR] Radha Tapia, SUZANNE    Positioning and Restraints    Pre-Treatment Position in bed  -CD in bed  -AS in bed  -AR    Post Treatment Position chair   CALLED DISPATCH  FOR T.V. REPAIR.   -CD chair  -AS chair  -AR    In Chair reclined;call light within reach;encouraged to call for assist;notified nsg;exit alarm on;RUE elevated;LUE elevated;legs elevated   NOTIFIED NSG OF MOBILITY STATUS FOR TRANSFER BACK TO BED.  -CD reclined;call light within reach;encouraged to call for assist;exit alarm on;with family/caregiver;RUE elevated;LUE elevated;compression device;waffle cushion;on mechanical lift sling  -AS notified nsg;reclined;call light within reach;encouraged to call for assist;exit alarm on;with family/caregiver;compression device;waffle cushion;on mechanical lift sling;RUE elevated;LUE elevated  -AR    Recorded by [CD] Janina Johnson, PT [AS] Tammy Ayala, PTA [AR] Radha Tapia, OT      02/21/17 1000          Rehab Assessment/Intervention    Discipline speech language pathologist  -SG      Document Type therapy note (daily note)   dys re-assessment in tx  -SG      Subjective Information complains of;weakness  -SG      Patient Effort, Rehab Treatment good  -SG      Symptoms Noted During/After Treatment fatigue  -SG      Precautions/Limitations, Vision WFL  -SG      Precautions/Limitations, Hearing WFL   -SG      Recorded by [SG] Ketty George MS CCC-SLP      Pain Assessment    Pain Assessment No/denies pain  -SG      [place] FACES Pain Rating 0  -SG      Recorded by [SG] Ketty George MS CCC-JUDITH      Dysphagia/Swallow Intervention    Dysphagia/Swallow Intervention Dys tx initiated. Pt is being re-assessed for instrumental readiness daily in tx. Very weak vocal quality but improved alertness today. Wet vocal quality and swallows x5 with small tsp of puree. Not ready for repeat instumental eval, recommend proceeding w/ keofeed and initiating alt nutrition until pt's strength improves.   -SG      Recorded by [SG] Ketty George MS CCC-SLP      Dysphagia Treatment Objectives and Progress    Dysphagia Treatment Objectives Improve timing of pharyngeal response;Improve laryngeal closure;Improve hyolaryngeal excursion;Improve closure at entrance to airway;Improve tongue base & pharyngeal wall squeeze  -SG      To improve timing of pharyngeal response, patient will: Swallow in timely way using three second prep;80%;without cues  -SG      Timing of Pharyngeal Response Progress 50%;with consistent cues;continue to adress  -SG      To improve laryngeal closure, patient will: Complete supraglottic swallow;80%;without cues  -SG      Laryngeal Closure Progress continue to adress  -SG      To improve closure at entrance to airway, patient will: Complete super-supraglottic swallow;80%;without cues  -SG      Closure at Entrance to Airway Progress continue to adress  -SG      To improve hyolaryngeal excursion, patient will: Complete head lift sustained (comment number of seconds);Complete head lift repetitive (comment number of lifts);80%;without cues  -SG      Hyolaryngeal Excursion Progress continue to adress  -SG      To improve tongue base & pharyngeal wall squeeze, patient will: Complete effortful swallow;80%;without cues  -SG      Tongue Base/Pharyngeal Wall Squeeze Progress  60%;with consistent cues;continue to adress  -SG      Recorded by [SG] Ketty Rosemary George, MS CCC-SLP        User Key  (r) = Recorded By, (t) = Taken By, (c) = Cosigned By    Initials Name Effective Dates    CD Janina Johnson, PT 06/19/15 -     SG Ketty Mccabemarcelo George, MS CCC-SLP 06/22/15 -     AR Radha Tapia, OT 06/22/15 -     AS Tammy Ayala, PTA 06/22/15 -                 IP PT Goals       02/23/17 1531 02/22/17 0914 02/20/17 1042    Bed Mobility PT LTG    Bed Mobility PT LTG, Date Established   02/20/17  -DM    Bed Mobility PT LTG, Time to Achieve   1 wk  -DM    Bed Mobility PT LTG, Activity Type   all bed mobility  -DM    Bed Mobility PT LTG, [place] Level   2 person assist required;moderate assist (50% patient effort)  -DM    Bed Mobility PT LTG, Date Goal Reviewed  02/22/17  -AS     Bed Mobility PT LTG, Outcome goal ongoing  -CD goal ongoing  -AS     Transfer Training PT LTG    Transfer Training PT LTG, Date Established   02/20/17  -DM    Transfer Training PT LTG, Time to Achieve   1 wk  -DM    Transfer Training PT LTG, Activity Type   bed to chair /chair to bed;sit to stand/stand to sit  -DM    Transfer Training PT LTG, [place] Level   maximum assist (25% patient effort);2 person assist required  -DM    Transfer Training PT LTG, Assist Device   walker, rolling   WBAT L LE;stable VS  -DM    Transfer Training PT  LTG, Date Goal Reviewed  02/22/17  -AS     Transfer Training PT LTG, Outcome goal ongoing  -CD goal ongoing  -AS     Gait Training PT LTG    Gait Training Goal PT LTG, Date Established   02/20/17  -DM    Gait Training Goal PT LTG, Time to Achieve   1 wk  -DM    Gait Training Goal PT LTG, [place] Level   maximum assist (25% patient effort);2 person assist required  -DM    Gait Training Goal PT LTG, Assist Device   walker, rolling   WBAT L LE;stable VS  -DM    Gait Training Goal PT LTG, Distance to Achieve   25  -DM    Gait Training Goal PT LTG, Date Goal  Reviewed  02/22/17  -AS     Gait Training Goal PT LTG, Outcome goal ongoing  -CD goal ongoing  -AS       User Key  (r) = Recorded By, (t) = Taken By, (c) = Cosigned By    Initials Name Provider Type    CD Janina Johnson, PT Physical Therapist    DM Sravani Puri, PT Physical Therapist    AS Tammy Ayala, PTA Physical Therapy Assistant          Physical Therapy Education     Title: PT OT SLP Therapies (Active)     Topic: Physical Therapy (Active)     Point: Mobility training (Active)    Learning Progress Summary    Learner Readiness Method Response Comment Documented by Status   Patient Acceptance E VU,NR BENEFITS OF OOB ACTIVITY, SAFETY WITH MOBILITY, THER EX, D/C PLANNING. CD 02/23/17 1531 Done    Acceptance E NR  AS 02/22/17 0914 Active    Acceptance E,D,H NR  DM 02/20/17 1042 Active   Family Acceptance E NR  AS 02/22/17 0914 Active    Acceptance E,D,H NR  DM 02/20/17 1042 Active               Point: Home exercise program (Active)    Learning Progress Summary    Learner Readiness Method Response Comment Documented by Status   Patient Acceptance E VU,NR BENEFITS OF OOB ACTIVITY, SAFETY WITH MOBILITY, THER EX, D/C PLANNING. CD 02/23/17 1531 Done    Acceptance E NR  AS 02/22/17 0914 Active    Acceptance E,D,H NR  DM 02/20/17 1042 Active   Family Acceptance E NR  AS 02/22/17 0914 Active    Acceptance E,D,H NR  DM 02/20/17 1042 Active               Point: Body mechanics (Active)    Learning Progress Summary    Learner Readiness Method Response Comment Documented by Status   Patient Acceptance E VU,NR BENEFITS OF OOB ACTIVITY, SAFETY WITH MOBILITY, THER EX, D/C PLANNING. CD 02/23/17 1531 Done    Acceptance E NR  AS 02/22/17 0914 Active    Acceptance E,D,H NR  DM 02/20/17 1042 Active   Family Acceptance E NR  AS 02/22/17 0914 Active    Acceptance E,D,H NR  DM 02/20/17 1042 Active               Point: Precautions (Active)    Learning Progress Summary    Learner Readiness Method Response Comment Documented by Status    Patient Acceptance E VU,NR BENEFITS OF OOB ACTIVITY, SAFETY WITH MOBILITY, THER EX, D/C PLANNING.  02/23/17 1531 Done    Acceptance E NR  AS 02/22/17 0914 Active    Acceptance E,D,H NR  DM 02/20/17 1042 Active   Family Acceptance E NR  AS 02/22/17 0914 Active    Acceptance E,D,H NR  DM 02/20/17 1042 Active                      User Key     Initials Effective Dates Name Provider Type Discipline     06/19/15 -  Janina Johnson, PT Physical Therapist PT    DM 06/19/15 -  Sravani Puri, PT Physical Therapist PT    AS 06/22/15 -  Tammy Ayala, PTA Physical Therapy Assistant PT                    PT Recommendation and Plan  Anticipated Discharge Disposition: inpatient rehabilitation facility  PT Frequency: daily  Plan of Care Review  Plan Of Care Reviewed With: patient  Progress: progress toward functional goals as expected  Outcome Summary/Follow up Plan: PT GIVES GOOD EFFORT WITH BED MOBILITY AND TRANSFERS. NEEDS ENCOURAGEMENT DUE TO FEARFUL OF FALLING BUT FOLLOWS COMMANDS. NEEDS REHAB PLACEMENT. B HEEL CORDS TIGHT.           Outcome Measures       02/23/17 1425 02/22/17 0805 02/22/17 0804    How much help from another person do you currently need...    Turning from your back to your side while in flat bed without using bedrails? 2  -CD 2  -AS     Moving from lying on back to sitting on the side of a flat bed without bedrails? 2  -CD 2  -AS     Moving to and from a bed to a chair (including a wheelchair)? 2  -CD 2  -AS     Standing up from a chair using your arms (e.g., wheelchair, bedside chair)? 2  -CD 1  -AS     Climbing 3-5 steps with a railing? 1  -CD 1  -AS     To walk in hospital room? 1  -CD 1  -AS     AM-PAC 6 Clicks Score 10  -CD 9  -AS     How much help from another is currently needed...    Putting on and taking off regular lower body clothing?   1  -AR    Bathing (including washing, rinsing, and drying)   1  -AR    Toileting (which includes using toilet bed pan or urinal)   1  -AR    Putting on  and taking off regular upper body clothing   2  -AR    Taking care of personal grooming (such as brushing teeth)   2  -AR    Eating meals   1  -AR    Score   8  -AR    Modified [place] Scale    Modified Jessica Scale 4 - Moderately severe disability.  Unable to walk without assistance, and unable to attend to own bodily needs without assistance.  -CD      Functional Assessment    Outcome Measure Options AM-PAC 6 Clicks Basic Mobility (PT);Modified [place]  -CD AM-PAC 6 Clicks Basic Mobility (PT)  -AS AM-PAC 6 Clicks Daily Activity (OT)  -AR      User Key  (r) = Recorded By, (t) = Taken By, (c) = Cosigned By    Initials Name Provider Type    CD Janina Johnson, PT Physical Therapist    AR Radha Tapia, OT Occupational Therapist    AS Tammy Ayala, PTA Physical Therapy Assistant           Time Calculation:         PT Charges       02/23/17 1534          Time Calculation    PT Received On 02/23/17  -      PT Goal Re-Cert Due Date 02/03/17  -      Time Calculation- PT    Total Timed Code Minutes- PT 40 minute(s)  -CD        User Key  (r) = Recorded By, (t) = Taken By, (c) = Cosigned By    Initials Name Provider Type    CD Janina Johnson, PT Physical Therapist          Therapy Charges for Today     Code Description Service Date Service Provider Modifiers Qty    81075912235 HC PT THER PROC EA 15 MIN 2/23/2017 Janina Johnson, PT GP 3    95089391623 HC PT THER SUPP EA 15 MIN 2/23/2017 Janina Johnson, PT GP 3          PT G-Codes  PT Professional Judgement Used?: Yes  Outcome Measure Options: AM-PAC 6 Clicks Basic Mobility (PT), Modified [place]  Score: 8  Functional Limitation: Mobility: Walking and moving around  Mobility: Walking and Moving Around Current Status (): At least 80 percent but less than 100 percent impaired, limited or restricted  Mobility: Walking and Moving Around Goal Status (): At least 60 percent but less than 80 percent impaired, limited or restricted    Janina Johnson  PT  2/23/2017

## 2017-02-23 NOTE — PROGRESS NOTES
[facility] Medicine Services  INPATIENT PROGRESS NOTE    Date of Admission: 2/19/2017  Length of Stay: 4  Primary Care Physician: Kieran Cottrell MD    Subjective     Chief Complaint: stroke  HPI:  Large brown (no melena or brbpr) bm. No chest pain, weakness improved. No fever, no chills, no dyspnea    Review Of Systems:   Review of Systems    No f/c, no headache, no visoin changes. No n/v/d. No rash. No chest pain, no dyspnea  Objective      Temp:  [97.1 °F (36.2 °C)-99.2 °F (37.3 °C)] 98.2 °F (36.8 °C)  Heart Rate:  [66-80] 80  Resp:  [16] 16  BP: (114-149)/(79-89) 114/79  Physical Exam  Alert, oriented to year, month, answers most questions appropriately, nontoxic  Ncat, oroph clear  Perhaps very minimal left lip droop, pupils equal, 4/5 left  and leg knee flexion  rrr  ctab  abd soft, nontender  No cce  No extremity rash    Results Review:    I have reviewed the labs, radiology results and diagnostic studies.      Results from last 7 days  Lab Units 02/23/17  0616 02/22/17  0524   WBC 10*3/mm3  --  4.55   HEMOGLOBIN g/dL 8.4* 8.3*   PLATELETS 10*3/mm3  --  147*       Results from last 7 days  Lab Units 02/23/17  0616   SODIUM mmol/L 137   POTASSIUM mmol/L 3.9   TOTAL CO2 mmol/L 27.0   CREATININE mg/dL 0.50*   GLUCOSE mg/dL 76       I have reviewed the medications.    Assessment/Plan     Assessment/Problem List  Principal Problem:    Acute ischemic left MCA stroke  Active Problems:    Hypertension    Hyperlipidemia    Urinary retention/Frequent UTIs    Hypothyroidism    Status post left hip replacement    Recent Colitis    Acute CVA (cerebrovascular accident)    ICU transfer to floor 2/22/17, hospitalists assuming care:   85 y.o.female with relevant PMH of HTN, Urinary Retention, Frequent UTI's, Chronic Systolic (45%) and Grade 1 Diastolic HF, Parkinsons, recent Hip Replacement (december 2016 at ), recent admission w/ colitis and occult gi loss anemia (s/p blood transfusion  last month; treated w/ antibiotics), urinary retention.   Admitted 2/19/2017 w/ Acute Left MCA CVA s/p TPA (initial deficits were aphasia, right facial droop, right sided hemiparesis), s/p cerebral angiogram w/ no intervention required as near complete lysis of thrombus w/ iv tpa.    Plan    *Left MCA cva (s/p TPA w/ near complete lysis of thrombus per cerebral angiogram, so no intervention needed)   -echo w/ ef 45% ef, small pfo, small pericardial  effusion   -cannot r/o cardioembolic source but no signs afib this hospitalization; in light of recent admission w/ significant anemia requiring blood transfusion, cards recommends defer anticoagulation for now and get zioXT patch (recorder) prior to discharge   -antiplatelet rx for now, high dose statin  *Dysphagia (resolved)   -passed dysphagia eval; keofeed d/c'd  *Chronic shf (ef 45%) w/ small pfo   -cards defer pfo closure  *Iron def Anemia, w/ prior admission w/ colitis (s/p blood transfusion at that time, completed antibiotics)   -no overt gi blood loss this admission  *Hx Parkinson Dz  *Urinary retention, resolved (navarro out)   -w/ navarro; flomax increased; voiding trial   *Hx Left hip replacement (dec '16 at )  *enterococcus in urine, likely colonization   -only 40-50,000cfu initially; repeat u/a negative  *constipation, resolved (w/ large bm 2/23/17)    Plan:  -Asa 325mg daily, lipitor 40mg, coreg 6.25 mg bid  -zioXT patch (recording device) prior to discharge to rehab tomorrow morning (call america mcmullen at cardiology office and ask them to place the patch prior to discharge)  -follow up 2-3 weeks w/ [facility]  -follow up w/ dr. Gabriel (neurointerventional clinic) 3 weeks  -d/c on po iron  -bowels now moving (per nursing staff large bm normal brown today 2/23)  -rehab tomorrow morning to [facility], ambulance set up for 11            DVT prophylaxis: sq lovenox  Discharge Planning: I expect patient to be discharged to rehab tomorrow    Evangelista Brand  MD   02/23/17   4:39 PM    Please note that portions of this note may have been completed with a voice recognition program. Efforts were made to edit the dictations, but occasionally words are mistranscribed.

## 2017-02-24 VITALS
OXYGEN SATURATION: 97 % | TEMPERATURE: 97.9 F | BODY MASS INDEX: 21.17 KG/M2 | HEART RATE: 72 BPM | DIASTOLIC BLOOD PRESSURE: 71 MMHG | SYSTOLIC BLOOD PRESSURE: 112 MMHG | HEIGHT: 59 IN | WEIGHT: 105 LBS | RESPIRATION RATE: 16 BRPM

## 2017-02-24 PROCEDURE — 25010000002 NA FERRIC GLUC CPLX PER 12.5 MG: Performed by: INTERNAL MEDICINE

## 2017-02-24 PROCEDURE — 99239 HOSP IP/OBS DSCHRG MGMT >30: CPT | Performed by: PHYSICIAN ASSISTANT

## 2017-02-24 PROCEDURE — 92507 TX SP LANG VOICE COMM INDIV: CPT

## 2017-02-24 RX ORDER — SENNA AND DOCUSATE SODIUM 50; 8.6 MG/1; MG/1
2 TABLET, FILM COATED ORAL DAILY
Status: ON HOLD
Start: 2017-02-24 | End: 2017-07-05

## 2017-02-24 RX ORDER — ASPIRIN 325 MG
325 TABLET ORAL DAILY
Start: 2017-02-24 | End: 2021-01-01

## 2017-02-24 RX ORDER — TAMSULOSIN HYDROCHLORIDE 0.4 MG/1
0.8 CAPSULE ORAL DAILY
Qty: 30 CAPSULE | Status: ON HOLD
Start: 2017-02-24 | End: 2017-07-05

## 2017-02-24 RX ORDER — FERROUS SULFATE 325(65) MG
325 TABLET ORAL 2 TIMES DAILY WITH MEALS
Status: ON HOLD
Start: 2017-02-24 | End: 2017-07-05

## 2017-02-24 RX ORDER — ATORVASTATIN CALCIUM 40 MG/1
40 TABLET, FILM COATED ORAL NIGHTLY
Start: 2017-02-24 | End: 2019-07-18 | Stop reason: CLARIF

## 2017-02-24 RX ORDER — CARVEDILOL 6.25 MG/1
6.25 TABLET ORAL EVERY 12 HOURS SCHEDULED
Status: ON HOLD
Start: 2017-02-24 | End: 2017-07-05

## 2017-02-24 RX ADMIN — SODIUM CHLORIDE 62.5 MG: 9 INJECTION, SOLUTION INTRAVENOUS at 08:30

## 2017-02-24 RX ADMIN — Medication 325 MG: at 08:32

## 2017-02-24 RX ADMIN — ASPIRIN 325 MG ORAL TABLET 325 MG: 325 PILL ORAL at 08:32

## 2017-02-24 RX ADMIN — VITAMIN D, TAB 1000IU (100/BT) 2000 UNITS: 25 TAB at 08:30

## 2017-02-24 RX ADMIN — CARVEDILOL 6.25 MG: 6.25 TABLET, FILM COATED ORAL at 08:32

## 2017-02-24 RX ADMIN — Medication 250 MG: at 08:32

## 2017-02-24 RX ADMIN — CARBIDOPA AND LEVODOPA 1 TABLET: 25; 100 TABLET ORAL at 05:30

## 2017-02-24 RX ADMIN — NYSTATIN 500000 UNITS: 500000 SUSPENSION ORAL at 08:32

## 2017-02-24 RX ADMIN — ACETAMINOPHEN 1000 MG: 500 TABLET, FILM COATED ORAL at 05:30

## 2017-02-24 RX ADMIN — CARBIDOPA AND LEVODOPA 1 TABLET: 25; 100 TABLET ORAL at 10:06

## 2017-02-24 RX ADMIN — TAMSULOSIN HYDROCHLORIDE 0.8 MG: 0.4 CAPSULE ORAL at 08:32

## 2017-02-24 NOTE — PLAN OF CARE
Problem: Patient Care Overview (Adult)  Goal: Plan of Care Review  Outcome: Outcome(s) achieved Date Met:  02/24/17 02/24/17 1233   Coping/Psychosocial Response Interventions   Plan Of Care Reviewed With patient;son   Patient Care Overview   Progress improving   Outcome Evaluation   Outcome Summary/Follow up Plan Pt D/C'd to [facility] today. Vitals WNL. Pt scored a 5 on her NIHSS this AM. She continues to exhibit R sided weakness.          Problem: Stroke (Ischemic) (Adult)  Goal: Signs and Symptoms of Listed Potential Problems Will be Absent or Manageable (Stroke)  Outcome: Outcome(s) achieved Date Met:  02/24/17 02/24/17 1233   Stroke (Ischemic)   Problems Assessed (Stroke (Ischemic)/TIA) all   Problems Present (Stroke (Ischemic)/TIA) muscle tone abnormal;bladder/bowel dysfunction;communication impairment         Problem: Pressure Ulcer Risk (Estevan Scale) (Adult,Obstetrics,Pediatric)  Goal: Identify Related Risk Factors and Signs and Symptoms  Outcome: Outcome(s) achieved Date Met:  02/24/17 02/24/17 1233   Pressure Ulcer Risk (Estevan Scale)   Related Risk Factors (Pressure Ulcer Risk (Estevan Scale)) mobility impaired       Goal: Skin Integrity  Outcome: Outcome(s) achieved Date Met:  02/24/17 02/24/17 1233   Pressure Ulcer Risk (Estevan Scale) (Adult,Obstetrics,Pediatric)   Skin Integrity achieves outcome         Problem: Skin Integrity Impairment, Risk/Actual (Adult)  Goal: Identify Related Risk Factors and Signs and Symptoms  Outcome: Outcome(s) achieved Date Met:  02/24/17 02/24/17 1233   Skin Integrity Impairment, Risk/Actual   Skin Integrity Impairment, Risk/Actual: Related Risk Factors moisture;immobility   Signs and Symptoms (Skin Integrity Impairment) other (see comments)  (erythema blanchable, skin tears, bruising)       Goal: Skin Integrity/Wound Healing  Outcome: Outcome(s) achieved Date Met:  02/24/17 02/24/17 1233   Skin Integrity Impairment, Risk/Actual (Adult)   Skin Integrity/Wound  Healing achieves outcome

## 2017-02-24 NOTE — PLAN OF CARE
Problem: Patient Care Overview (Adult)  Goal: Plan of Care Review  Outcome: Ongoing (interventions implemented as appropriate)    02/23/17 1531 02/24/17 0424   Coping/Psychosocial Response Interventions   Plan Of Care Reviewed With --  patient   Patient Care Overview   Progress progress toward functional goals as expected --          Problem: Pressure Ulcer Risk (Estevan Scale) (Adult,Obstetrics,Pediatric)  Goal: Identify Related Risk Factors and Signs and Symptoms  Outcome: Ongoing (interventions implemented as appropriate)    02/22/17 1916   Pressure Ulcer Risk (Estevan Scale)   Related Risk Factors (Pressure Ulcer Risk (Estevan Scale)) cognitive impairment;fluid intake inadequate       Goal: Skin Integrity  Outcome: Ongoing (interventions implemented as appropriate)    02/24/17 0424   Pressure Ulcer Risk (Estevan Scale) (Adult,Obstetrics,Pediatric)   Skin Integrity making progress toward outcome         Problem: Fall Risk (Adult)  Goal: Identify Related Risk Factors and Signs and Symptoms  Outcome: Ongoing (interventions implemented as appropriate)    02/23/17 1447   Fall Risk   Fall Risk: Related Risk Factors age-related changes   Fall Risk: Signs and Symptoms presence of risk factors       Goal: Absence of Falls  Outcome: Ongoing (interventions implemented as appropriate)    02/24/17 0424   Fall Risk (Adult)   Absence of Falls making progress toward outcome         Problem: Skin Integrity Impairment, Risk/Actual (Adult)  Goal: Identify Related Risk Factors and Signs and Symptoms  Outcome: Ongoing (interventions implemented as appropriate)    02/24/17 0424   Skin Integrity Impairment, Risk/Actual   Skin Integrity Impairment, Risk/Actual: Related Risk Factors age extremes       Goal: Skin Integrity/Wound Healing  Outcome: Ongoing (interventions implemented as appropriate)    02/24/17 0424   Skin Integrity Impairment, Risk/Actual (Adult)   Skin Integrity/Wound Healing making progress toward outcome

## 2017-02-24 NOTE — DISCHARGE SUMMARY
"    [facility] Medicine Services  DISCHARGE SUMMARY       Date of Admission: 2/19/2017  Date of Discharge:  2/24/2017  Primary Care Physician: Kieran Cottrell MD    Discharge Diagnoses:  Active Hospital Problems (** Indicates Principal Problem)    Diagnosis Date Noted   • **Acute ischemic left MCA stroke [I63.512] 02/19/2017   • Hypertension [I10] 02/19/2017   • Hyperlipidemia [E78.5] 02/19/2017   • Urinary retention/Frequent UTIs [R33.9] 02/19/2017   • Hypothyroidism [E03.9] 02/19/2017   • Status post left hip replacement [Z96.642] 02/19/2017   • Recent Colitis [K52.9] 02/19/2017      Resolved Hospital Problems    Diagnosis Date Noted Date Resolved   No resolved problems to display.     Presenting Problem/History of Present Illness  Acute CVA (cerebrovascular accident) [I63.9]      History of Present Illness on Day of Discharge:   \"I'm worn out.\" No chest pain, SOA, N/V/D or constipation. Was able to have BM on 2/23. Does report some mild abdominal pain.     Hospital Course  Ms. Knapp is an 86yo female with a history of HTN, HLD, hypothyroidism, anxiety/depression, anemia and Parkinson's disease. She presented to [facility] ED on 2/19/17 with complaints of aphasia, right-sided facial droop and right-sided paralysis.  According to the patient's son, she had left total hip replacement at [facility] on 12/16/16.  She was transferred to the [facility] for acute rehabilitation.  She was later hospitalized at [facility] from 1/12-1/19/17 for anemia requiring transfusion and dehydration.  She was discharged back to the [facility].  On the morning of 2/19, he developed a right sided paralysis, right facial droop and was unable to produce speech.  Son was contacted at 9 AM however EMS sheet stated it happened at 7:45 AM.  She was brought to [facility] ED at 9:40 AM.  Initial NIH was 26.  CT of head was negative, CT perfusion scan showed a left MCA CVA.  She was " "given TPA at 1033 and taken emergently to the Cath Lab with plans for mechanical thrombectomy.  However, after the exchange catheters to access the distal internal carotid artery, there is only small amount of residual \"distal\" clot.  She was admitted to the ICU.     PT/OT/speech therapy were consulted to work with the patient during this hospitalization.  CT of the head 24 hours demonstrated no convincing areas of infarction and no intracranial hemorrhage.  She was started aspirin.  Due to patient's history of recent transfusion, she was not started on anticoagulation therapy.  Cardiology was consulted to assess the patient for potential cardiac thromboembolic source of her CVA. Echocardiogram showed positive saline test indicating atrial level shunt.  EKG showed no evidence of atrial fibrillation.  Cardiology recommended ZioXT patch at time of discharge to evaluate for paroxysmal atrial fibrillation. ZioXT patch was NOT placed during this hospitalization and cannot be placed until the patient is discharged from [facility] due to issues with insurance approval. Patient or family to call [phone] to arrange patch placement following d/c to home.     MRI of the brain did not show convincing evidence of prior thromboembolic events.  PFO was found to be small and cardiology did not feel that closure was indicated or necessary at this time.    Case management was consulted to assist with discharge planning.  The patient was accepted to [facility] for acute rehabilitation and will be discharged in stable condition on 2/24/17.  She is to follow-up with Dr. Chou of cardiology in 2 weeks. Follow-up with Dr. Gabriel (neurosurgery) and with the neurointerventional clinic in 3-4 weeks. Follow up with PCP 5-7 days after d/c from [facility].     Diet recommendations per speech therapy include soft textures, ground foods.  Thin liquids, meds whole or crushed in puréed solids.     Procedures Performed: None    Consults:   Consults     " Date and Time Order Name Status Description    2/21/2017 0837 Inpatient Consult to Cardiology Completed     2/19/2017 1903 Inpatient consult to Intensivist Completed         Pertinent Test Results:    Results from last 7 days  Lab Units 02/23/17  0616 02/22/17  0524 02/21/17  0431 02/20/17  0629   WBC 10*3/mm3  --  4.55 6.85 5.81   HEMOGLOBIN g/dL 8.4* 8.3* 8.7* 9.5*   HEMATOCRIT % 26.8* 26.7* 28.1* 30.3*   PLATELETS 10*3/mm3  --  147* 165 173       Results from last 7 days  Lab Units 02/23/17  0616 02/22/17  0524 02/21/17  0431   SODIUM mmol/L 137 135 137   POTASSIUM mmol/L 3.9 3.7 4.7   CHLORIDE mmol/L 107 106 109   TOTAL CO2 mmol/L 27.0 24.0 21.0   BUN mg/dL 8* 10 13   CREATININE mg/dL 0.50* 0.50* 0.60   GLUCOSE mg/dL 76 111* 52*   CALCIUM mg/dL 8.1* 7.8* 8.4*     Imaging Results (all)     Procedure Component Value Units Date/Time    CT Head Without Contrast [[id number]] Collected:  02/19/17 1226     Updated:  02/19/17 1449    Narrative:       EXAMINATION: CT HEAD WO CONTRAST - 02/19/2017     INDICATION: Stroke, Code 19, right-sided weakness.     TECHNIQUE: Contiguous axial 5 mm sections through the brain parenchyma  without intravenous contrast.     CODE 19:  Time of Scan/Exam:  0945 hours   Interpretation Time:   0947 hours      The radiation dose reduction device was turned on for each scan per the  ALARA (As Low as Reasonably Achievable) protocol.     COMPARISON: 11/19/2012.     FINDINGS: The midline structures are central. There is no midline shift  or mass effect. The lateral, third, and fourth ventricles are grossly  unremarkable. The structures of the posterior fossa reveal a few  punctate infarcts which are old. There is no acute intra-axial or  extra-axial hemorrhage.     The bony calvarium is intact. A mucous retention cyst is present in the  right maxillary sinus.       Impression:       Old infarcts of the posterior fossa. No acute intracranial  process.      DICTATED:     02/19/2017  EDITED:          02/19/2017     This report was finalized on 2/19/2017 2:47 PM by Dr. Vinod Molina MD.       CT Cerebral Perfusion With & Without Contrast [[id number]] Collected:  02/19/17 1228     Updated:  02/19/17 1449    Narrative:       EXAMINATION: CT CEREBRAL PERFUSION W/WO CONTRAST - 02/19/2017      INDICATION: Stroke, right-sided weakness.     TECHNIQUE: Dynamic contrast-enhanced perfusion study after contrast.  Post processing to include parametric maps with cerebral blood flow,  cerebral blood volume, mean transit time, and time to peak enhancement.     The radiation dose reduction device was turned on for each scan per the  ALARA (As Low as Reasonably Achievable) protocol.     COMPARISON: None.     FINDINGS:  The cerebral blood volume and cerebral blood flow maps reveal  an area of decreased cerebral blood flow in the left posterior parietal  region which demonstrates minimally increased blood flow in the cerebral  blood volume maps. The mean transit time and time to peak enhancement  maps are elevated in this area.       Impression:       Acute infarct in the left posterior middle cerebral artery  distribution with some ischemic tissue.     DICTATED:     02/19/2017  EDITED:         02/19/2017     This report was finalized on 2/19/2017 2:47 PM by Dr. Vinod Molina MD.       CT Head Without Contrast [[id number]] Collected:  02/21/17 0930     Updated:  02/21/17 1414    Narrative:       EXAMINATION: CT HEAD WO CONTRAST-      INDICATION: I63.9-Cerebral infarction, unspecified; I65.22-Occlusion and  stenosis of left carotid artery; Z74.09-Other reduced mobility; 24 hours  post TPA.     TECHNIQUE: Contiguous axial 5 m sections through the brain parenchyma  without intravenous contrast.     COMPARISON: 02/19/2017.     FINDINGS: The midline structures are central. There is no midline shift  or mass effect. The lateral, third, and fourth ventricles are grossly  unremarkable. The structures of the posterior fossa are normal. There is  no  acute intra or extraaxial hemorrhage.     The bony calvarium is intact. The paranasal sinuses and mastoid air  cells are well pneumatized.       Impression:       1. No acute intracranial hemorrhage.  2. Unchanged since 02/19/2017.     D:  02/21/2017  E:  02/21/2017     This report was finalized on 2/21/2017 2:12 PM by Dr. Vinod Molina MD.       XR Abdomen KUB [[id number]] Collected:  02/21/17 1930     Updated:  02/1934    Narrative:          EXAMINATION: XR ABDOMEN KUB-      INDICATION: placement confirmation of Keofeed; I63.9-Cerebral  infarction, unspecified; I65.22-Occlusion and stenosis of left carotid  artery; Z74.09-Other reduced mobility; Z74.09-Other reduced mobility;  I50.9-Heart failure, unspecified; I42.9-Cardiomyopathy, unspecified      COMPARISON: 2/21/2017 KUB     FINDINGS: Contrast injection of the patient's feeding tube confirms  feeding tube is coiled back into the proximal stomach, to the left of  the midline. The gastroduodenal junction and second portion of the  duodenum are seen in expected location in the right abdomen.           Impression:       Feeding tube coiled in the stomach     This report was finalized on 2/21/2017 7:32 PM by DR. Peewee Paredes MD.       XR Abdomen KUB [[id number]] Collected:  02/22/17 0846     Updated:  02/22/17 1155    Narrative:       EXAMINATION: XR ABDOMEN KUB-      INDICATION: SBFT placement; I63.9-Cerebral infarction, unspecified;  I65.22-Occlusion and stenosis of left carotid artery; Z74.09-Other  reduced mobility; I50.9-Heart failure, unspecified;  I42.9-Cardiomyopathy, unspecified.      FINDINGS: The Keofeed tube folded in the stomach has not been changed,  moved or altered in position when compared to previous studies of  yesterday evening. Therefore, the tip of the tube remains in the fundus  of the stomach.           Impression:       No change in the position of the Keofeed tube. The tip  remains in the fundus of the stomach. This is suboptimal for  feeding.     D:  02/21/2017  E:  02/22/2017     This report was finalized on 2/22/2017 11:53 AM by Dr. Zurdo Cho MD.       MRI Brain Without Contrast [[id number]] Collected:  02/22/17 1152     Updated:  02/22/17 1633    Narrative:       EXAMINATION: MRI BRAIN WO CONTRAST- 02/22/2017     INDICATION: stroke; I63.9-Cerebral infarction, unspecified;  I65.22-Occlusion and stenosis of left carotid artery; Z74.09-Other  reduced mobility; I50.9-Heart failure, unspecified;  I42.9-Cardiomyopathy, unspecified; assess for stroke     TECHNIQUE: Routine multiplanar MR imaging was obtained of the brain  without the administration of gadolinium contrast.     COMPARISON: NONE     FINDINGS: There is a small area of restricted diffusion involving the  left parietal lobe as well as several smaller areas seen in the cortex  of the left parietal lobe near the vertex. There are 2 small foci of  restricted diffusion several millimeters in size within the right  parietal lobe near the vertex. There is a third area seen posteriorly  within the right posterior parietal region. There is a small focus as  well seen within the right occipital lobe inferiorly. No evidence of  hemorrhage. Atrophy identified of the brain. Signal changes seen within  the periventricular and subcortical white matter suggesting chronic  small vessel ischemic change. Mucous retention cyst in the right  maxillary sinus. Mucosal thickening involving the ethmoid air cells. No  air-fluid level identified. The globes and orbits are intact. No  cerebellopontine angle mass identified.  Mastoid air cells are patent.  Minimal edema seen surrounding the areas of acute infarction.       Impression:       1. Several small areas of acute infarction involving the left parietal  lobe as well as tiny foci several millimeters in size in the right  parietal lobe and frontal lobe as well as the right occipital lobe and  posterior parietal region.  2. Chronic paranasal sinusitis  "involving the ethmoid air cells with  mucous retention cyst in the right maxillary sinus.     D:  02/22/2017  E:  02/22/2017      This report was finalized on 2/22/2017 4:31 PM by Dr. Karely Esquivel MD.       XR Chest 1 View [[id number]] Collected:  02/23/17 0858     Updated:  02/23/17 2243    Narrative:       EXAMINATION: XR CHEST 1 VW- 02/23/2017     INDICATION:  I63.9-Cerebral infarction, unspecified; I65.22-Occlusion  and stenosis of left carotid artery; Z74.09-Other reduced mobility;  I50.9-Heart failure, unspecified; I42.9-Cardiomyopathy, unspecified      COMPARISON: 02/22/2017 portable chest     FINDINGS: Right upper extremity PICC line is seen in good position in  the distal SVC. Feeding tube has been removed. Heart remains enlarged.  The aortic knob shadow is quite enlarged as well, presumably aortic  ectasia. The pulmonary vasculature appears normal. Lungs appear grossly  clear.           Impression:       Stable chest exam with mild cardiomegaly and  enlarged-appearing aortic arch. No evidence of active chest disease is  seen.     D:  02/23/2017  E:  02/23/2017     This report was finalized on 2/23/2017 10:41 PM by DR. Peewee Paredes MD.           Condition on Discharge: Stable, improved    Physical Exam on Discharge:  Visit Vitals   • /87   • Pulse 74   • Temp 98.2 °F (36.8 °C) (Tympanic)   • Resp 16   • Ht 59\" (149.9 cm)   • Wt 105 lb (47.6 kg)   • SpO2 97%   • BMI 21.21 kg/m2     Physical Exam   Constitutional: no acute distress, awake, alert, oriented to year and month,   Eyes: PERRLA, sclerae anicteric, no conjunctival injection  Neck: supple, no thyromegaly, trachea midline  Respiratory: Clear to auscultation bilaterally, nonlabored respirations   Cardiovascular: RRR, no murmurs, rubs, or gallops, palpable pedal pulses bilaterally  Gastrointestinal: Positive bowel sounds, soft, nontender, nondistended  Musculoskeletal: No bilateral ankle edema, no clubbing or cyanosis to bilateral lower " extremities  Psychiatric: oriented x 3, appropriate affect, cooperative  Neurologic: Strength symmetric in all extremities, cranial Nerves grossly intact to confrontation. Mild L facial droop, speech somewhat difficult to understand. 4/5  to L hand, full strength to extremities bilaterally.   Skin: ecchymosis to BUEs and hands.     Discharge Disposition  Skilled Nursing Facility (DC - External)    Discharge Medications   [facility]   Home Medication Instructions HARDY:[id number]    Printed on:02/24/17 1363   Medication Information                      acetaminophen (TYLENOL) 500 MG tablet  Take 1,000 mg by mouth 4 (Four) Times a Day As Needed for mild pain (1-3).             aspirin 325 MG tablet  Take 1 tablet by mouth Daily.             atorvastatin (LIPITOR) 40 MG tablet  Take 1 tablet by mouth Every Night.             carbidopa-levodopa (SINEMET)  MG per tablet  Increase dose to four times a day: on rising, noon, 3pm and 6pm             carvedilol (COREG) 6.25 MG tablet  Take 1 tablet by mouth Every 12 (Twelve) Hours.             Cholecalciferol 2000 UNITS capsule  Take 2,000 Units by mouth Daily.             ferrous sulfate 325 (65 FE) MG tablet  Take 1 tablet by mouth 2 (Two) Times a Day With Meals.             levothyroxine (SYNTHROID, LEVOTHROID) 75 MCG tablet  Take 75 mcg by mouth Daily.             mirtazapine (REMERON) 30 MG tablet  Take 30 mg by mouth Every Night.             nystatin (MYCOSTATIN) 175467 UNIT/ML suspension  Take 5 mL by mouth 4 (Four) Times a Day for 10 days.             polyethylene glycol (MIRALAX) packet  Take 17 g by mouth Daily As Needed.             saccharomyces boulardii (FLORASTOR) 250 MG capsule  Take 1 capsule by mouth 2 (Two) Times a Day.             sennosides-docusate sodium (SENOKOT-S) 8.6-50 MG tablet  Take 2 tablets by mouth Daily.             tamsulosin (FLOMAX) 0.4 MG capsule 24 hr capsule  Take 2 capsules by mouth Daily.             vitamin E 1000 UNIT  capsule  Take 1,000 Units by mouth Daily.               Diet Instructions     Diet: Soft Texture; Thin Liquids, No Restrictions; Ground       Discharge Diet:  Soft Texture   Fluid Consistency:  Thin Liquids, No Restrictions   Soft Options:  Ground               Activity Instructions     Activity as Tolerated                   Follow-up Appointments  Future Appointments  Date Time Provider Department Center   3/22/2017 1:30 PM Francisco Javier Gabriel MD [facility] None   5/4/2017 1:00 PM Elisa Castro MD [facility] None     Additional Instructions for the Follow-ups that You Need to Schedule     Additional Discharge Follow-Up (Specify Provider)    As directed    Follow Up:  2 Weeks   Follow Up Details:  Follow up appointment with Dr. Chou in 2 weeks       Discharge Follow-Up With Specified Provider    As directed    Follow up appointment with Dr. Gabriel (neurointerventional clinic) in 3-4 weeks   Follow Up:  3 Weeks               ROOPA Mejias 02/24/17 8:10 AM    Time: Discharge 45 min    Please note that portions of this note may have been completed with a voice recognition program. Efforts were made to edit the dictations, but occasionally words are mistranscribed.                I supervised care of the patient on day of discharge with direct care provided by the advanced care provider (APC).

## 2017-02-24 NOTE — PLAN OF CARE
Problem: Patient Care Overview (Adult)  Goal: Plan of Care Review  Outcome: Ongoing (interventions implemented as appropriate)    02/24/17 1002   Coping/Psychosocial Response Interventions   Plan Of Care Reviewed With patient   Patient Care Overview   Progress progress toward functional goals as expected   Outcome Evaluation   Outcome Summary/Follow up Plan Speech/language therapy complete. Pt d/c to [facility] today. Signinficantly improved in all areas. Very mild dysathria however, pt reports significant fatigue. Functional at conversational level for ADLs, plans for discharge. REC: RE-eval at [facility] to ensure no high level deficits.          Problem: Inpatient SLP  Goal: Dysarthria-Patient will improve motor speech skills to allow optimal participation in care  Outcome: Ongoing (interventions implemented as appropriate)    02/21/17 1031 02/24/17 1002   Dysarthria- Optimal Particpation in Care   Dysarthria Optimal Participation in Care- SLP, Date Established --  02/24/17   Dysarthria Optimal Participation in Care- SLP, Time to Achieve by discharge --    Dysarthria Optimal Participation in Care- SLP, Date Goal Reviewed 02/21/17 --    Dysarthria Optimal Participation in Care- SLP, Outcome goal ongoing --        Goal: Expressive-Patient will improve expressive language skills to allow optimal participation in care    02/21/17 1031   Expressive- Optimal Participation in Care   Expressive Optimal Participation in Care- SLP, Date Established 02/21/17   Expressive Optimal Participation in Care- SLP, Time to Achieve by discharge   Expressive Optimal Participation in Care- SLP, Date Goal Reviewed 02/14/17   Expressive Optimal Participation in Care- SLP, Outcome goal ongoing       Goal: Cognitive-linguistic-Patient will improve Cognitive-linguistic skills to allow optimal participation in care    02/21/17 1031   Cognitive Linguistic- Optimal Participation in Care   Cognitive Linguistic Optimal Participation in Care- SLP, Date  Established 02/21/17   Cognitive Linguistic Optimal Participation in Care- SLP, Time to Achieve by discharge   Cognitive Linguistic Optimal Participation in Care- SLP, Date Goal Reviewed 02/21/17   Cognitive Linguistic Optimal Participation in Care- SLP, Outcome goal ongoing       Goal: Receptive Language-Patient will improve receptive language skills to allow optimal participation in care  Outcome: Ongoing (interventions implemented as appropriate)    02/21/17 1031 02/24/17 1002   Receptive Language- Optimal Participation in Care   Receptive Language Optimal Participation in Care- SLP, Date Established --  02/24/17   Receptive Language Optimal Participation in Care- SLP, Time to Achieve --  by discharge   Receptive Language Optimal Participation in Care- SLP, Date Goal Reviewed 02/21/17 --    Receptive Language Optimal Participation in Care- SLP, Outcome goal ongoing --

## 2017-02-24 NOTE — PROGRESS NOTES
Acute Care - Speech Language Pathology Treatment Note  [facility]     Patient Name: Florinda Knapp  : 3/5/1931  MRN: [id number]  Today's Date: 2017         Admit Date: 2017    Visit Dx:      ICD-10-CM ICD-9-CM   1. Acute CVA (cerebrovascular accident) I63.9 434.91   2. Left carotid artery occlusion I65.22 433.10   3. Impaired functional mobility, balance, gait, and endurance Z74.09 V49.89   4. Impaired mobility and ADLs Z74.09 799.89   5. CHF with cardiomyopathy I50.9 428.0    I42.9 425.4     Patient Active Problem List   Diagnosis   • Dysuria   • Elbow injury   • Fracture of left olecranon process   • Parkinson's disease   • Trauma left hip   • Acute Symptomatic on Chronic Normocytic Anemia   • Acute respiratory failure with hypoxia   • Generalized weakness   • Chest pain   • Symptomatic anemia   • Elevated troponin I level, possibly due to anemia   • Status post total replacement of left hip 2016 at [facility]   • H/O urinary retention   • History of recurrent UTIs   • Essential hypertension   • Anxiety   • CHF with cardiomyopathy   • Colitis   • Hypokalemia   • Acute ischemic left MCA stroke   • Hypertension   • Hyperlipidemia   • Urinary retention/Frequent UTIs   • Hypothyroidism   • Status post left hip replacement   • Recent Colitis              Adult Rehabilitation Note       17 0900 17 1425 17 0805    Rehab Assessment/Intervention    Discipline speech language pathologist  -LS physical therapist  -CD physical therapy assistant  -AS    Document Type therapy note (daily note)  -LS therapy note (daily note)  -CD therapy note (daily note)  -AS    Subjective Information agree to therapy  -LS agree to therapy   WANTING TO USE THE BATHROOM- UNSUCCESSFUL ON BEDPAN.   -CD agree to therapy;no complaints  -AS    Patient Effort, Rehab Treatment  good  -CD good  -AS    Symptoms Noted During/After Treatment  fatigue   REPORTS FEARFUL OF FALLING BUT RESPONDED WELL WITH P.T.   -CD fatigue  -AS     Precautions/Limitations  fall precautions   L THR WITH PRECAUTIONS 12/6/16, P[place]INSONS  -CD fall precautions;oxygen therapy device and L/min;other (see comments)   NG tube, L THR with precautions 12-6-17  -AS    Recorded by [LS] Ame Enrique, MS CCC-SLP [CD] Janina Johnson, PT [AS] Tammy Ayala, JOE    Vital Signs    Pre Systolic BP Rehab  --   VITALS STABLE.   -  -AS    Pre Treatment Diastolic BP   84  -AS    Post Systolic BP Rehab   134  -AS    Post Treatment Diastolic BP   83  -AS    Pre SpO2 (%)   99  -AS    O2 Delivery Pre Treatment   supplemental O2  -AS    Post SpO2 (%)   97  -AS    O2 Delivery Post Treatment   supplemental O2  -AS    Pre Patient Position   Supine  -AS    Intra Patient Position   Sitting  -AS    Post Patient Position   Sitting  -AS    Recorded by  [CD] Janina Johnson, PT [AS] Tammy Ayala, JOE    Pain Assessment    Pain Assessment No/denies pain  -LS No/denies pain  -CD No/denies pain  -AS    Recorded by [LS] Ame Enrique, MS CCC-SLP [CD] Janina Johnson, PT [AS] Tammy Ayala, JOE    Cognitive Assessment/Intervention    Current Cognitive/Communication Assessment  impaired  -CD impaired  -AS    Orientation Status  disoriented to;person;place;time  -CD oriented to;person;place;time  -AS    Follows Commands/Answers Questions  75% of the time;needs repetition;needs cueing  -CD 75% of the time;able to follow single-step instructions  -AS    Personal Safety  mild impairment;decreased awareness, need for assist;decreased awareness, need for safety  -CD decreased awareness, need for assist;decreased awareness, need for safety;mild impairment  -AS    Personal Safety Interventions  elopement precautions initiated;fall prevention program maintained;gait belt;muscle strengthening facilitated;nonskid shoes/slippers when out of bed;supervised activity  -CD elopement precautions initiated;fall prevention program maintained;gait belt;nonskid shoes/slippers when out of bed  -AS     "Recorded by  [CD] Janina Johnson, PT [AS] Tammy Ayala, JOE    Cognitive Assessment Intervention    Behavior/Mood Observations  cooperative;alert  -CD     Recorded by  [CD] Janina Johnson, PT     Dysarthria Treatment Objectives    Improve respiratory support by: increasing length of exhalation;80%;with consistent cues  -LS      Respiratory Support Progress 90%;without cues;achieved target accuracy on task  -LS      Improve phonation by: using loud speech;80%;with consistent cues  -LS      Phonation Progress 80%;without cues;achieved target accuracy on task  -LS      Reduce perception of hypernasality by: opening mouth wider for speech;80%;with consistent cues  -LS      Perception of Hypernasality Progress without cues;achieved target accuracy on task;70%  -LS      Improve articulation: of specific sounds in words;by over-articulating at word level;80%;with consistent cues  -LS      Articulation Progress 80%;without cues;achieved target accuracy on task  -LS      Recorded by [LS] Ame Enrique, MS CCC-SLP      Expressive Treatment Objectives    Improve connected speech to express thoughts by: describing a picture;70%;with consistent cues  -LS      Connected Speech Progress continue to address  -LS      Recorded by [LS] Ame Enrique, MS CCC-SLP      Cognitive Linguistic Treatment Objectives    Improve awareness of basic personal information through: answering open-ended questions regarding personal/biographical information;answering questions about cognitive/physical changes;80%;with consistent cues  -LS      Awareness of Basic Personal Information Progress 70%;without cues  -LS      Improve functional problem solving through: answer questions about ADL problems;answer \"what if\" questions;80%;with consistent cues  -LS      Functional Problem Solving Progress 100%;without cues;achieved target accuracy on task  -LS      Improve executive function skills: demonstrate awareness of deficit;identify strategies, " strengths, limitations;80%;with consistent cues  -LS      Executive Function Skills Progress continue to address  -LS      Recorded by [LS] Ame Enrique MS CCC-SLP      Receptive Language Treatment Objectives    Improve ability to comprehend simple conversation respond appropriately to simple conversational statements;respond appropriately to simple conversational questions;80%;with consistent cues  -LS      Ability to Comprehend Simple Conversation Progress 90%;without cues  -LS      Recorded by [LS] Ame Enrique MS CCC-SLP      Bed Mobility, Assessment/Treatment    Bed Mobility, Assistive Device  bed rails;draw sheet;head of bed elevated  -CD     Bed Mob, Supine to Sit, [place]  maximum assist (25% patient effort);2 person assist required;verbal cues required  -CD verbal cues required;maximum assist (25% patient effort);2 person assist required  -AS    Bed Mob, Sit to Supine, [place]   not tested  -AS    Bed Mobility, Safety Issues   decreased use of arms for pushing/pulling;decreased use of legs for bridging/pushing  -AS    Bed Mobility, Impairments   strength decreased;impaired balance;coordination impaired;motor control impaired  -AS    Bed Mobility, Comment   patient able to slide right leg to edge of bed, assist needed for LLE and trunk to reach upright position  -AS    Recorded by  [CD] Janina Johnson, PT [AS] Tammy Ayala, PTA    Transfer Assessment/Treatment    Transfers, Bed-Chair [place]  maximum assist (25% patient effort);verbal cues required  -CD verbal cues required;maximum assist (25% patient effort);2 person assist required  -AS    Transfers, Bed-Chair-Bed, Assist Device  --   PT HOLDING TO GAIT BELT AND PT HOLDING TO THERAPIST.   -CD     Toilet Transfer, [place]  maximum assist (25% patient effort);verbal cues required   GAIT BELT AND PT HOLDING TO THERAPISTS ARMS.   -CD     Toilet Transfer, Assistive Device  --   DEPENDENT WITH HYGIENE.   -CD     Transfer, Safety  Issues   step length decreased;weight-shifting ability decreased  -AS    Transfer, Impairments   strength decreased;impaired balance;motor control impaired  -AS    Transfer, Comment  EOB TO BSC TO RECLINER TO THE L. CARE TO MAINTAIN L HIP PRECAUTIONS.   -CD patient to weak to attmept standing secondary to increase sitting EOB  -AS    Recorded by  [CD] Janina Johnson, PT [AS] Tammy Ayala, JOE    Motor Skills/Interventions    Additional Documentation  Balance Skills Training (Group)  -CD     Recorded by  [CD] Janina Johnson PT     Balance Skills Training    Sitting-Level of Assistance  Minimum assistance   PROGRESSED TO CGA. LEANS TO THE R.   -CD Minimum assistance   progressed to CGA, leans to the right.  -AS    Sitting-Balance Support  Feet supported;Left upper extremity supported;Right upper extremity supported  -CD Right upper extremity supported;Left upper extremity supported;Feet supported  -AS    Sitting-Balance Activities  Trunk control activities  -CD     Sitting # of Minutes  15    10 MINUTES EOB AND 5 MIN ON BS COMMODE.   -CD 15  -AS    Recorded by  [CD] Janina Johnson, PT [AS] Tammy Ayala PTA    Therapy Exercises    Bilateral Lower Extremities  AAROM:;10 reps;sitting;supine;ankle pumps/circles;heel slides;hip abduction/adduction;LAQ  -CD     Recorded by  [CD] Janina Johnson PT     Positioning and Restraints    Pre-Treatment Position  in bed  -CD in bed  -AS    Post Treatment Position  chair   CALLED DISPATCH  FOR T.V. REPAIR.   -CD chair  -AS    In Chair  reclined;call light within reach;encouraged to call for assist;notified nsg;exit alarm on;RUE elevated;LUE elevated;legs elevated   NOTIFIED NSG OF MOBILITY STATUS FOR TRANSFER BACK TO BED.  -CD reclined;call light within reach;encouraged to call for assist;exit alarm on;with family/caregiver;RUE elevated;LUE elevated;compression device;waffle cushion;on mechanical lift sling  -AS    Recorded by  [CD] Janina Johnson, PT [AS] Tammy Morrow  Jamie, [facility]      02/22/17 0804          Rehab Assessment/Intervention    Discipline occupational therapist  -AR      Document Type therapy note (daily note)  -AR      Patient Effort, Rehab Treatment excellent  -AR      Symptoms Noted During/After Treatment fatigue  -AR      Precautions/Limitations fall precautions;hip precautions- left;oxygen therapy device and L/min  -AR      Recorded by [AR] Radha Tapia OT      Vital Signs    Pre Systolic BP Rehab 141  -AR      Pre Treatment Diastolic BP 84  -AR      Post Systolic BP Rehab 134  -AR      Post Treatment Diastolic BP 83  -AR      Pretreatment Heart Rate (beats/min) 74  -AR      Posttreatment Heart Rate (beats/min) 68  -AR      Pre SpO2 (%) 99  -AR      O2 Delivery Pre Treatment supplemental O2   1.5L NC  -AR      Post SpO2 (%) 94  -AR      O2 Delivery Post Treatment supplemental O2  -AR      Pre Patient Position Supine  -AR      Post Patient Position Sitting  -AR      Recorded by [AR] Radha Tapia OT      Pain Assessment    Pain Assessment No/denies pain  -AR      Recorded by [AR] Radha Tapia OT      Vision Assessment/Intervention    Visual Impairment --   tracking WNL  -AR      Recorded by [AR] Radha Tapia OT      Cognitive Assessment/Intervention    Current Cognitive/Communication Assessment functional  -AR      Orientation Status oriented x 4;required verbal cueing (specifiy in comments)   cues for year only  -AR      Follows Commands/Answers Questions 100% of the time;able to follow single-step instructions  -AR      Personal Safety WNL/WFL  -AR      Personal Safety Interventions fall prevention program maintained  -AR      Recorded by [AR] Radha Tapia OT      ROM (Range of Motion)    General ROM no range of motion deficits identified   grossly WN:  -AR      Recorded by [AR] Radha Tapia OT      Mobility Assessment/Training    Extremity Weight-Bearing Status left lower extremity  -AR      Left Lower Extremity  Weight-Bearing weight-bearing as tolerated  -AR      Recorded by [AR] Radha Tapia OT      Bed Mobility, Assessment/Treatment    Bed Mobility, Assistive Device bed rails;head of bed elevated;draw sheet  -AR      Bed Mobility, Scoot/Bridge, [place] dependent (less than 25% patient effort)  -AR      Bed Mob, Supine to Sit, [place] maximum assist (25% patient effort);2 person assist required;verbal cues required  -AR      Bed Mob, Sit to Supine, [place] maximum assist (25% patient effort);2 person assist required;verbal cues required  -AR      Bed Mobility, Impairments ROM decreased;sensation decreased;pain;impaired balance  -AR      Recorded by [AR] Radha Tapia OT      Transfer Assessment/Treatment    Transfers, Bed-Chair [place] maximum assist (25% patient effort);2 person assist required;verbal cues required  -AR      Transfers, Chair-Bed [place] maximum assist (25% patient effort);2 person assist required;verbal cues required  -AR      Transfers, Bed-Chair-Bed, Assist Device elevated surface;other (see comments)   BUE support  -AR      Transfers, Sit-Stand [place] maximum assist (25% patient effort);2 person assist required;verbal cues required  -AR      Transfers, Stand-Sit [place] verbal cues required;maximum assist (25% patient effort);2 person assist required  -AR      Transfers, Sit-Stand-Sit, Assist Device elevated surface;other (see comments)   BUE support  -AR      Transfer, Impairments ROM decreased;strength decreased;pain  -AR      Transfer, Comment Pt able to participate in stand-pivot and required cues to sequence and maintain THP  -AR      Recorded by [AR] Radha Tapia OT      Lower Body Dressing Assessment/Training    LB Dressing Assess/Train, Clothing Type donning:  -AR      LB Dressing Assess/Train, Position supine  -AR      LB Dressing Assess/Train, [place] dependent (less than 25% patient effort)  -AR      Recorded by [AR] Radha Denny  SUZANNE Tapia      Grooming Assessment/Training    Grooming Assess/Train, Position edge of bed  -AR      Grooming Assess/Train, Indepen Level moderate assist (50% patient effort)  -AR      Recorded by [AR] Radha Tapia OT      Motor Skills/Interventions    Additional Documentation Balance Skills Training (Group)  -AR      Recorded by [AR] Radha Tapia OT      Balance Skills Training    Sitting-Level of Assistance Minimum assistance   progressed to CGA  -AR      Sitting-Balance Support Feet supported;Right upper extremity supported   R lateral lean noted  -AR      Sitting-Balance Activities Trunk control activities  -AR      Sitting # of Minutes 15  -AR      Standing-Level of Assistance Maximum assistance;x2  -AR      Static Standing Balance Support Right upper extremity supported;Left upper extremity supported  -AR      Recorded by [AR] Radha Tapia OT      Therapy Exercises    Bilateral Upper Extremity AROM:;10 reps;supine;elbow flexion/extension;hand pumps;shoulder extension/flexion  -AR      Recorded by [AR] Radha Tapia OT      Sensory Assessment/Intervention    Sensory Impairment --   light touch intact  -AR      Recorded by [AR] Radha Tapia OT      Positioning and Restraints    Pre-Treatment Position in bed  -AR      Post Treatment Position chair  -AR      In Chair notified nsg;reclined;call light within reach;encouraged to call for assist;exit alarm on;with family/caregiver;compression device;waffle cushion;on mechanical lift sling;RUE elevated;LUE elevated  -AR      Recorded by [AR] Radha Tapia OT        User Key  (r) = Recorded By, (t) = Taken By, (c) = Cosigned By    Initials Name Effective Dates    MARYELLEN Johnson, PT 06/19/15 -     AR Radha Tapia OT 06/22/15 -     ANNIKA Ayala, PTA 06/22/15 -     LS Ame Enrique, MS [facility]-SLP 06/22/15 -               IP SLP Goals       02/24/17 1002 02/21/17 1031 02/20/17 1153    Begin to Take Some PO Safely     Begin to Take Some PO Safely- SLP, Date Established  02/21/17  -SG 02/20/17  -LS    Begin to Take Some PO Safely- SLP, Time to Achieve  by discharge  -SG by discharge  -LS    Begin to Take Some PO Safely- SLP, Date Goal Reviewed  02/21/17  -SG     Begin to Take Some PO Safely- SLP, Outcome  goal ongoing  -SG goal ongoing  -LS    Receptive Language- Optimal Participation in Care    Receptive Language Optimal Participation in Care- SLP, Date Established 02/24/17  -LS 02/21/17  -SG     Receptive Language Optimal Participation in Care- SLP, Time to Achieve by discharge  -LS by discharge  -SG     Receptive Language Optimal Participation in Care- SLP, Date Goal Reviewed  02/21/17  -SG     Receptive Language Optimal Participation in Care- SLP, Outcome  goal ongoing  -SG     Expressive- Optimal Participation in Care    Expressive Optimal Participation in Care- SLP, Date Established  02/21/17  -SG     Expressive Optimal Participation in Care- SLP, Time to Achieve  by discharge  -SG     Expressive Optimal Participation in Care- SLP, Date Goal Reviewed  02/14/17  -SG     Expressive Optimal Participation in Care- SLP, Outcome  goal ongoing  -SG     Cognitive Linguistic- Optimal Participation in Care    Cognitive Linguistic Optimal Participation in Care- SLP, Date Established  02/21/17  -SG     Cognitive Linguistic Optimal Participation in Care- SLP, Time to Achieve  by discharge  -SG     Cognitive Linguistic Optimal Participation in Care- SLP, Date Goal Reviewed  02/21/17  -SG     Cognitive Linguistic Optimal Participation in Care- SLP, Outcome  goal ongoing  -SG     Dysarthria- Optimal Particpation in Care    Dysarthria Optimal Participation in Care- SLP, Date Established 02/24/17  -LS 02/21/17  -SG     Dysarthria Optimal Participation in Care- SLP, Time to Achieve  by discharge  -SG     Dysarthria Optimal Participation in Care- SLP, Date Goal Reviewed  02/21/17  -SG     Dysarthria Optimal Participation in Care- SLP, Outcome   goal ongoing  -       User Key  (r) = Recorded By, (t) = Taken By, (c) = Cosigned By    Initials Name Provider Type    URIAH Cliftonah Rosemary George MS CCC-SLP Speech and Language Pathologist    AMINA Enrique MS CCC-SLP Speech and Language Pathologist          EDUCATION  The patient has been educated in the following areas:   Cognitive Impairment Communication Impairment.    SLP Recommendation and Plan              Anticipated Discharge Disposition: rehab facility                Plan of Care Review  Plan Of Care Reviewed With: patient  Progress: progress toward functional goals as expected  Outcome Summary/Follow up Plan: Speech/language therapy complete. Pt d/c to [facility] today. Signinficantly improved in all areas. Very mild dysathria however, pt reports significant fatigue.  Functional at conversational level for ADLs, plans for discharge. REC: RE-eval at [facility] to ensure no high level deficits.           Time Calculation:         Time Calculation- SLP       02/24/17 1004          Time Calculation- SLP    SLP Start Time 0900  -      SLP Received On 02/24/17  -        User Key  (r) = Recorded By, (t) = Taken By, (c) = Cosigned By    Initials Name Provider Type    AMINA Enrique MS CCC-SLP Speech and Language Pathologist          Therapy Charges for Today     Code Description Service Date Service Provider Modifiers Qty    [id number] HC ST TREATMENT SPEECH 1 2/24/2017 Ame Enrique MS CCC-SLP GN 1               Ame Enrique MS CCC-JUDITH  2/24/2017

## 2017-02-26 NOTE — THERAPY DISCHARGE NOTE
Acute Care - Physical Therapy Discharge Summary  [facility]       Patient Name: Florinda Knapp  : 3/5/1931  MRN: [id number]    Today's Date: 2017  Onset of Illness/Injury or Date of Surgery Date: 17    Date of Referral to PT: 17  Referring Physician: ARIEL Stokes      Admit Date: 2017      PT Recommendation and Plan    Visit Dx:    ICD-10-CM ICD-9-CM   1. Acute CVA (cerebrovascular accident) I63.9 434.91   2. Left carotid artery occlusion I65.22 433.10   3. Impaired functional mobility, balance, gait, and endurance Z74.09 V49.89   4. Impaired mobility and ADLs Z74.09 799.89   5. CHF with cardiomyopathy I50.9 428.0    I42.9 425.4             Outcome Measures       17 1000 17 0700 17 1425    How much help from another person do you currently need...    Turning from your back to your side while in flat bed without using bedrails?   2  -CD    Moving from lying on back to sitting on the side of a flat bed without bedrails?   2  -CD    Moving to and from a bed to a chair (including a wheelchair)?   2  -CD    Standing up from a chair using your arms (e.g., wheelchair, bedside chair)?   2  -CD    Climbing 3-5 steps with a railing?   1  -CD    To walk in hospital room?   1  -CD    AM-PAC 6 Clicks Score   10  -CD    Modified Jessica Scale    Modified [place] Scale 4 - Moderately severe disability.  Unable to walk without assistance, and unable to attend to own bodily needs without assistance.  -ES 4 - Moderately severe disability.  Unable to walk without assistance, and unable to attend to own bodily needs without assistance.  -JR 4 - Moderately severe disability.  Unable to walk without assistance, and unable to attend to own bodily needs without assistance.  -CD    Functional Assessment    Outcome Measure Options   AM-PAC 6 Clicks Basic Mobility (PT);Modified [place]  -CD      User Key  (r) = Recorded By, (t) = Taken By, (c) = Cosigned By    Initials Name Provider Type    CD  Janina Johnson, PT Physical Therapist    JR Rosalba Martino, OT Occupational Therapist    TEODORA Rowland, PT Physical Therapist                      IP PT Goals       02/23/17 1531 02/22/17 0914 02/20/17 1042    Bed Mobility PT LTG    Bed Mobility PT LTG, Date Established   02/20/17  -DM    Bed Mobility PT LTG, Time to Achieve   1 wk  -DM    Bed Mobility PT LTG, Activity Type   all bed mobility  -DM    Bed Mobility PT LTG, [place] Level   2 person assist required;moderate assist (50% patient effort)  -DM    Bed Mobility PT LTG, Date Goal Reviewed  02/22/17  -AS     Bed Mobility PT LTG, Outcome goal ongoing  -CD goal ongoing  -AS     Transfer Training PT LTG    Transfer Training PT LTG, Date Established   02/20/17  -DM    Transfer Training PT LTG, Time to Achieve   1 wk  -DM    Transfer Training PT LTG, Activity Type   bed to chair /chair to bed;sit to stand/stand to sit  -DM    Transfer Training PT LTG, [place] Level   maximum assist (25% patient effort);2 person assist required  -DM    Transfer Training PT LTG, Assist Device   walker, rolling   WBAT L LE;stable VS  -DM    Transfer Training PT  LTG, Date Goal Reviewed  02/22/17  -AS     Transfer Training PT LTG, Outcome goal ongoing  -CD goal ongoing  -AS     Gait Training PT LTG    Gait Training Goal PT LTG, Date Established   02/20/17  -DM    Gait Training Goal PT LTG, Time to Achieve   1 wk  -DM    Gait Training Goal PT LTG, [place] Level   maximum assist (25% patient effort);2 person assist required  -DM    Gait Training Goal PT LTG, Assist Device   walker, rolling   WBAT L LE;stable VS  -DM    Gait Training Goal PT LTG, Distance to Achieve   25  -DM    Gait Training Goal PT LTG, Date Goal Reviewed  02/22/17  -AS     Gait Training Goal PT LTG, Outcome goal ongoing  -CD goal ongoing  -AS       User Key  (r) = Recorded By, (t) = Taken By, (c) = Cosigned By    Initials Name Provider Type    CD Janina Johnson, PT Physical Therapist    NAHUM HARPER  Jaxson, PT Physical Therapist    AS Tammy Ayala, PTA Physical Therapy Assistant            Goals Status: Treatment plan discontinued secondary to discharge from acute facility.    PT Discharge Summary  Reason for Discharge: Discharge from facility  Outcomes Achieved: Refer to plan of care for updates on goals achieved  Discharge Destination: Inpatient rehabilitation facility      Faith Rowland, PT   2/26/2017

## 2017-02-26 NOTE — THERAPY DISCHARGE NOTE
Acute Care - Occupational Therapy Discharge Summary  [facility]     Patient Name: Florinda Knapp  : 3/5/1931  MRN: [id number]    Today's Date: 2017  Onset of Illness/Injury or Date of Surgery Date: 17    Date of Referral to OT: 17  Referring Physician: ARIEL Stokes      Admit Date: 2017        OT Recommendation and Plan    Visit Dx:    ICD-10-CM ICD-9-CM   1. Acute CVA (cerebrovascular accident) I63.9 434.91   2. Left carotid artery occlusion I65.22 433.10   3. Impaired functional mobility, balance, gait, and endurance Z74.09 V49.89   4. Impaired mobility and ADLs Z74.09 799.89   5. CHF with cardiomyopathy I50.9 428.0    I42.9 425.4                     OT Goals       17 0926 17 1020       Transfer Training OT LTG    Transfer Training OT LTG, Date Established  17  -CL     Transfer Training OT LTG, Time to Achieve  by discharge  -CL     Transfer Training OT LTG, Activity Type  bed to chair /chair to bed;sit to stand/stand to sit;toilet  -CL     Transfer Training OT LTG, [place] Level  maximum assist (25% patient effort);2 person assist required  -CL     Transfer Training OT LTG, Additional Goal  AAD  -CL     Transfer Training OT LTG, Outcome goal partially met  -AR      Strength OT LTG    Strength Goal OT LTG, Date Established  17  -CL     Strength Goal OT LTG, Time to Achieve  by discharge  -CL     Strength Goal OT LTG, Measure to Achieve  Pt will tolerate BUE AROM/AAROM HEP x15 reps to increase strength/endurance to promote ADL performance.   -CL     Strength Goal OT LTG, Outcome goal ongoing  -AR      Dynamic Sitting Balance OT LTG    Dynamic Sitting Balance OT LTG, Date Established  17  -CL     Dynamic Sitting Balance OT LTG, Time to Achieve  by discharge  -CL     Dynamic Sitting Balance OT LTG, [place] Level  contact guard assist  -CL     Dynamic Sitting Balance OT LTG, Additional Goal  AAD   -CL     Dynamic Sitting Balance OT LTG, Outcome  goal ongoing  -AR      Static Standing Balance OT LTG    Static Standing Balance OT LTG, Date Established  02/20/17  -CL     Static Standing Balance OT LTG, Time to Achieve  by discharge  -CL     Static Standing Balance OT LTG, [place] Level  moderate assist (50% patient effort);2 person assist required  -CL     Static Standing Balance OT LTG, Assist Device  UE Support  -CL     Static Standing Balance OT LTG, Additional Goal  AAD  -CL     Static Standing Balance OT LTG, Outcome goal ongoing  -AR      Orientation OT LTG    Orientation OT LTG, Date Established  02/20/17  -CL     Orientation OT LTG, Time to Achieve  by discharge  -CL     Orientation OT LTG, Activity Type  person;place;time;100% treatment session;verbal cues  -CL     Orientation OT LTG, Outcome goal met  -AR        User Key  (r) = Recorded By, (t) = Taken By, (c) = Cosigned By    Initials Name Provider Type    AR Radha Tapia, OT Occupational Therapist    CL Elif Rossi, OT Occupational Therapist                Outcome Measures       02/26/17 0700 02/23/17 1425       How much help from another person do you currently need...    Turning from your back to your side while in flat bed without using bedrails?  2  -CD     Moving from lying on back to sitting on the side of a flat bed without bedrails?  2  -CD     Moving to and from a bed to a chair (including a wheelchair)?  2  -CD     Standing up from a chair using your arms (e.g., wheelchair, bedside chair)?  2  -CD     Climbing 3-5 steps with a railing?  1  -CD     To walk in hospital room?  1  -CD     AM-PAC 6 Clicks Score  10  -CD     Modified Jessica Scale    Modified [place] Scale 4 - Moderately severe disability.  Unable to walk without assistance, and unable to attend to own bodily needs without assistance.  -JR 4 - Moderately severe disability.  Unable to walk without assistance, and unable to attend to own bodily needs without assistance.  -CD     Functional Assessment    Outcome Measure  Options  AM-PAC 6 Clicks Basic Mobility (PT);Modified [place]  -CD       User Key  (r) = Recorded By, (t) = Taken By, (c) = Cosigned By    Initials Name Provider Type    CD Janina Johnson PT Physical Therapist    JR Rosalba Martino, OT Occupational Therapist              OT Discharge Summary  Reason for Discharge: Discharge from facility  Outcomes Achieved: Refer to plan of care for updates on goals achieved  Discharge Destination: SNF      Rosalba Martino, OT  2/26/2017

## 2017-03-27 ENCOUNTER — OFFICE VISIT (OUTPATIENT)
Dept: CARDIOLOGY | Facility: CLINIC | Age: 82
End: 2017-03-27

## 2017-03-27 ENCOUNTER — LAB (OUTPATIENT)
Dept: LAB | Facility: HOSPITAL | Age: 82
End: 2017-03-27

## 2017-03-27 VITALS
BODY MASS INDEX: 22.22 KG/M2 | HEART RATE: 79 BPM | WEIGHT: 110 LBS | SYSTOLIC BLOOD PRESSURE: 108 MMHG | DIASTOLIC BLOOD PRESSURE: 73 MMHG

## 2017-03-27 DIAGNOSIS — I63.512 ACUTE ISCHEMIC LEFT MCA STROKE (HCC): ICD-10-CM

## 2017-03-27 DIAGNOSIS — I10 ESSENTIAL HYPERTENSION: ICD-10-CM

## 2017-03-27 DIAGNOSIS — I10 ESSENTIAL HYPERTENSION: Primary | ICD-10-CM

## 2017-03-27 DIAGNOSIS — I50.9 CHF WITH CARDIOMYOPATHY (HCC): ICD-10-CM

## 2017-03-27 DIAGNOSIS — I48.91 ATRIAL FIBRILLATION, UNSPECIFIED TYPE (HCC): ICD-10-CM

## 2017-03-27 DIAGNOSIS — I42.9 CHF WITH CARDIOMYOPATHY (HCC): ICD-10-CM

## 2017-03-27 DIAGNOSIS — E78.5 HYPERLIPIDEMIA, UNSPECIFIED HYPERLIPIDEMIA TYPE: ICD-10-CM

## 2017-03-27 DIAGNOSIS — I49.9 IRREGULAR HEART BEATS: ICD-10-CM

## 2017-03-27 DIAGNOSIS — J96.01 ACUTE RESPIRATORY FAILURE WITH HYPOXIA (HCC): ICD-10-CM

## 2017-03-27 DIAGNOSIS — R30.0 DYSURIA: ICD-10-CM

## 2017-03-27 DIAGNOSIS — D64.9 SYMPTOMATIC ANEMIA: ICD-10-CM

## 2017-03-27 LAB
BASOPHILS # BLD AUTO: 0.01 10*3/MM3 (ref 0–0.2)
BASOPHILS NFR BLD AUTO: 0.2 % (ref 0–1)
DEPRECATED RDW RBC AUTO: 52.8 FL (ref 37–54)
EOSINOPHIL # BLD AUTO: 0.31 10*3/MM3 (ref 0.1–0.3)
EOSINOPHIL NFR BLD AUTO: 7.3 % (ref 0–3)
ERYTHROCYTE [DISTWIDTH] IN BLOOD BY AUTOMATED COUNT: 14.6 % (ref 11.3–14.5)
HCT VFR BLD AUTO: 31.9 % (ref 34.5–44)
HGB BLD-MCNC: 10 G/DL (ref 11.5–15.5)
IMM GRANULOCYTES # BLD: 0.01 10*3/MM3 (ref 0–0.03)
IMM GRANULOCYTES NFR BLD: 0.2 % (ref 0–0.6)
LYMPHOCYTES # BLD AUTO: 1.38 10*3/MM3 (ref 0.6–4.8)
LYMPHOCYTES NFR BLD AUTO: 32.3 % (ref 24–44)
MCH RBC QN AUTO: 31.2 PG (ref 27–31)
MCHC RBC AUTO-ENTMCNC: 31.3 G/DL (ref 32–36)
MCV RBC AUTO: 99.4 FL (ref 80–99)
MONOCYTES # BLD AUTO: 0.27 10*3/MM3 (ref 0–1)
MONOCYTES NFR BLD AUTO: 6.3 % (ref 0–12)
NEUTROPHILS # BLD AUTO: 2.29 10*3/MM3 (ref 1.5–8.3)
NEUTROPHILS NFR BLD AUTO: 53.7 % (ref 41–71)
PLATELET # BLD AUTO: 193 10*3/MM3 (ref 150–450)
PMV BLD AUTO: 9.5 FL (ref 6–12)
RBC # BLD AUTO: 3.21 10*6/MM3 (ref 3.89–5.14)
WBC NRBC COR # BLD: 4.27 10*3/MM3 (ref 3.5–10.8)

## 2017-03-27 PROCEDURE — 36415 COLL VENOUS BLD VENIPUNCTURE: CPT

## 2017-03-27 PROCEDURE — 85025 COMPLETE CBC W/AUTO DIFF WBC: CPT | Performed by: INTERNAL MEDICINE

## 2017-03-27 PROCEDURE — 99213 OFFICE O/P EST LOW 20 MIN: CPT | Performed by: INTERNAL MEDICINE

## 2017-03-27 RX ORDER — DIPHENHYDRAMINE HCL 25 MG
25 CAPSULE ORAL EVERY 6 HOURS PRN
COMMUNITY
End: 2017-07-12 | Stop reason: HOSPADM

## 2017-03-27 RX ORDER — ONDANSETRON 4 MG/1
4 TABLET, FILM COATED ORAL EVERY 8 HOURS PRN
COMMUNITY
End: 2017-08-16

## 2017-03-27 RX ORDER — BETHANECHOL CHLORIDE 10 MG/1
10 TABLET ORAL 3 TIMES DAILY
Status: ON HOLD | COMMUNITY
End: 2017-07-05

## 2017-03-27 RX ORDER — PHENAZOPYRIDINE HYDROCHLORIDE 100 MG/1
100 TABLET, FILM COATED ORAL 2 TIMES DAILY
Status: ON HOLD | COMMUNITY
End: 2017-07-05

## 2017-03-27 RX ORDER — DOCUSATE SODIUM 100 MG/1
100 CAPSULE, LIQUID FILLED ORAL 2 TIMES DAILY
COMMUNITY
End: 2017-07-12 | Stop reason: HOSPADM

## 2017-03-27 RX ORDER — POTASSIUM CHLORIDE 750 MG/1
10 TABLET, EXTENDED RELEASE ORAL DAILY
Status: ON HOLD | COMMUNITY
End: 2017-07-05

## 2017-03-27 RX ORDER — BISACODYL 10 MG
10 SUPPOSITORY, RECTAL RECTAL DAILY PRN
COMMUNITY
End: 2017-07-12 | Stop reason: HOSPADM

## 2017-03-27 NOTE — PROGRESS NOTES
Subjective:     Encounter Date:03/27/2017      Patient ID: Florinda Knapp is an 86 y.o.  white female, resident of [place], [place].    INTERNIST: Kieran Cottrell MD   NEUROLOGIST: Elisa Castro MD  UROLOGIST: Dino Flynn MD  ORTHOPEDIC SURGEON: Ray Packer MD  INTERVENTIONAL NEURORADIOLOGIST:  Francisco Javier Gabriel MD    Chief Complaint:   Chief Complaint   Patient presents with   • Follow-up   • Fatigue     Problem List:  1. CVA 2/19/17; Left MCA thrombus with no evidence of atrial fibrillation on tele or ECG  2. Possible Nonischemic cardiomyopathy    A. Echocardiogram 1/13/17; LVEF 0.45, LV wall segments contract abnormally, LV   diastolic dysfunction grade 1 consistent with impaired relaxation, RV borderline   dilated, mild to moderate AR, MR, trivial circumferential pericardial effusion, RVSP   less than 35 mmHg, aortic valve exhibits sclerosis    B. Echocardiogram 2/20/17; LVEF 0.45, LV wall segments contract abnormally,   mild AR, LV wall thickness consistent with mild concentric hypertrophy, small less   than 1 cm pericardial effusion adjacent to the left ventricle, saline test results are   positive and indicate an atrial level shunt    C. Residual class I symptoms  3. Hypertension, essential  4. Hyperlipidemia  5. Hypothyroidism/replacement therapy  6. Anxiety and depression  7. Parkinson's disease  8. Failure to thrive in adult  9. Osteoarthritis  10. Stress urinary incontinence  11. [facility] hospitalization for symptomatic normocytic anemia 1/12/17-1/19/17  12. History of recurrent UTI's  13. Severe anemia, February 2017  14. Surgical history:    A. Appendectomy    B. Back surgery    C. Elbow surgery    D. Knee surgery    E. Cataract extraction    F. Left Hip replacement 12/6/2016-[facility]    Allergies   Allergen Reactions   • Alendronate    • Aripiprazole    • Celecoxib    • Nitrofurantoin    • Procaine    • Sulfa Antibiotics          Current Outpatient Prescriptions:   •   acetaminophen (TYLENOL) 500 MG tablet, Take 1,000 mg by mouth 4 (Four) Times a Day As Needed for mild pain (1-3)., Disp: , Rfl:   •  aspirin 325 MG tablet, Take 1 tablet by mouth Daily., Disp: , Rfl:   •  atorvastatin (LIPITOR) 40 MG tablet, Take 1 tablet by mouth Every Night., Disp: , Rfl:   •  bethanechol (URECHOLINE) 10 MG tablet, Take 10 mg by mouth 3 (Three) Times a Day., Disp: , Rfl:   •  bisacodyl (DULCOLAX) 10 MG suppository, Insert 10 mg into the rectum Daily As Needed for Constipation., Disp: , Rfl:   •  carbidopa-levodopa (SINEMET)  MG per tablet, Increase dose to four times a day: on rising, noon, 3pm and 6pm, Disp: 120 tablet, Rfl: 4  •  carvedilol (COREG) 6.25 MG tablet, Take 1 tablet by mouth Every 12 (Twelve) Hours. (Patient taking differently: Take 3.125 mg by mouth Every 12 (Twelve) Hours.), Disp: , Rfl:   •  Cholecalciferol 2000 UNITS capsule, Take 1,000 Units by mouth Daily., Disp: , Rfl:   •  diphenhydrAMINE (BENADRYL) 25 mg capsule, Take 25 mg by mouth Every 6 (Six) Hours As Needed for Itching., Disp: , Rfl:   •  docusate sodium (COLACE) 100 MG capsule, Take 100 mg by mouth 2 (Two) Times a Day., Disp: , Rfl:   •  ferrous sulfate 325 (65 FE) MG tablet, Take 1 tablet by mouth 2 (Two) Times a Day With Meals., Disp: , Rfl:   •  IPRATROPIUM-ALBUTEROL IN, Inhale As Needed., Disp: , Rfl:   •  levothyroxine (SYNTHROID, LEVOTHROID) 75 MCG tablet, Take 75 mcg by mouth Daily., Disp: , Rfl:   •  mirtazapine (REMERON) 30 MG tablet, Take 30 mg by mouth Every Night., Disp: , Rfl:   •  Multiple Vitamins-Minerals (PRESERVISION AREDS 2 PO), Take  by mouth 2 (Two) Times a Day., Disp: , Rfl:   •  ondansetron (ZOFRAN) 4 MG tablet, Take 4 mg by mouth Every 8 (Eight) Hours As Needed for Nausea or Vomiting., Disp: , Rfl:   •  phenazopyridine (PYRIDIUM) 100 MG tablet, Take 100 mg by mouth 2 (Two) Times a Day., Disp: , Rfl:   •  polyethylene glycol (MIRALAX) packet, Take 17 g by mouth Daily As Needed., Disp: ,  "Rfl:   •  potassium chloride (K-DUR,KLOR-CON) 10 MEQ CR tablet, Take 10 mEq by mouth Daily., Disp: , Rfl:   •  saccharomyces boulardii (FLORASTOR) 250 MG capsule, Take 1 capsule by mouth 2 (Two) Times a Day., Disp: , Rfl:   •  sennosides-docusate sodium (SENOKOT-S) 8.6-50 MG tablet, Take 2 tablets by mouth Daily., Disp: , Rfl:   •  tamsulosin (FLOMAX) 0.4 MG capsule 24 hr capsule, Take 2 capsules by mouth Daily., Disp: 30 capsule, Rfl:   •  vitamin E 1000 UNIT capsule, Take 1,000 Units by mouth Daily., Disp: , Rfl:     History of Present Illness Patient presents for a 1-month hospital followup after a 6-day hospitalization for acute ischemic left MCA stroke in the setting of hypertension and hyperlipidemia.  She notes that her hip has done well since she returned home;  \"I haven't had any problems at all with my hip since they did it.\" She has been having trouble with UTIs.  Patient otherwise denies chest pain, shortness of breath, PND, edema, palpitations, syncope or presyncope at this time.  She appears to be making progress but is still at  [facility] and eventually will return for long-term care at [facility] in [place]. She is accompanied to the office today by her son and presents in a wheelchair.      Review of Systems   Constitution: Positive for weakness and malaise/fatigue.   Cardiovascular: Positive for leg swelling (ankle).   Hematologic/Lymphatic: Bruises/bleeds easily.   Gastrointestinal: Positive for bloating.   Genitourinary: Positive for bladder incontinence.      Obtained and otherwise negative except as outlined in problem list and HPI.    Procedures       Objective:       Vitals:    03/27/17 1014   BP: 108/73   BP Location: Right arm   Patient Position: Sitting   Pulse: 79   Weight: 110 lb (49.9 kg)     Body mass index is 22.22 kg/(m^2).   Last weight:  105 lbs. (02/24/2017)    Physical Exam   Constitutional: She appears well-developed and " well-nourished.   HENT:   Head: Normocephalic and atraumatic.   Mouth/Throat: Oropharynx is clear and moist.   Neck: Neck supple. No JVD present. Carotid bruit is not present. No thyromegaly present.   Cardiovascular: Normal rate and regular rhythm.  Exam reveals no gallop, no S3 and no friction rub.    Murmur heard.   Medium-pitched early systolic murmur is present with a grade of 1/6  at the upper right sternal border  Pulses:       Carotid pulses are 1+ on the right side, and 1+ on the left side.       Radial pulses are 1+ on the right side, and 1+ on the left side.        Femoral pulses are 1+ on the right side, and 1+ on the left side.       Popliteal pulses are 1+ on the right side, and 1+ on the left side.        Dorsalis pedis pulses are 1+ on the right side, and 1+ on the left side.        Posterior tibial pulses are 1+ on the right side, and 1+ on the left side.   Pulmonary/Chest: Effort normal and breath sounds normal.   Abdominal: Soft. She exhibits no mass. There is no hepatosplenomegaly. There is no tenderness.   Lymphadenopathy:     She has no cervical adenopathy.   Neurological: She is alert.   Skin: Skin is warm, dry and intact.       Lab Review:   Lab Results   Component Value Date    GLUCOSE 76 02/23/2017    BUN 8 (L) 02/23/2017    CREATININE 0.50 (L) 02/23/2017    EGFRIFNONA 117 02/23/2017    BCR 16.0 02/23/2017    CO2 27.0 02/23/2017    CALCIUM 8.1 (L) 02/23/2017    ALBUMIN 2.80 (L) 02/22/2017    LABIL2 1.0 (L) 02/22/2017    AST 12 02/22/2017    ALT 2 (L) 02/22/2017       Lab Results   Component Value Date    WBC 4.55 02/22/2017    HGB 8.4 (L) 02/23/2017    HCT 26.8 (L) 02/23/2017    MCV 99.3 (H) 02/22/2017     (L) 02/22/2017       Lab Results   Component Value Date    HGBA1C 4.80 02/20/2017       Lab Results   Component Value Date    TSH 5.881 (H) 02/20/2017       Lab Results   Component Value Date    CHOL 161 02/20/2017    CHOL 131 01/13/2017     Lab Results   Component Value Date     TRIG 110 02/20/2017    TRIG 94 01/13/2017     Lab Results   Component Value Date    HDL 48 02/20/2017    HDL 34 (L) 01/13/2017         Assessment:   Overall continued acceptable course with no interim cardiopulmonary complaints with acceptable functional status. We will defer additional diagnostic or therapeutic intervention from a cardiac perspective at this time other than to initiate ZIO/XT patch monitor and obtain a CBC to followup her recent severe anemia.  She is encouraged to follow up with Ryley Castro and Nery as scheduled in the next 1-2 months.       Diagnosis Plan   1. Essential hypertension  CBC & Differential    ZIO Patch   2. Hyperlipidemia, unspecified hyperlipidemia type  CBC & Differential    ZIO Patch   3. Irregular heart beats  CBC & Differential    ZIO Patch   4. Atrial fibrillation, unspecified type  CBC & Differential    ZIO Patch   5. CHF with cardiomyopathy     6. Acute ischemic left MCA stroke     7. Acute respiratory failure with hypoxia     8. Dysuria     9. Symptomatic anemia            Plan:         1. Patient to continue current medications and close follow up with the above providers other than to discontinue her vitamin E because of the risk of exacerbation of congestive heart failure, as well as thromboembolic events.  2. The following studies will be ordered:  A. CBC  B. ZIO/XT patch  3. Tentative cardiology follow up in July 2017, or patient may return sooner PRN.      Transcribed by Linda Mehta for Dr. Mak Chou at 11:07 AM on 03/27/2017      IMak MD, [facility], personally performed the services described in this documentation as scribed by the above named individual in my presence, and it is both accurate and complete. At 10:31 AM on 03/27/2017

## 2017-04-03 ENCOUNTER — OFFICE VISIT (OUTPATIENT)
Dept: NEUROSURGERY | Facility: CLINIC | Age: 82
End: 2017-04-03

## 2017-04-03 VITALS
HEIGHT: 59 IN | DIASTOLIC BLOOD PRESSURE: 78 MMHG | WEIGHT: 110 LBS | SYSTOLIC BLOOD PRESSURE: 120 MMHG | BODY MASS INDEX: 22.18 KG/M2 | TEMPERATURE: 98.1 F

## 2017-04-03 DIAGNOSIS — D64.9 ANEMIA, UNSPECIFIED TYPE: Primary | ICD-10-CM

## 2017-04-03 PROCEDURE — 99213 OFFICE O/P EST LOW 20 MIN: CPT | Performed by: RADIOLOGY

## 2017-04-03 RX ORDER — AMOXICILLIN AND CLAVULANATE POTASSIUM 875; 125 MG/1; MG/1
1 TABLET, FILM COATED ORAL 2 TIMES DAILY
COMMUNITY
End: 2017-07-04

## 2017-04-03 NOTE — PROGRESS NOTES
"NAME: VAIBHAV RUTHERFORD   DOS: 4/3/2017  : 3/5/1931  PCP: Kieran Cottrell MD    Chief Complaint:    Chief Complaint   Patient presents with   • Stroke     s/p left MCA thromboembolism       History of Present Illness:  85 y.o. female who was recovering from a recent \"hip replacement\" at an inpatient rehabilitation/nursing home, when she developed acute onset of aphasia and right-sided weakness (witnessed event) this morning. She was ambulating at the nursing home/rehabilitation yesterday, with anticipation of returning \"home\". She presented to [facility] with symptoms of a severe stroke/large vessel occlusion (NIH stroke score 26). She presented within appropriate time frame, and was administered IV tPA, per protocol. She had a CT perfusion scan which demonstrates evidence of a left MCA occlusion with relatively little \"core\" infarct, and a large area of ischemic penumbra. During planned thrombectomy, she was found to have essentially complete lysis of thrombus within the left middle cerebral artery with restoration of near normal (TICI 2B) flow to the left cerebral hemisphere with tPA administration. As such, no thrombectomy was performed (diagnostic angiography only).    Overall, she made an excellent recovery in the hospital, in particular given the severity of her presenting stroke symptoms.  MRI examination demonstrated only relatively small area of infarction, with the vast majority of ischemic \"penumbra\" identified on the presenting CT perfusion scan being \"salvaged\" with IV TPA therapy. There are age-appropriate chronic small vessel ischemic changes, without convincing evidence of prior thromboembolic event.       Her carotid vasculature is \"normal\" making a high suspicion of a cardiac thromboembolic source. Her transthoracic echocardiogram demonstrated evidence of \"shunting\", but I had spoken with Dr. Chou and this represents a small PFO (possibly incidental). She was recently admitted " "(a month or so ago) to the hospital for \"anemia\" of uncertain etiology, and did require 2 units of blood transfusion. As such, we were reluctant to initiate anticoagulation therapy, without a definitive \"source\", and she was discharged on only aspirin antiplatelet regimen.    She has done quite well since her hospitalization, and presents today for routine follow-up.  She's had no new or recurrent strokelike symptoms.  She is still in inpatient rehabilitation, but is planning to return to her \"assisted living\" facility in the near future.     Past Medical History:  Past Medical History:   Diagnosis Date   • Anemia    • Anxiety    • Asthma    • Cerebral infarction    • Depression    • Failure to thrive in adult    • Hyperlipidemia    • Hyperlipidemia    • Hypertension    • Hypothyroidism    • Osteoarthritis    • Osteoporosis    • Parkinson's disease    • Stress incontinence    • Stroke 02/19/2017    left MCA thromboembolism   • Urinary retention        Past Surgical History:  Past Surgical History:   Procedure Laterality Date   • APPENDECTOMY     • BACK SURGERY     • CATARACT EXTRACTION     • ELBOW PROCEDURE     • KNEE SURGERY     • NC PRIM PRQ TRLUML MCHNL THRMBC N-COR N-ICRA 1ST Left 2/19/2017    Left MCA thrombectomy   • TOTAL HIP ARTHROPLASTY Left 12/2016       Review of Systems:        Review of Systems   Constitutional: Positive for activity change and fatigue. Negative for appetite change, chills, diaphoresis, fever and unexpected weight change.   HENT: Positive for voice change. Negative for congestion, dental problem, drooling, ear discharge, ear pain, facial swelling, hearing loss, mouth sores, nosebleeds, postnasal drip, rhinorrhea, sinus pressure, sneezing, sore throat, tinnitus and trouble swallowing.    Eyes: Negative for photophobia, pain, discharge, redness, itching and visual disturbance.   Respiratory: Negative for apnea, cough, choking, chest tightness, shortness of breath, wheezing and stridor.  "   Cardiovascular: Positive for leg swelling. Negative for chest pain and palpitations.   Gastrointestinal: Negative for abdominal distention, abdominal pain, anal bleeding, blood in stool, constipation, diarrhea, nausea, rectal pain and vomiting.   Musculoskeletal: Negative for arthralgias, back pain, gait problem, joint swelling, myalgias, neck pain and neck stiffness.   Skin: Negative for color change, pallor, rash and wound.   Allergic/Immunologic: Negative for environmental allergies, food allergies and immunocompromised state.   Neurological: Negative for dizziness, tremors, seizures, syncope, facial asymmetry, speech difficulty, weakness, light-headedness, numbness and headaches.   Hematological: Negative for adenopathy. Does not bruise/bleed easily.   Psychiatric/Behavioral: Positive for confusion. Negative for agitation, behavioral problems, decreased concentration, dysphoric mood, self-injury, sleep disturbance and suicidal ideas. The patient is nervous/anxious. The patient is not hyperactive.         Medications    Current Outpatient Prescriptions:   •  amoxicillin-clavulanate (AUGMENTIN) 875-125 MG per tablet, Take 1 tablet by mouth 2 (Two) Times a Day., Disp: , Rfl:   •  Tuberculin PPD (APLISOL ID), Inject  into the skin., Disp: , Rfl:   •  acetaminophen (TYLENOL) 500 MG tablet, Take 1,000 mg by mouth 4 (Four) Times a Day As Needed for mild pain (1-3)., Disp: , Rfl:   •  aspirin 325 MG tablet, Take 1 tablet by mouth Daily., Disp: , Rfl:   •  atorvastatin (LIPITOR) 40 MG tablet, Take 1 tablet by mouth Every Night., Disp: , Rfl:   •  bethanechol (URECHOLINE) 10 MG tablet, Take 10 mg by mouth 3 (Three) Times a Day., Disp: , Rfl:   •  bisacodyl (DULCOLAX) 10 MG suppository, Insert 10 mg into the rectum Daily As Needed for Constipation., Disp: , Rfl:   •  carbidopa-levodopa (SINEMET)  MG per tablet, Increase dose to four times a day: on rising, noon, 3pm and 6pm, Disp: 120 tablet, Rfl: 4  •  carvedilol  (COREG) 6.25 MG tablet, Take 1 tablet by mouth Every 12 (Twelve) Hours. (Patient taking differently: Take 3.125 mg by mouth Every 12 (Twelve) Hours.), Disp: , Rfl:   •  Cholecalciferol 2000 UNITS capsule, Take 1,000 Units by mouth Daily., Disp: , Rfl:   •  diphenhydrAMINE (BENADRYL) 25 mg capsule, Take 25 mg by mouth Every 6 (Six) Hours As Needed for Itching., Disp: , Rfl:   •  docusate sodium (COLACE) 100 MG capsule, Take 100 mg by mouth 2 (Two) Times a Day., Disp: , Rfl:   •  ferrous sulfate 325 (65 FE) MG tablet, Take 1 tablet by mouth 2 (Two) Times a Day With Meals., Disp: , Rfl:   •  IPRATROPIUM-ALBUTEROL IN, Inhale As Needed., Disp: , Rfl:   •  levothyroxine (SYNTHROID, LEVOTHROID) 75 MCG tablet, Take 75 mcg by mouth Daily., Disp: , Rfl:   •  mirtazapine (REMERON) 30 MG tablet, Take 30 mg by mouth Every Night., Disp: , Rfl:   •  Multiple Vitamins-Minerals (PRESERVISION AREDS 2 PO), Take  by mouth 2 (Two) Times a Day., Disp: , Rfl:   •  ondansetron (ZOFRAN) 4 MG tablet, Take 4 mg by mouth Every 8 (Eight) Hours As Needed for Nausea or Vomiting., Disp: , Rfl:   •  phenazopyridine (PYRIDIUM) 100 MG tablet, Take 100 mg by mouth 2 (Two) Times a Day., Disp: , Rfl:   •  polyethylene glycol (MIRALAX) packet, Take 17 g by mouth Daily As Needed., Disp: , Rfl:   •  potassium chloride (K-DUR,KLOR-CON) 10 MEQ CR tablet, Take 10 mEq by mouth Daily., Disp: , Rfl:   •  saccharomyces boulardii (FLORASTOR) 250 MG capsule, Take 1 capsule by mouth 2 (Two) Times a Day., Disp: , Rfl:   •  sennosides-docusate sodium (SENOKOT-S) 8.6-50 MG tablet, Take 2 tablets by mouth Daily., Disp: , Rfl:   •  tamsulosin (FLOMAX) 0.4 MG capsule 24 hr capsule, Take 2 capsules by mouth Daily., Disp: 30 capsule, Rfl:     Allergies:  Allergies   Allergen Reactions   • Alendronate    • Aripiprazole    • Celecoxib    • Levofloxacin    • Nitrofurantoin    • Procaine    • Sulfa Antibiotics        Social Hx:  Social History   Substance Use Topics   • Smoking  "status: Never Smoker   • Smokeless tobacco: Never Used   • Alcohol use Yes      Comment: social       Family Hx:  Family History   Problem Relation Age of Onset   • Alcohol abuse Other    • Hypertension Other    • Stroke Other    • Heart disease Son        Review of Imaging:  No new imaging.  Catheter angiogram from 2/19/2017 a [facility] again demonstrates progressive lysis of left MCA thrombus with IV TPA therapy, and thus no mechanical thrombectomy was deemed warranted.      Physical Examination:  Vitals:    04/03/17 1323   BP: 120/78   Temp: 98.1 °F (36.7 °C)        General Appearance:   Well developed, well nourished, well groomed, alert, and cooperative.  Cardiovascular: Regular rate and rhythm. No carotid bruits      Neurological examination:  Neurologic Exam     Mental Status   Oriented to person, place, and time.   Speech: speech is normal   Level of consciousness: alert    Cranial Nerves   Cranial nerves II through XII intact.     Motor Exam        Symmetric, 4/5 strength in the bilateral upper and lower extremities.     Sensory Exam   Light touch normal.     Gait, Coordination, and Reflexes        She ambulates with assistance of a walker, but uses a wheelchair for longer distances.  Please note that a significant portion of this was present as \"baseline\" deficits related to prior hip surgery.       Diagnoses/Plan:    Ms. Knapp is a 86 y.o. female status post IV TPA therapy for left MCA thromboembolism (M1 occlusion).  During planned mechanical thrombectomy, she was shown to have significant/progressive lysis of the thrombus, and thus no neuro intervention was performed.  She has done exceedingly well following her hospitalization (given the severity of her presenting stroke symptoms), and her speech is essentially back to normal and she has symmetric strength in the right upper and lower extremity.  Her 30 day mRS would be a 2, as she ambulates with assistance of a walker (but a " "significant portion of this was \"baseline\" deficit).    She has recently seen Dr. Chou, and he had a Zio patch placed to assess for suspected paroxysmal atrial fibrillation.  Her hemoglobin and hematocrit have increased since her hospitalization, and I've ordered a follow-up CBC prior to her follow-up visit with Dr. Chou in the next couple of months.  If she does show significant episodes/runs of atrial fibrillation, then anticoagulation would likely be indicated (barring significant anemia).    I do not anticipate her requiring any further neuro intervention, but I would be happy to see her back at any point should she develop any recurrent stroke/stroke-like symptoms.                  "

## 2017-04-19 ENCOUNTER — OFFICE VISIT (OUTPATIENT)
Dept: CARDIOLOGY | Facility: CLINIC | Age: 82
End: 2017-04-19

## 2017-04-19 DIAGNOSIS — I48.91 ATRIAL FIBRILLATION, UNSPECIFIED TYPE (HCC): ICD-10-CM

## 2017-04-19 PROCEDURE — 0298T PR EXT ECG > 48HR TO 21 DAY REVIEW AND INTERPRETATN: CPT | Performed by: INTERNAL MEDICINE

## 2017-05-10 ENCOUNTER — TELEPHONE (OUTPATIENT)
Dept: CARDIOLOGY | Facility: CLINIC | Age: 82
End: 2017-05-10

## 2017-05-11 ENCOUNTER — TELEPHONE (OUTPATIENT)
Dept: NEUROLOGY | Facility: CLINIC | Age: 82
End: 2017-05-11

## 2017-05-11 ENCOUNTER — OFFICE VISIT (OUTPATIENT)
Dept: NEUROLOGY | Facility: CLINIC | Age: 82
End: 2017-05-11

## 2017-05-11 VITALS
WEIGHT: 110 LBS | HEIGHT: 59 IN | DIASTOLIC BLOOD PRESSURE: 60 MMHG | SYSTOLIC BLOOD PRESSURE: 122 MMHG | BODY MASS INDEX: 22.18 KG/M2

## 2017-05-11 DIAGNOSIS — G20 PARKINSON'S DISEASE (HCC): ICD-10-CM

## 2017-05-11 DIAGNOSIS — Z86.73 HISTORY OF STROKE: Primary | ICD-10-CM

## 2017-05-11 PROCEDURE — 99215 OFFICE O/P EST HI 40 MIN: CPT | Performed by: PHYSICIAN ASSISTANT

## 2017-05-11 RX ORDER — CARBIDOPA AND LEVODOPA 50; 200 MG/1; MG/1
1 TABLET, EXTENDED RELEASE ORAL NIGHTLY
Qty: 30 TABLET | Refills: 11 | Status: ON HOLD
Start: 2017-05-11 | End: 2017-07-05

## 2017-06-19 ENCOUNTER — APPOINTMENT (OUTPATIENT)
Dept: CT IMAGING | Facility: HOSPITAL | Age: 82
End: 2017-06-19

## 2017-06-19 ENCOUNTER — HOSPITAL ENCOUNTER (EMERGENCY)
Facility: HOSPITAL | Age: 82
Discharge: HOME | End: 2017-06-19
Attending: EMERGENCY MEDICINE | Admitting: EMERGENCY MEDICINE

## 2017-06-19 VITALS
HEIGHT: 59 IN | DIASTOLIC BLOOD PRESSURE: 86 MMHG | RESPIRATION RATE: 16 BRPM | TEMPERATURE: 99.5 F | OXYGEN SATURATION: 90 % | HEART RATE: 85 BPM | SYSTOLIC BLOOD PRESSURE: 135 MMHG | WEIGHT: 110 LBS | BODY MASS INDEX: 22.18 KG/M2

## 2017-06-19 DIAGNOSIS — A09 INFECTIOUS COLITIS: Primary | ICD-10-CM

## 2017-06-19 LAB
BACTERIA UR QL AUTO: ABNORMAL /HPF
BASOPHILS # BLD AUTO: 0.01 10*3/MM3 (ref 0–0.2)
BASOPHILS NFR BLD AUTO: 0.1 % (ref 0–1)
BILIRUB UR QL STRIP: NEGATIVE
BUN BLDA-MCNC: 11 MG/DL (ref 8–26)
CA-I BLDA-SCNC: 1.15 MMOL/L (ref 1.2–1.32)
CHLORIDE BLDA-SCNC: 97 MMOL/L (ref 98–109)
CLARITY UR: ABNORMAL
CO2 BLDA-SCNC: 23 MMOL/L (ref 24–29)
COLOR UR: YELLOW
CREAT BLDA-MCNC: 0.8 MG/DL (ref 0.6–1.3)
DEPRECATED RDW RBC AUTO: 51.5 FL (ref 37–54)
EOSINOPHIL # BLD AUTO: 0 10*3/MM3 (ref 0.1–0.3)
EOSINOPHIL NFR BLD AUTO: 0 % (ref 0–3)
ERYTHROCYTE [DISTWIDTH] IN BLOOD BY AUTOMATED COUNT: 14.3 % (ref 11.3–14.5)
GLUCOSE BLDC GLUCOMTR-MCNC: 87 MG/DL (ref 70–130)
GLUCOSE UR STRIP-MCNC: NEGATIVE MG/DL
HCT VFR BLD AUTO: 35 % (ref 34.5–44)
HCT VFR BLDA CALC: 31 % (ref 38–51)
HGB BLD-MCNC: 11.1 G/DL (ref 11.5–15.5)
HGB BLDA-MCNC: 10.5 G/DL (ref 12–17)
HGB UR QL STRIP.AUTO: NEGATIVE
HYALINE CASTS UR QL AUTO: ABNORMAL /LPF
IMM GRANULOCYTES # BLD: 0.04 10*3/MM3 (ref 0–0.03)
IMM GRANULOCYTES NFR BLD: 0.4 % (ref 0–0.6)
KETONES UR QL STRIP: ABNORMAL
LEUKOCYTE ESTERASE UR QL STRIP.AUTO: ABNORMAL
LYMPHOCYTES # BLD AUTO: 1.24 10*3/MM3 (ref 0.6–4.8)
LYMPHOCYTES NFR BLD AUTO: 11 % (ref 24–44)
MCH RBC QN AUTO: 30.7 PG (ref 27–31)
MCHC RBC AUTO-ENTMCNC: 31.7 G/DL (ref 32–36)
MCV RBC AUTO: 96.7 FL (ref 80–99)
MONOCYTES # BLD AUTO: 0.93 10*3/MM3 (ref 0–1)
MONOCYTES NFR BLD AUTO: 8.2 % (ref 0–12)
NEUTROPHILS # BLD AUTO: 9.08 10*3/MM3 (ref 1.5–8.3)
NEUTROPHILS NFR BLD AUTO: 80.3 % (ref 41–71)
NITRITE UR QL STRIP: NEGATIVE
PH UR STRIP.AUTO: 6.5 [PH] (ref 5–8)
PLATELET # BLD AUTO: 158 10*3/MM3 (ref 150–450)
PMV BLD AUTO: 10.6 FL (ref 6–12)
POTASSIUM BLDA-SCNC: 3.4 MMOL/L (ref 3.5–4.9)
PROT UR QL STRIP: ABNORMAL
RBC # BLD AUTO: 3.62 10*6/MM3 (ref 3.89–5.14)
RBC # UR: ABNORMAL /HPF
REF LAB TEST METHOD: ABNORMAL
SODIUM BLDA-SCNC: 134 MMOL/L (ref 138–146)
SP GR UR STRIP: 1.02 (ref 1–1.03)
SQUAMOUS #/AREA URNS HPF: ABNORMAL /HPF
UROBILINOGEN UR QL STRIP: ABNORMAL
WBC NRBC COR # BLD: 11.3 10*3/MM3 (ref 3.5–10.8)
WBC UR QL AUTO: ABNORMAL /HPF

## 2017-06-19 PROCEDURE — 99284 EMERGENCY DEPT VISIT MOD MDM: CPT

## 2017-06-19 PROCEDURE — 81001 URINALYSIS AUTO W/SCOPE: CPT | Performed by: EMERGENCY MEDICINE

## 2017-06-19 PROCEDURE — 85025 COMPLETE CBC W/AUTO DIFF WBC: CPT | Performed by: EMERGENCY MEDICINE

## 2017-06-19 PROCEDURE — P9612 CATHETERIZE FOR URINE SPEC: HCPCS

## 2017-06-19 PROCEDURE — 87086 URINE CULTURE/COLONY COUNT: CPT | Performed by: EMERGENCY MEDICINE

## 2017-06-19 PROCEDURE — 85014 HEMATOCRIT: CPT

## 2017-06-19 PROCEDURE — 80047 BASIC METABLC PNL IONIZED CA: CPT

## 2017-06-19 PROCEDURE — 74176 CT ABD & PELVIS W/O CONTRAST: CPT

## 2017-06-19 RX ORDER — METRONIDAZOLE 500 MG/1
500 TABLET ORAL ONCE
Status: COMPLETED | OUTPATIENT
Start: 2017-06-19 | End: 2017-06-19

## 2017-06-19 RX ORDER — CIPROFLOXACIN 250 MG/1
500 TABLET, FILM COATED ORAL ONCE
Status: COMPLETED | OUTPATIENT
Start: 2017-06-19 | End: 2017-06-19

## 2017-06-19 RX ORDER — METRONIDAZOLE 500 MG/1
500 TABLET ORAL 2 TIMES DAILY
Qty: 14 TABLET | Refills: 0 | Status: SHIPPED | OUTPATIENT
Start: 2017-06-19 | End: 2017-07-04

## 2017-06-19 RX ADMIN — METRONIDAZOLE 500 MG: 500 TABLET ORAL at 21:57

## 2017-06-19 RX ADMIN — CIPROFLOXACIN HYDROCHLORIDE 500 MG: 250 TABLET, FILM COATED ORAL at 21:56

## 2017-06-19 NOTE — ED PROVIDER NOTES
Subjective   HPI Comments: Florinda Knapp is a 86 y.o.female who presents to the ED with c/o difficulty voiding urine and abdominal pain with onset one week ago. The patient reports she has been experiencing constant and worsening lower abdominal pain and distension with associated difficulty voiding urine. She had a similar previous experience one month ago. She was cathed at that time and approximately 500-600 cc was collected. She was seen by her PCP Dr. Cottrell today regarding her complaints. She was referred to the ED for a bladder scan, cath, and possible antibiotics. She denies any other complaints at this time. She reports history of recurrent UTIs as well.     Patient is a 86 y.o. female presenting with abdominal pain.   History provided by:  Patient  Abdominal Pain   Pain location:  LLQ, RLQ and suprapubic  Pain quality: aching    Pain radiates to:  Does not radiate  Pain severity:  Moderate  Onset quality:  Sudden  Duration:  1 week  Timing:  Constant  Progression:  Worsening  Chronicity:  New  Context comment:  Similar episode one month ago  Relieved by:  None tried  Worsened by:  Nothing  Ineffective treatments:  None tried  Associated symptoms: no chest pain, no chills, no cough, no diarrhea, no fever, no nausea, no shortness of breath, no sore throat and no vomiting    Associated symptoms comment:  Inability to void urine.  Risk factors: being elderly        Review of Systems   Constitutional: Negative for chills and fever.   HENT: Negative for congestion, rhinorrhea and sore throat.    Respiratory: Negative for cough and shortness of breath.    Cardiovascular: Negative for chest pain.   Gastrointestinal: Positive for abdominal distention and abdominal pain. Negative for diarrhea, nausea and vomiting.   Genitourinary: Positive for difficulty urinating (Not voiding urine).   Musculoskeletal: Negative for back pain and neck pain.   Neurological: Negative for dizziness, weakness, light-headedness and  headaches.   All other systems reviewed and are negative.      Past Medical History:   Diagnosis Date   • Anemia    • Anxiety    • Asthma    • Cerebral infarction    • Depression    • Failure to thrive in adult    • Hyperlipidemia    • Hyperlipidemia    • Hypertension    • Hypothyroidism    • Osteoarthritis    • Osteoporosis    • Parkinson's disease    • Stress incontinence    • Stroke 02/19/2017    left MCA thromboembolism   • Urinary retention        Allergies   Allergen Reactions   • Abilify [Aripiprazole]    • Alendronate    • Celecoxib    • Fosamax Plus D [Alendronate-Cholecalciferol]    • Levaquin [Levofloxacin]    • Macrobid [Nitrofurantoin Monohyd Macro]    • Nitrofurantoin    • Procaine    • Sulfa Antibiotics        Past Surgical History:   Procedure Laterality Date   • APPENDECTOMY     • BACK SURGERY     • CATARACT EXTRACTION     • ELBOW PROCEDURE     • KNEE SURGERY     • UT PRIM PRQ TRLUML MCHNL THRMBC N-COR N-ICRA 1ST Left 2/19/2017    Diagnostic Angiogram only, no thrombectomy   • TOTAL HIP ARTHROPLASTY Left 12/2016       Family History   Problem Relation Age of Onset   • Alcohol abuse Other    • Hypertension Other    • Stroke Other    • Heart disease Son        Social History     Social History   • Marital status:      Spouse name: N/A   • Number of children: N/A   • Years of education: N/A     Social History Main Topics   • Smoking status: Never Smoker   • Smokeless tobacco: Never Used   • Alcohol use Yes      Comment: social   • Drug use: No   • Sexual activity: Defer     Other Topics Concern   • None     Social History Narrative         Objective   Physical Exam   Constitutional: She is oriented to person, place, and time. She appears well-developed and well-nourished. No distress.   HENT:   Head: Normocephalic and atraumatic.   Eyes: Conjunctivae are normal. No scleral icterus.   Neck: Normal range of motion. Neck supple.   Cardiovascular: Normal rate, regular rhythm and normal heart sounds.     Pulmonary/Chest: Effort normal and breath sounds normal. No respiratory distress.   Abdominal: Soft. Bowel sounds are normal. She exhibits distension (mild). She exhibits no mass. There is tenderness (lower abdominal tenderness). There is no rebound and no guarding.   Musculoskeletal: Normal range of motion. She exhibits no edema.   Neurological: She is alert and oriented to person, place, and time.   Skin: Skin is warm and dry.   Psychiatric: She has a normal mood and affect. Her behavior is normal.   Nursing note and vitals reviewed.      Procedures         ED Course  ED Course     Cath urine did not produce much urine.  UA not very impressive for infection.  Pt not feeling much relief as one would expect had an obstructed bladder been drained, and her exam is unchanged.  Discussed with pt and son that alternative dx needs to be considered.  CT ordered and shows inflammation of the descending colon.  Discussed findings with pt and son.  Recommend admission for long segment colitis in an 86 yr old woman.  They both declined, prefer to go home.  They have Cipro rx from PCP already, will add Flagyl for full coverage.  Discussed reasons to return to the ED and importance of close followup.                MDM  Number of Diagnoses or Management Options  Infectious colitis:      Amount and/or Complexity of Data Reviewed  Clinical lab tests: ordered and reviewed  Tests in the radiology section of CPT®: ordered and reviewed  Decide to obtain previous medical records or to obtain history from someone other than the patient: yes  Obtain history from someone other than the patient: yes  Independent visualization of images, tracings, or specimens: yes        Final diagnoses:   Infectious colitis       Documentation assistance provided by víctor Holley.  Information recorded by the víctor was done at my direction and has been verified and validated by me.     Marielos Holley  06/19/17 1821       Moshe Pena  MD Singh  06/20/17 [id number]

## 2017-06-21 LAB — BACTERIA SPEC AEROBE CULT: NORMAL

## 2017-07-04 ENCOUNTER — APPOINTMENT (OUTPATIENT)
Dept: CT IMAGING | Facility: HOSPITAL | Age: 82
End: 2017-07-04

## 2017-07-04 ENCOUNTER — APPOINTMENT (OUTPATIENT)
Dept: GENERAL RADIOLOGY | Facility: HOSPITAL | Age: 82
End: 2017-07-04

## 2017-07-04 ENCOUNTER — HOSPITAL ENCOUNTER (INPATIENT)
Facility: HOSPITAL | Age: 82
LOS: 8 days | Discharge: NURSING, REHAB OR LONG-TERM CARE | End: 2017-07-12
Attending: EMERGENCY MEDICINE | Admitting: FAMILY MEDICINE

## 2017-07-04 DIAGNOSIS — Z78.9 IMPAIRED MOBILITY AND ADLS: ICD-10-CM

## 2017-07-04 DIAGNOSIS — N30.00 ACUTE CYSTITIS WITHOUT HEMATURIA: ICD-10-CM

## 2017-07-04 DIAGNOSIS — Z74.09 IMPAIRED MOBILITY AND ADLS: ICD-10-CM

## 2017-07-04 DIAGNOSIS — R62.7 FAILURE TO THRIVE SYNDROME, ADULT: ICD-10-CM

## 2017-07-04 DIAGNOSIS — D72.829 LEUKOCYTOSIS, UNSPECIFIED TYPE: ICD-10-CM

## 2017-07-04 DIAGNOSIS — Z74.09 IMPAIRED FUNCTIONAL MOBILITY AND ACTIVITY TOLERANCE: ICD-10-CM

## 2017-07-04 DIAGNOSIS — R10.84 ABDOMINAL PAIN, ACUTE, GENERALIZED: Primary | ICD-10-CM

## 2017-07-04 PROBLEM — Z86.73 HISTORY OF ISCHEMIC LEFT MCA STROKE: Status: ACTIVE | Noted: 2017-07-04

## 2017-07-04 PROBLEM — N28.1 RENAL CYST, LEFT: Status: ACTIVE | Noted: 2017-07-04

## 2017-07-04 PROBLEM — R53.81 PHYSICAL DECONDITIONING: Status: ACTIVE | Noted: 2017-07-04

## 2017-07-04 PROBLEM — J34.89 RHINORRHEA: Status: ACTIVE | Noted: 2017-07-04

## 2017-07-04 LAB
ALBUMIN SERPL-MCNC: 3.9 G/DL (ref 3.2–4.8)
ALBUMIN/GLOB SERPL: 1.1 G/DL (ref 1.5–2.5)
ALP SERPL-CCNC: 82 U/L (ref 25–100)
ALT SERPL W P-5'-P-CCNC: 1 U/L (ref 7–40)
ANION GAP SERPL CALCULATED.3IONS-SCNC: 6 MMOL/L (ref 3–11)
AST SERPL-CCNC: 13 U/L (ref 0–33)
BACTERIA UR QL AUTO: ABNORMAL /HPF
BASOPHILS # BLD AUTO: 0.02 10*3/MM3 (ref 0–0.2)
BASOPHILS NFR BLD AUTO: 0.1 % (ref 0–1)
BILIRUB SERPL-MCNC: 0.7 MG/DL (ref 0.3–1.2)
BILIRUB UR QL STRIP: NEGATIVE
BUN BLD-MCNC: 13 MG/DL (ref 9–23)
BUN/CREAT SERPL: 18.6 (ref 7–25)
CALCIUM SPEC-SCNC: 9.6 MG/DL (ref 8.7–10.4)
CHLORIDE SERPL-SCNC: 99 MMOL/L (ref 99–109)
CLARITY UR: ABNORMAL
CO2 SERPL-SCNC: 27 MMOL/L (ref 20–31)
COLOR UR: YELLOW
CREAT BLD-MCNC: 0.7 MG/DL (ref 0.6–1.3)
CRP SERPL-MCNC: 8.37 MG/DL (ref 0–1)
D-LACTATE SERPL-SCNC: 0.9 MMOL/L (ref 0.5–2)
D-LACTATE SERPL-SCNC: 1.5 MMOL/L (ref 0.5–2)
DEPRECATED RDW RBC AUTO: 49.8 FL (ref 37–54)
DEVELOPER EXPIRATION DATE: 0
DEVELOPER LOT NUMBER: 0
EOSINOPHIL # BLD AUTO: 0.02 10*3/MM3 (ref 0–0.3)
EOSINOPHIL NFR BLD AUTO: 0.1 % (ref 0–3)
ERYTHROCYTE [DISTWIDTH] IN BLOOD BY AUTOMATED COUNT: 14.5 % (ref 11.3–14.5)
ERYTHROCYTE [SEDIMENTATION RATE] IN BLOOD: 55 MM/HR (ref 0–30)
EXPIRATION DATE: ABNORMAL
FECAL OCCULT BLOOD SCREEN, POC: POSITIVE
GFR SERPL CREATININE-BSD FRML MDRD: 79 ML/MIN/1.73
GLOBULIN UR ELPH-MCNC: 3.7 GM/DL
GLUCOSE BLD-MCNC: 107 MG/DL (ref 70–100)
GLUCOSE UR STRIP-MCNC: NEGATIVE MG/DL
HCT VFR BLD AUTO: 37.5 % (ref 34.5–44)
HGB BLD-MCNC: 12.1 G/DL (ref 11.5–15.5)
HGB UR QL STRIP.AUTO: NEGATIVE
HOLD SPECIMEN: NORMAL
HOLD SPECIMEN: NORMAL
HYALINE CASTS UR QL AUTO: ABNORMAL /LPF
IMM GRANULOCYTES # BLD: 0.05 10*3/MM3 (ref 0–0.03)
IMM GRANULOCYTES NFR BLD: 0.3 % (ref 0–0.6)
KETONES UR QL STRIP: ABNORMAL
LEUKOCYTE ESTERASE UR QL STRIP.AUTO: ABNORMAL
LIPASE SERPL-CCNC: 23 U/L (ref 6–51)
LYMPHOCYTES # BLD AUTO: 1.25 10*3/MM3 (ref 0.6–4.8)
LYMPHOCYTES NFR BLD AUTO: 7.7 % (ref 24–44)
Lab: ABNORMAL
MCH RBC QN AUTO: 30.6 PG (ref 27–31)
MCHC RBC AUTO-ENTMCNC: 32.3 G/DL (ref 32–36)
MCV RBC AUTO: 94.7 FL (ref 80–99)
MONOCYTES # BLD AUTO: 0.79 10*3/MM3 (ref 0–1)
MONOCYTES NFR BLD AUTO: 4.9 % (ref 0–12)
NEGATIVE CONTROL: NEGATIVE
NEUTROPHILS # BLD AUTO: 14.09 10*3/MM3 (ref 1.5–8.3)
NEUTROPHILS NFR BLD AUTO: 86.9 % (ref 41–71)
NITRITE UR QL STRIP: NEGATIVE
PH UR STRIP.AUTO: 6 [PH] (ref 5–8)
PLATELET # BLD AUTO: 186 10*3/MM3 (ref 150–450)
PMV BLD AUTO: 10.6 FL (ref 6–12)
POSITIVE CONTROL: POSITIVE
POTASSIUM BLD-SCNC: 3.8 MMOL/L (ref 3.5–5.5)
PROT SERPL-MCNC: 7.6 G/DL (ref 5.7–8.2)
PROT UR QL STRIP: NEGATIVE
RBC # BLD AUTO: 3.96 10*6/MM3 (ref 3.89–5.14)
RBC # UR: ABNORMAL /HPF
REF LAB TEST METHOD: ABNORMAL
SODIUM BLD-SCNC: 132 MMOL/L (ref 132–146)
SP GR UR STRIP: 1.02 (ref 1–1.03)
SQUAMOUS #/AREA URNS HPF: ABNORMAL /HPF
TSH SERPL DL<=0.05 MIU/L-ACNC: 4.02 MIU/ML (ref 0.35–5.35)
UROBILINOGEN UR QL STRIP: ABNORMAL
WBC NRBC COR # BLD: 16.22 10*3/MM3 (ref 3.5–10.8)
WBC UR QL AUTO: ABNORMAL /HPF
WHOLE BLOOD HOLD SPECIMEN: NORMAL
WHOLE BLOOD HOLD SPECIMEN: NORMAL

## 2017-07-04 PROCEDURE — 71010 HC CHEST PA OR AP: CPT

## 2017-07-04 PROCEDURE — 85652 RBC SED RATE AUTOMATED: CPT | Performed by: PHYSICIAN ASSISTANT

## 2017-07-04 PROCEDURE — 25010000003 CEFTRIAXONE PER 250 MG: Performed by: EMERGENCY MEDICINE

## 2017-07-04 PROCEDURE — 25010000002 PIPERACILLIN-TAZOBACTAM: Performed by: PHYSICIAN ASSISTANT

## 2017-07-04 PROCEDURE — 83605 ASSAY OF LACTIC ACID: CPT | Performed by: PHYSICIAN ASSISTANT

## 2017-07-04 PROCEDURE — 74176 CT ABD & PELVIS W/O CONTRAST: CPT

## 2017-07-04 PROCEDURE — 99223 1ST HOSP IP/OBS HIGH 75: CPT | Performed by: FAMILY MEDICINE

## 2017-07-04 PROCEDURE — 83605 ASSAY OF LACTIC ACID: CPT | Performed by: EMERGENCY MEDICINE

## 2017-07-04 PROCEDURE — 81001 URINALYSIS AUTO W/SCOPE: CPT | Performed by: EMERGENCY MEDICINE

## 2017-07-04 PROCEDURE — 87040 BLOOD CULTURE FOR BACTERIA: CPT | Performed by: PHYSICIAN ASSISTANT

## 2017-07-04 PROCEDURE — 84443 ASSAY THYROID STIM HORMONE: CPT | Performed by: PHYSICIAN ASSISTANT

## 2017-07-04 PROCEDURE — 86140 C-REACTIVE PROTEIN: CPT | Performed by: PHYSICIAN ASSISTANT

## 2017-07-04 PROCEDURE — 93005 ELECTROCARDIOGRAM TRACING: CPT | Performed by: EMERGENCY MEDICINE

## 2017-07-04 PROCEDURE — 99285 EMERGENCY DEPT VISIT HI MDM: CPT

## 2017-07-04 PROCEDURE — 87086 URINE CULTURE/COLONY COUNT: CPT | Performed by: EMERGENCY MEDICINE

## 2017-07-04 PROCEDURE — 85025 COMPLETE CBC W/AUTO DIFF WBC: CPT | Performed by: EMERGENCY MEDICINE

## 2017-07-04 PROCEDURE — 80053 COMPREHEN METABOLIC PANEL: CPT | Performed by: EMERGENCY MEDICINE

## 2017-07-04 PROCEDURE — 83690 ASSAY OF LIPASE: CPT | Performed by: PHYSICIAN ASSISTANT

## 2017-07-04 RX ORDER — SODIUM CHLORIDE 0.9 % (FLUSH) 0.9 %
1-10 SYRINGE (ML) INJECTION AS NEEDED
Status: DISCONTINUED | OUTPATIENT
Start: 2017-07-04 | End: 2017-07-12 | Stop reason: HOSPADM

## 2017-07-04 RX ORDER — CARBIDOPA AND LEVODOPA 50; 200 MG/1; MG/1
1 TABLET, EXTENDED RELEASE ORAL NIGHTLY
Status: DISCONTINUED | OUTPATIENT
Start: 2017-07-04 | End: 2017-07-12 | Stop reason: HOSPADM

## 2017-07-04 RX ORDER — LEVOTHYROXINE SODIUM 0.07 MG/1
75 TABLET ORAL DAILY
Status: DISCONTINUED | OUTPATIENT
Start: 2017-07-05 | End: 2017-07-12 | Stop reason: HOSPADM

## 2017-07-04 RX ORDER — ONDANSETRON 2 MG/ML
4 INJECTION INTRAMUSCULAR; INTRAVENOUS EVERY 6 HOURS PRN
Status: DISCONTINUED | OUTPATIENT
Start: 2017-07-04 | End: 2017-07-12 | Stop reason: HOSPADM

## 2017-07-04 RX ORDER — TAMSULOSIN HYDROCHLORIDE 0.4 MG/1
0.8 CAPSULE ORAL DAILY
Status: DISCONTINUED | OUTPATIENT
Start: 2017-07-05 | End: 2017-07-12 | Stop reason: HOSPADM

## 2017-07-04 RX ORDER — SACCHAROMYCES BOULARDII 250 MG
250 CAPSULE ORAL 2 TIMES DAILY
Status: DISCONTINUED | OUTPATIENT
Start: 2017-07-04 | End: 2017-07-06

## 2017-07-04 RX ORDER — ASPIRIN 325 MG
325 TABLET ORAL DAILY
Status: DISCONTINUED | OUTPATIENT
Start: 2017-07-05 | End: 2017-07-12 | Stop reason: HOSPADM

## 2017-07-04 RX ORDER — MIRTAZAPINE 15 MG/1
30 TABLET, FILM COATED ORAL NIGHTLY
Status: DISCONTINUED | OUTPATIENT
Start: 2017-07-04 | End: 2017-07-12 | Stop reason: HOSPADM

## 2017-07-04 RX ORDER — ACETAMINOPHEN 325 MG/1
650 TABLET ORAL EVERY 4 HOURS PRN
Status: DISCONTINUED | OUTPATIENT
Start: 2017-07-04 | End: 2017-07-12 | Stop reason: HOSPADM

## 2017-07-04 RX ORDER — FAMOTIDINE 20 MG/1
40 TABLET, FILM COATED ORAL DAILY
Status: DISCONTINUED | OUTPATIENT
Start: 2017-07-04 | End: 2017-07-12 | Stop reason: HOSPADM

## 2017-07-04 RX ORDER — SODIUM CHLORIDE 9 MG/ML
75 INJECTION, SOLUTION INTRAVENOUS CONTINUOUS
Status: DISCONTINUED | OUTPATIENT
Start: 2017-07-04 | End: 2017-07-06

## 2017-07-04 RX ORDER — CEFTRIAXONE SODIUM 1 G/50ML
1 INJECTION, SOLUTION INTRAVENOUS ONCE
Status: COMPLETED | OUTPATIENT
Start: 2017-07-04 | End: 2017-07-04

## 2017-07-04 RX ORDER — CARVEDILOL 3.12 MG/1
3.12 TABLET ORAL EVERY 12 HOURS SCHEDULED
Status: DISCONTINUED | OUTPATIENT
Start: 2017-07-04 | End: 2017-07-12 | Stop reason: HOSPADM

## 2017-07-04 RX ORDER — FLUTICASONE PROPIONATE 50 MCG
2 SPRAY, SUSPENSION (ML) NASAL DAILY
Status: DISCONTINUED | OUTPATIENT
Start: 2017-07-05 | End: 2017-07-12 | Stop reason: HOSPADM

## 2017-07-04 RX ORDER — CETIRIZINE HYDROCHLORIDE 10 MG/1
5 TABLET ORAL DAILY
Status: DISCONTINUED | OUTPATIENT
Start: 2017-07-05 | End: 2017-07-12 | Stop reason: HOSPADM

## 2017-07-04 RX ORDER — ONDANSETRON 4 MG/1
4 TABLET, FILM COATED ORAL EVERY 6 HOURS PRN
Status: DISCONTINUED | OUTPATIENT
Start: 2017-07-04 | End: 2017-07-12 | Stop reason: HOSPADM

## 2017-07-04 RX ORDER — SODIUM CHLORIDE 0.9 % (FLUSH) 0.9 %
10 SYRINGE (ML) INJECTION AS NEEDED
Status: DISCONTINUED | OUTPATIENT
Start: 2017-07-04 | End: 2017-07-12 | Stop reason: HOSPADM

## 2017-07-04 RX ORDER — ATORVASTATIN CALCIUM 40 MG/1
40 TABLET, FILM COATED ORAL NIGHTLY
Status: DISCONTINUED | OUTPATIENT
Start: 2017-07-04 | End: 2017-07-06

## 2017-07-04 RX ADMIN — CARVEDILOL 3.12 MG: 3.12 TABLET, FILM COATED ORAL at 21:06

## 2017-07-04 RX ADMIN — Medication 250 MG: at 18:42

## 2017-07-04 RX ADMIN — CEFTRIAXONE SODIUM 1 G: 1 INJECTION, SOLUTION INTRAVENOUS at 16:19

## 2017-07-04 RX ADMIN — PIPERACILLIN SODIUM,TAZOBACTAM SODIUM 3.38 G: 3; .375 INJECTION, POWDER, FOR SOLUTION INTRAVENOUS at 18:34

## 2017-07-04 RX ADMIN — SODIUM CHLORIDE 500 ML: 9 INJECTION, SOLUTION INTRAVENOUS at 15:04

## 2017-07-04 RX ADMIN — ATORVASTATIN CALCIUM 40 MG: 40 TABLET, FILM COATED ORAL at 21:06

## 2017-07-04 RX ADMIN — CARBIDOPA AND LEVODOPA 1 TABLET: 50; 200 TABLET, EXTENDED RELEASE ORAL at 21:06

## 2017-07-04 RX ADMIN — SODIUM CHLORIDE 125 ML/HR: 9 INJECTION, SOLUTION INTRAVENOUS at 16:19

## 2017-07-04 RX ADMIN — SODIUM CHLORIDE 75 ML/HR: 9 INJECTION, SOLUTION INTRAVENOUS at 18:34

## 2017-07-04 RX ADMIN — FAMOTIDINE 40 MG: 20 TABLET ORAL at 18:42

## 2017-07-04 RX ADMIN — MIRTAZAPINE 30 MG: 15 TABLET, FILM COATED ORAL at 21:06

## 2017-07-04 NOTE — H&P
[facility] Medicine Services  HISTORY AND PHYSICAL    Primary Care Physician: Kieran Cottrell MD    Subjective     Chief Complaint:  weakness    History of Present Illness:   Patient is an 86-year-old female with PMH of recent hip replacement 12/2016 at  followed by left MCA stroke 2/2017 found to have thrombus with essentially complete lysis s/p TPA, did not require thrombectomy ECHO 2/20/17 showed mild decrease systolic function, EF 45% and small PFO- no anticoagulation 2nd to admission January 2017 for anemia requiring PRBCs on statin and asa per NS,review of records shows f/u with Cardiology, Dr Chou and Jocelin patch end of March with planned follow up this month, since states was told by Dr. Chou readings for normal.   Has hx Parkinson's dz, recent ED visit 6/19/17 found to have colitis  on CT Abd/Pelvis 6/19/17 refused admission at that time, was prescribed Cipro and flagyl.      Patient  presented to ED from Assisted living facility with generalized weakness, per patient progressive for the past 3 days, decreased po intake x1d with mild nausea, reports sleeping poorly overnight, slightly confused earlier today per patient,  states this has resolved. Son states last evening weakness had become more profound, unable to stand without significant assistance from staff.  Patient reports abdominal pain and diarrhea initially improved after treatment with Flagyl and antibiotics ×7 days for colitis. Now with mild lower abdominal discomfort and mild dysuria with intermittent incontinence.  Patient reports episodes of loose stool yesterday and today, nausea but no emesis.  Patient reports some rhinorrhea, no headache, and sore throat, cough.  No recent sick exposure. Had episode of dark, loose stool this a.m. patient denies fever, diaphoresis, dyspnea, chest tightness, chest pain, palpitations, dizziness, lightheadedness.  Upon presentation to ED pt afebrile, mildly hypertensive, HR  normal. Rectal exam performed in ED with mildly + guaiac stool. Bladder scan showed 100ml urine. Lactate 1.5, U/A showed 1+ leukocyte esterase and 6-12 wbcs/HPF, ucx sent. (ucx negative 6/19 with similar findings on U/A). , Na+ 132, Cr 0.7, albumin 3.9, + leukocytosis wbc 16.22. (11.3 6/19/17), hgb normal . EKG NSR with Twave abnormality in lateral leads. CT A/P w/o report pending, reviewed imaging has left renal cyst present on prior CT.CXR official report pending, reviewed imaging significant cardiomegaly, mild left basilar atelectasis, appears to have significant fibrotic changes, and mid/distal tracheal deviation with enlarged Aortic arch stable/ looks similar to CXR views 2/23/17, Pt received 1 dose IV rocephin in ED, 1/2L NS bolus, IVF at 125ml/hr.      Patient being admitted to hospitalist service for further evaluation and management.  We'll change to Zosyn for better anaerobic culture.  Prescription given recent colitis.  Decrease IV fluids to 75 ML's per hour.  Add-on blood cultures, repeat lactate, check C. difficile.  Pharmacy to clarify med rec in the a.m.    Review of Systems   Constitutional: Positive for appetite change and fatigue. Negative for diaphoresis and fever.   HENT: Positive for postnasal drip and rhinorrhea.    Respiratory: Negative for cough and shortness of breath.    Cardiovascular: Negative for chest pain, palpitations and leg swelling.   Gastrointestinal: Positive for abdominal pain and nausea. Negative for vomiting.        Loose stool   Genitourinary: Positive for dysuria. Negative for difficulty urinating.   Musculoskeletal: Negative for myalgias.   Skin: Negative for rash and wound.   Neurological: Positive for weakness. Negative for dizziness and headaches.   Hematological: Negative for adenopathy.   Psychiatric/Behavioral: Positive for confusion and sleep disturbance.   All other systems reviewed and are negative.     Otherwise complete ROS performed and negative except as  mentioned in the HPI.    Past Medical History:   Diagnosis Date   • Anemia    • Anxiety    • Asthma    • Cerebral infarction    • Depression    • Failure to thrive in adult    • Hyperlipidemia    • Hyperlipidemia    • Hypertension    • Hypothyroidism    • Osteoarthritis    • Osteoporosis    • Parkinson's disease    • Stress incontinence    • Stroke 02/19/2017    left MCA thromboembolism   • Urinary retention        Past Surgical History:   Procedure Laterality Date   • APPENDECTOMY     • BACK SURGERY     • CATARACT EXTRACTION     • ELBOW PROCEDURE     • KNEE SURGERY     • HI PRIM PRQ TRLUML MCHNL THRMBC N-COR N-ICRA 1ST Left 2/19/2017    Diagnostic Angiogram only, no thrombectomy   • TOTAL HIP ARTHROPLASTY Left 12/2016       Family History   Problem Relation Age of Onset   • Alcohol abuse Other    • Hypertension Other    • Stroke Other    • Heart disease Son        Social History     Social History   • Marital status:      Spouse name: N/A   • Number of children: N/A   • Years of education: N/A     Occupational History   • Not on file.     Social History Main Topics   • Smoking status: Never Smoker   • Smokeless tobacco: Never Used   • Alcohol use Yes      Comment: social   • Drug use: No   • Sexual activity: Defer     Other Topics Concern   • Not on file     Social History Narrative    [facility]       Medications:  Prescriptions Prior to Admission   Medication Sig Dispense Refill Last Dose   • acetaminophen (TYLENOL) 500 MG tablet Take 1,000 mg by mouth 4 (Four) Times a Day As Needed for mild pain (1-3).   Taking   • aspirin 325 MG tablet Take 1 tablet by mouth Daily.   Taking   • atorvastatin (LIPITOR) 40 MG tablet Take 1 tablet by mouth Every Night.   Taking   • bethanechol (URECHOLINE) 10 MG tablet Take 10 mg by mouth 3 (Three) Times a Day.   Taking Differently   • bisacodyl (DULCOLAX) 10 MG suppository Insert 10 mg into the rectum Daily As Needed for Constipation.   Taking  Differently   • carbidopa-levodopa CR (SINEMET CR)  MG per CR tablet Take 1 tablet by mouth Every Night. 30 tablet 11    • Cholecalciferol 2000 UNITS capsule Take 1,000 Units by mouth Daily.   Taking Differently   • conjugated estrogens (PREMARIN) 0.625 MG/GM vaginal cream Insert 0.5 g into the vagina Daily.      • diphenhydrAMINE (BENADRYL) 25 mg capsule Take 25 mg by mouth Every 6 (Six) Hours As Needed for Itching.   Taking Differently   • docusate sodium (COLACE) 100 MG capsule Take 100 mg by mouth 2 (Two) Times a Day.   Taking   • IPRATROPIUM-ALBUTEROL IN Inhale As Needed.   Taking Differently   • levothyroxine (SYNTHROID, LEVOTHROID) 75 MCG tablet Take 75 mcg by mouth Daily.   Taking   • mirtazapine (REMERON) 30 MG tablet Take 30 mg by mouth Every Night.   Taking   • Multiple Vitamins-Minerals (PRESERVISION AREDS 2 PO) Take  by mouth 2 (Two) Times a Day.   Taking Differently   • ondansetron (ZOFRAN) 4 MG tablet Take 4 mg by mouth Every 8 (Eight) Hours As Needed for Nausea or Vomiting.   Taking Differently   • saccharomyces boulardii (FLORASTOR) 250 MG capsule Take 1 capsule by mouth 2 (Two) Times a Day.   Taking   • sennosides-docusate sodium (SENOKOT-S) 8.6-50 MG tablet Take 2 tablets by mouth Daily.   Taking Differently   • tamsulosin (FLOMAX) 0.4 MG capsule 24 hr capsule Take 2 capsules by mouth Daily. 30 capsule  Taking   • Tuberculin PPD (APLISOL ID) Inject  into the skin.   Taking Differently   • carbidopa-levodopa (SINEMET)  MG per tablet Increase dose to four times a day: on rising, noon, 3pm and 6pm 120 tablet 4 Taking   • carvedilol (COREG) 6.25 MG tablet Take 1 tablet by mouth Every 12 (Twelve) Hours. (Patient taking differently: Take 3.125 mg by mouth Every 12 (Twelve) Hours.)   Taking Differently   • ferrous sulfate 325 (65 FE) MG tablet Take 1 tablet by mouth 2 (Two) Times a Day With Meals.   Taking Differently   • HYDROcodone-acetaminophen (VICODIN) 7.5-500 MG per tablet Take 1 tablet  "by mouth Every 6 (Six) Hours As Needed for Moderate Pain (4-6).   Taking   • phenazopyridine (PYRIDIUM) 100 MG tablet Take 100 mg by mouth 2 (Two) Times a Day.   Taking Differently   • polyethylene glycol (MIRALAX) packet Take 17 g by mouth Daily As Needed.   Taking   • potassium chloride (K-DUR,KLOR-CON) 10 MEQ CR tablet Take 10 mEq by mouth Daily.   Taking Differently       Allergies:  Allergies   Allergen Reactions   • Abilify [Aripiprazole]    • Alendronate    • Celecoxib    • Fosamax Plus D [Alendronate-Cholecalciferol]    • Levaquin [Levofloxacin]    • Macrobid [Nitrofurantoin Monohyd Macro]    • Nitrofurantoin    • Procaine    • Sulfa Antibiotics Nausea And Vomiting and Rash         Objective     Physical Exam:  Vital Signs: /89 (Patient Position: Lying)  Pulse 80  Temp 98.4 °F (36.9 °C) (Oral)   Resp 14  Ht 59\" (149.9 cm)  Wt 110 lb (49.9 kg)  SpO2 97%  BMI 22.22 kg/m2  Physical Exam  Temp:  [98.4 °F (36.9 °C)-98.6 °F (37 °C)] 98.4 °F (36.9 °C)  Heart Rate:  [79-87] 80  Resp:  [14-16] 14  BP: (132-155)/(82-96) 142/89  Constitutional: resting in bed, appears in no acute distress, awake, alert  HENT:  PERRLA, sclerae anicteric, no conjunctival injection; + clear white post nasal congestion draining in posterior pharynx, no erythema. No thrush  Neck: supple,  trachea midline, no cervical LAD/tenderness  Respiratory: Clear to auscultation bilaterally, nonlabored respirations   Cardiovascular: RRR, no murmurs, rubs, or gallops, palpable pedal pulses bilaterally  Gastrointestinal: Positive bowel sounds, soft, mild left lower quadrant tender w/o rebound/guarding, nondistended  Musculoskeletal: left foot drop wearing brace, No bilateral ankle edema, no clubbing or cyanosis to bilateral lower extremities  Psychiatric: oriented x 3, appropriate affect, cooperative. Did not speak very much due to fatigue  Neurologic: speech clear, follows commands, Strength symmetric in all extremities with generalized " weakness Muscle strength 4/5, Cranial Nerves grossly intact to confrontation   Skin: warm, dry    Attending performed examination editing here.     Results Reviewed:    Results from last 7 days  Lab Units 07/04/17  1409   WBC 10*3/mm3 16.22*   HEMOGLOBIN g/dL 12.1   PLATELETS 10*3/mm3 186       Results from last 7 days  Lab Units 07/04/17  1409   SODIUM mmol/L 132   POTASSIUM mmol/L 3.8   CO2 mmol/L 27.0   CREATININE mg/dL 0.70   GLUCOSE mg/dL 107*   CALCIUM mg/dL 9.6       I have personally reviewed and interpreted available lab data, radiology studies and ECG obtained at time of admission.     Assessment / Plan     Assessment/Problem List:   Hospital Problem List     * (Principal)Generalized weakness    Leukocytosis    Dysuria    Recent Colitis    Overview Signed 2/19/2017  2:48 PM by ARIEL Allen     Per CT a/p 1/13/2017, treated with Zosyn, had negative C diff 1/17/17         Parkinson's disease    History of recurrent UTIs    CHF with cardiomyopathy    Overview Signed 1/14/2017  1:44 PM by Evangelista Brand MD     EF 45%; mild-moderate mr and ai         Hypertension    Hypothyroidism    Overview Signed 2/19/2017  2:21 PM by ARIEL Allen     On replacement         History of ischemic left MCA stroke 2/20/17    Renal cyst, left noted on CT    Physical deconditioning    FTT (failure to thrive) in adult    Abdominal pain, acute, generalized            Plan:  Generalized weakness/malaise with leukocytosis  -PT/OT consult, case management consult tomorrow may need rehabilitation at time of discharge, from assisted living  -Blood cultures ordered, s/p 1 dose of antibiotic  - urine culture pending , UA appears bland is similar to last admit which had negative culture  -We will give Zosyn given recent colitis for anaerobic coverage  -Check CRP, sedimentation rate  -Check C. difficile given report of loose stools and recent antibiotic therapy with colitis    Generalized lower abdominal  pain.  -We will await official read of CT of abdomen  -ContinueNPO until  -Initial lactate 1.5 Will repeat ×1.     left renal cyst found on CT  -Will need follow-up    History of  CHF with cardiomyopathy, echo earlier this year showed EF 45%  - gentle hydration, appears euvolemic  - reduced dose of carvedilol until clarify home medications    Rhinorrhea  -Flonase, Zyrtec    History of left MCA stroke requiring TPA 2/20/17, history of small PFO  -Continue statin , full dose aspirin; is not on anticoagulation secondary to hospitalization due to symptomatic anemia January 2017    Parkinson's disease.    -Pharmacy to clarify home dose of Sinemet     guaiac positive stool   -Mildly positive in the ED   -Patient was previously on iron supplementation   -H&H normal , trend     Hypo-thyroidism  -Continue home dose Synthroid  - check TSH    **Will need pharmacy to update med rec once  [facility] office open, pharmacy unable to clarify dosing of medications.  For now we'll give reduced dose of carvedilol- contrast had been discontinued, was on 6.25 BIDat time of discharge in Feb 2017; also conflicting dosages  On  other medications    DVT px: Teds for now until CT of abdomen report available, if negative will add Lovenox   Code Status:  DNR/DNI   [facility] 07/04/17 6:14 PM    Attending:  Patient resting in bed in no acute distress. She denies cough, chest pain, abdominal pain. She feels tired and generally weak, however. Spoke to son at bedside, who reports she gets very nervous about any medical condition and that she would be very nervous if rehab facility option is discussed without him present or discuss it with him privately.     Exam edited above.    A/P:  1. Acute functional decline  2. Possible UTI  3. Guiac positive stool, consider possible GI bleed  4. History of GI bleed, on aspirin and no additional anticoagulation due to  this  5. Parkinsons  6. Recent resolving colitis.      Ceftriaxone given in ER, changed to Zosyn for colitis coverage. Cdiff pending. Continue to titrate medications accordingly. Need medication reconcilitation updated and medications adjusted tomorrow.   Discussed with son to hold lovenox tonight, pending repeat CBC in AM and consider adding lovenox tomorrow if not more ambulatory.    Will keep on clear liquid diet overnight and monitor. Await finalized ct scan result abdomen for colitis, preliminary improved colitis.     I have seen and examined patient, performing a face-to-face diagnostic evaluation with plan of care reviewed and developed with APRN/ROOPA Bhat. I have addended and modified the above history of present illness, physical examination, and assessment and plan to reflect my findings and impressions.     Patient and son reported satisfaction with current treatment plan.     DNR status.    Nicky Blunt DO  07/04/17  8:14 PM
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
## 2017-07-05 PROBLEM — A04.72 C. DIFFICILE COLITIS: Status: ACTIVE | Noted: 2017-02-19

## 2017-07-05 PROBLEM — D72.829 LEUKOCYTOSIS: Status: RESOLVED | Noted: 2017-07-04 | Resolved: 2017-07-05

## 2017-07-05 LAB
ANION GAP SERPL CALCULATED.3IONS-SCNC: 11 MMOL/L (ref 3–11)
BASOPHILS # BLD AUTO: 0.01 10*3/MM3 (ref 0–0.2)
BASOPHILS NFR BLD AUTO: 0.1 % (ref 0–1)
BUN BLD-MCNC: 11 MG/DL (ref 9–23)
BUN/CREAT SERPL: 18.3 (ref 7–25)
C DIFF TOX GENS STL QL NAA+PROBE: DETECTED
CALCIUM SPEC-SCNC: 8.4 MG/DL (ref 8.7–10.4)
CHLORIDE SERPL-SCNC: 102 MMOL/L (ref 99–109)
CO2 SERPL-SCNC: 21 MMOL/L (ref 20–31)
CREAT BLD-MCNC: 0.6 MG/DL (ref 0.6–1.3)
DEPRECATED RDW RBC AUTO: 53.3 FL (ref 37–54)
EOSINOPHIL # BLD AUTO: 0.02 10*3/MM3 (ref 0–0.3)
EOSINOPHIL NFR BLD AUTO: 0.1 % (ref 0–3)
ERYTHROCYTE [DISTWIDTH] IN BLOOD BY AUTOMATED COUNT: 14.9 % (ref 11.3–14.5)
GFR SERPL CREATININE-BSD FRML MDRD: 95 ML/MIN/1.73
GLUCOSE BLD-MCNC: 69 MG/DL (ref 70–100)
HCT VFR BLD AUTO: 32.7 % (ref 34.5–44)
HGB BLD-MCNC: 10.1 G/DL (ref 11.5–15.5)
IMM GRANULOCYTES # BLD: 0.03 10*3/MM3 (ref 0–0.03)
IMM GRANULOCYTES NFR BLD: 0.2 % (ref 0–0.6)
LYMPHOCYTES # BLD AUTO: 1.81 10*3/MM3 (ref 0.6–4.8)
LYMPHOCYTES NFR BLD AUTO: 12.2 % (ref 24–44)
MCH RBC QN AUTO: 30.1 PG (ref 27–31)
MCHC RBC AUTO-ENTMCNC: 30.9 G/DL (ref 32–36)
MCV RBC AUTO: 97.6 FL (ref 80–99)
MONOCYTES # BLD AUTO: 0.69 10*3/MM3 (ref 0–1)
MONOCYTES NFR BLD AUTO: 4.6 % (ref 0–12)
NEUTROPHILS # BLD AUTO: 12.33 10*3/MM3 (ref 1.5–8.3)
NEUTROPHILS NFR BLD AUTO: 82.8 % (ref 41–71)
PLATELET # BLD AUTO: 163 10*3/MM3 (ref 150–450)
PMV BLD AUTO: 10.8 FL (ref 6–12)
POTASSIUM BLD-SCNC: 3.7 MMOL/L (ref 3.5–5.5)
RBC # BLD AUTO: 3.35 10*6/MM3 (ref 3.89–5.14)
SODIUM BLD-SCNC: 134 MMOL/L (ref 132–146)
WBC NRBC COR # BLD: 14.89 10*3/MM3 (ref 3.5–10.8)

## 2017-07-05 PROCEDURE — 25010000002 ONDANSETRON PER 1 MG: Performed by: PHYSICIAN ASSISTANT

## 2017-07-05 PROCEDURE — 97530 THERAPEUTIC ACTIVITIES: CPT

## 2017-07-05 PROCEDURE — 97167 OT EVAL HIGH COMPLEX 60 MIN: CPT

## 2017-07-05 PROCEDURE — 87493 C DIFF AMPLIFIED PROBE: CPT | Performed by: PHYSICIAN ASSISTANT

## 2017-07-05 PROCEDURE — 80048 BASIC METABOLIC PNL TOTAL CA: CPT | Performed by: PHYSICIAN ASSISTANT

## 2017-07-05 PROCEDURE — 99233 SBSQ HOSP IP/OBS HIGH 50: CPT | Performed by: INTERNAL MEDICINE

## 2017-07-05 PROCEDURE — 25010000002 HEPARIN (PORCINE) PER 1000 UNITS: Performed by: INTERNAL MEDICINE

## 2017-07-05 PROCEDURE — 97162 PT EVAL MOD COMPLEX 30 MIN: CPT

## 2017-07-05 PROCEDURE — 97110 THERAPEUTIC EXERCISES: CPT

## 2017-07-05 PROCEDURE — 25010000002 PIPERACILLIN-TAZOBACTAM: Performed by: PHYSICIAN ASSISTANT

## 2017-07-05 PROCEDURE — 85025 COMPLETE CBC W/AUTO DIFF WBC: CPT | Performed by: PHYSICIAN ASSISTANT

## 2017-07-05 RX ORDER — SACCHAROMYCES BOULARDII 250 MG
250 CAPSULE ORAL DAILY
COMMUNITY
End: 2017-08-16

## 2017-07-05 RX ORDER — HEPARIN SODIUM 5000 [USP'U]/ML
5000 INJECTION, SOLUTION INTRAVENOUS; SUBCUTANEOUS EVERY 8 HOURS SCHEDULED
Status: DISCONTINUED | OUTPATIENT
Start: 2017-07-05 | End: 2017-07-12 | Stop reason: HOSPADM

## 2017-07-05 RX ORDER — CARBIDOPA AND LEVODOPA 50; 200 MG/1; MG/1
1 TABLET, EXTENDED RELEASE ORAL NIGHTLY PRN
COMMUNITY
End: 2017-08-16

## 2017-07-05 RX ORDER — TAMSULOSIN HYDROCHLORIDE 0.4 MG/1
1 CAPSULE ORAL NIGHTLY
COMMUNITY
End: 2017-08-16

## 2017-07-05 RX ORDER — IPRATROPIUM BROMIDE AND ALBUTEROL SULFATE 2.5; .5 MG/3ML; MG/3ML
3 SOLUTION RESPIRATORY (INHALATION) EVERY 4 HOURS PRN
COMMUNITY
End: 2018-02-23 | Stop reason: HOSPADM

## 2017-07-05 RX ORDER — SENNOSIDES 8.6 MG
1 TABLET ORAL 2 TIMES DAILY
COMMUNITY
End: 2017-07-12 | Stop reason: HOSPADM

## 2017-07-05 RX ORDER — ACETAMINOPHEN 325 MG/1
650 TABLET ORAL EVERY 6 HOURS PRN
COMMUNITY

## 2017-07-05 RX ADMIN — PIPERACILLIN SODIUM,TAZOBACTAM SODIUM 3.38 G: 3; .375 INJECTION, POWDER, FOR SOLUTION INTRAVENOUS at 05:28

## 2017-07-05 RX ADMIN — FLUTICASONE PROPIONATE 2 SPRAY: 50 SPRAY, METERED NASAL at 09:12

## 2017-07-05 RX ADMIN — ASPIRIN 325 MG ORAL TABLET 325 MG: 325 PILL ORAL at 09:07

## 2017-07-05 RX ADMIN — LEVOTHYROXINE SODIUM 75 MCG: 75 TABLET ORAL at 09:08

## 2017-07-05 RX ADMIN — PIPERACILLIN SODIUM,TAZOBACTAM SODIUM 3.38 G: 3; .375 INJECTION, POWDER, FOR SOLUTION INTRAVENOUS at 00:07

## 2017-07-05 RX ADMIN — Medication 250 MG: at 17:38

## 2017-07-05 RX ADMIN — MIRTAZAPINE 30 MG: 15 TABLET, FILM COATED ORAL at 22:57

## 2017-07-05 RX ADMIN — ONDANSETRON 4 MG: 2 INJECTION INTRAMUSCULAR; INTRAVENOUS at 05:50

## 2017-07-05 RX ADMIN — MICONAZOLE NITRATE: 20 POWDER TOPICAL at 23:00

## 2017-07-05 RX ADMIN — Medication 250 MG: at 09:08

## 2017-07-05 RX ADMIN — CETIRIZINE HYDROCHLORIDE 5 MG: 10 TABLET, FILM COATED ORAL at 09:08

## 2017-07-05 RX ADMIN — HEPARIN SODIUM 5000 UNITS: 5000 INJECTION, SOLUTION INTRAVENOUS; SUBCUTANEOUS at 14:00

## 2017-07-05 RX ADMIN — FAMOTIDINE 40 MG: 20 TABLET ORAL at 09:07

## 2017-07-05 RX ADMIN — CARBIDOPA AND LEVODOPA 1 TABLET: 25; 100 TABLET ORAL at 17:38

## 2017-07-05 RX ADMIN — CARVEDILOL 3.12 MG: 3.12 TABLET, FILM COATED ORAL at 09:08

## 2017-07-05 RX ADMIN — MICONAZOLE NITRATE: 20 POWDER TOPICAL at 12:28

## 2017-07-05 RX ADMIN — HEPARIN SODIUM 5000 UNITS: 5000 INJECTION, SOLUTION INTRAVENOUS; SUBCUTANEOUS at 22:58

## 2017-07-05 RX ADMIN — ATORVASTATIN CALCIUM 40 MG: 40 TABLET, FILM COATED ORAL at 22:57

## 2017-07-05 RX ADMIN — SODIUM CHLORIDE 75 ML/HR: 9 INJECTION, SOLUTION INTRAVENOUS at 17:40

## 2017-07-05 RX ADMIN — CARBIDOPA AND LEVODOPA 1 TABLET: 50; 200 TABLET, EXTENDED RELEASE ORAL at 22:57

## 2017-07-05 RX ADMIN — TAMSULOSIN HYDROCHLORIDE 0.8 MG: 0.4 CAPSULE ORAL at 09:07

## 2017-07-05 RX ADMIN — Medication 5 MG: at 02:21

## 2017-07-05 RX ADMIN — Medication 250 MG: at 23:00

## 2017-07-05 RX ADMIN — SODIUM CHLORIDE 1000 ML: 9 INJECTION, SOLUTION INTRAVENOUS at 18:29

## 2017-07-05 RX ADMIN — ACETAMINOPHEN 650 MG: 325 TABLET, FILM COATED ORAL at 14:00

## 2017-07-05 RX ADMIN — Medication 250 MG: at 09:12

## 2017-07-05 RX ADMIN — SODIUM CHLORIDE 75 ML/HR: 9 INJECTION, SOLUTION INTRAVENOUS at 05:28

## 2017-07-05 RX ADMIN — HEPARIN SODIUM 5000 UNITS: 5000 INJECTION, SOLUTION INTRAVENOUS; SUBCUTANEOUS at 09:08

## 2017-07-05 NOTE — THERAPY EVALUATION
Acute Care - Occupational Therapy Initial Evaluation  [facility]     Patient Name: Florinda Knapp  : 3/5/1931  MRN: [id number]  Today's Date: 2017  Onset of Illness/Injury or Date of Surgery Date: 17  Date of Referral to OT: 17  Referring Physician: Mayne, PA-C    Admit Date: 2017       ICD-10-CM ICD-9-CM   1. Abdominal pain, acute, generalized R10.84 789.07     338.19   2. Leukocytosis, unspecified type D72.829 288.60   3. Acute cystitis without hematuria N30.00 595.0   4. Failure to thrive syndrome, adult R62.7 783.7   5. Impaired mobility and ADLs Z74.09 799.89     Patient Active Problem List   Diagnosis   • Dysuria   • Elbow injury   • Fracture of left olecranon process   • Parkinson's disease   • Trauma left hip   • Acute Symptomatic on Chronic Normocytic Anemia   • Acute respiratory failure with hypoxia   • Generalized weakness   • Chest pain   • Symptomatic anemia   • Elevated troponin I level, possibly due to anemia   • Status post total replacement of left hip 2016 at [facility]   • H/O urinary retention   • History of recurrent UTIs   • Essential hypertension   • Anxiety   • CHF with cardiomyopathy   • Colitis   • Hypokalemia   • Acute ischemic left MCA stroke   • Hypertension   • Hyperlipidemia   • Urinary retention/Frequent UTIs   • Hypothyroidism   • Status post left hip replacement   • C. difficile colitis   • History of ischemic left MCA stroke 17   • Renal cyst, left noted on CT   • Physical deconditioning   • FTT (failure to thrive) in adult   • Abdominal pain, acute, generalized   • Rhinorrhea     Past Medical History:   Diagnosis Date   • Anemia    • Anxiety    • Asthma    • Cerebral infarction    • Depression    • Failure to thrive in adult    • Hyperlipidemia    • Hyperlipidemia    • Hypertension    • Hypothyroidism    • Osteoarthritis    • Osteoporosis    • Parkinson's disease    • Stress incontinence    • Stroke 2017    left MCA thromboembolism   • Urinary  retention      Past Surgical History:   Procedure Laterality Date   • APPENDECTOMY     • BACK SURGERY     • CATARACT EXTRACTION     • ELBOW PROCEDURE     • KNEE SURGERY     • CO PRIM PRQ TRLUML MCHNL THRMBC N-COR N-ICRA 1ST Left 2/19/2017    Diagnostic Angiogram only, no thrombectomy   • TOTAL HIP ARTHROPLASTY Left 12/2016          OT ASSESSMENT FLOWSHEET (last 72 hours)      OT Evaluation       07/05/17 0742 07/04/17 1820 07/04/17 1800          Rehab Evaluation    Document Type evaluation  -        Subjective Information agree to therapy;complains of;weakness  -        Patient Effort, Rehab Treatment good  -        Symptoms Noted During/After Treatment fatigue  -        General Information    Patient Profile Review yes  -        Onset of Illness/Injury or Date of Surgery Date 07/04/17  -        Referring Physician Mayne, PA-C  -        General Observations Pt received in supine, IV intact, on room air, exit alarm, no visitors present  -        Pertinent History Of Current Problem Pt is 86 YOF who presented to ED from [facility] with c/o generalized weakness, nausea, mild confusion, abdominal pain, diarrhea, and poor oral intake  -        Precautions/Limitations fall precautions  -        Prior Level of Function independent:;all household mobility;gait;transfer;bed mobility;grooming;feeding;mod assist:;dressing;bathing  -        Equipment Currently Used at Home walker, rolling;grab bar  - walker, rolling;grab bar  -       Plans/Goals Discussed With patient;agreed upon  -        Risks Reviewed patient:;LOB;nausea/vomiting;dizziness;increased discomfort;change in vital signs;lines disloged  -        Benefits Reviewed patient:;improve function;increase independence;increase strength;increase balance;decrease pain;improve skin integrity;increase knowledge  -        Barriers to Rehab medically complex;previous functional deficit  -        Living Environment    Lives With facility resident  -   facility resident  -      Living Arrangements assisted living  -  assisted living  -      Home Accessibility no concerns  -  no concerns  -      Stair Railings at Home   inside, present at both sides  -      Type of Financial/Environmental Concern   none  -      Transportation Available   car;family or friend will provide  -      Clinical Impression    Date of Referral to OT 07/05/17  -        OT Diagnosis Impaired ADLs and functional mobility  -        Patient/Family Goals Statement did not state  -        Criteria for Skilled Therapeutic Interventions Met yes  -        Rehab Potential good, to achieve stated therapy goals  -        Therapy Frequency daily  -        Anticipated Equipment Needs At Discharge --   TBD  -        Anticipated Discharge Disposition skilled nursing facility  -        Functional Level Prior    Ambulation   0-->independent  -      Transferring   0-->independent  -      Toileting   0-->independent  -      Bathing   0-->independent  -      Dressing   0-->independent  -      Eating   0-->independent  -      Communication   0-->understands/communicates without difficulty  -      Swallowing   0-->swallows foods/liquids without difficulty  -      Vital Signs    Pre Systolic BP Rehab --   VSS throughout  -        Pain Assessment    Pain Assessment Headley-Anderson FACES  -        Headley-Baker FACES Pain Rating 2  -        Pain Type Acute pain  -        Pain Location Abdomen  -        Vision Assessment/Intervention    Visual Impairment WFL with corrective lenses  -        Cognitive Assessment/Intervention    Current Cognitive/Communication Assessment functional  -        Orientation Status oriented x 4  -        Follows Commands/Answers Questions 75% of the time  -        Personal Safety mild impairment  -        Personal Safety Interventions fall prevention program maintained;gait belt;muscle strengthening facilitated;nonskid  shoes/slippers when out of bed;supervised activity  -        Short/Long Term Memory intact short term memory;intact long term memory  -        ROM (Range of Motion)    General ROM upper extremity range of motion deficits identified  -        General ROM Detail L shoulder 50% AROM, all other LUE WFL. RUE WFL.  Defer BLE to PT  -        MMT (Manual Muscle Testing)    General MMT Assessment upper extremity strength deficits identified  -        General MMT Assessment Detail L shoulder 3-/5, all other LUE and RUE 3+/5.  Defer BLE to PT. Pt reported impaired  strength, pancake call bell issued  -        Bed Mobility, Assessment/Treatment    Bed Mobility, Assistive Device bed rails;head of bed elevated;draw sheet  -        Bed Mobility, Roll Left, [place] maximum assist (25% patient effort)  -        Bed Mobility, Roll Right, [place] maximum assist (25% patient effort)  -        Bed Mobility, Scoot/Bridge, [place] dependent (less than 25% patient effort)  -        Bed Mob, Supine to Sit, [place] maximum assist (25% patient effort)  -        Bed Mob, Sit to Supine, [place] maximum assist (25% patient effort)  -        Bed Mobility, Safety Issues decreased use of arms for pushing/pulling;decreased use of legs for bridging/pushing  -        Bed Mobility, Impairments ROM decreased;strength decreased;impaired balance  -        Bed Mobility, Comment Pt with generalized increased tone/rigidity noted, limiting her ability to perform bed mobility.  Max cues for sequencing, and Max A with BLE and trunk to come into sitting as well as to return to supine.  Pt rolled multiple times to each side for toileting  -        Transfer Assessment/Treatment    Transfers, Sit-Stand [place] maximum assist (25% patient effort);upper extremity support  -        Transfers, Stand-Sit [place] maximum assist (25% patient effort);upper extremity support  -        Transfer,  Impairments strength decreased;impaired balance  -        Transfer, Comment Pt attempted one sit to stand with Max A, unable to completely clear hips from bed.  Pt with very strong fear of falling, pulls back posteriorly during transfers in ancitipation of falling.  Pt agreeable to get to chair but once she stood, she reported that she had a BM.  Pt returned to supine in order to be cleaned up  -        Functional Mobility    Functional Mobility- Ind. Level unable to perform  -        Lower Body Dressing Assessment/Training    LB Dressing Assess/Train, Clothing Type donning:;slipper socks   Don/doff brief for toileting  -        LB Dressing Assess/Train, Position supine  -        LB Dressing Assess/Train, [place] dependent (less than 25% patient effort)  -        LB Dressing Assess/Train, Impairments ROM decreased;strength decreased;impaired balance  -        Toileting Assessment/Training    Toileting Assess/Train, Assistive Device other (see comments)  -        Toileting Assess/Train, Position supine  -        Toileting Assess/Train, Indepen Level dependent (less than 25% patient effort)  -        Toileting Assess/Train, Impairments ROM decreased;strength decreased;impaired balance  -        Motor Skills/Interventions    Additional Documentation Balance Skills Training (Group)  -        Balance Skills Training    Sitting-Level of Assistance Moderate assistance  -        Sitting-Balance Support Feet supported  -        Sitting-Balance Activities Trunk control activities  -        Sitting # of Minutes 3  -        Standing-Level of Assistance Maximum assistance  -        Static Standing Balance Support Right upper extremity supported;Left upper extremity supported  -        Sensory Assessment/Intervention    Light Touch LUE;RUE  -        LUE Light Touch WNL  -        RUE Light Touch WNL  -        General Therapy Interventions    Planned Therapy Interventions ADL  retraining;balance training;bed mobility training;home exercise program;strengthening;transfer training  -        Positioning and Restraints    Pre-Treatment Position in bed  -        Post Treatment Position bed  -        In Bed notified nsg;supine;call light within reach;encouraged to call for assist;exit alarm on  -          User Key  (r) = Recorded By, (t) = Taken By, (c) = Cosigned By    Initials Name Effective Dates     Charla Rowland OT 03/14/16 -     VL Elizabeth Sherwood RN 11/01/16 -            Occupational Therapy Education     Title: PT OT SLP Therapies (Active)     Topic: Occupational Therapy (Active)     Point: ADL training (Active)    Description: Instruct learner(s) on proper safety adaptation and remediation techniques during self care or transfers.   Instruct in proper use of assistive devices.    Learning Progress Summary    Learner Readiness Method Response Comment Documented by Status   Patient Acceptance E NR   07/05/17 1030 Active               Point: Body mechanics (Active)    Description: Instruct learner(s) on proper positioning and spine alignment during self-care, functional mobility activities and/or exercises.    Learning Progress Summary    Learner Readiness Method Response Comment Documented by Status   Patient Acceptance E NR   07/05/17 1030 Active                      User Key     Initials Effective Dates Name Provider Type Discipline     03/14/16 -  Charla Rowland OT Occupational Therapist OT                  OT Recommendation and Plan  Anticipated Equipment Needs At Discharge:  (TBD)  Anticipated Discharge Disposition: skilled nursing facility  Planned Therapy Interventions: ADL retraining, balance training, bed mobility training, home exercise program, strengthening, transfer training  Therapy Frequency: daily  Plan of Care Review  Plan Of Care Reviewed With: patient  Outcome Summary/Follow up Plan: OT initial eval completed. Pt demonstrates an increased need for assist 2/2  weakness. Recommend pt to receive skilled OT to address ADLs, functional mobility, safety and education to return to PLOF.  Recommend pt to go to SNF at time of DC          OT Goals       07/05/17 1030          Bed Mobility OT LTG    Bed Mobility OT LTG, Date Established 07/05/17  -      Bed Mobility OT LTG, Time to Achieve by discharge  -      Bed Mobility OT LTG, Activity Type all bed mobility  -      Bed Mobility OT LTG, [place] Level moderate assist (50% patient effort)  -      Bed Mobility OT LTG, Outcome goal ongoing  -      Transfer Training OT LTG    Transfer Training OT LTG, Date Established 07/05/17  -      Transfer Training OT LTG, Time to Achieve by discharge  -      Transfer Training OT LTG, Activity Type sit to stand/stand to sit;toilet  -      Transfer Training OT LTG, [place] Level moderate assist (50% patient effort)  -      Transfer Training OT LTG, Assist Device walker, rolling  -      Transfer Training OT LTG, Outcome goal ongoing  -      Patient Education OT LTG    Patient Education OT LTG, Date Established 07/05/17  -      Patient Education OT LTG, Time to Achieve by discharge  -      Patient Education OT LTG, Education Type HEP;posture/body mechanics;home safety  -      Patient Education OT LTG, Education Understanding demonstrates adequately;verbalizes understanding  -      Patient Education OT LTG Outcome goal ongoing  -      Grooming OT LTG    Grooming Goal OT LTG, Date Established 07/05/17  -      Grooming Goal OT LTG, Time to Achieve by discharge  -      Grooming Goal OT LTG, [place] Level minimum assist (75% patient effort)  -      Grooming Goal OT LTG, Outcome goal ongoing  -        User Key  (r) = Recorded By, (t) = Taken By, (c) = Cosigned By    Initials Name Provider Type    JUNIOR Rowland OT Occupational Therapist                Outcome Measures       07/05/17 0742          How much help from another is currently needed...     Putting on and taking off regular lower body clothing? 1  -MC      Bathing (including washing, rinsing, and drying) 1  -MC      Toileting (which includes using toilet bed pan or urinal) 1  -MC      Putting on and taking off regular upper body clothing 2  -MC      Taking care of personal grooming (such as brushing teeth) 2  -MC      Eating meals 3  -      Score 10  -      Functional Assessment    Outcome Measure Options AM-PAC 6 Clicks Daily Activity (OT)  -        User Key  (r) = Recorded By, (t) = Taken By, (c) = Cosigned By    Initials Name Provider Type    JUNIOR Rowland OT Occupational Therapist          Time Calculation:   OT Start Time: 0742    Therapy Charges for Today     Code Description Service Date Service Provider Modifiers Qty    [id number]  OT EVAL HIGH COMPLEXITY 4 7/5/2017 Charla Rowland OT GO 1    [id number]  OT THERAPEUTIC ACT EA 15 MIN 7/5/2017 Charla Rowland OT GO 1               Charla Rowland OT  7/5/2017

## 2017-07-05 NOTE — PLAN OF CARE
Problem: Patient Care Overview (Adult)  Goal: Plan of Care Review  Outcome: Ongoing (interventions implemented as appropriate)    07/05/17 0420   Coping/Psychosocial Response Interventions   Plan Of Care Reviewed With patient   Patient Care Overview   Progress no change   Outcome Evaluation   Outcome Summary/Follow up Plan VSS, NSR on monitor with PVC's, on room air. NS infusing at 75 ml/hr. Did have trouble sleeping; PRN was ordered and given.          Problem: Pain, Acute (Adult)  Goal: Identify Related Risk Factors and Signs and Symptoms  Outcome: Outcome(s) achieved Date Met:  07/05/17  Goal: Acceptable Pain Control/Comfort Level  Outcome: Ongoing (interventions implemented as appropriate)    Problem: Infection, Risk/Actual (Adult)  Goal: Identify Related Risk Factors and Signs and Symptoms  Outcome: Outcome(s) achieved Date Met:  07/05/17  Goal: Infection Prevention/Resolution  Outcome: Ongoing (interventions implemented as appropriate)    Problem: Fall Risk (Adult)  Goal: Identify Related Risk Factors and Signs and Symptoms  Outcome: Outcome(s) achieved Date Met:  07/05/17  Goal: Absence of Falls  Outcome: Ongoing (interventions implemented as appropriate)

## 2017-07-05 NOTE — PLAN OF CARE
Problem: Patient Care Overview (Adult)  Goal: Plan of Care Review  Outcome: Ongoing (interventions implemented as appropriate)    07/05/17 0950   Coping/Psychosocial Response Interventions   Plan Of Care Reviewed With patient;son   Patient Care Overview   Progress unable to show any progress toward functional goals   Outcome Evaluation   Outcome Summary/Follow up Plan Pt seen for redness/ yeast rash on gluteal/ coccyx, perirectal and perineal area which will be treated with antfngal powder and no sting barrier spray. FMS being considered d/t loose stools. Pt to be placed on a waffle mattress and frame. WOCN will s/o but please resconsult for any further needs or concerns. QUINTEN Winchester present and aware of POC

## 2017-07-05 NOTE — PLAN OF CARE
Problem: Patient Care Overview (Adult)  Goal: Adult Individualization and Mutuality  Outcome: Ongoing (interventions implemented as appropriate)  Goal: Discharge Needs Assessment  Outcome: Ongoing (interventions implemented as appropriate)    Problem: Pain, Acute (Adult)  Goal: Acceptable Pain Control/Comfort Level  Outcome: Ongoing (interventions implemented as appropriate)    Problem: Infection, Risk/Actual (Adult)  Goal: Infection Prevention/Resolution  Outcome: Ongoing (interventions implemented as appropriate)    Problem: Fall Risk (Adult)  Goal: Absence of Falls  Outcome: Ongoing (interventions implemented as appropriate)

## 2017-07-05 NOTE — PLAN OF CARE
Problem: Patient Care Overview (Adult)  Goal: Plan of Care Review  Outcome: Ongoing (interventions implemented as appropriate)    07/05/17 1116   Coping/Psychosocial Response Interventions   Plan Of Care Reviewed With patient;son   Outcome Evaluation   Outcome Summary/Follow up Plan Pt in bed today with diarrhea. PT evaluation in bed but assess standing activities and transfers.         Problem: Inpatient Physical Therapy  Goal: Bed Mobility Goal LTG- PT  Outcome: Ongoing (interventions implemented as appropriate)    07/05/17 1116   Bed Mobility PT LTG   Bed Mobility PT LTG, Time to Achieve 2 wks   Bed Mobility PT LTG, Activity Type all bed mobility   Bed Mobility PT LTG, [place] Level conditional independence   Bed Mobility PT Goal LTG, Assist Device bed rails   Bed Mobility PT LTG, Outcome goal ongoing       Goal: Transfer Training Goal 1 LTG- PT  Outcome: Ongoing (interventions implemented as appropriate)    07/05/17 1116   Transfer Training PT LTG   Transfer Training PT LTG, Date Established 07/05/17   Transfer Training PT LTG, Activity Type all transfers;sit to stand/stand to sit   Transfer Training PT LTG, Assist Device walker, rolling   Transfer Training PT LTG, Date Goal Reviewed 07/05/17   Transfer Training PT LTG, Outcome goal ongoing       Goal: Static Sitting Balance Goal LTG- PT  Outcome: Ongoing (interventions implemented as appropriate)    07/05/17 1116   Static Sitting Balance PT LTG   Static Sitting Balance PT LTG, Time to Achieve 2 wks   Static Sitting Balance PT LTG, [place] Level conditional independence;supervision required   Static Sitting Balance PT LTG, Assist Device UE Support   Static Sitting Balance PT LTG, Outcome goal ongoing       Goal: Dynamic Sitting Balance Goal STG- PT  Outcome: Ongoing (interventions implemented as appropriate)    07/05/17 1116   Dynamic Sitting Balance PT STG   Dynamic Sitting Balance PT STG, Time to Achieve 2 wks   Dynamic Sitting Balance PT STG,  [place] Level contact guard assist   Dynamic Sitting Balance PT STG, Outcome goal ongoing       Goal: Static Standing Balance Goal STG- PT  Outcome: Ongoing (interventions implemented as appropriate)    07/05/17 1116   Static Standing Balance PT STG   Static Standing Balance PT STG, Date Established 07/05/17   Static Standing Balance PT STG, Time to Achieve 2 wks   Static Standing Balance PT STG, [place] Level set up required;contact guard assist   Static Standing Balance PT STG, Assist Device assistive Device   Static Standing Balance PT STG, Outcome goal ongoing

## 2017-07-05 NOTE — PROGRESS NOTES
[facility] Medicine Services  INPATIENT PROGRESS NOTE    Date of Admission: 7/4/2017  Length of Stay: 1  Primary Care Physician: Kieran Cottrell MD    Subjective   CC: weakness    HPI:  Doesn't feel very good.  Has multiple complaints - mild abd pain, nausea, loose stools, and very weak.  Low grade temp overnight.    Review Of Systems:   Review of Systems   Constitutional: Negative for fever.   Gastrointestinal: Positive for abdominal pain, diarrhea and nausea.   Neurological: Positive for weakness.   All other systems reviewed and are negative.      Objective      Temp:  [98.4 °F (36.9 °C)-99.7 °F (37.6 °C)] 99.4 °F (37.4 °C)  Heart Rate:  [70-87] 70  Resp:  [14-16] 16  BP: (132-155)/(77-96) 154/86  Physical Exam   Constitutional: She is oriented to person, place, and time. She appears well-developed and well-nourished.   Appears slightly uncomfortable and frail, non-toxic   HENT:   Head: Normocephalic.   Eyes: Pupils are equal, round, and reactive to light.   Cardiovascular: Normal rate, regular rhythm and normal heart sounds.    Pulmonary/Chest: Effort normal and breath sounds normal. No respiratory distress. She has no wheezes.   Abdominal: Soft. Bowel sounds are normal. She exhibits no distension.   Minimal diffuse tenderness, no r/g   Musculoskeletal: She exhibits no edema.   Neurological: She is alert and oriented to person, place, and time. No cranial nerve deficit.   No focal deficits   Skin: Skin is warm and dry. No rash noted.   Psychiatric: She has a normal mood and affect.   Vitals reviewed.        Results Review:    I have reviewed the labs, radiology results and diagnostic studies.      Results from last 7 days  Lab Units 07/05/17  0650   WBC 10*3/mm3 14.89*   HEMOGLOBIN g/dL 10.1*   PLATELETS 10*3/mm3 163       Results from last 7 days  Lab Units 07/05/17  0650   SODIUM mmol/L 134   POTASSIUM mmol/L 3.7   CHLORIDE mmol/L 102   CO2 mmol/L 21.0   BUN mg/dL 11    CREATININE mg/dL 0.60   GLUCOSE mg/dL 69*   CALCIUM mg/dL 8.4*       Culture Data:     Blood Culture   Date Value Ref Range Status   07/04/2017 No growth at less than 24 hours  Preliminary   07/04/2017 No growth at less than 24 hours  Preliminary     Urine Culture   Date Value Ref Range Status   07/04/2017 No growth  Preliminary     + c diff    Radiology Data:   Abd Ct - official read pending, per report colitis is improved compared to 6/19    I have reviewed the medications.    Assessment/Plan     Problem List  Hospital Problem List     * (Principal)C. difficile colitis    Overview Signed 2/19/2017  2:48 PM by ARIEL Allen     Per CT a/p 1/13/2017, treated with Zosyn, had negative C diff 1/17/17         Generalized weakness    FTT (failure to thrive) in adult    Dysuria    Parkinson's disease    History of recurrent UTIs    CHF with cardiomyopathy    Overview Signed 1/14/2017  1:44 PM by Evangelista Brand MD     EF 45%; mild-moderate mr and ai         Hypertension    Hypothyroidism    Overview Signed 2/19/2017  2:21 PM by ARIEL Allen     On replacement         History of ischemic left MCA stroke 2/20/17    Renal cyst, left noted on CT    Abdominal pain, acute, generalized        Assessment/Plan:  - 85 yo who presents with progressive weakness and some diarrhea.  Was seen in ED on 6/19 with CT scan showing evidence of colitis, treated with cipro/flagyl though c diff not collected.  Comes back with progressive symptoms and is now C diff positive  - start PO vanc x 2 weeks  - continue IVF until PO intake picks up; advance diet as tolerated  - f/u official results of CT scan, per report it is improved compared to 6/19  - continue home meds as appropriate  - PT/OT evals pending  - Came from assisted living facility, suspect she will need placement/rehab d/t weakness.  Will d/w CM.      DVT prophylaxis: heparin    High complexity    Flo Dugan MD   07/05/17   8:21 AM

## 2017-07-05 NOTE — PLAN OF CARE
Problem: Patient Care Overview (Adult)  Goal: Plan of Care Review  Outcome: Ongoing (interventions implemented as appropriate)    07/05/17 1030   Coping/Psychosocial Response Interventions   Plan Of Care Reviewed With patient   Outcome Evaluation   Outcome Summary/Follow up Plan OT initial eval completed. Pt demonstrates an increased need for assist 2/2 weakness. Recommend pt to receive skilled OT to address ADLs, functional mobility, safety and education to return to PLOF. Recommend pt to go to SNF at time of DC         Problem: Inpatient Occupational Therapy  Goal: Bed Mobility Goal LTG- OT  Outcome: Ongoing (interventions implemented as appropriate)    07/05/17 1030   Bed Mobility OT LTG   Bed Mobility OT LTG, Date Established 07/05/17   Bed Mobility OT LTG, Time to Achieve by discharge   Bed Mobility OT LTG, Activity Type all bed mobility   Bed Mobility OT LTG, [place] Level moderate assist (50% patient effort)   Bed Mobility OT LTG, Outcome goal ongoing       Goal: Transfer Training Goal 1 LTG- OT  Outcome: Ongoing (interventions implemented as appropriate)    07/05/17 1030   Transfer Training OT LTG   Transfer Training OT LTG, Date Established 07/05/17   Transfer Training OT LTG, Time to Achieve by discharge   Transfer Training OT LTG, Activity Type sit to stand/stand to sit;toilet   Transfer Training OT LTG, [place] Level moderate assist (50% patient effort)   Transfer Training OT LTG, Assist Device walker, rolling   Transfer Training OT LTG, Outcome goal ongoing       Goal: Patient Education Goal LTG- OT  Outcome: Ongoing (interventions implemented as appropriate)    07/05/17 1030   Patient Education OT LTG   Patient Education OT LTG, Date Established 07/05/17   Patient Education OT LTG, Time to Achieve by discharge   Patient Education OT LTG, Education Type HEP;posture/body mechanics;home safety   Patient Education OT LTG, Education Understanding demonstrates adequately;verbalizes understanding    Patient Education OT LTG Outcome goal ongoing       Goal: Grooming Goal LTG- OT  Outcome: Ongoing (interventions implemented as appropriate)    07/05/17 1030   Grooming OT LTG   Grooming Goal OT LTG, Date Established 07/05/17   Grooming Goal OT LTG, Time to Achieve by discharge   Grooming Goal OT LTG, [place] Level minimum assist (75% patient effort)   Grooming Goal OT LTG, Outcome goal ongoing

## 2017-07-05 NOTE — PROGRESS NOTES
Discharge Planning Assessment  [facility]     Patient Name: Florinda Knapp  MRN: [id number]  Today's Date: 7/5/2017    Admit Date: 7/4/2017          Discharge Needs Assessment       07/05/17 1450    Living Environment    Lives With facility resident    Living Arrangements assisted living   [facility] Accessibility no concerns;grab bars present (bathtub)    Type of Financial/Environmental Concern none    Transportation Available car;family or friend will provide    Living Environment    Provides Primary Care For no one    Quality Of Family Relationships supportive;helpful;involved    Discharge Needs Assessment    Concerns To Be Addressed discharge planning concerns    Readmission Within The Last 30 Days no previous admission in last 30 days    Equipment Currently Used at Home wheelchair;walker, rolling;walker, standard (only uses the wheelchair for long distances)    Discharge Facility/Level Of Care Needs rehabilitation facility    Discharge Disposition still a patient    Discharge Contact Information if Applicable Yogesh Knapp, son, [phone]            Discharge Plan       07/05/17 2882    Case Management/Social Work Plan    Plan [facility]    Patient/Family In Agreement With Plan yes    Additional Comments Met with patient and her son/POA, Yogesh, in the room to initiate discharge planning. Patient lives at [facility] in [facility]. She has assistance with bathing and dressing and normally ambulates on her own or with a walker if she is tired. She would like a referral to [facility] because she thinks she will need rehab before she can return to her facility. Referral called to Saige with [facility], [phone]. She will follow patient while she is here and look for a bed once patient is closer to medical readiness for discharge. Patient's son will transport her there. CM will continue to follow.          Discharge Placement     No information found        Expected Discharge Date  and Time     Expected Discharge Date Expected Discharge Time    Jul 7, 2017               Demographic Summary       07/05/17 1441    Referral Information    Referral Source admission list    Reason For Consult discharge planning    Record Reviewed history and physical;medical record   Yogesh Knapp, son & POA    Contact Information    Permission Granted to Share Information With ;family/designee    Primary Care Physician Information    Name Kieran Cottrell            Functional Status       07/05/17 1453    Functional Status Prior    Toileting 2-->assistive person    Bathing 2-->assistive person      07/05/17 1442    Functional Status Prior    Ambulation 0-->independent    Transferring 0-->independent    Toileting 0-->independent    Bathing 0-->independent    Dressing 0-->independent    Eating 0-->independent    Communication 0-->understands/communicates without difficulty    Swallowing 0-->swallows foods/liquids without difficulty    IADL    Medications assistive person    Meal Preparation assistive person    Housekeeping assistive person    Laundry assistive person    Shopping assistive person    Oral Care independent    Employment/Financial    Employment/Finance Comments Medical coverage through Medicare A & B; confirmed patient has Medicare D with her home pharmacy, [facility]; no issues affording meds             Psychosocial     None            Abuse/Neglect     None            Legal     None            Substance Abuse     None            Patient Forms     None          Ana Greene

## 2017-07-05 NOTE — PROGRESS NOTES
"Adult Nutrition  Assessment/PES    Patient Name:  Florinda Knapp  YOB: 1931  MRN: [id number]  Admit Date:  7/4/2017    Assessment Date:  7/5/2017        Reason for Assessment       07/05/17 1109    Reason for Assessment    Reason For Assessment/Visit identified at risk by screening criteria    Identified At Risk By Screening Criteria unintentional loss of 10 lbs or more in the past 2 mos;reduced oral intake over the last month    Time Spent (min) 20    Diagnosis Diagnosis    Cardiac HTN;Hypercholesterolemia;CHF;Cardiomyopathy    Endocrine Hypothyroid    Gastrointestinal Colitis;Diarrhea    Hematological Anemia   History of    Infectious Disease C. diff    Neurological TIA;AMS   L TIA (2/2017)    Ortho Osteoarthritis    Skin Pressure ulcer   stage 1 pressure ulcer medial coccyx; skin tears on hand    Other diagnosis failure to thrive              Nutrition/Diet History       07/05/17 1118    Nutrition/Diet History    Reported/Observed By Patient;Family    Food Habit/Preferences Uses Supplements   at home    Other Patient and family in room at time of visit. Family assisting patient with feeding. Reports patient has not had any recent weight loss, UBW stays around 105-110lb. Patient says she has weighed 110lb for a while. Reports she was eating normal for her, normal appetite prior to the past couple days. Family reports patient drinks ensure (chocolate) supplements at home, he buys them for her. Advised patient we can send her ensure supplements once diet is advanced but can start clear liquid supplement for current diet order. Willing to try apple ensure clear.            Anthropometrics       07/05/17 1129    Anthropometrics (Special Considerations)    Height Used for Calculations 1.499 m (4' 11\")    Weight Used for Calculations 49.9 kg (110 lb 0.2 oz)    Usual Body Weight (UBW)    Usual Body Weight 49.9 kg (110 lb)   patient/family reported            Labs/Tests/Procedures/Meds       07/05/17 1130 "    Labs/Tests/Procedures/Meds    Labs/Tests Review Reviewed                Nutrition Prescription Ordered       07/05/17 1130    Nutrition Prescription PO    Current PO Diet Clear Liquid            Evaluation of Received Nutrient/Fluid Intake       07/05/17 1130    PO Evaluation    Number of Days PO Intake Evaluated Insufficient Data   no recorded intake          Problem/Interventions:        Problem 1       07/05/17 1135    Nutrition Diagnoses Problem 1    Problem 1 Inadequate Intake/Infusion    Inadequate Intake Type Oral    Etiology (related to) --   clinical condition    Signs/Symptoms (evidenced by) Clear Liquid Diet                Intervention Goal       07/05/17 1136    Intervention Goal    General Nutrition support treatment    PO Advance diet            Nutrition Intervention       07/05/17 1136    Nutrition Intervention    RD/Tech Action Advise alternate selection;Encourage intake;Recommend/ordered;Supplement provided;Follow Tx progress;Care plan reviewd    Recommended/Ordered Supplement            Nutrition Prescription       07/05/17 1136    Nutrition Prescription PO    PO Prescription Begin/change supplement    Supplement Ensure Clear   apple    Supplement Frequency 3 times a day    New PO Prescription Ordered? Yes            Education/Evaluation       07/05/17 1136    Monitor/Evaluation    Monitor Per protocol;PO intake;Supplement intake          Electronically signed by:  Alida Presley  07/05/17 11:36 AM

## 2017-07-06 LAB
ANION GAP SERPL CALCULATED.3IONS-SCNC: 6 MMOL/L (ref 3–11)
BACTERIA SPEC AEROBE CULT: NORMAL
BACTERIA UR QL AUTO: ABNORMAL /HPF
BILIRUB UR QL STRIP: NEGATIVE
BUN BLD-MCNC: 12 MG/DL (ref 9–23)
BUN/CREAT SERPL: 24 (ref 7–25)
CALCIUM SPEC-SCNC: 7.6 MG/DL (ref 8.7–10.4)
CHLORIDE SERPL-SCNC: 103 MMOL/L (ref 99–109)
CLARITY UR: ABNORMAL
CO2 SERPL-SCNC: 23 MMOL/L (ref 20–31)
COLOR UR: ABNORMAL
CREAT BLD-MCNC: 0.5 MG/DL (ref 0.6–1.3)
DEPRECATED RDW RBC AUTO: 52.9 FL (ref 37–54)
ERYTHROCYTE [DISTWIDTH] IN BLOOD BY AUTOMATED COUNT: 14.8 % (ref 11.3–14.5)
GFR SERPL CREATININE-BSD FRML MDRD: 117 ML/MIN/1.73
GLUCOSE BLD-MCNC: 86 MG/DL (ref 70–100)
GLUCOSE UR STRIP-MCNC: NEGATIVE MG/DL
HCT VFR BLD AUTO: 28.1 % (ref 34.5–44)
HGB BLD-MCNC: 8.8 G/DL (ref 11.5–15.5)
HGB UR QL STRIP.AUTO: NEGATIVE
HYALINE CASTS UR QL AUTO: ABNORMAL /LPF
KETONES UR QL STRIP: ABNORMAL
LEUKOCYTE ESTERASE UR QL STRIP.AUTO: ABNORMAL
MCH RBC QN AUTO: 30.3 PG (ref 27–31)
MCHC RBC AUTO-ENTMCNC: 31.3 G/DL (ref 32–36)
MCV RBC AUTO: 96.9 FL (ref 80–99)
NITRITE UR QL STRIP: NEGATIVE
PH UR STRIP.AUTO: 6 [PH] (ref 5–8)
PLATELET # BLD AUTO: 150 10*3/MM3 (ref 150–450)
PMV BLD AUTO: 10.5 FL (ref 6–12)
POTASSIUM BLD-SCNC: 3 MMOL/L (ref 3.5–5.5)
PROT UR QL STRIP: ABNORMAL
RBC # BLD AUTO: 2.9 10*6/MM3 (ref 3.89–5.14)
RBC # UR: ABNORMAL /HPF
REF LAB TEST METHOD: ABNORMAL
SODIUM BLD-SCNC: 132 MMOL/L (ref 132–146)
SP GR UR STRIP: 1.02 (ref 1–1.03)
SQUAMOUS #/AREA URNS HPF: ABNORMAL /HPF
UROBILINOGEN UR QL STRIP: ABNORMAL
WBC NRBC COR # BLD: 15.75 10*3/MM3 (ref 3.5–10.8)
WBC UR QL AUTO: ABNORMAL /HPF
YEAST URNS QL MICRO: ABNORMAL /HPF

## 2017-07-06 PROCEDURE — 81001 URINALYSIS AUTO W/SCOPE: CPT | Performed by: INTERNAL MEDICINE

## 2017-07-06 PROCEDURE — 25010000002 HEPARIN (PORCINE) PER 1000 UNITS: Performed by: INTERNAL MEDICINE

## 2017-07-06 PROCEDURE — 99233 SBSQ HOSP IP/OBS HIGH 50: CPT | Performed by: INTERNAL MEDICINE

## 2017-07-06 PROCEDURE — 85027 COMPLETE CBC AUTOMATED: CPT | Performed by: INTERNAL MEDICINE

## 2017-07-06 PROCEDURE — 87086 URINE CULTURE/COLONY COUNT: CPT | Performed by: INTERNAL MEDICINE

## 2017-07-06 PROCEDURE — 80048 BASIC METABOLIC PNL TOTAL CA: CPT | Performed by: INTERNAL MEDICINE

## 2017-07-06 RX ORDER — L.ACID,PARA/B.BIFIDUM/S.THERM 8B CELL
1 CAPSULE ORAL DAILY
Status: DISCONTINUED | OUTPATIENT
Start: 2017-07-06 | End: 2017-07-12 | Stop reason: HOSPADM

## 2017-07-06 RX ORDER — DEXTROSE, SODIUM CHLORIDE, AND POTASSIUM CHLORIDE 5; .45; .15 G/100ML; G/100ML; G/100ML
125 INJECTION INTRAVENOUS CONTINUOUS
Status: DISCONTINUED | OUTPATIENT
Start: 2017-07-06 | End: 2017-07-12 | Stop reason: HOSPADM

## 2017-07-06 RX ORDER — FLUCONAZOLE 100 MG/1
200 TABLET ORAL EVERY 24 HOURS
Status: COMPLETED | OUTPATIENT
Start: 2017-07-06 | End: 2017-07-08

## 2017-07-06 RX ORDER — ATORVASTATIN CALCIUM 20 MG/1
20 TABLET, FILM COATED ORAL NIGHTLY
Status: DISCONTINUED | OUTPATIENT
Start: 2017-07-06 | End: 2017-07-12 | Stop reason: HOSPADM

## 2017-07-06 RX ADMIN — CARBIDOPA AND LEVODOPA 1 TABLET: 50; 200 TABLET, EXTENDED RELEASE ORAL at 21:11

## 2017-07-06 RX ADMIN — Medication 250 MG: at 17:51

## 2017-07-06 RX ADMIN — MIRTAZAPINE 30 MG: 15 TABLET, FILM COATED ORAL at 21:03

## 2017-07-06 RX ADMIN — CETIRIZINE HYDROCHLORIDE 5 MG: 10 TABLET, FILM COATED ORAL at 08:55

## 2017-07-06 RX ADMIN — LEVOTHYROXINE SODIUM 75 MCG: 75 TABLET ORAL at 08:56

## 2017-07-06 RX ADMIN — HEPARIN SODIUM 5000 UNITS: 5000 INJECTION, SOLUTION INTRAVENOUS; SUBCUTANEOUS at 06:07

## 2017-07-06 RX ADMIN — ATORVASTATIN CALCIUM 20 MG: 20 TABLET, FILM COATED ORAL at 21:02

## 2017-07-06 RX ADMIN — HEPARIN SODIUM 5000 UNITS: 5000 INJECTION, SOLUTION INTRAVENOUS; SUBCUTANEOUS at 21:04

## 2017-07-06 RX ADMIN — CARBIDOPA AND LEVODOPA 1 TABLET: 25; 100 TABLET ORAL at 06:07

## 2017-07-06 RX ADMIN — POTASSIUM CHLORIDE, DEXTROSE MONOHYDRATE AND SODIUM CHLORIDE 125 ML/HR: 150; 5; 450 INJECTION, SOLUTION INTRAVENOUS at 09:15

## 2017-07-06 RX ADMIN — CARBIDOPA AND LEVODOPA 1 TABLET: 25; 100 TABLET ORAL at 12:09

## 2017-07-06 RX ADMIN — Medication 250 MG: at 08:55

## 2017-07-06 RX ADMIN — MICONAZOLE NITRATE: 20 POWDER TOPICAL at 08:57

## 2017-07-06 RX ADMIN — MICONAZOLE NITRATE: 20 POWDER TOPICAL at 21:11

## 2017-07-06 RX ADMIN — CARBIDOPA AND LEVODOPA 1 TABLET: 25; 100 TABLET ORAL at 17:50

## 2017-07-06 RX ADMIN — Medication 250 MG: at 12:09

## 2017-07-06 RX ADMIN — METRONIDAZOLE 500 MG: 500 INJECTION, SOLUTION INTRAVENOUS at 17:50

## 2017-07-06 RX ADMIN — ACETAMINOPHEN 650 MG: 325 TABLET, FILM COATED ORAL at 21:02

## 2017-07-06 RX ADMIN — METRONIDAZOLE 500 MG: 500 INJECTION, SOLUTION INTRAVENOUS at 09:15

## 2017-07-06 RX ADMIN — Medication 1 CAPSULE: at 14:18

## 2017-07-06 RX ADMIN — HEPARIN SODIUM 5000 UNITS: 5000 INJECTION, SOLUTION INTRAVENOUS; SUBCUTANEOUS at 14:18

## 2017-07-06 RX ADMIN — FLUTICASONE PROPIONATE 2 SPRAY: 50 SPRAY, METERED NASAL at 08:56

## 2017-07-06 RX ADMIN — CARBIDOPA AND LEVODOPA 1 TABLET: 25; 100 TABLET ORAL at 14:18

## 2017-07-06 RX ADMIN — FLUCONAZOLE 200 MG: 100 TABLET ORAL at 12:10

## 2017-07-06 RX ADMIN — FAMOTIDINE 40 MG: 20 TABLET ORAL at 08:56

## 2017-07-06 RX ADMIN — Medication 250 MG: at 06:07

## 2017-07-06 RX ADMIN — TAMSULOSIN HYDROCHLORIDE 0.8 MG: 0.4 CAPSULE ORAL at 08:56

## 2017-07-06 RX ADMIN — ASPIRIN 325 MG ORAL TABLET 325 MG: 325 PILL ORAL at 08:55

## 2017-07-06 NOTE — PROGRESS NOTES
Continued Stay Note  [facility]     Patient Name: Florinda Knapp  MRN: [id number]  Today's Date: 7/6/2017    Admit Date: 7/4/2017          Discharge Plan     Consent obtained for the participation in the [facility] Transitions Program. Sonya Hernandez RN                Discharge Codes     None        Expected Discharge Date and Time     Expected Discharge Date Expected Discharge Time    Jul 10, 2017             Sonya Hernandez RN

## 2017-07-06 NOTE — CONSULTS
INFECTIOUS DISEASE CONSULT/INITIAL HOSPITAL VISIT    Florinda Knapp  3/5/1931  [id number]    Date of Consult: 7/6/2017    Admission Date: 7/4/2017      Requesting Provider: No ref. provider found  Evaluating Physician: Virginia Demarco MD    Reason for Consultation: cdiff colitis, recurrent uti    History of present illness:    Patient is a 86 y.o. female admitted 2 days ago with diarrhea, nausea, abdominal distention and severe weakness.  She became ill 3 weeks ago and presented to the [facility]/19 with abd pain and distention. Both patient and son state that she was not having diarrhea at the time and did not have a bm until several days later. WBC was 11.3  CT abd showed colitis involving transverse, descending and sigmoid colon.  She declined admission at that time and was treated with empiric flagyl and cipro for 7 days. Ten days after stopping the antibiotics ahe developed progressive weakness, nausea, confusion followed by onset of abd pain and diarrhea  In the  ER 7/4  WBC 16K; CT showed significant improvement in colitis Started on ceftriaxone then changed to zosyn  When cdiff + yesterday, changed to parenteral flagyl and oral vanc  Rectal tube placed yesterday after numerous stools  Today, abd pain and distention improved    UA on admission unremarkable Repeat UA this am has moderate LE and 3+ yeast  She had severe urinary retention after hospitalizations in Dec and Jan for JALEN then CVA  These hospitalizations were followed by multiple uti's with enterococcus, C albicans and citrobacter  She underwent urethral dilatation by Dr Flynn ~ 6 weeks ago with marked improvement in urinary retention and dysuria    Past Medical History:   Diagnosis Date   • 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    • Parkinsons- followed by Dr Castro    • Depression    • Urinary Retention- s/p urethral dilatation    • Hyperlipidemia    • Hypertension    • Hypothyroidism    • Osteoarthritis    • Osteoporosis    •  Parkinson's disease    • Stress incontinence     Asthma     Anxiety             Past Surgical History:   Procedure Laterality Date   • APPENDECTOMY     • BACK SURGERY     • CATARACT EXTRACTION     • ELBOW PROCEDURE     • KNEE SURGERY     • OR PRIM PRQ TRLUML MCHNL THRMBC N-COR N-ICRA 1ST Left 2/19/2017    Diagnostic Angiogram only, no thrombectomy   • TOTAL HIP ARTHROPLASTY Left 12/2016       Family History   Problem Relation Age of Onset   • Alcohol abuse Other    • Hypertension Other    • Stroke Other    • Heart disease Son        Social History     Social History   • Marital status:      Spouse name: N/A   • Number of children: Son very involved in care   • Years of education: N/A     Occupational History   • Not on file.     Social History Main Topics   • Smoking status: Never Smoker   • Smokeless tobacco: Never Used   • Alcohol use Yes      Comment: social   • Drug use: No   • Sexual activity: Defer     Other Topics Concern   • Not on file     Social History Narrative    Lives at [facility]       Allergies   Allergen Reactions   • Abilify [Aripiprazole]    • Alendronate    • Celecoxib    • Fosamax Plus D [Alendronate-Cholecalciferol]    • Levaquin [Levofloxacin]; tolerates cipro    • Macrobid [Nitrofurantoin Monohyd Macro]    • Nitrofurantoin    • Procaine    • Sulfa Antibiotics Nausea And Vomiting and Rash         Medication:    Current Facility-Administered Medications:   •  acetaminophen (TYLENOL) tablet 650 mg, 650 mg, Oral, Q4H PRN, Casie M Mayne, PA-C, 650 mg at 07/05/17 1400  •  aspirin tablet 325 mg, 325 mg, Oral, Daily, Casie M Mayne, PA-C, 325 mg at 07/06/17 0855  •  atorvastatin (LIPITOR) tablet 20 mg, 20 mg, Oral, Nightly, Virginia Demarco MD  •  carbidopa-levodopa (SINEMET)  MG per tablet 1 tablet, 1 tablet, Oral, Q6H, Flo Dugan MD, 1 tablet at 07/06/17 0607  •  carbidopa-levodopa CR (SINEMET CR)  MG per CR tablet 1 tablet, 1 tablet, Oral, Nightly, Perlita  M Mayne, PA-C, 1 tablet at 07/05/17 2257  •  carvedilol (COREG) tablet 3.125 mg, 3.125 mg, Oral, Q12H, Casie M Mayne, PA-C, 3.125 mg at 07/05/17 0908  •  cetirizine (zyrTEC) tablet 5 mg, 5 mg, Oral, Daily, Casie M Mayne, PA-C, 5 mg at 07/06/17 0855  •  dextrose 5 % and sodium chloride 0.45 % with KCl 20 mEq/L infusion, 125 mL/hr, Intravenous, Continuous, Flo Dugan MD, Last Rate: 125 mL/hr at 07/06/17 0915, 125 mL/hr at 07/06/17 0915  •  famotidine (PEPCID) tablet 40 mg, 40 mg, Oral, Daily, Casie M Mayne, PA-C, 40 mg at 07/06/17 0856  •  fluconazole (DIFLUCAN) tablet 200 mg, 200 mg, Oral, Q24H, Virginia Demarco MD  •  fluticasone (FLONASE) 50 MCG/ACT nasal spray 2 spray, 2 spray, Each Nare, Daily, Casie M Mayne, PA-C, 2 spray at 07/06/17 0856  •  heparin (porcine) 5000 UNIT/ML injection 5,000 Units, 5,000 Units, Subcutaneous, Q8H, Flo Dugan MD, 5,000 Units at 07/06/17 0607  •  lactobacillus acidophilus (RISAQUAD) capsule 1 capsule, 1 capsule, Oral, Daily, Virginia Demarco MD  •  levothyroxine (SYNTHROID, LEVOTHROID) tablet 75 mcg, 75 mcg, Oral, Daily, Casie M Mayne, PA-C, 75 mcg at 07/06/17 0856  •  melatonin sublingual tablet 5 mg, 5 mg, Sublingual, Nightly PRN, Barbara Maxwell PA-C, 5 mg at 07/05/17 0221  •  metroNIDAZOLE (FLAGYL) IVPB 500 mg, 500 mg, Intravenous, Q8H, Flo Dugan MD, 500 mg at 07/06/17 0915  •  miconazole (MICOTIN) 2 % powder, , Topical, Q12H, Flo Dugan MD  •  mirtazapine (REMERON) tablet 30 mg, 30 mg, Oral, Nightly, Casie M Mayne, PA-C, 30 mg at 07/05/17 1767  •  ondansetron (ZOFRAN) tablet 4 mg, 4 mg, Oral, Q6H PRN **OR** ondansetron (ZOFRAN) injection 4 mg, 4 mg, Intravenous, Q6H PRN, Casie M Mayne, PA-C, 4 mg at 07/05/17 0550  •  sodium chloride 0.9 % flush 1-10 mL, 1-10 mL, Intravenous, PRN, Casie M Mayne, PA-C  •  sodium chloride 0.9 % flush 1-10 mL, 1-10 mL, Intravenous, PRN, Casie M Mayne, PA-C  •  Insert peripheral IV, , , Once **AND** sodium chloride 0.9 % flush  10 mL, 10 mL, Intravenous, PRN, Eddy Reynaga MD  •  tamsulosin (FLOMAX) 24 hr capsule 0.8 mg, 0.8 mg, Oral, Daily, Casie M Mayne, PA-C, 0.8 mg at 17 0856  •  vancomycin oral solution 250 mg, 250 mg, Oral, Q6H, Flo Dugan MD, 250 mg at 17 0607    Antibiotics:  IV Anti-Infectives     Ordered     Dose/Rate Route Frequency Start Stop    17 1120  fluconazole (DIFLUCAN) tablet 200 mg     Ordering Provider:  Virginia Demarco MD    200 mg Oral Every 24 Hours 17 1200 17 1159    17 0812  metroNIDAZOLE (FLAGYL) IVPB 500 mg     Ordering Provider:  Flo Dugan MD    500 mg  over 60 Minutes Intravenous Every 8 Hours 17 0900      17 0804  vancomycin oral solution 250 mg     Ordering Provider:  Flo Dugan MD    250 mg Oral Every 6 Hours Scheduled 17 1000 17 1159    17 1556  cefTRIAXone (ROCEPHIN) IVPB 1 g     Ordering Provider:  Eddy Reynaga MD    1 g  over 30 Minutes Intravenous Once 17 1558 17 1649            Review of Systems:  Constitutional-- No Fever, chills or sweats.  Appetite fair, malaise and fatigue PTA .  HEENT-- No new vision, hearing or throat complaints.  No epistaxis or oral sores.  Denies odynophagia or dysphagia. No headache, photophobia or neck stiffness.  CV-- No chest pain, palpitation or syncope  Resp-- No SOB/cough/Hemoptysis  GI- +nausea,  No vomiting, + diarrhea.  No hematochezia, melena, or hematemesis. Denies jaundice or chronic liver disease.  -- + dysuria and  urgency or flank pain. Farris inserted  Lymph- no swollen lymph nodes in neck/axilla or groin.   Heme- No active bruising or bleeding; no Hx of DVT or PE.  MS-- no swelling or pain in the bones or joints of arms/legs.  No new back pain.  Neuro-- recovery from illness complicated by Parkinsons- very weak and son is requesting rehab placement  Skin--No rashes or lesions      Physical Exam:   Vital Signs  Temp (24hrs), Av.8 °F (37.1 °C), Min:97.3  °F (36.3 °C), Max:101.1 °F (38.4 °C)    Temp  Min: 97.3 °F (36.3 °C)  Max: 101.1 °F (38.4 °C)  BP  Min: 71/43  Max: 111/60  Pulse  Min: 65  Max: 86  Resp  Min: 16  Max: 18  SpO2  Min: 95 %  Max: 96 %    GENERAL: sleepybut  alert, in no acute distress, excellent recall of medical events.   HEENT: Normocephalic, atraumatic.  PERRL. EOMI. No conjunctival injection. No icterus. Oropharynx clear without evidence of thrush or exudate. No evidence of peridontal disease.    NECK: Supple without nuchal rigidity. No mass.  LYMPH: No cervical, axillary or inguinal lymphadenopathy.  HEART:  Irregular rate; No murmur, rubs, gallops.   LUNGS: few crackles at bases. Normal respiratory effort. Nonlabored. No dullness.  ABDOMEN: protuberant and firm but nontender. Hypoactive  bowel sounds. No rebound or guarding. NO mass or HSM.  EXT:  No cyanosis, clubbing or edema. No cord.  : Genitalia generally unremarkable.  + Farris catheter.  MSK: FROM without joint effusions noted arms/legs.    SKIN: Warm and dry without cutaneous eruptions on Inspection/palpation.    NEURO: Oriented to PPT. No focal deficits. Very mild rigidity  PSYCHIATRIC: Normal insight and judgement. Cooperative with PE    Laboratory Data      Results from last 7 days  Lab Units 07/06/17  0723 07/05/17  0650 07/04/17  1409   WBC 10*3/mm3 15.75* 14.89* 16.22*   HEMOGLOBIN g/dL 8.8* 10.1* 12.1   HEMATOCRIT % 28.1* 32.7* 37.5   PLATELETS 10*3/mm3 150 163 186       Results from last 7 days  Lab Units 07/06/17  0723   SODIUM mmol/L 132   POTASSIUM mmol/L 3.0*   CHLORIDE mmol/L 103   CO2 mmol/L 23.0   BUN mg/dL 12   CREATININE mg/dL 0.50*   GLUCOSE mg/dL 86   CALCIUM mg/dL 7.6*       Results from last 7 days  Lab Units 07/04/17  1409   ALK PHOS U/L 82   BILIRUBIN mg/dL 0.7   ALT (SGPT) U/L 1*   AST (SGOT) U/L 13       Results from last 7 days  Lab Units 07/04/17  1409   SED RATE mm/hr 55*       Results from last 7 days  Lab Units 07/04/17  1409   CRP mg/dL 8.37*        Results from last 7 days  Lab Units 07/04/17  1831   LACTATE mmol/L 0.9             Estimated Creatinine Clearance: 39.8 mL/min (by C-G formula based on Cr of 0.5).      Microbiology:  Blood Culture   Date Value Ref Range Status   07/04/2017 No growth at 24 hours  Preliminary   07/04/2017 No growth at 24 hours  Preliminary                    Urine Culture   Date Value Ref Range Status   07/04/2017 No growth at 2 days  Final              Radiology:  Imaging Results (last 72 hours)     Procedure Component Value Units Date/Time    XR Chest 1 View [[id number]] Collected:  07/05/17 0911     Updated:  07/05/17 1708    Narrative:       EXAMINATION: XR CHEST 1 VW- 07/04/2017     INDICATION: weakness      COMPARISON: 02/23/2017     FINDINGS: Portable chest reveals heart to be enlarged. Underlying  chronic and emphysematous changes seen within the lung fields  bilaterally. Degenerative changes seen within the spine. Vascular  calcifications are identified. No focal parenchymal opacification  present. No pleural effusion or pneumothorax.           Impression:       Degenerative changes seen within the spine. The heart is  enlarged. Underlying chronic and emphysematous changes seen within the  lung fields bilaterally.     D:  07/04/2017  E:  07/05/2017     This report was finalized on 7/5/2017 5:06 PM by Dr. Karely Esquivel MD.       CT Abdomen Pelvis Without Contrast [[id number]] Collected:  07/05/17 0916     Updated:  07/05/17 1708    Narrative:       EXAMINATION: CT ABDOMEN AND PELVIS WO CONTRAST-      INDICATION: History of colitis, now with abdominal pain generalized.     TECHNIQUE: Multiple axial CT imaging was obtained of the abdomen and  pelvis without the administration of intravenous contrast.     The radiation dose reduction device was turned on for each scan per the  ALARA (As Low as Reasonably Achievable) protocol.     COMPARISON: None.     FINDINGS: There is no change in the small pericardial effusion.  There is  a small left pleural effusion. Mild increased marking is seen at the  left lung base. Chronic changes are identified. The liver is homogeneous  as well as the spleen. There is a cyst identified in the left kidney. No  stones in the gallbladder. Vascular calcification is seen within the  abdominal aorta and iliac vessels. No free fluid or free air. No  abnormal mass or fluid collection is identified. The appendix is normal.  The abdominal portion of the gastrointestinal tract is within normal  limits.     PELVIS: Pelvic organs are unremarkable. The pelvic portion of the  gastrointestinal tract reveals improvement seen in the descending colon  previously having abnormal wall thickening. There is only minimal wall  thickening at this time with pericolonic stranding. Minimal fluid is  seen in the left paracolic gutter. Findings have significantly improved  in the interval.  No free fluid or free air. Calcification is seen  within the uterus suggesting degenerating fibroids. Vascular  calcification is seen within the pelvic vessels. No abnormal mass or  fluid collection is identified. Atrophy is identified of the left psoas  muscle. Hardware is identified within the left hip. No acute bony  abnormality is identified.       Impression:       Significant improvement seen in the appearance of the  descending colon with minimal wall thickening remaining and minimal  pericolonic stranding. There is a small amount of fluid in the left  paracolic gutter. The remainder the CT abdomen and pelvis is stable and  grossly unremarkable.     D:  07/04/2017  E:  07/05/2017     This report was finalized on 7/5/2017 5:06 PM by Dr. Karely Esquivel MD.               Impression:     - Cdiff colitis new dx  I suspect that the colitis that prompted her 6/19 ER evaluation was not cdiff since she was not having diarrhea at that time  Doubt diverticulitis since it was nearly pancolitis and WBC was only 11K   Possible diagnoses  include camphylobacter, viral infection, etc  CT findings significantly improved after a 7 day course of flagyl and cipro    - Cystitis- probably fungal based in UA results    - Urinary retention- symptoms improved after urethral dilatation  Hopefully infections will decrease as well  Dr Flynn has recommended 2 additional dilatation procedures    - CVA 2/2017    - Parkinsons Disease    PLAN/RECOMMENDATIONS:   Thank you for asking us to see Florinda Knapp, I recommend the following:     Agree with oral vancomycin; stop parenteral flagyl at discharge Recommend oral vancomycin 250 mg qid to 7/20 then bid to 8/1 then daily to 8/15  Discussed the significant relapse rate with son and the potential benefit of longterm probiotics    Agree with stopping zosyn    Short course oral fluconazole while urine culture pending - will halve the dose of atorvastatin while on fluconazole in attempt to reduce risk of rhabdomyalysis    Change saccharomyces to lactobacillus since the antifungal will likely decrease the effectiveness of the latter    Remove navarro and rectal tube tomorrow then assess for urinary retention    If diarrhea does not improve as anticipated, consider stool C+S, O+P to look for concurrent infection      Discussed with son  Discussed with Dr Ritchie Demarco MD  7/6/2017  11:21 AM

## 2017-07-06 NOTE — PLAN OF CARE
Problem: Patient Care Overview (Adult)  Goal: Plan of Care Review  Outcome: Ongoing (interventions implemented as appropriate)  Goal: Adult Individualization and Mutuality  Outcome: Ongoing (interventions implemented as appropriate)  Goal: Discharge Needs Assessment  Outcome: Ongoing (interventions implemented as appropriate)    Problem: Pain, Acute (Adult)  Goal: Acceptable Pain Control/Comfort Level  Outcome: Ongoing (interventions implemented as appropriate)    Problem: Infection, Risk/Actual (Adult)  Goal: Infection Prevention/Resolution  Outcome: Ongoing (interventions implemented as appropriate)    Problem: Fall Risk (Adult)  Goal: Absence of Falls  Outcome: Ongoing (interventions implemented as appropriate)

## 2017-07-06 NOTE — PROGRESS NOTES
[facility] Medicine Services  INPATIENT PROGRESS NOTE    Date of Admission: 7/4/2017  Length of Stay: 2  Primary Care Physician: Kieran Cottrell MD    Subjective   CC: weakness    HPI:  Febrile yesterday afternoon.  Rectal tube place for severe persistent diarrhea and navarro placed for retention.  Patient this AM says she feels bad.  Is confused and is a poor historian.    Review Of Systems:   Review of Systems   Constitutional: Negative for fever.   Gastrointestinal: Positive for abdominal pain, diarrhea and nausea.   Neurological: Positive for weakness.   All other systems reviewed and are negative.      Objective      Temp:  [97.3 °F (36.3 °C)-101.1 °F (38.4 °C)] 97.3 °F (36.3 °C)  Heart Rate:  [65-86] 65  Resp:  [16-18] 18  BP: ()/(41-63) 111/60  Physical Exam   Constitutional: She appears well-developed and well-nourished.   Looks ill but non-toxic   HENT:   Head: Normocephalic and atraumatic.   Eyes: Pupils are equal, round, and reactive to light.   Cardiovascular: Normal rate, regular rhythm and normal heart sounds.    Pulmonary/Chest: Effort normal and breath sounds normal. No respiratory distress. She has no wheezes.   Abdominal: Soft. Bowel sounds are normal.   Minimal diffuse tenderness, mild distention, tympanic to percussion  Rectal tube in place   Genitourinary:   Genitourinary Comments: Navarro in place   Musculoskeletal: She exhibits no edema.   Neurological: She is alert. No cranial nerve deficit.   Mild confusion, No focal deficits   Skin: Skin is warm and dry. No rash noted.   Psychiatric: She has a normal mood and affect.   Vitals reviewed.      Results Review:    I have reviewed the labs, radiology results and diagnostic studies.      Results from last 7 days  Lab Units 07/06/17  0723   WBC 10*3/mm3 15.75*   HEMOGLOBIN g/dL 8.8*   PLATELETS 10*3/mm3 150       Results from last 7 days  Lab Units 07/05/17  0650   SODIUM mmol/L 134   POTASSIUM mmol/L 3.7    CHLORIDE mmol/L 102   CO2 mmol/L 21.0   BUN mg/dL 11   CREATININE mg/dL 0.60   GLUCOSE mg/dL 69*   CALCIUM mg/dL 8.4*       Culture Data:   Blood/urine cultures negative so far  + c diff    Radiology Data:   Abd Ct - colitis, but improved from previous (6/19)    I have reviewed the medications.    Assessment/Plan     Problem List  Hospital Problem List     * (Principal)C. difficile colitis    Overview Signed 2/19/2017  2:48 PM by ARIEL Allen     Per CT a/p 1/13/2017, treated with Zosyn, had negative C diff 1/17/17         Generalized weakness    FTT (failure to thrive) in adult    Dysuria    Parkinson's disease    History of recurrent UTIs    CHF with cardiomyopathy    Overview Signed 1/14/2017  1:44 PM by Evangelista Brand MD     EF 45%; mild-moderate mr and ai         Hypertension    Hypothyroidism    Overview Signed 2/19/2017  2:21 PM by ARIEL Allen     On replacement         History of ischemic left MCA stroke 2/20/17    Renal cyst, left noted on CT    Abdominal pain, acute, generalized        Assessment/Plan:  - 87 yo who presents with progressive weakness and some diarrhea.  Was seen in ED on 6/19 with CT scan showing evidence of colitis, treated with cipro/flagyl though c diff not collected.  Comes back with progressive symptoms and is now C diff positive  - Still having some significant diarrhea and rectal tube, fever yesterday, and persistently elevated WBC.  PO vanc started 7/5.  Will add some IV flagyl.  Will ask ID to eval due to severity of illness.  - BP borderline.  Will increase IVF, add some D5 and potassium to fluids  - continued home meds as appropriate  - continue navarro for now, previously seen Dr. Flynn per the records.  Can consult him if still retains after removal.  - continue PT/OT , remains very weak  - Came from assisted living facility, she will need higher level of care at the time of discharge.  I suspect she will be here through the weekend.    DVT  prophylaxis: heparin    High complexity    Flo Dugan MD   07/06/17   8:06 AM

## 2017-07-07 LAB
ANION GAP SERPL CALCULATED.3IONS-SCNC: 6 MMOL/L (ref 3–11)
BUN BLD-MCNC: 8 MG/DL (ref 9–23)
BUN/CREAT SERPL: 16 (ref 7–25)
CALCIUM SPEC-SCNC: 8 MG/DL (ref 8.7–10.4)
CHLORIDE SERPL-SCNC: 106 MMOL/L (ref 99–109)
CO2 SERPL-SCNC: 19 MMOL/L (ref 20–31)
CREAT BLD-MCNC: 0.5 MG/DL (ref 0.6–1.3)
DEPRECATED RDW RBC AUTO: 52.9 FL (ref 37–54)
ERYTHROCYTE [DISTWIDTH] IN BLOOD BY AUTOMATED COUNT: 14.6 % (ref 11.3–14.5)
GFR SERPL CREATININE-BSD FRML MDRD: 117 ML/MIN/1.73
GLUCOSE BLD-MCNC: 112 MG/DL (ref 70–100)
HCT VFR BLD AUTO: 31.4 % (ref 34.5–44)
HGB BLD-MCNC: 9.8 G/DL (ref 11.5–15.5)
MCH RBC QN AUTO: 30.6 PG (ref 27–31)
MCHC RBC AUTO-ENTMCNC: 31.2 G/DL (ref 32–36)
MCV RBC AUTO: 98.1 FL (ref 80–99)
PLATELET # BLD AUTO: 160 10*3/MM3 (ref 150–450)
PMV BLD AUTO: 10.7 FL (ref 6–12)
POTASSIUM BLD-SCNC: 3.8 MMOL/L (ref 3.5–5.5)
RBC # BLD AUTO: 3.2 10*6/MM3 (ref 3.89–5.14)
SODIUM BLD-SCNC: 131 MMOL/L (ref 132–146)
WBC NRBC COR # BLD: 9.2 10*3/MM3 (ref 3.5–10.8)

## 2017-07-07 PROCEDURE — 25010000002 HEPARIN (PORCINE) PER 1000 UNITS: Performed by: INTERNAL MEDICINE

## 2017-07-07 PROCEDURE — 97530 THERAPEUTIC ACTIVITIES: CPT

## 2017-07-07 PROCEDURE — 99233 SBSQ HOSP IP/OBS HIGH 50: CPT | Performed by: INTERNAL MEDICINE

## 2017-07-07 PROCEDURE — 97110 THERAPEUTIC EXERCISES: CPT

## 2017-07-07 PROCEDURE — 85027 COMPLETE CBC AUTOMATED: CPT | Performed by: INTERNAL MEDICINE

## 2017-07-07 PROCEDURE — 80048 BASIC METABOLIC PNL TOTAL CA: CPT | Performed by: INTERNAL MEDICINE

## 2017-07-07 RX ADMIN — FAMOTIDINE 40 MG: 20 TABLET ORAL at 09:27

## 2017-07-07 RX ADMIN — CARBIDOPA AND LEVODOPA 1 TABLET: 25; 100 TABLET ORAL at 21:26

## 2017-07-07 RX ADMIN — CETIRIZINE HYDROCHLORIDE 5 MG: 10 TABLET, FILM COATED ORAL at 09:26

## 2017-07-07 RX ADMIN — CARBIDOPA AND LEVODOPA 1 TABLET: 25; 100 TABLET ORAL at 06:09

## 2017-07-07 RX ADMIN — Medication 250 MG: at 18:00

## 2017-07-07 RX ADMIN — MICONAZOLE NITRATE: 20 POWDER TOPICAL at 21:27

## 2017-07-07 RX ADMIN — Medication 250 MG: at 21:27

## 2017-07-07 RX ADMIN — Medication 5 MG: at 21:27

## 2017-07-07 RX ADMIN — Medication 250 MG: at 06:08

## 2017-07-07 RX ADMIN — ASPIRIN 325 MG ORAL TABLET 325 MG: 325 PILL ORAL at 09:25

## 2017-07-07 RX ADMIN — MICONAZOLE NITRATE 1 APPLICATION: 20 POWDER TOPICAL at 09:34

## 2017-07-07 RX ADMIN — CARVEDILOL 3.12 MG: 3.12 TABLET, FILM COATED ORAL at 09:27

## 2017-07-07 RX ADMIN — METRONIDAZOLE 500 MG: 500 INJECTION, SOLUTION INTRAVENOUS at 13:15

## 2017-07-07 RX ADMIN — CARBIDOPA AND LEVODOPA 1 TABLET: 50; 200 TABLET, EXTENDED RELEASE ORAL at 21:26

## 2017-07-07 RX ADMIN — Medication 250 MG: at 00:00

## 2017-07-07 RX ADMIN — Medication 250 MG: at 12:00

## 2017-07-07 RX ADMIN — FLUTICASONE PROPIONATE 2 SPRAY: 50 SPRAY, METERED NASAL at 09:28

## 2017-07-07 RX ADMIN — CARBIDOPA AND LEVODOPA 1 TABLET: 25; 100 TABLET ORAL at 12:00

## 2017-07-07 RX ADMIN — TAMSULOSIN HYDROCHLORIDE 0.8 MG: 0.4 CAPSULE ORAL at 09:25

## 2017-07-07 RX ADMIN — MIRTAZAPINE 30 MG: 15 TABLET, FILM COATED ORAL at 22:18

## 2017-07-07 RX ADMIN — HEPARIN SODIUM 5000 UNITS: 5000 INJECTION, SOLUTION INTRAVENOUS; SUBCUTANEOUS at 21:27

## 2017-07-07 RX ADMIN — CARVEDILOL 3.12 MG: 3.12 TABLET, FILM COATED ORAL at 21:27

## 2017-07-07 RX ADMIN — HEPARIN SODIUM 5000 UNITS: 5000 INJECTION, SOLUTION INTRAVENOUS; SUBCUTANEOUS at 06:08

## 2017-07-07 RX ADMIN — LEVOTHYROXINE SODIUM 75 MCG: 75 TABLET ORAL at 09:27

## 2017-07-07 RX ADMIN — POTASSIUM CHLORIDE, DEXTROSE MONOHYDRATE AND SODIUM CHLORIDE 125 ML/HR: 150; 5; 450 INJECTION, SOLUTION INTRAVENOUS at 18:47

## 2017-07-07 RX ADMIN — Medication 1 CAPSULE: at 09:27

## 2017-07-07 RX ADMIN — FLUCONAZOLE 200 MG: 100 TABLET ORAL at 12:00

## 2017-07-07 RX ADMIN — METRONIDAZOLE 500 MG: 500 INJECTION, SOLUTION INTRAVENOUS at 21:27

## 2017-07-07 RX ADMIN — HEPARIN SODIUM 5000 UNITS: 5000 INJECTION, SOLUTION INTRAVENOUS; SUBCUTANEOUS at 13:19

## 2017-07-07 RX ADMIN — ATORVASTATIN CALCIUM 20 MG: 20 TABLET, FILM COATED ORAL at 21:27

## 2017-07-07 NOTE — THERAPY TREATMENT NOTE
Inpatient Rehabilitation - Occupational Therapy Treatment Note  Psychiatric     Patient Name: Florinda Knapp  : 3/5/1931  MRN: [id number]  Today's Date: 2017  Onset of Illness/Injury or Date of Surgery Date: 17  Date of Referral to OT: 17  Referring Physician: Mayne, PA-C      Admit Date: 2017    Visit Dx:     ICD-10-CM ICD-9-CM   1. Abdominal pain, acute, generalized R10.84 789.07     338.19   2. Leukocytosis, unspecified type D72.829 288.60   3. Acute cystitis without hematuria N30.00 595.0   4. Failure to thrive syndrome, adult R62.7 783.7   5. Impaired mobility and ADLs Z74.09 799.89   6. Impaired functional mobility and activity tolerance Z74.09 V49.89     Patient Active Problem List   Diagnosis   • Dysuria   • Elbow injury   • Fracture of left olecranon process   • Parkinson's disease   • Trauma left hip   • Acute Symptomatic on Chronic Normocytic Anemia   • Acute respiratory failure with hypoxia   • Generalized weakness   • Chest pain   • Symptomatic anemia   • Elevated troponin I level, possibly due to anemia   • Status post total replacement of left hip 2016 at [facility]   • H/O urinary retention   • History of recurrent UTIs   • Essential hypertension   • Anxiety   • CHF with cardiomyopathy   • Colitis   • Hypokalemia   • Acute ischemic left MCA stroke   • Hypertension   • Hyperlipidemia   • Urinary retention/Frequent UTIs   • Hypothyroidism   • Status post left hip replacement   • C. difficile colitis   • History of ischemic left MCA stroke 17   • Renal cyst, left noted on CT   • Physical deconditioning   • FTT (failure to thrive) in adult   • Abdominal pain, acute, generalized   • Rhinorrhea             Adult Rehabilitation Note       17 1338          Rehab Assessment/Intervention    Discipline occupational therapist  -DUKE      Document Type therapy note (daily note)  -DUKE      Subjective Information agree to therapy;complains of;weakness  -DUKE      Symptoms Noted  During/After Treatment none  -DUKE      Precautions/Limitations fall precautions   spore prec, rectal tube, catheter  -DUKE      Recorded by [DUKE] Abigail Camarena OT      Vital Signs    Pre Systolic BP Rehab 136  -DUKE      Pre Treatment Diastolic BP 86  -DUKE      Pretreatment Heart Rate (beats/min) 66  -DUKE      Pre SpO2 (%) 97  -DUKE      O2 Delivery Pre Treatment room air  -DUKE      Post SpO2 (%) 96  -DUKE      O2 Delivery Post Treatment room air  -DUKE      Pre Patient Position Supine  -DUKE      Intra Patient Position Standing  -DUKE      Post Patient Position Sitting  -DUKE      Recorded by [DUKE] Abigail Camarena OT      Pain Assessment    Pain Assessment No/denies pain  -DUKE      Recorded by [DUKE] Abigail Camarena OT      Cognitive Assessment/Intervention    Current Cognitive/Communication Assessment functional  -DUKE      Orientation Status oriented x 4  -DUKE      Follows Commands/Answers Questions 100% of the time;able to follow single-step instructions  -DUKE      Personal Safety WNL/WFL   Pt appears to know own deficits.  -DUKE      Personal Safety Interventions fall prevention program maintained;gait belt;nonskid shoes/slippers when out of bed  -DUKE      Recorded by [DUKE] Abigail Camarena OT      Bed Mobility, Assessment/Treatment    Bed Mobility, Assistive Device bed rails;head of bed elevated  -DUKE      Bed Mobility, Scoot/Bridge, [place] maximum assist (25% patient effort);verbal cues required  -DUKE      Bed Mob, Supine to Sit, [place] moderate assist (50% patient effort);2 person assist required;verbal cues required  -DUKE      Bed Mobility, Safety Issues decreased use of arms for pushing/pulling;decreased use of legs for bridging/pushing;impaired trunk control for bed mobility  -DUKE      Bed Mobility, Impairments strength decreased;impaired balance  -DUKE      Bed Mobility, Comment Pt. able to assist with legs to EOB and reach for rail and scoot self part way out. Assist rest of way up with trunk and to edge.  -DUKE      Recorded by  [DUKE] Abigail Camarena OT      Transfer Assessment/Treatment    Transfers, Sit-Stand [place] maximum assist (25% patient effort);2 person assist required;verbal cues required  -DUKE      Transfers, Stand-Sit [place] maximum assist (25% patient effort);2 person assist required;verbal cues required  -DUKE      Transfers, Sit-Stand-Sit, Assist Device other (see comments)   UE support and gait belt  -DUKE      Transfer, Impairments strength decreased;impaired balance  -DUKE      Recorded by [DUKE] Abigail Camarena OT      Functional Mobility    Functional Mobility- Ind. Level maximum assist (25% patient effort);2 person assist required;verbal cues required  -DUKE      Functional Mobility- Device other (see comments)   UE support and gait belt  -DUKE      Functional Mobility-Distance (Feet) 3   to chair only  -DUKE      Recorded by [DUKE] Abigail Camarena OT      Toileting Assessment/Training    Toileting Assess/Train, Comment rectal tub and catheter  -DUKE      Recorded by [DYLAN Camarena OT      Balance Skills Training    Sitting-Level of Assistance Close supervision  -DUKE      Sitting-Balance Support Feet supported  -DUKE      Standing-Level of Assistance Maximum assistance;x2  -DUKE      Recorded by [DUKE] Abigail Camarena OT      Therapy Exercises    Bilateral Upper Extremity AROM:;10 reps;sitting;elbow flexion/extension;shoulder abduction/adduction;shoulder extension/flexion;shoulder protraction/retraction;shoulder rolls/shrugs  -DUKE      BUE Resistance manual resistance- minimal   biceps and triceps  -DUKE      Recorded by [DUKE] Abigail Camarena OT      Positioning and Restraints    Pre-Treatment Position in bed  -DUKE      Post Treatment Position chair  -DUKE      In Bed sitting;call light within reach;encouraged to call for assist;exit alarm on;with PT  -DUKE      Recorded by [DUKE] Abigail Camarena OT        User Key  (r) = Recorded By, (t) = Taken By, (c) = Cosigned By    Initials Name Effective Dates    DUKE Camarena OT 06/22/15 -                  OT Goals       07/07/17 1408 07/05/17 1030       Bed Mobility OT LTG    Bed Mobility OT LTG, Date Established  07/05/17  -     Bed Mobility OT LTG, Time to Achieve  by discharge  -     Bed Mobility OT LTG, Activity Type  all bed mobility  -     Bed Mobility OT LTG, [place] Level  moderate assist (50% patient effort)  -     Bed Mobility OT LTG, Outcome goal partially met   pt met OOB today  -DUKE goal ongoing  -     Transfer Training OT LTG    Transfer Training OT LTG, Date Established  07/05/17  -     Transfer Training OT LTG, Time to Achieve  by discharge  -     Transfer Training OT LTG, Activity Type  sit to stand/stand to sit;toilet  -     Transfer Training OT LTG, [place] Level --   max of 2  -DUKE moderate assist (50% patient effort)  -     Transfer Training OT LTG, Assist Device  walker, rolling  -     Transfer Training OT LTG, Outcome  goal ongoing  -     Patient Education OT LTG    Patient Education OT LTG, Date Established  07/05/17  -     Patient Education OT LTG, Time to Achieve  by discharge  -     Patient Education OT LTG, Education Type  HEP;posture/body mechanics;home safety  -     Patient Education OT LTG, Education Understanding  demonstrates adequately;verbalizes understanding  -     Patient Education OT LTG Outcome goal ongoing   HEP issued, progressing with foot positioning transfers.  -DUKE goal ongoing  -     Grooming OT LTG    Grooming Goal OT LTG, Date Established  07/05/17  -     Grooming Goal OT LTG, Time to Achieve  by discharge  -     Grooming Goal OT LTG, [place] Level  minimum assist (75% patient effort)  -     Grooming Goal OT LTG, Outcome goal ongoing   Pt waiting for aid to do whole bath  -DUKE goal ongoing  -       User Key  (r) = Recorded By, (t) = Taken By, (c) = Cosigned By    Initials Name Provider Type    DUKE Camarena, OT Occupational Therapist    JUNIOR Rowland, OT Occupational Therapist          Occupational  Therapy Education     Title: PT OT SLP Therapies (Active)     Topic: Occupational Therapy (Active)     Point: ADL training (Active)    Description: Instruct learner(s) on proper safety adaptation and remediation techniques during self care or transfers.   Instruct in proper use of assistive devices.    Learning Progress Summary    Learner Readiness Method Response Comment Documented by Status   Patient Acceptance E [facility] 07/05/17 1030 Active               Point: Home exercise program (Done)    Description: Instruct learner(s) on appropriate technique for monitoring, assisting and/or progressing therapeutic exercises/activities.    Learning Progress Summary    Learner Readiness Method Response Comment Documented by Status   Patient Acceptance E,D,H VU,NR bed mobility technique, transfer and feet postioning, [facility] 07/07/17 1408 Done               Point: Precautions (Done)    Description: Instruct learner(s) on prescribed precautions during self-care and functional transfers.    Learning Progress Summary    Learner Readiness Method Response Comment Documented by Status   Patient Acceptance E,D,H VU,NR bed mobility technique, transfer and feet postioning, [facility] 07/07/17 1408 Done               Point: Body mechanics (Done)    Description: Instruct learner(s) on proper positioning and spine alignment during self-care, functional mobility activities and/or exercises.    Learning Progress Summary    Learner Readiness Method Response Comment Documented by Status   Patient Acceptance E,D,H VU,NR bed mobility technique, transfer and feet postioning, [facility] 07/07/17 1408 Done    Acceptance E [facility] 07/05/17 1030 Active                      User Key     Initials Effective Dates Name Provider Type Discipline     06/22/15 -  Abigail Camarena, OT Occupational Therapist OT     03/14/16 -  Charla Rowland, OT Occupational Therapist OT                  OT Recommendation and Plan  Anticipated Equipment Needs At Discharge:   (TBD)  Anticipated Discharge Disposition: skilled nursing facility  Planned Therapy Interventions: ADL retraining, balance training, bed mobility training, home exercise program, strengthening, transfer training  Therapy Frequency: daily  Plan of Care Review  Plan Of Care Reviewed With: patient  Progress: progress towards functional goals is fair  Outcome Summary/Follow up Plan: Pt. mod assist OOB today.  Stood max of 2 and few steps to chair.  Less anxiety today.  HEP issued.        Outcome Measures       07/07/17 1338 07/05/17 0955 07/05/17 0742    How much help from another person do you currently need...    Turning from your back to your side while in flat bed without using bedrails?  2  -BD     Moving from lying on back to sitting on the side of a flat bed without bedrails?  2  -BD     Moving to and from a bed to a chair (including a wheelchair)?  2  -BD     Standing up from a chair using your arms (e.g., wheelchair, bedside chair)?  2  -BD     Climbing 3-5 steps with a railing?  1  -BD     To walk in hospital room?  1  -BD     AM-PAC 6 Clicks Score  10  -BD     How much help from another is currently needed...    Putting on and taking off regular lower body clothing? 2  -DUKE  1  -MC    Bathing (including washing, rinsing, and drying) 2  -DUKE  1  -MC    Toileting (which includes using toilet bed pan or urinal) 2  -DUKE  1  -MC    Putting on and taking off regular upper body clothing 3  -DUKE  2  -MC    Taking care of personal grooming (such as brushing teeth) 3  -DUKE  2  -MC    Eating meals 4  -DUKE  3  -MC    Score 16  -DUKE  10  -MC    Functional Assessment    Outcome Measure Options AM-PAC 6 Clicks Daily Activity (OT)  -DUKE AM-PAC 6 Clicks Basic Mobility (PT)  -BD AM-PAC 6 Clicks Daily Activity (OT)  -MC      User Key  (r) = Recorded By, (t) = Taken By, (c) = Cosigned By    Initials Name Provider Type    DUKE Camarena, OT Occupational Therapist    JUNIOR Rowland, OT Occupational Therapist    AJIT Blank,  PT Physical Therapist           Time Calculation:         Time Calculation- OT       07/07/17 1411          Time Calculation- OT    OT Start Time 1338  -DUKE      Total Timed Code Minutes- OT 11 minute(s)  -DUKE      OT Received On 07/07/17  -DUKE      OT Goal Re-Cert Due Date 07/15/17  -DUKE        User Key  (r) = Recorded By, (t) = Taken By, (c) = Cosigned By    Initials Name Provider Type    DUKE Camarena OT Occupational Therapist           Therapy Charges for Today     Code Description Service Date Service Provider Modifiers Qty    [id number]  OT THERAPEUTIC ACT EA 15 MIN 7/7/2017 Abigail Camarena OT GO 1               Abigail Camarena OT  7/7/2017

## 2017-07-07 NOTE — PROGRESS NOTES
[facility] Progress Note    Admission Date: 7/4/2017    Florinda Knapp  3/5/1931  [id number]    Date: 7/7/2017    Meds:    IV Anti-Infectives     Ordered     Dose/Rate Route Frequency Start Stop    07/06/17 1120  fluconazole (DIFLUCAN) tablet 200 mg     Ordering Provider:  Virginia Demarco MD    200 mg Oral Every 24 Hours 07/06/17 1200 07/09/17 1159    07/06/17 0812  metroNIDAZOLE (FLAGYL) IVPB 500 mg     Ordering Provider:  Flo Dugan MD    500 mg  over 60 Minutes Intravenous Every 8 Hours 07/06/17 0900      07/05/17 0804  vancomycin oral solution 250 mg     Ordering Provider:  Flo Dugan MD    250 mg Oral Every 6 Hours Scheduled 07/05/17 1000 07/19/17 1159    07/04/17 1556  cefTRIAXone (ROCEPHIN) IVPB 1 g     Ordering Provider:  Eddy Reynaga MD    1 g  over 30 Minutes Intravenous Once 07/04/17 1558 07/04/17 1649          CC:  cdiff colitis    SUBJECTIVE:  Patient is a 86 y.o. female admitted 2 days ago with diarrhea, nausea, abdominal distention and severe weakness. She became ill 3 weeks ago and presented to the [facility] ER 6/19 with abd pain and distention. Both patient and son state that she was not having diarrhea at the time and did not have a bm until several days later. WBC was 11.3 CT abd showed colitis involving transverse, descending and sigmoid colon. She declined admission at that time and was treated with empiric flagyl and cipro for 7 days. Ten days after stopping the antibiotics ahe developed progressive weakness, nausea, confusion followed by onset of abd pain and diarrhea In the ER 7/4 WBC 16K; CT showed significant improvement in colitis Started on ceftriaxone then changed to zosyn When cdiff + yesterday, changed to parenteral flagyl and oral vanc Rectal tube placed yesterday after numerous stools Today, abd pain and distention improved  UA on admission unremarkable Repeat UA this am has moderate LE and 3+ yeast She had severe urinary retention after hospitalizations in Dec and Jan for  JALEN then CVA These hospitalizations were followed by multiple uti's with enterococcus, C albicans and citrobacter She underwent urethral dilatation by Dr Flynn ~ 6 weeks ago with marked improvement in urinary retention and dysuria    17  Alert, main c/o is inadequate sleep because of noise from monitor and IV  Fluids have been decreased as oral intake improves  Diarrhea improved  Denies abd pain   Plan is to remove navarro and rectal tube later today or tomorrow       Review of Systems:  Constitutional-- No Fever, chills or sweats. Appetite fair, malaise and fatigue PTA .  HEENT-- No new vision, hearing or throat complaints. No epistaxis or oral sores. Denies odynophagia or dysphagia. No headache, photophobia or neck stiffness.  CV-- No chest pain, palpitation or syncope  Resp-- No SOB/cough/Hemoptysis  GI- +nausea, No vomiting, + diarrhea. No hematochezia, melena, or hematemesis. Denies jaundice or chronic liver disease.  -- + dysuria and  urgency or flank pain. Navarro inserted  Lymph- no swollen lymph nodes in neck/axilla or groin.   Heme- No active bruising or bleeding; no Hx of DVT or PE.  MS-- no swelling or pain in the bones or joints of arms/legs. No new back pain.  Neuro-- recovery from illness complicated by Parkinsons- very weak and son is requesting rehab placement  Skin--No rashes or lesions        PE:   Vital Signs  Temp (24hrs), Av °F (36.7 °C), Min:97.6 °F (36.4 °C), Max:98.2 °F (36.8 °C)    Temp  Min: 97.6 °F (36.4 °C)  Max: 98.2 °F (36.8 °C)  BP  Min: 96/57  Max: 136/86  Pulse  Min: 65  Max: 81  Resp  Min: 16  Max: 18  SpO2  Min: 95 %  Max: 98 %  GENERAL: sleepybut  alert, in no acute distress, excellent recall of medical events.   HEENT: Normocephalic, atraumatic. PERRL. EOMI. No conjunctival injection. No icterus. Oropharynx clear without evidence of thrush or exudate. No evidence of peridontal disease.   NECK: Supple without nuchal rigidity. No mass.  LYMPH: No cervical, axillary or inguinal  lymphadenopathy.  HEART: Irregular rate; No murmur, rubs, gallops.   LUNGS: few crackles at bases. Normal respiratory effort. Nonlabored. No dullness.  ABDOMEN: protuberant and firm but nontender. Hypoactive  bowel sounds. No rebound or guarding. NO mass or HSM.  EXT: No cyanosis, clubbing or edema. No cord.  : Genitalia generally unremarkable. + Farris catheter.  MSK: FROM without joint effusions noted arms/legs.   SKIN: Warm and dry without cutaneous eruptions on Inspection/palpation.   NEURO: Oriented to PPT. No focal deficits. Very mild rigidity  PSYCHIATRIC: Normal insight and judgement. Cooperative with PE    Laboratory Data      Results from last 7 days  Lab Units 07/07/17  0719 07/06/17  0723 07/05/17  0650   WBC 10*3/mm3 9.20 15.75* 14.89*   HEMOGLOBIN g/dL 9.8* 8.8* 10.1*   HEMATOCRIT % 31.4* 28.1* 32.7*   PLATELETS 10*3/mm3 160 150 163       Results from last 7 days  Lab Units 07/07/17  0719   SODIUM mmol/L 131*   POTASSIUM mmol/L 3.8   CHLORIDE mmol/L 106   CO2 mmol/L 19.0*   BUN mg/dL 8*   CREATININE mg/dL 0.50*   GLUCOSE mg/dL 112*   CALCIUM mg/dL 8.0*       Results from last 7 days  Lab Units 07/04/17  1409   ALK PHOS U/L 82   BILIRUBIN mg/dL 0.7   ALT (SGPT) U/L 1*   AST (SGOT) U/L 13       Results from last 7 days  Lab Units 07/04/17  1409   SED RATE mm/hr 55*       Results from last 7 days  Lab Units 07/04/17  1409   CRP mg/dL 8.37*       Results from last 7 days  Lab Units 07/04/17  1831   LACTATE mmol/L 0.9             Estimated Creatinine Clearance: 39.8 mL/min (by C-G formula based on Cr of 0.5).    Microbiology:  Blood Culture   Date Value Ref Range Status   07/04/2017 No growth at 2 days  Preliminary   07/04/2017 No growth at 2 days  Preliminary                    Urine Culture   Date Value Ref Range Status   07/06/2017 Culture in progress  Preliminary          Radiology:  Imaging Results (last 72 hours)     Procedure Component Value Units Date/Time    XR Chest 1 View [[id number]] Collected:   07/05/17 0911     Updated:  07/05/17 1708    Narrative:       EXAMINATION: XR CHEST 1 VW- 07/04/2017     INDICATION: weakness      COMPARISON: 02/23/2017     FINDINGS: Portable chest reveals heart to be enlarged. Underlying  chronic and emphysematous changes seen within the lung fields  bilaterally. Degenerative changes seen within the spine. Vascular  calcifications are identified. No focal parenchymal opacification  present. No pleural effusion or pneumothorax.           Impression:       Degenerative changes seen within the spine. The heart is  enlarged. Underlying chronic and emphysematous changes seen within the  lung fields bilaterally.     D:  07/04/2017  E:  07/05/2017     This report was finalized on 7/5/2017 5:06 PM by Dr. Karely Esquivel MD.       CT Abdomen Pelvis Without Contrast [[id number]] Collected:  07/05/17 0916     Updated:  07/05/17 1708    Narrative:       EXAMINATION: CT ABDOMEN AND PELVIS WO CONTRAST-      INDICATION: History of colitis, now with abdominal pain generalized.     TECHNIQUE: Multiple axial CT imaging was obtained of the abdomen and  pelvis without the administration of intravenous contrast.     The radiation dose reduction device was turned on for each scan per the  ALARA (As Low as Reasonably Achievable) protocol.     COMPARISON: None.     FINDINGS: There is no change in the small pericardial effusion. There is  a small left pleural effusion. Mild increased marking is seen at the  left lung base. Chronic changes are identified. The liver is homogeneous  as well as the spleen. There is a cyst identified in the left kidney. No  stones in the gallbladder. Vascular calcification is seen within the  abdominal aorta and iliac vessels. No free fluid or free air. No  abnormal mass or fluid collection is identified. The appendix is normal.  The abdominal portion of the gastrointestinal tract is within normal  limits.     PELVIS: Pelvic organs are unremarkable. The pelvic portion of  the  gastrointestinal tract reveals improvement seen in the descending colon  previously having abnormal wall thickening. There is only minimal wall  thickening at this time with pericolonic stranding. Minimal fluid is  seen in the left paracolic gutter. Findings have significantly improved  in the interval.  No free fluid or free air. Calcification is seen  within the uterus suggesting degenerating fibroids. Vascular  calcification is seen within the pelvic vessels. No abnormal mass or  fluid collection is identified. Atrophy is identified of the left psoas  muscle. Hardware is identified within the left hip. No acute bony  abnormality is identified.       Impression:       Significant improvement seen in the appearance of the  descending colon with minimal wall thickening remaining and minimal  pericolonic stranding. There is a small amount of fluid in the left  paracolic gutter. The remainder the CT abdomen and pelvis is stable and  grossly unremarkable.     D:  07/04/2017  E:  07/05/2017     This report was finalized on 7/5/2017 5:06 PM by Dr. Karely Esquivel MD.             I personally read the radiographic studies     IMPRESSION:  - Cdiff colitis new dx I suspect that the colitis that prompted her 6/19 ER evaluation was not cdiff since she was not having diarrhea at that time Doubt diverticulitis since it was nearly pancolitis and WBC was only 11K Possible diagnoses include camphylobacter, viral infection, etc CT findings significantly improved after a 7 day course of flagyl and cipro     - Cystitis- probably fungal based in UA results     - Urinary retention- symptoms improved after urethral dilatation Hopefully infections will decrease as well. Dr Flynn has recommended 2 additional dilatation procedures     - CVA 2/2017     - Parkinsons Disease     PLAN/RECOMMENDATIONS:   Thank you for asking us to see Florinda Knapp, I recommend the following:      Agree with oral vancomycin; stop parenteral flagyl at  discharge Recommend oral vancomycin 250 mg qid to 7/20 then bid to 8/1 then daily to 8/15 Discussed the significant relapse rate with son and the potential benefit of longterm probiotics     Agree with stopping zosyn     Short course oral fluconazole while urine culture pending - will halve the dose of atorvastatin while on fluconazole in attempt to reduce risk of rhabdomyalysis        Virginia Demarco MD  7/7/2017  2:03 PM

## 2017-07-07 NOTE — THERAPY TREATMENT NOTE
Acute Care - Physical Therapy Treatment Note  [facility]     Patient Name: Florinda Knapp  : 3/5/1931  MRN: [id number]  Today's Date: 2017  Onset of Illness/Injury or Date of Surgery Date: 17  Date of Referral to PT: 17  Referring Physician: Mayne, PA-C    Admit Date: 2017    Visit Dx:    ICD-10-CM ICD-9-CM   1. Abdominal pain, acute, generalized R10.84 789.07     338.19   2. Leukocytosis, unspecified type D72.829 288.60   3. Acute cystitis without hematuria N30.00 595.0   4. Failure to thrive syndrome, adult R62.7 783.7   5. Impaired mobility and ADLs Z74.09 799.89   6. Impaired functional mobility and activity tolerance Z74.09 V49.89     Patient Active Problem List   Diagnosis   • Dysuria   • Elbow injury   • Fracture of left olecranon process   • Parkinson's disease   • Trauma left hip   • Acute Symptomatic on Chronic Normocytic Anemia   • Acute respiratory failure with hypoxia   • Generalized weakness   • Chest pain   • Symptomatic anemia   • Elevated troponin I level, possibly due to anemia   • Status post total replacement of left hip 2016 at [facility]   • H/O urinary retention   • History of recurrent UTIs   • Essential hypertension   • Anxiety   • CHF with cardiomyopathy   • Colitis   • Hypokalemia   • Acute ischemic left MCA stroke   • Hypertension   • Hyperlipidemia   • Urinary retention/Frequent UTIs   • Hypothyroidism   • Status post left hip replacement   • C. difficile colitis   • History of ischemic left MCA stroke 17   • Renal cyst, left noted on CT   • Physical deconditioning   • FTT (failure to thrive) in adult   • Abdominal pain, acute, generalized   • Rhinorrhea               Adult Rehabilitation Note       17 1340 17 1338       Rehab Assessment/Intervention    Discipline physical therapist  -MB occupational therapist  -DUKE     Document Type therapy note (daily note)  -MB therapy note (daily note)  -DUKE     Subjective Information agree to therapy;complains  of;weakness  -MB agree to therapy;complains of;weakness  -DUKE     Symptoms Noted During/After Treatment none  -MB none  -DUKE     Precautions/Limitations fall precautions   spore precautions, rectal tube, catheter  -MB fall precautions   spore prec, rectal tube, catheter  -DUKE     Recorded by [MB] Serena Skelton, PT [DUKE] Abigail Camarena, OT     Vital Signs    Pre Systolic BP Rehab 136  -  -DUKE     Pre Treatment Diastolic BP 86  -MB 86  -DUKE     Pretreatment Heart Rate (beats/min) 66  -MB 66  -DUKE     Pre SpO2 (%) 97  -MB 97  -DUKE     O2 Delivery Pre Treatment room air  -MB room air  -DUKE     Post SpO2 (%) 96  -MB 96  -DUKE     O2 Delivery Post Treatment room air  -MB room air  -DUKE     Pre Patient Position Supine  -MB Supine  -DUKE     Intra Patient Position Standing  -MB Standing  -DUKE     Post Patient Position Sitting  -MB Sitting  -DUKE     Recorded by [MB] Serena Skelton, PT [DUKE] Abigail Camarena, OT     Pain Assessment    Pain Assessment No/denies pain  -MB No/denies pain  -DUKE     Recorded by [MB] Serena Skelton, PT [DUKE] Abigail Camarena, OT     Cognitive Assessment/Intervention    Current Cognitive/Communication Assessment functional  -MB functional  -DUKE     Orientation Status oriented x 4  -MB oriented x 4  -DUKE     Follows Commands/Answers Questions 100% of the time;able to follow single-step instructions;needs cueing;needs increased time  -% of the time;able to follow single-step instructions  -DUKE     Personal Safety WNL/WFL  -MB WNL/WFL   Pt appears to know own deficits.  -DUKE     Personal Safety Interventions fall prevention program maintained;gait belt;muscle strengthening facilitated;nonskid shoes/slippers when out of bed  -MB fall prevention program maintained;gait belt;nonskid shoes/slippers when out of bed  -DUKE     Recorded by [MB] Serena Skelton, PT [DUKE] Abigail Camarena, OT     Bed Mobility, Assessment/Treatment    Bed Mobility, Assistive Device bed rails;head of bed elevated  -MB bed rails;head  of bed elevated  -DUKE     Bed Mobility, Scoot/Bridge, [place] maximum assist (25% patient effort);verbal cues required  -MB maximum assist (25% patient effort);verbal cues required  -DUKE     Bed Mob, Supine to Sit, [place] moderate assist (50% patient effort);2 person assist required;verbal cues required  -MB moderate assist (50% patient effort);2 person assist required;verbal cues required  -DUKE     Bed Mob, Sit to Supine, [place] not tested  -MB      Bed Mobility, Safety Issues decreased use of arms for pushing/pulling;decreased use of legs for bridging/pushing;impaired trunk control for bed mobility  -MB decreased use of arms for pushing/pulling;decreased use of legs for bridging/pushing;impaired trunk control for bed mobility  -DUKE     Bed Mobility, Impairments decreased flexibility;ROM decreased;strength decreased;impaired balance  -MB strength decreased;impaired balance  -DUKE     Bed Mobility, Comment Pt required assist to move LEs towards EOB and raise trunk/shoulders to upright sitting EOB.    -MB Pt. able to assist with legs to EOB and reach for rail and scoot self part way out. Assist rest of way up with trunk and to edge.  -DUKE     Recorded by [MB] Serena Skelton, PT [DUKE] Abigail Camarena, OT     Transfer Assessment/Treatment    Transfers, Sit-Stand [place] maximum assist (25% patient effort);2 person assist required;verbal cues required  -MB maximum assist (25% patient effort);2 person assist required;verbal cues required  -DUKE     Transfers, Stand-Sit [place] maximum assist (25% patient effort);2 person assist required;verbal cues required  -MB maximum assist (25% patient effort);2 person assist required;verbal cues required  -DUKE     Transfers, Sit-Stand-Sit, Assist Device other (see comments)   gait belt and B UE support  -MB other (see comments)   UE support and gait belt  -DUKE     Transfer, Safety Issues balance decreased during turns;sequencing ability decreased;step length  decreased;weight-shifting ability decreased  -MB      Transfer, Impairments ROM decreased;strength decreased;impaired balance;postural control impaired   loses balance posteriorly  -MB strength decreased;impaired balance  -DUKE     Transfer, Comment Pt. transferred sit<->stand w/ max A x 2 w/ heavy posterior lean, requiring assist to shift weight forward over feet.   -MB      Recorded by [MB] Serena Skelton, PT [DUKE] Abigail Camarena OT     Gait Assessment/Treatment    Gait, [place] Level maximum assist (25% patient effort);2 person assist required;verbal cues required;nonverbal cues required (demo/gesture)  -MB      Gait, Assistive Device other (see comments)   gait belt, B UE support  -MB      Gait, Distance (Feet) 3  -MB      Gait, Safety Issues balance decreased during turns;sequencing ability decreased;step length decreased;weight-shifting ability decreased;loses balance backward  -MB      Gait, Impairments decreased flexibility;ROM decreased;strength decreased;impaired balance;postural control impaired  -MB      Gait, Comment Pt w/ dec B dorsiflexion, limiting gait.  Pt. side-stepped 3 steps to recliner w/ max A x 2.    -MB      Recorded by [MB] Serena Skelton, PT      Functional Mobility    Functional Mobility- Ind. Level  maximum assist (25% patient effort);2 person assist required;verbal cues required  -DUKE     Functional Mobility- Device  other (see comments)   UE support and gait belt  -DUKE     Functional Mobility-Distance (Feet)  3   to chair only  -DUKE     Recorded by  [DUKE] Abigail Camarena OT     Toileting Assessment/Training    Toileting Assess/Train, Comment  rectal tub and catheter  -DUKE     Recorded by  [DUKE] Abigail Camarena OT     Motor Skills/Interventions    Additional Documentation Balance Skills Training (Group)  -MB      Recorded by [MB] Serena Skelton, PT      Balance Skills Training    Sitting-Level of Assistance  Close supervision  -DUKE     Sitting-Balance Support  Feet supported   -DUKE     Standing-Level of Assistance Maximum assistance;x2  -MB Maximum assistance;x2  -DUKE     Static Standing Balance Support Right upper extremity supported;Left upper extremity supported  -MB      Standing-Balance Activities Weight Shift A-P;Forward lean  -MB      Recorded by [MB] Serena Skelton, PT [DUKE] Abigail Camarena, OT     Therapy Exercises    Bilateral Lower Extremities AROM:;10 reps;sitting;ankle pumps/circles;hip flexion;hip abduction/adduction;LAQ;glut sets  -MB      Bilateral Upper Extremity  AROM:;10 reps;sitting;elbow flexion/extension;shoulder abduction/adduction;shoulder extension/flexion;shoulder protraction/retraction;shoulder rolls/shrugs  -DUKE     BUE Resistance  manual resistance- minimal   biceps and triceps  -DUKE     Recorded by [MB] Serena Skelton, PT [DUKE] Abigail Camarena OT     Positioning and Restraints    Pre-Treatment Position in bed  -MB in bed  -DUKE     Post Treatment Position chair  -MB chair  -DUKE     In Bed  sitting;call light within reach;encouraged to call for assist;exit alarm on;with PT  -DUKE     In Chair notified nsg;reclined;call light within reach;encouraged to call for assist;exit alarm on;with family/caregiver;waffle cushion;legs elevated  -MB      Recorded by [MB] Serena Skelton, PT [DUKE] Abigail Camarena, OT       User Key  (r) = Recorded By, (t) = Taken By, (c) = Cosigned By    Initials Name Effective Dates    DUKE Camarena, OT 06/22/15 -     CURT Skelton, PT 03/14/16 -                 IP PT Goals       07/07/17 1615 07/05/17 1116       Bed Mobility PT LTG    Bed Mobility PT LTG, Time to Achieve  2 wks  -BD     Bed Mobility PT LTG, Activity Type  all bed mobility  -BD     Bed Mobility PT LTG, [place] Level  conditional independence  -BD     Bed Mobility PT Goal  LTG, Assist Device  bed rails  -BD     Bed Mobility PT LTG, Outcome goal ongoing  -MB goal ongoing  -BD     Transfer Training PT LTG    Transfer Training PT LTG, Date Established  07/05/17   -BD     Transfer Training PT LTG, Time to Achieve 1 wk  -MB      Transfer Training PT LTG, Activity Type  all transfers;sit to stand/stand to sit  -BD     Transfer Training PT LTG, [place] Level minimum assist (75% patient effort);2 person assist required  -MB      Transfer Training PT LTG, Assist Device  walker, rolling  -BD     Transfer Training PT  LTG, Date Goal Reviewed  07/05/17  -BD     Transfer Training PT LTG, Outcome goal ongoing  -MB goal ongoing  -BD     Static Sitting Balance PT LTG    Static Sitting Balance PT LTG, Time to Achieve  2 wks  -BD     Static Sitting Balance PT LTG, [place] Level  conditional independence;supervision required  -BD     Static Sitting Balance PT LTG, Assist Device  UE Support  -BD     Static Sitting Balance PT LTG, Outcome goal ongoing  -MB goal ongoing  -BD     Dynamic Sitting Balance PT STG    Dynamic Sitting Balance PT STG, Time to Achieve  2 wks  -BD     Dynamic Sitting Balance PT STG, [place] Level  contact guard assist  -BD     Dynamic Sitting Balance PT STG, Outcome goal ongoing  -MB goal ongoing  -BD     Static Standing Balance PT STG    Static Standing Balance PT STG, Date Established  07/05/17  -BD     Static Standing Balance PT STG, Time to Achieve  2 wks  -BD     Static Standing Balance PT STG, [place] Level  set up required;contact guard assist  -BD     Static Standing Balance PT STG, Assist Device  assistive Device  -BD     Static Standing Balance PT STG, Outcome goal ongoing  -MB goal ongoing  -BD       User Key  (r) = Recorded By, (t) = Taken By, (c) = Cosigned By    Initials Name Provider Type    CURT Skelton, PT Physical Therapist    AJIT Blank, PT Physical Therapist          Physical Therapy Education     Title: PT OT SLP Therapies (Active)     Topic: Physical Therapy (Done)     Point: Mobility training (Done)    Learning Progress Summary    Learner Readiness Method Response Comment Documented by Status   Patient  Acceptance E NR,VU gait mechanics, HEP, fall precautions, POC progression MB 07/07/17 1614 Done    Acceptance E,TB VU,NR,NL   07/05/17 1113 Done   Family Acceptance E NR,VU gait mechanics, HEP, fall precautions, POC progression MB 07/07/17 1614 Done    Acceptance E,TB VU,NR,NL   07/05/17 1113 Done               Point: Home exercise program (Done)    Learning Progress Summary    Learner Readiness Method Response Comment Documented by Status   Patient Acceptance E NR,VU gait mechanics, HEP, fall precautions, POC progression MB 07/07/17 1614 Done    Acceptance E,TB VU,NR,NL   07/05/17 1113 Done   Family Acceptance E NR,VU gait mechanics, HEP, fall precautions, POC progression MB 07/07/17 1614 Done    Acceptance E,TB VU,NR,NL   07/05/17 1113 Done               Point: Body mechanics (Done)    Learning Progress Summary    Learner Readiness Method Response Comment Documented by Status   Patient Acceptance E NR,VU gait mechanics, HEP, fall precautions, POC progression MB 07/07/17 1614 Done    Acceptance E,TB VU,NR,NL   07/05/17 1113 Done   Family Acceptance E NR,VU gait mechanics, HEP, fall precautions, POC progression MB 07/07/17 1614 Done    Acceptance E,TB VU,NR,NL   07/05/17 1113 Done               Point: Precautions (Done)    Learning Progress Summary    Learner Readiness Method Response Comment Documented by Status   Patient Acceptance E NR,VU gait mechanics, HEP, fall precautions, POC progression MB 07/07/17 1614 Done    Acceptance E,TB VU,NR,NL   07/05/17 1113 Done   Family Acceptance E NR,VU gait mechanics, HEP, fall precautions, POC progression MB 07/07/17 1614 Done    Acceptance E,TB VU,NR,NL   07/05/17 1113 Done                      User Key     Initials Effective Dates Name Provider Type Discipline    MB 03/14/16 -  Serena Skelton, PT Physical Therapist PT     06/13/16 -  Bee Blank, PT Physical Therapist PT                    PT Recommendation and Plan  Anticipated Discharge  Disposition: inpatient rehabilitation facility  PT Frequency: daily, per priority policy  Plan of Care Review  Plan Of Care Reviewed With: patient, son  Progress: improving  Outcome Summary/Follow up Plan: Pt demo improved safety and ind. w/ mobility.  Pt transferred sit<->stand w/ max  A  x 2 and side-stepped 3 steps to recliner w/ max A x 2.  Initiated seated B LE TherEx/HEP.          Outcome Measures       07/07/17 1340 07/07/17 1338 07/05/17 0955    How much help from another person do you currently need...    Turning from your back to your side while in flat bed without using bedrails? 2  -MB  2  -BD    Moving from lying on back to sitting on the side of a flat bed without bedrails? 2  -MB  2  -BD    Moving to and from a bed to a chair (including a wheelchair)? 2  -MB  2  -BD    Standing up from a chair using your arms (e.g., wheelchair, bedside chair)? 2  -MB  2  -BD    Climbing 3-5 steps with a railing? 1  -MB  1  -BD    To walk in hospital room? 1  -MB  1  -BD    AM-PAC 6 Clicks Score 10  -MB  10  -BD    How much help from another is currently needed...    Putting on and taking off regular lower body clothing?  2  -DUKE     Bathing (including washing, rinsing, and drying)  2  -DUKE     Toileting (which includes using toilet bed pan or urinal)  2  -DUKE     Putting on and taking off regular upper body clothing  3  -DUKE     Taking care of personal grooming (such as brushing teeth)  3  -DUKE     Eating meals  4  -DUKE     Score  16  -DUKE     Functional Assessment    Outcome Measure Options AM-PAC 6 Clicks Basic Mobility (PT)  -MB AM-PAC 6 Clicks Daily Activity (OT)  -DUKE AM-PAC 6 Clicks Basic Mobility (PT)  -BD      07/05/17 0742          How much help from another is currently needed...    Putting on and taking off regular lower body clothing? 1  -MC      Bathing (including washing, rinsing, and drying) 1  -MC      Toileting (which includes using toilet bed pan or urinal) 1  -MC      Putting on and taking off regular upper  body clothing 2  -MC      Taking care of personal grooming (such as brushing teeth) 2  -MC      Eating meals 3  -MC      Score 10  -      Functional Assessment    Outcome Measure Options AM-PAC 6 Clicks Daily Activity (OT)  -        User Key  (r) = Recorded By, (t) = Taken By, (c) = Cosigned By    Initials Name Provider Type    DUKE Camarena, OT Occupational Therapist    CURT Skelton, PT Physical Therapist    JUNIOR Rowland, OT Occupational Therapist    AJIT Blank, PT Physical Therapist           Time Calculation:         PT Charges       07/07/17 1618          Time Calculation    Start Time 1340  -MB      PT Received On 07/07/17  -MB      PT Goal Re-Cert Due Date 07/26/17  -MB      Time Calculation- PT    Total Timed Code Minutes- PT 25 minute(s)  -MB        User Key  (r) = Recorded By, (t) = Taken By, (c) = Cosigned By    Initials Name Provider Type    CURT Skelton, PT Physical Therapist          Therapy Charges for Today     Code Description Service Date Service Provider Modifiers Qty    28162146610 HC PT THER PROC EA 15 MIN 7/7/2017 Serena Skelton, PT GP 2          PT G-Codes  Outcome Measure Options: AM-PAC 6 Clicks Basic Mobility (PT)    Sernea Skelton, PT  7/7/2017

## 2017-07-07 NOTE — PLAN OF CARE
Problem: Patient Care Overview (Adult)  Goal: Plan of Care Review  Outcome: Ongoing (interventions implemented as appropriate)    07/07/17 1408   Coping/Psychosocial Response Interventions   Plan Of Care Reviewed With patient   Patient Care Overview   Progress progress towards functional goals is fair   Outcome Evaluation   Outcome Summary/Follow up Plan Pt. mod assist OOB today. Stood max of 2 and few steps to chair. Less anxiety today. HEP issued.         Problem: Inpatient Occupational Therapy  Goal: Bed Mobility Goal LTG- OT  Outcome: Ongoing (interventions implemented as appropriate)    07/05/17 1030 07/07/17 1408   Bed Mobility OT LTG   Bed Mobility OT LTG, Date Established 07/05/17 --    Bed Mobility OT LTG, Time to Achieve by discharge --    Bed Mobility OT LTG, Activity Type all bed mobility --    Bed Mobility OT LTG, [place] Level moderate assist (50% patient effort) --    Bed Mobility OT LTG, Outcome --  goal partially met  (pt met OOB today)       Goal: Transfer Training Goal 1 LTG- OT  Outcome: Ongoing (interventions implemented as appropriate)    07/05/17 1030 07/07/17 1408   Transfer Training OT LTG   Transfer Training OT LTG, Date Established 07/05/17 --    Transfer Training OT LTG, Time to Achieve by discharge --    Transfer Training OT LTG, Activity Type sit to stand/stand to sit;toilet --    Transfer Training OT LTG, [place] Level --  (max of 2)   Transfer Training OT LTG, Assist Device walker, rolling --    Transfer Training OT LTG, Outcome goal ongoing --        Goal: Patient Education Goal LTG- OT  Outcome: Ongoing (interventions implemented as appropriate)    07/05/17 1030 07/07/17 1408   Patient Education OT LTG   Patient Education OT LTG, Date Established 07/05/17 --    Patient Education OT LTG, Time to Achieve by discharge --    Patient Education OT LTG, Education Type HEP;posture/body mechanics;home safety --    Patient Education OT LTG, Education Understanding demonstrates  adequately;verbalizes understanding --    Patient Education OT LTG Outcome --  goal ongoing  (HEP issued, progressing with foot positioning transfers.)       Goal: Grooming Goal LTG- OT  Outcome: Ongoing (interventions implemented as appropriate)    07/05/17 1030 07/07/17 1408   Grooming OT LTG   Grooming Goal OT LTG, Date Established 07/05/17 --    Grooming Goal OT LTG, Time to Achieve by discharge --    Grooming Goal OT LTG, [place] Level minimum assist (75% patient effort) --    Grooming Goal OT LTG, Outcome --  goal ongoing  (Pt waiting for aid to do whole bath)

## 2017-07-07 NOTE — PLAN OF CARE
Problem: Patient Care Overview (Adult)  Goal: Plan of Care Review  Outcome: Ongoing (interventions implemented as appropriate)    07/07/17 1615   Coping/Psychosocial Response Interventions   Plan Of Care Reviewed With patient;[place]   Patient Care Overview   Progress improving   Outcome Evaluation   Outcome Summary/Follow up Plan Pt demo improved safety and ind. w/ mobility. Pt transferred sit<->stand w/ max A x 2 and side-stepped 3 steps to recliner w/ max A x 2. Initiated seated B LE TherEx/HEP.         Problem: Inpatient Physical Therapy  Goal: Bed Mobility Goal LTG- PT  Outcome: Ongoing (interventions implemented as appropriate)    07/05/17 1116 07/07/17 1615   Bed Mobility PT LTG   Bed Mobility PT LTG, Time to Achieve 2 wks --    Bed Mobility PT LTG, Activity Type all bed mobility --    Bed Mobility PT LTG, [place] Level conditional independence --    Bed Mobility PT Goal LTG, Assist Device bed rails --    Bed Mobility PT LTG, Outcome --  goal ongoing       Goal: Transfer Training Goal 1 LTG- PT  Outcome: Ongoing (interventions implemented as appropriate)    07/05/17 1116 07/07/17 1615   Transfer Training PT LTG   Transfer Training PT LTG, Date Established 07/05/17 --    Transfer Training PT LTG, Time to Achieve --  1 wk   Transfer Training PT LTG, Activity Type all transfers;sit to stand/stand to sit --    Transfer Training PT LTG, [place] Level --  minimum assist (75% patient effort);2 person assist required   Transfer Training PT LTG, Assist Device walker, rolling --    Transfer Training PT LTG, Date Goal Reviewed 07/05/17 --    Transfer Training PT LTG, Outcome --  goal ongoing       Goal: Static Sitting Balance Goal LTG- PT  Outcome: Ongoing (interventions implemented as appropriate)    07/05/17 1116 07/07/17 1615   Static Sitting Balance PT LTG   Static Sitting Balance PT LTG, Time to Achieve 2 wks --    Static Sitting Balance PT LTG, [place] Level conditional independence;supervision required  --    Static Sitting Balance PT LTG, Assist Device UE Support --    Static Sitting Balance PT LTG, Outcome --  goal ongoing       Goal: Dynamic Sitting Balance Goal STG- PT  Outcome: Ongoing (interventions implemented as appropriate)    07/05/17 1116 07/07/17 1615   Dynamic Sitting Balance PT STG   Dynamic Sitting Balance PT STG, Time to Achieve 2 wks --    Dynamic Sitting Balance PT STG, [place] Level contact guard assist --    Dynamic Sitting Balance PT STG, Outcome --  goal ongoing       Goal: Static Standing Balance Goal STG- PT  Outcome: Ongoing (interventions implemented as appropriate)    07/05/17 1116 07/07/17 1615   Static Standing Balance PT STG   Static Standing Balance PT STG, Date Established 07/05/17 --    Static Standing Balance PT STG, Time to Achieve 2 wks --    Static Standing Balance PT STG, [place] Level set up required;contact guard assist --    Static Standing Balance PT STG, Assist Device assistive Device --    Static Standing Balance PT STG, Outcome --  goal ongoing

## 2017-07-07 NOTE — PROGRESS NOTES
"Adult Nutrition  Assessment/PES    Patient Name:  Florinda Knapp  YOB: 1931  MRN: [id number]  Admit Date:  7/4/2017    Assessment Date:  7/7/2017        Reason for Assessment       07/07/17 1454    Reason for Assessment    Reason For Assessment/Visit NPO/Clr Policy   3 days    Time Spent (min) 20    Diagnosis Diagnosis   per notes this admission    Gastrointestinal --   rectal tube place 7/6; still having loose stools              Nutrition/Diet History       07/07/17 1455    Nutrition/Diet History    Reported/Observed By Family    Food Habit/Preferences Uses Supplements    Other Patient working with OT at time of visit. Patients son waiting outside. Reports patients appetite has been good. Patient has been eating everything on tray except for grape juice, drinking ensure clear liquid supplement. Reports patient has not had any nausea, vomiting, still having diarrhea. Feels could tolerate solid food            Anthropometrics       07/07/17 1457    Anthropometrics (Special Considerations)    Height Used for Calculations 1.499 m (4' 11\")    Weight Used for Calculations 49.9 kg (110 lb 0.2 oz)            Labs/Tests/Procedures/Meds       07/07/17 1457    Labs/Tests/Procedures/Meds    Labs/Tests Review Reviewed                Nutrition Prescription Ordered       07/07/17 1457    Nutrition Prescription PO    Current PO Diet Clear Liquid    Supplement Ensure Clear   apple    Supplement Frequency 3 times a day            Evaluation of Received Nutrient/Fluid Intake       07/07/17 1457    PO Evaluation    Number of Days PO Intake Evaluated --   PO data recorded from 7/5    Number of Meals 4    % PO Intake 56%          Problem/Interventions:        Problem 1       07/07/17 1458    Nutrition Diagnoses Problem 1    Problem 1 Inadequate Nutrient Intake    Etiology (related to) --   clinical condition    Signs/Symptoms (evidenced by) Clear Liquid Diet   3 days                Intervention Goal       07/07/17 1458 "    Intervention Goal    General Nutrition support treatment    PO Advance diet            Nutrition Intervention       07/07/17 1458    Nutrition Intervention    RD/Tech Action Follow Tx progress;Care plan reviewd;Encourage intake;Recommend/ordered   encouraged full use of supplements    Recommended/Ordered Diet            Nutrition Prescription       07/07/17 1509    Nutrition Prescription PO    PO Prescription Begin/change diet   RD discussed with MD states OK to advance patient to solid foods    Begin/Change Diet to Regular    Common Modifiers GI Soft/[place]    New PO Prescription Ordered? Yes            Education/Evaluation       07/07/17 1459    Monitor/Evaluation    Monitor Per protocol;PO intake;Supplement intake        Electronically signed by:  Alida Presley  07/07/17 3:10 PM

## 2017-07-07 NOTE — PROGRESS NOTES
"      HOSPITALIST DAILY PROGRESS NOTE    Chief Complaint: weakness and diarhea    Subjective   SUBJECTIVE/OVERNIGHT EVENTS   Patient continues to have significant diarrhea, denies any abd pain, no fevers or chills, son is at bedside.    Review of Systems:  Gen-no fevers, no chills  CV-no chest pain, no palpitations  Resp-no cough, no dyspnea  GI-no N/V, +diarrhea,  no abd pain    Objective   OBJECTIVE   I have reviewed the vital signs.  /77  Pulse 65  Temp 98.1 °F (36.7 °C) (Oral)   Resp 18  Ht 59\" (149.9 cm)  Wt 110 lb (49.9 kg)  SpO2 95%  BMI 22.22 kg/m2    Physical Exam:  Gen-no acute distress, seated in bed  CV-RRR, S1 S2 normal, no m/r/g  Resp-CTAB, no wheezes  Abd-soft, NT, ND, +BS  /rect: navarro and rectal tube in place   Ext-no edema  Neuro-A&Ox3, no focal deficits  Psych-appropriate mood and affect    Results:  I have reviewed the labs, culture data, radiology results, and diagnostic studies.      Results from last 7 days  Lab Units 07/07/17  0719 07/06/17  0723 07/05/17  0650   WBC 10*3/mm3 9.20 15.75* 14.89*   HEMOGLOBIN g/dL 9.8* 8.8* 10.1*   HEMATOCRIT % 31.4* 28.1* 32.7*   PLATELETS 10*3/mm3 160 150 163       Results from last 7 days  Lab Units 07/07/17  0719   SODIUM mmol/L 131*   POTASSIUM mmol/L 3.8   CHLORIDE mmol/L 106   CO2 mmol/L 19.0*   BUN mg/dL 8*   CREATININE mg/dL 0.50*   GLUCOSE mg/dL 112*   CALCIUM mg/dL 8.0*       Culture Data:  Cultures:    Blood Culture   Date Value Ref Range Status   07/04/2017 No growth at 2 days  Preliminary   07/04/2017 No growth at 2 days  Preliminary     Urine Culture   Date Value Ref Range Status   07/04/2017 No growth at 2 days  Final     I have reviewed the medications.    Assessment/Plan   ASSESSMENT/PLAN    Principal Problem:    C. difficile colitis  Active Problems:    Dysuria    Parkinson's disease    Generalized weakness    History of recurrent UTIs    CHF with cardiomyopathy    Hypertension    Hypothyroidism    History of ischemic left MCA " stroke 2/20/17    Renal cyst, left noted on CT    FTT (failure to thrive) in adult    Abdominal pain, acute, generalized    86 yof with hx of CVA and PD, p/w weakness and diarrhea, previously dx with colitis per CT and started  on cipro and flagyl, admitted with severe c.diff    Plan  - ID consulted, rec PO vanc 250 mg qid to 7/20 then bid to 8/1 then daily to 8/15   - f/u urine culture, started on fluconazole  - rectal tube and navarro in place, still having significant output  - continue to hydrate aggressively  - continue home rx  - PT/OT   - DVT ppx SQH    Dispo: anticipate d/c likely to SNF in a few days,     Sharee Travis MD     07/07/17  11:38 AM

## 2017-07-08 LAB — BACTERIA SPEC AEROBE CULT: NORMAL

## 2017-07-08 PROCEDURE — 99232 SBSQ HOSP IP/OBS MODERATE 35: CPT | Performed by: INTERNAL MEDICINE

## 2017-07-08 PROCEDURE — 25010000002 HEPARIN (PORCINE) PER 1000 UNITS: Performed by: INTERNAL MEDICINE

## 2017-07-08 RX ORDER — QUETIAPINE FUMARATE 25 MG/1
25 TABLET, FILM COATED ORAL NIGHTLY
Status: DISCONTINUED | OUTPATIENT
Start: 2017-07-08 | End: 2017-07-09

## 2017-07-08 RX ADMIN — CARVEDILOL 3.12 MG: 3.12 TABLET, FILM COATED ORAL at 21:09

## 2017-07-08 RX ADMIN — TAMSULOSIN HYDROCHLORIDE 0.8 MG: 0.4 CAPSULE ORAL at 09:33

## 2017-07-08 RX ADMIN — CARBIDOPA AND LEVODOPA 1 TABLET: 50; 200 TABLET, EXTENDED RELEASE ORAL at 21:08

## 2017-07-08 RX ADMIN — METRONIDAZOLE 500 MG: 500 INJECTION, SOLUTION INTRAVENOUS at 05:43

## 2017-07-08 RX ADMIN — POTASSIUM CHLORIDE, DEXTROSE MONOHYDRATE AND SODIUM CHLORIDE 125 ML/HR: 150; 5; 450 INJECTION, SOLUTION INTRAVENOUS at 05:43

## 2017-07-08 RX ADMIN — FAMOTIDINE 40 MG: 20 TABLET ORAL at 09:34

## 2017-07-08 RX ADMIN — Medication 250 MG: at 05:38

## 2017-07-08 RX ADMIN — METRONIDAZOLE 500 MG: 500 INJECTION, SOLUTION INTRAVENOUS at 14:55

## 2017-07-08 RX ADMIN — CARBIDOPA AND LEVODOPA 1 TABLET: 25; 100 TABLET ORAL at 14:51

## 2017-07-08 RX ADMIN — CARBIDOPA AND LEVODOPA 1 TABLET: 25; 100 TABLET ORAL at 05:38

## 2017-07-08 RX ADMIN — Medication 250 MG: at 01:00

## 2017-07-08 RX ADMIN — HEPARIN SODIUM 5000 UNITS: 5000 INJECTION, SOLUTION INTRAVENOUS; SUBCUTANEOUS at 14:55

## 2017-07-08 RX ADMIN — CARBIDOPA AND LEVODOPA 1 TABLET: 25; 100 TABLET ORAL at 18:24

## 2017-07-08 RX ADMIN — Medication 1 CAPSULE: at 09:34

## 2017-07-08 RX ADMIN — MICONAZOLE NITRATE 1 APPLICATION: 20 POWDER TOPICAL at 09:36

## 2017-07-08 RX ADMIN — METRONIDAZOLE 500 MG: 500 INJECTION, SOLUTION INTRAVENOUS at 21:08

## 2017-07-08 RX ADMIN — Medication 250 MG: at 14:52

## 2017-07-08 RX ADMIN — ASPIRIN 325 MG ORAL TABLET 325 MG: 325 PILL ORAL at 09:32

## 2017-07-08 RX ADMIN — CARVEDILOL 3.12 MG: 3.12 TABLET, FILM COATED ORAL at 09:35

## 2017-07-08 RX ADMIN — CETIRIZINE HYDROCHLORIDE 5 MG: 10 TABLET, FILM COATED ORAL at 09:33

## 2017-07-08 RX ADMIN — MIRTAZAPINE 30 MG: 15 TABLET, FILM COATED ORAL at 21:07

## 2017-07-08 RX ADMIN — CARBIDOPA AND LEVODOPA 1 TABLET: 25; 100 TABLET ORAL at 01:00

## 2017-07-08 RX ADMIN — LEVOTHYROXINE SODIUM 75 MCG: 75 TABLET ORAL at 09:36

## 2017-07-08 RX ADMIN — MICONAZOLE NITRATE: 20 POWDER TOPICAL at 21:09

## 2017-07-08 RX ADMIN — QUETIAPINE FUMARATE 25 MG: 25 TABLET, FILM COATED ORAL at 21:07

## 2017-07-08 RX ADMIN — Medication 250 MG: at 18:24

## 2017-07-08 RX ADMIN — FLUTICASONE PROPIONATE 2 SPRAY: 50 SPRAY, METERED NASAL at 09:36

## 2017-07-08 RX ADMIN — FLUCONAZOLE 200 MG: 100 TABLET ORAL at 14:50

## 2017-07-08 NOTE — PLAN OF CARE
"Problem: Patient Care Overview (Adult)  Goal: Plan of Care Review  Outcome: Ongoing (interventions implemented as appropriate)    07/08/17 9094   Coping/Psychosocial Response Interventions   Plan Of Care Reviewed With patient;son   Patient Care Overview   Progress no change   Outcome Evaluation   Outcome Summary/Follow up Plan Pt. has slept alot today however easily to arouse. Pt. has verbalized statements such as ,\" I think I am dying\" \" I won't be here much longer.\" Pt.'s son is aware and also aware hallucinations at times. VS are all stable and no C/O pain noted. MD ordered new medication for tonight. Son aware of all orders. Stool has decreased significantly. Rectal tube remains in place. Pt. has been turned every 2 hours, butocks slightly pink but blanchable. Barrier cream applied.          Problem: Pain, Acute (Adult)  Goal: Acceptable Pain Control/Comfort Level  Outcome: Ongoing (interventions implemented as appropriate)    Problem: Infection, Risk/Actual (Adult)  Goal: Infection Prevention/Resolution  Outcome: Ongoing (interventions implemented as appropriate)    Problem: Fall Risk (Adult)  Goal: Absence of Falls  Outcome: Ongoing (interventions implemented as appropriate)      "

## 2017-07-08 NOTE — PLAN OF CARE
Problem: Pain, Acute (Adult)  Goal: Acceptable Pain Control/Comfort Level  Outcome: Ongoing (interventions implemented as appropriate)    07/08/17 0503   Pain, Acute (Adult)   Acceptable Pain Control/Comfort Level making progress toward outcome

## 2017-07-08 NOTE — PROGRESS NOTES
"      HOSPITALIST DAILY PROGRESS NOTE    Chief Complaint: weakness and diarrhea    Subjective   SUBJECTIVE/OVERNIGHT EVENTS   No acute events overnight, patient states that she was restless overnight, did not get enough sleep, had some hallucinations    Review of Systems:  Gen-no fevers, no chills  CV-no chest pain, no palpitations  Resp-no cough, no dyspnea  GI-no N/V/D, no abd pain    Objective   OBJECTIVE   I have reviewed the vital signs.  /84  Pulse 72  Temp 95.5 °F (35.3 °C) (Oral)   Resp 16  Ht 59\" (149.9 cm)  Wt 110 lb (49.9 kg)  SpO2 96%  BMI 22.22 kg/m2    Physical Exam:  Gen-no acute distress,   CV-RRR, S1 S2 normal, no m/r/g  Resp-CTAB, no wheezes  Abd-soft, NT, ND, +BS  /rect-navarro and rectal tube in place  Ext-no edema  Neuro-A&Ox3, no focal deficits  Psych-appropriate mood and affect    Results:  I have reviewed the labs, culture data.      Results from last 7 days  Lab Units 07/07/17  0719 07/06/17  0723 07/05/17  0650   WBC 10*3/mm3 9.20 15.75* 14.89*   HEMOGLOBIN g/dL 9.8* 8.8* 10.1*   HEMATOCRIT % 31.4* 28.1* 32.7*   PLATELETS 10*3/mm3 160 150 163       Results from last 7 days  Lab Units 07/07/17  0719   SODIUM mmol/L 131*   POTASSIUM mmol/L 3.8   CHLORIDE mmol/L 106   CO2 mmol/L 19.0*   BUN mg/dL 8*   CREATININE mg/dL 0.50*   GLUCOSE mg/dL 112*   CALCIUM mg/dL 8.0*       Culture Data:  Cultures:    Blood Culture   Date Value Ref Range Status   07/04/2017 No growth at 3 days  Preliminary   07/04/2017 No growth at 3 days  Preliminary     Urine Culture   Date Value Ref Range Status   07/06/2017 >100,000 CFU/mL Normal Urogenital Vesta  Final   07/04/2017 No growth at 2 days  Final     I have reviewed the medications.    Assessment/Plan   ASSESSMENT/PLAN    Principal Problem:    C. difficile colitis  Active Problems:    Dysuria    Parkinson's disease    Generalized weakness    History of recurrent UTIs    CHF with cardiomyopathy    Hypertension    Hypothyroidism    History of ischemic " left MCA stroke 2/20/17    Renal cyst, left noted on CT    FTT (failure to thrive) in adult    Abdominal pain, acute, generalized      86 yof with hx of CVA and PD, p/w weakness and diarrhea, previously dx with colitis per CT and started on cipro and flagyl, admitted with severe c.diff     Plan  - ID consulted, rec PO vanc 250 mg qid to 7/20 then bid to 8/1 then daily to 8/15   - f/u urine culture, continue fluconazole  - rectal tube and navarro in place, output decreasing, will continue for one more day,  - continue to hydration  - add Seroquel qhs  - continue home rx  - PT/OT   - DVT ppx SQH     Dispo: anticipate d/c likely to SNF in a few days, CM following    Sharee Travis MD  07/08/17  10:54 AM

## 2017-07-09 LAB
BACTERIA SPEC AEROBE CULT: NORMAL
BACTERIA SPEC AEROBE CULT: NORMAL

## 2017-07-09 PROCEDURE — 25010000002 HEPARIN (PORCINE) PER 1000 UNITS: Performed by: INTERNAL MEDICINE

## 2017-07-09 PROCEDURE — 97110 THERAPEUTIC EXERCISES: CPT

## 2017-07-09 PROCEDURE — 99233 SBSQ HOSP IP/OBS HIGH 50: CPT | Performed by: INTERNAL MEDICINE

## 2017-07-09 PROCEDURE — 97116 GAIT TRAINING THERAPY: CPT

## 2017-07-09 RX ORDER — QUETIAPINE FUMARATE 25 MG/1
12.5 TABLET, FILM COATED ORAL NIGHTLY
Status: COMPLETED | OUTPATIENT
Start: 2017-07-09 | End: 2017-07-11

## 2017-07-09 RX ADMIN — MICONAZOLE NITRATE: 20 POWDER TOPICAL at 20:57

## 2017-07-09 RX ADMIN — TAMSULOSIN HYDROCHLORIDE 0.8 MG: 0.4 CAPSULE ORAL at 09:23

## 2017-07-09 RX ADMIN — FLUTICASONE PROPIONATE 2 SPRAY: 50 SPRAY, METERED NASAL at 09:23

## 2017-07-09 RX ADMIN — Medication 250 MG: at 05:08

## 2017-07-09 RX ADMIN — ASPIRIN 325 MG ORAL TABLET 325 MG: 325 PILL ORAL at 09:24

## 2017-07-09 RX ADMIN — CETIRIZINE HYDROCHLORIDE 5 MG: 10 TABLET, FILM COATED ORAL at 09:25

## 2017-07-09 RX ADMIN — METRONIDAZOLE 500 MG: 500 INJECTION, SOLUTION INTRAVENOUS at 21:01

## 2017-07-09 RX ADMIN — CARVEDILOL 3.12 MG: 3.12 TABLET, FILM COATED ORAL at 09:25

## 2017-07-09 RX ADMIN — CARBIDOPA AND LEVODOPA 1 TABLET: 50; 200 TABLET, EXTENDED RELEASE ORAL at 20:53

## 2017-07-09 RX ADMIN — ATORVASTATIN CALCIUM 20 MG: 20 TABLET, FILM COATED ORAL at 20:54

## 2017-07-09 RX ADMIN — CARBIDOPA AND LEVODOPA 1 TABLET: 25; 100 TABLET ORAL at 19:03

## 2017-07-09 RX ADMIN — CARBIDOPA AND LEVODOPA 1 TABLET: 25; 100 TABLET ORAL at 05:08

## 2017-07-09 RX ADMIN — QUETIAPINE FUMARATE 12.5 MG: 25 TABLET, FILM COATED ORAL at 20:54

## 2017-07-09 RX ADMIN — CARBIDOPA AND LEVODOPA 1 TABLET: 25; 100 TABLET ORAL at 12:10

## 2017-07-09 RX ADMIN — MIRTAZAPINE 30 MG: 15 TABLET, FILM COATED ORAL at 20:53

## 2017-07-09 RX ADMIN — MICONAZOLE NITRATE: 20 POWDER TOPICAL at 09:23

## 2017-07-09 RX ADMIN — Medication 1 CAPSULE: at 09:25

## 2017-07-09 RX ADMIN — Medication 250 MG: at 19:03

## 2017-07-09 RX ADMIN — CARVEDILOL 3.12 MG: 3.12 TABLET, FILM COATED ORAL at 20:54

## 2017-07-09 RX ADMIN — HEPARIN SODIUM 5000 UNITS: 5000 INJECTION, SOLUTION INTRAVENOUS; SUBCUTANEOUS at 15:04

## 2017-07-09 RX ADMIN — Medication 250 MG: at 12:10

## 2017-07-09 RX ADMIN — METRONIDAZOLE 500 MG: 500 INJECTION, SOLUTION INTRAVENOUS at 05:09

## 2017-07-09 RX ADMIN — POTASSIUM CHLORIDE, DEXTROSE MONOHYDRATE AND SODIUM CHLORIDE 125 ML/HR: 150; 5; 450 INJECTION, SOLUTION INTRAVENOUS at 05:15

## 2017-07-09 RX ADMIN — Medication 250 MG: at 23:47

## 2017-07-09 RX ADMIN — HEPARIN SODIUM 5000 UNITS: 5000 INJECTION, SOLUTION INTRAVENOUS; SUBCUTANEOUS at 21:00

## 2017-07-09 RX ADMIN — METRONIDAZOLE 500 MG: 500 INJECTION, SOLUTION INTRAVENOUS at 15:05

## 2017-07-09 RX ADMIN — FAMOTIDINE 40 MG: 20 TABLET ORAL at 09:23

## 2017-07-09 RX ADMIN — LEVOTHYROXINE SODIUM 75 MCG: 75 TABLET ORAL at 09:26

## 2017-07-09 NOTE — THERAPY TREATMENT NOTE
Acute Care - Physical Therapy Treatment Note  [facility]     Patient Name: Florinda Knapp  : 3/5/1931  MRN: [id number]  Today's Date: 2017  Onset of Illness/Injury or Date of Surgery Date: 17  Date of Referral to PT: 17  Referring Physician: Mayne, PA-C    Admit Date: 2017    Visit Dx:    ICD-10-CM ICD-9-CM   1. Abdominal pain, acute, generalized R10.84 789.07     338.19   2. Leukocytosis, unspecified type D72.829 288.60   3. Acute cystitis without hematuria N30.00 595.0   4. Failure to thrive syndrome, adult R62.7 783.7   5. Impaired mobility and ADLs Z74.09 799.89   6. Impaired functional mobility and activity tolerance Z74.09 V49.89     Patient Active Problem List   Diagnosis   • Dysuria   • Elbow injury   • Fracture of left olecranon process   • Parkinson's disease   • Trauma left hip   • Acute Symptomatic on Chronic Normocytic Anemia   • Acute respiratory failure with hypoxia   • Generalized weakness   • Chest pain   • Symptomatic anemia   • Elevated troponin I level, possibly due to anemia   • Status post total replacement of left hip 2016 at [facility]   • H/O urinary retention   • History of recurrent UTIs   • Essential hypertension   • Anxiety   • CHF with cardiomyopathy   • Colitis   • Hypokalemia   • Acute ischemic left MCA stroke   • Hypertension   • Hyperlipidemia   • Urinary retention/Frequent UTIs   • Hypothyroidism   • Status post left hip replacement   • C. difficile colitis   • History of ischemic left MCA stroke 17   • Renal cyst, left noted on CT   • Physical deconditioning   • FTT (failure to thrive) in adult   • Abdominal pain, acute, generalized   • Rhinorrhea               Adult Rehabilitation Note       17 0846 17 1340 17 1338    Rehab Assessment/Intervention    Discipline physical therapist  -SH physical therapist  -MB occupational therapist  -DUKE    Document Type therapy note (daily note)  -SH therapy note (daily note)  -MB therapy note (daily  note)  -DUKE    Subjective Information no complaints;agree to therapy  -SH agree to therapy;complains of;weakness  -MB agree to therapy;complains of;weakness  -DUKE    Patient Effort, Rehab Treatment adequate  -SH      Symptoms Noted During/After Treatment none  -SH none  -MB none  -DUKE    Precautions/Limitations fall precautions  -SH fall precautions   spore precautions, rectal tube, catheter  -MB fall precautions   spore prec, rectal tube, catheter  -DUKE    Recorded by [SH] Elisa Guerra, PT [MB] Serena Skelton, PT [DUKE] Abigail Camarena, OT    Vital Signs    Pre Systolic BP Rehab  136  -  -DUKE    Pre Treatment Diastolic BP  86  -MB 86  -DUKE    Pretreatment Heart Rate (beats/min)  66  -MB 66  -DUKE    Pre SpO2 (%)  97  -MB 97  -DUKE    O2 Delivery Pre Treatment  room air  -MB room air  -DUKE    Post SpO2 (%)  96  -MB 96  -DUKE    O2 Delivery Post Treatment  room air  -MB room air  -DUKE    Pre Patient Position  Supine  -MB Supine  -DUKE    Intra Patient Position  Standing  -MB Standing  -DUKE    Post Patient Position  Sitting  -MB Sitting  -DUKE    Recorded by  [MB] Serena Skelton, PT [DUKE] Abigail Camarena, OT    Pain Assessment    Pain Assessment No/denies pain  -SH No/denies pain  -MB No/denies pain  -DUKE    Recorded by [SH] Elisa Guerra, PT [MB] Serena Skelton, PT [DUKE] Abigail Camarena, OT    Cognitive Assessment/Intervention    Current Cognitive/Communication Assessment functional  -SH functional  -MB functional  -DUKE    Orientation Status oriented x 4  -SH oriented x 4  -MB oriented x 4  -DUKE    Follows Commands/Answers Questions able to follow single-step instructions;100% of the time  -% of the time;able to follow single-step instructions;needs cueing;needs increased time  -% of the time;able to follow single-step instructions  -DUKE    Personal Safety WNL/WFL  -SH WNL/WFL  -MB WNL/WFL   Pt appears to know own deficits.  -DUKE    Personal Safety Interventions fall prevention program maintained;gait  belt;nonskid shoes/slippers when out of bed  -SH fall prevention program maintained;gait belt;muscle strengthening facilitated;nonskid shoes/slippers when out of bed  -MB fall prevention program maintained;gait belt;nonskid shoes/slippers when out of bed  -DUKE    Recorded by [SH] Elisa Guerra, PT [MB] Serena Skelton, PT [DUKE] Abigail Camarena, OT    Bed Mobility, Assessment/Treatment    Bed Mobility, Assistive Device bed rails;head of bed elevated  -SH bed rails;head of bed elevated  -MB bed rails;head of bed elevated  -DUKE    Bed Mobility, Roll Left, [place] moderate assist (50% patient effort);2 person assist required;verbal cues required;nonverbal cues required (demo/gesture)  -SH      Bed Mobility, Scoot/Bridge, [place] moderate assist (50% patient effort);2 person assist required;verbal cues required  -SH maximum assist (25% patient effort);verbal cues required  -MB maximum assist (25% patient effort);verbal cues required  -DUKE    Bed Mob, Supine to Sit, [place] moderate assist (50% patient effort);2 person assist required;verbal cues required  -SH moderate assist (50% patient effort);2 person assist required;verbal cues required  -MB moderate assist (50% patient effort);2 person assist required;verbal cues required  -DUKE    Bed Mob, Sit to Supine, [place]  not tested  -MB     Bed Mobility, Safety Issues decreased use of arms for pushing/pulling;decreased use of legs for bridging/pushing;impaired trunk control for bed mobility  -SH decreased use of arms for pushing/pulling;decreased use of legs for bridging/pushing;impaired trunk control for bed mobility  -MB decreased use of arms for pushing/pulling;decreased use of legs for bridging/pushing;impaired trunk control for bed mobility  -DUKE    Bed Mobility, Impairments strength decreased;impaired balance  -SH decreased flexibility;ROM decreased;strength decreased;impaired balance  -MB strength decreased;impaired balance  -DUKE    Bed Mobility,  Comment Max cues and encouragement. Generalized weakness and increased tone continue to limit her ability to perform.  -SH Pt required assist to move LEs towards EOB and raise trunk/shoulders to upright sitting EOB.    -MB Pt. able to assist with legs to EOB and reach for rail and scoot self part way out. Assist rest of way up with trunk and to edge.  -DUKE    Recorded by [SH] Elisa Guerra, PT [MB] Serena Skelton, PT [DUKE] Abigail Camarena OT    Transfer Assessment/Treatment    Transfers, Bed-Chair [place] moderate assist (50% patient effort);2 person assist required;verbal cues required  -SH      Transfers, Bed-Chair-Bed, Assist Device rolling walker  -SH      Transfers, Sit-Stand [place] moderate assist (50% patient effort);2 person assist required;verbal cues required  -SH maximum assist (25% patient effort);2 person assist required;verbal cues required  -MB maximum assist (25% patient effort);2 person assist required;verbal cues required  -DUKE    Transfers, Stand-Sit [place] moderate assist (50% patient effort);2 person assist required;verbal cues required  -SH maximum assist (25% patient effort);2 person assist required;verbal cues required  -MB maximum assist (25% patient effort);2 person assist required;verbal cues required  -DUKE    Transfers, Sit-Stand-Sit, Assist Device rolling walker  - other (see comments)   gait belt and B UE support  -MB other (see comments)   UE support and gait belt  -DUKE    Transfer, Safety Issues balance decreased during turns;sequencing ability decreased;step length decreased;weight-shifting ability decreased;loses balance backward  - balance decreased during turns;sequencing ability decreased;step length decreased;weight-shifting ability decreased  -MB     Transfer, Impairments strength decreased;impaired balance;coordination impaired  - ROM decreased;strength decreased;impaired balance;postural control impaired   loses balance posteriorly  -MB strength  decreased;impaired balance  -DUKE    Transfer, Comment Pt performed sit to stand from EOB with B feet blocked to prevent sliding. Pt then stepped to chair.  - Pt. transferred sit<->stand w/ max A x 2 w/ heavy posterior lean, requiring assist to shift weight forward over feet.   -MB     Recorded by [] Elisa Guerra, PT [MB] Serena Skelton, PT [DUKE] Abigail Camarena, OT    Gait Assessment/Treatment    Gait, [place] Level moderate assist (50% patient effort);2 person assist required  - maximum assist (25% patient effort);2 person assist required;verbal cues required;nonverbal cues required (demo/gesture)  -MB     Gait, Assistive Device rolling walker  - other (see comments)   gait belt, B UE support  -MB     Gait, Distance (Feet) 3  - 3  -MB     Gait, Gait Pattern Analysis swing-to gait  -      Gait, Gait Deviations bernice decreased;decreased heel strike;forward flexed posture;step length decreased  -      Gait, Safety Issues sequencing ability decreased;balance decreased during turns;step length decreased;weight-shifting ability decreased;loses balance backward  - balance decreased during turns;sequencing ability decreased;step length decreased;weight-shifting ability decreased;loses balance backward  -MB     Gait, Impairments impaired balance;strength decreased;coordination impaired  - decreased flexibility;ROM decreased;strength decreased;impaired balance;postural control impaired  -MB     Gait, Comment Pt continues to be limited by decreased dorsiflexion B LE.  - Pt w/ dec B dorsiflexion, limiting gait.  Pt. side-stepped 3 steps to recliner w/ max A x 2.    -MB     Recorded by [] Elisa Guerra, PT [MB] Serena Skelton, PT     Functional Mobility    Functional Mobility- Ind. Level   maximum assist (25% patient effort);2 person assist required;verbal cues required  -DUKE    Functional Mobility- Device   other (see comments)   UE support and gait belt  -DUKE    Functional  Mobility-Distance (Feet)   3   to chair only  -DUKE    Recorded by   [DUKE] Abigail Camarena OT    Toileting Assessment/Training    Toileting Assess/Train, Comment   rectal tub and catheter  -DUKE    Recorded by   [DUKE] Abigail Camarena OT    Motor Skills/Interventions    Additional Documentation  Balance Skills Training (Group)  -MB     Recorded by  [MB] Serena Skelton, PT     Balance Skills Training    Sitting-Level of Assistance   Close supervision  -DUKE    Sitting-Balance Support   Feet supported  -DUKE    Standing-Level of Assistance  Maximum assistance;x2  -MB Maximum assistance;x2  -DUKE    Static Standing Balance Support  Right upper extremity supported;Left upper extremity supported  -MB     Standing-Balance Activities  Weight Shift A-P;Forward lean  -MB     Recorded by  [MB] Serena Skelton, PT [DUKE] Abigail Camarena OT    Therapy Exercises    Bilateral Lower Extremities AROM:;10 reps;sitting;ankle pumps/circles;LAQ  -SH AROM:;10 reps;sitting;ankle pumps/circles;hip flexion;hip abduction/adduction;LAQ;glut sets  -MB     Bilateral Upper Extremity   AROM:;10 reps;sitting;elbow flexion/extension;shoulder abduction/adduction;shoulder extension/flexion;shoulder protraction/retraction;shoulder rolls/shrugs  -DUKE    BUE Resistance   manual resistance- minimal   biceps and triceps  -DUKE    Recorded by [SH] Elisa Guerra, PT [MB] Serena Skelton, PT [DUKE] Abigail Camarena, OT    Positioning and Restraints    Pre-Treatment Position in bed  - in bed  -MB in bed  -DUKE    Post Treatment Position chair  - chair  -MB chair  -DUKE    In Bed   sitting;call light within reach;encouraged to call for assist;exit alarm on;with PT  -DUKE    In Chair reclined;call light within reach;encouraged to call for assist;exit alarm on;on mechanical lift sling  - notified nsg;reclined;call light within reach;encouraged to call for assist;exit alarm on;with family/caregiver;waffle cushion;legs elevated  -MB     Recorded by [SH] Elisa Guerra,  PT [MB] Serena Skelton, PT [DUKE] Abigail Camarena, OT      User Key  (r) = Recorded By, (t) = Taken By, (c) = Cosigned By    Initials Name Effective Dates    DUKE Abigail Camarena, OT 06/22/15 -      Elisa Guerra, PT 06/19/15 -     MB Serena Skelton, PT 03/14/16 -                 IP PT Goals       07/07/17 1615 07/05/17 1116       Bed Mobility PT LTG    Bed Mobility PT LTG, Time to Achieve  2 wks  -BD     Bed Mobility PT LTG, Activity Type  all bed mobility  -BD     Bed Mobility PT LTG, [place] Level  conditional independence  -BD     Bed Mobility PT Goal  LTG, Assist Device  bed rails  -BD     Bed Mobility PT LTG, Outcome goal ongoing  -MB goal ongoing  -BD     Transfer Training PT LTG    Transfer Training PT LTG, Date Established  07/05/17  -BD     Transfer Training PT LTG, Time to Achieve 1 wk  -MB      Transfer Training PT LTG, Activity Type  all transfers;sit to stand/stand to sit  -BD     Transfer Training PT LTG, [place] Level minimum assist (75% patient effort);2 person assist required  -MB      Transfer Training PT LTG, Assist Device  walker, rolling  -BD     Transfer Training PT  LTG, Date Goal Reviewed  07/05/17  -BD     Transfer Training PT LTG, Outcome goal ongoing  -MB goal ongoing  -BD     Static Sitting Balance PT LTG    Static Sitting Balance PT LTG, Time to Achieve  2 wks  -BD     Static Sitting Balance PT LTG, [place] Level  conditional independence;supervision required  -BD     Static Sitting Balance PT LTG, Assist Device  UE Support  -BD     Static Sitting Balance PT LTG, Outcome goal ongoing  -MB goal ongoing  -BD     Dynamic Sitting Balance PT STG    Dynamic Sitting Balance PT STG, Time to Achieve  2 wks  -BD     Dynamic Sitting Balance PT STG, [place] Level  contact guard assist  -BD     Dynamic Sitting Balance PT STG, Outcome goal ongoing  -MB goal ongoing  -BD     Static Standing Balance PT STG    Static Standing Balance PT STG, Date Established  07/05/17  -BD      Static Standing Balance PT STG, Time to Achieve  2 wks  -BD     Static Standing Balance PT STG, [place] Level  set up required;contact guard assist  -BD     Static Standing Balance PT STG, Assist Device  assistive Device  -BD     Static Standing Balance PT STG, Outcome goal ongoing  -MB goal ongoing  -       User Key  (r) = Recorded By, (t) = Taken By, (c) = Cosigned By    Initials Name Provider Type    CURT Skelton, PT Physical Therapist    AJIT Blank, PT Physical Therapist          Physical Therapy Education     Title: PT OT SLP Therapies (Active)     Topic: Physical Therapy (Active)     Point: Mobility training (Active)    Learning Progress Summary    Learner Readiness Method Response Comment Documented by Status   Patient Acceptance E,D NR   07/09/17 0917 Active    Acceptance E VU   07/08/17 0510 Done    Acceptance E NR,VU gait mechanics, HEP, fall precautions, POC progression MB 07/07/17 1614 Done    Acceptance E,TB VU,NR,NL   07/05/17 1113 Done   Family Acceptance E NR,VU gait mechanics, HEP, fall precautions, POC progression MB 07/07/17 1614 Done    Acceptance E,TB VU,NR,NL   07/05/17 1113 Done               Point: Home exercise program (Active)    Learning Progress Summary    Learner Readiness Method Response Comment Documented by Status   Patient Acceptance E,D NR   07/09/17 0917 Active    Acceptance E VU   07/08/17 0510 Done    Acceptance E NR,VU gait mechanics, HEP, fall precautions, POC progression MB 07/07/17 1614 Done    Acceptance E,TB VU,NR,NL   07/05/17 1113 Done   Family Acceptance E NR,VU gait mechanics, HEP, fall precautions, POC progression MB 07/07/17 1614 Done    Acceptance E,TB VU,NR,NL   07/05/17 1113 Done               Point: Body mechanics (Active)    Learning Progress Summary    Learner Readiness Method Response Comment Documented by Status   Patient Acceptance E,D NR   07/09/17 0917 Active    Acceptance E VU   07/08/17 0510 Done     Acceptance E NR,VU gait mechanics, HEP, fall precautions, POC progression MB 07/07/17 1614 Done    Acceptance E,TB VU,NR,NL  BD 07/05/17 1113 Done   Family Acceptance E NR,VU gait mechanics, HEP, fall precautions, POC progression MB 07/07/17 1614 Done    Acceptance E,TB VU,NR,NL  BD 07/05/17 1113 Done               Point: Precautions (Active)    Learning Progress Summary    Learner Readiness Method Response Comment Documented by Status   Patient Acceptance E,D NR   07/09/17 0917 Active    Acceptance E VU   07/08/17 0510 Done    Acceptance E NR,VU gait mechanics, HEP, fall precautions, POC progression MB 07/07/17 1614 Done    Acceptance E,TB VU,NR,NL  BD 07/05/17 1113 Done   Family Acceptance E NR,VU gait mechanics, HEP, fall precautions, POC progression MB 07/07/17 1614 Done    Acceptance E,TB VU,NR,NL   07/05/17 1113 Done                      User Key     Initials Effective Dates Name Provider Type Discipline     06/19/15 -  Elisa Guerra, PT Physical Therapist PT    MB 03/14/16 -  Serena Skelton, PT Physical Therapist PT     06/13/16 -  Bee Blank, PT Physical Therapist PT     01/20/17 -  Veronica Santana, RN Registered Nurse Nurse                    PT Recommendation and Plan  Anticipated Discharge Disposition: inpatient rehabilitation facility  PT Frequency: daily, per priority policy  Plan of Care Review  Plan Of Care Reviewed With: patient  Progress: progress toward functional goals as expected  Outcome Summary/Follow up Plan: Pt required mod A x 2 to perform bed mobility, sit to stand and transfer from EOB to chair with RW.          Outcome Measures       07/09/17 0846 07/07/17 1340 07/07/17 1338    How much help from another person do you currently need...    Turning from your back to your side while in flat bed without using bedrails? 2  -SH 2  -MB     Moving from lying on back to sitting on the side of a flat bed without bedrails? 2  -SH 2  -MB     Moving to and from a bed to a  chair (including a wheelchair)? 2  -SH 2  -MB     Standing up from a chair using your arms (e.g., wheelchair, bedside chair)? 2  - 2  -MB     Climbing 3-5 steps with a railing? 1  - 1  -MB     To walk in hospital room? 1  -SH 1  -MB     AM-PAC 6 Clicks Score 10  - 10  -MB     How much help from another is currently needed...    Putting on and taking off regular lower body clothing?   2  -DUKE    Bathing (including washing, rinsing, and drying)   2  -DUKE    Toileting (which includes using toilet bed pan or urinal)   2  -DUKE    Putting on and taking off regular upper body clothing   3  -DUKE    Taking care of personal grooming (such as brushing teeth)   3  -DUKE    Eating meals   4  -DUKE    Score   16  -DUKE    Functional Assessment    Outcome Measure Options AM-PAC 6 Clicks Basic Mobility (PT)  - AM-PAC 6 Clicks Basic Mobility (PT)  -MB AM-PAC 6 Clicks Daily Activity (OT)  -      User Key  (r) = Recorded By, (t) = Taken By, (c) = Cosigned By    Initials Name Provider Type    DUKE Camarena, OT Occupational Therapist     Elisa Guerra, PT Physical Therapist    MB Serena Skelton, PT Physical Therapist           Time Calculation:         PT Charges       07/09/17 0920          Time Calculation    Start Time 0846  -      PT Received On 07/09/17  -      PT Goal Re-Cert Due Date 07/26/17  -      Time Calculation- PT    Total Timed Code Minutes- PT 23 minute(s)  -        User Key  (r) = Recorded By, (t) = Taken By, (c) = Cosigned By    Initials Name Provider Type     Elisa Guerra, PT Physical Therapist          Therapy Charges for Today     Code Description Service Date Service Provider Modifiers Qty    68542248611 HC GAIT TRAINING EA 15 MIN 7/9/2017 Elisa Guerra, PT GP 1    26537913766 HC PT THER PROC EA 15 MIN 7/9/2017 Elisa Guerra, PT GP 1    75102446284 HC PT THER SUPP EA 15 MIN 7/9/2017 Elisa Guerra, PT GP 2          PT G-Codes  Outcome Measure Options: AM-PAC 6 Clicks Basic  Mobility (PT)    Elisa Guerra, PT  7/9/2017

## 2017-07-09 NOTE — PLAN OF CARE
Problem: Fall Risk (Adult)  Goal: Absence of Falls  Outcome: Ongoing (interventions implemented as appropriate)    07/09/17 0237   Fall Risk (Adult)   Absence of Falls making progress toward outcome

## 2017-07-09 NOTE — PLAN OF CARE
Problem: Patient Care Overview (Adult)  Goal: Plan of Care Review  Outcome: Ongoing (interventions implemented as appropriate)    07/09/17 0918   Coping/Psychosocial Response Interventions   Plan Of Care Reviewed With patient   Patient Care Overview   Progress progress toward functional goals as expected   Outcome Evaluation   Outcome Summary/Follow up Plan Pt required mod A x 2 to perform bed mobility, sit to stand and transfer from EOB to chair with RW.         Problem: Inpatient Physical Therapy  Goal: Bed Mobility Goal LTG- PT  Outcome: Ongoing (interventions implemented as appropriate)    07/05/17 1116 07/07/17 1615   Bed Mobility PT LTG   Bed Mobility PT LTG, Time to Achieve 2 wks --    Bed Mobility PT LTG, Activity Type all bed mobility --    Bed Mobility PT LTG, [place] Level conditional independence --    Bed Mobility PT Goal LTG, Assist Device bed rails --    Bed Mobility PT LTG, Outcome --  goal ongoing       Goal: Transfer Training Goal 1 LTG- PT  Outcome: Ongoing (interventions implemented as appropriate)    07/05/17 1116 07/07/17 1615   Transfer Training PT LTG   Transfer Training PT LTG, Date Established 07/05/17 --    Transfer Training PT LTG, Time to Achieve --  1 wk   Transfer Training PT LTG, Activity Type all transfers;sit to stand/stand to sit --    Transfer Training PT LTG, [place] Level --  minimum assist (75% patient effort);2 person assist required   Transfer Training PT LTG, Assist Device walker, rolling --    Transfer Training PT LTG, Date Goal Reviewed 07/05/17 --    Transfer Training PT LTG, Outcome --  goal ongoing       Goal: Static Sitting Balance Goal LTG- PT  Outcome: Ongoing (interventions implemented as appropriate)    07/05/17 1116 07/07/17 1615   Static Sitting Balance PT LTG   Static Sitting Balance PT LTG, Time to Achieve 2 wks --    Static Sitting Balance PT LTG, [place] Level conditional independence;supervision required --    Static Sitting Balance PT LTG,  Assist Device UE Support --    Static Sitting Balance PT LTG, Outcome --  goal ongoing       Goal: Dynamic Sitting Balance Goal STG- PT  Outcome: Ongoing (interventions implemented as appropriate)    07/05/17 1116 07/07/17 1615   Dynamic Sitting Balance PT STG   Dynamic Sitting Balance PT STG, Time to Achieve 2 wks --    Dynamic Sitting Balance PT STG, [place] Level contact guard assist --    Dynamic Sitting Balance PT STG, Outcome --  goal ongoing       Goal: Static Standing Balance Goal STG- PT  Outcome: Ongoing (interventions implemented as appropriate)    07/05/17 1116 07/07/17 1615   Static Standing Balance PT STG   Static Standing Balance PT STG, Date Established 07/05/17 --    Static Standing Balance PT STG, Time to Achieve 2 wks --    Static Standing Balance PT STG, [place] Level set up required;contact guard assist --    Static Standing Balance PT STG, Assist Device assistive Device --    Static Standing Balance PT STG, Outcome --  goal ongoing

## 2017-07-09 NOTE — PROGRESS NOTES
"      HOSPITALIST DAILY PROGRESS NOTE    Chief Complaint: weakness and diarrhea    Subjective   SUBJECTIVE/OVERNIGHT EVENTS     Fairly sleepy today, but up in chair this morning  Eating ok  Denies abd pain    Review of Systems:  Review of Systems   Constitution: Positive for malaise/fatigue.   HENT: Negative.    Eyes: Negative.    Cardiovascular: Negative.    Respiratory: Negative.    Endocrine: Negative.    Hematologic/Lymphatic: Negative.    Musculoskeletal: Negative.    Gastrointestinal: Positive for diarrhea. Negative for abdominal pain.   Genitourinary: Negative.    Neurological: Negative.    Psychiatric/Behavioral: Negative.    Allergic/Immunologic: Negative.        Objective   OBJECTIVE   I have reviewed the vital signs.  /91 (BP Location: Right arm, Patient Position: Lying)  Pulse 70  Temp 96.2 °F (35.7 °C) (Oral)   Resp 16  Ht 59\" (149.9 cm)  Wt 110 lb (49.9 kg)  SpO2 95%  BMI 22.22 kg/m2    Physical Exam:  Gen-no acute distress, non toxic, in bed  - navarro  - rectal tube  CV-RRR, S1 S2 normal, no m/r/g  Resp-CTAB, no wheezes, rhonchi or rales  Abd-soft, Non tender, Non distended, normo active bowel sounds  Ext-No lower extremity cyanosis clubbing or edema bilaterally  Neuro-somnolent but awakens easily, answers questions appropriately  Psych-overall flat affect   Skin- No rash on exposed UE or LE bilaterally      Results:  I have reviewed the labs, culture data.      Results from last 7 days  Lab Units 07/07/17  0719 07/06/17  0723 07/05/17  0650   WBC 10*3/mm3 9.20 15.75* 14.89*   HEMOGLOBIN g/dL 9.8* 8.8* 10.1*   HEMATOCRIT % 31.4* 28.1* 32.7*   PLATELETS 10*3/mm3 160 150 163       Results from last 7 days  Lab Units 07/07/17  0719   SODIUM mmol/L 131*   POTASSIUM mmol/L 3.8   CHLORIDE mmol/L 106   CO2 mmol/L 19.0*   BUN mg/dL 8*   CREATININE mg/dL 0.50*   GLUCOSE mg/dL 112*   CALCIUM mg/dL 8.0*       Culture Data:  Cultures:    Blood Culture   Date Value Ref Range Status   07/04/2017 No " growth at 3 days  Preliminary   07/04/2017 No growth at 3 days  Preliminary     Urine Culture   Date Value Ref Range Status   07/06/2017 >100,000 CFU/mL Normal Urogenital Evsta  Final   07/04/2017 No growth at 2 days  Final     I have reviewed the medications.  Scheduled Meds:  aspirin 325 mg Oral Daily   atorvastatin 20 mg Oral Nightly   carbidopa-levodopa 1 tablet Oral Q6H   carbidopa-levodopa CR 1 tablet Oral Nightly   carvedilol 3.125 mg Oral Q12H   cetirizine 5 mg Oral Daily   famotidine 40 mg Oral Daily   fluticasone 2 spray Each Nare Daily   heparin (porcine) 5,000 Units Subcutaneous Q8H   lactobacillus acidophilus 1 capsule Oral Daily   levothyroxine 75 mcg Oral Daily   metroNIDAZOLE 500 mg Intravenous Q8H   miconazole  Topical Q12H   mirtazapine 30 mg Oral Nightly   QUEtiapine 25 mg Oral Nightly   tamsulosin 0.8 mg Oral Daily   vancomycin 250 mg Oral Q6H     Continuous Infusions:  dextrose 5 % and sodium chloride 0.45 % with KCl 20 mEq/L 125 mL/hr Last Rate: 125 mL/hr (07/09/17 0515)     PRN Meds:.•  acetaminophen  •  melatonin  •  ondansetron **OR** ondansetron  •  sodium chloride  •  sodium chloride  •  Insert peripheral IV **AND** sodium chloride        Assessment/Plan   ASSESSMENT/PLAN    Principal Problem:    C. difficile colitis  Active Problems:    Dysuria    Parkinson's disease    Generalized weakness    History of recurrent UTIs    CHF with cardiomyopathy    Hypertension    Hypothyroidism    History of ischemic left MCA stroke 2/20/17    Renal cyst, left noted on CT    FTT (failure to thrive) in adult    Abdominal pain, acute, generalized      86 yof with hx of CVA and PD, p/w weakness and diarrhea, previously dx with colitis per CT and started on cipro and flagyl, admitted with severe c.diff     Plan     C diff colitis  - vanc, flagyl  - check cbc and cmp am    Cystitis    Urinary retention  - followed by Dr Flynn outpatient    Parkinsons    HTN    H/o CVA s/p tPA  (2/2017)    Hypothyroid    Somnolence  - decrease seroquel dose    Generalized weakness  - PT, inpatient rehab recomended     Dispo: anticipate d/c likely to SNF in a few days, CM following    Aki Luke MD  07/09/17  4:23 PM

## 2017-07-10 LAB
ALBUMIN SERPL-MCNC: 2.6 G/DL (ref 3.2–4.8)
ALBUMIN/GLOB SERPL: 1 G/DL (ref 1.5–2.5)
ALP SERPL-CCNC: 62 U/L (ref 25–100)
ALT SERPL W P-5'-P-CCNC: 2 U/L (ref 7–40)
ANION GAP SERPL CALCULATED.3IONS-SCNC: 5 MMOL/L (ref 3–11)
AST SERPL-CCNC: 18 U/L (ref 0–33)
BASOPHILS # BLD AUTO: 0.02 10*3/MM3 (ref 0–0.2)
BASOPHILS NFR BLD AUTO: 0.7 % (ref 0–1)
BILIRUB SERPL-MCNC: 0.2 MG/DL (ref 0.3–1.2)
BUN BLD-MCNC: 8 MG/DL (ref 9–23)
BUN/CREAT SERPL: 13.3 (ref 7–25)
CALCIUM SPEC-SCNC: 8.6 MG/DL (ref 8.7–10.4)
CHLORIDE SERPL-SCNC: 104 MMOL/L (ref 99–109)
CO2 SERPL-SCNC: 26 MMOL/L (ref 20–31)
CREAT BLD-MCNC: 0.6 MG/DL (ref 0.6–1.3)
DEPRECATED RDW RBC AUTO: 49 FL (ref 37–54)
EOSINOPHIL # BLD AUTO: 0.17 10*3/MM3 (ref 0–0.3)
EOSINOPHIL NFR BLD AUTO: 5.5 % (ref 0–3)
ERYTHROCYTE [DISTWIDTH] IN BLOOD BY AUTOMATED COUNT: 14.5 % (ref 11.3–14.5)
GFR SERPL CREATININE-BSD FRML MDRD: 95 ML/MIN/1.73
GLOBULIN UR ELPH-MCNC: 2.7 GM/DL
GLUCOSE BLD-MCNC: 97 MG/DL (ref 70–100)
HCT VFR BLD AUTO: 27.5 % (ref 34.5–44)
HGB BLD-MCNC: 8.8 G/DL (ref 11.5–15.5)
IMM GRANULOCYTES # BLD: 0.02 10*3/MM3 (ref 0–0.03)
IMM GRANULOCYTES NFR BLD: 0.7 % (ref 0–0.6)
LYMPHOCYTES # BLD AUTO: 1.23 10*3/MM3 (ref 0.6–4.8)
LYMPHOCYTES NFR BLD AUTO: 40.1 % (ref 24–44)
MCH RBC QN AUTO: 29.3 PG (ref 27–31)
MCHC RBC AUTO-ENTMCNC: 32 G/DL (ref 32–36)
MCV RBC AUTO: 91.7 FL (ref 80–99)
MONOCYTES # BLD AUTO: 0.38 10*3/MM3 (ref 0–1)
MONOCYTES NFR BLD AUTO: 12.4 % (ref 0–12)
NEUTROPHILS # BLD AUTO: 1.25 10*3/MM3 (ref 1.5–8.3)
NEUTROPHILS NFR BLD AUTO: 40.6 % (ref 41–71)
PLATELET # BLD AUTO: 196 10*3/MM3 (ref 150–450)
PMV BLD AUTO: 10.5 FL (ref 6–12)
POTASSIUM BLD-SCNC: 4.1 MMOL/L (ref 3.5–5.5)
PROT SERPL-MCNC: 5.3 G/DL (ref 5.7–8.2)
RBC # BLD AUTO: 3 10*6/MM3 (ref 3.89–5.14)
SODIUM BLD-SCNC: 135 MMOL/L (ref 132–146)
WBC NRBC COR # BLD: 3.07 10*3/MM3 (ref 3.5–10.8)

## 2017-07-10 PROCEDURE — 25010000002 HEPARIN (PORCINE) PER 1000 UNITS: Performed by: INTERNAL MEDICINE

## 2017-07-10 PROCEDURE — 85025 COMPLETE CBC W/AUTO DIFF WBC: CPT | Performed by: INTERNAL MEDICINE

## 2017-07-10 PROCEDURE — 97530 THERAPEUTIC ACTIVITIES: CPT

## 2017-07-10 PROCEDURE — 97116 GAIT TRAINING THERAPY: CPT

## 2017-07-10 PROCEDURE — 80053 COMPREHEN METABOLIC PANEL: CPT | Performed by: INTERNAL MEDICINE

## 2017-07-10 PROCEDURE — 99233 SBSQ HOSP IP/OBS HIGH 50: CPT | Performed by: INTERNAL MEDICINE

## 2017-07-10 RX ADMIN — TAMSULOSIN HYDROCHLORIDE 0.8 MG: 0.4 CAPSULE ORAL at 13:53

## 2017-07-10 RX ADMIN — HEPARIN SODIUM 5000 UNITS: 5000 INJECTION, SOLUTION INTRAVENOUS; SUBCUTANEOUS at 21:35

## 2017-07-10 RX ADMIN — CARBIDOPA AND LEVODOPA 1 TABLET: 25; 100 TABLET ORAL at 17:47

## 2017-07-10 RX ADMIN — CARBIDOPA AND LEVODOPA 1 TABLET: 50; 200 TABLET, EXTENDED RELEASE ORAL at 21:34

## 2017-07-10 RX ADMIN — MICONAZOLE NITRATE: 20 POWDER TOPICAL at 13:33

## 2017-07-10 RX ADMIN — MICONAZOLE NITRATE: 20 POWDER TOPICAL at 21:34

## 2017-07-10 RX ADMIN — METRONIDAZOLE 500 MG: 500 INJECTION, SOLUTION INTRAVENOUS at 05:09

## 2017-07-10 RX ADMIN — MIRTAZAPINE 30 MG: 15 TABLET, FILM COATED ORAL at 21:35

## 2017-07-10 RX ADMIN — LEVOTHYROXINE SODIUM 75 MCG: 75 TABLET ORAL at 10:19

## 2017-07-10 RX ADMIN — Medication 250 MG: at 05:10

## 2017-07-10 RX ADMIN — MICONAZOLE NITRATE: 20 POWDER TOPICAL at 17:53

## 2017-07-10 RX ADMIN — MICONAZOLE NITRATE 1 APPLICATION: 20 POWDER TOPICAL at 10:20

## 2017-07-10 RX ADMIN — CARBIDOPA AND LEVODOPA 1 TABLET: 25; 100 TABLET ORAL at 13:33

## 2017-07-10 RX ADMIN — QUETIAPINE FUMARATE 12.5 MG: 25 TABLET, FILM COATED ORAL at 21:35

## 2017-07-10 RX ADMIN — CETIRIZINE HYDROCHLORIDE 5 MG: 10 TABLET, FILM COATED ORAL at 10:19

## 2017-07-10 RX ADMIN — ASPIRIN 325 MG ORAL TABLET 325 MG: 325 PILL ORAL at 10:17

## 2017-07-10 RX ADMIN — CARBIDOPA AND LEVODOPA 1 TABLET: 25; 100 TABLET ORAL at 05:11

## 2017-07-10 RX ADMIN — POTASSIUM CHLORIDE, DEXTROSE MONOHYDRATE AND SODIUM CHLORIDE 125 ML/HR: 150; 5; 450 INJECTION, SOLUTION INTRAVENOUS at 03:17

## 2017-07-10 RX ADMIN — METRONIDAZOLE 500 MG: 500 INJECTION, SOLUTION INTRAVENOUS at 21:34

## 2017-07-10 RX ADMIN — POTASSIUM CHLORIDE, DEXTROSE MONOHYDRATE AND SODIUM CHLORIDE 125 ML/HR: 150; 5; 450 INJECTION, SOLUTION INTRAVENOUS at 21:30

## 2017-07-10 RX ADMIN — METRONIDAZOLE 500 MG: 500 INJECTION, SOLUTION INTRAVENOUS at 13:36

## 2017-07-10 RX ADMIN — FAMOTIDINE 40 MG: 20 TABLET ORAL at 10:19

## 2017-07-10 RX ADMIN — Medication 1 CAPSULE: at 10:24

## 2017-07-10 RX ADMIN — HEPARIN SODIUM 5000 UNITS: 5000 INJECTION, SOLUTION INTRAVENOUS; SUBCUTANEOUS at 13:34

## 2017-07-10 RX ADMIN — ATORVASTATIN CALCIUM 20 MG: 20 TABLET, FILM COATED ORAL at 21:35

## 2017-07-10 RX ADMIN — Medication 250 MG: at 13:32

## 2017-07-10 RX ADMIN — FLUTICASONE PROPIONATE 2 SPRAY: 50 SPRAY, METERED NASAL at 10:22

## 2017-07-10 RX ADMIN — CARVEDILOL 3.12 MG: 3.12 TABLET, FILM COATED ORAL at 10:18

## 2017-07-10 RX ADMIN — POTASSIUM CHLORIDE, DEXTROSE MONOHYDRATE AND SODIUM CHLORIDE 125 ML/HR: 150; 5; 450 INJECTION, SOLUTION INTRAVENOUS at 13:36

## 2017-07-10 RX ADMIN — HEPARIN SODIUM 5000 UNITS: 5000 INJECTION, SOLUTION INTRAVENOUS; SUBCUTANEOUS at 05:13

## 2017-07-10 RX ADMIN — CARVEDILOL 3.12 MG: 3.12 TABLET, FILM COATED ORAL at 21:35

## 2017-07-10 NOTE — PROGRESS NOTES
Continued Stay Note  [facility]     Patient Name: Florinda Knapp  MRN: [id number]  Today's Date: 7/10/2017    Admit Date: 7/4/2017          Discharge Plan       07/10/17 1652    Case Management/Social Work Plan    Plan [facility]    Patient/Family In Agreement With Plan yes    Additional Comments Updated Saige with [facility] that patient may be ready for discharge tomorrow per MD consult to CM. She will look into bed availability. Son, Yogesh, [phone], will transport. CM will continue to follow.       07/10/17 1526    Case Management/Social Work Plan    Plan [facility]    Patient/Family In Agreement With Plan yes    Additional Comments [facility] continues to follow patient's progress in the hospital and will inform CM of bed availability as patient is closer to discharge. Plan remains to go to [facility] when medically ready. CM will continue to follow.               Discharge Codes     None        Expected Discharge Date and Time     Expected Discharge Date Expected Discharge Time    Jul 12, 2017             Ana Greene

## 2017-07-10 NOTE — PROGRESS NOTES
Continued Stay Note  [facility]     Patient Name: Florinda Knapp  MRN: [id number]  Today's Date: 7/10/2017    Admit Date: 7/4/2017          Discharge Plan       07/10/17 1526    Case Management/Social Work Plan    Plan [facility]    Patient/Family In Agreement With Plan yes    Additional Comments Saige with [facility] continues to follow patient's progress in the hospital and will inform CM of bed availability as patient is closer to discharge. Plan remains to go to [facility] when medically ready. CM will continue to follow.               Discharge Codes     None        Expected Discharge Date and Time     Expected Discharge Date Expected Discharge Time    Jul 12, 2017             Ana Greene

## 2017-07-10 NOTE — PROGRESS NOTES
"      HOSPITALIST DAILY PROGRESS NOTE    Chief Complaint: weakness and diarrhea    Subjective   SUBJECTIVE/OVERNIGHT EVENTS     less sleepy today  Walked in room with pt  Eating well  Denies abd pain    Review of Systems:  Review of Systems   Constitution: Positive for malaise/fatigue.   HENT: Negative.    Eyes: Negative.    Cardiovascular: Negative.    Respiratory: Negative.    Endocrine: Negative.    Hematologic/Lymphatic: Negative.    Musculoskeletal: Negative.    Gastrointestinal: Positive for diarrhea. Negative for abdominal pain.   Genitourinary: Negative.    Neurological: Negative.    Psychiatric/Behavioral: Negative.    Allergic/Immunologic: Negative.        Objective   OBJECTIVE   I have reviewed the vital signs.  /81 (BP Location: Right arm, Patient Position: Lying)  Pulse 68  Temp 98.1 °F (36.7 °C) (Oral)   Resp 18  Ht 59\" (149.9 cm)  Wt 110 lb (49.9 kg)  SpO2 95%  BMI 22.22 kg/m2    Physical Exam:  Gen-no acute distress, non toxic, in bed  - navarro  - rectal tube  CV-RRR, S1 S2 normal, no m/r/g  Resp-CTAB, no wheezes, rhonchi or rales  Abd-soft, Non tender, Non distended, normo active bowel sounds  Ext-No lower extremity cyanosis clubbing or edema bilaterally  Neuro-somnolent but awakens easily, answers questions appropriately  Psych-overall flat affect   Skin- No rash on exposed UE or LE bilaterally      Results:  I have reviewed the labs, culture data.      Results from last 7 days  Lab Units 07/10/17  0713 07/07/17  0719 07/06/17  0723   WBC 10*3/mm3 3.07* 9.20 15.75*   HEMOGLOBIN g/dL 8.8* 9.8* 8.8*   HEMATOCRIT % 27.5* 31.4* 28.1*   PLATELETS 10*3/mm3 196 160 150       Results from last 7 days  Lab Units 07/10/17  0713   SODIUM mmol/L 135   POTASSIUM mmol/L 4.1   CHLORIDE mmol/L 104   CO2 mmol/L 26.0   BUN mg/dL 8*   CREATININE mg/dL 0.60   GLUCOSE mg/dL 97   CALCIUM mg/dL 8.6*       Culture Data:  Cultures:    Blood Culture   Date Value Ref Range Status   07/04/2017 No growth at 3 days  " Preliminary   07/04/2017 No growth at 3 days  Preliminary     Urine Culture   Date Value Ref Range Status   07/06/2017 >100,000 CFU/mL Normal Urogenital Vesta  Final   07/04/2017 No growth at 2 days  Final     I have reviewed the medications.  Scheduled Meds:    aspirin 325 mg Oral Daily   atorvastatin 20 mg Oral Nightly   carbidopa-levodopa 1 tablet Oral Q6H   carbidopa-levodopa CR 1 tablet Oral Nightly   carvedilol 3.125 mg Oral Q12H   cetirizine 5 mg Oral Daily   famotidine 40 mg Oral Daily   fluticasone 2 spray Each Nare Daily   heparin (porcine) 5,000 Units Subcutaneous Q8H   lactobacillus acidophilus 1 capsule Oral Daily   levothyroxine 75 mcg Oral Daily   metroNIDAZOLE 500 mg Intravenous Q8H   miconazole  Topical Q12H   mirtazapine 30 mg Oral Nightly   QUEtiapine 12.5 mg Oral Nightly   tamsulosin 0.8 mg Oral Daily   vancomycin 250 mg Oral Q6H     Continuous Infusions:    dextrose 5 % and sodium chloride 0.45 % with KCl 20 mEq/L 125 mL/hr Last Rate: 125 mL/hr (07/10/17 1336)     PRN Meds:.•  acetaminophen  •  melatonin  •  ondansetron **OR** ondansetron  •  sodium chloride  •  sodium chloride  •  Insert peripheral IV **AND** sodium chloride        Assessment/Plan   ASSESSMENT/PLAN    Principal Problem:    C. difficile colitis  Active Problems:    Dysuria    Parkinson's disease    Generalized weakness    History of recurrent UTIs    CHF with cardiomyopathy    Hypertension    Hypothyroidism    History of ischemic left MCA stroke 2/20/17    Renal cyst, left noted on CT    FTT (failure to thrive) in adult    Abdominal pain, acute, generalized      86 yof with hx of CVA and PD, p/w weakness and diarrhea, previously dx with colitis per CT and started on cipro and flagyl, admitted with severe c.diff     Plan     C diff colitis  - vanc taper per ID note dated today  - asked nurse to D/C rectal tube today as well    Cystitis    Urinary retention  - followed by Dr Flynn outpatient    Parkinsons    HTN    H/o CVA s/p tPA  (2/2017)    Hypothyroid    Somnolence  - decrease seroquel dose - not as somnolent today    Generalized weakness  - PT, inpatient rehab recommended - should be medically ready for transfer to rehab tomorrow, alerted .     Dispo: anticipate d/c likely to rehab in 1-2, CM following    Aki Luke MD  07/10/17  4:47 PM

## 2017-07-10 NOTE — PLAN OF CARE
Problem: Patient Care Overview (Adult)  Goal: Plan of Care Review  Outcome: Ongoing (interventions implemented as appropriate)    Problem: Pain, Acute (Adult)  Goal: Acceptable Pain Control/Comfort Level  Outcome: Ongoing (interventions implemented as appropriate)    Problem: Infection, Risk/Actual (Adult)  Goal: Infection Prevention/Resolution  Outcome: Ongoing (interventions implemented as appropriate)    Problem: Fall Risk (Adult)  Goal: Absence of Falls  Outcome: Ongoing (interventions implemented as appropriate)

## 2017-07-10 NOTE — PLAN OF CARE
Problem: Patient Care Overview (Adult)  Goal: Plan of Care Review  Outcome: Ongoing (interventions implemented as appropriate)    07/10/17 1510   Coping/Psychosocial Response Interventions   Plan Of Care Reviewed With patient   Patient Care Overview   Progress improving   Outcome Evaluation   Outcome Summary/Follow up Plan pt ambulates with MIN Ax2> CGAx2 for 11 ft in room using RWx. Pt had decreased step length BLE. Limited by fatigue.         Problem: Inpatient Physical Therapy  Goal: Bed Mobility Goal LTG- PT  Outcome: Ongoing (interventions implemented as appropriate)    07/05/17 1116 07/10/17 1510   Bed Mobility PT LTG   Bed Mobility PT LTG, Time to Achieve 2 wks --    Bed Mobility PT LTG, Activity Type all bed mobility --    Bed Mobility PT LTG, [place] Level conditional independence --    Bed Mobility PT Goal LTG, Assist Device bed rails --    Bed Mobility PT LTG, Outcome --  goal ongoing       Goal: Transfer Training Goal 1 LTG- PT  Outcome: Ongoing (interventions implemented as appropriate)    07/05/17 1116 07/07/17 1615 07/10/17 1510   Transfer Training PT LTG   Transfer Training PT LTG, Date Established 07/05/17 --  --    Transfer Training PT LTG, Time to Achieve --  --  1 wk   Transfer Training PT LTG, Activity Type all transfers;sit to stand/stand to sit --  --    Transfer Training PT LTG, [place] Level --  minimum assist (75% patient effort);2 person assist required --    Transfer Training PT LTG, Assist Device walker, rolling --  --    Transfer Training PT LTG, Date Goal Reviewed 07/05/17 --  --    Transfer Training PT LTG, Outcome --  --  goal ongoing       Goal: Static Sitting Balance Goal LTG- PT  Outcome: Ongoing (interventions implemented as appropriate)    07/05/17 1116 07/10/17 1510   Static Sitting Balance PT LTG   Static Sitting Balance PT LTG, Time to Achieve 2 wks --    Static Sitting Balance PT LTG, [place] Level conditional independence;supervision required --    Static  Sitting Balance PT LTG, Assist Device UE Support --    Static Sitting Balance PT LTG, Outcome --  goal ongoing       Goal: Dynamic Sitting Balance Goal STG- PT  Outcome: Ongoing (interventions implemented as appropriate)    07/05/17 1116 07/10/17 1510   Dynamic Sitting Balance PT STG   Dynamic Sitting Balance PT STG, Time to Achieve 2 wks --    Dynamic Sitting Balance PT STG, [place] Level contact guard assist --    Dynamic Sitting Balance PT STG, Outcome --  goal ongoing       Goal: Static Standing Balance Goal STG- PT  Outcome: Ongoing (interventions implemented as appropriate)    07/05/17 1116 07/10/17 1510   Static Standing Balance PT STG   Static Standing Balance PT STG, Date Established 07/05/17 --    Static Standing Balance PT STG, Time to Achieve 2 wks --    Static Standing Balance PT STG, [place] Level set up required;contact guard assist --    Static Standing Balance PT STG, Assist Device assistive Device --    Static Standing Balance PT STG, Outcome --  goal ongoing

## 2017-07-10 NOTE — THERAPY TREATMENT NOTE
Acute Care - Occupational Therapy Treatment Note  [facility]     Patient Name: Florinda Knapp  : 3/5/1931  MRN: [id number]  Today's Date: 7/10/2017  Onset of Illness/Injury or Date of Surgery Date: 17  Date of Referral to OT: 17  Referring Physician: Mayne, PA-C      Admit Date: 2017    Visit Dx:     ICD-10-CM ICD-9-CM   1. Abdominal pain, acute, generalized R10.84 789.07     338.19   2. Leukocytosis, unspecified type D72.829 288.60   3. Acute cystitis without hematuria N30.00 595.0   4. Failure to thrive syndrome, adult R62.7 783.7   5. Impaired mobility and ADLs Z74.09 799.89   6. Impaired functional mobility and activity tolerance Z74.09 V49.89     Patient Active Problem List   Diagnosis   • Dysuria   • Elbow injury   • Fracture of left olecranon process   • Parkinson's disease   • Trauma left hip   • Acute Symptomatic on Chronic Normocytic Anemia   • Acute respiratory failure with hypoxia   • Generalized weakness   • Chest pain   • Symptomatic anemia   • Elevated troponin I level, possibly due to anemia   • Status post total replacement of left hip 2016 at [facility]   • H/O urinary retention   • History of recurrent UTIs   • Essential hypertension   • Anxiety   • CHF with cardiomyopathy   • Colitis   • Hypokalemia   • Acute ischemic left MCA stroke   • Hypertension   • Hyperlipidemia   • Urinary retention/Frequent UTIs   • Hypothyroidism   • Status post left hip replacement   • C. difficile colitis   • History of ischemic left MCA stroke 17   • Renal cyst, left noted on CT   • Physical deconditioning   • FTT (failure to thrive) in adult   • Abdominal pain, acute, generalized   • Rhinorrhea             Adult Rehabilitation Note       07/10/17 1426 07/10/17 1425 17 0846    Rehab Assessment/Intervention    Discipline occupational therapist  -TA physical therapist  -EH physical therapist  -SH    Document Type therapy note (daily note)  -TA therapy note (daily note)  -EH therapy note  (daily note)  -    Subjective Information agree to therapy;complains of;weakness  -TA no complaints;agree to therapy  - no complaints;agree to therapy  -SH    Patient Effort, Rehab Treatment good  -TA adequate  - adequate  -SH    Symptoms Noted During/After Treatment none  -TA fatigue  - none  -SH    Precautions/Limitations fall precautions;other (see comments)   spore; fecal mgt system; navarro cath  -TA fall precautions  - fall precautions  -    Recorded by [TA] Linwood Torres, OT [] Virginia Wheatley, PT [] Elisa Guerra, PT    Vital Signs    Pre Systolic BP Rehab --   Nurse cleared pt for tx, VSS  -TA      Pre SpO2 (%)  --   WNL throughout  -     Pre Patient Position Supine  -TA      Intra Patient Position Standing  -TA      Post Patient Position Sitting  -TA      Recorded by [TA] Linwood Torres, OT [] Virginia Wheatley, PT     Pain Assessment    Pain Assessment [place] FACES  -TA No/denies pain  - No/denies pain  -    [place] FACES Pain Rating 2  -TA      Pain Type Acute pain  -TA      Pain Location Buttocks  -TA      Pain Intervention(s) Repositioned;Ambulation/increased activity  -TA      Response to Interventions tolerated  -TA      Recorded by [TA] Linwood Torres, OT [] Virginia Wheatley, PT [] Elisa Guerra, PT    Cognitive Assessment/Intervention    Current Cognitive/Communication Assessment functional  -TA functional  - functional  -    Orientation Status oriented x 4  -TA oriented to;place;person;situation   other not assessed  - oriented x 4  -    Follows Commands/Answers Questions 100% of the time;able to follow single-step instructions;needs cueing  -TA able to follow single-step instructions  - able to follow single-step instructions;100% of the time  -    Personal Safety WNL/WFL  -TA WNL/WFL  -EH WNL/WFL  -    Personal Safety Interventions fall prevention program maintained;gait belt;muscle strengthening facilitated;nonskid  shoes/slippers when out of bed;supervised activity  -TA fall prevention program maintained;gait belt;nonskid shoes/slippers when out of bed  -EH fall prevention program maintained;gait belt;nonskid shoes/slippers when out of bed  -    Recorded by [TA] Linwood Torres, OT [EH] Virginia Wheatley, PT [SH] Elisa Guerra, PT    Bed Mobility, Assessment/Treatment    Bed Mobility, Assistive Device bed rails;head of bed elevated  -TA bed rails;head of bed elevated  - bed rails;head of bed elevated  -    Bed Mobility, Roll Left, [place] moderate assist (50% patient effort)  -TA moderate assist (50% patient effort);2 person assist required  -EH moderate assist (50% patient effort);2 person assist required;verbal cues required;nonverbal cues required (demo/gesture)  -    Bed Mobility, Scoot/Bridge, [place]  moderate assist (50% patient effort);2 person assist required;verbal cues required  -EH moderate assist (50% patient effort);2 person assist required;verbal cues required  -    Bed Mob, Supine to Sit, [place] moderate assist (50% patient effort);verbal cues required  -TA moderate assist (50% patient effort);2 person assist required  -EH moderate assist (50% patient effort);2 person assist required;verbal cues required  -    Bed Mob, Sidelying to Sit, [place] moderate assist (50% patient effort);verbal cues required  -TA      Bed Mob, Sit to Sidelying, [place] not tested  -TA      Bed Mobility, Safety Issues decreased use of arms for pushing/pulling;decreased use of legs for bridging/pushing  -TA decreased use of arms for pushing/pulling;decreased use of legs for bridging/pushing  - decreased use of arms for pushing/pulling;decreased use of legs for bridging/pushing;impaired trunk control for bed mobility  -    Bed Mobility, Impairments strength decreased;impaired balance  -TA strength decreased;impaired balance  - strength decreased;impaired balance  -    Bed Mobility,  Comment VCs for sequencing transition  -TA Verbal cues and tactile cues with assist.   -EH Max cues and encouragement. Generalized weakness and increased tone continue to limit her ability to perform.  -SH    Recorded by [TA] Linwood Torres, OT [EH] Virginia Wheatley, PT [SH] Elisa Guerra, PT    Transfer Assessment/Treatment    Transfers, Bed-Chair [place] moderate assist (50% patient effort);2 person assist required  -TA moderate assist (50% patient effort);2 person assist required  -EH moderate assist (50% patient effort);2 person assist required;verbal cues required  -SH    Transfers, Chair-Bed [place]  not tested  -EH     Transfers, Bed-Chair-Bed, Assist Device rolling walker  -TA rolling walker  -EH rolling walker  -SH    Transfers, Sit-Stand [place] moderate assist (50% patient effort);verbal cues required  -TA moderate assist (50% patient effort);2 person assist required  -EH moderate assist (50% patient effort);2 person assist required;verbal cues required  -SH    Transfers, Stand-Sit [place] minimum assist (75% patient effort);verbal cues required  -TA minimum assist (75% patient effort);2 person assist required  -EH moderate assist (50% patient effort);2 person assist required;verbal cues required  -SH    Transfers, Sit-Stand-Sit, Assist Device rolling walker  -TA rolling walker  -EH rolling walker  -SH    Transfer, Safety Issues balance decreased during turns;step length decreased;weight-shifting ability decreased  -TA balance decreased during turns;sequencing ability decreased;step length decreased  -EH balance decreased during turns;sequencing ability decreased;step length decreased;weight-shifting ability decreased;loses balance backward  -    Transfer, Impairments strength decreased;impaired balance  -TA impaired balance;coordination impaired  -EH strength decreased;impaired balance;coordination impaired  -    Transfer, Comment Pt completed stand pivot transfer with  Mod A x 2, difficulty advancing LLE; 2nd STS required less assist, Mod A x 1.  -TA cues for handplacement, sequencing  - Pt performed sit to stand from EOB with B feet blocked to prevent sliding. Pt then stepped to chair.  -SH    Recorded by [TA] Linwood Torres OT [EH] Virginia Wheatley, PT [SH] Elisa Guerra, PT    Gait Assessment/Treatment    Gait, [place] Level  minimum assist (75% patient effort);2 person assist required   with periods of CGA x2  - moderate assist (50% patient effort);2 person assist required  -    Gait, Assistive Device  rolling walker  - rolling walker  -    Gait, Distance (Feet)  11  - 3  -    Gait, Gait Pattern Analysis  swing-to gait  - swing-to gait  -    Gait, Gait Deviations  bernice decreased;decreased heel strike;toe-to-floor clearance decreased;stride length decreased;stride width increased;forward flexed posture  - bernice decreased;decreased heel strike;forward flexed posture;step length decreased  -    Gait, Safety Issues  sequencing ability decreased;step length decreased;weight-shifting ability decreased  - sequencing ability decreased;balance decreased during turns;step length decreased;weight-shifting ability decreased;loses balance backward  -    Gait, Impairments  impaired balance;strength decreased;ROM decreased;postural control impaired  - impaired balance;strength decreased;coordination impaired  -    Gait, Comment  decreased dorsiflexion STACEY, decreased step length (L more impaired than R).  - Pt continues to be limited by decreased dorsiflexion B LE.  -SH    Recorded by  [EH] Virginia Wheatley, PT [SH] Elisa Guerra, PT    Functional Mobility    Functional Mobility- Ind. Level contact guard assist;2 person assist required;verbal cues required;other (see comments)   brief episodes of Min A required  -TA      Functional Mobility- Device rolling walker  -TA      Functional Mobility-Distance (Feet) 11  -TA      Functional  Mobility- Safety Issues sequencing ability decreased;step length decreased;weight-shifting ability decreased  -TA      Functional Mobility- Comment VCs for postural corrections; pt with forward flexed posture.  -TA      Recorded by [TA] Linwood Torres OT      Upper Body Dressing Assessment/Training    UB Dressing Assess/Train, Clothing Type donning:;hospital gown  -TA      UB Dressing Assess/Train, Position sitting;edge of bed  -TA      UB Dressing Assess/Train, [place] moderate assist (50% patient effort)  -TA      UB Dressing Assess/Train, Impairments impaired balance;strength decreased  -TA      Recorded by [TA] Linwood Torres OT      Lower Body Dressing Assessment/Training    LB Dressing Assess/Train, Clothing Type doffing:;donning:;socks;shoes  -TA      LB Dressing Assess/Train, Position supine  -TA      LB Dressing Assess/Train, [place] dependent (less than 25% patient effort)  -TA      LB Dressing Assess/Train, Impairments ROM decreased;strength decreased  -TA      Recorded by [TA] Linwood Torres OT      Toileting Assessment/Training    Toileting Assess/Train, Indepen Level dependent (less than 25% patient effort)  -TA      Toileting Assess/Train, Comment fecal mgt system; navarro cath  -TA      Recorded by [TA] Linwood Torres OT      Grooming Assessment/Training    Grooming Assess/Train, Position supported sitting  -TA      Grooming Assess/Train, Indepen Level verbal cues required;supervision required;set up required  -TA      Grooming Assess/Train, Impairments strength decreased;coordination impaired  -TA      Grooming Assess/Train, Comment VC/TCs for thoroughness with combing hair.  -TA      Recorded by [TA] Linwood Torres OT      Motor Skills/Interventions    Additional Documentation Balance Skills Training (Group)  -TA      Recorded by [TA] Linwood Torres OT      Balance Skills Training    Sitting-Level of Assistance Close supervision  -TA Close supervision  -      Sitting-Balance Support Feet supported  -TA Feet supported  -     Sitting-Balance Activities Lateral lean;Forward lean;Trunk control activities  -TA Trunk control activities  -     Sitting # of Minutes  3  -EH     Standing-Level of Assistance Minimum assistance;x2  -TA Contact guard;x2  -EH     Static Standing Balance Support assistive device  -TA assistive device  -     Standing-Balance Activities Weight Shift A-P;Weight Shift R-L;Reaching for objects  -TA Weight Shift R-L   correcting posture with TC for t-spine extention  -     Standing Balance # of Minutes  1  -EH     Recorded by [TA] Linwood Torres, OT [] Virginia Wheatley PT     Therapy Exercises    Bilateral Lower Extremities  AAROM:;calf stretch;ankle pumps/circles  - AROM:;10 reps;sitting;ankle pumps/circles;LAQ  -    Bilateral Upper Extremity AROM:;10 reps;supine;hand pumps;shoulder extension/flexion;shoulder horizontal abd/add  -TA      Recorded by [TA] Linwood Torres OT [] Virginia Wheatley, PT [] Elisa Guerra, PT    Positioning and Restraints    Pre-Treatment Position in bed  -TA in bed  - in bed  -    Post Treatment Position chair  -TA chair  - chair  -    In Chair reclined;call light within reach;encouraged to call for assist;exit alarm on;RUE elevated;LUE elevated;waffle cushion;on mechanical lift sling;legs elevated  - notified nsg;reclined;call light within reach;encouraged to call for assist;exit alarm on;waffle cushion;on mechanical lift sling  - reclined;call light within reach;encouraged to call for assist;exit alarm on;on mechanical lift sling  -    Recorded by [TA] Linwood Torres OT [] Virginia Wheatley, PT [] Elisa Guerra, PT      User Key  (r) = Recorded By, (t) = Taken By, (c) = Cosigned By    Initials Name Effective Dates     Virginia Wheatley, PT 06/19/15 -     SH Elisa Guerra, PT 06/19/15 -     TA Linwood Torres OT 03/14/16 -                 OT Goals        07/10/17 1513 07/07/17 1408 07/05/17 1030    Bed Mobility OT LTG    Bed Mobility OT LTG, Date Established   07/05/17  -    Bed Mobility OT LTG, Time to Achieve   by discharge  -    Bed Mobility OT LTG, Activity Type   all bed mobility  -    Bed Mobility OT LTG, [place] Level   moderate assist (50% patient effort)  -    Bed Mobility OT LTG, Outcome goal partially met   Met for roll, sidelying to sit  -TA goal partially met   pt met OOB today  -DUKE goal ongoing  -    Transfer Training OT LTG    Transfer Training OT LTG, Date Established   07/05/17  -    Transfer Training OT LTG, Time to Achieve   by discharge  -    Transfer Training OT LTG, Activity Type   sit to stand/stand to sit;toilet  -    Transfer Training OT LTG, [place] Level minimum assist (75% patient effort);2 person assist required  -TA --   max of 2  -DUKE moderate assist (50% patient effort)  -    Transfer Training OT LTG, Assist Device   walker, rolling  -    Transfer Training OT LTG, Outcome goal revised   Met for Max A x 2; goal upgraded  -TA  goal ongoing  -    Patient Education OT LTG    Patient Education OT LTG, Date Established   07/05/17  -    Patient Education OT LTG, Time to Achieve   by discharge  -    Patient Education OT LTG, Education Type   HEP;posture/body mechanics;home safety  -    Patient Education OT LTG, Education Understanding   demonstrates adequately;verbalizes understanding  -    Patient Education OT LTG Outcome goal ongoing   Progressing with HEP, body mechanics  -TA goal ongoing   HEP issued, progressing with foot positioning transfers.  -DUKE goal ongoing  -    Grooming OT LTG    Grooming Goal OT LTG, Date Established   07/05/17  -    Grooming Goal OT LTG, Time to Achieve   by discharge  -    Grooming Goal OT LTG, [place] Level   minimum assist (75% patient effort)  -    Grooming Goal OT LTG, Outcome goal partially met   Met for combing hair.  -TA goal ongoing   Pt waiting for  aid to do whole bath  -DUKE goal ongoing  -      User Key  (r) = Recorded By, (t) = Taken By, (c) = Cosigned By    Initials Name Provider Type    DUKE Camarena, OT Occupational Therapist    STELLA Torres, OT Occupational Therapist    JUNIOR Rowland, OT Occupational Therapist          Occupational Therapy Education     Title: PT OT SLP Therapies (Active)     Topic: Occupational Therapy (Done)     Point: ADL training (Done)    Description: Instruct learner(s) on proper safety adaptation and remediation techniques during self care or transfers.   Instruct in proper use of assistive devices.    Learning Progress Summary    Learner Readiness Method Response Comment Documented by Status   Patient Acceptance E VU  LD 07/08/17 0510 Done    Acceptance E NR  MC 07/05/17 1030 Active               Point: Home exercise program (Done)    Description: Instruct learner(s) on appropriate technique for monitoring, assisting and/or progressing therapeutic exercises/activities.    Learning Progress Summary    Learner Readiness Method Response Comment Documented by Status   Patient Acceptance E,D VU,DU,NR BUE HEP; body mechanics for bed mobility/fxl transfers; reinforced need for call for assist with OOB activities. TA 07/10/17 1512 Done    Acceptance E VU  LD 07/08/17 0510 Done    Acceptance E,D,H VU,NR bed mobility technique, transfer and feet postioning, HEP DUKE 07/07/17 1408 Done               Point: Precautions (Done)    Description: Instruct learner(s) on prescribed precautions during self-care and functional transfers.    Learning Progress Summary    Learner Readiness Method Response Comment Documented by Status   Patient Acceptance E,D VU,DU,NR BUE HEP; body mechanics for bed mobility/fxl transfers; reinforced need for call for assist with OOB activities. TA 07/10/17 1512 Done    Acceptance E VU  LD 07/08/17 0510 Done    Acceptance E,D,H VU,NR bed mobility technique, transfer and feet postioning, HEP DUKE 07/07/17 1408  Done               Point: Body mechanics (Done)    Description: Instruct learner(s) on proper positioning and spine alignment during self-care, functional mobility activities and/or exercises.    Learning Progress Summary    Learner Readiness Method Response Comment Documented by Status   Patient Acceptance E,D ANA MARIA MEDINA,JOLLY BUE HEP; body mechanics for bed mobility/fxl transfers; reinforced need for call for assist with OOB activities.  07/10/17 1512 Done    Acceptance E VU   07/08/17 0510 Done    Acceptance E,D,H CAROL,JOLLY bed mobility technique, transfer and feet postioning, HEP DUKE 07/07/17 1408 Done    Acceptance E NR   07/05/17 1030 Active                      User Key     Initials Effective Dates Name Provider Type Discipline     06/22/15 -  Abigail Camarena, OT Occupational Therapist OT     03/14/16 -  Linwood Torres, OT Occupational Therapist OT     03/14/16 -  Charla AVERY Rowland, OT Occupational Therapist OT     01/20/17 -  Veronica Santana, RN Registered Nurse Nurse                  OT Recommendation and Plan  Anticipated Equipment Needs At Discharge:  (TBD)  Anticipated Discharge Disposition: skilled nursing facility  Planned Therapy Interventions: ADL retraining, balance training, bed mobility training, home exercise program, strengthening, transfer training  Therapy Frequency: daily  Plan of Care Review  Plan Of Care Reviewed With: patient  Progress: improving  Outcome Summary/Follow up Plan: Pt limited by fatigue; progressing with bed mobility at Mod A; ambulated in room 11 feet with CGA x 2, periods of Min A x 2;; prgressing with UB strengthening; will continue to benefit from skilled OT services to address deficits, facilitate increased fxl I, safe transition to next level of care.        Outcome Measures       07/10/17 1426 07/10/17 1425 07/09/17 0846    How much help from another person do you currently need...    Turning from your back to your side while in flat bed without using bedrails?  2  -EH 2   -SH    Moving from lying on back to sitting on the side of a flat bed without bedrails?  2  -EH 2  -SH    Moving to and from a bed to a chair (including a wheelchair)?  2  -EH 2  -SH    Standing up from a chair using your arms (e.g., wheelchair, bedside chair)?  2  -EH 2  -SH    Climbing 3-5 steps with a railing?  1  -EH 1  -SH    To walk in hospital room?  2  -EH 1  -SH    AM-PAC 6 Clicks Score  11  -EH 10  -SH    How much help from another is currently needed...    Putting on and taking off regular lower body clothing? 2  -TA      Bathing (including washing, rinsing, and drying) 2  -TA      Toileting (which includes using toilet bed pan or urinal) 2  -TA      Putting on and taking off regular upper body clothing 3  -TA      Taking care of personal grooming (such as brushing teeth) 3  -TA      Eating meals 4  -TA      Score 16  -TA      Functional Assessment    Outcome Measure Options AM-PAC 6 Clicks Daily Activity (OT)  -TA AM-PAC 6 Clicks Basic Mobility (PT)  - AM-PAC 6 Clicks Basic Mobility (PT)  -      User Key  (r) = Recorded By, (t) = Taken By, (c) = Cosigned By    Initials Name Provider Type     Virginia Wheatley, PT Physical Therapist     Elisa Guerra, PT Physical Therapist    TA Linwood Torres OT Occupational Therapist           Time Calculation:         Time Calculation- OT       07/10/17 1518          Time Calculation- OT    OT Start Time 1426   ttc 16 minutes  -TA      Total Timed Code Minutes- OT 16 minute(s)  -TA      OT Received On 07/10/17  -TA      OT Goal Re-Cert Due Date 07/15/17  -TA        User Key  (r) = Recorded By, (t) = Taken By, (c) = Cosigned By    Initials Name Provider Type    TA Linwood Torres OT Occupational Therapist           Therapy Charges for Today     Code Description Service Date Service Provider Modifiers Qty    [id number]  OT THERAPEUTIC ACT EA 15 MIN 7/10/2017 Linwood Torres OT GO 1               Linwood Torres OT  7/10/2017

## 2017-07-10 NOTE — PROGRESS NOTES
"Adult Nutrition  Assessment/PES    Patient Name:  Florinda Knapp  YOB: 1931  MRN: [id number]  Admit Date:  7/4/2017    Assessment Date:  7/10/2017        Reason for Assessment       07/10/17 1035    Reason for Assessment    Reason For Assessment/Visit follow up protocol    Time Spent (min) 20    Diagnosis Diagnosis   Per notes this admission              Nutrition/Diet History       07/10/17 1036    Nutrition/Diet History    Food Preferences Dislikes eggs    Reported/Observed By Patient;Family;RN    Food Habit/Preferences Uses Supplements    Other Pt reports appetite to be fair, does not eat a lot of the solid foods due to taste change, reports drinking all 3 ensures a day.            Anthropometrics       07/10/17 1041    Anthropometrics    Height 149.9 cm (59\")    Weight 49.9 kg (110 lb)    Ideal Body Weight (IBW)    Ideal Body Weight (IBW), Female 43.97    % Ideal Body Weight 113.7    Body Mass Index (BMI)    BMI (kg/m2) 22.26            Labs/Tests/Procedures/Meds       07/10/17 1041    Labs/Tests/Procedures/Meds    Labs/Tests Review Reviewed              Nutrition Prescription Ordered       07/10/17 1041    Nutrition Prescription PO    Current PO Diet Regular    Fluid Consistency Thin    Supplement Ensure HP    Supplement Frequency 3 times a day    Common Modifiers GI Soft/[place]            Evaluation of Received Nutrient/Fluid Intake       07/10/17 1042    PO Evaluation    Number of Meals 6    % PO Intake 38   Drinks 3 ensures per day        Problem/Interventions:        Problem 1       07/10/17 1042    Nutrition Diagnoses Problem 1    Problem 1 Inadequate Intake/Infusion    Etiology (related to) Medical Diagnosis   Clinical condition    Signs/Symptoms (evidenced by) PO Intake    Percent (%) intake recorded 38 %   Drinks 3 ensures per day    Over number of meals 6              Intervention Goal       07/10/17 1043    Intervention Goal    General Nutrition support treatment    PO Increase intake "            Nutrition Intervention       07/10/17 1043    Nutrition Intervention    RD/Tech Action Advise alternate selection;Menu provided;Encourage intake;Follow Tx progress;Care plan reviewd              Education/Evaluation       07/10/17 1044    Monitor/Evaluation    Monitor Per protocol;PO intake;Supplement intake        Electronically signed by:  Tammy Rubio  07/10/17 10:44 AM

## 2017-07-10 NOTE — PROGRESS NOTES
[facility] Progress Note    Admission Date: 7/4/2017    Florinda Knapp  3/5/1931  [id number]    Date: 7/10/2017    Meds:    IV Anti-Infectives     Ordered     Dose/Rate Route Frequency Start Stop    07/06/17 1120  fluconazole (DIFLUCAN) tablet 200 mg     Ordering Provider:  Virginia Demarco MD    200 mg Oral Every 24 Hours 07/06/17 1200 07/08/17 1450    07/06/17 0812  metroNIDAZOLE (FLAGYL) IVPB 500 mg     Ordering Provider:  Flo Dugan MD    500 mg  over 60 Minutes Intravenous Every 8 Hours 07/06/17 0900      07/05/17 0804  vancomycin oral solution 250 mg     Ordering Provider:  Flo Dugan MD    250 mg Oral Every 6 Hours Scheduled 07/05/17 1000 07/19/17 1159    07/04/17 1556  cefTRIAXone (ROCEPHIN) IVPB 1 g     Ordering Provider:  Eddy Reynaga MD    1 g  over 30 Minutes Intravenous Once 07/04/17 1558 07/04/17 1649          CC:  cdiff colitis    SUBJECTIVE:  Patient is a 86 y.o. female admitted 2 days ago with diarrhea, nausea, abdominal distention and severe weakness. She became ill 3 weeks ago and presented to the [facility] ER 6/19 with abd pain and distention. Both patient and son state that she was not having diarrhea at the time and did not have a bm until several days later. WBC was 11.3 CT abd showed colitis involving transverse, descending and sigmoid colon. She declined admission at that time and was treated with empiric flagyl and cipro for 7 days. Ten days after stopping the antibiotics ahe developed progressive weakness, nausea, confusion followed by onset of abd pain and diarrhea In the ER 7/4 WBC 16K; CT showed significant improvement in colitis Started on ceftriaxone then changed to zosyn When cdiff + yesterday, changed to parenteral flagyl and oral vanc Rectal tube placed yesterday after numerous stools Today, abd pain and distention improved  UA on admission unremarkable Repeat UA this am has moderate LE and 3+ yeast She had severe urinary retention after hospitalizations in Dec and Jan for  JALEN then CVA These hospitalizations were followed by multiple uti's with enterococcus, C albicans and citrobacter She underwent urethral dilatation by Dr Flynn ~ 6 weeks ago with marked improvement in urinary retention and dysuria    17  Alert, main c/o is inadequate sleep because of noise from monitor and IV  Fluids have been decreased as oral intake improves  Diarrhea improved  Denies abd pain   Plan is to remove navarro and rectal tube later today or tomorrow       Review of Systems:  Constitutional-- No Fever, chills or sweats. Appetite fair, malaise and fatigue PTA .  HEENT-- No new vision, hearing or throat complaints. No epistaxis or oral sores. Denies odynophagia or dysphagia. No headache, photophobia or neck stiffness.  CV-- No chest pain, palpitation or syncope  Resp-- No SOB/cough/Hemoptysis  GI- +nausea, No vomiting, + diarrhea. No hematochezia, melena, or hematemesis. Denies jaundice or chronic liver disease.  -- + dysuria and  urgency or flank pain. Navarro inserted  Lymph- no swollen lymph nodes in neck/axilla or groin.   Heme- No active bruising or bleeding; no Hx of DVT or PE.  MS-- no swelling or pain in the bones or joints of arms/legs. No new back pain.  Neuro-- recovery from illness complicated by Parkinsons- very weak and son is requesting rehab placement  Skin--No rashes or lesions        PE:   Vital Signs  Temp (24hrs), Av.9 °F (36.6 °C), Min:97.8 °F (36.6 °C), Max:98.1 °F (36.7 °C)    Temp  Min: 97.8 °F (36.6 °C)  Max: 98.1 °F (36.7 °C)  BP  Min: 129/83  Max: 156/90  Pulse  Min: 64  Max: 70  Resp  Min: 16  Max: 18  SpO2  Min: 94 %  Max: 96 %  GENERAL: sleepybut  alert, in no acute distress, excellent recall of medical events.   HEENT: Normocephalic, atraumatic. PERRL. EOMI. No conjunctival injection. No icterus. Oropharynx clear without evidence of thrush or exudate. No evidence of peridontal disease.   NECK: Supple without nuchal rigidity. No mass.  LYMPH: No cervical, axillary or  inguinal lymphadenopathy.  HEART: Irregular rate; No murmur, rubs, gallops.   LUNGS: few crackles at bases. Normal respiratory effort. Nonlabored. No dullness.  ABDOMEN: protuberant and firm but nontender. Hypoactive  bowel sounds. No rebound or guarding. NO mass or HSM.  EXT: No cyanosis, clubbing or edema. No cord.  : Genitalia generally unremarkable. + Farris catheter.  MSK: FROM without joint effusions noted arms/legs.   SKIN: Warm and dry without cutaneous eruptions on Inspection/palpation.   NEURO: Oriented to PPT. No focal deficits. Very mild rigidity  PSYCHIATRIC: Normal insight and judgement. Cooperative with PE    Laboratory Data      Results from last 7 days  Lab Units 07/10/17  0713 07/07/17  0719 07/06/17  0723   WBC 10*3/mm3 3.07* 9.20 15.75*   HEMOGLOBIN g/dL 8.8* 9.8* 8.8*   HEMATOCRIT % 27.5* 31.4* 28.1*   PLATELETS 10*3/mm3 196 160 150       Results from last 7 days  Lab Units 07/10/17  0713   SODIUM mmol/L 135   POTASSIUM mmol/L 4.1   CHLORIDE mmol/L 104   CO2 mmol/L 26.0   BUN mg/dL 8*   CREATININE mg/dL 0.60   GLUCOSE mg/dL 97   CALCIUM mg/dL 8.6*       Results from last 7 days  Lab Units 07/10/17  0713   ALK PHOS U/L 62   BILIRUBIN mg/dL 0.2*   ALT (SGPT) U/L 2*   AST (SGOT) U/L 18       Results from last 7 days  Lab Units 07/04/17  1409   SED RATE mm/hr 55*       Results from last 7 days  Lab Units 07/04/17  1409   CRP mg/dL 8.37*       Results from last 7 days  Lab Units 07/04/17  1831   LACTATE mmol/L 0.9             Estimated Creatinine Clearance: 39.8 mL/min (by C-G formula based on Cr of 0.6).    Microbiology:  Blood Culture   Date Value Ref Range Status   07/04/2017 No growth at 2 days  Preliminary   07/04/2017 No growth at 2 days  Preliminary                    Urine Culture   Date Value Ref Range Status   07/06/2017 Culture in progress  Preliminary          Radiology:  Imaging Results (last 72 hours)     Procedure Component Value Units Date/Time    XR Chest 1 View [[id number]]  Collected:  07/05/17 0911     Updated:  07/05/17 1708    Narrative:       EXAMINATION: XR CHEST 1 VW- 07/04/2017     INDICATION: weakness      COMPARISON: 02/23/2017     FINDINGS: Portable chest reveals heart to be enlarged. Underlying  chronic and emphysematous changes seen within the lung fields  bilaterally. Degenerative changes seen within the spine. Vascular  calcifications are identified. No focal parenchymal opacification  present. No pleural effusion or pneumothorax.           Impression:       Degenerative changes seen within the spine. The heart is  enlarged. Underlying chronic and emphysematous changes seen within the  lung fields bilaterally.     D:  07/04/2017  E:  07/05/2017     This report was finalized on 7/5/2017 5:06 PM by Dr. Karely Esquivel MD.       CT Abdomen Pelvis Without Contrast [[id number]] Collected:  07/05/17 0916     Updated:  07/05/17 1708    Narrative:       EXAMINATION: CT ABDOMEN AND PELVIS WO CONTRAST-      INDICATION: History of colitis, now with abdominal pain generalized.     TECHNIQUE: Multiple axial CT imaging was obtained of the abdomen and  pelvis without the administration of intravenous contrast.     The radiation dose reduction device was turned on for each scan per the  ALARA (As Low as Reasonably Achievable) protocol.     COMPARISON: None.     FINDINGS: There is no change in the small pericardial effusion. There is  a small left pleural effusion. Mild increased marking is seen at the  left lung base. Chronic changes are identified. The liver is homogeneous  as well as the spleen. There is a cyst identified in the left kidney. No  stones in the gallbladder. Vascular calcification is seen within the  abdominal aorta and iliac vessels. No free fluid or free air. No  abnormal mass or fluid collection is identified. The appendix is normal.  The abdominal portion of the gastrointestinal tract is within normal  limits.     PELVIS: Pelvic organs are unremarkable. The pelvic  portion of the  gastrointestinal tract reveals improvement seen in the descending colon  previously having abnormal wall thickening. There is only minimal wall  thickening at this time with pericolonic stranding. Minimal fluid is  seen in the left paracolic gutter. Findings have significantly improved  in the interval.  No free fluid or free air. Calcification is seen  within the uterus suggesting degenerating fibroids. Vascular  calcification is seen within the pelvic vessels. No abnormal mass or  fluid collection is identified. Atrophy is identified of the left psoas  muscle. Hardware is identified within the left hip. No acute bony  abnormality is identified.       Impression:       Significant improvement seen in the appearance of the  descending colon with minimal wall thickening remaining and minimal  pericolonic stranding. There is a small amount of fluid in the left  paracolic gutter. The remainder the CT abdomen and pelvis is stable and  grossly unremarkable.     D:  07/04/2017  E:  07/05/2017     This report was finalized on 7/5/2017 5:06 PM by Dr. Karely Esquivel MD.             I personally read the radiographic studies     IMPRESSION:  - Cdiff colitis new dx I suspect that the colitis that prompted her 6/19 ER evaluation was not cdiff since she was not having diarrhea at that time Doubt diverticulitis since it was nearly pancolitis and WBC was only 11K Possible diagnoses include camphylobacter, viral infection, etc CT findings significantly improved after a 7 day course of flagyl and cipro     - Cystitis- probably fungal based in UA results     - Urinary retention- symptoms improved after urethral dilatation Hopefully infections will decrease as well. Dr Flynn has recommended 2 additional dilatation procedures     - CVA 2/2017     - Parkinsons Disease     PLAN/RECOMMENDATIONS:   Thank you for asking us to see Florinda Knapp, I recommend the following:      Agree with oral vancomycin; stop parenteral  flagyl at discharge Recommend oral vancomycin 250 mg qid to 7/20 then bid to 8/1 then daily to 8/15 Discussed the significant relapse rate with son and the potential benefit of longterm probiotics     Agree with stopping zosyn     finish oral fluconazole  - will halve the dose of atorvastatin while on fluconazole in attempt to reduce risk of rhabdomyalysis        Virginia Demarco MD  7/10/2017  4:19 PM

## 2017-07-10 NOTE — THERAPY TREATMENT NOTE
Acute Care - Physical Therapy Treatment Note  [facility]     Patient Name: Florinda Knapp  : 3/5/1931  MRN: [id number]  Today's Date: 7/10/2017  Onset of Illness/Injury or Date of Surgery Date: 17  Date of Referral to PT: 17  Referring Physician: Mayne, PA-C    Admit Date: 2017    Visit Dx:    ICD-10-CM ICD-9-CM   1. Abdominal pain, acute, generalized R10.84 789.07     338.19   2. Leukocytosis, unspecified type D72.829 288.60   3. Acute cystitis without hematuria N30.00 595.0   4. Failure to thrive syndrome, adult R62.7 783.7   5. Impaired mobility and ADLs Z74.09 799.89   6. Impaired functional mobility and activity tolerance Z74.09 V49.89     Patient Active Problem List   Diagnosis   • Dysuria   • Elbow injury   • Fracture of left olecranon process   • Parkinson's disease   • Trauma left hip   • Acute Symptomatic on Chronic Normocytic Anemia   • Acute respiratory failure with hypoxia   • Generalized weakness   • Chest pain   • Symptomatic anemia   • Elevated troponin I level, possibly due to anemia   • Status post total replacement of left hip 2016 at [facility]   • H/O urinary retention   • History of recurrent UTIs   • Essential hypertension   • Anxiety   • CHF with cardiomyopathy   • Colitis   • Hypokalemia   • Acute ischemic left MCA stroke   • Hypertension   • Hyperlipidemia   • Urinary retention/Frequent UTIs   • Hypothyroidism   • Status post left hip replacement   • C. difficile colitis   • History of ischemic left MCA stroke 17   • Renal cyst, left noted on CT   • Physical deconditioning   • FTT (failure to thrive) in adult   • Abdominal pain, acute, generalized   • Rhinorrhea               Adult Rehabilitation Note       07/10/17 1426 07/10/17 1425 17 0846    Rehab Assessment/Intervention    Discipline occupational therapist  -TA physical therapist  -EH physical therapist  -SH    Document Type therapy note (daily note)  -TA therapy note (daily note)  -EH therapy note (daily  note)  -    Subjective Information agree to therapy;complains of;weakness  -TA no complaints;agree to therapy  - no complaints;agree to therapy  -    Patient Effort, Rehab Treatment good  -TA adequate  - adequate  -    Symptoms Noted During/After Treatment none  -TA fatigue  - none  -SH    Precautions/Limitations fall precautions;other (see comments)   spore; fecal mgt system; navarro cath  -TA fall precautions  - fall precautions  -SH    Recorded by [TA] Linwood Torres, OT [] Virginia Wheatley, PT [] Elisa Guerra, PT    Vital Signs    Pre Systolic BP Rehab --   Nurse cleared pt for tx, VSS  -TA      Pre SpO2 (%)  --   WNL throughout  -     Pre Patient Position Supine  -TA      Intra Patient Position Standing  -TA      Post Patient Position Sitting  -TA      Recorded by [TA] Linwood Torres, OT [] Virginia Wheatley, PT     Pain Assessment    Pain Assessment HeadleyKatharina CARDOZA  -TA No/denies pain  - No/denies pain  -    Headley-Anderson FACES Pain Rating 2  -TA      Pain Type Acute pain  -TA      Pain Location Buttocks  -TA      Pain Intervention(s) Repositioned;Ambulation/increased activity  -TA      Response to Interventions tolerated  -TA      Recorded by [TA] Linwood Torres, OT [] Virginia Wheatley, PT [] Elisa Guerra, PT    Cognitive Assessment/Intervention    Current Cognitive/Communication Assessment functional  -TA functional  - functional  -    Orientation Status oriented x 4  -TA oriented to;place;person;situation   other not assessed  - oriented x 4  -    Follows Commands/Answers Questions 100% of the time;able to follow single-step instructions;needs cueing  - able to follow single-step instructions  - able to follow single-step instructions;100% of the time  -    Personal Safety WNL/WFL  -TA WNL/WFL  -EH WNL/WFL  -    Personal Safety Interventions fall prevention program maintained;gait belt;muscle strengthening facilitated;nonskid shoes/slippers  when out of bed;supervised activity  -TA fall prevention program maintained;gait belt;nonskid shoes/slippers when out of bed  -EH fall prevention program maintained;gait belt;nonskid shoes/slippers when out of bed  -    Recorded by [TA] Linwood Torres OT [EH] Virginia Wheatley, PT [SH] Elisa Guerra, PT    Bed Mobility, Assessment/Treatment    Bed Mobility, Assistive Device bed rails;head of bed elevated  -TA bed rails;head of bed elevated  -EH bed rails;head of bed elevated  -    Bed Mobility, Roll Left, [place] moderate assist (50% patient effort)  -TA moderate assist (50% patient effort);2 person assist required  -EH moderate assist (50% patient effort);2 person assist required;verbal cues required;nonverbal cues required (demo/gesture)  -    Bed Mobility, Scoot/Bridge, [place]  moderate assist (50% patient effort);2 person assist required;verbal cues required  -EH moderate assist (50% patient effort);2 person assist required;verbal cues required  -    Bed Mob, Supine to Sit, [place] moderate assist (50% patient effort);verbal cues required  -TA moderate assist (50% patient effort);2 person assist required  -EH moderate assist (50% patient effort);2 person assist required;verbal cues required  -    Bed Mob, Sidelying to Sit, [place] moderate assist (50% patient effort);verbal cues required  -      Bed Mob, Sit to Sidelying, [place] not tested  -TA      Bed Mobility, Safety Issues decreased use of arms for pushing/pulling;decreased use of legs for bridging/pushing  -TA decreased use of arms for pushing/pulling;decreased use of legs for bridging/pushing  - decreased use of arms for pushing/pulling;decreased use of legs for bridging/pushing;impaired trunk control for bed mobility  -    Bed Mobility, Impairments strength decreased;impaired balance  -TA strength decreased;impaired balance  - strength decreased;impaired balance  -    Bed Mobility, Comment VCs for  sequencing transition  -TA Verbal cues and tactile cues with assist.   -EH Max cues and encouragement. Generalized weakness and increased tone continue to limit her ability to perform.  -SH    Recorded by [TA] Linwood Torres OT [EH] Virginia Wheatley, PT [SH] Elisa Guerra, JINNY    Transfer Assessment/Treatment    Transfers, Bed-Chair [place] moderate assist (50% patient effort);2 person assist required  -TA moderate assist (50% patient effort);2 person assist required  -EH moderate assist (50% patient effort);2 person assist required;verbal cues required  -SH    Transfers, Chair-Bed [place]  not tested  -EH     Transfers, Bed-Chair-Bed, Assist Device rolling walker  -TA rolling walker  -EH rolling walker  -SH    Transfers, Sit-Stand [place] moderate assist (50% patient effort);verbal cues required  -TA moderate assist (50% patient effort);2 person assist required  -EH moderate assist (50% patient effort);2 person assist required;verbal cues required  -SH    Transfers, Stand-Sit [place] minimum assist (75% patient effort);verbal cues required  -TA minimum assist (75% patient effort);2 person assist required  -EH moderate assist (50% patient effort);2 person assist required;verbal cues required  -SH    Transfers, Sit-Stand-Sit, Assist Device rolling walker  -TA rolling walker  -EH rolling walker  -SH    Transfer, Safety Issues balance decreased during turns;step length decreased;weight-shifting ability decreased  -TA balance decreased during turns;sequencing ability decreased;step length decreased  -EH balance decreased during turns;sequencing ability decreased;step length decreased;weight-shifting ability decreased;loses balance backward  -SH    Transfer, Impairments strength decreased;impaired balance  -TA impaired balance;coordination impaired  -EH strength decreased;impaired balance;coordination impaired  -    Transfer, Comment Pt completed stand pivot transfer with Mod A x 2,  difficulty advancing LLE; 2nd STS required less assist, Mod A x 1.  -TA cues for handplacement, sequencing  - Pt performed sit to stand from EOB with B feet blocked to prevent sliding. Pt then stepped to chair.  -SH    Recorded by [TA] Linwood Torres OT [EH] Virginia Wheatley, PT [SH] Elisa Guerra, PT    Gait Assessment/Treatment    Gait, [place] Level  minimum assist (75% patient effort);2 person assist required   with periods of CGA x2  - moderate assist (50% patient effort);2 person assist required  -    Gait, Assistive Device  rolling walker  - rolling walker  -    Gait, Distance (Feet)  11  - 3  -    Gait, Gait Pattern Analysis  swing-to gait  - swing-to gait  -    Gait, Gait Deviations  bernice decreased;decreased heel strike;toe-to-floor clearance decreased;stride length decreased;stride width increased;forward flexed posture  - bernice decreased;decreased heel strike;forward flexed posture;step length decreased  -    Gait, Safety Issues  sequencing ability decreased;step length decreased;weight-shifting ability decreased  - sequencing ability decreased;balance decreased during turns;step length decreased;weight-shifting ability decreased;loses balance backward  -    Gait, Impairments  impaired balance;strength decreased;ROM decreased;postural control impaired  - impaired balance;strength decreased;coordination impaired  -    Gait, Comment  decreased dorsiflexion STACEY, decreased step length (L more impaired than R).  - Pt continues to be limited by decreased dorsiflexion B LE.  -SH    Recorded by  [EH] Virginia Wheatley, PT [SH] Elisa Guerra, PT    Functional Mobility    Functional Mobility- Ind. Level contact guard assist;2 person assist required;verbal cues required;other (see comments)   brief episodes of Min A required  -TA      Functional Mobility- Device rolling walker  -TA      Functional Mobility-Distance (Feet) 11  -TA      Functional Mobility-  Safety Issues sequencing ability decreased;step length decreased;weight-shifting ability decreased  -TA      Functional Mobility- Comment VCs for postural corrections; pt with forward flexed posture.  -TA      Recorded by [TA] Linwood Torres OT      Upper Body Dressing Assessment/Training    UB Dressing Assess/Train, Clothing Type donning:;hospital gown  -TA      UB Dressing Assess/Train, Position sitting;edge of bed  -TA      UB Dressing Assess/Train, [place] moderate assist (50% patient effort)  -TA      UB Dressing Assess/Train, Impairments impaired balance;strength decreased  -TA      Recorded by [TA] Linwood Torres OT      Lower Body Dressing Assessment/Training    LB Dressing Assess/Train, Clothing Type doffing:;donning:;socks;shoes  -TA      LB Dressing Assess/Train, Position supine  -TA      LB Dressing Assess/Train, [place] dependent (less than 25% patient effort)  -TA      LB Dressing Assess/Train, Impairments ROM decreased;strength decreased  -TA      Recorded by [TA] Linwood Torres OT      Toileting Assessment/Training    Toileting Assess/Train, Indepen Level dependent (less than 25% patient effort)  -TA      Toileting Assess/Train, Comment fecal mgt system; navarro cath  -TA      Recorded by [TA] Linwood Torres OT      Grooming Assessment/Training    Grooming Assess/Train, Position supported sitting  -TA      Grooming Assess/Train, Indepen Level verbal cues required;supervision required;set up required  -TA      Grooming Assess/Train, Impairments strength decreased;coordination impaired  -TA      Grooming Assess/Train, Comment VC/TCs for thoroughness with combing hair.  -TA      Recorded by [TA] Linwood Torres OT      Motor Skills/Interventions    Additional Documentation Balance Skills Training (Group)  -TA      Recorded by [TA] Linwood Torres OT      Balance Skills Training    Sitting-Level of Assistance Close supervision  -TA Close supervision  -      Sitting-Balance Support Feet supported  -TA Feet supported  -     Sitting-Balance Activities Lateral lean;Forward lean;Trunk control activities  -TA Trunk control activities  -     Sitting # of Minutes  3  -EH     Standing-Level of Assistance Minimum assistance;x2  -TA Contact guard;x2  -EH     Static Standing Balance Support assistive device  -TA assistive device  -     Standing-Balance Activities Weight Shift A-P;Weight Shift R-L;Reaching for objects  -TA Weight Shift R-L   correcting posture with TC for t-spine extention  -     Standing Balance # of Minutes  1  -EH     Recorded by [TA] Linwood Torres, OT [] Virginia Wheatley, PT     Therapy Exercises    Bilateral Lower Extremities  AAROM:;calf stretch;ankle pumps/circles  - AROM:;10 reps;sitting;ankle pumps/circles;LAQ  -    Bilateral Upper Extremity AROM:;10 reps;supine;hand pumps;shoulder extension/flexion;shoulder horizontal abd/add  -TA      Recorded by [TA] Linwood Torres OT [] Virginia Wheatley, PT [] Elisa Guerra, PT    Positioning and Restraints    Pre-Treatment Position in bed  -TA in bed  - in bed  -    Post Treatment Position chair  -TA chair  - chair  -    In Chair reclined;call light within reach;encouraged to call for assist;exit alarm on;RUE elevated;LUE elevated;waffle cushion;on mechanical lift sling;legs elevated  - notified nsg;reclined;call light within reach;encouraged to call for assist;exit alarm on;waffle cushion;on mechanical lift sling  - reclined;call light within reach;encouraged to call for assist;exit alarm on;on mechanical lift sling  -    Recorded by [TA] Linwood Torres, OT [] Virginia Wheatley, PT [] Eilsa Guerra, PT      User Key  (r) = Recorded By, (t) = Taken By, (c) = Cosigned By    Initials Name Effective Dates     Virginia Wheatley, PT 06/19/15 -     SH Elisa Guerra, PT 06/19/15 -     TA Linwood Torres OT 03/14/16 -                  PT Goals        07/10/17 1510 07/07/17 1615 07/05/17 1116    Bed Mobility PT LTG    Bed Mobility PT LTG, Time to Achieve   2 wks  -BD    Bed Mobility PT LTG, Activity Type   all bed mobility  -BD    Bed Mobility PT LTG, [place] Level   conditional independence  -BD    Bed Mobility PT Goal  LTG, Assist Device   bed rails  -BD    Bed Mobility PT LTG, Outcome goal ongoing  -EH goal ongoing  -MB goal ongoing  -BD    Transfer Training PT LTG    Transfer Training PT LTG, Date Established   07/05/17  -BD    Transfer Training PT LTG, Time to Achieve 1 wk  -EH 1 wk  -MB     Transfer Training PT LTG, Activity Type   all transfers;sit to stand/stand to sit  -BD    Transfer Training PT LTG, [place] Level  minimum assist (75% patient effort);2 person assist required  -MB     Transfer Training PT LTG, Assist Device   walker, rolling  -BD    Transfer Training PT  LTG, Date Goal Reviewed   07/05/17  -BD    Transfer Training PT LTG, Outcome goal ongoing  -EH goal ongoing  -MB goal ongoing  -BD    Static Sitting Balance PT LTG    Static Sitting Balance PT LTG, Time to Achieve   2 wks  -BD    Static Sitting Balance PT LTG, [place] Level   conditional independence;supervision required  -BD    Static Sitting Balance PT LTG, Assist Device   UE Support  -BD    Static Sitting Balance PT LTG, Outcome goal ongoing  -EH goal ongoing  -MB goal ongoing  -BD    Dynamic Sitting Balance PT STG    Dynamic Sitting Balance PT STG, Time to Achieve   2 wks  -BD    Dynamic Sitting Balance PT STG, [place] Level   contact guard assist  -BD    Dynamic Sitting Balance PT STG, Outcome goal ongoing  - goal ongoing  -MB goal ongoing  -BD    Static Standing Balance PT STG    Static Standing Balance PT STG, Date Established   07/05/17  -BD    Static Standing Balance PT STG, Time to Achieve   2 wks  -BD    Static Standing Balance PT STG, [place] Level   set up required;contact guard assist  -BD    Static Standing Balance PT STG, Assist Device    assistive Device  -BD    Static Standing Balance PT STG, Outcome goal ongoing  - goal ongoing  -MB goal ongoing  -BD      User Key  (r) = Recorded By, (t) = Taken By, (c) = Cosigned By    Initials Name Provider Type     Virginia Wheatley, PT Physical Therapist    MB Serena Skelton, PT Physical Therapist    AJIT Blank, PT Physical Therapist          Physical Therapy Education     Title: PT OT SLP Therapies (Active)     Topic: Physical Therapy (Active)     Point: Mobility training (Active)    Learning Progress Summary    Learner Readiness Method Response Comment Documented by Status   Patient Acceptance E NR   07/10/17 1510 Active    Acceptance E,D NR   07/09/17 0917 Active    Acceptance E VU   07/08/17 0510 Done    Acceptance E NR,VU gait mechanics, HEP, fall precautions, POC progression MB 07/07/17 1614 Done    Acceptance E,TB VU,NR,NL   07/05/17 1113 Done   Family Acceptance E NR,VU gait mechanics, HEP, fall precautions, POC progression MB 07/07/17 1614 Done    Acceptance E,TB VU,NR,NL   07/05/17 1113 Done               Point: Home exercise program (Active)    Learning Progress Summary    Learner Readiness Method Response Comment Documented by Status   Patient Acceptance E NR   07/10/17 1510 Active    Acceptance E,D NR   07/09/17 0917 Active    Acceptance E VU   07/08/17 0510 Done    Acceptance E NR,VU gait mechanics, HEP, fall precautions, POC progression MB 07/07/17 1614 Done    Acceptance E,TB VU,NR,NL   07/05/17 1113 Done   Family Acceptance E NR,VU gait mechanics, HEP, fall precautions, POC progression MB 07/07/17 1614 Done    Acceptance E,TB VU,NR,NL   07/05/17 1113 Done               Point: Body mechanics (Active)    Learning Progress Summary    Learner Readiness Method Response Comment Documented by Status   Patient Acceptance E NR   07/10/17 1510 Active    Acceptance E,D NR   07/09/17 0917 Active    Acceptance E VU   07/08/17 0510 Done    Acceptance E NR,VU  gait mechanics, HEP, fall precautions, POC progression MB 07/07/17 1614 Done    Acceptance E,TB VU,NR,NL  BD 07/05/17 1113 Done   Family Acceptance E NR,VU gait mechanics, HEP, fall precautions, POC progression MB 07/07/17 1614 Done    Acceptance E,TB VU,NR,NL  BD 07/05/17 1113 Done               Point: Precautions (Active)    Learning Progress Summary    Learner Readiness Method Response Comment Documented by Status   Patient Acceptance E NR   07/10/17 1510 Active    Acceptance E,D NR   07/09/17 0917 Active    Acceptance E VU   07/08/17 0510 Done    Acceptance E NR,VU gait mechanics, HEP, fall precautions, POC progression MB 07/07/17 1614 Done    Acceptance E,TB VU,NR,NL  BD 07/05/17 1113 Done   Family Acceptance E NR,VU gait mechanics, HEP, fall precautions, POC progression MB 07/07/17 1614 Done    Acceptance E,TB VU,NR,NL  BD 07/05/17 1113 Done                      User Key     Initials Effective Dates Name Provider Type Discipline     06/19/15 -  Virginia Wheatley, PT Physical Therapist PT     06/19/15 -  Elisa Guerra, PT Physical Therapist PT    MB 03/14/16 -  Serena Skelton, PT Physical Therapist PT     06/13/16 -  Bee Blank, PT Physical Therapist PT     01/20/17 -  Veronica Santana, RN Registered Nurse Nurse                    PT Recommendation and Plan  Anticipated Discharge Disposition: inpatient rehabilitation facility  PT Frequency: daily, per priority policy  Plan of Care Review  Plan Of Care Reviewed With: patient  Progress: improving  Outcome Summary/Follow up Plan: pt ambulates with MIN Ax2> CGAx2 for 11 ft in room using RWx. Pt had decreased step length BLE. Limited by fatigue.          Outcome Measures       07/10/17 1425 07/09/17 0846       How much help from another person do you currently need...    Turning from your back to your side while in flat bed without using bedrails? 2  -EH 2  -SH     Moving from lying on back to sitting on the side of a flat bed without  bedrails? 2  - 2  -SH     Moving to and from a bed to a chair (including a wheelchair)? 2  -EH 2  -SH     Standing up from a chair using your arms (e.g., wheelchair, bedside chair)? 2  - 2  -SH     Climbing 3-5 steps with a railing? 1  - 1  -SH     To walk in hospital room? 2  -EH 1  -SH     AM-PAC 6 Clicks Score 11  - 10  -     Functional Assessment    Outcome Measure Options AM-PAC 6 Clicks Basic Mobility (PT)  -EH AM-PAC 6 Clicks Basic Mobility (PT)  -       User Key  (r) = Recorded By, (t) = Taken By, (c) = Cosigned By    Initials Name Provider Type     Virginia Wheatley, PT Physical Therapist     Elisa Guerra, JINNY Physical Therapist           Time Calculation:         PT Charges       07/10/17 1512          Time Calculation    Start Time 1425  -      PT Received On 07/10/17  -      PT Goal Re-Cert Due Date 07/15/17  -      Time Calculation- PT    Total Timed Code Minutes- PT 15 minute(s)  -        User Key  (r) = Recorded By, (t) = Taken By, (c) = Cosigned By    Initials Name Provider Type     Virginia Wheatley, PT Physical Therapist          Therapy Charges for Today     Code Description Service Date Service Provider Modifiers Qty    [id number] HC GAIT TRAINING EA 15 MIN 7/10/2017 Virginia Wheatley, PT GP 1          PT G-Codes  Outcome Measure Options: AM-PAC 6 Clicks Basic Mobility (PT)    Virginia Wheatley, PT  7/10/2017

## 2017-07-10 NOTE — PLAN OF CARE
Problem: Patient Care Overview (Adult)  Goal: Plan of Care Review  Outcome: Ongoing (interventions implemented as appropriate)    07/10/17 1513   Coping/Psychosocial Response Interventions   Plan Of Care Reviewed With patient   Patient Care Overview   Progress improving   Outcome Evaluation   Outcome Summary/Follow up Plan Pt limited by fatigue; progressing with bed mobility at Mod A; ambulated in room 11 feet with CGA x 2, periods of Min A x 2;; prgressing with UB strengthening; will continue to benefit from skilled OT services to address deficits, facilitate increased fxl I, safe transition to next level of care.         Problem: Inpatient Occupational Therapy  Goal: Bed Mobility Goal LTG- OT  Outcome: Ongoing (interventions implemented as appropriate)    07/05/17 1030 07/10/17 1513   Bed Mobility OT LTG   Bed Mobility OT LTG, Date Established 07/05/17 --    Bed Mobility OT LTG, Time to Achieve by discharge --    Bed Mobility OT LTG, Activity Type all bed mobility --    Bed Mobility OT LTG, [place] Level moderate assist (50% patient effort) --    Bed Mobility OT LTG, Outcome --  goal partially met  (Met for roll, sidelying to sit)       Goal: Transfer Training Goal 1 LTG- OT  Outcome: Revised    07/05/17 1030 07/10/17 1513   Transfer Training OT LTG   Transfer Training OT LTG, Date Established 07/05/17 --    Transfer Training OT LTG, Time to Achieve by discharge --    Transfer Training OT LTG, Activity Type sit to stand/stand to sit;toilet --    Transfer Training OT LTG, [place] Level --  minimum assist (75% patient effort);2 person assist required   Transfer Training OT LTG, Assist Device walker, rolling --    Transfer Training OT LTG, Outcome --  goal revised  (Met for Max A x 2; goal upgraded)       Goal: Patient Education Goal LTG- OT  Outcome: Ongoing (interventions implemented as appropriate)    07/05/17 1030 07/10/17 1513   Patient Education OT LTG   Patient Education OT LTG, Date Established  07/05/17 --    Patient Education OT LTG, Time to Achieve by discharge --    Patient Education OT LTG, Education Type HEP;posture/body mechanics;home safety --    Patient Education OT LTG, Education Understanding demonstrates adequately;verbalizes understanding --    Patient Education OT LTG Outcome --  goal ongoing  (Progressing with HEP, body mechanics)       Goal: Grooming Goal LTG- OT  Outcome: Ongoing (interventions implemented as appropriate)    07/05/17 1030 07/10/17 1513   Grooming OT LTG   Grooming Goal OT LTG, Date Established 07/05/17 --    Grooming Goal OT LTG, Time to Achieve by discharge --    Grooming Goal OT LTG, [place] Level minimum assist (75% patient effort) --    Grooming Goal OT LTG, Outcome --  goal partially met  (Met for combing hair.)

## 2017-07-11 PROCEDURE — 25010000002 HEPARIN (PORCINE) PER 1000 UNITS: Performed by: INTERNAL MEDICINE

## 2017-07-11 PROCEDURE — 99231 SBSQ HOSP IP/OBS SF/LOW 25: CPT | Performed by: NURSE PRACTITIONER

## 2017-07-11 RX ADMIN — HEPARIN SODIUM 5000 UNITS: 5000 INJECTION, SOLUTION INTRAVENOUS; SUBCUTANEOUS at 21:10

## 2017-07-11 RX ADMIN — LEVOTHYROXINE SODIUM 75 MCG: 75 TABLET ORAL at 09:21

## 2017-07-11 RX ADMIN — QUETIAPINE FUMARATE 12.5 MG: 25 TABLET, FILM COATED ORAL at 21:11

## 2017-07-11 RX ADMIN — CARVEDILOL 3.12 MG: 3.12 TABLET, FILM COATED ORAL at 21:11

## 2017-07-11 RX ADMIN — Medication 250 MG: at 12:24

## 2017-07-11 RX ADMIN — CARVEDILOL 3.12 MG: 3.12 TABLET, FILM COATED ORAL at 09:20

## 2017-07-11 RX ADMIN — METRONIDAZOLE 500 MG: 500 INJECTION, SOLUTION INTRAVENOUS at 05:58

## 2017-07-11 RX ADMIN — MIRTAZAPINE 30 MG: 15 TABLET, FILM COATED ORAL at 21:11

## 2017-07-11 RX ADMIN — MICONAZOLE NITRATE: 20 POWDER TOPICAL at 09:24

## 2017-07-11 RX ADMIN — HEPARIN SODIUM 5000 UNITS: 5000 INJECTION, SOLUTION INTRAVENOUS; SUBCUTANEOUS at 14:00

## 2017-07-11 RX ADMIN — METRONIDAZOLE 500 MG: 500 INJECTION, SOLUTION INTRAVENOUS at 21:10

## 2017-07-11 RX ADMIN — CETIRIZINE HYDROCHLORIDE 5 MG: 10 TABLET, FILM COATED ORAL at 09:23

## 2017-07-11 RX ADMIN — FLUTICASONE PROPIONATE 2 SPRAY: 50 SPRAY, METERED NASAL at 09:23

## 2017-07-11 RX ADMIN — ASPIRIN 325 MG ORAL TABLET 325 MG: 325 PILL ORAL at 09:20

## 2017-07-11 RX ADMIN — ATORVASTATIN CALCIUM 20 MG: 20 TABLET, FILM COATED ORAL at 21:11

## 2017-07-11 RX ADMIN — CARBIDOPA AND LEVODOPA 1 TABLET: 25; 100 TABLET ORAL at 12:24

## 2017-07-11 RX ADMIN — Medication 1 CAPSULE: at 09:21

## 2017-07-11 RX ADMIN — Medication 250 MG: at 05:57

## 2017-07-11 RX ADMIN — CARBIDOPA AND LEVODOPA 1 TABLET: 25; 100 TABLET ORAL at 05:58

## 2017-07-11 RX ADMIN — CARBIDOPA AND LEVODOPA 1 TABLET: 25; 100 TABLET ORAL at 18:29

## 2017-07-11 RX ADMIN — Medication 10 ML: at 09:23

## 2017-07-11 RX ADMIN — FAMOTIDINE 40 MG: 20 TABLET ORAL at 09:21

## 2017-07-11 RX ADMIN — POTASSIUM CHLORIDE, DEXTROSE MONOHYDRATE AND SODIUM CHLORIDE 125 ML/HR: 150; 5; 450 INJECTION, SOLUTION INTRAVENOUS at 18:33

## 2017-07-11 RX ADMIN — METRONIDAZOLE 500 MG: 500 INJECTION, SOLUTION INTRAVENOUS at 14:00

## 2017-07-11 RX ADMIN — ACETAMINOPHEN 650 MG: 325 TABLET, FILM COATED ORAL at 09:20

## 2017-07-11 RX ADMIN — TAMSULOSIN HYDROCHLORIDE 0.8 MG: 0.4 CAPSULE ORAL at 09:20

## 2017-07-11 RX ADMIN — CARBIDOPA AND LEVODOPA 1 TABLET: 50; 200 TABLET, EXTENDED RELEASE ORAL at 21:11

## 2017-07-11 RX ADMIN — HEPARIN SODIUM 5000 UNITS: 5000 INJECTION, SOLUTION INTRAVENOUS; SUBCUTANEOUS at 05:57

## 2017-07-11 RX ADMIN — Medication 250 MG: at 18:28

## 2017-07-11 NOTE — PROGRESS NOTES
"      HOSPITALIST DAILY PROGRESS NOTE    Chief Complaint: weakness and diarrhea    Subjective   SUBJECTIVE/OVERNIGHT EVENTS   Patient lying in bed very whiny and states she is depressed.  States she didn't need much for lunch, but she had a huge breakfast.  Patient states that the rectal tube was pulled out and she had a large diarrhea stool this morning, but has had no diarrhea since.  Patient states she is ready to go to [facility] but concerned about her son getting all her stuff together.    Review of Systems:  Review of Systems   Constitution: Positive for malaise/fatigue.   HENT: Negative.    Eyes: Negative.    Cardiovascular: Negative.    Respiratory: Negative.    Endocrine: Negative.    Hematologic/Lymphatic: Negative.    Musculoskeletal: Negative.    Gastrointestinal: Positive for diarrhea. Negative for abdominal pain.   Genitourinary: Negative.    Neurological: Negative.    Psychiatric/Behavioral: Positive for depression.   Allergic/Immunologic: Negative.      Objective   OBJECTIVE   I have reviewed the vital signs.  /88 (BP Location: Right arm, Patient Position: Lying)  Pulse 71  Temp 97.6 °F (36.4 °C) (Oral)   Resp 16  Ht 59\" (149.9 cm)  Wt 110 lb (49.9 kg)  SpO2 94%  BMI 22.22 kg/m2  Physical Exam:  Gen-Alert, well-developed, thin frail female in NAD, non toxic, in bed  Head-normocephalic atraumatic  Eyes-PERRLA, EOMI, nonicteric  CV-RRR, S1 S2 normal, no m/r/g  Resp-nonlabored, symmetrical chest expansion, CTAB  Abd-soft, Non tender, Non distended, normo active bowel sounds  Ext-No lower extremity cyanosis clubbing or edema bilaterally; brace on LLE  Neuro-oriented to person, place, time and situation.  Speech is clear, follows all commands  Psych-pleasant and cooperative; flat affect   Skin- No rash on exposed UE or LE bilaterally    Results:  I have reviewed the labs, culture data.    Results from last 7 days  Lab Units 07/10/17  0713 07/07/17  0719 07/06/17  0723   WBC 10*3/mm3 3.07* " 9.20 15.75*   HEMOGLOBIN g/dL 8.8* 9.8* 8.8*   HEMATOCRIT % 27.5* 31.4* 28.1*   PLATELETS 10*3/mm3 196 160 150       Results from last 7 days  Lab Units 07/10/17  0713   SODIUM mmol/L 135   POTASSIUM mmol/L 4.1   CHLORIDE mmol/L 104   CO2 mmol/L 26.0   BUN mg/dL 8*   CREATININE mg/dL 0.60   GLUCOSE mg/dL 97   CALCIUM mg/dL 8.6*     Culture Data:  Cultures:  Blood Culture   Date Value Ref Range Status   07/04/2017 No growth at 3 days  Preliminary   07/04/2017 No growth at 3 days  Preliminary     Urine Culture   Date Value Ref Range Status   07/06/2017 >100,000 CFU/mL Normal Urogenital Vesta  Final   07/04/2017 No growth at 2 days  Final     I have reviewed the medications.  Scheduled Meds:    aspirin 325 mg Oral Daily   atorvastatin 20 mg Oral Nightly   carbidopa-levodopa 1 tablet Oral Q6H   carbidopa-levodopa CR 1 tablet Oral Nightly   carvedilol 3.125 mg Oral Q12H   cetirizine 5 mg Oral Daily   famotidine 40 mg Oral Daily   fluticasone 2 spray Each Nare Daily   heparin (porcine) 5,000 Units Subcutaneous Q8H   lactobacillus acidophilus 1 capsule Oral Daily   levothyroxine 75 mcg Oral Daily   metroNIDAZOLE 500 mg Intravenous Q8H   miconazole  Topical Q12H   mirtazapine 30 mg Oral Nightly   QUEtiapine 12.5 mg Oral Nightly   tamsulosin 0.8 mg Oral Daily   vancomycin 250 mg Oral Q6H     Continuous Infusions:    dextrose 5 % and sodium chloride 0.45 % with KCl 20 mEq/L 125 mL/hr Last Rate: 125 mL/hr (07/10/17 2130)     PRN Meds:.•  acetaminophen  •  melatonin  •  ondansetron **OR** ondansetron  •  sodium chloride  •  sodium chloride  •  Insert peripheral IV **AND** sodium chloride    Assessment/Plan   ASSESSMENT/PLAN    Principal Problem:    C. difficile colitis  Active Problems:    Dysuria    Parkinson's disease    Generalized weakness    History of recurrent UTIs    CHF with cardiomyopathy    Hypertension    Hypothyroidism    History of ischemic left MCA stroke 2/20/17    Renal cyst, left noted on CT    FTT (failure to  thrive) in adult    Abdominal pain, acute, generalized    86 yof with hx of CVA and PD, p/w weakness and diarrhea, previously dx with colitis per CT and started on cipro and flagyl, admitted with severe c.diff     Plan   C diff colitis  - vanc per ID note through 7/19/17  - asked nurse to D/C rectal tube today as well    Cystitis    Urinary retention  - followed by Dr Flynn outpatient    Parkinsons    HTN    H/o CVA s/p tPA (2/2017)    Hypothyroid    Somnolence  - Improved today    Generalized weakness  - PT, inpatient rehab recommended - medically ready for transfer to rehab today, alerted .     Dispo: anticipate d/c likely to rehab tomorrow; no [facility] bed available today; CM following    ARIEL Spann  07/11/17  3:08 PM

## 2017-07-12 VITALS
HEIGHT: 59 IN | HEART RATE: 64 BPM | DIASTOLIC BLOOD PRESSURE: 95 MMHG | TEMPERATURE: 97.3 F | RESPIRATION RATE: 16 BRPM | WEIGHT: 110 LBS | SYSTOLIC BLOOD PRESSURE: 160 MMHG | OXYGEN SATURATION: 95 % | BODY MASS INDEX: 22.18 KG/M2

## 2017-07-12 PROCEDURE — 25010000002 HEPARIN (PORCINE) PER 1000 UNITS: Performed by: INTERNAL MEDICINE

## 2017-07-12 PROCEDURE — 97110 THERAPEUTIC EXERCISES: CPT

## 2017-07-12 PROCEDURE — G8980 MOBILITY D/C STATUS: HCPCS

## 2017-07-12 PROCEDURE — 99239 HOSP IP/OBS DSCHRG MGMT >30: CPT | Performed by: NURSE PRACTITIONER

## 2017-07-12 RX ORDER — CARVEDILOL 3.12 MG/1
3.12 TABLET ORAL EVERY 12 HOURS SCHEDULED
Start: 2017-07-12 | End: 2018-02-23 | Stop reason: SDUPTHER

## 2017-07-12 RX ORDER — CETIRIZINE HYDROCHLORIDE 5 MG/1
5 TABLET ORAL DAILY
Start: 2017-07-12 | End: 2017-08-16

## 2017-07-12 RX ADMIN — MICONAZOLE NITRATE: 20 POWDER TOPICAL at 08:20

## 2017-07-12 RX ADMIN — Medication 250 MG: at 05:43

## 2017-07-12 RX ADMIN — CARVEDILOL 3.12 MG: 3.12 TABLET, FILM COATED ORAL at 08:21

## 2017-07-12 RX ADMIN — FLUTICASONE PROPIONATE 2 SPRAY: 50 SPRAY, METERED NASAL at 08:20

## 2017-07-12 RX ADMIN — TAMSULOSIN HYDROCHLORIDE 0.8 MG: 0.4 CAPSULE ORAL at 08:21

## 2017-07-12 RX ADMIN — FAMOTIDINE 40 MG: 20 TABLET ORAL at 08:21

## 2017-07-12 RX ADMIN — MICONAZOLE NITRATE: 20 POWDER TOPICAL at 05:42

## 2017-07-12 RX ADMIN — CETIRIZINE HYDROCHLORIDE 5 MG: 10 TABLET, FILM COATED ORAL at 08:21

## 2017-07-12 RX ADMIN — CARBIDOPA AND LEVODOPA 1 TABLET: 25; 100 TABLET ORAL at 11:30

## 2017-07-12 RX ADMIN — CARBIDOPA AND LEVODOPA 1 TABLET: 25; 100 TABLET ORAL at 01:19

## 2017-07-12 RX ADMIN — Medication 250 MG: at 11:30

## 2017-07-12 RX ADMIN — CARBIDOPA AND LEVODOPA 1 TABLET: 25; 100 TABLET ORAL at 05:43

## 2017-07-12 RX ADMIN — Medication 1 CAPSULE: at 08:21

## 2017-07-12 RX ADMIN — METRONIDAZOLE 500 MG: 500 INJECTION, SOLUTION INTRAVENOUS at 05:43

## 2017-07-12 RX ADMIN — ASPIRIN 325 MG ORAL TABLET 325 MG: 325 PILL ORAL at 08:21

## 2017-07-12 RX ADMIN — Medication 250 MG: at 01:19

## 2017-07-12 RX ADMIN — HEPARIN SODIUM 5000 UNITS: 5000 INJECTION, SOLUTION INTRAVENOUS; SUBCUTANEOUS at 05:43

## 2017-07-12 RX ADMIN — LEVOTHYROXINE SODIUM 75 MCG: 75 TABLET ORAL at 08:21

## 2017-07-12 NOTE — PLAN OF CARE
Problem: Patient Care Overview (Adult)  Goal: Plan of Care Review  Outcome: Ongoing (interventions implemented as appropriate)    07/12/17 1419   Coping/Psychosocial Response Interventions   Plan Of Care Reviewed With patient;edgard   Patient Care Overview   Progress progress towards functional goals is fair   Outcome Evaluation   Outcome Summary/Follow up Plan Pt progress w/ therapy limited by fatigue and dec ROM L ankle. Pt transferred sit<->stand and stand-pivot to recliner w/ mod A x 2 this date. Anticipate D/C to [facility] later this date.         Problem: Inpatient Physical Therapy  Goal: Bed Mobility Goal LTG- PT  Outcome: Unable to achieve outcome(s) by discharge Date Met:  07/12/17 07/12/17 1419   Bed Mobility PT LTG   Bed Mobility PT LTG, Outcome goal not met   Bed Mobility PT LTG, Reason Goal Not Met progress slower than expected;discharged from facility         07/05/17 1116 07/12/17 1419   Bed Mobility PT LTG   Bed Mobility PT LTG, Time to Achieve 2 wks --    Bed Mobility PT LTG, Activity Type all bed mobility --    Bed Mobility PT LTG, [place] Level conditional independence --    Bed Mobility PT Goal LTG, Assist Device bed rails --    Bed Mobility PT LTG, Outcome --  goal not met   Bed Mobility PT LTG, Reason Goal Not Met --  progress slower than expected;discharged from facility       Goal: Transfer Training Goal 1 LTG- PT  Outcome: Unable to achieve outcome(s) by discharge Date Met:  07/12/17 07/05/17 1116 07/07/17 1615 07/10/17 1510   Transfer Training PT LTG   Transfer Training PT LTG, Date Established 07/05/17 --  --    Transfer Training PT LTG, Time to Achieve --  --  1 wk   Transfer Training PT LTG, Activity Type all transfers;sit to stand/stand to sit --  --    Transfer Training PT LTG, [place] Level --  minimum assist (75% patient effort);2 person assist required --    Transfer Training PT LTG, Assist Device walker, rolling --  --    Transfer Training PT LTG, Date Goal Reviewed  07/05/17 --  --    Transfer Training PT LTG, Outcome --  --  --    Transfer Training PT LTG, Reason Goal Not Met --  --  --      07/12/17 1419   Transfer Training PT LTG   Transfer Training PT LTG, Date Established --    Transfer Training PT LTG, Time to Achieve --    Transfer Training PT LTG, Activity Type --    Transfer Training PT LTG, [place] Level --    Transfer Training PT LTG, Assist Device --    Transfer Training PT LTG, Date Goal Reviewed --    Transfer Training PT LTG, Outcome goal not met   Transfer Training PT LTG, Reason Goal Not Met progress slower than expected;discharged from facility       Goal: Static Sitting Balance Goal LTG- PT  Outcome: Unable to achieve outcome(s) by discharge Date Met:  07/12/17 07/12/17 1419   Static Sitting Balance PT LTG   Static Sitting Balance PT LTG, Outcome goal not met   Static Sitting Balance PT LTG, Reason Goal Not Met progress slower than expected;discharged from facility         07/05/17 1116 07/12/17 1419   Static Sitting Balance PT LTG   Static Sitting Balance PT LTG, Time to Achieve 2 wks --    Static Sitting Balance PT LTG, [place] Level conditional independence;supervision required --    Static Sitting Balance PT LTG, Assist Device UE Support --    Static Sitting Balance PT LTG, Outcome --  goal not met   Static Sitting Balance PT LTG, Reason Goal Not Met --  progress slower than expected;discharged from facility       Goal: Dynamic Sitting Balance Goal STG- PT  Outcome: Unable to achieve outcome(s) by discharge Date Met:  07/12/17 07/05/17 1116 07/12/17 1419   Dynamic Sitting Balance PT STG   Dynamic Sitting Balance PT STG, Time to Achieve 2 wks --    Dynamic Sitting Balance PT STG, [place] Level contact guard assist --    Dynamic Sitting Balance PT STG, Outcome --  goal not met   Dynamic Sitting Balance PT STG, Reason Goal Not Met --  progress slower than expected;discharged from facility       Goal: Static Standing Balance Goal STG-  PT  Outcome: Unable to achieve outcome(s) by discharge Date Met:  07/12/17 07/05/17 1116 07/10/17 1510 07/12/17 1419   Static Standing Balance PT STG   Static Standing Balance PT STG, Date Established 07/05/17 --  --    Static Standing Balance PT STG, Time to Achieve 2 wks --  --    Static Standing Balance PT STG, [place] Level set up required;contact guard assist --  --    Static Standing Balance PT STG, Assist Device assistive Device --  --    Static Standing Balance PT STG, Outcome --  goal ongoing --    Static Standing Balance PT STG, Reason Goal Not Met --  --  progress slower than        07/05/17 1116 07/12/17 1419   Static Standing Balance PT STG   Static Standing Balance PT STG, Date Established 07/05/17 --    Static Standing Balance PT STG, Time to Achieve 2 wks --    Static Standing Balance PT STG, [place] Level set up required;contact guard assist --    Static Standing Balance PT STG, Assist Device assistive Device --    Static Standing Balance PT STG, Outcome --  goal not met   Static Standing Balance PT STG, Reason Goal Not Met --  progress slower than expected;discharged from facility   expected;discharged from facility

## 2017-07-12 NOTE — THERAPY DISCHARGE NOTE
Acute Care - Physical Therapy Treatment Note/Discharge   [place]     Patient Name: Florinda Knapp  : 3/5/1931  MRN: [id number]  Today's Date: 2017  Onset of Illness/Injury or Date of Surgery Date: 17  Date of Referral to PT: 17  Referring Physician: Mayne, PA-C    Admit Date: 2017    Visit Dx:    ICD-10-CM ICD-9-CM   1. Abdominal pain, acute, generalized R10.84 789.07     338.19   2. Leukocytosis, unspecified type D72.829 288.60   3. Acute cystitis without hematuria N30.00 595.0   4. Failure to thrive syndrome, adult R62.7 783.7   5. Impaired mobility and ADLs Z74.09 799.89   6. Impaired functional mobility and activity tolerance Z74.09 V49.89     Patient Active Problem List   Diagnosis   • Dysuria   • Elbow injury   • Fracture of left olecranon process   • Parkinson's disease   • Trauma left hip   • Acute Symptomatic on Chronic Normocytic Anemia   • Acute respiratory failure with hypoxia   • Generalized weakness   • Chest pain   • Symptomatic anemia   • Elevated troponin I level, possibly due to anemia   • Status post total replacement of left hip 2016 at [facility]   • H/O urinary retention   • History of recurrent UTIs   • Essential hypertension   • Anxiety   • CHF with cardiomyopathy   • Colitis   • Hypokalemia   • Acute ischemic left MCA stroke   • Hypertension   • Hyperlipidemia   • Urinary retention/Frequent UTIs   • Hypothyroidism   • Status post left hip replacement   • C. difficile colitis   • History of ischemic left MCA stroke 17   • Renal cyst, left noted on CT   • Physical deconditioning   • FTT (failure to thrive) in adult   • Abdominal pain, acute, generalized   • Rhinorrhea       Physical Therapy Education     Title: PT OT SLP Therapies (Active)     Topic: Physical Therapy (Active)     Point: Mobility training (Active)    Learning Progress Summary    Learner Readiness Method Response Comment Documented by Status   Patient Acceptance E,D NR POC progression, home safety,  fall precautions, HEP MB 07/12/17 1419 Active    Acceptance E VU,NR   07/12/17 1226 Done    Acceptance E NR   07/10/17 1510 Active    Acceptance E,D NR   07/09/17 0917 Active    Acceptance E VU   07/08/17 0510 Done    Acceptance E NR,VU gait mechanics, HEP, fall precautions, POC progression MB 07/07/17 1614 Done    Acceptance E,TB VU,NR,NL   07/05/17 1113 Done   Family Acceptance E,D NR POC progression, home safety, fall precautions, HEP MB 07/12/17 1419 Active    Acceptance E VU,NR   07/12/17 1226 Done    Acceptance E NR,VU gait mechanics, HEP, fall precautions, POC progression MB 07/07/17 1614 Done    Acceptance E,TB VU,NR,NL   07/05/17 1113 Done               Point: Home exercise program (Active)    Learning Progress Summary    Learner Readiness Method Response Comment Documented by Status   Patient Acceptance E,D NR POC progression, home safety, fall precautions, HEP MB 07/12/17 1419 Active    Acceptance E VU,NR   07/12/17 1226 Done    Acceptance E NR   07/10/17 1510 Active    Acceptance E,D NR   07/09/17 0917 Active    Acceptance E VU   07/08/17 0510 Done    Acceptance E NR,VU gait mechanics, HEP, fall precautions, POC progression MB 07/07/17 1614 Done    Acceptance E,TB VU,NR,NL   07/05/17 1113 Done   Family Acceptance E,D NR POC progression, home safety, fall precautions, HEP MB 07/12/17 1419 Active    Acceptance E VU,NR   07/12/17 1226 Done    Acceptance E NR,VU gait mechanics, HEP, fall precautions, POC progression MB 07/07/17 1614 Done    Acceptance E,TB VU,NR,NL   07/05/17 1113 Done               Point: Body mechanics (Active)    Learning Progress Summary    Learner Readiness Method Response Comment Documented by Status   Patient Acceptance E,D NR POC progression, home safety, fall precautions, HEP MB 07/12/17 1419 Active    Acceptance E VU,NR   07/12/17 1226 Done    Acceptance E NR   07/10/17 1510 Active    Acceptance E,D NR   07/09/17 0917 Active    Acceptance E VU  LD  07/08/17 0510 Done    Acceptance E NR,VU gait mechanics, HEP, fall precautions, POC progression MB 07/07/17 1614 Done    Acceptance E,TB VU,NR,NL   07/05/17 1113 Done   Family Acceptance E,D NR POC progression, home safety, fall precautions, HEP MB 07/12/17 1419 Active    Acceptance E VU,NR   07/12/17 1226 Done    Acceptance E NR,VU gait mechanics, HEP, fall precautions, POC progression MB 07/07/17 1614 Done    Acceptance E,TB VU,NR,NL   07/05/17 1113 Done               Point: Precautions (Active)    Learning Progress Summary    Learner Readiness Method Response Comment Documented by Status   Patient Acceptance E,D NR POC progression, home safety, fall precautions, HEP MB 07/12/17 1419 Active    Acceptance E VU,NR   07/12/17 1226 Done    Acceptance E NR   07/10/17 1510 Active    Acceptance E,D NR   07/09/17 0917 Active    Acceptance E VU   07/08/17 0510 Done    Acceptance E NR,VU gait mechanics, HEP, fall precautions, POC progression MB 07/07/17 1614 Done    Acceptance E,TB VU,NR,NL   07/05/17 1113 Done   Family Acceptance E,D NR POC progression, home safety, fall precautions, HEP MB 07/12/17 1419 Active    Acceptance E VU,NR   07/12/17 1226 Done    Acceptance E NR,VU gait mechanics, HEP, fall precautions, POC progression MB 07/07/17 1614 Done    Acceptance E,TB VU,NR,NL   07/05/17 1113 Done                      User Key     Initials Effective Dates Name Provider Type Discipline     06/19/15 -  Virginia Wheatley, PT Physical Therapist PT     06/19/15 -  Elisa Guerra, PT Physical Therapist PT    MB 03/14/16 -  Serena Skelton, PT Physical Therapist PT     06/13/16 -  Bee Blank, PT Physical Therapist PT     11/10/16 -  Annabella Preston, RN Registered Nurse Nurse     01/20/17 -  Veronica Santana, RN Registered Nurse Nurse                    IP PT Goals       07/12/17 1419 07/10/17 1510 07/07/17 1615    Bed Mobility PT LTG    Bed Mobility PT LTG, Outcome goal not met  -MB  goal ongoing  -EH goal ongoing  -MB    Bed Mobility PT LTG, Reason Goal Not Met progress slower than expected;discharged from [facility]      Transfer Training PT LTG    Transfer Training PT LTG, Time to Achieve  1 wk  -EH 1 wk  -MB    Transfer Training PT LTG, [place] Level   minimum assist (75% patient effort);2 person assist required  -MB    Transfer Training PT LTG, Outcome goal not met  -MB goal ongoing  -EH goal ongoing  -MB    Transfer Training PT LTG, Reason Goal Not Met progress slower than expected;discharged from [facility]      Static Sitting Balance PT LTG    Static Sitting Balance PT LTG, Outcome goal not met  -MB goal ongoing  -EH goal ongoing  -MB    Static Sitting Balance PT LTG, Reason Goal Not Met progress slower than expected;discharged from [facility]      Dynamic Sitting Balance PT STG    Dynamic Sitting Balance PT STG, Outcome goal not met  -MB goal ongoing  - goal ongoing  -MB    Dynamic Sitting Balance PT STG, Reason Goal Not Met progress slower than expected;discharged from [facility]      Static Standing Balance PT STG    Static Standing Balance PT STG, Outcome goal not met  -MB goal ongoing  - goal ongoing  -MB    Static Standing Balance PT STG, Reason Goal Not Met progress slower than expected;discharged from [facility]        07/05/17 1116          Bed Mobility PT LTG    Bed Mobility PT LTG, Time to Achieve 2 wks  -BD      Bed Mobility PT LTG, Activity Type all bed mobility  -BD      Bed Mobility PT LTG, [place] Level conditional independence  -BD      Bed Mobility PT Goal  LTG, Assist Device bed rails  -BD      Bed Mobility PT LTG, Outcome goal ongoing  -      Transfer Training PT LTG    Transfer Training PT LTG, Date Established 07/05/17  -BD      Transfer Training PT LTG, Activity Type all transfers;sit to stand/stand to sit  -BD      Transfer Training PT LTG, Assist Device walker, rolling  -BD      Transfer Training PT  LTG, Date Goal Reviewed 07/05/17   -BD      Transfer Training PT LTG, Outcome goal ongoing  -BD      Static Sitting Balance PT LTG    Static Sitting Balance PT LTG, Time to Achieve 2 wks  -BD      Static Sitting Balance PT LTG, [place] Level conditional independence;supervision required  -BD      Static Sitting Balance PT LTG, Assist Device UE Support  -BD      Static Sitting Balance PT LTG, Outcome goal ongoing  -BD      Dynamic Sitting Balance PT STG    Dynamic Sitting Balance PT STG, Time to Achieve 2 wks  -BD      Dynamic Sitting Balance PT STG, [place] Level contact guard assist  -BD      Dynamic Sitting Balance PT STG, Outcome goal ongoing  -BD      Static Standing Balance PT STG    Static Standing Balance PT STG, Date Established 07/05/17  -BD      Static Standing Balance PT STG, Time to Achieve 2 wks  -BD      Static Standing Balance PT STG, [place] Level set up required;contact guard assist  -BD      Static Standing Balance PT STG, Assist Device assistive Device  -BD      Static Standing Balance PT STG, Outcome goal ongoing  -BD        User Key  (r) = Recorded By, (t) = Taken By, (c) = Cosigned By    Initials Name Provider Type     Virginia Wheatley, PT Physical Therapist    MB Serena Skelotn, PT Physical Therapist    AJIT Blank, PT Physical Therapist              Adult Rehabilitation Note       07/12/17 1135 07/10/17 1426 07/10/17 1425    Rehab Assessment/Intervention    Discipline physical therapist  -MB occupational therapist  -TA physical therapist  -    Document Type therapy note (daily note);discharge summary  -MB therapy note (daily note)  -TA therapy note (daily note)  -    Subjective Information agree to therapy;complains of;weakness  -MB agree to therapy;complains of;weakness  -TA no complaints;agree to therapy  -EH    Patient Effort, Rehab Treatment good  -MB good  -TA adequate  -EH    Symptoms Noted During/After Treatment none  -MB none  -TA fatigue  -    Precautions/Limitations fall  precautions   L ankle contracture  -MB fall precautions;other (see comments)   spore; fecal mgt system; navarro cath  -TA fall precautions  -EH    Recorded by [MB] Serena Skelton, PT [TA] Linwood Torres, OT [EH] Virginia Wheatley, PT    Vital Signs    Pre Systolic BP Rehab --   VSS.  Nsg cleared for tx.  -MB --   Nurse cleared pt for tx, VSS  -TA     Pre SpO2 (%)   --   WNL throughout  -EH    Pre Patient Position  Supine  -TA     Intra Patient Position  Standing  -TA     Post Patient Position  Sitting  -TA     Recorded by [MB] Serena Skelton, PT [TA] Linwood Torres, OT [EH] Virginia Wheatley, PT    Pain Assessment    Pain Assessment [place] FACES  -MB [place]-B[place] FACES  -TA No/denies pain  -    [place] FACES Pain Rating 0  -MB 2  -TA     Pain Type  Acute pain  -TA     Pain Location  Buttocks  -TA     Pain Intervention(s)  Repositioned;Ambulation/increased activity  -TA     Response to Interventions  tolerated  -TA     Recorded by [MB] Serena Skelton, PT [TA] Linwood Torres, OT [EH] Virginia Wheatley, PT    Cognitive Assessment/Intervention    Current Cognitive/Communication Assessment functional  -MB functional  -TA functional  -    Orientation Status oriented x 4  -MB oriented x 4  -TA oriented to;place;person;situation   other not assessed  -    Follows Commands/Answers Questions 75% of the time;able to follow single-step instructions;needs cueing;needs increased time;needs repetition  -% of the time;able to follow single-step instructions;needs cueing  -TA able to follow single-step instructions  -    Personal Safety mild impairment;decreased awareness, need for assist;decreased awareness, need for safety;decreased insight to deficits  -MB WNL/WFL  -TA WNL/WFL  -EH    Personal Safety Interventions fall prevention program maintained;gait belt;muscle strengthening facilitated;nonskid shoes/slippers when out of bed  -MB fall prevention program maintained;gait belt;muscle  strengthening facilitated;nonskid shoes/slippers when out of bed;supervised activity  -TA fall prevention program maintained;gait belt;nonskid shoes/slippers when out of bed  -    Recorded by [MB] Serena Skelton, PT [TA] Linwood Torres, OT [EH] Virginia Wheatley, PT    Bed Mobility, Assessment/Treatment    Bed Mobility, Assistive Device head of bed elevated;bed rails;draw sheet  -MB bed rails;head of bed elevated  -TA bed rails;head of bed elevated  -    Bed Mobility, Roll Left, [place]  moderate assist (50% patient effort)  -TA moderate assist (50% patient effort);2 person assist required  -    Bed Mobility, [place]/Bridge, [place]   moderate assist (50% patient effort);2 person assist required;verbal cues required  -    Bed Mob, Supine to Sit, [place] moderate assist (50% patient effort);verbal cues required  -MB moderate assist (50% patient effort);verbal cues required  -TA moderate assist (50% patient effort);2 person assist required  -    Bed Mob, Sit to Supine, [place] not tested  -MB      Bed Mob, Sidelying to Sit, [place]  moderate assist (50% patient effort);verbal cues required  -TA     Bed Mob, Sit to Sidelying, [place]  not tested  -TA     Bed Mobility, Safety Issues decreased use of arms for pushing/pulling;decreased use of legs for bridging/pushing  -MB decreased use of arms for pushing/pulling;decreased use of legs for bridging/pushing  -TA decreased use of arms for pushing/pulling;decreased use of legs for bridging/pushing  -    Bed Mobility, Impairments strength decreased;impaired balance;ROM decreased  -MB strength decreased;impaired balance  -TA strength decreased;impaired balance  -    Bed Mobility, Comment Pt required VCs and mod A to manage LEs and raise shoulders/trunk to upright sitting.  -MB VCs for sequencing transition  -TA Verbal cues and tactile cues with assist.   -    Recorded by [MB] Serena Skelton, PT [TA] Linwood Torres,  OT [EH] Virginia Wheatley, PT    Transfer Assessment/Treatment    Transfers, Bed-Chair [place] moderate assist (50% patient effort);2 person assist required;verbal cues required  -MB moderate assist (50% patient effort);2 person assist required  -TA moderate assist (50% patient effort);2 person assist required  -EH    Transfers, Chair-Bed [place]   not tested  -    Transfers, Bed-Chair-Bed, Assist Device  rolling walker  -TA rolling walker  -EH    Transfers, Sit-Stand [place] moderate assist (50% patient effort);2 person assist required  -MB moderate assist (50% patient effort);verbal cues required  -TA moderate assist (50% patient effort);2 person assist required  -EH    Transfers, Stand-Sit [place]  minimum assist (75% patient effort);verbal cues required  -TA minimum assist (75% patient effort);2 person assist required  -EH    Transfers, Sit-Stand-Sit, Assist Device  rolling walker  -TA rolling walker  -    Transfer, Safety Issues balance decreased during turns;sequencing ability decreased;step length decreased;weight-shifting ability decreased  -MB balance decreased during turns;step length decreased;weight-shifting ability decreased  -TA balance decreased during turns;sequencing ability decreased;step length decreased  -    Transfer, Impairments ROM decreased;decreased flexibility;strength decreased;impaired balance  -MB strength decreased;impaired balance  -TA impaired balance;coordination impaired  -    Transfer, Comment Pt. transferred sit <->stand w/ mod A x2 w/ VCs for sequencing and forward weight-shift.  Pt. transferred stand-pivot to chair w/ mod A to weight-shift, advance L LE, and transfer to chair.  -MB Pt completed stand pivot transfer with Mod A x 2, difficulty advancing LLE; 2nd STS required less assist, Mod A x 1.  -TA cues for handplacement, sequencing  -    Recorded by [MB] Serena Skelton, PT [TA] Linwood Torres OT [EH] Virginia Wheatley, PT    Gait  Assessment/Treatment    Gait, [place] Level unable to perform  -MB  minimum assist (75% patient effort);2 person assist required   with periods of CGA x2  -EH    Gait, Assistive Device   rolling walker  -EH    Gait, Distance (Feet)   11  -EH    Gait, Gait Pattern Analysis   swing-to gait  -    Gait, Gait Deviations   bernice decreased;decreased heel strike;toe-to-floor clearance decreased;stride length decreased;stride width increased;forward flexed posture  -    Gait, Safety Issues   sequencing ability decreased;step length decreased;weight-shifting ability decreased  -    Gait, Impairments   impaired balance;strength decreased;ROM decreased;postural control impaired  -    Gait, Comment   decreased dorsiflexion STACEY, decreased step length (L more impaired than R).  -EH    Recorded by [MB] Serena Skelton, PT  [] Virginia Wheatley, PT    Functional Mobility    Functional Mobility- Ind. Level  contact guard assist;2 person assist required;verbal cues required;other (see comments)   brief episodes of Min A required  -TA     Functional Mobility- Device  rolling walker  -TA     Functional Mobility-Distance (Feet)  11  -TA     Functional Mobility- Safety Issues  sequencing ability decreased;step length decreased;weight-shifting ability decreased  -TA     Functional Mobility- Comment  VCs for postural corrections; pt with forward flexed posture.  -TA     Recorded by  [TA] Linwood Torres OT     Upper Body Dressing Assessment/Training    UB Dressing Assess/Train, Clothing Type  donning:;hospital gown  -TA     UB Dressing Assess/Train, Position  sitting;edge of bed  -TA     UB Dressing Assess/Train, [place]  moderate assist (50% patient effort)  -TA     UB Dressing Assess/Train, Impairments  impaired balance;strength decreased  -TA     Recorded by  [TA] Linwood Torres OT     Lower Body Dressing Assessment/Training    LB Dressing Assess/Train, Clothing Type  doffing:;donning:;socks;shoes  -TA      LB Dressing Assess/Train, Position  supine  -TA     LB Dressing Assess/Train, [place]  dependent (less than 25% patient effort)  -TA     LB Dressing Assess/Train, Impairments  ROM decreased;strength decreased  -TA     Recorded by  [TA] Linwood Torres OT     Toileting Assessment/Training    Toileting Assess/Train, Indepen Level  dependent (less than 25% patient effort)  -TA     Toileting Assess/Train, Comment  fecal mgt system; navarro cath  -TA     Recorded by  [TA] Linwood Torres OT     Grooming Assessment/Training    Grooming Assess/Train, Position  supported sitting  -TA     Grooming Assess/Train, Indepen Level  verbal cues required;supervision required;set up required  -TA     Grooming Assess/Train, Impairments  strength decreased;coordination impaired  -TA     Grooming Assess/Train, Comment  VC/TCs for thoroughness with combing hair.  -TA     Recorded by  [TA] Linwood Torres OT     Motor Skills/Interventions    Additional Documentation Balance Skills Training (Group)  -MB Balance Skills Training (Group)  -TA     Recorded by [MB] Serena Skelton, PT [TA] Linwood Torres OT     Balance Skills Training    Sitting-Level of Assistance Moderate assistance  -MB Close supervision  -TA Close supervision  -    Sitting-Balance Support Feet supported  -MB Feet supported  -TA Feet supported  -    Sitting-Balance Activities Trunk control activities  -MB Lateral lean;Forward lean;Trunk control activities  -TA Trunk control activities  -    Sitting # of Minutes   3  -EH    Standing-Level of Assistance Moderate assistance;x2  -MB Minimum assistance;x2  -TA Contact guard;x2  -EH    Static Standing Balance Support Right upper extremity supported;Left upper extremity supported  -MB assistive device  -TA assistive device  -    Standing-Balance Activities Weight Shift R-L;Forward lean  -MB Weight Shift A-P;Weight Shift R-L;Reaching for objects  -TA Weight Shift R-L   correcting posture with TC for  t-spine extention  -    Standing Balance # of Minutes   1  -EH    Recorded by [MB] Serena Skelton, PT [TA] Linwood Torres, OT [] Virginia Wheatley PT    Therapy Exercises    Bilateral Lower Extremities AAROM:;supine;calf stretch;sitting;hip flexion;hip abduction/adduction;LAQ  -MB  AAROM:;calf stretch;ankle pumps/circles  -    Bilateral Upper Extremity  AROM:;10 reps;supine;hand pumps;shoulder extension/flexion;shoulder horizontal abd/add  -TA     Recorded by [MB] Serena Skelton, PT [TA] Linwood Torres, OT [] Virginia Wheatley PT    Positioning and Restraints    Pre-Treatment Position in bed  -MB in bed  - in bed  -    Post Treatment Position chair  -MB chair  -TA chair  -    In Chair notified nsg;reclined;call light within reach;encouraged to call for assist;exit alarm on;with family/caregiver;waffle cushion;on mechanical lift sling;legs elevated  -MB reclined;call light within reach;encouraged to call for assist;exit alarm on;RUE elevated;LUE elevated;waffle cushion;on mechanical lift sling;legs elevated  -TA notified nsg;reclined;call light within reach;encouraged to call for assist;exit alarm on;waffle cushion;on mechanical lift sling  -    Recorded by [MB] Serena Skelton, PT [TA] Linwood Torres, OT [] Virginia Wheatley PT      User Key  (r) = Recorded By, (t) = Taken By, (c) = Cosigned By    Initials Name Effective Dates     Virginia Wheatley, PT 06/19/15 -     TA Linwood Torres OT 03/14/16 -     CURT Skelton, PT 03/14/16 -           PT Recommendation and Plan  Anticipated Discharge Disposition: inpatient rehabilitation facility  PT Frequency: daily, per priority policy  Plan of Care Review  Plan Of Care Reviewed With: patient, son  Progress: progress towards functional goals is fair  Outcome Summary/Follow up Plan: Pt progress w/ therapy limited by fatigue and dec ROM L ankle.  Pt transferred sit<->stand and stand-pivot to recliner w/ mod A x 2  this date.  Anticipate D/C to [facility] later this date.          Outcome Measures       07/12/17 1145 07/10/17 1426 07/10/17 1425    How much help from another person do you currently need...    Turning from your back to your side while in flat bed without using bedrails? 2  -MB  2  -EH    Moving from lying on back to sitting on the side of a flat bed without bedrails? 2  -MB  2  -EH    Moving to and from a bed to a chair (including a wheelchair)? 2  -MB  2  -EH    Standing up from a chair using your arms (e.g., wheelchair, bedside chair)? 2  -MB  2  -EH    Climbing 3-5 steps with a railing? 1  -MB  1  -EH    To walk in hospital room? 2  -MB  2  -EH    AM-PAC 6 Clicks Score 11  -MB  11  -EH    How much help from another is currently needed...    Putting on and taking off regular lower body clothing?  2  -TA     Bathing (including washing, rinsing, and drying)  2  -TA     Toileting (which includes using toilet bed pan or urinal)  2  -TA     Putting on and taking off regular upper body clothing  3  -TA     Taking care of personal grooming (such as brushing teeth)  3  -TA     Eating meals  4  -TA     Score  16  -TA     Functional Assessment    Outcome Measure Options AM-PAC 6 Clicks Basic Mobility (PT)  -MB AM-PAC 6 Clicks Daily Activity (OT)  -TA AM-PAC 6 Clicks Basic Mobility (PT)  -      User Key  (r) = Recorded By, (t) = Taken By, (c) = Cosigned By    Initials Name Provider Type     Virginia Wheatley, PT Physical Therapist    STELLA Torres, OT Occupational Therapist    CURT Skelton, PT Physical Therapist           Time Calculation:         PT Charges       07/12/17 1424          Time Calculation    Start Time 1145  -MB      PT Received On 07/12/17  -MB      Time Calculation- PT    Total Timed Code Minutes- PT 34 minute(s)  -MB        User Key  (r) = Recorded By, (t) = Taken By, (c) = Cosigned By    Initials Name Provider Type    CURT Skelton, PT Physical Therapist          Therapy Charges  for Today     Code Description Service Date Service Provider Modifiers Qty    41944550698 HC PT MOBILITY DISCHARGE 7/12/2017 Serena Skelton, PT GP, CL 1    [id number] HC PT THER PROC EA 15 MIN 7/12/2017 Serena Skelton PT GP 2          PT G-Codes  Outcome Measure Options: AM-PAC 6 Clicks Basic Mobility (PT)  Score: 11  Functional Limitation: Mobility: Walking and moving around  Mobility: Walking and Moving Around Discharge Status (): At least 60 percent but less than 80 percent impaired, limited or restricted    PT Discharge Summary  Anticipated Discharge Disposition: inpatient rehabilitation facility  Reason for Discharge: Discharge from facility  Outcomes Achieved: Patient able to partially acheive established goals  Discharge Destination: Inpatient rehabilitation facility    Serena Skelton PT  7/12/2017

## 2017-07-12 NOTE — DISCHARGE PLACEMENT REQUEST
"**Discharge Planner:   Laurie Morillo  [phone]**    Vaibhav Knapp P (86 y.o. Female)     Date of Birth Social Security Number Address Home Phone MRN    03/05/1931  [id number] [facility] [facility] [address]  [place] [id number] [phone] [id number]    Hindu Marital Status          [employer]        Admission Date Admission Type Admitting Provider Attending Provider Department, Room/Bed    7/4/17 Emergency Nicky Blunt DO Gandee, Julie G,  [facility] 3E, S335/1    Discharge Date Discharge Disposition Discharge Destination         Rehab Facility or Unit (DC - External)             Attending Provider: Nicky Blunt DO     Allergies:  Abilify [Aripiprazole], Alendronate, Celecoxib, Fosamax Plus D [Alendronate-cholecalciferol], Levaquin [Levofloxacin], Macrobid [Nitrofurantoin Monohyd Macro], Nitrofurantoin, Procaine, Sulfa Antibiotics    Isolation:  Spore   Infection:  C.difficile (07/05/17)   Code Status:  Conditional    Ht:  59\" (149.9 cm)   Wt:  110 lb (49.9 kg)    Admission Cmt:  None   Principal Problem:  C. difficile colitis [A04.7] More...                 Active Insurance as of 7/4/2017     Primary Coverage     Payor Plan Insurance Group Employer/Plan Group    MEDICARE MEDICARE A & B      Payor Plan Address Payor Plan Phone Number Effective From Effective To    [address] [phone] 3/1/1996     [place] [address]       Subscriber Name Subscriber Birth Date Member ID       VAIBHAV KNAPP P 3/5/1931 [id number]           Secondary Coverage     Payor Plan Insurance Group Employer/Plan Group    ANTHEM BLUE CROSS ANTHEM BLUE CROSS BLUE SHIELD PPO [id number]     Payor Plan Address Payor Plan Phone Number Effective From Effective To    [address] [phone] 7/1/2003     [place] [address]       Subscriber Name Subscriber Birth Date Member ID       VAIBHAV KNAPP P 3/5/1931 [id number]                 Emergency Contacts      (Rel.) Home Phone Work Phone Mobile Phone    " Yogesh Knapp (Power of ) [phone] -- --               Discharge Summary      Wendy WHEAT ARIEL Wheatley at 7/12/2017 11:06 AM              [facility] Medicine Services  DISCHARGE SUMMARY       Date of Admission: 7/4/2017  Date of Discharge:  7/12/2017  Primary Care Physician: Kieran Cottrell MD  Consulting Physician(s)     Provider Relationship    Virginia Demarco MD Consulting Physician          Discharge Diagnoses:  Active Hospital Problems (** Indicates Principal Problem)    Diagnosis Date Noted   • **C. 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] 01/14/2017     EF 45%; mild-moderate mr and ai     • History of recurrent UTIs [Z87.440] 01/13/2017   • Generalized weakness [R53.1] 01/12/2017   • Parkinson's disease [G20] 08/16/2016   • Dysuria [R30.0] 08/16/2016      Resolved Hospital Problems    Diagnosis Date Noted Date Resolved   • Leukocytosis [D72.829] 07/04/2017 07/05/2017       Presenting Problem/History of Present Illness  Abdominal pain, acute, generalized [R10.84]  Abdominal pain, acute, generalized [R10.84]     Chief Complaint on Day of Discharge: C Diff colitis; N/V/D    History of Present Illness on Day of Discharge:   Patient feeling better today. No abdominal pain, nausea or vomiting. Still with diarrhea and weakness. Has not been out of bed without lift and is currently 2 person assist. Son concerned about navarro catheter presence and possibility of UTI and requests ua prior to dc.    Hospital Course  Patient is a 86 y.o. female  with PMH of recent hip replacement 12/2016 at  followed by left MCA stroke 2/2017 found to have thrombus with essentially complete lysis s/p TPA, did not require thrombectomy ECHO 2/20/17 showed mild decrease systolic function, EF 45% and small PFO- no anticoagulation 2nd to admission January 2017 for anemia requiring PRBCs on statin and asa per NS,review of records shows f/u with Cardiology, Dr Chou and Jocelin patch end of March with planned follow up this month, since states was told by Dr. Chou readings for normal.  Has hx Parkinson's dz, recent ED visit 6/19/17 found to have colitis on CT Abd/Pelvis 6/19/17 refused admission at that time, was prescribed Cipro and flagyl.      Return to [facility] ED on 7/4/2017 from assisted living facility with generalized weakness, decreased oral intake, nausea and diarrhea.  Urine has been tested and only normal urogenital jordan present.  Patient was positive for C. difficile colitis and was started on oral vancomycin as well as IV Flagyl.  CT scan abdomen and pelvis showed improvement compared to scan on 6/19/2017.  Bliss infectious disease, Dr. Demarco was consulted for severe numbness of C. difficile colitis.  Rectal tube was placed for large volume diarrhea.  She has been continued on IV Flagyl until today and will be discontinued.  She will continue oral vancomycin 250 mg 4 times a day through 7/20/2017 then twice a day through 8/1/2017 then daily through 8/15/2017.    Farris catheter will be removed prior to discharge with voiding trial.  Patient is having continued significant weakness.  Patient was ambulatory prior to arrival, however has significant weakness and requires 2 person assist.  She'll be transferred to [facility] on the general rehabilitation unit for further physical therapy/occupational therapy.  She will need follow-up on UA/urine culture if obtained prior to discharge.  Currently patient is eating and appetite increasing.  Patient currently having  no pain, nausea, vomiting.  Patient will need follow-up with primary care physician in one week after discharge from [facility] in to keep previously scheduled appointments with neurology and cardiology in August.  Procedures Performed         Consults:   Consults     Date and Time Order Name Status Description    7/6/2017 0806 Inpatient Consult to Infectious Diseases Completed           Pertinent Test Results:  Results for VAIBHAV RUTHERFORD (MRN [id number]) as of 7/12/2017 11:04   Ref. Range 7/10/2017 07:13   Glucose Latest Ref Range: 70 - 100 mg/dL 97   Sodium Latest Ref Range: 132 - 146 mmol/L 135   Potassium Latest Ref Range: 3.5 - 5.5 mmol/L 4.1   CO2 Latest Ref Range: 20.0 - 31.0 mmol/L 26.0   Chloride Latest Ref Range: 99 - 109 mmol/L 104   Anion Gap Latest Ref Range: 3.0 - 11.0 mmol/L 5.0   Creatinine Latest Ref Range: 0.60 - 1.30 mg/dL 0.60   BUN Latest Ref Range: 9 - 23 mg/dL 8 (L)   BUN/Creatinine Ratio Latest Ref Range: 7.0 - 25.0  13.3   Calcium Latest Ref Range: 8.7 - 10.4 mg/dL 8.6 (L)   eGFR Non African Amer Latest Ref Range: >60 mL/min/1.73 95   Alkaline Phosphatase Latest Ref Range: 25 - 100 U/L 62   Total Protein Latest Ref Range: 5.7 - 8.2 g/dL 5.3 (L)   ALT (SGPT) Latest Ref Range: 7 - 40 U/L 2 (L)   AST (SGOT) Latest Ref Range: 0 - 33 U/L 18   Total Bilirubin Latest Ref Range: 0.3 - 1.2 mg/dL 0.2 (L)   Albumin Latest Ref Range: 3.20 - 4.80 g/dL 2.60 (L)   Globulin Latest Units: gm/dL 2.7   A/G Ratio Latest Ref Range: 1.5 - 2.5 g/dL 1.0 (L)   WBC Latest Ref Range: 3.50 - 10.80 10*3/mm3 3.07 (L)   RBC Latest Ref Range: 3.89 - 5.14 10*6/mm3 3.00 (L)   Hemoglobin Latest Ref Range: 11.5 - 15.5 g/dL 8.8 (L)   Hematocrit Latest Ref Range: 34.5 - 44.0 % 27.5 (L)   RDW Latest Ref Range: 11.3 - 14.5 % 14.5   MCV Latest Ref Range: 80.0 - 99.0 fL 91.7   MCH Latest Ref Range: 27.0 - 31.0 pg 29.3   MCHC Latest Ref Range: 32.0 - 36.0 g/dL 32.0   MPV Latest Ref Range: 6.0 - 12.0 fL 10.5   Platelets Latest  Ref Range: 150 - 450 10*3/mm3 196   RDW-SD Latest Ref Range: 37.0 - 54.0 fl 49.0   Neutrophil % Latest Ref Range: 41.0 - 71.0 % 40.6 (L)   Lymphocyte % Latest Ref Range: 24.0 - 44.0 % 40.1   Monocyte % Latest Ref Range: 0.0 - 12.0 % 12.4 (H)   Eosinophil % Latest Ref Range: 0.0 - 3.0 % 5.5 (H)   Basophil % Latest Ref Range: 0.0 - 1.0 % 0.7   Immature Grans % Latest Ref Range: 0.0 - 0.6 % 0.7 (H)   Neutrophils, Absolute Latest Ref Range: 1.50 - 8.30 10*3/mm3 1.25 (L)   Lymphocytes, Absolute Latest Ref Range: 0.60 - 4.80 10*3/mm3 1.23   Monocytes, Absolute Latest Ref Range: 0.00 - 1.00 10*3/mm3 0.38   Eosinophils, Absolute Latest Ref Range: 0.00 - 0.30 10*3/mm3 0.17   Basophils, Absolute Latest Ref Range: 0.00 - 0.20 10*3/mm3 0.02   Immature Grans, Absolute Latest Ref Range: 0.00 - 0.03 10*3/mm3 0.02     Narrative:          EXAMINATION: XR CHEST 1 VW- 07/04/2017     INDICATION: weakness      COMPARISON: 02/23/2017     FINDINGS: Portable chest reveals heart to be enlarged. Underlying  chronic and emphysematous changes seen within the lung fields  bilaterally. Degenerative changes seen within the spine. Vascular  calcifications are identified. No focal parenchymal opacification  present. No pleural effusion or pneumothorax.              Impression:         Degenerative changes seen within the spine. The heart is  enlarged. Underlying chronic and emphysematous changes seen within the  lung fields bilaterally.     D:  07/04/2017  E:  07/05/2017     This report was finalized on 7/5/2017 5:06 PM by Dr. Karely Esquivel MD.          CT Abdomen Pelvis Without Contrast [[id number]] Not Reviewed       Order Status: Completed Collected: 07/05/17 0916        Updated: 07/05/17 1708       Narrative:         EXAMINATION: CT ABDOMEN AND PELVIS WO CONTRAST-      INDICATION: History of colitis, now with abdominal pain generalized.     TECHNIQUE: Multiple axial CT imaging was obtained of the abdomen and  pelvis without the  administration of intravenous contrast.     The radiation dose reduction device was turned on for each scan per the  ALARA (As Low as Reasonably Achievable) protocol.     COMPARISON: None.     FINDINGS: There is no change in the small pericardial effusion. There is  a small left pleural effusion. Mild increased marking is seen at the  left lung base. Chronic changes are identified. The liver is homogeneous  as well as the spleen. There is a cyst identified in the left kidney. No  stones in the gallbladder. Vascular calcification is seen within the  abdominal aorta and iliac vessels. No free fluid or free air. No  abnormal mass or fluid collection is identified. The appendix is normal.  The abdominal portion of the gastrointestinal tract is within normal  limits.     PELVIS: Pelvic organs are unremarkable. The pelvic portion of the  gastrointestinal tract reveals improvement seen in the descending colon  previously having abnormal wall thickening. There is only minimal wall  thickening at this time with pericolonic stranding. Minimal fluid is  seen in the left paracolic gutter. Findings have significantly improved      in the interval.  No free fluid or free air. Calcification is seen  within the uterus suggesting degenerating fibroids. Vascular  calcification is seen within the pelvic vessels. No abnormal mass or  fluid collection is identified. Atrophy is identified of the left psoas  muscle. Hardware is identified within the left hip. No acute bony  abnormality is identified.           Impression:         Significant improvement seen in the appearance of the  descending colon with minimal wall thickening remaining and minimal  pericolonic stranding. There is a small amount of fluid in the left  paracolic gutter. The remainder the CT abdomen and pelvis is stable and  grossly unremarkable.     D:  07/04/2017  E:  07/05/2017    Results for VAIBHAV RUTHERFORD (MRN [id number]) as of 7/12/2017 11:04   Ref. Range 7/5/2017  "02:34   C.difficile toxin A/B Latest Ref Range: Negative  Detected (A)     Urine Culture   Order: [id number]   Status:  Final result   Visible to patient:  No (Not Released)   Specimen Information: Urine, Clean Catch; Urine        Culture   >100,000 CFU/mL Normal Urogenital Vesta      Resulting Agency: [facility]               Condition on Discharge:  stable    Physical Exam on Discharge:/87 (BP Location: Right arm, Patient Position: Lying)  Pulse 64  Temp 97.6 °F (36.4 °C) (Oral)   Resp 16  Ht 59\" (149.9 cm)  Wt 110 lb (49.9 kg)  SpO2 95%  BMI 22.22 kg/m2  Physical Exam   Constitutional: She is oriented to person, place, and time. She appears well-nourished. No distress.   HENT:   Head: Normocephalic and atraumatic.   Eyes: Pupils are equal, round, and reactive to light. No scleral icterus.   Neck: Normal range of motion. No JVD present.   Cardiovascular: Normal rate, regular rhythm, normal heart sounds and intact distal pulses.    No murmur heard.  Pulmonary/Chest: Effort normal and breath sounds normal. No respiratory distress.   Abdominal: Soft. Bowel sounds are normal. She exhibits no distension. There is no tenderness.   Genitourinary:   Genitourinary Comments: Farris with clear yellow urine   Musculoskeletal: Normal range of motion. She exhibits no edema.   Neurological: She is alert and oriented to person, place, and time.   Skin: Skin is warm and dry.   Psychiatric: She has a normal mood and affect. Her behavior is normal. Judgment and thought content normal.         Discharge Disposition  Rehab Facility or Unit (DC - External)    Discharge Medications   Florinda Kanpp   Home Medication Instructions HARDY:[id number]    Printed on:07/12/17 1106   Medication Information                      acetaminophen (TYLENOL) 325 MG tablet  Take 650 mg by mouth Every 6 (Six) Hours As Needed (Pain).             aspirin 325 MG tablet  Take 1 tablet by mouth Daily.             atorvastatin (LIPITOR) 40 MG " tablet  Take 1 tablet by mouth Every Night.             carbidopa-levodopa (SINEMET)  MG per tablet  Take 1 tablet by mouth 4 (Four) Times a Day. Take at 9am, noon, 3pm, and 6pm             carbidopa-levodopa CR (SINEMET CR)  MG per CR tablet  Take 1 tablet by mouth At Night As Needed (Leg tremors).             carvedilol (COREG) 3.125 MG tablet  Take 1 tablet by mouth Every 12 (Twelve) Hours.             cetirizine (zyrTEC) 5 MG tablet  Take 1 tablet by mouth Daily.             Cholecalciferol 1000 UNITS capsule  Take 1,000 Units by mouth Daily.             ipratropium-albuterol (DUO-NEB) 0.5-2.5 mg/mL nebulizer  Take 3 mL by nebulization Every 4 (Four) Hours As Needed for Wheezing.             levothyroxine (SYNTHROID, LEVOTHROID) 75 MCG tablet  Take 75 mcg by mouth Daily.             miconazole (MICOTIN) 2 % powder  Apply  topically Every 12 (Twelve) Hours.             mirtazapine (REMERON) 30 MG tablet  Take 30 mg by mouth Every Night.             Multiple Vitamins-Minerals (PRESERVISION AREDS 2 PO)  Take 1 tablet by mouth 2 (Two) Times a Day.             ondansetron (ZOFRAN) 4 MG tablet  Take 4 mg by mouth Every 8 (Eight) Hours As Needed for Nausea or Vomiting.             saccharomyces boulardii (FLORASTOR) 250 MG capsule  Take 250 mg by mouth Daily.             tamsulosin (FLOMAX) 0.4 MG capsule 24 hr capsule  Take 1 capsule by mouth Every Night.             vancomycin 50 MG/ML solution oral solution  Take 5 mL by mouth Every 6 (Six) Hours. 250 mg qid to 7/20 then bid to 8/1 then daily to 8/15  Indications: Clostridium Difficile Infection                 Discharge Diet:   Diet Instructions     Diet: Regular; Thin Liquids, No Restrictions       Discharge Diet:  Regular   Fluid Consistency:  Thin Liquids, No Restrictions                 Discharge Care Plan / Instructions:    Activity at Discharge:   Activity Instructions     Activity as Tolerated                     Follow-up Appointments  Future  Appointments  Date Time Provider Department Center   8/15/2017 11:00 AM MD [facility] [place] [facility] None   8/16/2017 1:30 PM MD [facility] None     Additional Instructions for the Follow-ups that You Need to Schedule     Discharge Follow-up with PCP    As directed    Follow Up Details:  1 week after dc from [facility]                 Test Results Pending at Discharge       ARIEL Montalvo 07/12/17 11:06 AM    Time: Discharge 36 min    Please note that portions of this note may have been completed with a voice recognition program. Efforts were made to edit the dictations, but occasionally words are mistranscribed.         Electronically signed by ARIEL Sood at 7/12/2017 11:18 AM

## 2017-07-12 NOTE — DISCHARGE SUMMARY
[facility] Medicine Services  DISCHARGE SUMMARY       Date of Admission: 7/4/2017  Date of Discharge:  7/12/2017  Primary Care Physician: Kieran Cottrell MD  Consulting Physician(s)     Provider Relationship    Virginia Demarco MD Consulting Physician          Discharge Diagnoses:  Active Hospital Problems (** Indicates Principal Problem)    Diagnosis Date Noted   • **C. 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] 01/14/2017     EF 45%; mild-moderate mr and ai     • History of recurrent UTIs [Z87.440] 01/13/2017   • Generalized weakness [R53.1] 01/12/2017   • Parkinson's disease [G20] 08/16/2016   • Dysuria [R30.0] 08/16/2016      Resolved Hospital Problems    Diagnosis Date Noted Date Resolved   • Leukocytosis [D72.829] 07/04/2017 07/05/2017       Presenting Problem/History of Present Illness  Abdominal pain, acute, generalized [R10.84]  Abdominal pain, acute, generalized [R10.84]     Chief Complaint on Day of Discharge: C Diff colitis; N/V/D    History of Present Illness on Day of Discharge:   Patient feeling better today. No abdominal pain, nausea or vomiting. Still with diarrhea and weakness. Has not been out of bed without lift and is currently 2 person assist. Son concerned about navarro catheter presence and possibility of UTI and requests ua prior to dc.    Hospital Course  Patient is a 86 y.o. female with PMH of recent hip replacement 12/2016 at  followed by left MCA stroke 2/2017 found to have thrombus with essentially complete lysis s/p  TPA, did not require thrombectomy ECHO 2/20/17 showed mild decrease systolic function, EF 45% and small PFO- no anticoagulation 2nd to admission January 2017 for anemia requiring PRBCs on statin and asa per NS,review of records shows f/u with Cardiology, Dr Chou and Jocelin patch end of March with planned follow up this month, since states was told by Dr. Chou readings for normal.  Has hx Parkinson's dz, recent ED visit 6/19/17 found to have colitis on CT Abd/Pelvis 6/19/17 refused admission at that time, was prescribed Cipro and flagyl.      Return to [facility] [place] operated by [facility] ED on 7/4/2017 from assisted living facility with generalized weakness, decreased oral intake, nausea and diarrhea.  Urine has been tested and only normal urogenital jordan present.  Patient was positive for C. difficile colitis and was started on oral vancomycin as well as IV Flagyl.  CT scan abdomen and pelvis showed improvement compared to scan on 6/19/2017.  [place] infectious disease, Dr. Demarco was consulted for severe numbness of C. difficile colitis.  Rectal tube was placed for large volume diarrhea.  She has been continued on IV Flagyl until today and will be discontinued.  She will continue oral vancomycin 250 mg 4 times a day through 7/20/2017 then twice a day through 8/1/2017 then daily through 8/15/2017.    Farris catheter will be removed prior to discharge with voiding trial.  Patient is having continued significant weakness.  Patient was ambulatory prior to arrival, however has significant weakness and requires 2 person assist.  She'll be transferred to [facility] on the general rehabilitation unit for further physical therapy/occupational therapy.  She will need follow-up on UA/urine culture if obtained prior to discharge.  Currently patient is eating and appetite increasing.  Patient currently having no pain, nausea, vomiting.  Patient will need follow-up with primary care physician in one week after discharge from [facility] in to keep  previously scheduled appointments with neurology and cardiology in August.  Procedures Performed         Consults:   Consults     Date and Time Order Name Status Description    7/6/2017 0806 Inpatient Consult to Infectious Diseases Completed           Pertinent Test Results:  Results for VAIBHAV RUTHERFORD (MRN [id number]) as of 7/12/2017 11:04   Ref. Range 7/10/2017 07:13   Glucose Latest Ref Range: 70 - 100 mg/dL 97   Sodium Latest Ref Range: 132 - 146 mmol/L 135   Potassium Latest Ref Range: 3.5 - 5.5 mmol/L 4.1   CO2 Latest Ref Range: 20.0 - 31.0 mmol/L 26.0   Chloride Latest Ref Range: 99 - 109 mmol/L 104   Anion Gap Latest Ref Range: 3.0 - 11.0 mmol/L 5.0   Creatinine Latest Ref Range: 0.60 - 1.30 mg/dL 0.60   BUN Latest Ref Range: 9 - 23 mg/dL 8 (L)   BUN/Creatinine Ratio Latest Ref Range: 7.0 - 25.0  13.3   Calcium Latest Ref Range: 8.7 - 10.4 mg/dL 8.6 (L)   eGFR Non African Amer Latest Ref Range: >60 mL/min/1.73 95   Alkaline Phosphatase Latest Ref Range: 25 - 100 U/L 62   Total Protein Latest Ref Range: 5.7 - 8.2 g/dL 5.3 (L)   ALT (SGPT) Latest Ref Range: 7 - 40 U/L 2 (L)   AST (SGOT) Latest Ref Range: 0 - 33 U/L 18   Total Bilirubin Latest Ref Range: 0.3 - 1.2 mg/dL 0.2 (L)   Albumin Latest Ref Range: 3.20 - 4.80 g/dL 2.60 (L)   Globulin Latest Units: gm/dL 2.7   A/G Ratio Latest Ref Range: 1.5 - 2.5 g/dL 1.0 (L)   WBC Latest Ref Range: 3.50 - 10.80 10*3/mm3 3.07 (L)   RBC Latest Ref Range: 3.89 - 5.14 10*6/mm3 3.00 (L)   Hemoglobin Latest Ref Range: 11.5 - 15.5 g/dL 8.8 (L)   Hematocrit Latest Ref Range: 34.5 - 44.0 % 27.5 (L)   RDW Latest Ref Range: 11.3 - 14.5 % 14.5   MCV Latest Ref Range: 80.0 - 99.0 fL 91.7   MCH Latest Ref Range: 27.0 - 31.0 pg 29.3   MCHC Latest Ref Range: 32.0 - 36.0 g/dL 32.0   MPV Latest Ref Range: 6.0 - 12.0 fL 10.5   Platelets Latest Ref Range: 150 - 450 10*3/mm3 196   RDW-SD Latest Ref Range: 37.0 - 54.0 fl 49.0   Neutrophil % Latest Ref Range: 41.0 - 71.0 % 40.6 (L)    Lymphocyte % Latest Ref Range: 24.0 - 44.0 % 40.1   Monocyte % Latest Ref Range: 0.0 - 12.0 % 12.4 (H)   Eosinophil % Latest Ref Range: 0.0 - 3.0 % 5.5 (H)   Basophil % Latest Ref Range: 0.0 - 1.0 % 0.7   Immature Grans % Latest Ref Range: 0.0 - 0.6 % 0.7 (H)   Neutrophils, Absolute Latest Ref Range: 1.50 - 8.30 10*3/mm3 1.25 (L)   Lymphocytes, Absolute Latest Ref Range: 0.60 - 4.80 10*3/mm3 1.23   Monocytes, Absolute Latest Ref Range: 0.00 - 1.00 10*3/mm3 0.38   Eosinophils, Absolute Latest Ref Range: 0.00 - 0.30 10*3/mm3 0.17   Basophils, Absolute Latest Ref Range: 0.00 - 0.20 10*3/mm3 0.02   Immature Grans, Absolute Latest Ref Range: 0.00 - 0.03 10*3/mm3 0.02     Narrative:          EXAMINATION: XR CHEST 1 VW- 07/04/2017     INDICATION: weakness      COMPARISON: 02/23/2017     FINDINGS: Portable chest reveals heart to be enlarged. Underlying  chronic and emphysematous changes seen within the lung fields  bilaterally. Degenerative changes seen within the spine. Vascular  calcifications are identified. No focal parenchymal opacification  present. No pleural effusion or pneumothorax.              Impression:         Degenerative changes seen within the spine. The heart is  enlarged. Underlying chronic and emphysematous changes seen within the  lung fields bilaterally.     D:  07/04/2017  E:  07/05/2017     This report was finalized on 7/5/2017 5:06 PM by Dr. Karely Esquivel MD.          CT Abdomen Pelvis Without Contrast [[id number]] Not Reviewed       Order Status: Completed Collected: 07/05/17 0916        Updated: 07/05/17 1708       Narrative:         EXAMINATION: CT ABDOMEN AND PELVIS WO CONTRAST-      INDICATION: History of colitis, now with abdominal pain generalized.     TECHNIQUE: Multiple axial CT imaging was obtained of the abdomen and  pelvis without the administration of intravenous contrast.     The radiation dose reduction device was turned on for each scan per the  ALARA (As Low as Reasonably  Achievable) protocol.     COMPARISON: None.     FINDINGS: There is no change in the small pericardial effusion. There is  a small left pleural effusion. Mild increased marking is seen at the  left lung base. Chronic changes are identified. The liver is homogeneous  as well as the spleen. There is a cyst identified in the left kidney. No  stones in the gallbladder. Vascular calcification is seen within the  abdominal aorta and iliac vessels. No free fluid or free air. No  abnormal mass or fluid collection is identified. The appendix is normal.  The abdominal portion of the gastrointestinal tract is within normal  limits.     PELVIS: Pelvic organs are unremarkable. The pelvic portion of the  gastrointestinal tract reveals improvement seen in the descending colon  previously having abnormal wall thickening. There is only minimal wall  thickening at this time with pericolonic stranding. Minimal fluid is  seen in the left paracolic gutter. Findings have significantly improved      in the interval.  No free fluid or free air. Calcification is seen  within the uterus suggesting degenerating fibroids. Vascular  calcification is seen within the pelvic vessels. No abnormal mass or  fluid collection is identified. Atrophy is identified of the left psoas  muscle. Hardware is identified within the left hip. No acute bony  abnormality is identified.           Impression:         Significant improvement seen in the appearance of the  descending colon with minimal wall thickening remaining and minimal  pericolonic stranding. There is a small amount of fluid in the left  paracolic gutter. The remainder the CT abdomen and pelvis is stable and  grossly unremarkable.     D:  07/04/2017  E:  07/05/2017    Results for RUTHERFORD VAIBHAV P (MRN [id number]) as of 7/12/2017 11:04   Ref. Range 7/5/2017 02:34   C.difficile toxin A/B Latest Ref Range: Negative  Detected (A)     Urine Culture   Order: [id number]   Status:  Final result   Visible to  "patient:  No (Not Released)   Specimen Information: Urine, Clean Catch; Urine        Culture   >100,000 CFU/mL Normal Urogenital Vesta      Resulting Agency: [facility]               Condition on Discharge:  stable    Physical Exam on Discharge:/87 (BP Location: Right arm, Patient Position: Lying)  Pulse 64  Temp 97.6 °F (36.4 °C) (Oral)   Resp 16  Ht 59\" (149.9 cm)  Wt 110 lb (49.9 kg)  SpO2 95%  BMI 22.22 kg/m2  Physical Exam   Constitutional: She is oriented to person, place, and time. She appears well-nourished. No distress.   HENT:   Head: Normocephalic and atraumatic.   Eyes: Pupils are equal, round, and reactive to light. No scleral icterus.   Neck: Normal range of motion. No JVD present.   Cardiovascular: Normal rate, regular rhythm, normal heart sounds and intact distal pulses.    No murmur heard.  Pulmonary/Chest: Effort normal and breath sounds normal. No respiratory distress.   Abdominal: Soft. Bowel sounds are normal. She exhibits no distension. There is no tenderness.   Genitourinary:   Genitourinary Comments: Farris with clear yellow urine   Musculoskeletal: Normal range of motion. She exhibits no edema.   Neurological: She is alert and oriented to person, place, and time.   Skin: Skin is warm and dry.   Psychiatric: She has a normal mood and affect. Her behavior is normal. Judgment and thought content normal.         Discharge Disposition  Rehab Facility or Unit (DC - External)    Discharge Medications   Florinda Knapp   Home Medication Instructions HARDY:[id number]    Printed on:07/12/17 1102   Medication Information                      acetaminophen (TYLENOL) 325 MG tablet  Take 650 mg by mouth Every 6 (Six) Hours As Needed (Pain).             aspirin 325 MG tablet  Take 1 tablet by mouth Daily.             atorvastatin (LIPITOR) 40 MG tablet  Take 1 tablet by mouth Every Night.             carbidopa-levodopa (SINEMET)  MG per tablet  Take 1 tablet by mouth 4 (Four) Times a " Day. Take at 9am, noon, 3pm, and 6pm             carbidopa-levodopa CR (SINEMET CR)  MG per CR tablet  Take 1 tablet by mouth At Night As Needed (Leg tremors).             carvedilol (COREG) 3.125 MG tablet  Take 1 tablet by mouth Every 12 (Twelve) Hours.             cetirizine (zyrTEC) 5 MG tablet  Take 1 tablet by mouth Daily.             Cholecalciferol 1000 UNITS capsule  Take 1,000 Units by mouth Daily.             ipratropium-albuterol (DUO-NEB) 0.5-2.5 mg/mL nebulizer  Take 3 mL by nebulization Every 4 (Four) Hours As Needed for Wheezing.             levothyroxine (SYNTHROID, LEVOTHROID) 75 MCG tablet  Take 75 mcg by mouth Daily.             miconazole (MICOTIN) 2 % powder  Apply  topically Every 12 (Twelve) Hours.             mirtazapine (REMERON) 30 MG tablet  Take 30 mg by mouth Every Night.             Multiple Vitamins-Minerals (PRESERVISION AREDS 2 PO)  Take 1 tablet by mouth 2 (Two) Times a Day.             ondansetron (ZOFRAN) 4 MG tablet  Take 4 mg by mouth Every 8 (Eight) Hours As Needed for Nausea or Vomiting.             saccharomyces boulardii (FLORASTOR) 250 MG capsule  Take 250 mg by mouth Daily.             tamsulosin (FLOMAX) 0.4 MG capsule 24 hr capsule  Take 1 capsule by mouth Every Night.             vancomycin 50 MG/ML solution oral solution  Take 5 mL by mouth Every 6 (Six) Hours. 250 mg qid to 7/20 then bid to 8/1 then daily to 8/15  Indications: Clostridium Difficile Infection                 Discharge Diet:   Diet Instructions     Diet: Regular; Thin Liquids, No Restrictions       Discharge Diet:  Regular   Fluid Consistency:  Thin Liquids, No Restrictions                 Discharge Care Plan / Instructions:    Activity at Discharge:   Activity Instructions     Activity as Tolerated                     Follow-up Appointments  Future Appointments  Date Time Provider Department Center   8/15/2017 11:00 AM Elisa Castro MD [facility] None   8/16/2017 1:30 PM Mak RICCI  MD José Antonio [facility] None     Additional Instructions for the Follow-ups that You Need to Schedule     Discharge Follow-up with PCP    As directed    Follow Up Details:  1 week after dc from [facility]                 Test Results Pending at Discharge       ARIEL Montalvo 07/12/17 11:06 AM    Time: Discharge 36 min    Please note that portions of this note may have been completed with a voice recognition program. Efforts were made to edit the dictations, but occasionally words are mistranscribed.

## 2017-07-12 NOTE — PROGRESS NOTES
Continued Stay Note  [facility]     Patient Name: Florinda Knapp  MRN: [id number]  Today's Date: 7/12/2017    Admit Date: 7/4/2017          Discharge Plan       07/12/17 0914    Case Management/Social Work Plan    Plan [facility] today    Patient/Family In Agreement With Plan yes    Additional Comments Pt has a bed on the General Rehab Unit at [facility] today.  Pt will be transported by her son and DAVIDISIS Zuñiga; SW has left a message for him.  Nurse please call report to [phone].  SW will fax d/c summary to [phone]. All if this information has been relayed to MD, RN, pt and son.              Discharge Codes     None        Expected Discharge Date and Time     Expected Discharge Date Expected Discharge Time    Jul 12, 2017             UNIQUE Ceja

## 2017-07-13 NOTE — THERAPY DISCHARGE NOTE
Acute Care - Occupational Therapy Discharge Summary  [facility]     Patient Name: Florinda Knapp  : 3/5/1931  MRN: [id number]    Today's Date: 2017  Onset of Illness/Injury or Date of Surgery Date: 17    Date of Referral to OT: 17  Referring Physician: Mayne, PA-C      Admit Date: 2017        OT Recommendation and Plan    Visit Dx:    ICD-10-CM ICD-9-CM   1. Abdominal pain, acute, generalized R10.84 789.07     338.19   2. Leukocytosis, unspecified type D72.829 288.60   3. Acute cystitis without hematuria N30.00 595.0   4. Failure to thrive syndrome, adult R62.7 783.7   5. Impaired mobility and ADLs Z74.09 799.89   6. Impaired functional mobility and activity tolerance Z74.09 V49.89                     OT Goals       07/10/17 1513 17 1408 17 1030    Bed Mobility OT LTG    Bed Mobility OT LTG, Date Established   17  -    Bed Mobility OT LTG, Time to Achieve   by discharge  -    Bed Mobility OT LTG, Activity Type   all bed mobility  -    Bed Mobility OT LTG, [place] Level   moderate assist (50% patient effort)  -    Bed Mobility OT LTG, Outcome goal partially met   Met for roll, sidelying to sit  -TA goal partially met   pt met OOB today  -DUKE goal ongoing  -    Transfer Training OT LTG    Transfer Training OT LTG, Date Established   17  -    Transfer Training OT LTG, Time to Achieve   by discharge  -    Transfer Training OT LTG, Activity Type   sit to stand/stand to sit;toilet  -    Transfer Training OT LTG, [place] Level minimum assist (75% patient effort);2 person assist required  -TA --   max of 2  -DUKE moderate assist (50% patient effort)  -    Transfer Training OT LTG, Assist Device   walker, rolling  -    Transfer Training OT LTG, Outcome goal revised   Met for Max A x 2; goal upgraded  -TA  goal ongoing  -    Patient Education OT LTG    Patient Education OT LTG, Date Established   17  -    Patient Education OT LTG, Time to  Achieve   by discharge  -    Patient Education OT LTG, Education Type   HEP;posture/body mechanics;home safety  -    Patient Education OT LTG, Education Understanding   demonstrates adequately;verbalizes understanding  -    Patient Education OT LTG Outcome goal ongoing   Progressing with HEP, body mechanics  -TA goal ongoing   HEP issued, progressing with foot positioning transfers.  -DUKE goal ongoing  -    Grooming OT LTG    Grooming Goal OT LTG, Date Established   07/05/17  -    Grooming Goal OT LTG, Time to Achieve   by discharge  -    Grooming Goal OT LTG, [place] Level   minimum assist (75% patient effort)  -    Grooming Goal OT LTG, Outcome goal partially met   Met for combing hair.  -TA goal ongoing   Pt waiting for aid to do whole bath  -DUKE goal ongoing  -      User Key  (r) = Recorded By, (t) = Taken By, (c) = Cosigned By    Initials Name Provider Type    DUKE Camarena, OT Occupational Therapist    STELLA Torres, OT Occupational Therapist    JUNIOR Rowland, OT Occupational Therapist                Outcome Measures       07/12/17 1145          How much help from another person do you currently need...    Turning from your back to your side while in flat bed without using bedrails? 2  -MB      Moving from lying on back to sitting on the side of a flat bed without bedrails? 2  -MB      Moving to and from a bed to a chair (including a wheelchair)? 2  -MB      Standing up from a chair using your arms (e.g., wheelchair, bedside chair)? 2  -MB      Climbing 3-5 steps with a railing? 1  -MB      To walk in hospital room? 2  -MB      AM-PAC 6 Clicks Score 11  -MB      Functional Assessment    Outcome Measure Options AM-PAC 6 Clicks Basic Mobility (PT)  -MB        User Key  (r) = Recorded By, (t) = Taken By, (c) = Cosigned By    Initials Name Provider Type    CURT Skelton, PT Physical Therapist              OT Discharge Summary  Reason for Discharge: Discharge from  facility  Outcomes Achieved: Refer to plan of care for updates on goals achieved  Discharge Destination: Inpatient rehabilitation facility      Rosalba Martino, OT  7/13/2017

## 2017-08-16 ENCOUNTER — OFFICE VISIT (OUTPATIENT)
Dept: CARDIOLOGY | Facility: CLINIC | Age: 82
End: 2017-08-16

## 2017-08-16 VITALS
SYSTOLIC BLOOD PRESSURE: 134 MMHG | BODY MASS INDEX: 22.18 KG/M2 | HEART RATE: 69 BPM | WEIGHT: 110 LBS | HEIGHT: 59 IN | DIASTOLIC BLOOD PRESSURE: 70 MMHG

## 2017-08-16 DIAGNOSIS — I10 ESSENTIAL HYPERTENSION: Primary | ICD-10-CM

## 2017-08-16 DIAGNOSIS — I50.9 CHF WITH CARDIOMYOPATHY (HCC): ICD-10-CM

## 2017-08-16 DIAGNOSIS — D64.9 SYMPTOMATIC ANEMIA: ICD-10-CM

## 2017-08-16 DIAGNOSIS — I42.9 CHF WITH CARDIOMYOPATHY (HCC): ICD-10-CM

## 2017-08-16 PROCEDURE — 99214 OFFICE O/P EST MOD 30 MIN: CPT | Performed by: INTERNAL MEDICINE

## 2017-08-16 RX ORDER — CARBIDOPA AND LEVODOPA 50; 200 MG/1; MG/1
1 TABLET, EXTENDED RELEASE ORAL NIGHTLY
COMMUNITY
End: 2017-09-29 | Stop reason: SDUPTHER

## 2017-08-16 RX ORDER — DOCUSATE SODIUM 100 MG/1
100 CAPSULE, LIQUID FILLED ORAL DAILY PRN
COMMUNITY
End: 2022-01-01

## 2017-08-16 RX ORDER — LORATADINE 10 MG/1
10 TABLET ORAL DAILY PRN
COMMUNITY
End: 2022-01-01

## 2017-08-16 NOTE — PROGRESS NOTES
Subjective:     Encounter Date:08/16/2017      Patient ID: Florinda Knapp is an 86 y.o.  white female, resident of [place], [place].     INTERNIST: Kieran Cottrell MD   NEUROLOGIST: Elisa Castro MD  UROLOGIST: Dino Flynn MD  ORTHOPEDIC SURGEON: Ray Packer MD  INTERVENTIONAL NEURORADIOLOGIST:  Francisco Javier Gabriel MD    Chief Complaint:   Chief Complaint   Patient presents with   • Atrial Fibrillation   • Hypertension     Problem List:  1. 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 Possible nonischemic cardiomyopathy                                        A. Echocardiogram 1/13/17; LVEF 0.45, LV wall segments contract abnormally,           LV diastolic dysfunction grade 1 consistent with impaired relaxation, RV borderline                                     dilated, mild to moderate AR, MR, trivial circumferential pericardial effusion, RVSP                                     less than 35 mmHg, aortic valve exhibits sclerosis                                      B. Echocardiogram 2/20/17; LVEF 0.45, LV wall segments contract abnormally,                                     mild AR, LV wall thickness consistent with mild concentric hypertrophy, small less                                     than 1 cm pericardial effusion adjacent to the left ventricle, saline test results are                                     positive and indicate an atrial level shunt                                      C. Residual class I symptoms  3. Hypertension, essential  4. Hyperlipidemia  5. Hypothyroidism/replacement therapy  6. Anxiety and depression  7. Parkinson's disease  8. Failure to thrive in adult  9. Osteoarthritis  10. Stress urinary incontinence  11. [facility] hospitalization for symptomatic normocytic anemia 1/12/17-1/19/17  12. History of recurrent UTIs.  13. Severe anemia, February 2017  14. Surgical history:                                        A.  Appendectomy                                        B. Back surgery                                        C. Elbow surgery                                        D. Knee surgery                                        E. Cataract extraction                                        F. Left Hip replacement 12/6/2016-[facility]    Allergies   Allergen Reactions   • Abilify [Aripiprazole]    • Alendronate    • Celecoxib    • Fosamax Plus D [Alendronate-Cholecalciferol]    • Levaquin [Levofloxacin]    • Macrobid [Nitrofurantoin Monohyd Macro]    • Nitrofurantoin    • Procaine    • Sulfa Antibiotics Nausea And Vomiting and Rash         Current Outpatient Prescriptions:   •  acetaminophen (TYLENOL) 325 MG tablet, Take 650 mg by mouth Every 6 (Six) Hours As Needed (Pain)., Disp: , Rfl:   •  aspirin 325 MG tablet, Take 1 tablet by mouth Daily., Disp: , Rfl:   •  atorvastatin (LIPITOR) 40 MG tablet, Take 1 tablet by mouth Every Night., Disp: , Rfl:   •  carbidopa-levodopa (SINEMET)  MG per tablet, Take 1 tablet by mouth 4 (Four) Times a Day. Take at 9am, noon, 3pm, and 6pm, Disp: , Rfl:   •  carbidopa-levodopa (SINEMET)  MG per tablet, Take 1 tablet by mouth 4 (Four) Times a Day., Disp: , Rfl:   •  carbidopa-levodopa CR (SINEMET CR)  MG per CR tablet, Take 1 tablet by mouth Every Night., Disp: , Rfl:   •  carvedilol (COREG) 3.125 MG tablet, Take 1 tablet by mouth Every 12 (Twelve) Hours., Disp: , Rfl:   •  Cholecalciferol 1000 UNITS capsule, Take 1,000 Units by mouth Daily., Disp: , Rfl:   •  docusate sodium (COLACE) 100 MG capsule, Take 100 mg by mouth Daily., Disp: , Rfl:   •  ipratropium-albuterol (DUO-NEB) 0.5-2.5 mg/mL nebulizer, Take 3 mL by nebulization Every 4 (Four) Hours As Needed for Wheezing., Disp: , Rfl:   •  levothyroxine (SYNTHROID, LEVOTHROID) 75 MCG tablet, Take 75 mcg by mouth Daily., Disp: , Rfl:   •  loratadine (CLARITIN) 10 MG tablet, Take 10 mg by mouth Daily., Disp: , Rfl:   •  miconazole  "(MICOTIN) 2 % powder, Apply  topically Every 12 (Twelve) Hours., Disp: , Rfl: 0  •  mirtazapine (REMERON) 30 MG tablet, Take 30 mg by mouth Every Night., Disp: , Rfl:   •  Multiple Vitamin (MULTI VITAMIN PO), Take  by mouth Daily., Disp: , Rfl:   •  Multiple Vitamins-Minerals (PRESERVISION AREDS 2 PO), Take 1 tablet by mouth 2 (Two) Times a Day., Disp: , Rfl:   •  Probiotic Product (PROBIOTIC ADVANCED PO), Take  by mouth Daily., Disp: , Rfl:     History of Present Illness Patient returns for scheduled followup after a 4-1/2 month hiatus. In the interim, in July 2017, she was hospitalized for 9 days with C. Difficile colitis.  She is getting her strength back since her hospitalization and discharge from [facility].  She is \"mostly walking\" for exercise and is also doing arm and leg exercises.  She says that she has had no more diarrhea but has another UTI.  She is accompanied to the office today by her son, and he says that they just saw Dr. Flynn, and he really did not have a reason for her recurrent UTIs.  He did a bladder scan, and she does not seem to be retaining much urine.  She has not been to see an infectious disease doctor.  She has not had any symptoms heart-wise; no chest pain, tightness, or pressure.  The patient states that she sleeps fairly well and is eating without problems.  Her son says that she had her annual physical with Dr. Cottrell last week, and he did lab work at that time, but they are unaware of what tests were run or what the results were, as are we.  Patient otherwise denies chest pain, shortness of breath, PND, edema, palpitations, syncope or presyncope at this time on limited activity.  She is accompanied to the office today by her son and is in a wheelchair.      Review of Systems   Constitution: Positive for weakness and malaise/fatigue.   Hematologic/Lymphatic: Bruises/bleeds easily.   Skin: Positive for dry skin.   Musculoskeletal: Positive for arthritis, back pain and " "falls.   Gastrointestinal: Positive for bloating (sometimes with UTIs) and constipation.   Genitourinary: Positive for bladder incontinence and flank pain.   Psychiatric/Behavioral: The patient is nervous/anxious.         Confusion (with UTIs)      Obtained and otherwise negative except as outlined in problem list and HPI.    Procedures       Objective:       Vitals:    08/16/17 1258 08/16/17 1316   BP: 153/93 134/70   BP Location: Right arm Left arm   Patient Position: Sitting Sitting   Pulse: 69    Weight: 110 lb (49.9 kg)    Height: 59\" (149.9 cm)      Body mass index is 22.22 kg/(m^2).   Last weight:  110 lbs.    Physical Exam   Constitutional: She is oriented to person, place, and time. She appears well-developed and well-nourished.   Neck: No JVD present. Carotid bruit is not present. No thyromegaly present.   Cardiovascular: S1 normal and S2 normal.  An irregular rhythm present. Exam reveals no gallop, no S3 and no friction rub.    Murmur heard.   Medium-pitched early systolic murmur is present with a grade of 2/6  at the lower left sternal border  Pulses:       Carotid pulses are 1+ on the right side, and 1+ on the left side.       Radial pulses are 1+ on the right side, and 1+ on the left side.        Femoral pulses are 1+ on the right side, and 1+ on the left side.       Popliteal pulses are 1+ on the right side, and 1+ on the left side.        Dorsalis pedis pulses are 1+ on the right side, and 1+ on the left side.        Posterior tibial pulses are 1+ on the right side, and 1+ on the left side.   Pulmonary/Chest: Effort normal and breath sounds normal. She has no wheezes. She has no rhonchi. She has no rales.   Abdominal: Soft. She exhibits no mass. There is no hepatosplenomegaly. There is no tenderness. There is no guarding.   Lymphadenopathy:     She has no cervical adenopathy.   Neurological: She is alert and oriented to person, place, and time.   Skin: Skin is warm, dry and intact. No rash noted. "   Vitals reviewed.      Lab Review:   Lab Results   Component Value Date    GLUCOSE 97 07/10/2017    BUN 8 (L) 07/10/2017    CREATININE 0.60 07/10/2017    EGFRIFNONA 95 07/10/2017    BCR 13.3 07/10/2017    CO2 26.0 07/10/2017    CALCIUM 8.6 (L) 07/10/2017    ALBUMIN 2.60 (L) 07/10/2017    LABIL2 1.0 (L) 07/10/2017    AST 18 07/10/2017    ALT 2 (L) 07/10/2017       Lab Results   Component Value Date    WBC 3.07 (L) 07/10/2017    HGB 8.8 (L) 07/10/2017    HCT 27.5 (L) 07/10/2017    MCV 91.7 07/10/2017     07/10/2017       Lab Results   Component Value Date    HGBA1C 4.80 02/20/2017       Lab Results   Component Value Date    TSH 4.016 07/04/2017       Lab Results   Component Value Date    CHOL 161 02/20/2017    CHOL 131 01/13/2017     Lab Results   Component Value Date    TRIG 110 02/20/2017    TRIG 94 01/13/2017     Lab Results   Component Value Date    HDL 48 02/20/2017    HDL 34 (L) 01/13/2017   LDL - 80 (02/20/2017)      Assessment:   Overall continued acceptable course with no interim cardiopulmonary complaints with acceptable functional status. We will defer additional diagnostic or therapeutic intervention from a cardiac perspective at this time.  We will attempt to obtain and review her laboratory studies.       Diagnosis Plan   1. Essential hypertension  Continue current medications   2. CHF with cardiomyopathy  Continue current medications   3. Acute Symptomatic on Chronic Normocytic Anemia  Attempt to obtain and review laboratory studies.          Plan:         1. Patient to continue current medications and close follow up with the above providers.  2. Tentative cardiology follow up in January 2018, or patient may return sooner PRN.       Transcribed by Linda Mehta for Dr. Mak Chou at 1:10 PM on 08/16/2017    IMak MD, [facility], personally performed the services described in this documentation as scribed by the above named individual in my presence, and it is both accurate and  complete. At 2:28 PM on 08/16/2017

## 2017-08-25 ENCOUNTER — OFFICE VISIT (OUTPATIENT)
Dept: NEUROLOGY | Facility: CLINIC | Age: 82
End: 2017-08-25

## 2017-08-25 VITALS
BODY MASS INDEX: 22.18 KG/M2 | WEIGHT: 110 LBS | HEIGHT: 59 IN | SYSTOLIC BLOOD PRESSURE: 122 MMHG | DIASTOLIC BLOOD PRESSURE: 80 MMHG

## 2017-08-25 DIAGNOSIS — G20 PARKINSON'S DISEASE (HCC): Primary | ICD-10-CM

## 2017-08-25 PROCEDURE — 99214 OFFICE O/P EST MOD 30 MIN: CPT | Performed by: PSYCHIATRY & NEUROLOGY

## 2017-08-25 NOTE — PROGRESS NOTES
"Subjective   Florinda Knapp is a 86 y.o. female.     History of Present Illness   Patient followed since October 2013 or Parkinson's disease that initially responded well to Sinemet although she had trouble tolerating it at first. Prior to the Parkinson's diagnosis she had had problems with frequent falls since at least 2010.  8/16 noted: When seen in December she noted some restlessness in her legs at night and was on Sinemet 25/100 tid at 9:30, 1:30 and 5:30pm. In May she fractured her left hip 2/2 fall, and underwent conservative treatment rather than surgery. She called in the interval about worsening cramping and we added in Sinemet CR at bedtime which has been tremendously helpful. She and her son both feel she is doing extremely well with the Parkinson's. Ongoing left hip pain only when walking is the biggest problem now.  Today: Since last seen has had hip surgery, then in ICU, has been at  and other hospitals. Has had colitis and UTI. Now at the [facility]. Has had \"crazy\" dreams, also leg cramping, and has had confusion since the hip surgery 12/6/16. Was very confused while hospitalized, improved but not quite baseline.  Frustrated with waiting for meds to be brought to her, and if she doesn't get them on time, legs cramp. Wonders about increasing the dose a bit, as she notes it wearing off.  On Remeron a long time. Bad and vivid dreams had started before hospitalization, but have worsened. Can't relate to any medication. Often violent, and horrible. Thinks she may act them out -- not falling out of bed, but bedclothes messed up.  Takes C/L 25/100 at 9, 2, 5 and CR at bedtime (about 9).         5/17: Ms. Knapp was hospitalized at [facility] in 2/17 for stroke. She was noted to have increased confusion, right hemiparesis, as well as aphasia. An MRI was notable for several small areas of acute infarction in the left parietal lobe. An echo was notable for a small PFO. She is currently following with cardiology for " this who did not feel that she needed surgical closure. Her carotids were unremarkable. She was subsequently treated with TPA as well as ASA 325mg and Lipitor 40mg daily. Since her hospitalization, she notes that her right sided weakness has significantly improved, though is still present. She and her family note that her confusion and aphasia have resolved.      Since her last visit in 2/17, she notes increased bradykinesia as well as rigidity. She also notes that her leg cramps are worse. She is currently taking Sinemet 25/100mg TID. She is no longer taking the Sinemet CR nightly, though for unclear reasons.   Noted: stroke workup complete and appropriate, continue on ASA and Lipitor unchanged. She will also follow up with cardiology concerning her PFO.    Also planned increase her Sinemet back up to 25/100mg tablets QID as she currently feels that the medication is wearing off. It was also elected to restart Sinemet CR 50/200mg tablets nightly as needed  Today: readmitted July for cdiff colitis.  Doing PT for a long while. Feels slower than usual. Can't tell if medicine is wearing off. Is walking, although hindered by left foot turning in.  Has soft AFO, to get rigid one tomorrow.  Sinemet scheduled for 9, 12, 3 and 6, often gets it late, eg 3pm dose at 5pm. Uses hs dose of sinemet CR only if needed, and rarely feels she needs it. .    The following portions of the patient's history were reviewed and updated as appropriate: allergies, current medications, past family history, past medical history, past social history, past surgical history and problem list.    Review of Systems   Constitutional: Positive for fatigue. Negative for fever and unexpected weight change.   Respiratory: Negative for cough and shortness of breath.    Cardiovascular: Negative for chest pain.   Gastrointestinal:        As in hpi   Neurological: Positive for tremors and weakness. Negative for light-headedness.       Objective   Physical  Exam   Constitutional: She is oriented to person, place, and time. She appears well-developed.   HENT:   Head: Normocephalic and atraumatic.   Eyes: EOM are normal. Pupils are equal, round, and reactive to light.   Pulmonary/Chest: Effort normal.   Musculoskeletal: Normal range of motion.   Neurological: She is oriented to person, place, and time. She has a normal Finger-Nose-Finger Test.   Skin: Skin is warm and dry.   Psychiatric: Her speech is normal.   Nursing note and vitals reviewed.      Neurologic Exam     Mental Status   Oriented to person, place, and time.   Attention: normal.   Speech: speech is normal   Level of consciousness: alert  Knowledge: consistent with education.   Able to name object. Normal comprehension.     Cranial Nerves     CN II   Visual fields full to confrontation.     CN III, IV, VI   Pupils are equal, round, and reactive to light.  Extraocular motions are normal.     CN IX, X   CN IX normal.   CN X normal.   Palate: symmetric    CN XI   CN XI normal.     CN XII   CN XII normal.        Minimal MF, WNL     Motor Exam   Muscle bulk: normal  Right arm pronator drift: absent  Left arm pronator drift: absent       4+-5- throughout, no asymmetry of UE, no drift; soft AFO left foot  Slight increase in tone LUE      Sensory Exam   Light touch normal.     Gait, Coordination, and Reflexes     Coordination   Finger to nose coordination: normal    Tremor   Resting tremor: present (transient LUE)  Intention tremor: absent  Action tremor: absent       Gait NT; in wheelchair for visit  FT OK       Assessment/Plan   Florinda was seen today for parkinson's disease.    Diagnoses and all orders for this visit:    Parkinson's disease    Discussion/Summary:  She has a generalized debility, but does not appear undermedicated right now for her PD, and biggest problem is probably dosing times. Wrote out instructions for NH regarding dosing (which she is capable of herself if all 4 doses are brought to her in the  morning). Encouraged PT, eating well, etc. Long discussion regarding contributors to gait difficulty, although no obvious residual from stroke.  25 minutes face to face, 15 minutes spent in discussion as above.   Return in about 6 months (around 2/25/2018).

## 2017-09-28 ENCOUNTER — TELEPHONE (OUTPATIENT)
Dept: NEUROLOGY | Facility: CLINIC | Age: 82
End: 2017-09-28

## 2017-09-28 NOTE — TELEPHONE ENCOUNTER
Director of Nursing called about concerns of the patient getting around in the AM and having a harder time.  Pt has Sinemet as a PRN dose added for the evening but she is wanting to know if you can add something for the AM or rearrange her meds so its easier on patient in the am.  Please advise     [phone]

## 2017-09-28 NOTE — TELEPHONE ENCOUNTER
She should be getting the first dose of Sinemet 25/100 1st thing on waking.  That could be brought to her half an hour before she gets out of bed.  I do not want to increase the dose further  unless it is ineffective giving it in this way.

## 2017-09-29 RX ORDER — CARBIDOPA AND LEVODOPA 50; 200 MG/1; MG/1
1 TABLET, EXTENDED RELEASE ORAL NIGHTLY
Qty: 30 TABLET | Refills: 3 | Status: SHIPPED | OUTPATIENT
Start: 2017-09-29 | End: 2018-04-10 | Stop reason: SDUPTHER

## 2017-09-29 NOTE — TELEPHONE ENCOUNTER
The D.O.N. wants to know if the PRN dose in the evening can be a scheduled dose as late as possible.  She said the pt will say she doesn't need it but they feel its beneficial to her in the morning.

## 2017-10-02 ENCOUNTER — TELEPHONE (OUTPATIENT)
Dept: NEUROLOGY | Facility: CLINIC | Age: 82
End: 2017-10-02

## 2017-10-02 NOTE — TELEPHONE ENCOUNTER
"Krista ( CHERELLE)  called and said that per the son, he feels that the added scheduled sinemet is making his mother \"groggy\" in the am.  He wants to hold it for 3 days and wont give it to her tonight.  Director of Nursing try to explain to son that we shouldn't rotate between giving it and not.  Son was wanting to know from you if you feel that added dose at night was making her groggy or not. Director said it has helped Pt the last few days and she was a little groggy this am but will encourage son to let mother take it in the evening.   "

## 2017-10-04 NOTE — TELEPHONE ENCOUNTER
If it is making her groggy in the morning, we should not continue at this dose (could potentially try lower dose, but not sure it would last until morning). Is she getting the first dose in the morning either before she gets out of bed, or right when she gets up?

## 2017-10-05 NOTE — TELEPHONE ENCOUNTER
Marizol ([phone]) called to get an order.   She is getting her dose after she gets up. Could be between 8-9am.  They will go ahead D/C the sinemet. Marizol the nurse stated that she does not needs the controlled release. However she wants to hear your suggestion. Please advise.

## 2018-02-23 ENCOUNTER — OFFICE VISIT (OUTPATIENT)
Dept: CARDIOLOGY | Facility: CLINIC | Age: 83
End: 2018-02-23

## 2018-02-23 VITALS
WEIGHT: 113 LBS | HEART RATE: 72 BPM | BODY MASS INDEX: 22.78 KG/M2 | SYSTOLIC BLOOD PRESSURE: 131 MMHG | DIASTOLIC BLOOD PRESSURE: 72 MMHG | HEIGHT: 59 IN

## 2018-02-23 DIAGNOSIS — Z86.73 HISTORY OF ISCHEMIC LEFT MCA STROKE: ICD-10-CM

## 2018-02-23 DIAGNOSIS — D64.9 SYMPTOMATIC ANEMIA: ICD-10-CM

## 2018-02-23 DIAGNOSIS — I42.9 CHF WITH CARDIOMYOPATHY (HCC): ICD-10-CM

## 2018-02-23 DIAGNOSIS — I10 ESSENTIAL HYPERTENSION: Primary | ICD-10-CM

## 2018-02-23 DIAGNOSIS — I50.9 CHF WITH CARDIOMYOPATHY (HCC): ICD-10-CM

## 2018-02-23 PROCEDURE — 99214 OFFICE O/P EST MOD 30 MIN: CPT | Performed by: INTERNAL MEDICINE

## 2018-02-23 RX ORDER — CARVEDILOL 3.12 MG/1
3.12 TABLET ORAL EVERY 12 HOURS SCHEDULED
Qty: 180 TABLET | Refills: 4 | Status: SHIPPED | OUTPATIENT
Start: 2018-02-23 | End: 2018-09-10 | Stop reason: SDUPTHER

## 2018-02-23 NOTE — PROGRESS NOTES
Subjective:     Encounter Date:02/23/2018    Patient ID: Florinda Knapp is an 86 y.o.  white female, resident of [facility] in [place], [place].      INTERNIST: Kieran Cottrell MD   NEUROLOGIST: Elisa Castro MD  UROLOGIST: Dino Flynn MD  ORTHOPEDIC SURGEON: Ray Packer MD  INTERVENTIONAL NEURORADIOLOGIST:  Francisco Javier Gabriel MD    Chief Complaint:   Chief Complaint   Patient presents with   • Hypertension   • Atrial Fibrillation     Problem List:  1. 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 Possible nonischemic cardiomyopathy                                        A. Echocardiogram 1/13/17; LVEF 0.45, LV wall segments contract abnormally, LV     diastolic dysfunction grade 1 consistent with impaired relaxation, RV borderline                                     dilated, mild to moderate AR, MR, trivial circumferential pericardial effusion, RVSP                                     less than 35 mmHg, aortic valve exhibits sclerosis                                      B. Echocardiogram 2/20/17; LVEF 0.45, LV wall segments contract abnormally,                                     mild AR, LV wall thickness consistent with mild concentric hypertrophy, small less                                     than 1 cm pericardial effusion adjacent to the left ventricle, saline test results are                                     positive and indicate an atrial level shunt                                      C. Residual CCS class 0 angina pectoris/NYHA class II exertional dyspnea and fatigue  3. Hypertension, essential  4. Hyperlipidemia  5. Hypothyroidism/replacement therapy  6. Anxiety and depression  7. Parkinson's disease  8. Failure to thrive in adult  9. Osteoarthritis  10. Stress urinary incontinence  11. [facility] hospitalization for symptomatic normocytic anemia 1/12/17-1/19/17  12. History of recurrent UTIs.  13. Severe anemia,  February 2017, with apparent recent acceptable CBC - data deficit, 2018 14. Surgical history:                                        A. Appendectomy                                        B. Back surgery                                        C. Elbow surgery                                        D. Knee surgery                                        E. Cataract extraction                                        F. Left Hip replacement 12/6/2016-[facility]    Allergies   Allergen Reactions   • Abilify [Aripiprazole]    • Alendronate    • Celecoxib    • Fosamax Plus D [Alendronate-Cholecalciferol]    • Levaquin [Levofloxacin]    • Macrobid [Nitrofurantoin Monohyd Macro]    • Nitrofurantoin    • Procaine    • Sulfa Antibiotics Nausea And Vomiting and Rash         Current Outpatient Prescriptions:   •  acetaminophen (TYLENOL) 325 MG tablet, Take 650 mg by mouth Every 6 (Six) Hours As Needed (Pain)., Disp: , Rfl:   •  aspirin 325 MG tablet, Take 1 tablet by mouth Daily., Disp: , Rfl:   •  atorvastatin (LIPITOR) 40 MG tablet, Take 1 tablet by mouth Every Night., Disp: , Rfl:   •  carbidopa-levodopa (SINEMET)  MG per tablet, Take 1 tablet by mouth 4 (Four) Times a Day. Take at 9am, noon, 3pm, and 6pm, Disp: , Rfl:   •  carbidopa-levodopa CR (SINEMET CR)  MG per CR tablet, Take 1 tablet by mouth Every Night., Disp: 30 tablet, Rfl: 3  •  carvedilol (COREG) 3.125 MG tablet, Take 1 tablet by mouth Every 12 (Twelve) Hours., Disp: , Rfl:   •  Cholecalciferol 1000 UNITS capsule, Take 1,000 Units by mouth Daily., Disp: , Rfl:   •  docusate sodium (COLACE) 100 MG capsule, Take 100 mg by mouth Daily As Needed., Disp: , Rfl:   •  levothyroxine (SYNTHROID, LEVOTHROID) 75 MCG tablet, Take 75 mcg by mouth Daily., Disp: , Rfl:   •  loratadine (CLARITIN) 10 MG tablet, Take 10 mg by mouth Daily As Needed., Disp: , Rfl:   •  miconazole (MICOTIN) 2 % powder, Apply  topically Every 12 (Twelve) Hours., Disp: , Rfl: 0  •  mirtazapine  "(REMERON) 30 MG tablet, Take 30 mg by mouth Every Night., Disp: , Rfl:   •  Multiple Vitamin (MULTI VITAMIN PO), Take  by mouth Daily., Disp: , Rfl:   •  Multiple Vitamins-Minerals (PRESERVISION AREDS 2 PO), Take 1 tablet by mouth 2 (Two) Times a Day., Disp: , Rfl:   •  Probiotic Product (PROBIOTIC ADVANCED PO), Take  by mouth Daily., Disp: , Rfl:     HISTORY OF PRESENT ILLNESS: Patient returns for scheduled followup after a 6-month hiatus. She states that she has been \"so-so.\"  She notes that they had to be quarantined for a week and a half within the last few weeks due to a stomach virus going around her facility.  She has not had any chest pain, tightness, or pressure.  She gets out walking but uses a rolling walker for that activity; she does \"get to breathing hard\" with that activity and sits down to rest.  She indicates she feels very weak.  Her son, who accompanies her to the office today, says that she just had a CBC drawn in Dr. Cottrell' office, and he says that her hemoglobin was better but still low; data deficit.  She has also had blood work where she lives at [facility].  The patient states that Imelda Brand, a nurse who used to work at [facility], is the  at [facility] now, and she is excellent.  The patient states that her main problem now is that she is just so fatigued that it makes it hard to get around.  Her son also indicates that they checked her iron and her vitamin B12 levels just a few days ago, and \"it all came back normal.\"  Patient otherwise denies chest pain, severe shortness of breath, PND, edema, palpitations, syncope or presyncope at this time on limited activity.        Review of Systems   Constitution: Positive for weakness and malaise/fatigue.   Hematologic/Lymphatic: Bruises/bleeds easily.   Musculoskeletal: Positive for back pain and muscle weakness.   Gastrointestinal: Positive for constipation (a few weeks ago, resolved now).   Neurological: " "Positive for excessive daytime sleepiness and loss of balance.   Psychiatric/Behavioral: The patient is nervous/anxious.       Obtained and otherwise negative except as outlined in problem list and HPI.    Procedures       Objective:       Vitals:    02/23/18 0959 02/23/18 1013   BP: 133/83 131/72   BP Location: Left arm Right arm   Patient Position: Sitting Sitting   Pulse: 72 72   Weight: 51.3 kg (113 lb)    Height: 149.9 cm (59\")      Body mass index is 22.82 kg/(m^2).   Last weight:  110 lbs.    Physical Exam   Constitutional: She is oriented to person, place, and time. She appears well-developed and well-nourished.   Neck: No JVD present. Carotid bruit is not present. No thyromegaly present.   Cardiovascular: S1 normal and S2 normal.  An irregular rhythm present. Exam reveals distant heart sounds. Exam reveals no gallop, no S3 and no friction rub.    Murmur heard.   Medium-pitched early systolic murmur is present with a grade of 2/6  at the lower left sternal border  Pulses:       Carotid pulses are 1+ on the right side, and 1+ on the left side.       Radial pulses are 1+ on the right side, and 1+ on the left side.        Femoral pulses are 1+ on the right side, and 1+ on the left side.       Popliteal pulses are 1+ on the right side, and 1+ on the left side.        Dorsalis pedis pulses are 1+ on the right side, and 1+ on the left side.        Posterior tibial pulses are 1+ on the right side, and 1+ on the left side.   Pulmonary/Chest: Effort normal. She has decreased breath sounds. She has no wheezes. She has no rhonchi. She has no rales.   Abdominal: Soft. She exhibits no mass. There is no hepatosplenomegaly. There is no tenderness. There is no guarding.   Bowel sounds audible x4   Musculoskeletal: Normal range of motion. She exhibits no edema.   Lymphadenopathy:     She has no cervical adenopathy.   Neurological: She is alert and oriented to person, place, and time.   Skin: Skin is warm, dry and intact. No " rash noted.   Vitals reviewed.        Lab Review:   Lab Results   Component Value Date    GLUCOSE 97 07/10/2017    BUN 8 (L) 07/10/2017    CREATININE 0.60 07/10/2017    EGFRIFNONA 95 07/10/2017    BCR 13.3 07/10/2017    CO2 26.0 07/10/2017    CALCIUM 8.6 (L) 07/10/2017    ALBUMIN 2.60 (L) 07/10/2017    LABIL2 1.0 (L) 07/10/2017    AST 18 07/10/2017    ALT 2 (L) 07/10/2017       Lab Results   Component Value Date    WBC 3.07 (L) 07/10/2017    HGB 8.8 (L) 07/10/2017    HCT 27.5 (L) 07/10/2017    MCV 91.7 07/10/2017     07/10/2017       Lab Results   Component Value Date    HGBA1C 4.80 02/20/2017       Lab Results   Component Value Date    TSH 4.016 07/04/2017       Lab Results   Component Value Date    CHOL 161 02/20/2017    CHOL 131 01/13/2017     Lab Results   Component Value Date    TRIG 110 02/20/2017    TRIG 94 01/13/2017     Lab Results   Component Value Date    HDL 48 02/20/2017    HDL 34 (L) 01/13/2017     Lab Results   Component Value Date    LDL 80 02/20/2017    LDL 65 01/13/2017       Lab Results   Component Value Date    .0 (H) 01/12/2017           Assessment:   Overall continued acceptable course with no interim cardiopulmonary complaints with marginal functional status. We will defer additional diagnostic or therapeutic intervention from a cardiac perspective at this time.  I do not feel at this time the patient has uncontrolled CHF.  Will continue current medications at current doses and not up titrate on carvedilol at this time.  We have no laboratory study results to review.       Diagnosis Plan   1. Essential hypertension  Acceptable control   2. CHF with cardiomyopathy  Acceptable examination   3. History of ischemic left MCA stroke 2/20/17  No recurrent neurologic events   4. Symptomatic anemia  No data to review          Plan:         1. Patient to continue current medications and close follow up with the above providers.  2. Tentative cardiology follow up in August or September  2018, or patient may return sooner PRN.       Transcribed by Linda Mehta for Dr. Mak Chou at 10:19 AM on 02/23/2018      I, Mak Chou MD, [facility], personally performed the services described in this documentation as scribed by the above named individual in my presence, and it is both accurate and complete. At 10:52 AM on 02/23/2018

## 2018-04-10 ENCOUNTER — OFFICE VISIT (OUTPATIENT)
Dept: NEUROLOGY | Facility: CLINIC | Age: 83
End: 2018-04-10

## 2018-04-10 VITALS
SYSTOLIC BLOOD PRESSURE: 122 MMHG | BODY MASS INDEX: 22.78 KG/M2 | WEIGHT: 113 LBS | HEIGHT: 59 IN | DIASTOLIC BLOOD PRESSURE: 76 MMHG

## 2018-04-10 DIAGNOSIS — G20 PARKINSON'S DISEASE (HCC): Primary | ICD-10-CM

## 2018-04-10 PROCEDURE — 99214 OFFICE O/P EST MOD 30 MIN: CPT | Performed by: PSYCHIATRY & NEUROLOGY

## 2018-04-10 RX ORDER — CONJUGATED ESTROGENS 0.62 MG/G
CREAM VAGINAL
COMMUNITY
Start: 2018-03-23 | End: 2022-01-01

## 2018-04-10 RX ORDER — CARBIDOPA AND LEVODOPA 50; 200 MG/1; MG/1
1 TABLET, EXTENDED RELEASE ORAL NIGHTLY
Qty: 30 TABLET | Refills: 11 | Status: SHIPPED | OUTPATIENT
Start: 2018-04-10 | End: 2018-09-07

## 2018-04-10 NOTE — PROGRESS NOTES
"Subjective   Florinda Knapp is a 87 y.o. female.     Chief Complaint   Patient presents with   • Parkinson's Disease       History of Present Illness   Patient followed since 10/13 for Parkinson's disease that initially responded well to Sinemet although she had trouble tolerating it at first. Prior to the Parkinson's diagnosis she had had problems with frequent falls since at least 2010.  8/16: some restlessness in her legs at night and was on Sinemet 25/100 tid at 9:30, 1:30 and 5:30pm. Fractured her left hip 2/2 fall, conservative tx rather than surgery.Then worsening cramping, added Sinemet CR at , tremendously helpful. She and her son both feel she is doing extremely well with the Parkinson's. Ongoing left hip pain only when walking is the biggest problem now.  Then had hip surgery, then in ICU, at  and other hospitals. Had colitis and UTI. Now at the [facility]. Has had \"crazy\" dreams, also leg cramping, and has had confusion since the hip surgery 12/16. Was very confused while hospitalized, improved but not quite baseline.  Reported wearing off. Also dev bad and vivid dreams that started before hospitalization, but have worsened.   Takes C/L 25/100 at 9, 2, 5 and CR at bedtime (about 9).      5/17: hospitalized at [facility] in 2/17 for stroke; had confusion, right HP,  aphasia. MRI showed several small areas of acute infarction in the left parietal lobe. Echo showed small PFO. F/b cardiology. Carotids unremarkable. Tx with TPA as well as ASA 325mg and Lipitor 40mg daily. At f/u, right sided weakness significantly improved, confusion and aphasia resolved.      Increased bradykinesia and rigidity, and leg cramps worse. On Sinemet 25/100mg TID. No longer taking the Sinemet CR nightly, for unclear reasons.   Planned increase Sinemet back up to 25/100mg tablets QID as she feels medication is wearing off, and restarted Sinemet CR 50/200mg tablets nightly as needed  8/17: Doing PT for a long while. Feels slower than " usual. Can't tell if medicine is wearing off. Is walking, although hindered by left foot turning in.  Has soft AFO.  Sinemet scheduled for 9, 12, 3 and 6, often gets it late, eg 3pm dose at 5pm. Uses hs dose of sinemet CR only if needed, and rarely feels she needs it.   Today: doing well, no recent hospitalization. No falls, but close occasionally, foot catches on carpet. Takes the regular sinemet 4x/day, often late, up to 2 hours, and takes the CR PRN at night, infrequently, for cramps. Wondering how late she can take that eg. 5am, if first regular dose is at 9am. Not LH/dizzy.   Doing PT twice/week, finds really helpful, getting left knee more mobile. Back in her apartment for over a year.     The following portions of the patient's history were reviewed and updated as appropriate: allergies, current medications, past family history, past medical history, past social history, past surgical history and problem list.    Allergies   Allergen Reactions   • Abilify [Aripiprazole]    • Alendronate    • Celecoxib    • Fosamax Plus D [Alendronate-Cholecalciferol]    • Levaquin [Levofloxacin]    • Macrobid [Nitrofurantoin Monohyd Macro]    • Nitrofurantoin    • Procaine    • Sulfa Antibiotics Nausea And Vomiting and Rash       Current Outpatient Prescriptions on File Prior to Visit   Medication Sig Dispense Refill   • acetaminophen (TYLENOL) 325 MG tablet Take 650 mg by mouth Every 6 (Six) Hours As Needed (Pain).     • aspirin 325 MG tablet Take 1 tablet by mouth Daily.     • atorvastatin (LIPITOR) 40 MG tablet Take 1 tablet by mouth Every Night.     • carvedilol (COREG) 3.125 MG tablet Take 1 tablet by mouth Every 12 (Twelve) Hours. 180 tablet 4   • Cholecalciferol 1000 UNITS capsule Take 1,000 Units by mouth Daily.     • docusate sodium (COLACE) 100 MG capsule Take 100 mg by mouth Daily As Needed.     • levothyroxine (SYNTHROID, LEVOTHROID) 75 MCG tablet Take 75 mcg by mouth Daily.     • loratadine (CLARITIN) 10 MG  tablet Take 10 mg by mouth Daily As Needed.     • miconazole (MICOTIN) 2 % powder Apply  topically Every 12 (Twelve) Hours.  0   • mirtazapine (REMERON) 30 MG tablet Take 30 mg by mouth Every Night.     • Multiple Vitamin (MULTI VITAMIN PO) Take  by mouth Daily.     • Multiple Vitamins-Minerals (PRESERVISION AREDS 2 PO) Take 1 tablet by mouth 2 (Two) Times a Day.     • Probiotic Product (PROBIOTIC ADVANCED PO) Take  by mouth Daily.     • [DISCONTINUED] carbidopa-levodopa (SINEMET)  MG per tablet Take 1 tablet by mouth 4 (Four) Times a Day. Take at 9am, noon, 3pm, and 6pm     • [DISCONTINUED] carbidopa-levodopa CR (SINEMET CR)  MG per CR tablet Take 1 tablet by mouth Every Night. 30 tablet 3     No current facility-administered medications on file prior to visit.        Past Medical History:   Diagnosis Date   • Anemia    • Anxiety    • Asthma    • Cerebral infarction    • Depression    • Failure to thrive in adult    • Hyperlipidemia    • Hyperlipidemia    • Hypertension    • Hypothyroidism    • Osteoarthritis    • Osteoporosis    • Parkinson's disease    • Stress incontinence    • Stroke 02/19/2017    left MCA thromboembolism   • Urinary retention        Past Surgical History:   Procedure Laterality Date   • APPENDECTOMY     • BACK SURGERY     • CATARACT EXTRACTION     • ELBOW PROCEDURE     • KNEE SURGERY     • NV PRIM PRQ TRLUML MCHNL THRMBC N-COR N-ICRA 1ST Left 2/19/2017    Diagnostic Angiogram only, no thrombectomy   • TOTAL HIP ARTHROPLASTY Left 12/2016       Social History     Social History   • Marital status:      Spouse name: N/A   • Number of children: N/A   • Years of education: N/A     Occupational History   • Not on file.     Social History Main Topics   • Smoking status: Never Smoker   • Smokeless tobacco: Never Used   • Alcohol use No   • Drug use: No   • Sexual activity: Defer     Other Topics Concern   • Not on file     Social History Narrative    Lives at [facility]living       Review of Systems   Constitutional: Negative for fever and unexpected weight change.   Respiratory: Negative for cough and shortness of breath.    Cardiovascular: Negative for chest pain.   Musculoskeletal: Positive for gait problem.       Objective   Blood pressure 122/76, height 149.9 cm (59.02\"), weight 51.3 kg (113 lb).    Physical Exam   Constitutional: She is oriented to person, place, and time. She appears well-developed and well-nourished.   HENT:   Head: Normocephalic and atraumatic.   Eyes: EOM are normal. Pupils are equal, round, and reactive to light.   Pulmonary/Chest: Effort normal.   Neurological: She is oriented to person, place, and time. She has a normal Finger-Nose-Finger Test and a normal Heel to Talbot Test.   Skin: Skin is warm and dry.   Psychiatric: Her speech is normal.   Nursing note and vitals reviewed.      Neurologic Exam     Mental Status   Oriented to person, place, and time.   Speech: speech is normal   Level of consciousness: alert  Knowledge: consistent with education.   Able to name object. Normal comprehension.     Cranial Nerves     CN II   Visual fields full to confrontation.     CN III, IV, VI   Pupils are equal, round, and reactive to light.  Extraocular motions are normal.     CN VII   Facial expression full, symmetric.     CN VIII   Hearing: impaired    CN IX, X   CN IX normal.   CN X normal.   Palate: symmetric    CN XI   CN XI normal.     CN XII   CN XII normal.     Motor Exam   Muscle bulk: normal  Right arm tone: increased  Left arm tone: increased  Right arm pronator drift: absent  Left arm pronator drift: absent    Strength   Strength 5/5 except as noted. 5- throughout  Mild rigidity BUE, L>R     Sensory Exam   Light touch normal.     Gait, Coordination, and Reflexes     Gait  Gait: shuffling    Coordination   Finger to nose coordination: normal  Heel to shin coordination: normal    Tremor   Resting tremor: absent  Intention tremor: absent  Action tremor: " absentFT impaired right, OK left       Assessment/Plan     Florinda was seen today for parkinson's disease.    Diagnoses and all orders for this visit:    Parkinson's disease    Other orders  -     carbidopa-levodopa CR (SINEMET CR)  MG per CR tablet; Take 1 tablet by mouth Every Night.  -     carbidopa-levodopa (SINEMET)  MG per tablet; Take 1 tablet by mouth 4 (Four) Times a Day. Take at 9am, noon, 3pm, and 6pm      Discussion/Summary:  Long discussion about gait, medication, SEs. Recommended trial of sinemet CR every night for a couple weeks, see if no cramps, and morning gait better. Wrote out instructions for facility to give her all her sinemet doses in ams for her to take as scheduled if they cannot get the medicine to her as scheduled. Discussed increased risk of falls without meds. Discussed potential for orthostasis. Answered questions re: appropriate shoes.  30 min face to face, 20 min in discussion as above.   Return in about 6 months (around 10/10/2018).

## 2018-09-07 ENCOUNTER — OFFICE VISIT (OUTPATIENT)
Dept: CARDIOLOGY | Facility: CLINIC | Age: 83
End: 2018-09-07

## 2018-09-07 VITALS
WEIGHT: 113 LBS | HEART RATE: 70 BPM | SYSTOLIC BLOOD PRESSURE: 155 MMHG | DIASTOLIC BLOOD PRESSURE: 94 MMHG | BODY MASS INDEX: 22.78 KG/M2 | HEIGHT: 59 IN

## 2018-09-07 DIAGNOSIS — I10 ESSENTIAL HYPERTENSION: ICD-10-CM

## 2018-09-07 DIAGNOSIS — E78.5 HYPERLIPIDEMIA, UNSPECIFIED HYPERLIPIDEMIA TYPE: ICD-10-CM

## 2018-09-07 DIAGNOSIS — I42.9 CHF WITH CARDIOMYOPATHY (HCC): Primary | ICD-10-CM

## 2018-09-07 DIAGNOSIS — I50.9 CHF WITH CARDIOMYOPATHY (HCC): Primary | ICD-10-CM

## 2018-09-07 PROCEDURE — 99214 OFFICE O/P EST MOD 30 MIN: CPT | Performed by: INTERNAL MEDICINE

## 2018-09-07 RX ORDER — ONDANSETRON 4 MG/1
4 TABLET, ORALLY DISINTEGRATING ORAL AS NEEDED
COMMUNITY
End: 2019-03-08

## 2018-09-07 RX ORDER — DIPHENHYDRAMINE HCL 25 MG
25 CAPSULE ORAL AS NEEDED
COMMUNITY
End: 2019-03-08

## 2018-09-07 NOTE — PROGRESS NOTES
Subjective:     Encounter Date:09/07/2018    Patient ID: Florinda Knapp is an 87 y.o.  white female, resident of [facility] in [place], [place].      INTERNIST: Kieran Cottrell MD, FACP  NEUROLOGIST: Elisa Castro MD  UROLOGIST: Dino Flynn MD  ORTHOPEDIC SURGEON: Ray Packer MD  INTERVENTIONAL NEURORADIOLOGIST:  Francisco Javier Gabriel MD    Chief Complaint:   Chief Complaint   Patient presents with   • Hypertension     Problem List:  1. 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 Possible nonischemic cardiomyopathy                                        A. Echocardiogram 1/13/17; LVEF 0.45, LV wall segments contract abnormally, LV diastolic dysfunction grade 1 consistent with impaired relaxation, RV borderline dilated, mild to moderate AR, MR, trivial circumferential pericardial effusion, RVSP less than 35 mmHg, aortic valve exhibits sclerosis                                      B. Echocardiogram 2/20/17; LVEF 0.45, LV wall segments contract abnormally, mild AR, LV wall thickness consistent with mild concentric hypertrophy, small less than 1 cm pericardial effusion adjacent to the left ventricle, saline test results are positive and indicate an atrial level shunt                                      C. Residual CCS class 0 angina pectoris/NYHA class II exertional dyspnea and fatigue  3. Hypertension, essential  4. Hyperlipidemia  5. Hypothyroidism/replacement therapy  6. Anxiety and depression  7. Parkinson's disease  8. Failure to thrive in adult  9. Osteoarthritis  10. Stress urinary incontinence  11. [facility] hospitalization for symptomatic normocytic anemia 1/12/17-1/19/17  12. History of recurrent UTIs.  13. Severe anemia, February 2017, with apparent recent acceptable CBC - data deficit, 2018  14. Surgical history:                                        A. Appendectomy                                        B. Back  surgery                                        C. Elbow surgery                                        D. Knee surgery                                        E. Cataract extraction                                        F. Left Hip replacement 12/6/2016-[facility]    Allergies   Allergen Reactions   • Abilify [Aripiprazole]    • Alendronate    • Celecoxib    • Fosamax Plus D [Alendronate-Cholecalciferol]    • Levaquin [Levofloxacin]    • Macrobid [Nitrofurantoin Monohyd Macro]    • Nitrofurantoin    • Procaine    • Sulfa Antibiotics Nausea And Vomiting and Rash         Current Outpatient Prescriptions:   •  acetaminophen (TYLENOL) 325 MG tablet, Take 650 mg by mouth Every 6 (Six) Hours As Needed (Pain)., Disp: , Rfl:   •  aspirin 325 MG tablet, Take 1 tablet by mouth Daily., Disp: , Rfl:   •  atorvastatin (LIPITOR) 40 MG tablet, Take 1 tablet by mouth Every Night. (Patient taking differently: Take 80 mg by mouth Every Night.), Disp: , Rfl:   •  carbidopa-levodopa (SINEMET)  MG per tablet, Take 1 tablet by mouth 4 (Four) Times a Day. Take at 9am, noon, 3pm, and 6pm, Disp: 120 tablet, Rfl: 11  •  carvedilol (COREG) 3.125 MG tablet, Take 1 tablet by mouth Every 12 (Twelve) Hours., Disp: 180 tablet, Rfl: 4  •  Cholecalciferol 1000 UNITS capsule, Take 1,000 Units by mouth Daily., Disp: , Rfl:   •  diphenhydrAMINE (BENADRYL) 25 mg capsule, Take 25 mg by mouth As Needed for Itching., Disp: , Rfl:   •  docusate sodium (COLACE) 100 MG capsule, Take 100 mg by mouth Daily As Needed., Disp: , Rfl:   •  levothyroxine (SYNTHROID, LEVOTHROID) 75 MCG tablet, Take 75 mcg by mouth Daily., Disp: , Rfl:   •  loratadine (CLARITIN) 10 MG tablet, Take 10 mg by mouth Daily As Needed., Disp: , Rfl:   •  mirtazapine (REMERON) 30 MG tablet, Take 30 mg by mouth Every Night., Disp: , Rfl:   •  Multiple Vitamin (MULTI VITAMIN PO), Take  by mouth Daily., Disp: , Rfl:   •  Multiple Vitamins-Minerals (PRESERVISION AREDS 2 PO), Take 1 tablet by  mouth 2 (Two) Times a Day., Disp: , Rfl:   •  ondansetron ODT (ZOFRAN-ODT) 4 MG disintegrating tablet, Take 4 mg by mouth As Needed for Nausea or Vomiting., Disp: , Rfl:   •  PREMARIN 0.625 MG/GM vaginal cream, , Disp: , Rfl:   •  Probiotic Product (PROBIOTIC ADVANCED PO), Take  by mouth Daily., Disp: , Rfl:     HISTORY OF PRESENT ILLNESS:  Patient is here for a 7 month follow-up. She denies chest pressure, SOB, palpitations, presyncope, syncope, or edema.  She has felt more fatigued lately, and her physician was concerned about anemia.  She had laboratory testing 2 weeks ago, and her atorvastatin was increased to 80 mg nightly.  She had repeat laboratory testing several days ago but is unsure of the results.  She has limited activity but undergoes physical therapy for her left knee.  Her appetite is marginal, but she tries to eat a little something 3 times a day at least.  She stays well hydrated but has frequent urinary tract infections.  She was recently placed on Cipro and Pyridium for dysuria.  She was encouraged to follow with her urologist. She had a mole removed a couple of days ago near her right clavicle, and it is covered with a bandage.  Patient otherwise denies chest pain, shortness of breath, PND, edema, palpitations, syncope or presyncope at this time on limited activity.  She primarily uses a wheelchair for transport currently with minimal use of a rolling walker.  She is accompanied to the office today by her son.      Review of Systems   Constitution: Positive for malaise/fatigue.   Hematologic/Lymphatic: Bruises/bleeds easily.   Skin: Positive for rash (Removal of mole near right clavicle).   Musculoskeletal: Positive for muscle cramps and muscle weakness.   Genitourinary: Positive for dysuria and frequency.   Neurological: Positive for disturbances in coordination.   Psychiatric/Behavioral: The patient is nervous/anxious.    Allergic/Immunologic: Positive for environmental allergies.     "  Obtained and otherwise negative except as outlined in problem list and HPI.    Procedures       Objective:       Vitals:    09/07/18 1123   BP: 155/94   BP Location: Right arm   Patient Position: Sitting   Pulse: 70   Weight: 51.3 kg (113 lb)   Height: 149.9 cm (59\")   Recheck blood pressure right arm sitting was 142/68  Body mass index is 22.82 kg/m².  Last weight February 2018 was 113 pounds    Physical Exam   Constitutional: She is oriented to person, place, and time. She appears well-developed and well-nourished.   Neck: No JVD present. Carotid bruit is not present. No thyromegaly present.   Cardiovascular: S1 normal and S2 normal.  Exam reveals distant heart sounds. Exam reveals no gallop, no S3 and no friction rub.    Murmur heard.   Medium-pitched harsh early systolic murmur is present with a grade of 2/6  at the lower left sternal border  Pulses:       Dorsalis pedis pulses are 1+ on the right side, and 1+ on the left side.        Posterior tibial pulses are 1+ on the right side, and 1+ on the left side.   Pulmonary/Chest: Effort normal and breath sounds normal. She has no wheezes. She has no rhonchi. She has no rales.   Abdominal: Soft. She exhibits no mass. There is no hepatosplenomegaly. There is no tenderness. There is no guarding.   Bowel sounds audible x4   Musculoskeletal: Normal range of motion. She exhibits no edema.   Lymphadenopathy:     She has no cervical adenopathy.   Neurological: She is alert and oriented to person, place, and time.   Skin: Skin is warm, dry and intact. No rash noted.        Bandage from mole removal with no exudate   Vitals reviewed.        Lab Review: No recent laboratory studies to review  Lab Results   Component Value Date    GLUCOSE 97 07/10/2017    BUN 8 (L) 07/10/2017    CREATININE 0.60 07/10/2017    EGFRIFNONA 95 07/10/2017    BCR 13.3 07/10/2017    CO2 26.0 07/10/2017    CALCIUM 8.6 (L) 07/10/2017    ALBUMIN 2.60 (L) 07/10/2017    AST 18 07/10/2017    ALT 2 (L) " 07/10/2017       Lab Results   Component Value Date    WBC 3.07 (L) 07/10/2017    HGB 8.8 (L) 07/10/2017    HCT 27.5 (L) 07/10/2017    MCV 91.7 07/10/2017     07/10/2017       Lab Results   Component Value Date    HGBA1C 4.80 02/20/2017       Lab Results   Component Value Date    TSH 4.016 07/04/2017       Lab Results   Component Value Date    CHOL 161 02/20/2017    CHOL 131 01/13/2017     Lab Results   Component Value Date    TRIG 110 02/20/2017    TRIG 94 01/13/2017     Lab Results   Component Value Date    HDL 48 02/20/2017    HDL 34 (L) 01/13/2017     Lab Results   Component Value Date    LDL 80 02/20/2017    LDL 65 01/13/2017     Lab Results   Component Value Date    .0 (H) 01/12/2017         Assessment:   Overall continued acceptable course with no interim cardiopulmonary complaints with fair functional status. We will defer additional diagnostic or therapeutic intervention from a cardiac perspective at this time.  The patient will call her physician to have them send her recent laboratory testing.  Apparently she recently had an increase in atorvastatin to 80 mg. Encouraged the patient to follow with her urologist for frequent UTIs.      Diagnosis Plan   1. CHF with cardiomyopathy (CMS/HCC)  Stable; No recurrent angina pectoris or CHF on limited activity; continue current treatment except for reducing aspirin dose to 81 mg daily     2. Essential hypertension  Increase Coreg to 6.25mg bid   3. Hyperlipidemia, unspecified hyperlipidemia type  No new labs          Plan:         1. Patient to continue current medications and close follow up with the above providers other than to increase her Coreg to 6.25 mg bid and change her aspirin dose to 81 mg daily.  2. Tentative cardiology follow up in March 2019 or patient may return sooner PRN.      Scribed for Mak Chou MD by Natasha Saldana, APRN. 9/7/2018  11:46 AM    I, Mak Chuo MD, [facility], personally performed the services described in  this documentation as scribed by the above named individual in my presence, and it is both accurate and complete. At 12:51 PM on 09/07/2018

## 2018-09-10 RX ORDER — CARVEDILOL 6.25 MG/1
6.25 TABLET ORAL EVERY 12 HOURS SCHEDULED
Qty: 90 TABLET | Refills: 3 | Status: SHIPPED | OUTPATIENT
Start: 2018-09-10 | End: 2022-01-01

## 2018-10-09 ENCOUNTER — OFFICE VISIT (OUTPATIENT)
Dept: NEUROLOGY | Facility: CLINIC | Age: 83
End: 2018-10-09

## 2018-10-09 ENCOUNTER — TELEPHONE (OUTPATIENT)
Dept: NEUROLOGY | Facility: CLINIC | Age: 83
End: 2018-10-09

## 2018-10-09 VITALS
SYSTOLIC BLOOD PRESSURE: 126 MMHG | DIASTOLIC BLOOD PRESSURE: 78 MMHG | HEIGHT: 59 IN | BODY MASS INDEX: 22.78 KG/M2 | WEIGHT: 113 LBS

## 2018-10-09 DIAGNOSIS — G20 PARKINSON'S DISEASE (HCC): Primary | ICD-10-CM

## 2018-10-09 PROCEDURE — 99214 OFFICE O/P EST MOD 30 MIN: CPT | Performed by: PSYCHIATRY & NEUROLOGY

## 2018-10-09 RX ORDER — GUAIFENESIN 600 MG/1
TABLET, EXTENDED RELEASE ORAL
COMMUNITY
End: 2019-03-08

## 2018-10-09 RX ORDER — MIRTAZAPINE 30 MG/1
TABLET, FILM COATED ORAL
COMMUNITY
End: 2018-10-09 | Stop reason: SDUPTHER

## 2018-10-09 RX ORDER — CARBIDOPA AND LEVODOPA 50; 200 MG/1; MG/1
TABLET, EXTENDED RELEASE ORAL
Qty: 30 TABLET | Refills: 5 | Status: SHIPPED | OUTPATIENT
Start: 2018-10-09 | End: 2019-02-25

## 2018-10-09 RX ORDER — METHYLPHENIDATE HYDROCHLORIDE 5 MG/1
TABLET ORAL
COMMUNITY
End: 2019-03-08

## 2018-10-09 RX ORDER — MELATONIN
1000 DAILY
COMMUNITY

## 2018-10-09 RX ORDER — BISACODYL 10 MG
SUPPOSITORY, RECTAL RECTAL
COMMUNITY

## 2018-10-09 RX ORDER — CARBIDOPA/LEVODOPA 25MG-250MG
TABLET ORAL
COMMUNITY
End: 2018-10-09

## 2018-10-09 NOTE — PROGRESS NOTES
"Subjective   Florinda Knapp is a 87 y.o. female.     Chief Complaint   Patient presents with   • Parkinson's Disease     6mo fup       History of Present Illness     Patient followed since 10/13 for Parkinson's disease that initially responded well to Sinemet although she had trouble tolerating it at first. Prior to the Parkinson's diagnosis she had had problems with frequent falls since at least 2010.  8/16: some restlessness in her legs at night and was on Sinemet 25/100 tid at 9:30, 1:30 and 5:30pm. Fractured her left hip 2/2 fall, conservative tx rather than surgery.Then worsening cramping, added Sinemet CR at , tremendously helpful.   Then had hip surgery, then in ICU, at  and other hospitals. Had colitis and UTI. Reported \"crazy\" dreams, also leg cramping, and confusion since the hip surgery 12/16. Was very confused while hospitalized, improved but not quite baseline.  Reported wearing off. Also bad and vivid dreams that started before hospitalization, but have worsened.   Takes C/L 25/100 at 9, 2, 5 and CR at bedtime (about 9).      5/17: hospitalized at [facility] in 2/17 for stroke; had confusion, right HP,  aphasia. MRI showed several small areas of acute infarction in the left parietal lobe. Echo showed small PFO. F/b cardiology. Carotids unremarkable. Tx with TPA as well as ASA 325mg and Lipitor 40mg daily. At f/u, right sided weakness significantly improved, confusion and aphasia resolved.      Increased bradykinesia and rigidity, and leg cramps worse. On Sinemet 25/100mg TID. No longer taking the Sinemet CR nightly, for unclear reasons.   Planned increase Sinemet back to 25/100mg tablets QID, restarted Sinemet CR 50/200mg tablets nightly as needed  4/18: doing well, no recent hospitalization. No falls, but sometimes foot catches on carpet. Takes the regular sinemet 4x/day, often late, up to 2 hours, and takes the CR PRN at night, infrequently, for cramps. Wondering how late she can take that eg. 5am, if " first regular dose is at 9am. Not LH/dizzy. Recommended taking sinemet CR every night for both cramps and morning sx.  Today:  Lots of trouble getting left foot and leg to move. Doing PT. They bought a velcro boot that dorsiflexes foot, trying to keep calf muscles stretched/extended. Those left leg muscles are also weaker maybe from disuse. Spends 80% of her time in recliner, cannot get out of it herself, very hard to scooch forward as instructed in this chair. Wondering about lift chair. Physical therapist suggested Botox for her left leg tightness. Still often getting sinemet dose an hour or two late, despite specific instructions. Notes lots of turnover at nursing facility.      Allergies   Allergen Reactions   • Abilify [Aripiprazole]    • Alendronate    • Celecoxib    • Fosamax Plus D [Alendronate-Cholecalciferol]    • Levaquin [Levofloxacin]    • Macrobid [Nitrofurantoin Monohyd Macro]    • Nitrofurantoin    • Procaine    • Sulfa Antibiotics Nausea And Vomiting and Rash       Current Outpatient Prescriptions on File Prior to Visit   Medication Sig Dispense Refill   • acetaminophen (TYLENOL) 325 MG tablet Take 650 mg by mouth Every 6 (Six) Hours As Needed (Pain).     • aspirin 325 MG tablet Take 1 tablet by mouth Daily.     • atorvastatin (LIPITOR) 40 MG tablet Take 1 tablet by mouth Every Night. (Patient taking differently: Take 80 mg by mouth Every Night.)     • carvedilol (COREG) 6.25 MG tablet Take 1 tablet by mouth Every 12 (Twelve) Hours. 90 tablet 3   • Cholecalciferol 1000 UNITS capsule Take 1,000 Units by mouth Daily.     • diphenhydrAMINE (BENADRYL) 25 mg capsule Take 25 mg by mouth As Needed for Itching.     • docusate sodium (COLACE) 100 MG capsule Take 100 mg by mouth Daily As Needed.     • levothyroxine (SYNTHROID, LEVOTHROID) 75 MCG tablet Take 75 mcg by mouth Daily.     • loratadine (CLARITIN) 10 MG tablet Take 10 mg by mouth Daily As Needed.     • mirtazapine (REMERON) 30 MG tablet Take 30 mg  by mouth Every Night.     • Multiple Vitamin (MULTI VITAMIN PO) Take  by mouth Daily.     • Multiple Vitamins-Minerals (PRESERVISION AREDS 2 PO) Take 1 tablet by mouth 2 (Two) Times a Day.     • ondansetron ODT (ZOFRAN-ODT) 4 MG disintegrating tablet Take 4 mg by mouth As Needed for Nausea or Vomiting.     • PREMARIN 0.625 MG/GM vaginal cream      • Probiotic Product (PROBIOTIC ADVANCED PO) Take  by mouth Daily.     • [DISCONTINUED] carbidopa-levodopa (SINEMET)  MG per tablet Take 1 tablet by mouth 4 (Four) Times a Day. Take at 9am, noon, 3pm, and 6pm 120 tablet 11     No current facility-administered medications on file prior to visit.        Past Medical History:   Diagnosis Date   • Anemia    • Anxiety    • Asthma    • Cerebral infarction (CMS/HCC)    • Depression    • Failure to thrive in adult    • Hyperlipidemia    • Hyperlipidemia    • Hypertension    • Hypothyroidism    • Osteoarthritis    • Osteoporosis    • Parkinson's disease (CMS/HCC)    • Stress incontinence    • Stroke (CMS/HCC) 02/19/2017    left MCA thromboembolism   • Urinary retention        Past Surgical History:   Procedure Laterality Date   • APPENDECTOMY     • BACK SURGERY     • CATARACT EXTRACTION     • ELBOW PROCEDURE     • KNEE SURGERY     • WV PRIM PRQ TRLUML MCHNL THRMBC N-COR N-ICRA 1ST Left 2/19/2017    Diagnostic Angiogram only, no thrombectomy   • TOTAL HIP ARTHROPLASTY Left 12/2016       Social History     Social History   • Marital status:      Spouse name: N/A   • Number of children: N/A   • Years of education: N/A     Occupational History   • Not on file.     Social History Main Topics   • Smoking status: Never Smoker   • Smokeless tobacco: Never Used   • Alcohol use No   • Drug use: No   • Sexual activity: Defer     Other Topics Concern   • Not on file     Social History Narrative    Lives at [facility] assisted living       Review of Systems   Constitutional: Negative for fever and unexpected weight change.  "  Respiratory: Negative for cough and shortness of breath.    Cardiovascular: Negative for chest pain.       Objective   Blood pressure 126/78, height 149.9 cm (59\"), weight 51.3 kg (113 lb).    Physical Exam   Constitutional: She is oriented to person, place, and time. She appears well-developed and well-nourished.   HENT:   Head: Normocephalic and atraumatic.   Eyes: Pupils are equal, round, and reactive to light. EOM are normal.   Pulmonary/Chest: Effort normal.   Musculoskeletal: Normal range of motion.   Neurological: She is oriented to person, place, and time. She has a normal Finger-Nose-Finger Test and a normal Heel to Shin Test. Gait normal.   Reflex Scores:       Bicep reflexes are 2+ on the right side and 2+ on the left side.       Brachioradialis reflexes are 2+ on the right side and 2+ on the left side.       Patellar reflexes are 1+ on the right side and 1+ on the left side.       Achilles reflexes are 0 on the right side and 0 on the left side.  Skin: Skin is warm and dry.   Psychiatric: She has a normal mood and affect. Her speech is normal and behavior is normal. Judgment and thought content normal.   Nursing note and vitals reviewed.      Neurologic Exam     Mental Status   Oriented to person, place, and time.   Speech: speech is normal   Level of consciousness: alert  Knowledge: consistent with education.   Able to name object. Normal comprehension.   No abnormal hypophonia     Cranial Nerves     CN II   Visual fields full to confrontation.     CN III, IV, VI   Pupils are equal, round, and reactive to light.  Extraocular motions are normal.     CN VII   Right facial weakness: none  Left facial weakness: none    CN IX, X   CN IX normal.   CN X normal.   Palate: symmetric    CN XI   CN XI normal.     CN XII   CN XII normal.   Minimal hypomimia     Motor Exam   Muscle bulk: normal  Muscle tone: pt unable to relax despite repeated encouragement; cannot adequately assess rigidity, but no clonus of U or " LE.  Right arm pronator drift: absent  Left arm pronator drift: absentStrength 4/5, but cannot fully dorsiflex left foot, although can DF to about 80 degrees; when relaxed, left foot is plantar flexed.     Gait, Coordination, and Reflexes     Gait  Gait: normal    Coordination   Finger to nose coordination: normal  Heel to shin coordination: normal    Tremor   Resting tremor: absent  Intention tremor: absent  Action tremor: absent    Reflexes   Right brachioradialis: 2+  Left brachioradialis: 2+  Right biceps: 2+  Left biceps: 2+  Right patellar: 1+  Left patellar: 1+  Right achilles: 0  Left achilles: 0  Right plantar: normal  Left plantar: normal  Right ankle clonus: absent  Left ankle clonus: absentNegative babinski, no clonus at knee or ankle in either LE, and no asymmetry of reflexes       Assessment/Plan     Florinda was seen today for parkinson's disease.    Diagnoses and all orders for this visit:    Parkinson's disease (CMS/HCC)    Other orders  -     carbidopa-levodopa (SINEMET)  MG per tablet; Take 1 tablet by mouth 4 (Four) Times a Day. Increase as instructed to 1 1/2 tabs at 9am, noon, 3pm, and 6pm  -     carbidopa-levodopa CR (SINEMET CR)  MG per CR tablet; Take 1 tablet by mouth every night starting on 10/23/18 (continue prn use until then)      Discussion/Summary:  .I spent  40  minutes face to face with the patient, with 25 minutes spent  counselling:    Handwritten script given for lift chair, discussed maximizing exercise/physical activity.  Despite plantar flexion at rest of LLE, I do not see signs of UMN ds, with no spasticity/increased reflex/babinski. Increase in tone appears to be due to PD. Will try gradual increase of sinemet 25/100 to 1 1/2 tabs qid (9, noon, 3, and 6) over coming week, and in 2 weeks, add back sinemet CR 50/200 at bedtime every night. Discussed potential SEs, went over instructions repeatedly until understood by pt and son, wrote out instructions, reviewed  rationale for this tx, and how stroke differs from PD.     Return in about 3 months (around 1/9/2019).

## 2018-10-09 NOTE — TELEPHONE ENCOUNTER
Phone call to Tammy at [facility] to help coordinate getting lift chair for Ms. Roque. Dr. Castro wrote rx per her physical therapist's recommendation. I emailed Tammy as well. And told son that I was contacting Tammy to help facilitate.

## 2018-10-29 ENCOUNTER — TELEPHONE (OUTPATIENT)
Dept: NEUROLOGY | Facility: CLINIC | Age: 83
End: 2018-10-29

## 2018-10-29 DIAGNOSIS — G93.40 ENCEPHALOPATHY: Primary | ICD-10-CM

## 2019-02-04 ENCOUNTER — OFFICE VISIT (OUTPATIENT)
Dept: NEUROLOGY | Facility: CLINIC | Age: 84
End: 2019-02-04

## 2019-02-04 VITALS
HEIGHT: 59 IN | BODY MASS INDEX: 22.78 KG/M2 | WEIGHT: 113 LBS | OXYGEN SATURATION: 93 % | HEART RATE: 63 BPM | DIASTOLIC BLOOD PRESSURE: 76 MMHG | SYSTOLIC BLOOD PRESSURE: 124 MMHG

## 2019-02-04 DIAGNOSIS — R29.898 LEFT LEG WEAKNESS: Primary | ICD-10-CM

## 2019-02-04 DIAGNOSIS — F32.A DEPRESSION, UNSPECIFIED DEPRESSION TYPE: ICD-10-CM

## 2019-02-04 DIAGNOSIS — G20 PARKINSON'S DISEASE (HCC): ICD-10-CM

## 2019-02-04 PROCEDURE — 99214 OFFICE O/P EST MOD 30 MIN: CPT | Performed by: PSYCHIATRY & NEUROLOGY

## 2019-02-04 RX ORDER — IPRATROPIUM BROMIDE AND ALBUTEROL SULFATE 2.5; .5 MG/3ML; MG/3ML
3 SOLUTION RESPIRATORY (INHALATION) EVERY 4 HOURS PRN
COMMUNITY
End: 2019-03-08

## 2019-02-04 RX ORDER — POLYETHYLENE GLYCOL 3350 17 G/17G
17 POWDER, FOR SOLUTION ORAL
COMMUNITY

## 2019-02-04 RX ORDER — DEXTROMETHORPHAN POLISTIREX 30 MG/5ML
SUSPENSION ORAL
COMMUNITY
End: 2022-01-01

## 2019-02-04 RX ORDER — MAGNESIUM HYDROXIDE/ALUMINUM HYDROXICE/SIMETHICONE 120; 1200; 1200 MG/30ML; MG/30ML; MG/30ML
SUSPENSION ORAL EVERY 6 HOURS PRN
COMMUNITY
End: 2019-03-08

## 2019-02-04 RX ORDER — SENNOSIDES 8.6 MG
1 TABLET ORAL AS NEEDED
COMMUNITY

## 2019-02-04 NOTE — PROGRESS NOTES
"Subjective:    CC: Florinda Knapp is seen today  for Parkinson's Disease       HPI:  Current visit- this is a patient previously seen by Dr. Castro for Parkinson's disease.  At her last visit Dr. Castro had increase the dose of her Sinemet 25/100 mg  To 1.5 tablets 4 times a day at 9 AM ,noon, 3 PM and 6 PM.  Patient reports to tolerating the dose well.  She denies having any severe tremors but continues to have difficulty moving her left leg.  She does have a history of left hip fracture and left hip replacement a few years back.  She also has cramps in her legs more so the left one.  She currently lives at an assisted living facility where she was getting PT however her son helps her her do exercises now a few times each week.  Patient is unable to walk without her walker.    Of note-I reviewed Dr. Dr. Castro's note in detail which is as follows-    Patient followed since 10/13 for Parkinson's disease that initially responded well to Sinemet although she had trouble tolerating it at first. Prior to the Parkinson's diagnosis she had had problems with frequent falls since at least 2010.  8/16: some restlessness in her legs at night and was on Sinemet 25/100 tid at 9:30, 1:30 and 5:30pm. Fractured her left hip 2/2 fall, conservative tx rather than surgery.Then worsening cramping, added Sinemet CR at , tremendously helpful.   Then had hip surgery, then in ICU, at  and other hospitals. Had colitis and UTI. Reported \"crazy\" dreams, also leg cramping, and confusion since the hip surgery 12/16. Was very confused while hospitalized, improved but not quite baseline.  Reported wearing off. Also bad and vivid dreams that started before hospitalization, but have worsened.   Takes C/L 25/100 at 9, 2, 5 and CR at bedtime (about 9).      5/17: hospitalized at [facility] in 2/17 for stroke; had confusion, right HP,  aphasia. MRI showed several small areas of acute infarction in the left parietal lobe. Echo showed small PFO. " F/b cardiology. Carotids unremarkable. Tx with TPA as well as ASA 325mg and Lipitor 40mg daily. At f/u, right sided weakness significantly improved, confusion and aphasia resolved.      Increased bradykinesia and rigidity, and leg cramps worse. On Sinemet 25/100mg TID. No longer taking the Sinemet CR nightly, for unclear reasons.   Planned increase Sinemet back to 25/100mg tablets QID, restarted Sinemet CR 50/200mg tablets nightly as needed  4/18: doing well, no recent hospitalization. No falls, but sometimes foot catches on carpet. Takes the regular sinemet 4x/day, often late, up to 2 hours, and takes the CR PRN at night, infrequently, for cramps. Wondering how late she can take that eg. 5am, if first regular dose is at 9am. Not LH/dizzy. Recommended taking sinemet CR every night for both cramps and morning sx.  Today:  Lots of trouble getting left foot and leg to move. Doing PT. They bought a velcro boot that dorsiflexes foot, trying to keep calf muscles stretched/extended. Those left leg muscles are also weaker maybe from disuse. Spends 80% of her time in recliner, cannot get out of it herself, very hard to scooch forward as instructed in this chair. Wondering about lift chair. Physical therapist suggested Botox for her left leg tightness. Still often getting sinemet dose an hour or two late, despite specific instructions. Notes lots of turnover at nursing facility.     The following portions of the patient's history were reviewed today and updated as of 02/04/2019  : allergies, current medications, past family history, past medical history, past social history, past surgical history and problem list  These document will be scanned to patient's chart.      Current Outpatient Medications:   •  acetaminophen (TYLENOL) 325 MG tablet, Take 650 mg by mouth Every 6 (Six) Hours As Needed (Pain)., Disp: , Rfl:   •  aluminum-magnesium hydroxide-simethicone (MAALOX/MYLANTA) 200-200-20 MG/5ML suspension, Take  by mouth  Every 6 (Six) Hours As Needed for Indigestion or Heartburn., Disp: , Rfl:   •  aspirin 325 MG tablet, Take 1 tablet by mouth Daily., Disp: , Rfl:   •  atorvastatin (LIPITOR) 40 MG tablet, Take 1 tablet by mouth Every Night. (Patient taking differently: Take 80 mg by mouth Every Night.), Disp: , Rfl:   •  bisacodyl (DULCOLAX) 10 MG suppository, bisacodyl 10 mg rectal suppository, Disp: , Rfl:   •  carbidopa-levodopa (SINEMET)  MG per tablet, Take 1 tablet by mouth 4 (Four) Times a Day. Increase as instructed to 1 1/2 tabs at 9am, noon, 3pm, and 6pm, Disp: 180 tablet, Rfl: 11  •  carvedilol (COREG) 6.25 MG tablet, Take 1 tablet by mouth Every 12 (Twelve) Hours., Disp: 90 tablet, Rfl: 3  •  Cholecalciferol (D3-1000) 1000 units capsule, cholecalciferol (vit D3)(bulk), Disp: , Rfl:   •  Cholecalciferol 1000 UNITS capsule, Take 1,000 Units by mouth Daily., Disp: , Rfl:   •  dextromethorphan polistirex ER (DELSYM) 30 MG/5ML Suspension Extended Release oral suspension, Take  by mouth., Disp: , Rfl:   •  diphenhydrAMINE (BENADRYL) 25 mg capsule, Take 25 mg by mouth As Needed for Itching., Disp: , Rfl:   •  docusate sodium (COLACE) 100 MG capsule, Take 100 mg by mouth Daily As Needed., Disp: , Rfl:   •  guaiFENesin (MUCINEX) 600 MG 12 hr tablet, Mucinex 600 mg tablet, extended release  As needed, Disp: , Rfl:   •  ipratropium-albuterol (DUO-NEB) 0.5-2.5 mg/3 ml nebulizer, Take 3 mL by nebulization Every 4 (Four) Hours As Needed for Wheezing., Disp: , Rfl:   •  levothyroxine (SYNTHROID, LEVOTHROID) 75 MCG tablet, Take 75 mcg by mouth Daily., Disp: , Rfl:   •  loratadine (CLARITIN) 10 MG tablet, Take 10 mg by mouth Daily As Needed., Disp: , Rfl:   •  mirtazapine (REMERON) 30 MG tablet, Take 30 mg by mouth Every Night., Disp: , Rfl:   •  Multiple Vitamin (MULTI VITAMIN PO), Take  by mouth Daily., Disp: , Rfl:   •  Multiple Vitamins-Minerals (PRESERVISION AREDS 2 PO), Take 1 tablet by mouth 2 (Two) Times a Day., Disp: ,  Rfl:   •  ondansetron ODT (ZOFRAN-ODT) 4 MG disintegrating tablet, Take 4 mg by mouth As Needed for Nausea or Vomiting., Disp: , Rfl:   •  polyethylene glycol (MIRALAX) packet, Take 17 g by mouth Daily., Disp: , Rfl:   •  PREMARIN 0.625 MG/GM vaginal cream, , Disp: , Rfl:   •  Probiotic Product (PROBIOTIC ADVANCED PO), Take  by mouth Daily., Disp: , Rfl:   •  senna (SENOKOT) 8.6 MG tablet tablet, Take  by mouth Every Night., Disp: , Rfl:   •  carbidopa-levodopa CR (SINEMET CR)  MG per CR tablet, Take 1 tablet by mouth every night starting on 10/23/18 (continue prn use until then), Disp: 30 tablet, Rfl: 5  •  methylphenidate (RITALIN) 5 MG tablet, Ritalin 5 mg tablet  Two times a day, Disp: , Rfl:    Past Medical History:   Diagnosis Date   • Anemia    • Anxiety    • Asthma    • Cerebral infarction (CMS/HCC)    • Depression    • Failure to thrive in adult    • Hyperlipidemia    • Hyperlipidemia    • Hypertension    • Hypothyroidism    • Osteoarthritis    • Osteoporosis    • Parkinson's disease (CMS/HCC)    • Stress incontinence    • Stroke (CMS/HCC) 02/19/2017    left MCA thromboembolism   • Urinary retention       Past Surgical History:   Procedure Laterality Date   • APPENDECTOMY     • BACK SURGERY     • CATARACT EXTRACTION     • ELBOW PROCEDURE     • KNEE SURGERY     • OK PRIM PRQ TRLUML MCHNL THRMBC N-COR N-ICRA 1ST Left 2/19/2017    Diagnostic Angiogram only, no thrombectomy   • TOTAL HIP ARTHROPLASTY Left 12/2016      Family History   Problem Relation Age of Onset   • Alcohol abuse Other    • Hypertension Other    • Stroke Other    • Heart disease Son       Social History     Socioeconomic History   • Marital status:      Spouse name: Not on file   • Number of children: Not on file   • Years of education: Not on file   • Highest education level: Not on file   Social Needs   • Financial resource strain: Not on file   • Food insecurity - worry: Not on file   • Food insecurity - inability: Not on file  "  • Transportation needs - medical: Not on file   • Transportation needs - non-medical: Not on file   Occupational History   • Not on file   Tobacco Use   • Smoking status: Never Smoker   • Smokeless tobacco: Never Used   Substance and Sexual Activity   • Alcohol use: No   • Drug use: No   • Sexual activity: Defer   Other Topics Concern   • Not on file   Social History Narrative    Lives at [facility] assisted living     Review of Systems   Constitutional: Positive for fatigue.   HENT: Positive for postnasal drip and rhinorrhea.    Respiratory: Positive for cough.    Gastrointestinal: Positive for constipation.   Endocrine: Positive for cold intolerance.   Genitourinary: Positive for difficulty urinating and dysuria.   Neurological: Positive for confusion.   Hematological: Bruises/bleeds easily.   Psychiatric/Behavioral: Positive for agitation, decreased concentration, sleep disturbance and depressed mood. The patient is nervous/anxious.    All other systems reviewed and are negative.      Objective:    /76 (BP Location: Right arm, Patient Position: Sitting, Cuff Size: Adult)   Pulse 63   Ht 149.9 cm (59\")   Wt 51.3 kg (113 lb)   SpO2 93%   BMI 22.82 kg/m²     Neurology Exam:    General apperance: frail    Mental status: Alert, awake and oriented to time place and person.    Recent and Remote memory: Intact.    Attention span and Concentration: Normal.     Language and Speech: Intact- No dysarthria.    Fluency, Naming , Repitition and Comprehension:  Intact    Cranial Nerves:   CN II: Visual fields are full. Intact. Fundi - Normal, No papillederma, Pupils - GONZALEZ  CN III, IV and VI: Extraocular movements are intact. Normal saccades.   CN V: Facial sensation is intact.   CN VII: Muscles of facial expression reveal no asymmetry. Intact.   CN VIII: Hearing is intact. Whispered voice intact.   CN IX and X: Palate elevates symmetrically. Intact  CN XI: Shoulder shrug is intact.   CN XII: Tongue is midline " without evidence of atrophy or fasciculation.     Ophthalmoscopic exam of optic disc-normal    Motor:-Mild left upper extremity postural tremors  Right UE muscle strength 5/5. Normal tone.     Left UE muscle strength 5/5. Normal tone.      Right LE muscle strength5/5. Normal tone.     Left LE muscle strength 4/5.  Her left foot is inverted, no foot drop seen ,Normal tone.      Sensory: Normal light touch, vibration and pinprick sensation bilaterally.    DTRs: 2+ bilaterally in upper and lower extremities.    Babinski: Negative bilaterally.    Co-ordination: Normal finger-to-nose, heel to shin B/L.    Rhomberg: Negative.    Gait: Normal.    Cardiovascular: Regular rate and rhythm without murmur, gallop or rub.    Assessment and Plan:  1. Parkinson's disease (CMS/[facility])  Continue Sinemet 25/100 mg, 1.5 tablets 4 times a day    2. Left leg weakness  I will give her a repeat physical therapy referral as she didn't benefit from it in the past  I have also asked her to take an over-the-counter magnesium supplement for her leg cramps  - Ambulatory Referral to Physical Therapy    3. Depression, unspecified depression type  She is currently on Remeron 30 mg at night but that makes her very lethargic during the daytime.  Hence I will reduce the dose to 15 mg daily at bedtime       Return in about 3 months (around 5/4/2019).     I spent over 30  minutes with the patient face to face out of which over 50% (15  minutes) was spent in management including reviewing her chart, instructions and education regarding her medications.     Rupali Echevarria MD

## 2019-02-07 ENCOUNTER — TELEPHONE (OUTPATIENT)
Dept: NEUROLOGY | Facility: CLINIC | Age: 84
End: 2019-02-07

## 2019-02-07 NOTE — TELEPHONE ENCOUNTER
----- Message from Meghan Leija Rep sent at 2/7/2019 11:53 AM EST -----  Contact: YOGESH RUTHERFORD (Son)  Swathi    PT's son, Yogesh, called/returning Veronica's call, went to relay message but didn't see any notes in PT's chart. Yogesh says Dr. Echevarria had requested that PT start working on some physical exercises. Yogesh says that he incorrectly informed Dr. Echevarria on the status of PT's physical therapist, he was under the impression that she had previously worked with someone from [facility]. However, in the past, PT has worked with a man named Umang who operates through a physical therapy service called [facility]      Umang informed Yogesh thatif he could get the medicare issues worked out and a work order from Dr. Echevarria he would be able to work with PT again. Umang also informed PT and son that it isn't so much her PD issues, as it is the issues with her hip and leg.     Please call Yogesh back:   [phone]

## 2019-02-07 NOTE — TELEPHONE ENCOUNTER
Returned patient son call and he stated that they would like to work with Umang again and provided number [phone] and I advised that I would fax order over to Umang and they should be in touch with them soon to set up PT. Patient son Yogesh stated that he understood

## 2019-02-22 ENCOUNTER — TELEPHONE (OUTPATIENT)
Dept: NEUROLOGY | Facility: CLINIC | Age: 84
End: 2019-02-22

## 2019-02-22 RX ORDER — CYCLOBENZAPRINE HCL 5 MG
5 TABLET ORAL
Qty: 21 TABLET | Refills: 0 | Status: SHIPPED | OUTPATIENT
Start: 2019-02-22 | End: 2019-03-15

## 2019-02-22 NOTE — TELEPHONE ENCOUNTER
Tell her that I have not seen any medications that Dr. Castro gave her for cramping.  However I can give her a low dose of Flexeril 5 mg at night which she can take for 2-3 weeks as needed for muscle spasms

## 2019-02-22 NOTE — TELEPHONE ENCOUNTER
----- Message from Meghan James sent at 2/22/2019 10:02 AM EST -----  Contact: SON  KAITLYNN/DEV:    PT IS HAVING A LOT OF PAIN AND CRAMPS IN LEFT FOOT AND LEG. SON SAYS HE REMEMBERS DR MÉNDEZ HAVING HER ON SOMETHING SHE CAN TAKE AT NIGHT TO HELP REDUCE THE CRAMPING, HE SAYS HE THINKS IT DISSOLVED IN THE MOUTH AND REMEMBERS IT WORKING WELL. WONDERING IF WE CAN SEND SOMETHING LIKE THAT OUT FOR THEM AGAIN.       [phone] - CALLBACK

## 2019-02-25 ENCOUNTER — TELEPHONE (OUTPATIENT)
Dept: NEUROLOGY | Facility: CLINIC | Age: 84
End: 2019-02-25

## 2019-02-25 RX ORDER — CARBIDOPA AND LEVODOPA 25; 100 MG/1; MG/1
1 TABLET, EXTENDED RELEASE ORAL NIGHTLY
Qty: 30 TABLET | Refills: 1 | Status: SHIPPED | OUTPATIENT
Start: 2019-02-25 | End: 2019-03-08

## 2019-02-25 NOTE — TELEPHONE ENCOUNTER
Patient son Yogesh called and advised that the Flexeril was going to be to strong for patient to take and he advised that in Oct patient was given Sinemet CR 50/200 prn and they wanted to see if this can be give again.

## 2019-02-25 NOTE — TELEPHONE ENCOUNTER
Advised son of medicine Sinemet 25/100 being called in and advised him that patient is to take PRN. He stated that he understood and had no questions at this time

## 2019-03-08 ENCOUNTER — LAB (OUTPATIENT)
Dept: LAB | Facility: HOSPITAL | Age: 84
End: 2019-03-08

## 2019-03-08 ENCOUNTER — OFFICE VISIT (OUTPATIENT)
Dept: CARDIOLOGY | Facility: CLINIC | Age: 84
End: 2019-03-08

## 2019-03-08 VITALS
SYSTOLIC BLOOD PRESSURE: 82 MMHG | DIASTOLIC BLOOD PRESSURE: 54 MMHG | HEART RATE: 64 BPM | WEIGHT: 112 LBS | OXYGEN SATURATION: 95 % | HEIGHT: 59 IN | BODY MASS INDEX: 22.58 KG/M2

## 2019-03-08 DIAGNOSIS — R53.1 GENERALIZED WEAKNESS: ICD-10-CM

## 2019-03-08 DIAGNOSIS — I50.9 CHF WITH CARDIOMYOPATHY (HCC): ICD-10-CM

## 2019-03-08 DIAGNOSIS — I10 ESSENTIAL HYPERTENSION: ICD-10-CM

## 2019-03-08 DIAGNOSIS — I42.9 CHF WITH CARDIOMYOPATHY (HCC): ICD-10-CM

## 2019-03-08 DIAGNOSIS — I10 ESSENTIAL HYPERTENSION: Primary | ICD-10-CM

## 2019-03-08 LAB
ANION GAP SERPL CALCULATED.3IONS-SCNC: 4 MMOL/L (ref 3–11)
BASOPHILS # BLD AUTO: 0.03 10*3/MM3 (ref 0–0.2)
BASOPHILS NFR BLD AUTO: 0.7 % (ref 0–1)
BUN BLD-MCNC: 24 MG/DL (ref 9–23)
BUN/CREAT SERPL: 28.9 (ref 7–25)
CALCIUM SPEC-SCNC: 9.3 MG/DL (ref 8.7–10.4)
CHLORIDE SERPL-SCNC: 103 MMOL/L (ref 99–109)
CO2 SERPL-SCNC: 28 MMOL/L (ref 20–31)
CREAT BLD-MCNC: 0.83 MG/DL (ref 0.6–1.3)
DEPRECATED RDW RBC AUTO: 50.6 FL (ref 37–54)
EOSINOPHIL # BLD AUTO: 0.18 10*3/MM3 (ref 0–0.3)
EOSINOPHIL NFR BLD AUTO: 4.3 % (ref 0–3)
ERYTHROCYTE [DISTWIDTH] IN BLOOD BY AUTOMATED COUNT: 13.7 % (ref 11.3–14.5)
GFR SERPL CREATININE-BSD FRML MDRD: 65 ML/MIN/1.73
GLUCOSE BLD-MCNC: 132 MG/DL (ref 70–100)
HCT VFR BLD AUTO: 33.9 % (ref 34.5–44)
HGB BLD-MCNC: 11 G/DL (ref 11.5–15.5)
IMM GRANULOCYTES # BLD AUTO: 0.01 10*3/MM3 (ref 0–0.05)
IMM GRANULOCYTES NFR BLD AUTO: 0.2 % (ref 0–0.6)
LYMPHOCYTES # BLD AUTO: 1.49 10*3/MM3 (ref 0.6–4.8)
LYMPHOCYTES NFR BLD AUTO: 35.8 % (ref 24–44)
MCH RBC QN AUTO: 33 PG (ref 27–31)
MCHC RBC AUTO-ENTMCNC: 32.4 G/DL (ref 32–36)
MCV RBC AUTO: 101.8 FL (ref 80–99)
MONOCYTES # BLD AUTO: 0.3 10*3/MM3 (ref 0–1)
MONOCYTES NFR BLD AUTO: 7.2 % (ref 0–12)
NEUTROPHILS # BLD AUTO: 2.16 10*3/MM3 (ref 1.5–8.3)
NEUTROPHILS NFR BLD AUTO: 52 % (ref 41–71)
PLATELET # BLD AUTO: 122 10*3/MM3 (ref 150–450)
PMV BLD AUTO: 10.4 FL (ref 6–12)
POTASSIUM BLD-SCNC: 4.3 MMOL/L (ref 3.5–5.5)
RBC # BLD AUTO: 3.33 10*6/MM3 (ref 3.89–5.14)
SODIUM BLD-SCNC: 135 MMOL/L (ref 132–146)
WBC NRBC COR # BLD: 4.16 10*3/MM3 (ref 3.5–10.8)

## 2019-03-08 PROCEDURE — 36415 COLL VENOUS BLD VENIPUNCTURE: CPT

## 2019-03-08 PROCEDURE — 80048 BASIC METABOLIC PNL TOTAL CA: CPT

## 2019-03-08 PROCEDURE — 99214 OFFICE O/P EST MOD 30 MIN: CPT | Performed by: INTERNAL MEDICINE

## 2019-03-08 PROCEDURE — 85025 COMPLETE CBC W/AUTO DIFF WBC: CPT

## 2019-03-08 RX ORDER — MIDODRINE HYDROCHLORIDE 2.5 MG/1
2.5 TABLET ORAL 3 TIMES DAILY
Qty: 90 TABLET | Refills: 11 | Status: SHIPPED | OUTPATIENT
Start: 2019-03-08 | End: 2019-06-20

## 2019-03-08 RX ORDER — FLUDROCORTISONE ACETATE 0.1 MG/1
0.1 TABLET ORAL DAILY
Qty: 30 TABLET | Refills: 11 | Status: SHIPPED | OUTPATIENT
Start: 2019-03-08 | End: 2019-07-11 | Stop reason: ALTCHOICE

## 2019-03-08 NOTE — PROGRESS NOTES
Subjective:     Encounter Date:03/08/2019    Patient ID: Florinda Knapp is an 88 y.o.  white female, resident of [facility] in [place], [place].      INTERNIST: Kieran Cottrell MD, FACP  NEUROLOGIST: Elisa Castro MD  UROLOGIST: Dino Flynn MD  ORTHOPEDIC SURGEON: Ray Packer MD  INTERVENTIONAL NEURORADIOLOGIST:  Francisco Javier Gabriel MD    Chief Complaint:   Chief Complaint   Patient presents with   • Atrial Fibrillation   • Fatigue     Problem List:  1. CVA, 02/19/2017; left MCA thrombus with no evidence of atrial fibrillation on tele or ECG  2. Possible nonischemic cardiomyopathy:  a. Echocardiogram 1/13/17; LVEF 0.45, LV wall segments contract abnormally, LV diastolic dysfunction grade 1 consistent with impaired relaxation, RV borderline dilated, mild to moderate AR, MR, trivial circumferential pericardial effusion, RVSP less than 35 mmHg, aortic valve exhibits sclerosis  b. Echocardiogram 2/20/17; LVEF 0.45, LV wall segments contract abnormally, mild AR, LV wall thickness consistent with mild concentric hypertrophy, small less than 1 cm pericardial effusion adjacent to the left ventricle, saline test results are positive and indicate an atrial level shunt  c. Residual CCS class 0 angina pectoris/NYHA class II exertional dyspnea and fatigue  3. Hypertension, essential  4. Hyperlipidemia  5. Hypothyroidism/replacement therapy  6. Anxiety and depression  7. Parkinson's disease  8. Failure to thrive in adult  9. Osteoarthritis  10. Stress urinary incontinence  11. [facility] hospitalization for symptomatic normocytic anemia 1/12/17-1/19/17  12. History of recurrent UTIs.  13. Severe anemia, February 2017, with apparent recent acceptable CBC - data deficit, 2018  14. Surgical history:  a. Appendectomy  b. Back surgery  c. Elbow surgery  d. Knee surgery  e. Cataract extraction  f. Left Hip replacement 12/6/2016-[facility]           Allergies   Allergen Reactions   • Abilify  [Aripiprazole]    • Alendronate    • Celecoxib    • Fosamax Plus D [Alendronate-Cholecalciferol]    • Levaquin [Levofloxacin]    • Macrobid [Nitrofurantoin Monohyd Macro]    • Nitrofurantoin    • Procaine    • Sulfa Antibiotics Nausea And Vomiting and Rash         Current Outpatient Medications:   •  acetaminophen (TYLENOL) 325 MG tablet, Take 650 mg by mouth Every 6 (Six) Hours As Needed (Pain)., Disp: , Rfl:   •  aspirin 325 MG tablet, Take 1 tablet by mouth Daily., Disp: , Rfl:   •  atorvastatin (LIPITOR) 40 MG tablet, Take 1 tablet by mouth Every Night. (Patient taking differently: Take 80 mg by mouth Every Night.), Disp: , Rfl:   •  bisacodyl (DULCOLAX) 10 MG suppository, bisacodyl 10 mg rectal suppository as needed, Disp: , Rfl:   •  carbidopa-levodopa (SINEMET)  MG per tablet, Take 1 tablet by mouth 4 (Four) Times a Day. Increase as instructed to 1 1/2 tabs at 9am, noon, 3pm, and 6pm, Disp: 180 tablet, Rfl: 11  •  carvedilol (COREG) 6.25 MG tablet, Take 1 tablet by mouth Every 12 (Twelve) Hours., Disp: 90 tablet, Rfl: 3  •  Cholecalciferol (D3-1000) 1000 units capsule, cholecalciferol (vit D3)(bulk), Disp: , Rfl:   •  dextromethorphan polistirex ER (DELSYM) 30 MG/5ML Suspension Extended Release oral suspension, Take  by mouth., Disp: , Rfl:   •  docusate sodium (COLACE) 100 MG capsule, Take 100 mg by mouth Daily As Needed., Disp: , Rfl:   •  levothyroxine (SYNTHROID, LEVOTHROID) 75 MCG tablet, Take 75 mcg by mouth Daily., Disp: , Rfl:   •  loratadine (CLARITIN) 10 MG tablet, Take 10 mg by mouth Daily As Needed., Disp: , Rfl:   •  Multiple Vitamin (MULTI VITAMIN PO), Take  by mouth Daily. Centrum (brand new ), Disp: , Rfl:   •  Multiple Vitamins-Minerals (PRESERVISION AREDS 2 PO), Take 1 tablet by mouth 2 (Two) Times a Day., Disp: , Rfl:   •  polyethylene glycol (MIRALAX) packet, Take 17 g by mouth As Needed., Disp: , Rfl:   •  PREMARIN 0.625 MG/GM vaginal cream, Twice a week, Disp: , Rfl:   •  Probiotic  "Product (PROBIOTIC ADVANCED PO), Take  by mouth Daily., Disp: , Rfl:   •  senna (SENOKOT) 8.6 MG tablet tablet, Take  by mouth As Needed., Disp: , Rfl:   •  cyclobenzaprine (FLEXERIL) 5 MG tablet, Take 1 tablet by mouth every night at bedtime for 21 days., Disp: 21 tablet, Rfl: 0    HISTORY OF PRESENT ILLNESS: Patient returns for scheduled 6-month followup.  She denies any chest pain, shortness of breath, edema, palpitations, presyncope, fevers, chills, abdominal discomfort, melena, or loss of appetite.  She has felt fatigued for the past 2 weeks.  Her blood pressures recently at her assisted living facility have been around 120 systolic, but the patient cannot remember the last time she had her blood pressure taken.  The patient has had some dysuria and is on Keflex currently.  They cultured her urine, and it was found to have staph; data deficit.  She has other pending urine cultures out right now and is unsure of the results.      Review of Systems   Constitution: Positive for weakness and malaise/fatigue.   Hematologic/Lymphatic: Bruises/bleeds easily.   Gastrointestinal: Positive for constipation.   Genitourinary: Positive for dysuria.   Neurological: Positive for excessive daytime sleepiness.   Psychiatric/Behavioral: The patient is nervous/anxious.       Obtained and otherwise negative except as outlined in problem list and HPI.    Procedures       Objective:       Vitals:    03/08/19 1336   BP: (!) 82/54   BP Location: Right arm   Patient Position: Sitting   Pulse: 64   SpO2: 95%   Weight: 50.8 kg (112 lb)   Height: 149.9 cm (59\")   Recheck blood pressure right arm sitting was 80/50  Body mass index is 22.62 kg/m².   Last weight:  113 lbs.    Physical Exam   Constitutional: She is oriented to person, place, and time. She appears well-developed and well-nourished.   Neck: No JVD present. Carotid bruit is not present. No thyromegaly present.   Cardiovascular: Regular rhythm, S1 normal, S2 normal and normal " heart sounds. Exam reveals no gallop, no S3 and no friction rub.   No murmur heard.  Pulses:       Dorsalis pedis pulses are 2+ on the right side, and 2+ on the left side.        Posterior tibial pulses are 2+ on the right side, and 2+ on the left side.   Pulmonary/Chest: Effort normal and breath sounds normal. She has no wheezes. She has no rhonchi. She has no rales.   Abdominal: Soft. She exhibits no mass. There is no hepatosplenomegaly. There is no tenderness. There is no guarding.   Bowel sounds audible x4   Musculoskeletal: Normal range of motion. She exhibits no edema.   Lymphadenopathy:     She has no cervical adenopathy.   Neurological: She is alert and oriented to person, place, and time.   Skin: Skin is warm, dry and intact. No rash noted.   Vitals reviewed.        Lab Review:   Lab Results   Component Value Date    GLUCOSE 97 07/10/2017    BUN 8 (L) 07/10/2017    CREATININE 0.60 07/10/2017    EGFRIFNONA 95 07/10/2017    BCR 13.3 07/10/2017    CO2 26.0 07/10/2017    CALCIUM 8.6 (L) 07/10/2017    ALBUMIN 2.60 (L) 07/10/2017    AST 18 07/10/2017    ALT 2 (L) 07/10/2017       Lab Results   Component Value Date    WBC 3.07 (L) 07/10/2017    HGB 8.8 (L) 07/10/2017    HCT 27.5 (L) 07/10/2017    MCV 91.7 07/10/2017     07/10/2017       Lab Results   Component Value Date    HGBA1C 4.80 02/20/2017       Lab Results   Component Value Date    TSH 4.016 07/04/2017       Lab Results   Component Value Date    CHOL 161 02/20/2017    CHOL 131 01/13/2017     Lab Results   Component Value Date    TRIG 110 02/20/2017    TRIG 94 01/13/2017     Lab Results   Component Value Date    HDL 48 02/20/2017    HDL 34 (L) 01/13/2017     Lab Results   Component Value Date    LDL 80 02/20/2017    LDL 65 01/13/2017     Lab Results   Component Value Date    .0 (H) 01/12/2017           Assessment:   Overall continued acceptable course with no interim cardiopulmonary complaints with fair functional status on limited activity.   She has low blood pressure in office today.  We will discontinue her Coreg and start midodrine 2.5 mg 3 times daily and Florinef 0.1 mg daily.  We have requested that the patient to start wearing knee-high compression stockings.  She will also get a CBC and BMP today.       Diagnosis Plan   1. Essential hypertension   Discontinue Coreg, start midodrine 2.5 mg 3 times daily and Florinef 0.1 mg daily, compression stockings (knee high)   2. CHF with cardiomyopathy (CMS/HCC)  Stable   3. Generalized weakness  CBC, BMP          Plan:         1. Patient to continue current medications and close follow up with the above providers with the following changes:   A. Discontinue Coreg   B. Initiate midodrine 2.5 mg 3 times daily   C. Initiate florinef 0.1 mg daily  2. Tentative cardiology follow up in 3-4 months or patient may return sooner PRN.  3. CBC, BMP   4. Compression hose  5. We have asked that her blood pressure be assessed 1-2 times daily and update us with readings and symptoms in the next 1-2 weeks.      Scribed for Mak Chou MD by Natasha Saldana, ARIEL. 3/8/2019  2:30 PM    I, Mak Chou MD, [facility], personally performed the services described in this documentation as scribed by the above named individual in my presence, and it is both accurate and complete. At 2:32 PM on 03/08/2019

## 2019-04-09 ENCOUNTER — TELEPHONE (OUTPATIENT)
Dept: NEUROLOGY | Facility: CLINIC | Age: 84
End: 2019-04-09

## 2019-04-09 NOTE — TELEPHONE ENCOUNTER
Patient son Yogesh called and advised that mother is currently taken Carbidopa-Levodopa cr 50/200 mg nightly prn but that last order stated that she is to take nightly. Patient son wanted to see if a new order that states that patient is to take prn can be sent to [facility] as they are given to patient nightly and she is very fatigued and they stated they have to give to her since order stated nightly. Please fax over new order stating PRN.      Yogesh- [phone]

## 2019-04-30 ENCOUNTER — TELEPHONE (OUTPATIENT)
Dept: CARDIOLOGY | Facility: CLINIC | Age: 84
End: 2019-04-30

## 2019-04-30 NOTE — TELEPHONE ENCOUNTER
Attempted to call patient to inquire about her BP. Home number was disconnected   Mobile number: someone answered and hung up on me.

## 2019-06-20 ENCOUNTER — TELEPHONE (OUTPATIENT)
Dept: CARDIOLOGY | Facility: CLINIC | Age: 84
End: 2019-06-20

## 2019-06-20 RX ORDER — RAMIPRIL 10 MG/1
10 CAPSULE ORAL DAILY
Start: 2019-06-20 | End: 2019-06-24 | Stop reason: SDUPTHER

## 2019-06-20 NOTE — TELEPHONE ENCOUNTER
Discontinue midodrine and increase ramipril to 10 mg hs daily.  Update us in 1-2 weeks concerning blood pressure.    Thanks

## 2019-06-20 NOTE — TELEPHONE ENCOUNTER
Pts son contacted in addition to [facility] [phone] with KTS recommendations above.

## 2019-06-20 NOTE — TELEPHONE ENCOUNTER
The patient's assisted living facility ([facility]) faxed us several BP's since March. I've scanned over them and most recently her systolic pressures have increased averaging above 150 Since June 7th here are her numbers...    6/7-157/81  6/8- 175/66  6/10- 148/72  6//2  6//88  6/13- 158/88  6//70  6/15- 160/80  6/16- 143/72  6/17- 155/70  6/18- 150/99  6/19- 149/92    She is taking current meds listed in epic. We discontinued coreg, initiated midodrine 2.5 mg three times daily and florinef 0.1 mg daily on 03/08/19 during her last office visit.    Dr. Cottrell ordered Ramipril 5 mg on 06/04/19. She was supposed to see you again in 3 to 4 months. Thoughts?

## 2019-06-24 RX ORDER — RAMIPRIL 10 MG/1
10 CAPSULE ORAL DAILY
Qty: 90 CAPSULE | Refills: 3 | Status: SHIPPED | OUTPATIENT
Start: 2019-06-24 | End: 2019-06-24

## 2019-06-24 RX ORDER — RAMIPRIL 10 MG/1
10 CAPSULE ORAL DAILY
Qty: 90 CAPSULE | Refills: 3 | Status: SHIPPED | OUTPATIENT
Start: 2019-06-24 | End: 2022-01-01

## 2019-07-11 ENCOUNTER — TELEPHONE (OUTPATIENT)
Dept: CARDIOLOGY | Facility: CLINIC | Age: 84
End: 2019-07-11

## 2019-07-11 RX ORDER — AMLODIPINE BESYLATE 5 MG/1
5 TABLET ORAL DAILY
Qty: 90 TABLET | Refills: 3 | Status: SHIPPED | OUTPATIENT
Start: 2019-07-11 | End: 2022-01-01

## 2019-07-11 RX ORDER — CHLORTHALIDONE 25 MG/1
12.5 TABLET ORAL DAILY
Qty: 45 TABLET | Refills: 3 | Status: SHIPPED | OUTPATIENT
Start: 2019-07-11 | End: 2022-01-01

## 2019-07-11 NOTE — TELEPHONE ENCOUNTER
[facility] has previously faxed BP record and medlist. KTS reviewed.     Following changes per KTS:  D/c Florinef  Start amlodipine 5mg daily  Start chlorthalidone 12.5mg daily  Continue ramipril 10 mg daily HS

## 2019-07-16 ENCOUNTER — OFFICE VISIT (OUTPATIENT)
Dept: NEUROLOGY | Facility: CLINIC | Age: 84
End: 2019-07-16

## 2019-07-16 VITALS
BODY MASS INDEX: 22.58 KG/M2 | HEIGHT: 59 IN | DIASTOLIC BLOOD PRESSURE: 58 MMHG | SYSTOLIC BLOOD PRESSURE: 90 MMHG | WEIGHT: 112 LBS

## 2019-07-16 DIAGNOSIS — F32.A DEPRESSION, UNSPECIFIED DEPRESSION TYPE: ICD-10-CM

## 2019-07-16 DIAGNOSIS — G20 PARKINSON'S DISEASE (HCC): Primary | ICD-10-CM

## 2019-07-16 PROCEDURE — 99213 OFFICE O/P EST LOW 20 MIN: CPT | Performed by: PSYCHIATRY & NEUROLOGY

## 2019-07-16 NOTE — PROGRESS NOTES
Subjective:    CC: Florinda Knapp is seen today for Parkinson's disease    HPI:  Current visit-since her last visit patient continues to take Sinemet 25/100 mg, 1.5 tablets 4 times daily and her tremors are well controlled.  She occasionally also takes Sinemet CR 25/100 mg at bedtime for left leg cramps.  Did not get physical therapy for some time but that did not really help with the left leg pain and weakness.  She also cut down on her dose of Remeron as she was having increased sleepiness on the higher dose but I do not see it on her list of medications today.  She does not know if it was discontinued by Dr. Cottrell.  Her last blood work did show a low platelet count.  She has been having burning in the urine recently.  Work-up for UTI was negative.    Last visit-this is a patient previously seen by Dr. Castro for Parkinson's disease.  At her last visit Dr. Castro had increase the dose of her Sinemet 25/100 mg  To 1.5 tablets 4 times a day at 9 AM ,noon, 3 PM and 6 PM.  Patient reports to tolerating the dose well.  She denies having any severe tremors but continues to have difficulty moving her left leg.  She does have a history of left hip fracture and left hip replacement a few years back.  She also has cramps in her legs more so the left one.  She currently lives at an assisted living facility where she was getting PT however her son helps her her do exercises now a few times each week.  Patient is unable to walk without her walker.    Of note-I reviewed Dr. Dr. Castro's note in detail which is as follows-    Patient followed since 10/13 for Parkinson's disease that initially responded well to Sinemet although she had trouble tolerating it at first. Prior to the Parkinson's diagnosis she had had problems with frequent falls since at least 2010.  8/16: some restlessness in her legs at night and was on Sinemet 25/100 tid at 9:30, 1:30 and 5:30pm. Fractured her left hip 2/2 fall, conservative tx rather  "than surgery.Then worsening cramping, added Sinemet CR at hs, tremendously helpful.   Then had hip surgery, then in ICU, at  and other hospitals. Had colitis and UTI. Reported \"crazy\" dreams, also leg cramping, and confusion since the hip surgery 12/16. Was very confused while hospitalized, improved but not quite baseline.  Reported wearing off. Also bad and vivid dreams that started before hospitalization, but have worsened.   Takes C/L 25/100 at 9, 2, 5 and CR at bedtime (about 9).      5/17: hospitalized at [facility] in 2/17 for stroke; had confusion, right HP,  aphasia. MRI showed several small areas of acute infarction in the left parietal lobe. Echo showed small PFO. F/b cardiology. Carotids unremarkable. Tx with TPA as well as ASA 325mg and Lipitor 40mg daily. At f/u, right sided weakness significantly improved, confusion and aphasia resolved.      Increased bradykinesia and rigidity, and leg cramps worse. On Sinemet 25/100mg TID. No longer taking the Sinemet CR nightly, for unclear reasons.   Planned increase Sinemet back to 25/100mg tablets QID, restarted Sinemet CR 50/200mg tablets nightly as needed  4/18: doing well, no recent hospitalization. No falls, but sometimes foot catches on carpet. Takes the regular sinemet 4x/day, often late, up to 2 hours, and takes the CR PRN at night, infrequently, for cramps. Wondering how late she can take that eg. 5am, if first regular dose is at 9am. Not LH/dizzy. Recommended taking sinemet CR every night for both cramps and morning sx.  Today:  Lots of trouble getting left foot and leg to move. Doing PT. They bought a velcro boot that dorsiflexes foot, trying to keep calf muscles stretched/extended. Those left leg muscles are also weaker maybe from disuse. Spends 80% of her time in recliner, cannot get out of it herself, very hard to scooch forward as instructed in this chair. Wondering about lift chair. Physical therapist suggested Botox for her left leg tightness. Still " often getting sinemet dose an hour or two late, despite specific instructions. Notes lots of turnover at nursing facility.      The following portions of the patient's history were reviewed today and updated as of 07/16/2019  : allergies, current medications, past family history, past medical history, past social history, past surgical history and problem list  These document will be scanned to patient's chart.      Current Outpatient Medications:   •  acetaminophen (TYLENOL) 325 MG tablet, Take 650 mg by mouth Every 6 (Six) Hours As Needed (Pain)., Disp: , Rfl:   •  amLODIPine (NORVASC) 5 MG tablet, Take 1 tablet by mouth Daily., Disp: 90 tablet, Rfl: 3  •  aspirin 325 MG tablet, Take 1 tablet by mouth Daily., Disp: , Rfl:   •  atorvastatin (LIPITOR) 40 MG tablet, Take 1 tablet by mouth Every Night. (Patient taking differently: Take 80 mg by mouth Every Night.), Disp: , Rfl:   •  bisacodyl (DULCOLAX) 10 MG suppository, bisacodyl 10 mg rectal suppository as needed, Disp: , Rfl:   •  carbidopa-levodopa (SINEMET)  MG per tablet, Take 1 tablet by mouth 4 (Four) Times a Day. Increase as instructed to 1 1/2 tabs at 9am, noon, 3pm, and 6pm, Disp: 180 tablet, Rfl: 11  •  carvedilol (COREG) 6.25 MG tablet, Take 1 tablet by mouth Every 12 (Twelve) Hours., Disp: 90 tablet, Rfl: 3  •  chlorthalidone (HYGROTON) 25 MG tablet, Take 0.5 tablets by mouth Daily., Disp: 45 tablet, Rfl: 3  •  Cholecalciferol (D3-1000) 1000 units capsule, cholecalciferol (vit D3)(bulk), Disp: , Rfl:   •  dextromethorphan polistirex ER (DELSYM) 30 MG/5ML Suspension Extended Release oral suspension, Take  by mouth., Disp: , Rfl:   •  docusate sodium (COLACE) 100 MG capsule, Take 100 mg by mouth Daily As Needed., Disp: , Rfl:   •  levothyroxine (SYNTHROID, LEVOTHROID) 75 MCG tablet, Take 75 mcg by mouth Daily., Disp: , Rfl:   •  loratadine (CLARITIN) 10 MG tablet, Take 10 mg by mouth Daily As Needed., Disp: , Rfl:   •  Multiple Vitamin (MULTI  VITAMIN PO), Take  by mouth Daily. Centrum (brand new ), Disp: , Rfl:   •  Multiple Vitamins-Minerals (PRESERVISION AREDS 2 PO), Take 1 tablet by mouth 2 (Two) Times a Day., Disp: , Rfl:   •  polyethylene glycol (MIRALAX) packet, Take 17 g by mouth As Needed., Disp: , Rfl:   •  PREMARIN 0.625 MG/GM vaginal cream, Twice a week, Disp: , Rfl:   •  Probiotic Product (PROBIOTIC ADVANCED PO), Take  by mouth Daily., Disp: , Rfl:   •  ramipril (ALTACE) 10 MG capsule, Take 1 capsule by mouth Daily., Disp: 90 capsule, Rfl: 3  •  senna (SENOKOT) 8.6 MG tablet tablet, Take  by mouth As Needed., Disp: , Rfl:    Past Medical History:   Diagnosis Date   • Anemia    • Anxiety    • Asthma    • Cerebral infarction (CMS/HCC)    • Depression    • Failure to thrive in adult    • Hyperlipidemia    • Hyperlipidemia    • Hypertension    • Hypothyroidism    • Osteoarthritis    • Osteoporosis    • Parkinson's disease (CMS/HCC)    • Stress incontinence    • Stroke (CMS/HCC) 02/19/2017    left MCA thromboembolism   • Urinary retention       Past Surgical History:   Procedure Laterality Date   • APPENDECTOMY     • BACK SURGERY     • CATARACT EXTRACTION     • ELBOW PROCEDURE     • KNEE SURGERY     • MA PRIM PRQ TRLUML MCHNL THRMBC N-COR N-ICRA 1ST Left 2/19/2017    Diagnostic Angiogram only, no thrombectomy   • TOTAL HIP ARTHROPLASTY Left 12/2016      Family History   Problem Relation Age of Onset   • Alcohol abuse Other    • Hypertension Other    • Stroke Other    • Heart disease Son       Social History     Socioeconomic History   • Marital status:      Spouse name: Not on file   • Number of children: Not on file   • Years of education: Not on file   • Highest education level: Not on file   Tobacco Use   • Smoking status: Never Smoker   • Smokeless tobacco: Never Used   Substance and Sexual Activity   • Alcohol use: No   • Drug use: No   • Sexual activity: Defer   Social History Narrative    Lives at [facility] assisted living      Review of Systems   Constitutional: Positive for fatigue.   Gastrointestinal: Positive for constipation.   Genitourinary: Positive for dysuria.   Neurological: Positive for confusion.   Hematological: Bruises/bleeds easily.   Psychiatric/Behavioral: Positive for sleep disturbance and depressed mood. The patient is nervous/anxious.        Objective:    As noted in the chart    Neurology Exam:    General apperance: frail    Mental status: Alert, awake and oriented to time place and person.    Recent and Remote memory: Intact.    Attention span and Concentration: Normal.     Language and Speech: Intact- No dysarthria.    Fluency, Naming , Repitition and Comprehension:  Intact    Cranial Nerves:   CN II: Visual fields are full. Intact. Fundi - Normal, No papillederma, Pupils - GONZALEZ  CN III, IV and VI: Extraocular movements are intact. Normal saccades.   CN V: Facial sensation is intact.   CN VII: Muscles of facial expression reveal no asymmetry. Intact.   CN VIII: Hearing is intact. Whispered voice intact.   CN IX and X: Palate elevates symmetrically. Intact  CN XI: Shoulder shrug is intact.   CN XII: Tongue is midline without evidence of atrophy or fasciculation.     Ophthalmoscopic exam of optic disc-normal    Motor: -No postural tremors noted in the left hand today    Right UE muscle strength 5/5. Normal tone.     Left UE muscle strength 5/5.  Mild cogwheel rigidity noted     Right LE muscle strength5/5. Normal tone.     Left LE muscle strength 4/5.  Her left foot is inverted, no foot drop seen ,Normal tone.      Sensory: Normal light touch, vibration and pinprick sensation bilaterally.    DTRs: 2+ bilaterally in upper and lower extremities.    Babinski: Negative bilaterally.    Co-ordination: Normal finger-to-nose, heel to shin B/L.    Rhomberg: Negative.    Gait: Wheelchair-bound today      Cardiovascular: Regular rate and rhythm without murmur, gallop or rub.    Assessment and Plan:  1. Parkinson's disease  (CMS/[facility])  Continue Sinemet 25/100 mg 1.5 tabs 4 times daily.  She can also continue Sinemet CR 25/100 mg for her leg cramps  I may reduce this dose of Sinemet in the future as I do not think she needs to be on such a high dose given her symptoms      2. Depression, unspecified depression type  I have asked her son to confirm her dose of Remeron from the nursing home and let me know    3.  Hyperlipidemia-  Not sure if she is on atorvastatin 80 or 40 mg daily.  Again the son will confirm her dose.  If she is on 80 mg then I would recommend cutting down her dose due to her age and increased risk of myalgias    Return in about 6 months (around 1/16/2020).     I spent over 60 minutes with the patient face to face out of which over 50% (30 minutes) was spent in management, instructions and education.     Rupali Echevarria MD

## 2019-07-18 ENCOUNTER — TELEPHONE (OUTPATIENT)
Dept: NEUROLOGY | Facility: CLINIC | Age: 84
End: 2019-07-18

## 2019-07-18 RX ORDER — ROSUVASTATIN CALCIUM 10 MG/1
10 TABLET, COATED ORAL NIGHTLY
COMMUNITY

## 2019-07-18 NOTE — TELEPHONE ENCOUNTER
----- Message from Ambrocio Prabhakar sent at 7/18/2019  9:33 AM EDT -----  Contact: [phone]  Dr. Echevarria,     Pt's son, Yogesh, called to relay a message. Yogesh states pt is not taking Remeron anymore. Yogesh believes pt's PCP took her off. Yogesh also states that pt is no longer on Simvastatin. But is on Rosuvastatin 20 mg once every night. Yogesh states he is not sure which provider made this change, but believes it was either pt's Cardiologist or PCP.

## 2019-07-18 NOTE — TELEPHONE ENCOUNTER
Okay thanks.  Elisa could you add the new medication in her chart and remove simvastatin and atorvastatin.  Thanks

## 2020-01-02 ENCOUNTER — APPOINTMENT (OUTPATIENT)
Dept: GENERAL RADIOLOGY | Facility: HOSPITAL | Age: 85
End: 2020-01-02

## 2020-01-02 ENCOUNTER — TELEPHONE (OUTPATIENT)
Dept: CARDIOLOGY | Facility: CLINIC | Age: 85
End: 2020-01-02

## 2020-01-02 ENCOUNTER — APPOINTMENT (OUTPATIENT)
Dept: CT IMAGING | Facility: HOSPITAL | Age: 85
End: 2020-01-02

## 2020-01-02 ENCOUNTER — HOSPITAL ENCOUNTER (EMERGENCY)
Facility: HOSPITAL | Age: 85
Discharge: NURSING, REHAB OR LONG-TERM CARE | End: 2020-01-02
Attending: EMERGENCY MEDICINE | Admitting: EMERGENCY MEDICINE

## 2020-01-02 VITALS
TEMPERATURE: 97.8 F | HEART RATE: 90 BPM | SYSTOLIC BLOOD PRESSURE: 141 MMHG | HEIGHT: 60 IN | OXYGEN SATURATION: 91 % | BODY MASS INDEX: 21.99 KG/M2 | WEIGHT: 112 LBS | RESPIRATION RATE: 18 BRPM | DIASTOLIC BLOOD PRESSURE: 84 MMHG

## 2020-01-02 DIAGNOSIS — N39.0 ACUTE UTI: Primary | ICD-10-CM

## 2020-01-02 DIAGNOSIS — R41.0 INTERMITTENT CONFUSION: ICD-10-CM

## 2020-01-02 DIAGNOSIS — E87.1 HYPONATREMIA: ICD-10-CM

## 2020-01-02 LAB
ALBUMIN SERPL-MCNC: 3.4 G/DL (ref 3.5–5.2)
ALBUMIN/GLOB SERPL: 0.9 G/DL
ALP SERPL-CCNC: 67 U/L (ref 39–117)
ALT SERPL W P-5'-P-CCNC: <5 U/L (ref 1–33)
ANION GAP SERPL CALCULATED.3IONS-SCNC: 10 MMOL/L (ref 5–15)
AST SERPL-CCNC: 16 U/L (ref 1–32)
BACTERIA UR QL AUTO: ABNORMAL /HPF
BASOPHILS # BLD AUTO: 0.01 10*3/MM3 (ref 0–0.2)
BASOPHILS NFR BLD AUTO: 0.2 % (ref 0–1.5)
BILIRUB SERPL-MCNC: 0.4 MG/DL (ref 0.2–1.2)
BILIRUB UR QL STRIP: NEGATIVE
BUN BLD-MCNC: 28 MG/DL (ref 8–23)
BUN/CREAT SERPL: 37.8 (ref 7–25)
CALCIUM SPEC-SCNC: 9.2 MG/DL (ref 8.6–10.5)
CHLORIDE SERPL-SCNC: 97 MMOL/L (ref 98–107)
CLARITY UR: ABNORMAL
CO2 SERPL-SCNC: 22 MMOL/L (ref 22–29)
COLOR UR: YELLOW
CREAT BLD-MCNC: 0.74 MG/DL (ref 0.57–1)
DEPRECATED RDW RBC AUTO: 50.8 FL (ref 37–54)
EOSINOPHIL # BLD AUTO: 0.01 10*3/MM3 (ref 0–0.4)
EOSINOPHIL NFR BLD AUTO: 0.2 % (ref 0.3–6.2)
ERYTHROCYTE [DISTWIDTH] IN BLOOD BY AUTOMATED COUNT: 13.6 % (ref 12.3–15.4)
FLUAV AG NPH QL: NEGATIVE
FLUBV AG NPH QL IA: NEGATIVE
GFR SERPL CREATININE-BSD FRML MDRD: 74 ML/MIN/1.73
GLOBULIN UR ELPH-MCNC: 3.9 GM/DL
GLUCOSE BLD-MCNC: 128 MG/DL (ref 65–99)
GLUCOSE UR STRIP-MCNC: NEGATIVE MG/DL
HCT VFR BLD AUTO: 35.8 % (ref 34–46.6)
HGB BLD-MCNC: 11.1 G/DL (ref 12–15.9)
HGB UR QL STRIP.AUTO: ABNORMAL
HYALINE CASTS UR QL AUTO: ABNORMAL /LPF
IMM GRANULOCYTES # BLD AUTO: 0.02 10*3/MM3 (ref 0–0.05)
IMM GRANULOCYTES NFR BLD AUTO: 0.3 % (ref 0–0.5)
KETONES UR QL STRIP: NEGATIVE
LEUKOCYTE ESTERASE UR QL STRIP.AUTO: ABNORMAL
LYMPHOCYTES # BLD AUTO: 1.03 10*3/MM3 (ref 0.7–3.1)
LYMPHOCYTES NFR BLD AUTO: 17.4 % (ref 19.6–45.3)
MCH RBC QN AUTO: 31.4 PG (ref 26.6–33)
MCHC RBC AUTO-ENTMCNC: 31 G/DL (ref 31.5–35.7)
MCV RBC AUTO: 101.4 FL (ref 79–97)
MONOCYTES # BLD AUTO: 0.73 10*3/MM3 (ref 0.1–0.9)
MONOCYTES NFR BLD AUTO: 12.3 % (ref 5–12)
NEUTROPHILS # BLD AUTO: 4.12 10*3/MM3 (ref 1.7–7)
NEUTROPHILS NFR BLD AUTO: 69.6 % (ref 42.7–76)
NITRITE UR QL STRIP: NEGATIVE
NRBC BLD AUTO-RTO: 0 /100 WBC (ref 0–0.2)
PH UR STRIP.AUTO: 7 [PH] (ref 5–8)
PLATELET # BLD AUTO: 204 10*3/MM3 (ref 140–450)
PMV BLD AUTO: 9.8 FL (ref 6–12)
POTASSIUM BLD-SCNC: 4.1 MMOL/L (ref 3.5–5.2)
PROT SERPL-MCNC: 7.3 G/DL (ref 6–8.5)
PROT UR QL STRIP: ABNORMAL
RBC # BLD AUTO: 3.53 10*6/MM3 (ref 3.77–5.28)
RBC # UR: ABNORMAL /HPF
REF LAB TEST METHOD: ABNORMAL
SODIUM BLD-SCNC: 129 MMOL/L (ref 136–145)
SP GR UR STRIP: 1.02 (ref 1–1.03)
SQUAMOUS #/AREA URNS HPF: ABNORMAL /HPF
TROPONIN T SERPL-MCNC: <0.01 NG/ML (ref 0–0.03)
UROBILINOGEN UR QL STRIP: ABNORMAL
WBC NRBC COR # BLD: 5.92 10*3/MM3 (ref 3.4–10.8)
WBC UR QL AUTO: ABNORMAL /HPF
YEAST URNS QL MICRO: ABNORMAL /HPF

## 2020-01-02 PROCEDURE — 81001 URINALYSIS AUTO W/SCOPE: CPT | Performed by: NURSE PRACTITIONER

## 2020-01-02 PROCEDURE — 96365 THER/PROPH/DIAG IV INF INIT: CPT

## 2020-01-02 PROCEDURE — 71045 X-RAY EXAM CHEST 1 VIEW: CPT

## 2020-01-02 PROCEDURE — 70450 CT HEAD/BRAIN W/O DYE: CPT

## 2020-01-02 PROCEDURE — 93005 ELECTROCARDIOGRAM TRACING: CPT | Performed by: NURSE PRACTITIONER

## 2020-01-02 PROCEDURE — 25010000002 CEFTRIAXONE PER 250 MG: Performed by: NURSE PRACTITIONER

## 2020-01-02 PROCEDURE — 80053 COMPREHEN METABOLIC PANEL: CPT | Performed by: NURSE PRACTITIONER

## 2020-01-02 PROCEDURE — 87804 INFLUENZA ASSAY W/OPTIC: CPT | Performed by: NURSE PRACTITIONER

## 2020-01-02 PROCEDURE — 87086 URINE CULTURE/COLONY COUNT: CPT | Performed by: NURSE PRACTITIONER

## 2020-01-02 PROCEDURE — 85025 COMPLETE CBC W/AUTO DIFF WBC: CPT | Performed by: NURSE PRACTITIONER

## 2020-01-02 PROCEDURE — 99284 EMERGENCY DEPT VISIT MOD MDM: CPT

## 2020-01-02 PROCEDURE — 84484 ASSAY OF TROPONIN QUANT: CPT | Performed by: NURSE PRACTITIONER

## 2020-01-02 RX ORDER — AMPICILLIN TRIHYDRATE 500 MG
1 CAPSULE ORAL DAILY
COMMUNITY
End: 2022-01-01

## 2020-01-02 RX ORDER — CEFDINIR 300 MG/1
300 CAPSULE ORAL 2 TIMES DAILY
Qty: 20 CAPSULE | Refills: 0 | OUTPATIENT
Start: 2020-01-02 | End: 2021-01-01

## 2020-01-02 RX ORDER — PHENAZOPYRIDINE HYDROCHLORIDE 100 MG/1
100 TABLET, FILM COATED ORAL 3 TIMES DAILY PRN
COMMUNITY

## 2020-01-02 RX ORDER — CLOPIDOGREL BISULFATE 75 MG/1
75 TABLET ORAL DAILY
COMMUNITY

## 2020-01-02 RX ORDER — SODIUM CHLORIDE 0.9 % (FLUSH) 0.9 %
10 SYRINGE (ML) INJECTION AS NEEDED
Status: DISCONTINUED | OUTPATIENT
Start: 2020-01-02 | End: 2020-01-03 | Stop reason: HOSPADM

## 2020-01-02 RX ORDER — DULOXETIN HYDROCHLORIDE 60 MG/1
60 CAPSULE, DELAYED RELEASE ORAL DAILY
COMMUNITY

## 2020-01-02 RX ADMIN — CEFTRIAXONE 1 G: 1 INJECTION, POWDER, FOR SOLUTION INTRAMUSCULAR; INTRAVENOUS at 21:42

## 2020-01-02 RX ADMIN — SODIUM CHLORIDE 500 ML: 9 INJECTION, SOLUTION INTRAVENOUS at 21:42

## 2020-01-02 NOTE — TELEPHONE ENCOUNTER
Author: Mak Chou MD Service: -- Author Type: Physician   Filed: 01/02/20 1003 Encounter Date: 12/30/2019 Status: Signed   : Mak Chou MD (Physician)        Please call [facility] and let them know that Ms. Knapp's blood pressure readings remain somewhat labile but would continue current treatment and assess blood pressure 1-2 times weekly and update us if systolic blood pressure is consistently at or above 150 torr or less than 115 torr.     Thanks              Called [facility] and spoke to Julita Anderson LPN, giving KTS recommendations above.    Julita verbalizes understanding and agreeable to plan.

## 2020-01-03 NOTE — ED PROVIDER NOTES
Subjective   Patient is a pleasant 88-year-old female resident of [facility] in [place] who presents via EMS today with mild intermittant confusion.  Son and nursing home staff report that patient has asked about childhood friends several times and acts occasionally confused over the past 3 hours.  There is been no focal weakness, fever, chills, nausea, or vomiting.  Patient does report burning with urination but only between the hours of 8 PM and 5 AM.  Sees Dr. Flynn with urology.  Despite what the triage note says, son denies any hallucinations visual or auditory      History provided by:  Patient, relative and nursing home  Altered Mental Status   Presenting symptoms: confusion    Severity:  Moderate  Most recent episode:  Today  Episode history:  Multiple  Timing:  Intermittent  Progression:  Waxing and waning  Chronicity:  New  Context: nursing home resident    Context: not head injury, not recent illness and not recent infection    Associated symptoms: bladder incontinence    Associated symptoms: no abdominal pain, no fever, no hallucinations, no headaches, no light-headedness, no nausea, no palpitations, no slurred speech and no visual change        Review of Systems   Constitutional: Negative for chills and fever.   Eyes: Negative for visual disturbance.   Respiratory: Negative for shortness of breath.    Cardiovascular: Negative for chest pain and palpitations.   Gastrointestinal: Negative for abdominal pain and nausea.   Genitourinary: Positive for bladder incontinence and dysuria. Negative for flank pain and frequency.   Musculoskeletal: Negative for arthralgias.   Neurological: Negative for light-headedness and headaches.   Psychiatric/Behavioral: Positive for confusion. Negative for hallucinations.   All other systems reviewed and are negative.      Past Medical History:   Diagnosis Date   • Anemia    • Anxiety    • Asthma    • Cerebral infarction (CMS/HCC)    • Depression    •  Failure to thrive in adult    • Hyperlipidemia    • Hyperlipidemia    • Hypertension    • Hypothyroidism    • Osteoarthritis    • Osteoporosis    • Parkinson's disease (CMS/[facility])    • Stress incontinence    • Stroke (CMS/HCC) 02/19/2017    left MCA thromboembolism   • Urinary retention        Allergies   Allergen Reactions   • Abilify [Aripiprazole]    • Alendronate    • Celecoxib    • Fosamax Plus D [Alendronate-Cholecalciferol]    • Levaquin [Levofloxacin]    • Macrobid [Nitrofurantoin Monohyd Macro]    • Nitrofurantoin    • Procaine    • Sulfa Antibiotics Nausea And Vomiting and Rash       Past Surgical History:   Procedure Laterality Date   • APPENDECTOMY     • BACK SURGERY     • CATARACT EXTRACTION     • ELBOW PROCEDURE     • KNEE SURGERY     • NE PRIM PRQ TRLUML MCHNL THRMBC N-COR N-ICRA 1ST Left 2/19/2017    Diagnostic Angiogram only, no thrombectomy   • TOTAL HIP ARTHROPLASTY Left 12/2016       Family History   Problem Relation Age of Onset   • Alcohol abuse Other    • Hypertension Other    • Stroke Other    • Heart disease Son        Social History     Socioeconomic History   • Marital status:      Spouse name: Not on file   • Number of children: Not on file   • Years of education: Not on file   • Highest education level: Not on file   Tobacco Use   • Smoking status: Never Smoker   • Smokeless tobacco: Never Used   Substance and Sexual Activity   • Alcohol use: No   • Drug use: No   • Sexual activity: Defer   Social History Narrative    Lives at [facility] assisted living           Objective   Physical Exam   Constitutional: She is oriented to person, place, and time. She appears well-developed and well-nourished.   HENT:   Right Ear: External ear normal.   Left Ear: External ear normal.   Mouth/Throat: Oropharynx is clear and moist.   Eyes: Pupils are equal, round, and reactive to light. Conjunctivae and EOM are normal.   Neck: Normal range of motion. Neck supple.   Cardiovascular: Normal rate and  regular rhythm.   Pulmonary/Chest: Effort normal and breath sounds normal.   Abdominal: Soft. Bowel sounds are normal. There is tenderness in the suprapubic area. There is no rebound.   Neurological: She is alert and oriented to person, place, and time. She has normal strength. No cranial nerve deficit or sensory deficit.   Normal finger-to-nose test   Skin: Skin is warm and dry.   Psychiatric: She has a normal mood and affect. Her behavior is normal.   Vitals reviewed.      Procedures           ED Course      Recent Results (from the past 24 hour(s))   Influenza Antigen, Rapid - Swab, Nasopharynx    Collection Time: 01/02/20  7:34 PM   Result Value Ref Range    Influenza A Ag, EIA Negative Negative    Influenza B Ag, EIA Negative Negative   Comprehensive Metabolic Panel    Collection Time: 01/02/20  7:38 PM   Result Value Ref Range    Glucose 128 (H) 65 - 99 mg/dL    BUN 28 (H) 8 - 23 mg/dL    Creatinine 0.74 0.57 - 1.00 mg/dL    Sodium 129 (L) 136 - 145 mmol/L    Potassium 4.1 3.5 - 5.2 mmol/L    Chloride 97 (L) 98 - 107 mmol/L    CO2 22.0 22.0 - 29.0 mmol/L    Calcium 9.2 8.6 - 10.5 mg/dL    Total Protein 7.3 6.0 - 8.5 g/dL    Albumin 3.40 (L) 3.50 - 5.20 g/dL    ALT (SGPT) <5 1 - 33 U/L    AST (SGOT) 16 1 - 32 U/L    Alkaline Phosphatase 67 39 - 117 U/L    Total Bilirubin 0.4 0.2 - 1.2 mg/dL    eGFR Non African Amer 74 >60 mL/min/1.73    Globulin 3.9 gm/dL    A/G Ratio 0.9 g/dL    BUN/Creatinine Ratio 37.8 (H) 7.0 - 25.0    Anion Gap 10.0 5.0 - 15.0 mmol/L   CBC Auto Differential    Collection Time: 01/02/20  7:38 PM   Result Value Ref Range    WBC 5.92 3.40 - 10.80 10*3/mm3    RBC 3.53 (L) 3.77 - 5.28 10*6/mm3    Hemoglobin 11.1 (L) 12.0 - 15.9 g/dL    Hematocrit 35.8 34.0 - 46.6 %    .4 (H) 79.0 - 97.0 fL    MCH 31.4 26.6 - 33.0 pg    MCHC 31.0 (L) 31.5 - 35.7 g/dL    RDW 13.6 12.3 - 15.4 %    RDW-SD 50.8 37.0 - 54.0 fl    MPV 9.8 6.0 - 12.0 fL    Platelets 204 140 - 450 10*3/mm3    Neutrophil % 69.6  42.7 - 76.0 %    Lymphocyte % 17.4 (L) 19.6 - 45.3 %    Monocyte % 12.3 (H) 5.0 - 12.0 %    Eosinophil % 0.2 (L) 0.3 - 6.2 %    Basophil % 0.2 0.0 - 1.5 %    Immature Grans % 0.3 0.0 - 0.5 %    Neutrophils, Absolute 4.12 1.70 - 7.00 10*3/mm3    Lymphocytes, Absolute 1.03 0.70 - 3.10 10*3/mm3    Monocytes, Absolute 0.73 0.10 - 0.90 10*3/mm3    Eosinophils, Absolute 0.01 0.00 - 0.40 10*3/mm3    Basophils, Absolute 0.01 0.00 - 0.20 10*3/mm3    Immature Grans, Absolute 0.02 0.00 - 0.05 10*3/mm3    nRBC 0.0 0.0 - 0.2 /100 WBC   Troponin    Collection Time: 01/02/20  7:38 PM   Result Value Ref Range    Troponin T <0.010 0.000 - 0.030 ng/mL   Urinalysis With Culture If Indicated - Urine, Catheter In/Out    Collection Time: 01/02/20  8:17 PM   Result Value Ref Range    Color, UA Yellow Yellow, Straw    Appearance, UA Turbid (A) Clear    pH, UA 7.0 5.0 - 8.0    Specific Gravity, UA 1.018 1.001 - 1.030    Glucose, UA Negative Negative    Ketones, UA Negative Negative    Bilirubin, UA Negative Negative    Blood, UA Trace (A) Negative    Protein, UA >=300 mg/dL (3+) (A) Negative    Leuk Esterase, UA Moderate (2+) (A) Negative    Nitrite, UA Negative Negative    Urobilinogen, UA 0.2 E.U./dL 0.2 - 1.0 E.U./dL   Urinalysis, Microscopic Only - Urine, Catheter In/Out    Collection Time: 01/02/20  8:17 PM   Result Value Ref Range    RBC, UA 21-30 (A) None Seen, 0-2 /HPF    WBC, UA Too Numerous to Count (A) None Seen, 0-2 /HPF    Bacteria, UA 3+ (A) None Seen, Trace /HPF    Squamous Epithelial Cells, UA None Seen None Seen, 0-2 /HPF    Yeast, UA Large/3+ Budding Yeast None Seen /HPF    Hyaline Casts, UA 0-6 0 - 6 /LPF    Methodology Manual Light Microscopy      Note: In addition to lab results from this visit, the labs listed above may include labs taken at another facility or during a different encounter within the last 24 hours. Please correlate lab times with ED admission and discharge times for further clarification of the  services performed during this visit.    CT Head Without Contrast   Final Result   Senescent changes without acute abnormality.               Signer Name: Booker Camargo MD    Signed: 1/2/2020 8:53 PM    Workstation Name: [id number]     [facility]      XR Chest 1 View   Final Result   No acute cardiopulmonary findings.      Signer Name: Mak Palumbo MD    Signed: 1/2/2020 8:03 PM    Workstation Name: [id number]     [facility]        Vitals:    01/02/20 2002 01/02/20 2100 01/02/20 2101 01/02/20 2120   BP:  122/73  118/70   Pulse: 94  90 89   Resp:       Temp:       TempSrc:       SpO2: 94%  94% 95%   Weight:       Height:         Medications   sodium chloride 0.9 % flush 10 mL (has no administration in time range)   cefTRIAXone (ROCEPHIN) 1 g/100 mL 0.9% NS (MBP) (has no administration in time range)   sodium chloride 0.9 % bolus 500 mL (has no administration in time range)     ECG/EMG Results (last 24 hours)     Procedure Component Value Units Date/Time    ECG 12 Lead [[id number]] Collected:  01/02/20 1925     Updated:  01/02/20 1927        ECG 12 Lead           Discussed lab findings and radiology findings with patient and family.  Recommend antibiotics.  Discussed hyponatremia and according to records and family members patient has dealt with hyponatremia in the past.  NS 500ml administered in ER and will need labwork rechecked tomorrow.  Counseled patient against drinking excessive water as this can contribute to hyponatremia.  Advised to follow-up with primary care provider in 3 to 4 days for urine recheck and to return to the ER with worsening of symptoms or development of new symptoms.  Patient family verbalized understanding                  [place] Coma Scale Score: 15                          MDM    Final diagnoses:   Acute UTI   Intermittent confusion   Hyponatremia            Faith Gardner, ARIEL  01/07/20 1144

## 2020-01-04 LAB — BACTERIA SPEC AEROBE CULT: NO GROWTH

## 2020-01-16 ENCOUNTER — OFFICE VISIT (OUTPATIENT)
Dept: NEUROLOGY | Facility: CLINIC | Age: 85
End: 2020-01-16

## 2020-01-16 VITALS
DIASTOLIC BLOOD PRESSURE: 68 MMHG | SYSTOLIC BLOOD PRESSURE: 98 MMHG | HEIGHT: 60 IN | BODY MASS INDEX: 21.2 KG/M2 | HEART RATE: 78 BPM | OXYGEN SATURATION: 94 % | WEIGHT: 108 LBS

## 2020-01-16 DIAGNOSIS — G20 PARKINSON'S DISEASE (HCC): Primary | ICD-10-CM

## 2020-01-16 PROCEDURE — 99214 OFFICE O/P EST MOD 30 MIN: CPT | Performed by: PSYCHIATRY & NEUROLOGY

## 2020-01-16 NOTE — PROGRESS NOTES
Subjective:    CC: Florinda Knapp is seen today for Parkinson's disease    HPI:  Current visit-since the last visit patient was in the hospital recently with confusion and hallucinations.  She was found to have a severe UTI and treated for the same.  Even now she has hallucinations off and on.  Only takes Cymbalta and has now stopped taking Remeron.  Also continues to have burning in the urine and I have asked her to take cranberry tablets.  With regards to her Parkinson's she is still taking Sinemet 25/100 mg 1.5 tablets 4 times a day in addition to Sinemet CR 25/100 mg only as needed at night however does not take it most days.  Patient is completely wheelchair-bound and does not walk at all because of her left leg weakness (which was caused after she had an infection in her left hip post surgery).  For her stroke she continues to take Plavix as well as Crestor 20 mg daily.    Last visit-since her last visit patient continues to take Sinemet 25/100 mg, 1.5 tablets 4 times daily and her tremors are well controlled.  She occasionally also takes Sinemet CR 25/100 mg at bedtime for left leg cramps.  Did not get physical therapy for some time but that did not really help with the left leg pain and weakness.  She also cut down on her dose of Remeron as she was having increased sleepiness on the higher dose but I do not see it on her list of medications today.  She does not know if it was discontinued by Dr. Cottrell.  Her last blood work did show a low platelet count.  She has been having burning in the urine recently.  Work-up for UTI was negative.    Last visit-this is a patient previously seen by Dr. Castro for Parkinson's disease.  At her last visit Dr. Castro had increase the dose of her Sinemet 25/100 mg  To 1.5 tablets 4 times a day at 9 AM ,noon, 3 PM and 6 PM.  Patient reports to tolerating the dose well.  She denies having any severe tremors but continues to have difficulty moving her left leg.  She does  "have a history of left hip fracture and left hip replacement a few years back.  She also has cramps in her legs more so the left one.  She currently lives at an assisted living facility where she was getting PT however her son helps her her do exercises now a few times each week.  Patient is unable to walk without her walker.    Of note-I reviewed Dr. Dr. Castro's note in detail which is as follows-    Patient followed since 10/13 for Parkinson's disease that initially responded well to Sinemet although she had trouble tolerating it at first. Prior to the Parkinson's diagnosis she had had problems with frequent falls since at least 2010.  8/16: some restlessness in her legs at night and was on Sinemet 25/100 tid at 9:30, 1:30 and 5:30pm. Fractured her left hip 2/2 fall, conservative tx rather than surgery.Then worsening cramping, added Sinemet CR at , tremendously helpful.   Then had hip surgery, then in ICU, at  and other hospitals. Had colitis and UTI. Reported \"crazy\" dreams, also leg cramping, and confusion since the hip surgery 12/16. Was very confused while hospitalized, improved but not quite baseline.  Reported wearing off. Also bad and vivid dreams that started before hospitalization, but have worsened.   Takes C/L 25/100 at 9, 2, 5 and CR at bedtime (about 9).      5/17: hospitalized at [facility] in 2/17 for stroke; had confusion, right HP,  aphasia. MRI showed several small areas of acute infarction in the left parietal lobe. Echo showed small PFO. F/b cardiology. Carotids unremarkable. Tx with TPA as well as ASA 325mg and Lipitor 40mg daily. At f/u, right sided weakness significantly improved, confusion and aphasia resolved.      Increased bradykinesia and rigidity, and leg cramps worse. On Sinemet 25/100mg TID. No longer taking the Sinemet CR nightly, for unclear reasons.   Planned increase Sinemet back to 25/100mg tablets QID, restarted Sinemet CR 50/200mg tablets nightly as needed  4/18: doing well, " no recent hospitalization. No falls, but sometimes foot catches on carpet. Takes the regular sinemet 4x/day, often late, up to 2 hours, and takes the CR PRN at night, infrequently, for cramps. Wondering how late she can take that eg. 5am, if first regular dose is at 9am. Not LH/dizzy. Recommended taking sinemet CR every night for both cramps and morning sx.  Today:  Lots of trouble getting left foot and leg to move. Doing PT. They bought a velcro boot that dorsiflexes foot, trying to keep calf muscles stretched/extended. Those left leg muscles are also weaker maybe from disuse. Spends 80% of her time in recliner, cannot get out of it herself, very hard to scooch forward as instructed in this chair. Wondering about lift chair. Physical therapist suggested Botox for her left leg tightness. Still often getting sinemet dose an hour or two late, despite specific instructions. Notes lots of turnover at nursing facility.      The following portions of the patient's history were reviewed today and updated as of 07/16/2019  : allergies, current medications, past family history, past medical history, past social history, past surgical history and problem list  These document will be scanned to patient's chart.      Current Outpatient Medications:   •  acetaminophen (TYLENOL) 325 MG tablet, Take 650 mg by mouth Every 6 (Six) Hours As Needed (Pain)., Disp: , Rfl:   •  amLODIPine (NORVASC) 5 MG tablet, Take 1 tablet by mouth Daily., Disp: 90 tablet, Rfl: 3  •  aspirin 325 MG tablet, Take 1 tablet by mouth Daily., Disp: , Rfl:   •  bisacodyl (DULCOLAX) 10 MG suppository, bisacodyl 10 mg rectal suppository as needed, Disp: , Rfl:   •  carbidopa-levodopa (SINEMET)  MG per tablet, Take 1.5 tablets by mouth 4 (Four) Times a Day., Disp: 180 tablet, Rfl: 1  •  carvedilol (COREG) 6.25 MG tablet, Take 1 tablet by mouth Every 12 (Twelve) Hours., Disp: 90 tablet, Rfl: 3  •  cefdinir (OMNICEF) 300 MG capsule, Take 1 capsule by mouth 2  (Two) Times a Day., Disp: 20 capsule, Rfl: 0  •  chlorthalidone (HYGROTON) 25 MG tablet, Take 0.5 tablets by mouth Daily., Disp: 45 tablet, Rfl: 3  •  Cholecalciferol (D3-1000) 1000 units capsule, cholecalciferol (vit D3)(bulk), Disp: , Rfl:   •  clopidogrel (PLAVIX) 75 MG tablet, Take 75 mg by mouth Daily., Disp: , Rfl:   •  Cranberry 450 MG capsule, Take 1 tablet by mouth Daily., Disp: , Rfl:   •  dextromethorphan polistirex ER (DELSYM) 30 MG/5ML Suspension Extended Release oral suspension, Take  by mouth., Disp: , Rfl:   •  docusate sodium (COLACE) 100 MG capsule, Take 100 mg by mouth Daily As Needed., Disp: , Rfl:   •  DULoxetine (CYMBALTA) 30 MG capsule, Take 30 mg by mouth Daily., Disp: , Rfl:   •  levothyroxine (SYNTHROID, LEVOTHROID) 75 MCG tablet, Take 75 mcg by mouth Daily., Disp: , Rfl:   •  loratadine (CLARITIN) 10 MG tablet, Take 10 mg by mouth Daily As Needed., Disp: , Rfl:   •  magnesium oxide (MAGOX) 400 (241.3 Mg) MG tablet tablet, Take 400 mg by mouth Daily., Disp: , Rfl:   •  Multiple Vitamin (MULTI VITAMIN PO), Take  by mouth Daily. Centrum (brand new ), Disp: , Rfl:   •  Multiple Vitamins-Minerals (PRESERVISION AREDS 2 PO), Take 1 tablet by mouth 2 (Two) Times a Day., Disp: , Rfl:   •  phenazopyridine (PYRIDIUM) 100 MG tablet, Take 100 mg by mouth 3 (Three) Times a Day As Needed for Bladder Spasms., Disp: , Rfl:   •  polyethylene glycol (MIRALAX) packet, Take 17 g by mouth As Needed., Disp: , Rfl:   •  PREMARIN 0.625 MG/GM vaginal cream, Twice a week, Disp: , Rfl:   •  Probiotic Product (PROBIOTIC ADVANCED PO), Take  by mouth Daily., Disp: , Rfl:   •  ramipril (ALTACE) 10 MG capsule, Take 1 capsule by mouth Daily., Disp: 90 capsule, Rfl: 3  •  rosuvastatin (CRESTOR) 20 MG tablet, Every Night., Disp: , Rfl:   •  senna (SENOKOT) 8.6 MG tablet tablet, Take  by mouth As Needed., Disp: , Rfl:    Past Medical History:   Diagnosis Date   • Anemia    • Anxiety    • Asthma    • Cerebral infarction  (CMS/[facility])    • Depression    • Failure to thrive in adult    • Hyperlipidemia    • Hyperlipidemia    • Hypertension    • Hypothyroidism    • Osteoarthritis    • Osteoporosis    • Parkinson's disease (CMS/[facility])    • Stress incontinence    • Stroke (CMS/[facility]) 02/19/2017    left MCA thromboembolism   • Urinary retention       Past Surgical History:   Procedure Laterality Date   • APPENDECTOMY     • BACK SURGERY     • CATARACT EXTRACTION     • ELBOW PROCEDURE     • KNEE SURGERY     • NH PRIM PRQ TRLUML MCHNL THRMBC N-COR N-ICRA 1ST Left 2/19/2017    Diagnostic Angiogram only, no thrombectomy   • TOTAL HIP ARTHROPLASTY Left 12/2016      Family History   Problem Relation Age of Onset   • Alcohol abuse Other    • Hypertension Other    • Stroke Other    • Heart disease Son       Social History     Socioeconomic History   • Marital status:      Spouse name: Not on file   • Number of children: Not on file   • Years of education: Not on file   • Highest education level: Not on file   Tobacco Use   • Smoking status: Never Smoker   • Smokeless tobacco: Never Used   Substance and Sexual Activity   • Alcohol use: No   • Drug use: No   • Sexual activity: Defer   Social History Narrative    Lives at [facility]     Review of Systems   Constitutional: Positive for fatigue.   Gastrointestinal: Positive for constipation.   Genitourinary: Positive for dysuria.   Neurological: Positive for confusion.   Hematological: Bruises/bleeds easily.   Psychiatric/Behavioral: Positive for sleep disturbance and depressed mood. The patient is nervous/anxious.    All other systems reviewed and are negative.      Objective:    As noted in the chart    Neurology Exam:    General apperance: frail    Mental status: Alert, awake and oriented to time place and person.    Recent and Remote memory: Intact.    Attention span and Concentration: Normal.     Language and Speech: Intact- No dysarthria.    Fluency, Naming , Repitition and  Comprehension:  Intact    Cranial Nerves:   CN II: Visual fields are full. Intact. Fundi - Normal, No papillederma, Pupils - GONZALEZ  CN III, IV and VI: Extraocular movements are intact. Normal saccades.   CN V: Facial sensation is intact.   CN VII: Muscles of facial expression reveal no asymmetry. Intact.   CN VIII: Hearing is intact. Whispered voice intact.   CN IX and X: Palate elevates symmetrically. Intact  CN XI: Shoulder shrug is intact.   CN XII: Tongue is midline without evidence of atrophy or fasciculation.     Ophthalmoscopic exam of optic disc-normal    Motor: -No postural tremors noted in the left hand today    Right UE muscle strength 5/5. Normal tone.     Left UE muscle strength 5/5.  Mild cogwheel rigidity noted     Right LE muscle strength5/5. Normal tone.     Left LE muscle strength 4/5.  Her left foot is inverted, no foot drop seen ,Normal tone.      Sensory: Normal light touch, vibration and pinprick sensation bilaterally.    DTRs: 2+ bilaterally in upper and lower extremities.    Babinski: Negative bilaterally.    Co-ordination: Normal finger-to-nose, heel to shin B/L.    Rhomberg: Negative.    Gait: Wheelchair-bound today      Cardiovascular: Regular rate and rhythm without murmur, gallop or rub.    Assessment and Plan:  1. Parkinson's disease (CMS/formerly [facility])  I have told her to reduce her dose of Sinemet gradually to 25/100 mg, 1 tab p.o. 4 times daily (written instructions given on how to reduce the dose)  She can also continue Sinemet CR 25/100 mg only as needed at night  Since the patient has frequent UTIs I have told her to get a monthly urine analysis.  She should also start taking cranberry tablets/cranberry juice for the burning in her urine  Her hallucinations are most likely due to her UTI but if they persist I will start her on low doses of Seroquel    Return in about 3 months (around 4/16/2020).          Rupali Echevarria MD

## 2020-01-20 ENCOUNTER — TELEPHONE (OUTPATIENT)
Dept: NEUROLOGY | Facility: CLINIC | Age: 85
End: 2020-01-20

## 2020-01-21 RX ORDER — CARBIDOPA AND LEVODOPA 25; 100 MG/1; MG/1
1 TABLET, EXTENDED RELEASE ORAL NIGHTLY PRN
Qty: 30 TABLET | Refills: 11 | Status: SHIPPED | OUTPATIENT
Start: 2020-01-21 | End: 2020-08-14 | Stop reason: SDUPTHER

## 2020-03-29 NOTE — TELEPHONE ENCOUNTER
Please just give that first dose as early as possible, again, desirable to give even before she gets out of bed. It starts to work in less than 30 minutes, so if she gets it early, it will help her with eg getting dressed, eating breakfast, etc.   Former smoker

## 2020-08-14 RX ORDER — CARBIDOPA AND LEVODOPA 25; 100 MG/1; MG/1
1 TABLET, EXTENDED RELEASE ORAL NIGHTLY PRN
Qty: 30 TABLET | Refills: 11 | Status: SHIPPED | OUTPATIENT
Start: 2020-08-14 | End: 2021-01-01

## 2020-08-18 ENCOUNTER — TELEPHONE (OUTPATIENT)
Dept: NEUROLOGY | Facility: CLINIC | Age: 85
End: 2020-08-18

## 2020-08-18 NOTE — TELEPHONE ENCOUNTER
THE [facility]ARM[facility] CALLED AND REQUESTED THAT THE CARBIDPOA LEVODOPA  BE THE PLAIN TABLETS NOT THE EXTENDED RELEASE THEY WOULD LIKE A NEW PRESCRIPTION FAXED OVER .

## 2021-01-01 ENCOUNTER — HOSPITAL ENCOUNTER (EMERGENCY)
Facility: HOSPITAL | Age: 86
Discharge: HOME | End: 2021-12-01
Attending: EMERGENCY MEDICINE | Admitting: EMERGENCY MEDICINE

## 2021-01-01 ENCOUNTER — TELEPHONE (OUTPATIENT)
Dept: NEUROLOGY | Facility: CLINIC | Age: 86
End: 2021-01-01

## 2021-01-01 ENCOUNTER — OFFICE VISIT (OUTPATIENT)
Dept: NEUROLOGY | Facility: CLINIC | Age: 86
End: 2021-01-01

## 2021-01-01 ENCOUNTER — APPOINTMENT (OUTPATIENT)
Dept: GENERAL RADIOLOGY | Facility: HOSPITAL | Age: 86
End: 2021-01-01

## 2021-01-01 ENCOUNTER — HOSPITAL ENCOUNTER (EMERGENCY)
Facility: HOSPITAL | Age: 86
Discharge: HOME | End: 2021-08-12
Attending: EMERGENCY MEDICINE | Admitting: EMERGENCY MEDICINE

## 2021-01-01 ENCOUNTER — APPOINTMENT (OUTPATIENT)
Dept: CT IMAGING | Facility: HOSPITAL | Age: 86
End: 2021-01-01

## 2021-01-01 VITALS
HEIGHT: 58 IN | BODY MASS INDEX: 24.35 KG/M2 | RESPIRATION RATE: 15 BRPM | DIASTOLIC BLOOD PRESSURE: 90 MMHG | OXYGEN SATURATION: 94 % | HEART RATE: 75 BPM | WEIGHT: 116 LBS | SYSTOLIC BLOOD PRESSURE: 169 MMHG | TEMPERATURE: 98 F

## 2021-01-01 VITALS
HEIGHT: 59 IN | HEART RATE: 87 BPM | SYSTOLIC BLOOD PRESSURE: 110 MMHG | DIASTOLIC BLOOD PRESSURE: 88 MMHG | WEIGHT: 112.2 LBS | OXYGEN SATURATION: 97 % | BODY MASS INDEX: 22.62 KG/M2

## 2021-01-01 VITALS
DIASTOLIC BLOOD PRESSURE: 70 MMHG | RESPIRATION RATE: 16 BRPM | SYSTOLIC BLOOD PRESSURE: 129 MMHG | OXYGEN SATURATION: 95 % | BODY MASS INDEX: 23.39 KG/M2 | HEART RATE: 81 BPM | TEMPERATURE: 97.5 F | WEIGHT: 116 LBS | HEIGHT: 59 IN

## 2021-01-01 DIAGNOSIS — G20 PARKINSON'S DISEASE (HCC): Primary | ICD-10-CM

## 2021-01-01 DIAGNOSIS — N39.0 URINARY TRACT INFECTION WITHOUT HEMATURIA, SITE UNSPECIFIED: Primary | ICD-10-CM

## 2021-01-01 DIAGNOSIS — I69.30 SEQUELAE, POST-STROKE: ICD-10-CM

## 2021-01-01 DIAGNOSIS — S09.90XA TRAUMATIC INJURY OF HEAD, INITIAL ENCOUNTER: ICD-10-CM
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
## 2021-01-01 DIAGNOSIS — M79.602 LEFT ARM PAIN: Primary | ICD-10-CM

## 2021-01-01 DIAGNOSIS — R53.1 GENERALIZED WEAKNESS: ICD-10-CM

## 2021-01-01 LAB
ALBUMIN SERPL-MCNC: 4.1 G/DL (ref 3.5–5.2)
ALBUMIN/GLOB SERPL: 1.1 G/DL
ALP SERPL-CCNC: 79 U/L (ref 39–117)
ALT SERPL W P-5'-P-CCNC: <5 U/L (ref 1–33)
ANION GAP SERPL CALCULATED.3IONS-SCNC: 9 MMOL/L (ref 5–15)
AST SERPL-CCNC: 20 U/L (ref 1–32)
BACTERIA SPEC AEROBE CULT: ABNORMAL
BACTERIA UR QL AUTO: ABNORMAL /HPF
BASOPHILS # BLD AUTO: 0.01 10*3/MM3 (ref 0–0.2)
BASOPHILS NFR BLD AUTO: 0.2 % (ref 0–1.5)
BILIRUB SERPL-MCNC: 0.4 MG/DL (ref 0–1.2)
BILIRUB UR QL STRIP: NEGATIVE
BUN SERPL-MCNC: 24 MG/DL (ref 8–23)
BUN/CREAT SERPL: 29.3 (ref 7–25)
CALCIUM SPEC-SCNC: 9.4 MG/DL (ref 8.2–9.6)
CHLORIDE SERPL-SCNC: 104 MMOL/L (ref 98–107)
CLARITY UR: ABNORMAL
CO2 SERPL-SCNC: 25 MMOL/L (ref 22–29)
COLOR UR: YELLOW
CREAT SERPL-MCNC: 0.82 MG/DL (ref 0.57–1)
D-LACTATE SERPL-SCNC: 1 MMOL/L (ref 0.5–2)
DEPRECATED RDW RBC AUTO: 51.9 FL (ref 37–54)
EOSINOPHIL # BLD AUTO: 0.11 10*3/MM3 (ref 0–0.4)
EOSINOPHIL NFR BLD AUTO: 2.6 % (ref 0.3–6.2)
ERYTHROCYTE [DISTWIDTH] IN BLOOD BY AUTOMATED COUNT: 14.3 % (ref 12.3–15.4)
ERYTHROCYTE [SEDIMENTATION RATE] IN BLOOD: 25 MM/HR (ref 0–30)
GFR SERPL CREATININE-BSD FRML MDRD: 65 ML/MIN/1.73
GLOBULIN UR ELPH-MCNC: 3.6 GM/DL
GLUCOSE SERPL-MCNC: 94 MG/DL (ref 65–99)
GLUCOSE UR STRIP-MCNC: NEGATIVE MG/DL
HCT VFR BLD AUTO: 36.8 % (ref 34–46.6)
HGB BLD-MCNC: 11.8 G/DL (ref 12–15.9)
HGB UR QL STRIP.AUTO: NEGATIVE
HOLD SPECIMEN: NORMAL
HYALINE CASTS UR QL AUTO: ABNORMAL /LPF
IMM GRANULOCYTES # BLD AUTO: 0.01 10*3/MM3 (ref 0–0.05)
IMM GRANULOCYTES NFR BLD AUTO: 0.2 % (ref 0–0.5)
KETONES UR QL STRIP: NEGATIVE
LEUKOCYTE ESTERASE UR QL STRIP.AUTO: ABNORMAL
LYMPHOCYTES # BLD AUTO: 1.51 10*3/MM3 (ref 0.7–3.1)
LYMPHOCYTES NFR BLD AUTO: 36 % (ref 19.6–45.3)
MCH RBC QN AUTO: 31.7 PG (ref 26.6–33)
MCHC RBC AUTO-ENTMCNC: 32.1 G/DL (ref 31.5–35.7)
MCV RBC AUTO: 98.9 FL (ref 79–97)
MONOCYTES # BLD AUTO: 0.37 10*3/MM3 (ref 0.1–0.9)
MONOCYTES NFR BLD AUTO: 8.8 % (ref 5–12)
NEUTROPHILS NFR BLD AUTO: 2.18 10*3/MM3 (ref 1.7–7)
NEUTROPHILS NFR BLD AUTO: 52.2 % (ref 42.7–76)
NITRITE UR QL STRIP: NEGATIVE
NRBC BLD AUTO-RTO: 0 /100 WBC (ref 0–0.2)
PH UR STRIP.AUTO: 7.5 [PH] (ref 5–8)
PLATELET # BLD AUTO: 147 10*3/MM3 (ref 140–450)
PMV BLD AUTO: 10.1 FL (ref 6–12)
POTASSIUM SERPL-SCNC: 4.5 MMOL/L (ref 3.5–5.2)
PROT SERPL-MCNC: 7.7 G/DL (ref 6–8.5)
PROT UR QL STRIP: NEGATIVE
QT INTERVAL: 418 MS
QTC INTERVAL: 451 MS
RBC # BLD AUTO: 3.72 10*6/MM3 (ref 3.77–5.28)
RBC # UR: ABNORMAL /HPF
REF LAB TEST METHOD: ABNORMAL
SODIUM SERPL-SCNC: 138 MMOL/L (ref 136–145)
SP GR UR STRIP: 1.01 (ref 1–1.03)
SQUAMOUS #/AREA URNS HPF: ABNORMAL /HPF
TSH SERPL DL<=0.05 MIU/L-ACNC: 2.02 UIU/ML (ref 0.27–4.2)
UROBILINOGEN UR QL STRIP: ABNORMAL
WBC # BLD AUTO: 4.19 10*3/MM3 (ref 3.4–10.8)
WBC UR QL AUTO: ABNORMAL /HPF
WHOLE BLOOD HOLD SPECIMEN: NORMAL

## 2021-01-01 PROCEDURE — 83605 ASSAY OF LACTIC ACID: CPT | Performed by: NURSE PRACTITIONER

## 2021-01-01 PROCEDURE — 96365 THER/PROPH/DIAG IV INF INIT: CPT

## 2021-01-01 PROCEDURE — 85652 RBC SED RATE AUTOMATED: CPT | Performed by: EMERGENCY MEDICINE

## 2021-01-01 PROCEDURE — 73060 X-RAY EXAM OF HUMERUS: CPT

## 2021-01-01 PROCEDURE — 71045 X-RAY EXAM CHEST 1 VIEW: CPT

## 2021-01-01 PROCEDURE — 99214 OFFICE O/P EST MOD 30 MIN: CPT | Performed by: PSYCHIATRY & NEUROLOGY

## 2021-01-01 PROCEDURE — 81001 URINALYSIS AUTO W/SCOPE: CPT | Performed by: EMERGENCY MEDICINE

## 2021-01-01 PROCEDURE — 25010000002 CEFTRIAXONE PER 250 MG: Performed by: NURSE PRACTITIONER

## 2021-01-01 PROCEDURE — 87086 URINE CULTURE/COLONY COUNT: CPT | Performed by: NURSE PRACTITIONER

## 2021-01-01 PROCEDURE — 85025 COMPLETE CBC W/AUTO DIFF WBC: CPT | Performed by: EMERGENCY MEDICINE

## 2021-01-01 PROCEDURE — P9612 CATHETERIZE FOR URINE SPEC: HCPCS

## 2021-01-01 PROCEDURE — 99283 EMERGENCY DEPT VISIT LOW MDM: CPT

## 2021-01-01 PROCEDURE — 84443 ASSAY THYROID STIM HORMONE: CPT | Performed by: EMERGENCY MEDICINE

## 2021-01-01 PROCEDURE — 80053 COMPREHEN METABOLIC PANEL: CPT | Performed by: EMERGENCY MEDICINE

## 2021-01-01 PROCEDURE — 70450 CT HEAD/BRAIN W/O DYE: CPT

## 2021-01-01 PROCEDURE — 99284 EMERGENCY DEPT VISIT MOD MDM: CPT

## 2021-01-01 PROCEDURE — 93005 ELECTROCARDIOGRAM TRACING: CPT | Performed by: EMERGENCY MEDICINE

## 2021-01-01 RX ORDER — CEFTRIAXONE SODIUM 1 G/50ML
1 INJECTION, SOLUTION INTRAVENOUS ONCE
Status: COMPLETED | OUTPATIENT
Start: 2021-01-01 | End: 2021-01-01

## 2021-01-01 RX ORDER — CLOTRIMAZOLE AND BETAMETHASONE DIPROPIONATE 10; .64 MG/G; MG/G
CREAM TOPICAL
Qty: 45 EACH | Refills: 3 | Status: SHIPPED | OUTPATIENT
Start: 2021-01-01 | End: 2021-01-01

## 2021-01-01 RX ORDER — CLOTRIMAZOLE AND BETAMETHASONE DIPROPIONATE 10; .64 MG/G; MG/G
CREAM TOPICAL
Qty: 45 G | Refills: 0 | Status: SHIPPED | OUTPATIENT
Start: 2021-01-01

## 2021-01-01 RX ORDER — PIMAVANSERIN TARTRATE 34 MG/1
1 CAPSULE ORAL DAILY
Qty: 30 CAPSULE | Refills: 5 | Status: SHIPPED | OUTPATIENT
Start: 2021-01-01 | End: 2022-01-01

## 2021-01-01 RX ORDER — CARBIDOPA AND LEVODOPA 25; 100 MG/1; MG/1
TABLET, EXTENDED RELEASE ORAL
Qty: 30 TABLET | Refills: 11 | Status: SHIPPED | OUTPATIENT
Start: 2021-01-01

## 2021-01-01 RX ORDER — CEFDINIR 300 MG/1
300 CAPSULE ORAL 2 TIMES DAILY
Qty: 14 CAPSULE | Refills: 0 | Status: SHIPPED | OUTPATIENT
Start: 2021-01-01 | End: 2021-01-01

## 2021-01-01 RX ORDER — SODIUM CHLORIDE 0.9 % (FLUSH) 0.9 %
10 SYRINGE (ML) INJECTION AS NEEDED
Status: DISCONTINUED | OUTPATIENT
Start: 2021-01-01 | End: 2021-01-01 | Stop reason: HOSPADM

## 2021-01-01 RX ORDER — PIMAVANSERIN TARTRATE 34 MG/1
1 CAPSULE ORAL DAILY
Qty: 30 CAPSULE | Refills: 5 | Status: SHIPPED | OUTPATIENT
Start: 2021-01-01 | End: 2021-01-01 | Stop reason: SDUPTHER

## 2021-01-01 RX ADMIN — CEFTRIAXONE SODIUM 1 G: 1 INJECTION, SOLUTION INTRAVENOUS at 16:09

## 2021-01-01 RX ADMIN — SODIUM CHLORIDE 500 ML: 9 INJECTION, SOLUTION INTRAVENOUS at 14:55

## 2021-04-07 NOTE — THERAPY EVALUATION
Acute Care - Physical Therapy Initial Evaluation  [facility]     Patient Name: Florinda Knapp  : 3/5/1931  MRN: [id number]  Today's Date: 2017   Onset of Illness/Injury or Date of Surgery Date: 17  Date of Referral to PT: 17  Referring Physician: Mayne, PA-C      Admit Date: 2017     Visit Dx:    ICD-10-CM ICD-9-CM   1. Abdominal pain, acute, generalized R10.84 789.07     338.19   2. Leukocytosis, unspecified type D72.829 288.60   3. Acute cystitis without hematuria N30.00 595.0   4. Failure to thrive syndrome, adult R62.7 783.7   5. Impaired mobility and ADLs Z74.09 799.89     Patient Active Problem List   Diagnosis   • Dysuria   • Elbow injury   • Fracture of left olecranon process   • Parkinson's disease   • Trauma left hip   • Acute Symptomatic on Chronic Normocytic Anemia   • Acute respiratory failure with hypoxia   • Generalized weakness   • Chest pain   • Symptomatic anemia   • Elevated troponin I level, possibly due to anemia   • Status post total replacement of left hip 2016 at [facility]   • H/O urinary retention   • History of recurrent UTIs   • Essential hypertension   • Anxiety   • CHF with cardiomyopathy   • Colitis   • Hypokalemia   • Acute ischemic left MCA stroke   • Hypertension   • Hyperlipidemia   • Urinary retention/Frequent UTIs   • Hypothyroidism   • Status post left hip replacement   • C. difficile colitis   • History of ischemic left MCA stroke 17   • Renal cyst, left noted on CT   • Physical deconditioning   • FTT (failure to thrive) in adult   • Abdominal pain, acute, generalized   • Rhinorrhea     Past Medical History:   Diagnosis Date   • Anemia    • Anxiety    • Asthma    • Cerebral infarction    • Depression    • Failure to thrive in adult    • Hyperlipidemia    • Hyperlipidemia    • Hypertension    • Hypothyroidism    • Osteoarthritis    • Osteoporosis    • Parkinson's disease    • Stress incontinence    • Stroke 2017    left MCA thromboembolism   •  Urinary retention      Past Surgical History:   Procedure Laterality Date   • APPENDECTOMY     • BACK SURGERY     • CATARACT EXTRACTION     • ELBOW PROCEDURE     • KNEE SURGERY     • DE PRIM PRQ TRLUML MCHNL THRMBC N-COR N-ICRA 1ST Left 2/19/2017    Diagnostic Angiogram only, no thrombectomy   • TOTAL HIP ARTHROPLASTY Left 12/2016          PT ASSESSMENT (last 72 hours)      PT Evaluation       07/05/17 0955 07/05/17 0742    Rehab Evaluation    Document Type evaluation  -BD evaluation  -    Subjective Information agree to therapy;complains of;weakness  - agree to therapy;complains of;weakness  -MC    Patient Effort, Rehab Treatment adequate  -BD good  -    Symptoms Noted During/After Treatment fatigue  - fatigue  -    General Information    Patient Profile Review yes  -BD yes  -    Onset of Illness/Injury or Date of Surgery Date  07/04/17  -    Referring Physician Mayne, PA-ZARA  -BD Mayne, PA-C  -JUNIOR    General Observations Pt supine in bed, Spore precautions, IV, TELE, on room air. Son present at end of PT.   - Pt received in supine, IV intact, on room air, exit alarm, no visitors present  -    Pertinent History Of Current Problem PT 86 yof aditted to ED from [facility] with c/o generalized weakness, nausia, abdominal pain, diarrhea. PT admitted for further evaluation and management.  - Pt is 86 YOF who presented to ED from [facility] with c/o generalized weakness, nausea, mild confusion, abdominal pain, diarrhea, and poor oral intake  -    Precautions/Limitations fall precautions;other (see comments)   spore prescaution.  - fall precautions  -    Prior Level of Function independent:;all household mobility;gait;transfer;bed mobility;feeding;grooming;mod assist:;dressing;bathing;dependent:;driving  - independent:;all household mobility;gait;transfer;bed mobility;grooming;feeding;mod assist:;dressing;bathing  -    Equipment Currently Used at Home walker, rolling;grab bar  -  walker, rolling;grab bar  -    Plans/Goals Discussed With patient and family;agreed upon  -BD patient;agreed upon  -    Risks Reviewed patient and family:;LOB;change in vital signs;lines disloged  - patient:;LOB;nausea/vomiting;dizziness;increased discomfort;change in vital signs;lines disloged  -    Benefits Reviewed patient and family:;improve function;increase strength  - patient:;improve function;increase independence;increase strength;increase balance;decrease pain;improve skin integrity;increase knowledge  -    Barriers to Rehab medically complex;previous functional deficit  - medically complex;previous functional deficit  -    Living Environment    Lives With facility resident  - facility resident  -    Living Arrangements assisted living  - assisted living  -    Home Accessibility no concerns  - no concerns  -    Clinical Impression    Date of Referral to PT 07/04/17  -     PT Diagnosis Impaired functional mobility endurance, balance  -     Patient/Family Goals Statement to feel better  -     Criteria for Skilled Therapeutic Interventions Met yes  -     Rehab Potential good, to achieve stated therapy goals  -     Vital Signs    Pre Systolic BP Rehab 145  -BD --   VSS throughout  -    Pre Treatment Diastolic BP 89  -BD     Pre SpO2 (%) 91  -BD     O2 Delivery Pre Treatment room air  -     O2 Delivery Intra Treatment room air  -     O2 Delivery Post Treatment room air  -     Pain Assessment    Pain Assessment Headley-Anderson FACES  - Headley-Baker FACES  -    Headley-Anderson FACES Pain Rating 2  -BD 2  -    Pain Type Acute pain  - Acute pain  -    Pain Location Abdomen  - Abdomen  -    Vision Assessment/Intervention    Visual Impairment WFL with corrective lenses  - WFL with corrective lenses  -    Cognitive Assessment/Intervention    Current Cognitive/Communication Assessment functional  - functional  -    Orientation Status oriented x 4  -BD oriented x 4   -    Follows Commands/Answers Questions 75% of the time;able to follow single-step instructions  - 75% of the time  -    Personal Safety WNL/WFL  - mild impairment  -    Personal Safety Interventions fall prevention program maintained  - fall prevention program maintained;gait belt;muscle strengthening facilitated;nonskid shoes/slippers when out of bed;supervised activity  -    Short/Long Term Memory  intact short term memory;intact long term memory  -    ROM (Range of Motion)    General ROM lower extremity range of motion deficits identified  - upper extremity range of motion deficits identified  -    General ROM Detail Bilateral drop foot, functional DF L > R  -BD L shoulder 50% AROM, all other LUE WFL. RUE WFL.  Defer BLE to PT  -    MMT (Manual Muscle Testing)    General MMT Assessment  upper extremity strength deficits identified  -    General MMT Assessment Detail  L shoulder 3-/5, all other LUE and RUE 3+/5.  Defer BLE to PT. Pt reported impaired  strength, pancake call bell issued  -    Mobility Assessment/Training    Extremity Weight-Bearing Status other (see comments)   LE's not assessed today due to pt's diarrhea and illness.  -     Bed Mobility, Assessment/Treatment    Bed Mobility, Assistive Device  bed rails;head of bed elevated;draw sheet  -    Bed Mobility, Roll Left, [place]  maximum assist (25% patient effort)  -    Bed Mobility, Roll Right, [place]  maximum assist (25% patient effort)  -    Bed Mobility, Scoot/Bridge, [place]  dependent (less than 25% patient effort)  -    Bed Mob, Supine to Sit, [place]  maximum assist (25% patient effort)  -    Bed Mob, Sit to Supine, [place]  maximum assist (25% patient effort)  -    Bed Mobility, Safety Issues  decreased use of arms for pushing/pulling;decreased use of legs for bridging/pushing  -    Bed Mobility, Impairments  ROM decreased;strength decreased;impaired balance  -    Bed  Mobility, Comment  Pt with generalized increased tone/rigidity noted, limiting her ability to perform bed mobility.  Max cues for sequencing, and Max A with BLE and trunk to come into sitting as well as to return to supine.  Pt rolled multiple times to each side for toileting  -    Transfer Assessment/Treatment    Transfers, Sit-Stand [place]  maximum assist (25% patient effort);upper extremity support  -    Transfers, Stand-Sit [place]  maximum assist (25% patient effort);upper extremity support  -    Transfer, Impairments  strength decreased;impaired balance  -    Transfer, Comment PT stated she was not able to move from bed today. Pt with active diarrhea.  -BD Pt attempted one sit to stand with Max A, unable to completely clear hips from bed.  Pt with very strong fear of falling, pulls back posteriorly during transfers in ancitipation of falling.  Pt agreeable to get to chair but once she stood, she reported that she had a BM.  Pt returned to supine in order to be cleaned up  -    Motor Skills/Interventions    Additional Documentation  Balance Skills Training (Group)  -    Balance Skills Training    Sitting-Level of Assistance  Moderate assistance  -    Sitting-Balance Support  Feet supported  -    Sitting-Balance Activities  Trunk control activities  -    Sitting # of Minutes  3  -    Standing-Level of Assistance  Maximum assistance  -    Static Standing Balance Support  Right upper extremity supported;Left upper extremity supported  -    Therapy Exercises    Bilateral Lower Extremities AAROM:;ankle pumps/circles  -     Sensory Assessment/Intervention    Light Touch  LUE;RUE  -MC    LUE Light Touch  WNL  -MC    RUE Light Touch  WNL  -    Positioning and Restraints    Pre-Treatment Position in bed  - in bed  -    Post Treatment Position bed  - bed  -    In Bed notified nsg;supine;call light within reach;encouraged to call for assist;patient within staff view;exit  alarm on  -BD notified nsg;supine;call light within reach;encouraged to call for assist;exit alarm on  -      07/04/17 1820 07/04/17 1800    General Information    Equipment Currently Used at Home walker, rolling;grab bar  -VL     Living Environment    Lives With  facility resident  -    Living Arrangements  assisted living  -    Home Accessibility  no concerns  -    Stair Railings at Home  inside, present at both sides  -    Type of Financial/Environmental Concern  none  -VL    Transportation Available  car;family or friend will provide  -      User Key  (r) = Recorded By, (t) = Taken By, (c) = Cosigned By    Initials Name Provider Type     Charla Rowland, OT Occupational Therapist     Bee Blank, PT Physical Therapist     Elizabeth Sherwood, RN Registered Nurse          Physical Therapy Education     Title: PT OT SLP Therapies (Active)     Topic: Physical Therapy (Done)     Point: Mobility training (Done)    Learning Progress Summary    Learner Readiness Method Response Comment Documented by Status   Patient Acceptance E,TB VU,NR,NL   07/05/17 1113 Done   Family Acceptance E,TB VU,NR,NL   07/05/17 1113 Done               Point: Home exercise program (Done)    Learning Progress Summary    Learner Readiness Method Response Comment Documented by Status   Patient Acceptance E,TB VU,NR,NL  BD 07/05/17 1113 Done   Family Acceptance E,TB VU,NR,NL  BD 07/05/17 1113 Done               Point: Body mechanics (Done)    Learning Progress Summary    Learner Readiness Method Response Comment Documented by Status   Patient Acceptance E,TB VU,NR,NL   07/05/17 1113 Done   Family Acceptance E,TB VU,NR,NL  BD 07/05/17 1113 Done               Point: Precautions (Done)    Learning Progress Summary    Learner Readiness Method Response Comment Documented by Status   Patient Acceptance E,TB VU,NR,NL  BD 07/05/17 1113 Done   Family Acceptance E,TB VU,NR,NL  BD 07/05/17 1113 Done                      User Key      Initials Effective Dates Name Provider Type Discipline     06/13/16 -  Bee Blank, PT Physical Therapist PT                PT Recommendation and Plan  Anticipated Discharge Disposition: inpatient rehabilitation facility  PT Frequency: daily, per priority policy  Plan of Care Review  Plan Of Care Reviewed With: patient, son  Outcome Summary/Follow up Plan: Pt in bed today with diarrhea. PT evaluation in bed but assess standing activities and transfers.          IP PT Goals       07/05/17 1116          Bed Mobility PT LTG    Bed Mobility PT LTG, Time to Achieve 2 wks  -BD      Bed Mobility PT LTG, Activity Type all bed mobility  -BD      Bed Mobility PT LTG, [place] Level conditional independence  -BD      Bed Mobility PT Goal  LTG, Assist Device bed rails  -BD      Bed Mobility PT LTG, Outcome goal ongoing  -BD      Transfer Training PT LTG    Transfer Training PT LTG, Date Established 07/05/17  -      Transfer Training PT LTG, Activity Type all transfers;sit to stand/stand to sit  -BD      Transfer Training PT LTG, Assist Device walker, rolling  -BD      Transfer Training PT  LTG, Date Goal Reviewed 07/05/17  -BD      Transfer Training PT LTG, Outcome goal ongoing  -BD      Static Sitting Balance PT LTG    Static Sitting Balance PT LTG, Time to Achieve 2 wks  -BD      Static Sitting Balance PT LTG, [place] Level conditional independence;supervision required  -BD      Static Sitting Balance PT LTG, Assist Device UE Support  -BD      Static Sitting Balance PT LTG, Outcome goal ongoing  -BD      Dynamic Sitting Balance PT STG    Dynamic Sitting Balance PT STG, Time to Achieve 2 wks  -BD      Dynamic Sitting Balance PT STG, [place] Level contact guard assist  -BD      Dynamic Sitting Balance PT STG, Outcome goal ongoing  -BD      Static Standing Balance PT STG    Static Standing Balance PT STG, Date Established 07/05/17  -BD      Static Standing Balance PT STG, Time to Achieve 2 wks  -BD       Static Standing Balance PT STG, [place] Level set up required;contact guard assist  -BD      Static Standing Balance PT STG, Assist Device assistive Device  -BD      Static Standing Balance PT STG, Outcome goal ongoing  -BD        User Key  (r) = Recorded By, (t) = Taken By, (c) = Cosigned By    Initials Name Provider Type    AJIT Blank, JINNY Physical Therapist                Outcome Measures       07/05/17 0955 07/05/17 0742       How much help from another person do you currently need...    Turning from your back to your side while in flat bed without using bedrails? 2  -BD      Moving from lying on back to sitting on the side of a flat bed without bedrails? 2  -BD      Moving to and from a bed to a chair (including a wheelchair)? 2  -BD      Standing up from a chair using your arms (e.g., wheelchair, bedside chair)? 2  -BD      Climbing 3-5 steps with a railing? 1  -BD      To walk in hospital room? 1  -BD      AM-PAC 6 Clicks Score 10  -BD      How much help from another is currently needed...    Putting on and taking off regular lower body clothing?  1  -MC     Bathing (including washing, rinsing, and drying)  1  -MC     Toileting (which includes using toilet bed pan or urinal)  1  -MC     Putting on and taking off regular upper body clothing  2  -MC     Taking care of personal grooming (such as brushing teeth)  2  -MC     Eating meals  3  -MC     Score  10  -MC     Functional Assessment    Outcome Measure Options AM-PAC 6 Clicks Basic Mobility (PT)  -BD AM-PAC 6 Clicks Daily Activity (OT)  -       User Key  (r) = Recorded By, (t) = Taken By, (c) = Cosigned By    Initials Name Provider Type    JUNIOR Rowland, OT Occupational Therapist    AJIT Blank, PT Physical Therapist           Time Calculation:         PT Charges       07/05/17 1124          Time Calculation    Start Time 0955  -      PT Received On 07/05/17  -      PT Goal Re-Cert Due Date 07/26/17  -      Time Calculation-  PT    Total Timed Code Minutes- PT 30 minute(s)  -BD        User Key  (r) = Recorded By, (t) = Taken By, (c) = Cosigned By    Initials Name Provider Type    AJIT Blank, PT Physical Therapist          Therapy Charges for Today     Code Description Service Date Service Provider Modifiers Qty    91995787968 HC PT THER PROC EA 15 MIN 7/5/2017 Bee Blank, PT GP 1    11372429440 HC PT EVAL MOD COMPLEXITY 1 7/5/2017 Bee Blank, PT GP 1    62471340462 HC PT THER SUPP EA 15 MIN 7/5/2017 Bee Blank, PT GP 2    73167660970 HC PT THER PROC EA 15 MIN 7/5/2017 Bee Blank, PT GP 2          PT G-Codes  Outcome Measure Options: AM-PAC 6 Clicks Basic Mobility (PT)      Bee Blank, PT  7/5/2017        room air

## 2021-07-30 NOTE — PROGRESS NOTES
Subjective:    CC: Florinda Knapp is seen today for Parkinson's disease    HPI:  Current visit-patient states that she cut back on her Sinemet 25/100 mg to 1 tablet 4 times a day but denies having any tremors.  Also ends up up taking her Sinemet ER 25/100 mg almost every night for leg cramps.  She is getting PT but does not walk much and is mostly wheelchair-bound.  She also denies having any hallucinations but does have vivid dreams at night.  Has not had a UTI since last year.  With regards to her stroke she continues to take Plavix along with Crestor 20 mg.  Does complain of some myalgias.    Last visit-since the last visit patient was in the hospital recently with confusion and hallucinations.  She was found to have a severe UTI and treated for the same.  Even now she has hallucinations off and on.  Only takes Cymbalta and has now stopped taking Remeron.  Also continues to have burning in the urine and I have asked her to take cranberry tablets.  With regards to her Parkinson's she is still taking Sinemet 25/100 mg 1.5 tablets 4 times a day in addition to Sinemet CR 25/100 mg only as needed at night however does not take it most days.  Patient is completely wheelchair-bound and does not walk at all because of her left leg weakness (which was caused after she had an infection in her left hip post surgery).  For her stroke she continues to take Plavix as well as Crestor 20 mg daily.    Last visit-since her last visit patient continues to take Sinemet 25/100 mg, 1.5 tablets 4 times daily and her tremors are well controlled.  She occasionally also takes Sinemet CR 25/100 mg at bedtime for left leg cramps.  Did not get physical therapy for some time but that did not really help with the left leg pain and weakness.  She also cut down on her dose of Remeron as she was having increased sleepiness on the higher dose but I do not see it on her list of medications today.  She does not know if it was discontinued by   "[employer].  Her last blood work did show a low platelet count.  She has been having burning in the urine recently.  Work-up for UTI was negative.    Last visit-this is a patient previously seen by Dr. Castro for Parkinson's disease.  At her last visit Dr. Castro had increase the dose of her Sinemet 25/100 mg  To 1.5 tablets 4 times a day at 9 AM ,noon, 3 PM and 6 PM.  Patient reports to tolerating the dose well.  She denies having any severe tremors but continues to have difficulty moving her left leg.  She does have a history of left hip fracture and left hip replacement a few years back.  She also has cramps in her legs more so the left one.  She currently lives at an assisted living facility where she was getting PT however her son helps her her do exercises now a few times each week.  Patient is unable to walk without her walker.    Of note-I reviewed Dr. Dr. Castro's note in detail which is as follows-    Patient followed since 10/13 for Parkinson's disease that initially responded well to Sinemet although she had trouble tolerating it at first. Prior to the Parkinson's diagnosis she had had problems with frequent falls since at least 2010.  8/16: some restlessness in her legs at night and was on Sinemet 25/100 tid at 9:30, 1:30 and 5:30pm. Fractured her left hip 2/2 fall, conservative tx rather than surgery.Then worsening cramping, added Sinemet CR at , tremendously helpful.   Then had hip surgery, then in ICU, at  and other hospitals. Had colitis and UTI. Reported \"crazy\" dreams, also leg cramping, and confusion since the hip surgery 12/16. Was very confused while hospitalized, improved but not quite baseline.  Reported wearing off. Also bad and vivid dreams that started before hospitalization, but have worsened.   Takes C/L 25/100 at 9, 2, 5 and CR at bedtime (about 9).      5/17: hospitalized at [facility] in 2/17 for stroke; had confusion, right HP,  aphasia. MRI showed several small areas of acute " infarction in the left parietal lobe. Echo showed small PFO. F/b cardiology. Carotids unremarkable. Tx with TPA as well as ASA 325mg and Lipitor 40mg daily. At f/u, right sided weakness significantly improved, confusion and aphasia resolved.      Increased bradykinesia and rigidity, and leg cramps worse. On Sinemet 25/100mg TID. No longer taking the Sinemet CR nightly, for unclear reasons.   Planned increase Sinemet back to 25/100mg tablets QID, restarted Sinemet CR 50/200mg tablets nightly as needed  4/18: doing well, no recent hospitalization. No falls, but sometimes foot catches on carpet. Takes the regular sinemet 4x/day, often late, up to 2 hours, and takes the CR PRN at night, infrequently, for cramps. Wondering how late she can take that eg. 5am, if first regular dose is at 9am. Not LH/dizzy. Recommended taking sinemet CR every night for both cramps and morning sx.  Today:  Lots of trouble getting left foot and leg to move. Doing PT. They bought a velcro boot that dorsiflexes foot, trying to keep calf muscles stretched/extended. Those left leg muscles are also weaker maybe from disuse. Spends 80% of her time in recliner, cannot get out of it herself, very hard to scooch forward as instructed in this chair. Wondering about lift chair. Physical therapist suggested Botox for her left leg tightness. Still often getting sinemet dose an hour or two late, despite specific instructions. Notes lots of turnover at nursing facility.      The following portions of the patient's history were reviewed today and updated as of 07/16/2019  : allergies, current medications, past family history, past medical history, past social history, past surgical history and problem list  These document will be scanned to patient's chart.      Current Outpatient Medications:   •  acetaminophen (TYLENOL) 325 MG tablet, Take 650 mg by mouth Every 6 (Six) Hours As Needed (Pain)., Disp: , Rfl:   •  bisacodyl (DULCOLAX) 10 MG suppository,  bisacodyl 10 mg rectal suppository as needed, Disp: , Rfl:   •  carbidopa-levodopa (SINEMET)  MG per tablet, Take 1 tablet 4 times a day, Disp: 120 tablet, Rfl: 5  •  carbidopa-levodopa ER (Sinemet CR)  MG per tablet, Take 1 tablet by mouth At Night As Needed (for symptoms of Parkinsons)., Disp: 30 tablet, Rfl: 11  •  Cholecalciferol (D3-1000) 1000 units capsule, cholecalciferol (vit D3)(bulk), Disp: , Rfl:   •  clopidogrel (PLAVIX) 75 MG tablet, Take 75 mg by mouth Daily., Disp: , Rfl:   •  clotrimazole-betamethasone (LOTRISONE) 1-0.05 % cream, APPLY TO AFFECTED AND SURROUNDING AREAS OF SKIN TOPICALLY EVERY MORNING AND EVERY EVENING, Disp: 45 each, Rfl: 3  •  DULoxetine (CYMBALTA) 30 MG capsule, Take 30 mg by mouth Daily., Disp: , Rfl:   •  levothyroxine (SYNTHROID, LEVOTHROID) 75 MCG tablet, Take 75 mcg by mouth Daily., Disp: , Rfl:   •  magnesium oxide (MAGOX) 400 (241.3 Mg) MG tablet tablet, Take 400 mg by mouth Daily., Disp: , Rfl:   •  Multiple Vitamin (MULTI VITAMIN PO), Take  by mouth Daily. Centrum (brand new ), Disp: , Rfl:   •  Multiple Vitamins-Minerals (PRESERVISION AREDS 2 PO), Take 1 tablet by mouth 2 (Two) Times a Day., Disp: , Rfl:   •  phenazopyridine (PYRIDIUM) 100 MG tablet, Take 100 mg by mouth 3 (Three) Times a Day As Needed for Bladder Spasms., Disp: , Rfl:   •  polyethylene glycol (MIRALAX) packet, Take 17 g by mouth As Needed., Disp: , Rfl:   •  ramipril (ALTACE) 10 MG capsule, Take 1 capsule by mouth Daily., Disp: 90 capsule, Rfl: 3  •  rosuvastatin (CRESTOR) 20 MG tablet, Every Night., Disp: , Rfl:   •  senna (SENOKOT) 8.6 MG tablet tablet, Take  by mouth As Needed., Disp: , Rfl:   •  amLODIPine (NORVASC) 5 MG tablet, Take 1 tablet by mouth Daily., Disp: 90 tablet, Rfl: 3  •  carvedilol (COREG) 6.25 MG tablet, Take 1 tablet by mouth Every 12 (Twelve) Hours., Disp: 90 tablet, Rfl: 3  •  cefdinir (OMNICEF) 300 MG capsule, Take 1 capsule by mouth 2 (Two) Times a Day., Disp: 20  capsule, Rfl: 0  •  chlorthalidone (HYGROTON) 25 MG tablet, Take 0.5 tablets by mouth Daily., Disp: 45 tablet, Rfl: 3  •  Cranberry 450 MG capsule, Take 1 tablet by mouth Daily., Disp: , Rfl:   •  dextromethorphan polistirex ER (DELSYM) 30 MG/5ML Suspension Extended Release oral suspension, Take  by mouth., Disp: , Rfl:   •  docusate sodium (COLACE) 100 MG capsule, Take 100 mg by mouth Daily As Needed., Disp: , Rfl:   •  loratadine (CLARITIN) 10 MG tablet, Take 10 mg by mouth Daily As Needed., Disp: , Rfl:   •  PREMARIN 0.625 MG/GM vaginal cream, Twice a week, Disp: , Rfl:   •  Probiotic Product (PROBIOTIC ADVANCED PO), Take  by mouth Daily., Disp: , Rfl:    Past Medical History:   Diagnosis Date   • Anemia    • Anxiety    • Asthma    • Cerebral infarction (CMS/HCC)    • Depression    • Failure to thrive in adult    • Hyperlipidemia    • Hyperlipidemia    • Hypertension    • Hypothyroidism    • Osteoarthritis    • Osteoporosis    • Parkinson's disease (CMS/HCC)    • Stress incontinence    • Stroke (CMS/HCC) 02/19/2017    left MCA thromboembolism   • Urinary retention       Past Surgical History:   Procedure Laterality Date   • APPENDECTOMY     • BACK SURGERY     • CATARACT EXTRACTION     • ELBOW PROCEDURE     • KNEE SURGERY     • IA PRIM PRQ TRLUML MCHNL THRMBC N-COR N-ICRA 1ST Left 2/19/2017    Diagnostic Angiogram only, no thrombectomy   • TOTAL HIP ARTHROPLASTY Left 12/2016      Family History   Problem Relation Age of Onset   • Alcohol abuse Other    • Hypertension Other    • Stroke Other    • Heart disease Son       Social History     Socioeconomic History   • Marital status:      Spouse name: Not on file   • Number of children: Not on file   • Years of education: Not on file   • Highest education level: Not on file   Tobacco Use   • Smoking status: Never Smoker   • Smokeless tobacco: Never Used   Substance and Sexual Activity   • Alcohol use: No   • Drug use: No   • Sexual activity: Defer     Review of  Systems   All other systems reviewed and are negative.      Objective:    As noted in the chart    Neurology Exam:    General apperance: frail    Mental status: Alert, awake and oriented to time place and person.    Recent and Remote memory: Intact.    Attention span and Concentration: Normal.     Language and Speech: Intact- No dysarthria.    Fluency, Naming , Repitition and Comprehension:  Intact    Cranial Nerves:   CN II: Visual fields are full. Intact. Fundi - Normal, No papillederma, Pupils - GONZALEZ  CN III, IV and VI: Extraocular movements are intact. Normal saccades.   CN V: Facial sensation is intact.   CN VII: Muscles of facial expression reveal no asymmetry. Intact.   CN VIII: Hearing is intact. Whispered voice intact.   CN IX and X: Palate elevates symmetrically. Intact  CN XI: Shoulder shrug is intact.   CN XII: Tongue is midline without evidence of atrophy or fasciculation.     Ophthalmoscopic exam of optic disc-normal    Motor: -No postural or resting tremors noted in the left hand today    Right UE muscle strength 5/5. Normal tone.     Left UE muscle strength 5/5.  Has difficulty straightening her left arm due to previous elbow fractures     Right LE muscle strength5/5. Normal tone.     Left LE muscle strength 4/5.  Her left foot is inverted, no foot drop seen ,Normal tone.      Sensory: Normal light touch, vibration and pinprick sensation bilaterally.    DTRs: 2+ bilaterally in upper and lower extremities.    Babinski: Negative bilaterally.    Co-ordination: Normal finger-to-nose, heel to shin B/L.    Rhomberg: Negative.    Gait: Wheelchair-bound today      Cardiovascular: Regular rate and rhythm without murmur, gallop or rub.    Assessment and Plan:  1. Parkinson's disease (CMS/[facility])  I have told her to reduce her to further reduce dose of Sinemet to 25/100 mg, 1 tab p.o. 3 times daily (written instructions given on how to reduce the dose)  She can  continue Sinemet CR 25/100 mg but take 1 tablet  every night for leg cramps  She should continue getting monthly UA for UTIs  If she restarts having hallucinations I will start her on Nuplazid  I have also told her to start melatonin 5 mg nightly for her vivid dreams    2.  Stroke  She has a history of right hemispheric stroke  She can continue Plavix and reduce her Crestor to 10 mg as she has mild myalgias  I have also told her to get PT at least twice a week and walk with her walker    Return in about 6 months (around 1/30/2022).          Rupali Echevarria MD

## 2021-08-12 NOTE — ED PROVIDER NOTES
"Subjective   Patient presents to the emergency department from [facility] complaining \"I just sleep all the time\".  Patient states she is having bad dreams at night.  She was asked to be brought in an ambulance for generalized weakness.  Patient states she has been at [facility] for approximately 8 years.  She is currently bedridden but transfers to wheelchair on a daily basis.  She is not confident when she began to feel the weakness that led to her presentation today.  She states she has had poor appetite.  She denies any pain or recent illness.  \"I only have pain in the night and that is when it burns when I urinate.\"  She laments that she has to get assistance for help to the restroom in the nighttime hours.    Review of systems reveals that she has had no fever nor chills.  She has had no cough, chest pain or shortness of breath.  She has had no nausea, vomiting or diarrhea and she denies any abdominal pain.    Patient has a past medical history of CVA, hyperlipidemia and hypertension with osteoporosis and Parkinson's and failure to thrive as an adult.      History provided by:  Patient and caregiver (I met her caregiver sonCal later in her ED course)   used: No    Dysuria  Pain quality:  Burning  Pain severity:  Mild  Onset quality:  Gradual  Duration: Unable to specify.  Timing:  Intermittent  Chronicity: Unable to specify.  Recent urinary tract infections: yes    Relieved by:  Nothing  Exacerbated by: Patient states her symptoms are worse at night.  Ineffective treatments:  None tried  Associated symptoms: no abdominal pain, no fever, no nausea and no vomiting    Risk factors: recurrent urinary tract infections (On few occasions)    Risk factors: no urinary catheter        Review of Systems   Constitutional: Negative for diaphoresis and fever.   HENT: Negative for sore throat.    Eyes: Negative.  Negative for pain and visual disturbance.   Respiratory: Negative for cough, " shortness of breath, wheezing and stridor.    Cardiovascular: Negative.  Negative for chest pain.   Gastrointestinal: Negative.  Negative for abdominal pain, diarrhea, nausea and vomiting.   Endocrine: Negative.    Genitourinary: Positive for dysuria. Negative for difficulty urinating.   Musculoskeletal: Negative.  Negative for back pain and neck pain.   Skin: Negative.  Negative for pallor and rash.   Allergic/Immunologic: Negative.    Neurological: Positive for weakness. Negative for syncope and headaches.   Hematological: Negative.    Psychiatric/Behavioral: Positive for dysphoric mood and sleep disturbance. Negative for agitation and behavioral problems.   All other systems reviewed and are negative.      Past Medical History:   Diagnosis Date   • Anemia    • Anxiety    • Asthma    • Cerebral infarction (CMS/[facility])    • Depression    • Failure to thrive in adult    • Hyperlipidemia    • Hyperlipidemia    • Hypertension    • Hypothyroidism    • Osteoarthritis    • Osteoporosis    • Parkinson's disease (CMS/[facility])    • Stress incontinence    • Stroke (CMS/[facility]) 02/19/2017    left MCA thromboembolism   • Urinary retention        Allergies   Allergen Reactions   • Abilify [Aripiprazole]    • Alendronate    • Celecoxib    • Fosamax Plus D [Alendronate-Cholecalciferol]    • Levaquin [Levofloxacin]    • Macrobid [Nitrofurantoin Monohyd Macro]    • Nitrofurantoin    • Procaine    • Sulfa Antibiotics Nausea And Vomiting and Rash       Past Surgical History:   Procedure Laterality Date   • APPENDECTOMY     • BACK SURGERY     • CATARACT EXTRACTION     • ELBOW PROCEDURE     • KNEE SURGERY     • PA PRIM PRQ TRLUML MCHNL THRMBC N-COR N-ICRA 1ST Left 2/19/2017    Diagnostic Angiogram only, no thrombectomy   • TOTAL HIP ARTHROPLASTY Left 12/2016       Family History   Problem Relation Age of Onset   • Alcohol abuse Other    • Hypertension Other    • Stroke Other    • Heart disease Son        Social History     Socioeconomic History    • Marital status:      Spouse name: Not on file   • Number of children: Not on file   • Years of education: Not on file   • Highest education level: Not on file   Tobacco Use   • Smoking status: Never Smoker   • Smokeless tobacco: Never Used   Substance and Sexual Activity   • Alcohol use: No   • Drug use: No   • Sexual activity: Defer           Objective   Physical Exam  Vitals and nursing note reviewed.   Constitutional:       General: She is not in acute distress.     Appearance: Normal appearance. She is not ill-appearing.      Comments: Patient is a melyssa and mild historian.  Her vital signs are normal.   HENT:      Head: Normocephalic and atraumatic.      Right Ear: External ear normal.      Left Ear: External ear normal.      Nose: Nose normal.      Mouth/Throat:      Mouth: Mucous membranes are moist.      Pharynx: No oropharyngeal exudate.   Eyes:      Conjunctiva/sclera: Conjunctivae normal.   Cardiovascular:      Rate and Rhythm: Normal rate and regular rhythm.      Heart sounds: No murmur heard.     Pulmonary:      Effort: Pulmonary effort is normal. No respiratory distress.      Breath sounds: Normal breath sounds.   Abdominal:      General: Bowel sounds are normal. There is no distension.      Palpations: Abdomen is soft.      Tenderness: There is no abdominal tenderness.   Musculoskeletal:         General: No swelling, tenderness or deformity. Normal range of motion.      Cervical back: Normal range of motion and neck supple. No muscular tenderness.   Skin:     General: Skin is warm and dry.      Capillary Refill: Capillary refill takes less than 2 seconds.      Coloration: Skin is not pale.      Findings: No lesion.   Neurological:      General: No focal deficit present.      Mental Status: She is alert and oriented to person, place, and time.      Comments: No Neurosensory deficit or focal weakness.  She has 4 out of 5 strength in all 4 extremities.     Psychiatric:         Behavior:  "Behavior normal.         Thought Content: Thought content normal.         Procedures           ED Course  ED Course as of Aug 17 2313   Thu Aug 12, 2021   1521 WBC, UA(!): Too Numerous to Count [MS]   1521 Bacteria, UA(!): 4+ [MS]   1521 Leukocytes, UA(!): Large (3+) [MS]   1603 MsDavis Knapp's work-up is most remarkable for urinary tract infection without sepsis suspected.  She has had no hypotension or tachycardia.  She has been hydrated and first 24 hours of antibiotics have been provided.  I discussed her diagnosis with the patient as a potential etiology for why she is feeling so poorly.  I have encouraged her to drink ample fluids and maintain her best nutrition.  She understands that she will be taking antibiotics for the next 7 days.  Patient understands and concurs with his outpatient plan of care    [MS]   1604 Appearance, UA(!): Cloudy [MS]      ED Course User Index  [MS] Faby Soto, APRN      No results found for this or any previous visit (from the past 24 hour(s)).  Note: In addition to lab results from this visit, the labs listed above may include labs taken at another facility or during a different encounter within the last 24 hours. Please correlate lab times with ED admission and discharge times for further clarification of the services performed during this visit.    XR Chest 1 View   Final Result   Cardiomegaly without overt edema or effusion.       D:  08/12/2021   E:  08/12/2021       This report was finalized on 8/13/2021 6:57 PM by Dr. Jesús Leon.            Vitals:    08/12/21 1327 08/12/21 1329 08/12/21 1430 08/12/21 1600   BP: 155/82  148/84 169/90   Patient Position: Lying      Pulse: 70  68 75   Resp: 15      Temp:  98 °F (36.7 °C)     TempSrc:  Oral     SpO2: 95%  96% 94%   Weight: 52.6 kg (116 lb)      Height: 147.3 cm (58\")        Medications   sodium chloride 0.9 % bolus 500 mL (0 mL Intravenous Stopped 8/12/21 1609)   cefTRIAXone (ROCEPHIN) IVPB 1 g (0 g Intravenous Stopped " 8/12/21 1658)     ECG/EMG Results (last 24 hours)     Procedure Component Value Units Date/Time    ECG 12 Lead [[id number]] Collected: 08/12/21 1331     Updated: 08/12/21 1531     QT Interval 418 ms      QTC Interval 451 ms     Narrative:      Test Reason : Weak/Dizzy/AMS protocol  Blood Pressure :   */*   mmHG  Vent. Rate :  70 BPM     Atrial Rate :  70 BPM     P-R Int : 140 ms          QRS Dur :  74 ms      QT Int : 418 ms       P-R-T Axes :  19 -53 194 degrees     QTc Int : 451 ms    Normal sinus rhythm  Left axis deviation  Inferior-posterior infarct (cited on or before 04-JUL-2017)  ST & T wave abnormality, consider lateral ischemia  Abnormal ECG  When compared with ECG of 02-JAN-2020 19:25,  Nonspecific T wave abnormality, worse in Inferior leads  T wave inversion now evident in Anterior leads  Confirmed by AGATA ALBERTS MD ([id number]) on 8/12/2021 3:30:55 PM    Referred By: ER           Confirmed By: AGATA ALBERTS MD        ECG 12 Lead   Final Result   Test Reason : Weak/Dizzy/AMS protocol   Blood Pressure :   */*   mmHG   Vent. Rate :  70 BPM     Atrial Rate :  70 BPM      P-R Int : 140 ms          QRS Dur :  74 ms       QT Int : 418 ms       P-R-T Axes :  19 -53 194 degrees      QTc Int : 451 ms      Normal sinus rhythm   Left axis deviation   Inferior-posterior infarct (cited on or before 04-JUL-2017)   ST & T wave abnormality, consider lateral ischemia   Abnormal ECG   When compared with ECG of 02-JAN-2020 19:25,   Nonspecific T wave abnormality, worse in Inferior leads   T wave inversion now evident in Anterior leads   Confirmed by AGATA ALBERTS MD ([id number]) on 8/12/2021 3:30:55 PM      Referred By: ER           Confirmed By: AGATA ALBERTS MD                                               [facility]    Final diagnoses:   Urinary tract infection without hematuria, site unspecified   Generalized weakness       ED Disposition  ED Disposition     ED Disposition Condition Comment    Discharge Kieran Burgess  MD Ramy  [address]  [facility] [address]  [phone]    Schedule an appointment as soon as possible for a visit in 2 days  If symptoms worsen         Where to Get Your Medications      These medications were sent to [facility] [address] [place] [address] - [address] [facility] - [phone]  - [phone]   [address] [facility] [address]    Phone: [phone]   · cefdinir 300 MG capsule        Medication List      No changes were made to your prescriptions during this visit.          Faby Soto, APRN  08/17/21 [id number]

## 2021-08-12 NOTE — DISCHARGE INSTRUCTIONS
Home to rest.  Maintain your very best hydration and nutrition.  Take your next dose of antibiotics tomorrow and then every 12 hours for a week.  Follow-up with Dr. Cottrell to monitor your recovery.  Return to the emergency department as needed for worsening symptoms or concerns.  Thank you

## 2021-09-15 NOTE — TELEPHONE ENCOUNTER
I spoke with Yogesh. Informed him of Dr. Echevarria's message below. He informed me that the nursing home are taking and urine sample today. Advised that he would contact our office with those results. Patient son verbalized understanding.  -TMT

## 2021-09-15 NOTE — TELEPHONE ENCOUNTER
Tell the patient's son that he should have the patient tested again for a UTI because sometimes the infection is not treated fully.  I do not think the hallucinations are because of the Sinemet.  I will start her on a medication called Nuplazid that is an excellent medication for psychosis seen with Parkinson's disease.  I have sent this medication to a specialty pharmacy in [place].  They will be sending him the medication soon.  If he does not get it within the next week or so he can call us back.

## 2021-09-15 NOTE — TELEPHONE ENCOUNTER
Provider: KAITLYNN  Caller: BELINDA  Relationship to Patient: SON  Phone Number: [phone]  Reason for Call: BELINDA CALLED IN ON BEHALF OF HIS MOTHER. HE STATES THAT PT IS HALLUCINATING AND STATES PT IS CONFUSED AGAIN. BELINDA STATED THAT PT DID HAVE A UTI AGAIN BUT THE ANTIBIOTICS HAVE BEEN COMPLETED A FEW WEEKS AGO. PT'S NURSE AT HOME NOTICED PT NOT SEEMING RIGHT AS WELL AND BROUGHT THIS UP TO BELINDA. BELINDA WANTED TO KNOW IF  carbidopa-levodopa ER (Sinemet CR) WOULD CAUSE ANY SIDE EFFECTS AS WELL. PLEASE ADVISE.   When was the patient last seen: 7/30/21  When did it start: 8/12/21  Characteristics of symptom/severity: HALLUCINATIONS, CONFUSION

## 2021-09-16 NOTE — TELEPHONE ENCOUNTER
Called patient son 1x. No answer. I LVM informing him that I resent medication to [facility] pharmacy on file. I provided my name and call back number.  -TMT

## 2021-09-16 NOTE — TELEPHONE ENCOUNTER
Provider:  DR CALDERÓN    Caller:  BELINDA  Relationship to Patient: SON   Pharmacy:  [facility]   Phone Number: [phone]  Reason for Call:  SON CALLING BACK STATES PT DID HAVE URINE SAMPLE TAKEN LAST NIGHT AT FACILITY  [facility] IS CALLING TO CHECK IF RESULTED ADVISED IT IS NOT IN CHART AS OF THIS MORNING . SON ALSO IS ASKING ABOUT PSYCH MEDS DR CALDERÓN WAS GOING TO ORDER . LOOKS LIKE MIGHT HAVE BEEN SENT IN TO WRONG PHARMACY SON ASKS FOR MEDS TO BE SENT TO [facility] LISTED    PLEASE ADVISE AND SON WOULD LIKE A CALL BACK WITH TEST RESULTS PLEASE

## 2021-09-28 NOTE — TELEPHONE ENCOUNTER
Provider: KAITLYNN    Caller:mateus    Phone Number: [phone] REF: [id number]    Reason for Call: CALLING TO SEE IF OICAMRYNICE RECEIVED AUTH PPW FOR NUPLAZID 34MG

## 2021-11-01 NOTE — PLAN OF CARE
Problem: Patient Care Overview (Adult)  Goal: Plan of Care Review  Outcome: Ongoing (interventions implemented as appropriate)    02/20/17 1020   Coping/Psychosocial Response Interventions   Plan Of Care Reviewed With patient;family   Patient Care Overview   Progress progress toward functional goals as expected   Outcome Evaluation   Outcome Summary/Follow up Plan OT eval complete, though session limited d/t lethargy and cognitive status. Pt appropriately answers yes/no questions w/ nods, however pt quickly fatigues and requires VCs to stay alert. Pt dependentx2 for sit/pivot t/f from bed to chair this date. Recommend pt DC to SNF for rehab placement. Recommend cont skilled IPOT intervention to increase safety/functional independence.          Problem: Inpatient Occupational Therapy  Goal: Transfer Training Goal 1 LTG- OT  Outcome: Ongoing (interventions implemented as appropriate)    02/20/17 1020   Transfer Training OT LTG   Transfer Training OT LTG, Date Established 02/20/17   Transfer Training OT LTG, Time to Achieve by discharge   Transfer Training OT LTG, Activity Type bed to chair /chair to bed;sit to stand/stand to sit;toilet   Transfer Training OT LTG, [place] Level maximum assist (25% patient effort);2 person assist required   Transfer Training OT LTG, Additional Goal AAD       Goal: Strength Goal LTG- OT  Outcome: Ongoing (interventions implemented as appropriate)    02/20/17 1020   Strength OT LTG   Strength Goal OT LTG, Date Established 02/20/17   Strength Goal OT LTG, Time to Achieve by discharge   Strength Goal OT LTG, Measure to Achieve Pt will tolerate BUE AROM/AAROM HEP x15 reps to increase strength/endurance to promote ADL performance.        Goal: Dynamic Sitting Balance Goal LTG- OT  Outcome: Ongoing (interventions implemented as appropriate)    02/20/17 1020   Dynamic Sitting Balance OT LTG   Dynamic Sitting Balance OT LTG, Date Established 02/20/17   Dynamic Sitting Balance OT LTG, Time  to Achieve by discharge   Dynamic Sitting Balance OT LTG, [place] Level contact guard assist   Dynamic Sitting Balance OT LTG, Additional Goal AAD        Goal: Static Standing Balance Goal LTG- OT  Outcome: Ongoing (interventions implemented as appropriate)    02/20/17 1020   Static Standing Balance OT LTG   Static Standing Balance OT LTG, Date Established 02/20/17   Static Standing Balance OT LTG, Time to Achieve by discharge   Static Standing Balance OT LTG, [place] Level moderate assist (50% patient effort);2 person assist required   Static Standing Balance OT LTG, Assist Device UE Support   Static Standing Balance OT LTG, Additional Goal AAD       Goal: Orientation Goal LTG- OT  Outcome: Ongoing (interventions implemented as appropriate)    02/20/17 1020   Orientation OT LTG   Orientation OT LTG, Date Established 02/20/17   Orientation OT LTG, Time to Achieve by discharge   Orientation OT LTG, Activity Type person;place;time;100% treatment session;verbal cues            patient patient patient

## 2021-12-01 NOTE — DISCHARGE INSTRUCTIONS
Do your best to allow others to help you move about.    If you have any concern of worsening condition please return to the emergency department.    Utilize Tylenol for discomfort.

## 2021-12-01 NOTE — ED PROVIDER NOTES
EMERGENCY DEPARTMENT ENCOUNTER    Pt Name: Florinda Knapp  MRN: [id number]  Pt :   3/5/1931  Room Number:    Date of encounter:  2021  PCP: Kieran Cottrell MD  ED Provider: Andrea Apple MD    Historian: Patient      HPI:  Chief Complaint: Injuries from fall        Context: Florinda Knapp is a 89 y.o. female who presents to the ED c/o left arm pain mostly.  A patient is in the Alzheimer's unit at [facility].  She was in her wheelchair and apparently fell out of the wheelchair.  She initially told her son that she had slid out of it.  She now does not have a clear recollection and actually tells me that she was walking and tripped falling to the ground.  Loss of consciousness is uncertain.  She does take Plavix as a blood thinner.  She denies pain in her head although she does have a small red andrea on her forehead that her son has not previously seen.  She rates her pain moderate in her left humerus, midshaft region.  She has been able to use her arm since that time.  She denies sensory motor changes distal to the injury.  Late this afternoon/early this evening the power went out at [facility] and life alert was not operative.  Therefore the patient may have been on the floor for a period of time.        PAST MEDICAL HISTORY  Past Medical History:   Diagnosis Date   • Anemia    • Anxiety    • Asthma    • Cerebral infarction (CMS/HCC)    • Depression    • Failure to thrive in adult    • Hyperlipidemia    • Hyperlipidemia    • Hypertension    • Hypothyroidism    • Osteoarthritis    • Osteoporosis    • Parkinson's disease (CMS/HCC)    • Stress incontinence    • Stroke (CMS/HCC) 2017    left MCA thromboembolism   • Urinary retention          PAST SURGICAL HISTORY  Past Surgical History:   Procedure Laterality Date   • APPENDECTOMY     • BACK SURGERY     • CATARACT EXTRACTION     • ELBOW PROCEDURE     • KNEE SURGERY     • IL PRIM PRQ TRLUML MCHNL THRMBC N-COR N-ICRA 1ST Left 2017     Diagnostic Angiogram only, no thrombectomy   • TOTAL HIP ARTHROPLASTY Left 12/2016         FAMILY HISTORY  Family History   Problem Relation Age of Onset   • Alcohol abuse Other    • Hypertension Other    • Stroke Other    • Heart disease Son          SOCIAL HISTORY  Social History     Socioeconomic History   • Marital status:    Tobacco Use   • Smoking status: Never Smoker   • Smokeless tobacco: Never Used   Substance and Sexual Activity   • Alcohol use: No   • Drug use: No   • Sexual activity: Defer         ALLERGIES  Abilify [aripiprazole], Alendronate, Celecoxib, Fosamax plus d [alendronate-cholecalciferol], Levaquin [levofloxacin], Macrobid [nitrofurantoin monohyd macro], Nitrofurantoin, Procaine, and Sulfa antibiotics        REVIEW OF SYSTEMS  Review of Systems       All systems reviewed and negative except for those discussed in HPI.       PHYSICAL EXAM    I have reviewed the triage vital signs and nursing notes.    ED Triage Vitals [11/30/21 2135]   Temp Heart Rate Resp BP SpO2   97.5 °F (36.4 °C) 85 16 128/72 95 %      Temp src Heart Rate Source Patient Position BP Location FiO2 (%)   Oral Monitor Sitting Right arm --       Physical Exam  GENERAL:   Appears very pleasant, nontoxic.  She is here with her son who is at bedside.  HENT: Nares patent.  Mild erythematous patch mid forehead without signs of skull fracture.  EYES: No scleral icterus.  CV: Regular rhythm, regular rate.  No murmurs gallops rubs clear to auscultation  RESPIRATORY: Normal effort.  No audible wheezes, rales or rhonchi.  ABDOMEN: Soft, nontender  MUSCULOSKELETAL: No deformities.  Pelvis is stable.  No CT LS spine tenderness.  Detailed head to toe exam was performed and the only point of tenderness is midshaft humerus without any overlying contusion or hematoma and without crepitus or deformity.  NEURO: Alert, moves all extremities, follows commands.  Median ulnar and radial nerves intact in left upper extremity.  SKIN: Warm,  dry, no rash visualized.        LAB RESULTS  No results found for this or any previous visit (from the past 24 hour(s)).    If labs were ordered, I independently reviewed the results.        RADIOLOGY  XR Humerus Left    Result Date: 11/30/2021  CR Humerus Min 2 Vws LT INDICATION: Acute left arm pain after fall. COMPARISON: None available. FINDINGS: AP and lateral views of the left humerus. Examination limited due to significant artifacts exam being performed in portable fashion. Moderate osteopenia present, acceptable for patient's age. This limits sensitivity and subtle fracture. No gross acute fracture identified. Limited assessment for dislocation of the proximal or distal joint of the humerus. No gross dislocation.     Limited exam due to artifact and osteopenia but no gross acute fracture or dislocation. Correlate with exam in range of motion. Signer Name: Clare Funez MD  Signed: 11/30/2021 10:36 PM  Workstation Name: [id number]  [facility]    CT Head Without Contrast    Result Date: 11/30/2021  PROCEDURE: CT Head WO COMPARISON: No relevant comparison or correlation studies available at time of dictation. INDICATIONS: trauma closed head injury Additional Relevant clinical info: Unwitnessed fall from wheelchair, redness to forehead  TECHNIQUE:  CT examination of the brain is performed without IV contrast. Radiation dose reduction techniques included automated exposure control or exposure modulation based on body size.  Count of known CT and cardiac nuc med studies performed in previous 12 months: 0.  FINDINGS:  Significant streak artifact from patient's dental hardware. Patient's head is also angled within the gantry. There is no evidence of acute intracranial hemorrhage, mass effect or midline shift. No intra-axial or extra-axial fluid collections. There is mild cortical atrophy. Periventricular low attenuation is likely secondary to small vessel ischemic disease. The ventricular  system is not dilated.  The calvarium is intact. Right maxillary sinus mucosal polyp or retention cyst seen chronic from January 2020.     No acute intracranial trauma. Probable small vessel ischemic disease.  Signer Name: Clare Funez MD  Signed: 11/30/2021 10:45 PM  Workstation Name: [email or web address]  [facility] [place]      I ordered and reviewed the above noted radiographic studies.      I viewed images of CT head which showed no acute bleed per my independent interpretation.    See radiologist's dictation for official interpretation.        PROCEDURES    Procedures    No orders to display       MEDICATIONS GIVEN IN ER    Medications - No data to display      PROGRESS, DATA ANALYSIS, CONSULTS, AND MEDICAL DECISION MAKING    All labs have been independently reviewed by me.  All radiology studies have been reviewed by me and the radiologist dictating the report.   EKG's have been independently viewed and interpreted by me.      Differential diagnoses: Fall with question of occult trauma. Consider intracranial hemorrhage or skull fracture. Consider occult fractures.      ED Course as of 12/01/21 0011   Wed Dec 01, 2021   0010 I spoke with patient and son.  They are quite comfortable with outpatient follow-up on a as needed basis. [MS]      ED Course User Index  [MS] Price Apple MD             AS OF 00:11 EST VITALS:    BP - 129/70  HR - 81  TEMP - 97.5 °F (36.4 °C) (Oral)  O2 SATS - 95%        DIAGNOSIS  Final diagnoses:   Left arm pain   Traumatic injury of head, initial encounter   Fall, initial encounter         DISPOSITION  DISCHARGE    Patient discharged in stable condition.    Reviewed implications of results, diagnosis, meds, responsibility to follow up, warning signs and symptoms of possible worsening, potential complications and reasons to return to ER.    Patient/Family voiced understanding of above instructions.    Discussed plan for discharge, as there is no emergent indication for  admission.  Pt/family is agreeable and understands need for follow up and possible repeat testing.  Pt/family is aware that discharge does not mean that nothing is wrong but that it indicates no emergency is currently present that requires admission and they must continue care with follow-up as given below or with a physician of their choice.     FOLLOW-UP  Kieran Cottrell MD  [address] [address]  [phone]      IF NOT MUCH IMPROVED    [facility] Emergency Department  [address] [address] [phone]    IF YOU HAVE ANY CONCERN OF WORSENING CONDITION         Medication List      No changes were made to your prescriptions during this visit.                  Price Apple MD  12/01/21 0121

## 2021-12-28 NOTE — TELEPHONE ENCOUNTER
Caller: BECKIE WITH [employer]OVER MY MED[employer]    Relationship:     Best call back number: [phone]    What medications are you currently taking:   Current Outpatient Medications on File Prior to Visit   Medication Sig Dispense Refill   • acetaminophen (TYLENOL) 325 MG tablet Take 650 mg by mouth Every 6 (Six) Hours As Needed (Pain).     • amLODIPine (NORVASC) 5 MG tablet Take 1 tablet by mouth Daily. 90 tablet 3   • bisacodyl (DULCOLAX) 10 MG suppository bisacodyl 10 mg rectal suppository as needed     • carbidopa-levodopa (SINEMET)  MG per tablet Take 1 tablet 4 times a day 120 tablet 5   • carbidopa-levodopa ER (SINEMET CR)  MG per tablet TAKE ONE TABLET BY MOUTH EVERY NIGHT AT BEDTIME AS NEEDED FOR SYMPTOMS OF PARKINSONS 30 tablet 11   • carvedilol (COREG) 6.25 MG tablet Take 1 tablet by mouth Every 12 (Twelve) Hours. 90 tablet 3   • chlorthalidone (HYGROTON) 25 MG tablet Take 0.5 tablets by mouth Daily. 45 tablet 3   • Cholecalciferol (D3-1000) 1000 units capsule cholecalciferol (vit D3)(bulk)     • clopidogrel (PLAVIX) 75 MG tablet Take 75 mg by mouth Daily.     • clotrimazole-betamethasone (LOTRISONE) 1-0.05 % cream APPLY TO AFFECTED AND SURROUNDING AREAS EVERY MORNING AND EVERY EVENING 45 g 0   • Cranberry 450 MG capsule Take 1 tablet by mouth Daily.     • dextromethorphan polistirex ER (DELSYM) 30 MG/5ML Suspension Extended Release oral suspension Take  by mouth.     • docusate sodium (COLACE) 100 MG capsule Take 100 mg by mouth Daily As Needed.     • DULoxetine (CYMBALTA) 30 MG capsule Take 30 mg by mouth Daily.     • levothyroxine (SYNTHROID, LEVOTHROID) 75 MCG tablet Take 75 mcg by mouth Daily.     • loratadine (CLARITIN) 10 MG tablet Take 10 mg by mouth Daily As Needed.     • magnesium oxide (MAGOX) 400 (241.3 Mg) MG tablet tablet Take 400 mg by mouth Daily.     • Multiple Vitamin (MULTI VITAMIN PO) Take  by mouth Daily. Centrum (brand new )     • Multiple Vitamins-Minerals (PRESERVISION AREDS 2  PO) Take 1 tablet by mouth 2 (Two) Times a Day.     • Nuplazid 34 MG capsule Take 1 capsule by mouth Daily. 30 capsule 5   • phenazopyridine (PYRIDIUM) 100 MG tablet Take 100 mg by mouth 3 (Three) Times a Day As Needed for Bladder Spasms.     • polyethylene glycol (MIRALAX) packet Take 17 g by mouth As Needed.     • PREMARIN 0.625 MG/GM vaginal cream Twice a week     • Probiotic Product (PROBIOTIC ADVANCED PO) Take  by mouth Daily.     • ramipril (ALTACE) 10 MG capsule Take 1 capsule by mouth Daily. 90 capsule 3   • rosuvastatin (CRESTOR) 20 MG tablet Every Night.     • senna (SENOKOT) 8.6 MG tablet tablet Take  by mouth As Needed.       No current facility-administered medications on file prior to visit.          When did you start taking these medications: NA    Which medication are you concerned about: NUPLAZID    Who prescribed you this medication:     What are your concerns: COVER MY MEDS WANTED TO OFFER ASSISTANCE IF NEEDED FOR NEW PA AS ONE ON FILE  YESTERDAY THEY STATED. PLEASE ADVISE.     How long have you had these concerns: NA

## 2022-01-01 ENCOUNTER — HOSPITAL ENCOUNTER (INPATIENT)
Facility: HOSPITAL | Age: 87
LOS: 2 days | End: 2022-01-08
Attending: EMERGENCY MEDICINE | Admitting: INTERNAL MEDICINE

## 2022-01-01 ENCOUNTER — APPOINTMENT (OUTPATIENT)
Dept: CT IMAGING | Facility: HOSPITAL | Age: 87
End: 2022-01-01

## 2022-01-01 ENCOUNTER — HOSPITAL ENCOUNTER (INPATIENT)
Facility: HOSPITAL | Age: 87
LOS: 4 days | End: 2022-01-12
Attending: INTERNAL MEDICINE | Admitting: INTERNAL MEDICINE

## 2022-01-01 ENCOUNTER — APPOINTMENT (OUTPATIENT)
Dept: GENERAL RADIOLOGY | Facility: HOSPITAL | Age: 87
End: 2022-01-01

## 2022-01-01 VITALS
TEMPERATURE: 97.9 F | DIASTOLIC BLOOD PRESSURE: 83 MMHG | SYSTOLIC BLOOD PRESSURE: 139 MMHG | HEART RATE: 96 BPM | RESPIRATION RATE: 18 BRPM | WEIGHT: 115 LBS | OXYGEN SATURATION: 90 % | HEIGHT: 59 IN | BODY MASS INDEX: 23.18 KG/M2

## 2022-01-01 VITALS — OXYGEN SATURATION: 91 % | TEMPERATURE: 98.1 F

## 2022-01-01 DIAGNOSIS — R54 SENESCENCE: ICD-10-CM

## 2022-01-01 DIAGNOSIS — E83.52 HYPERCALCEMIA: ICD-10-CM

## 2022-01-01 DIAGNOSIS — R79.89 POSITIVE D DIMER: ICD-10-CM

## 2022-01-01 DIAGNOSIS — N39.0 URINARY TRACT INFECTION WITH HEMATURIA, SITE UNSPECIFIED: ICD-10-CM

## 2022-01-01 DIAGNOSIS — J18.9 PNEUMONIA OF RIGHT UPPER LOBE DUE TO INFECTIOUS ORGANISM: ICD-10-CM

## 2022-01-01 DIAGNOSIS — H10.32 ACUTE CONJUNCTIVITIS OF LEFT EYE, UNSPECIFIED ACUTE CONJUNCTIVITIS TYPE: ICD-10-CM

## 2022-01-01 DIAGNOSIS — R31.9 URINARY TRACT INFECTION WITH HEMATURIA, SITE UNSPECIFIED: ICD-10-CM

## 2022-01-01 DIAGNOSIS — J96.91 RESPIRATORY FAILURE WITH HYPOXIA, UNSPECIFIED CHRONICITY: Primary | ICD-10-CM

## 2022-01-01 DIAGNOSIS — R41.0 CONFUSION: ICD-10-CM

## 2022-01-01 LAB
ALBUMIN SERPL-MCNC: 3.8 G/DL (ref 3.5–5.2)
ALBUMIN/GLOB SERPL: 1 G/DL
ALP SERPL-CCNC: 103 U/L (ref 39–117)
ALT SERPL W P-5'-P-CCNC: 7 U/L (ref 1–33)
AMORPH URATE CRY URNS QL MICRO: ABNORMAL /HPF
ANION GAP SERPL CALCULATED.3IONS-SCNC: 11 MMOL/L (ref 5–15)
ANION GAP SERPL CALCULATED.3IONS-SCNC: 14 MMOL/L (ref 5–15)
AST SERPL-CCNC: 26 U/L (ref 1–32)
B PARAPERT DNA SPEC QL NAA+PROBE: NOT DETECTED
B PERT DNA SPEC QL NAA+PROBE: NOT DETECTED
BACTERIA SPEC AEROBE CULT: NO GROWTH
BACTERIA SPEC AEROBE CULT: NORMAL
BACTERIA SPEC AEROBE CULT: NORMAL
BACTERIA UR QL AUTO: ABNORMAL /HPF
BASOPHILS # BLD AUTO: 0.02 10*3/MM3 (ref 0–0.2)
BASOPHILS NFR BLD AUTO: 0.2 % (ref 0–1.5)
BILIRUB SERPL-MCNC: 0.7 MG/DL (ref 0–1.2)
BILIRUB UR QL STRIP: NEGATIVE
BUN SERPL-MCNC: 20 MG/DL (ref 8–23)
BUN SERPL-MCNC: 27 MG/DL (ref 8–23)
BUN/CREAT SERPL: 25.3 (ref 7–25)
BUN/CREAT SERPL: 32.1 (ref 7–25)
C PNEUM DNA NPH QL NAA+NON-PROBE: NOT DETECTED
CA-I SERPL ISE-MCNC: 1.31 MMOL/L (ref 1.12–1.32)
CALCIUM SPEC-SCNC: 7.9 MG/DL (ref 8.2–9.6)
CALCIUM SPEC-SCNC: 9.8 MG/DL (ref 8.2–9.6)
CHLORIDE SERPL-SCNC: 101 MMOL/L (ref 98–107)
CHLORIDE SERPL-SCNC: 105 MMOL/L (ref 98–107)
CLARITY UR: ABNORMAL
CO2 SERPL-SCNC: 16 MMOL/L (ref 22–29)
CO2 SERPL-SCNC: 21 MMOL/L (ref 22–29)
COLOR UR: YELLOW
CREAT SERPL-MCNC: 0.79 MG/DL (ref 0.57–1)
CREAT SERPL-MCNC: 0.84 MG/DL (ref 0.57–1)
D DIMER PPP FEU-MCNC: >20 MCGFEU/ML (ref 0–0.56)
D-LACTATE SERPL-SCNC: 1.7 MMOL/L (ref 0.5–2)
D-LACTATE SERPL-SCNC: 3 MMOL/L (ref 0.5–2)
DEPRECATED RDW RBC AUTO: 50.4 FL (ref 37–54)
DEPRECATED RDW RBC AUTO: 53.6 FL (ref 37–54)
EOSINOPHIL # BLD AUTO: 0.1 10*3/MM3 (ref 0–0.4)
EOSINOPHIL NFR BLD AUTO: 0.9 % (ref 0.3–6.2)
ERYTHROCYTE [DISTWIDTH] IN BLOOD BY AUTOMATED COUNT: 14 % (ref 12.3–15.4)
ERYTHROCYTE [DISTWIDTH] IN BLOOD BY AUTOMATED COUNT: 14.5 % (ref 12.3–15.4)
FLUAV SUBTYP SPEC NAA+PROBE: NOT DETECTED
FLUBV RNA ISLT QL NAA+PROBE: NOT DETECTED
GFR SERPL CREATININE-BSD FRML MDRD: 64 ML/MIN/1.73
GFR SERPL CREATININE-BSD FRML MDRD: 68 ML/MIN/1.73
GLOBULIN UR ELPH-MCNC: 4 GM/DL
GLUCOSE SERPL-MCNC: 118 MG/DL (ref 65–99)
GLUCOSE SERPL-MCNC: 85 MG/DL (ref 65–99)
GLUCOSE UR STRIP-MCNC: NEGATIVE MG/DL
HADV DNA SPEC NAA+PROBE: NOT DETECTED
HCOV 229E RNA SPEC QL NAA+PROBE: NOT DETECTED
HCOV HKU1 RNA SPEC QL NAA+PROBE: NOT DETECTED
HCOV NL63 RNA SPEC QL NAA+PROBE: NOT DETECTED
HCOV OC43 RNA SPEC QL NAA+PROBE: NOT DETECTED
HCT VFR BLD AUTO: 31.6 % (ref 34–46.6)
HCT VFR BLD AUTO: 39.2 % (ref 34–46.6)
HGB BLD-MCNC: 12.6 G/DL (ref 12–15.9)
HGB BLD-MCNC: 9.9 G/DL (ref 12–15.9)
HGB UR QL STRIP.AUTO: ABNORMAL
HMPV RNA NPH QL NAA+NON-PROBE: NOT DETECTED
HOLD SPECIMEN: NORMAL
HPIV1 RNA ISLT QL NAA+PROBE: NOT DETECTED
HPIV2 RNA SPEC QL NAA+PROBE: NOT DETECTED
HPIV3 RNA NPH QL NAA+PROBE: NOT DETECTED
HPIV4 P GENE NPH QL NAA+PROBE: NOT DETECTED
HYALINE CASTS UR QL AUTO: ABNORMAL /LPF
IMM GRANULOCYTES # BLD AUTO: 0.08 10*3/MM3 (ref 0–0.05)
IMM GRANULOCYTES NFR BLD AUTO: 0.7 % (ref 0–0.5)
KETONES UR QL STRIP: ABNORMAL
LEUKOCYTE ESTERASE UR QL STRIP.AUTO: ABNORMAL
LYMPHOCYTES # BLD AUTO: 1.02 10*3/MM3 (ref 0.7–3.1)
LYMPHOCYTES NFR BLD AUTO: 9.3 % (ref 19.6–45.3)
M PNEUMO IGG SER IA-ACNC: NOT DETECTED
MAGNESIUM SERPL-MCNC: 2.3 MG/DL (ref 1.6–2.4)
MCH RBC QN AUTO: 31.3 PG (ref 26.6–33)
MCH RBC QN AUTO: 31.5 PG (ref 26.6–33)
MCHC RBC AUTO-ENTMCNC: 31.3 G/DL (ref 31.5–35.7)
MCHC RBC AUTO-ENTMCNC: 32.1 G/DL (ref 31.5–35.7)
MCV RBC AUTO: 100 FL (ref 79–97)
MCV RBC AUTO: 98 FL (ref 79–97)
MONOCYTES # BLD AUTO: 0.61 10*3/MM3 (ref 0.1–0.9)
MONOCYTES NFR BLD AUTO: 5.6 % (ref 5–12)
NEUTROPHILS NFR BLD AUTO: 83.3 % (ref 42.7–76)
NEUTROPHILS NFR BLD AUTO: 9.15 10*3/MM3 (ref 1.7–7)
NITRITE UR QL STRIP: POSITIVE
NRBC BLD AUTO-RTO: 0 /100 WBC (ref 0–0.2)
PH UR STRIP.AUTO: 8.5 [PH] (ref 5–8)
PLATELET # BLD AUTO: 137 10*3/MM3 (ref 140–450)
PLATELET # BLD AUTO: 174 10*3/MM3 (ref 140–450)
PMV BLD AUTO: 10.4 FL (ref 6–12)
PMV BLD AUTO: 10.4 FL (ref 6–12)
POTASSIUM SERPL-SCNC: 3.8 MMOL/L (ref 3.5–5.2)
POTASSIUM SERPL-SCNC: 4.5 MMOL/L (ref 3.5–5.2)
PROCALCITONIN SERPL-MCNC: 0.07 NG/ML (ref 0–0.25)
PROT SERPL-MCNC: 7.8 G/DL (ref 6–8.5)
PROT UR QL STRIP: ABNORMAL
QT INTERVAL: 348 MS
QTC INTERVAL: 457 MS
RBC # BLD AUTO: 3.16 10*6/MM3 (ref 3.77–5.28)
RBC # BLD AUTO: 4 10*6/MM3 (ref 3.77–5.28)
RBC # UR STRIP: ABNORMAL /HPF
REF LAB TEST METHOD: ABNORMAL
RHINOVIRUS RNA SPEC NAA+PROBE: NOT DETECTED
RSV RNA NPH QL NAA+NON-PROBE: NOT DETECTED
SARS-COV-2 RNA NPH QL NAA+NON-PROBE: NOT DETECTED
SODIUM SERPL-SCNC: 132 MMOL/L (ref 136–145)
SODIUM SERPL-SCNC: 136 MMOL/L (ref 136–145)
SP GR UR STRIP: 1.02 (ref 1–1.03)
SQUAMOUS #/AREA URNS HPF: ABNORMAL /HPF
TROPONIN T SERPL-MCNC: <0.01 NG/ML (ref 0–0.03)
UROBILINOGEN UR QL STRIP: ABNORMAL
WBC # UR STRIP: ABNORMAL /HPF
WBC NRBC COR # BLD: 10.98 10*3/MM3 (ref 3.4–10.8)
WBC NRBC COR # BLD: 6.85 10*3/MM3 (ref 3.4–10.8)
WHOLE BLOOD HOLD SPECIMEN: NORMAL
WHOLE BLOOD HOLD SPECIMEN: NORMAL

## 2022-01-01 PROCEDURE — 84484 ASSAY OF TROPONIN QUANT: CPT | Performed by: EMERGENCY MEDICINE

## 2022-01-01 PROCEDURE — 25010000002 ENOXAPARIN PER 10 MG: Performed by: INTERNAL MEDICINE

## 2022-01-01 PROCEDURE — 99239 HOSP IP/OBS DSCHRG MGMT >30: CPT | Performed by: INTERNAL MEDICINE

## 2022-01-01 PROCEDURE — 99233 SBSQ HOSP IP/OBS HIGH 50: CPT | Performed by: INTERNAL MEDICINE

## 2022-01-01 PROCEDURE — 70450 CT HEAD/BRAIN W/O DYE: CPT

## 2022-01-01 PROCEDURE — 71275 CT ANGIOGRAPHY CHEST: CPT

## 2022-01-01 PROCEDURE — 80048 BASIC METABOLIC PNL TOTAL CA: CPT | Performed by: INTERNAL MEDICINE

## 2022-01-01 PROCEDURE — 93005 ELECTROCARDIOGRAM TRACING: CPT | Performed by: EMERGENCY MEDICINE

## 2022-01-01 PROCEDURE — 36415 COLL VENOUS BLD VENIPUNCTURE: CPT

## 2022-01-01 PROCEDURE — 83735 ASSAY OF MAGNESIUM: CPT | Performed by: EMERGENCY MEDICINE

## 2022-01-01 PROCEDURE — 71045 X-RAY EXAM CHEST 1 VIEW: CPT

## 2022-01-01 PROCEDURE — 25010000002 MORPHINE PER 10 MG: Performed by: NURSE PRACTITIONER

## 2022-01-01 PROCEDURE — 81001 URINALYSIS AUTO W/SCOPE: CPT | Performed by: EMERGENCY MEDICINE

## 2022-01-01 PROCEDURE — 25010000002 LORAZEPAM PER 2 MG: Performed by: NURSE PRACTITIONER

## 2022-01-01 PROCEDURE — 94799 UNLISTED PULMONARY SVC/PX: CPT

## 2022-01-01 PROCEDURE — 25010000002 AMPICILLIN PER 500 MG: Performed by: INTERNAL MEDICINE

## 2022-01-01 PROCEDURE — P9612 CATHETERIZE FOR URINE SPEC: HCPCS

## 2022-01-01 PROCEDURE — 25010000002 FUROSEMIDE PER 20 MG: Performed by: NURSE PRACTITIONER

## 2022-01-01 PROCEDURE — 25010000002 MORPHINE PER 10 MG: Performed by: INTERNAL MEDICINE

## 2022-01-01 PROCEDURE — 84145 PROCALCITONIN (PCT): CPT | Performed by: EMERGENCY MEDICINE

## 2022-01-01 PROCEDURE — 85025 COMPLETE CBC W/AUTO DIFF WBC: CPT | Performed by: EMERGENCY MEDICINE

## 2022-01-01 PROCEDURE — 82330 ASSAY OF CALCIUM: CPT | Performed by: EMERGENCY MEDICINE

## 2022-01-01 PROCEDURE — 83605 ASSAY OF LACTIC ACID: CPT | Performed by: EMERGENCY MEDICINE

## 2022-01-01 PROCEDURE — 0 IOPAMIDOL PER 1 ML: Performed by: INTERNAL MEDICINE

## 2022-01-01 PROCEDURE — 87040 BLOOD CULTURE FOR BACTERIA: CPT | Performed by: EMERGENCY MEDICINE

## 2022-01-01 PROCEDURE — 85379 FIBRIN DEGRADATION QUANT: CPT | Performed by: EMERGENCY MEDICINE

## 2022-01-01 PROCEDURE — 0202U NFCT DS 22 TRGT SARS-COV-2: CPT | Performed by: EMERGENCY MEDICINE

## 2022-01-01 PROCEDURE — 87086 URINE CULTURE/COLONY COUNT: CPT | Performed by: EMERGENCY MEDICINE

## 2022-01-01 PROCEDURE — 25010000002 PIPERACILLIN SOD-TAZOBACTAM PER 1 G: Performed by: EMERGENCY MEDICINE

## 2022-01-01 PROCEDURE — 99223 1ST HOSP IP/OBS HIGH 75: CPT | Performed by: INTERNAL MEDICINE

## 2022-01-01 PROCEDURE — 99285 EMERGENCY DEPT VISIT HI MDM: CPT

## 2022-01-01 PROCEDURE — 85027 COMPLETE CBC AUTOMATED: CPT | Performed by: INTERNAL MEDICINE

## 2022-01-01 PROCEDURE — 25010000002 HALOPERIDOL LACTATE PER 5 MG: Performed by: NURSE PRACTITIONER

## 2022-01-01 PROCEDURE — 25010000002 LORAZEPAM PER 2 MG: Performed by: INTERNAL MEDICINE

## 2022-01-01 PROCEDURE — 80053 COMPREHEN METABOLIC PANEL: CPT | Performed by: EMERGENCY MEDICINE

## 2022-01-01 PROCEDURE — 94640 AIRWAY INHALATION TREATMENT: CPT

## 2022-01-01 PROCEDURE — 25010000002 MEROPENEM PER 100 MG: Performed by: INTERNAL MEDICINE

## 2022-01-01 RX ORDER — ACETAMINOPHEN 325 MG/1
650 TABLET ORAL EVERY 6 HOURS PRN
Status: DISCONTINUED | OUTPATIENT
Start: 2022-01-01 | End: 2022-01-01 | Stop reason: HOSPADM

## 2022-01-01 RX ORDER — ACETAMINOPHEN 650 MG/1
650 SUPPOSITORY RECTAL EVERY 6 HOURS PRN
Status: CANCELLED | OUTPATIENT
Start: 2022-01-01

## 2022-01-01 RX ORDER — MORPHINE SULFATE 2 MG/ML
1 INJECTION, SOLUTION INTRAMUSCULAR; INTRAVENOUS
Status: CANCELLED | OUTPATIENT
Start: 2022-01-01 | End: 2022-01-14

## 2022-01-01 RX ORDER — POLYVINYL ALCOHOL 14 MG/ML
2 SOLUTION/ DROPS OPHTHALMIC
Status: DISCONTINUED | OUTPATIENT
Start: 2022-01-01 | End: 2022-01-01 | Stop reason: HOSPADM

## 2022-01-01 RX ORDER — ROSUVASTATIN CALCIUM 10 MG/1
10 TABLET, COATED ORAL NIGHTLY
Status: DISCONTINUED | OUTPATIENT
Start: 2022-01-01 | End: 2022-01-01

## 2022-01-01 RX ORDER — LORAZEPAM 2 MG/ML
0.5 INJECTION INTRAMUSCULAR 2 TIMES DAILY
Status: DISCONTINUED | OUTPATIENT
Start: 2022-01-01 | End: 2022-01-01

## 2022-01-01 RX ORDER — LORAZEPAM 2 MG/ML
0.5 INJECTION INTRAMUSCULAR
Status: CANCELLED | OUTPATIENT
Start: 2022-01-01 | End: 2022-01-14

## 2022-01-01 RX ORDER — LEVOTHYROXINE SODIUM 0.07 MG/1
75 TABLET ORAL
Status: DISCONTINUED | OUTPATIENT
Start: 2022-01-01 | End: 2022-01-01

## 2022-01-01 RX ORDER — POLYVINYL ALCOHOL 14 MG/ML
1 SOLUTION/ DROPS OPHTHALMIC
Status: CANCELLED | OUTPATIENT
Start: 2022-01-01

## 2022-01-01 RX ORDER — SODIUM CHLORIDE 0.9 % (FLUSH) 0.9 %
10 SYRINGE (ML) INJECTION EVERY 12 HOURS SCHEDULED
Status: DISCONTINUED | OUTPATIENT
Start: 2022-01-01 | End: 2022-01-01 | Stop reason: HOSPADM

## 2022-01-01 RX ORDER — SODIUM CHLORIDE 0.9 % (FLUSH) 0.9 %
10 SYRINGE (ML) INJECTION AS NEEDED
Status: DISCONTINUED | OUTPATIENT
Start: 2022-01-01 | End: 2022-01-01

## 2022-01-01 RX ORDER — LORAZEPAM 2 MG/ML
0.5 INJECTION INTRAMUSCULAR
Status: DISCONTINUED | OUTPATIENT
Start: 2022-01-01 | End: 2022-01-01 | Stop reason: HOSPADM

## 2022-01-01 RX ORDER — SODIUM CHLORIDE 0.9 % (FLUSH) 0.9 %
10 SYRINGE (ML) INJECTION EVERY 12 HOURS SCHEDULED
Status: CANCELLED | OUTPATIENT
Start: 2022-01-01

## 2022-01-01 RX ORDER — HALOPERIDOL 5 MG/ML
1 INJECTION INTRAMUSCULAR EVERY 4 HOURS PRN
Status: DISCONTINUED | OUTPATIENT
Start: 2022-01-01 | End: 2022-01-01 | Stop reason: HOSPADM

## 2022-01-01 RX ORDER — SACCHAROMYCES BOULARDII 250 MG
250 CAPSULE ORAL DAILY
Status: DISCONTINUED | OUTPATIENT
Start: 2022-01-01 | End: 2022-01-01

## 2022-01-01 RX ORDER — PHENAZOPYRIDINE HYDROCHLORIDE 100 MG/1
100 TABLET, FILM COATED ORAL 3 TIMES DAILY PRN
Status: DISCONTINUED | OUTPATIENT
Start: 2022-01-01 | End: 2022-01-01

## 2022-01-01 RX ORDER — BISACODYL 10 MG
10 SUPPOSITORY, RECTAL RECTAL NIGHTLY
Status: DISCONTINUED | OUTPATIENT
Start: 2022-01-01 | End: 2022-01-01 | Stop reason: HOSPADM

## 2022-01-01 RX ORDER — SODIUM CHLORIDE 9 MG/ML
100 INJECTION, SOLUTION INTRAVENOUS CONTINUOUS
Status: DISCONTINUED | OUTPATIENT
Start: 2022-01-01 | End: 2022-01-01

## 2022-01-01 RX ORDER — MORPHINE SULFATE 2 MG/ML
1 INJECTION, SOLUTION INTRAMUSCULAR; INTRAVENOUS
Status: DISCONTINUED | OUTPATIENT
Start: 2022-01-01 | End: 2022-01-01 | Stop reason: HOSPADM

## 2022-01-01 RX ORDER — ACETAMINOPHEN 325 MG/1
650 TABLET ORAL EVERY 6 HOURS PRN
Status: CANCELLED | OUTPATIENT
Start: 2022-01-01

## 2022-01-01 RX ORDER — CLOTRIMAZOLE AND BETAMETHASONE DIPROPIONATE 10; .64 MG/G; MG/G
1 CREAM TOPICAL 2 TIMES DAILY
Status: DISCONTINUED | OUTPATIENT
Start: 2022-01-01 | End: 2022-01-01 | Stop reason: HOSPADM

## 2022-01-01 RX ORDER — CHOLECALCIFEROL (VITAMIN D3) 125 MCG
5 CAPSULE ORAL NIGHTLY PRN
Status: DISCONTINUED | OUTPATIENT
Start: 2022-01-01 | End: 2022-01-01

## 2022-01-01 RX ORDER — CLOPIDOGREL BISULFATE 75 MG/1
75 TABLET ORAL DAILY
Status: DISCONTINUED | OUTPATIENT
Start: 2022-01-01 | End: 2022-01-01

## 2022-01-01 RX ORDER — SCOLOPAMINE TRANSDERMAL SYSTEM 1 MG/1
1 PATCH, EXTENDED RELEASE TRANSDERMAL
Status: DISCONTINUED | OUTPATIENT
Start: 2022-01-01 | End: 2022-01-01 | Stop reason: HOSPADM

## 2022-01-01 RX ORDER — FUROSEMIDE 10 MG/ML
20 INJECTION INTRAMUSCULAR; INTRAVENOUS EVERY 6 HOURS PRN
Status: DISCONTINUED | OUTPATIENT
Start: 2022-01-01 | End: 2022-01-01 | Stop reason: HOSPADM

## 2022-01-01 RX ORDER — POLYVINYL ALCOHOL 14 MG/ML
1 SOLUTION/ DROPS OPHTHALMIC
Status: DISCONTINUED | OUTPATIENT
Start: 2022-01-01 | End: 2022-01-01 | Stop reason: HOSPADM

## 2022-01-01 RX ORDER — MORPHINE SULFATE 2 MG/ML
2 INJECTION, SOLUTION INTRAMUSCULAR; INTRAVENOUS
Status: DISCONTINUED | OUTPATIENT
Start: 2022-01-01 | End: 2022-01-01 | Stop reason: HOSPADM

## 2022-01-01 RX ORDER — RAMIPRIL 5 MG/1
5 CAPSULE ORAL DAILY
COMMUNITY

## 2022-01-01 RX ORDER — GLYCOPYRROLATE 0.2 MG/ML
0.2 INJECTION INTRAMUSCULAR; INTRAVENOUS EVERY 6 HOURS PRN
Status: DISCONTINUED | OUTPATIENT
Start: 2022-01-01 | End: 2022-01-01 | Stop reason: HOSPADM

## 2022-01-01 RX ORDER — POLYETHYLENE GLYCOL 3350 17 G/17G
17 POWDER, FOR SOLUTION ORAL DAILY
Status: DISCONTINUED | OUTPATIENT
Start: 2022-01-01 | End: 2022-01-01

## 2022-01-01 RX ORDER — ACETAMINOPHEN 650 MG/1
650 SUPPOSITORY RECTAL EVERY 6 HOURS PRN
Status: DISCONTINUED | OUTPATIENT
Start: 2022-01-01 | End: 2022-01-01 | Stop reason: HOSPADM

## 2022-01-01 RX ORDER — RAMIPRIL 5 MG/1
5 CAPSULE ORAL DAILY
Status: DISCONTINUED | OUTPATIENT
Start: 2022-01-01 | End: 2022-01-01

## 2022-01-01 RX ORDER — GENTAMICIN SULFATE 3 MG/ML
1 SOLUTION/ DROPS OPHTHALMIC 4 TIMES DAILY
Status: CANCELLED | OUTPATIENT
Start: 2022-01-01 | End: 2022-01-01

## 2022-01-01 RX ORDER — GENTAMICIN SULFATE 3 MG/ML
1 SOLUTION/ DROPS OPHTHALMIC 2 TIMES DAILY
Status: DISCONTINUED | OUTPATIENT
Start: 2022-01-01 | End: 2022-01-01 | Stop reason: HOSPADM

## 2022-01-01 RX ORDER — ACETAMINOPHEN 650 MG/1
650 SUPPOSITORY RECTAL EVERY 4 HOURS PRN
Status: DISCONTINUED | OUTPATIENT
Start: 2022-01-01 | End: 2022-01-01 | Stop reason: HOSPADM

## 2022-01-01 RX ORDER — ALBUTEROL SULFATE 2.5 MG/3ML
2.5 SOLUTION RESPIRATORY (INHALATION) ONCE
Status: COMPLETED | OUTPATIENT
Start: 2022-01-01 | End: 2022-01-01

## 2022-01-01 RX ORDER — ONDANSETRON 2 MG/ML
4 INJECTION INTRAMUSCULAR; INTRAVENOUS EVERY 6 HOURS PRN
Status: DISCONTINUED | OUTPATIENT
Start: 2022-01-01 | End: 2022-01-01 | Stop reason: HOSPADM

## 2022-01-01 RX ORDER — LORAZEPAM 2 MG/ML
0.5 INJECTION INTRAMUSCULAR EVERY 4 HOURS PRN
Status: DISCONTINUED | OUTPATIENT
Start: 2022-01-01 | End: 2022-01-01 | Stop reason: HOSPADM

## 2022-01-01 RX ORDER — SACCHAROMYCES BOULARDII 250 MG
250 CAPSULE ORAL DAILY
COMMUNITY

## 2022-01-01 RX ORDER — MORPHINE SULFATE 2 MG/ML
2 INJECTION, SOLUTION INTRAMUSCULAR; INTRAVENOUS EVERY 6 HOURS
Status: DISCONTINUED | OUTPATIENT
Start: 2022-01-01 | End: 2022-01-01

## 2022-01-01 RX ORDER — DULOXETIN HYDROCHLORIDE 60 MG/1
60 CAPSULE, DELAYED RELEASE ORAL DAILY
Status: DISCONTINUED | OUTPATIENT
Start: 2022-01-01 | End: 2022-01-01

## 2022-01-01 RX ORDER — LORAZEPAM 2 MG/ML
0.5 INJECTION INTRAMUSCULAR EVERY 8 HOURS
Status: DISCONTINUED | OUTPATIENT
Start: 2022-01-01 | End: 2022-01-01 | Stop reason: HOSPADM

## 2022-01-01 RX ORDER — BISACODYL 10 MG
10 SUPPOSITORY, RECTAL RECTAL DAILY PRN
Status: DISCONTINUED | OUTPATIENT
Start: 2022-01-01 | End: 2022-01-01 | Stop reason: HOSPADM

## 2022-01-01 RX ORDER — GENTAMICIN SULFATE 3 MG/ML
1 SOLUTION/ DROPS OPHTHALMIC 4 TIMES DAILY
Status: DISCONTINUED | OUTPATIENT
Start: 2022-01-01 | End: 2022-01-01

## 2022-01-01 RX ORDER — CLOTRIMAZOLE AND BETAMETHASONE DIPROPIONATE 10; .64 MG/G; MG/G
1 CREAM TOPICAL 2 TIMES DAILY
Status: CANCELLED | OUTPATIENT
Start: 2022-01-01

## 2022-01-01 RX ORDER — MORPHINE SULFATE 2 MG/ML
2 INJECTION, SOLUTION INTRAMUSCULAR; INTRAVENOUS EVERY 4 HOURS
Status: DISCONTINUED | OUTPATIENT
Start: 2022-01-01 | End: 2022-01-01 | Stop reason: HOSPADM

## 2022-01-01 RX ORDER — SENNA PLUS 8.6 MG/1
1 TABLET ORAL AS NEEDED
Status: DISCONTINUED | OUTPATIENT
Start: 2022-01-01 | End: 2022-01-01

## 2022-01-01 RX ORDER — CARBOXYMETHYLCELLULOSE SODIUM, GLYCERIN 5; 9 MG/ML; MG/ML
1 SOLUTION/ DROPS OPHTHALMIC 2 TIMES DAILY
COMMUNITY

## 2022-01-01 RX ORDER — GENTAMICIN SULFATE 3 MG/ML
1 SOLUTION/ DROPS OPHTHALMIC 4 TIMES DAILY
Status: DISCONTINUED | OUTPATIENT
Start: 2022-01-01 | End: 2022-01-01 | Stop reason: HOSPADM

## 2022-01-01 RX ORDER — CHOLECALCIFEROL (VITAMIN D3) 125 MCG
5 CAPSULE ORAL NIGHTLY PRN
COMMUNITY

## 2022-01-01 RX ADMIN — GENTAMICIN SULFATE 1 DROP: 3 SOLUTION OPHTHALMIC at 06:09

## 2022-01-01 RX ADMIN — MORPHINE SULFATE 1 MG: 2 INJECTION, SOLUTION INTRAMUSCULAR; INTRAVENOUS at 08:14

## 2022-01-01 RX ADMIN — GLYCOPYRROLATE 0.2 MG: 0.2 INJECTION INTRAMUSCULAR; INTRAVENOUS at 11:10

## 2022-01-01 RX ADMIN — MORPHINE SULFATE 2 MG: 2 INJECTION, SOLUTION INTRAMUSCULAR; INTRAVENOUS at 11:44

## 2022-01-01 RX ADMIN — MORPHINE SULFATE 2 MG: 2 INJECTION, SOLUTION INTRAMUSCULAR; INTRAVENOUS at 22:32

## 2022-01-01 RX ADMIN — GENTAMICIN SULFATE 1 DROP: 3 SOLUTION OPHTHALMIC at 11:35

## 2022-01-01 RX ADMIN — MORPHINE SULFATE 2 MG: 2 INJECTION, SOLUTION INTRAMUSCULAR; INTRAVENOUS at 18:52

## 2022-01-01 RX ADMIN — LORAZEPAM 0.5 MG: 2 INJECTION INTRAMUSCULAR; INTRAVENOUS at 20:36

## 2022-01-01 RX ADMIN — MINERAL OIL: 1000 LIQUID ORAL at 11:49

## 2022-01-01 RX ADMIN — ALBUTEROL SULFATE 2.5 MG: 2.5 SOLUTION RESPIRATORY (INHALATION) at 08:59

## 2022-01-01 RX ADMIN — MORPHINE SULFATE 2 MG: 2 INJECTION, SOLUTION INTRAMUSCULAR; INTRAVENOUS at 17:20

## 2022-01-01 RX ADMIN — LORAZEPAM 0.5 MG: 2 INJECTION INTRAMUSCULAR; INTRAVENOUS at 04:16

## 2022-01-01 RX ADMIN — MINERAL OIL: 1000 LIQUID ORAL at 20:53

## 2022-01-01 RX ADMIN — AMPICILLIN 1 G: 1 INJECTION, POWDER, FOR SOLUTION INTRAMUSCULAR; INTRAVENOUS at 05:00

## 2022-01-01 RX ADMIN — MORPHINE SULFATE 2 MG: 2 INJECTION, SOLUTION INTRAMUSCULAR; INTRAVENOUS at 08:47

## 2022-01-01 RX ADMIN — IOPAMIDOL 56 ML: 755 INJECTION, SOLUTION INTRAVENOUS at 10:37

## 2022-01-01 RX ADMIN — MINERAL OIL: 1000 LIQUID ORAL at 04:17

## 2022-01-01 RX ADMIN — MINERAL OIL: 1000 LIQUID ORAL at 17:20

## 2022-01-01 RX ADMIN — GENTAMICIN SULFATE 1 DROP: 3 SOLUTION OPHTHALMIC at 19:59

## 2022-01-01 RX ADMIN — LORAZEPAM 0.5 MG: 2 INJECTION INTRAMUSCULAR; INTRAVENOUS at 17:50

## 2022-01-01 RX ADMIN — GENTAMICIN SULFATE 1 DROP: 3 SOLUTION OPHTHALMIC at 18:52

## 2022-01-01 RX ADMIN — ACETAMINOPHEN 650 MG: 650 SUPPOSITORY RECTAL at 20:54

## 2022-01-01 RX ADMIN — SODIUM CHLORIDE 125 ML/HR: 9 INJECTION, SOLUTION INTRAVENOUS at 11:51

## 2022-01-01 RX ADMIN — MINERAL OIL: 1000 LIQUID ORAL at 06:29

## 2022-01-01 RX ADMIN — GENTAMICIN SULFATE 1 DROP: 3 SOLUTION OPHTHALMIC at 21:00

## 2022-01-01 RX ADMIN — MORPHINE SULFATE 2 MG: 2 INJECTION, SOLUTION INTRAMUSCULAR; INTRAVENOUS at 17:50

## 2022-01-01 RX ADMIN — ACETAMINOPHEN 650 MG: 650 SUPPOSITORY RECTAL at 00:31

## 2022-01-01 RX ADMIN — MINERAL OIL: 1000 LIQUID ORAL at 15:33

## 2022-01-01 RX ADMIN — GENTAMICIN SULFATE 1 DROP: 3 SOLUTION OPHTHALMIC at 08:21

## 2022-01-01 RX ADMIN — MINERAL OIL: 1000 LIQUID ORAL at 23:47

## 2022-01-01 RX ADMIN — LORAZEPAM 0.5 MG: 2 INJECTION INTRAMUSCULAR; INTRAVENOUS at 03:17

## 2022-01-01 RX ADMIN — MORPHINE SULFATE 2 MG: 2 INJECTION, SOLUTION INTRAMUSCULAR; INTRAVENOUS at 08:31

## 2022-01-01 RX ADMIN — MORPHINE SULFATE 2 MG: 2 INJECTION, SOLUTION INTRAMUSCULAR; INTRAVENOUS at 00:49

## 2022-01-01 RX ADMIN — GLYCOPYRROLATE 0.2 MG: 0.2 INJECTION INTRAMUSCULAR; INTRAVENOUS at 19:52

## 2022-01-01 RX ADMIN — AMPICILLIN 1 G: 1 INJECTION, POWDER, FOR SOLUTION INTRAMUSCULAR; INTRAVENOUS at 21:00

## 2022-01-01 RX ADMIN — GENTAMICIN SULFATE 1 DROP: 3 SOLUTION OPHTHALMIC at 17:55

## 2022-01-01 RX ADMIN — MORPHINE SULFATE 1 MG: 2 INJECTION, SOLUTION INTRAMUSCULAR; INTRAVENOUS at 17:39

## 2022-01-01 RX ADMIN — ENOXAPARIN SODIUM 40 MG: 40 INJECTION SUBCUTANEOUS at 17:55

## 2022-01-01 RX ADMIN — GENTAMICIN SULFATE 1 DROP: 3 SOLUTION OPHTHALMIC at 17:19

## 2022-01-01 RX ADMIN — MORPHINE SULFATE 2 MG: 2 INJECTION, SOLUTION INTRAMUSCULAR; INTRAVENOUS at 00:30

## 2022-01-01 RX ADMIN — FUROSEMIDE 20 MG: 10 INJECTION INTRAMUSCULAR; INTRAVENOUS at 23:46

## 2022-01-01 RX ADMIN — LORAZEPAM 0.5 MG: 2 INJECTION INTRAMUSCULAR; INTRAVENOUS at 19:51

## 2022-01-01 RX ADMIN — AMPICILLIN 1 G: 1 INJECTION, POWDER, FOR SOLUTION INTRAMUSCULAR; INTRAVENOUS at 15:18

## 2022-01-01 RX ADMIN — MORPHINE SULFATE 2 MG: 2 INJECTION, SOLUTION INTRAMUSCULAR; INTRAVENOUS at 05:08

## 2022-01-01 RX ADMIN — LORAZEPAM 0.5 MG: 2 INJECTION INTRAMUSCULAR; INTRAVENOUS at 11:49

## 2022-01-01 RX ADMIN — SCOPALAMINE 1 PATCH: 1 PATCH, EXTENDED RELEASE TRANSDERMAL at 04:20

## 2022-01-01 RX ADMIN — MINERAL OIL: 1000 LIQUID ORAL at 03:18

## 2022-01-01 RX ADMIN — MORPHINE SULFATE 2 MG: 2 INJECTION, SOLUTION INTRAMUSCULAR; INTRAVENOUS at 19:51

## 2022-01-01 RX ADMIN — GLYCOPYRROLATE 0.2 MG: 0.2 INJECTION INTRAMUSCULAR; INTRAVENOUS at 03:23

## 2022-01-01 RX ADMIN — MINERAL OIL: 1000 LIQUID ORAL at 08:31

## 2022-01-01 RX ADMIN — MORPHINE SULFATE 2 MG: 2 INJECTION, SOLUTION INTRAMUSCULAR; INTRAVENOUS at 16:55

## 2022-01-01 RX ADMIN — GENTAMICIN SULFATE 1 DROP: 3 SOLUTION OPHTHALMIC at 20:20

## 2022-01-01 RX ADMIN — HALOPERIDOL LACTATE 1 MG: 5 INJECTION, SOLUTION INTRAMUSCULAR at 18:51

## 2022-01-01 RX ADMIN — MORPHINE SULFATE 2 MG: 2 INJECTION, SOLUTION INTRAMUSCULAR; INTRAVENOUS at 04:16

## 2022-01-01 RX ADMIN — LORAZEPAM 0.5 MG: 2 INJECTION, SOLUTION INTRAMUSCULAR; INTRAVENOUS at 17:20

## 2022-01-01 RX ADMIN — LORAZEPAM 0.5 MG: 2 INJECTION INTRAMUSCULAR; INTRAVENOUS at 11:47

## 2022-01-01 RX ADMIN — LORAZEPAM 0.5 MG: 2 INJECTION INTRAMUSCULAR; INTRAVENOUS at 22:32

## 2022-01-01 RX ADMIN — MINERAL OIL: 1000 LIQUID ORAL at 20:37

## 2022-01-01 RX ADMIN — MINERAL OIL: 1000 LIQUID ORAL at 11:07

## 2022-01-01 RX ADMIN — GENTAMICIN SULFATE 1 DROP: 3 SOLUTION OPHTHALMIC at 13:53

## 2022-01-01 RX ADMIN — BISACODYL 10 MG: 10 SUPPOSITORY RECTAL at 20:36

## 2022-01-01 RX ADMIN — MORPHINE SULFATE 2 MG: 2 INJECTION, SOLUTION INTRAMUSCULAR; INTRAVENOUS at 06:21

## 2022-01-01 RX ADMIN — MINERAL OIL: 1000 LIQUID ORAL at 00:49

## 2022-01-01 RX ADMIN — LORAZEPAM 0.5 MG: 2 INJECTION INTRAMUSCULAR; INTRAVENOUS at 16:55

## 2022-01-01 RX ADMIN — GENTAMICIN SULFATE 1 DROP: 3 SOLUTION OPHTHALMIC at 20:36

## 2022-01-01 RX ADMIN — MINERAL OIL: 1000 LIQUID ORAL at 16:56

## 2022-01-01 RX ADMIN — MORPHINE SULFATE 2 MG: 2 INJECTION, SOLUTION INTRAMUSCULAR; INTRAVENOUS at 00:20

## 2022-01-01 RX ADMIN — MORPHINE SULFATE 2 MG: 2 INJECTION, SOLUTION INTRAMUSCULAR; INTRAVENOUS at 11:07

## 2022-01-01 RX ADMIN — TAZOBACTAM SODIUM AND PIPERACILLIN SODIUM 3.38 G: 375; 3 INJECTION, SOLUTION INTRAVENOUS at 10:55

## 2022-01-01 RX ADMIN — MORPHINE SULFATE 2 MG: 2 INJECTION, SOLUTION INTRAMUSCULAR; INTRAVENOUS at 23:46

## 2022-01-01 RX ADMIN — MORPHINE SULFATE 2 MG: 2 INJECTION, SOLUTION INTRAMUSCULAR; INTRAVENOUS at 16:17

## 2022-01-01 RX ADMIN — MINERAL OIL: 1000 LIQUID ORAL at 05:09

## 2022-01-01 RX ADMIN — MINERAL OIL: 1000 LIQUID ORAL at 00:30

## 2022-01-01 RX ADMIN — HALOPERIDOL LACTATE 1 MG: 5 INJECTION, SOLUTION INTRAMUSCULAR at 23:47

## 2022-01-01 RX ADMIN — LORAZEPAM 0.5 MG: 2 INJECTION INTRAMUSCULAR; INTRAVENOUS at 11:44

## 2022-01-01 RX ADMIN — MORPHINE SULFATE 2 MG: 2 INJECTION, SOLUTION INTRAMUSCULAR; INTRAVENOUS at 14:59

## 2022-01-01 RX ADMIN — MORPHINE SULFATE 2 MG: 2 INJECTION, SOLUTION INTRAMUSCULAR; INTRAVENOUS at 15:29

## 2022-01-01 RX ADMIN — GENTAMICIN SULFATE 1 DROP: 3 SOLUTION OPHTHALMIC at 08:15

## 2022-01-01 RX ADMIN — MORPHINE SULFATE 1 MG: 2 INJECTION, SOLUTION INTRAMUSCULAR; INTRAVENOUS at 11:57

## 2022-01-01 RX ADMIN — MEROPENEM 1 G: 1 INJECTION, POWDER, FOR SOLUTION INTRAVENOUS at 00:32

## 2022-01-01 RX ADMIN — MORPHINE SULFATE 2 MG: 2 INJECTION, SOLUTION INTRAMUSCULAR; INTRAVENOUS at 13:43

## 2022-01-01 RX ADMIN — BISACODYL 10 MG: 10 SUPPOSITORY RECTAL at 20:53

## 2022-01-01 RX ADMIN — MINERAL OIL: 1000 LIQUID ORAL at 11:44

## 2022-01-01 RX ADMIN — MINERAL OIL: 1000 LIQUID ORAL at 08:47

## 2022-01-01 RX ADMIN — MINERAL OIL: 1000 LIQUID ORAL at 00:21

## 2022-01-01 RX ADMIN — LORAZEPAM 0.5 MG: 2 INJECTION INTRAMUSCULAR; INTRAVENOUS at 20:52

## 2022-01-01 RX ADMIN — GENTAMICIN SULFATE 1 DROP: 3 SOLUTION OPHTHALMIC at 06:22

## 2022-01-01 RX ADMIN — GENTAMICIN SULFATE 1 DROP: 3 SOLUTION OPHTHALMIC at 12:03

## 2022-01-01 RX ADMIN — GENTAMICIN SULFATE 1 DROP: 3 SOLUTION OPHTHALMIC at 17:39

## 2022-01-01 RX ADMIN — MINERAL OIL: 1000 LIQUID ORAL at 16:18

## 2022-01-01 RX ADMIN — MORPHINE SULFATE 2 MG: 2 INJECTION, SOLUTION INTRAMUSCULAR; INTRAVENOUS at 20:53

## 2022-01-01 RX ADMIN — MORPHINE SULFATE 2 MG: 2 INJECTION, SOLUTION INTRAMUSCULAR; INTRAVENOUS at 11:49

## 2022-01-01 RX ADMIN — MORPHINE SULFATE 2 MG: 2 INJECTION, SOLUTION INTRAMUSCULAR; INTRAVENOUS at 20:36

## 2022-01-01 RX ADMIN — MORPHINE SULFATE 2 MG: 2 INJECTION, SOLUTION INTRAMUSCULAR; INTRAVENOUS at 03:17

## 2022-01-01 RX ADMIN — LORAZEPAM 0.5 MG: 2 INJECTION INTRAMUSCULAR; INTRAVENOUS at 05:08

## 2022-01-01 RX ADMIN — LORAZEPAM 0.5 MG: 2 INJECTION, SOLUTION INTRAMUSCULAR; INTRAVENOUS at 06:21

## 2022-01-01 RX ADMIN — LORAZEPAM 0.5 MG: 2 INJECTION INTRAMUSCULAR; INTRAVENOUS at 11:07

## 2022-01-01 RX ADMIN — ACETAMINOPHEN 650 MG: 650 SUPPOSITORY RECTAL at 16:00

## 2022-01-01 RX ADMIN — LORAZEPAM 0.5 MG: 2 INJECTION, SOLUTION INTRAMUSCULAR; INTRAVENOUS at 11:35

## 2022-01-01 RX ADMIN — MORPHINE SULFATE 1 MG: 2 INJECTION, SOLUTION INTRAMUSCULAR; INTRAVENOUS at 11:35

## 2022-01-01 RX ADMIN — GENTAMICIN SULFATE 1 DROP: 3 SOLUTION OPHTHALMIC at 05:08

## 2022-01-01 RX ADMIN — SODIUM CHLORIDE 1000 ML: 9 INJECTION, SOLUTION INTRAVENOUS at 10:53

## 2022-01-06 PROBLEM — J96.91 RESPIRATORY FAILURE WITH HYPOXIA (HCC): Status: ACTIVE | Noted: 2022-01-01

## 2022-01-06 NOTE — ED PROVIDER NOTES
Subjective   This is a 89-year-old female who is a resident of apparently a memory unit.  I am unclear of her ambulation status or her baseline though she is seen by neurology for pretty severe Parkinson's disease and she has had a stroke in the past.    She sent to the emergency room today by EMS for problems breathing.  Apparently her oxygen saturation was in the 80s.  She is very difficult to understand and apparently this has been that way for at least a few days.  Again I am not sure what her baseline is.  I really cannot obtain no more review of systems from the patient but I reviewed her most recent office notes.    I really cannot get any other review of systems or history from the patient.          Review of Systems   Unable to perform ROS: Mental status change       Past Medical History:   Diagnosis Date   • Anemia    • Anxiety    • Asthma    • Cerebral infarction (HCC)    • Depression    • Failure to thrive in adult    • Hyperlipidemia    • Hyperlipidemia    • Hypertension    • Hypothyroidism    • Osteoarthritis    • Osteoporosis    • Parkinson's disease (HCC)    • Stress incontinence    • Stroke ([facility]) 02/19/2017    left MCA thromboembolism   • Urinary retention        Allergies   Allergen Reactions   • Abilify [Aripiprazole]    • Alendronate    • Celecoxib    • Fosamax Plus D [Alendronate-Cholecalciferol]    • Levaquin [Levofloxacin]    • Macrobid [Nitrofurantoin Monohyd Macro]    • Nitrofurantoin    • Procaine    • Sulfa Antibiotics Nausea And Vomiting and Rash       Past Surgical History:   Procedure Laterality Date   • APPENDECTOMY     • BACK SURGERY     • CATARACT EXTRACTION     • ELBOW PROCEDURE     • KNEE SURGERY     • MT PRIM PRQ TRLUML MCHNL THRMBC N-COR N-ICRA 1ST Left 2/19/2017    Diagnostic Angiogram only, no thrombectomy   • TOTAL HIP ARTHROPLASTY Left 12/2016       Family History   Problem Relation Age of Onset   • Alcohol abuse Other    • Hypertension Other    • Stroke Other    • Heart  disease Son        Social History     Socioeconomic History   • Marital status:    Tobacco Use   • Smoking status: Never Smoker   • Smokeless tobacco: Never Used   Substance and Sexual Activity   • Alcohol use: No   • Drug use: No   • Sexual activity: Defer           Objective   Physical Exam  Vitals and nursing note reviewed.   Constitutional:       Comments: This is an elderly female who does not seem to be in distress I asked her questions and she responds but this is somewhat gibberish without any real words.  She is maintaining her airway.  She looks thin and frail.   HENT:      Head: Normocephalic and atraumatic.      Right Ear: External ear normal.      Left Ear: External ear normal.      Nose: Nose normal.      Mouth/Throat:      Comments: She has a few telangiectasias of the lips and the right lower lip looks a little bit abraded and crusted.  Eyes:      Extraocular Movements: Extraocular movements intact.      Conjunctiva/sclera: Conjunctivae normal.      Pupils: Pupils are equal, round, and reactive to light.      Comments: She has she has some crusting and injection around the left eye but no proptosis.  Most likely conjunctivitis.   Neck:      Vascular: No carotid bruit.   Cardiovascular:      Rate and Rhythm: Normal rate and regular rhythm.      Pulses: Normal pulses.      Heart sounds: Normal heart sounds.   Pulmonary:      Comments: Decreased breath sounds bilaterally.  Abdominal:      General: Abdomen is flat. Bowel sounds are normal. There is no distension.      Palpations: Abdomen is soft. There is no mass.      Tenderness: There is no abdominal tenderness.   Musculoskeletal:      Cervical back: Normal range of motion and neck supple. No rigidity or tenderness.      Comments: 3/4 pulses in her upper and lower extremities no tissue loss no edema   Lymphadenopathy:      Cervical: No cervical adenopathy.   Skin:     General: Skin is dry.      Capillary Refill: Capillary refill takes less than  2 seconds.      Comments: Skin is little cool.   Neurological:      Comments: Face symmetric voice is gibberish tongue is midline vision seems to be intact she looks at me and she can hear me.  She is contracted almost decorticate and stiff in all 4 extremities with a little palsy.         Procedures           ED Course            Recent Results (from the past 24 hour(s))   Comprehensive Metabolic Panel    Collection Time: 01/06/22  8:06 AM    Specimen: Blood   Result Value Ref Range    Glucose 118 (H) 65 - 99 mg/dL    BUN 27 (H) 8 - 23 mg/dL    Creatinine 0.84 0.57 - 1.00 mg/dL    Sodium 136 136 - 145 mmol/L    Potassium 4.5 3.5 - 5.2 mmol/L    Chloride 101 98 - 107 mmol/L    CO2 21.0 (L) 22.0 - 29.0 mmol/L    Calcium 9.8 (H) 8.2 - 9.6 mg/dL    Total Protein 7.8 6.0 - 8.5 g/dL    Albumin 3.80 3.50 - 5.20 g/dL    ALT (SGPT) 7 1 - 33 U/L    AST (SGOT) 26 1 - 32 U/L    Alkaline Phosphatase 103 39 - 117 U/L    Total Bilirubin 0.7 0.0 - 1.2 mg/dL    eGFR Non African Amer 64 >60 mL/min/1.73    Globulin 4.0 gm/dL    A/G Ratio 1.0 g/dL    BUN/Creatinine Ratio 32.1 (H) 7.0 - 25.0    Anion Gap 14.0 5.0 - 15.0 mmol/L   Troponin    Collection Time: 01/06/22  8:06 AM    Specimen: Blood   Result Value Ref Range    Troponin T <0.010 0.000 - 0.030 ng/mL   Magnesium    Collection Time: 01/06/22  8:06 AM    Specimen: Blood   Result Value Ref Range    Magnesium 2.3 1.6 - 2.4 mg/dL   Green Top (Gel)    Collection Time: 01/06/22  8:06 AM   Result Value Ref Range    Extra Tube Hold for add-ons.    Lavender Top    Collection Time: 01/06/22  8:06 AM   Result Value Ref Range    Extra Tube hold for add-on    Gold Top - SST    Collection Time: 01/06/22  8:06 AM   Result Value Ref Range    Extra Tube Hold for add-ons.    Gray Top    Collection Time: 01/06/22  8:06 AM   Result Value Ref Range    Extra Tube Hold for add-ons.    Light Blue Top    Collection Time: 01/06/22  8:06 AM   Result Value Ref Range    Extra Tube hold for add-on    CBC  Auto Differential    Collection Time: 01/06/22  8:06 AM    Specimen: Blood   Result Value Ref Range    WBC 10.98 (H) 3.40 - 10.80 10*3/mm3    RBC 4.00 3.77 - 5.28 10*6/mm3    Hemoglobin 12.6 12.0 - 15.9 g/dL    Hematocrit 39.2 34.0 - 46.6 %    MCV 98.0 (H) 79.0 - 97.0 fL    MCH 31.5 26.6 - 33.0 pg    MCHC 32.1 31.5 - 35.7 g/dL    RDW 14.0 12.3 - 15.4 %    RDW-SD 50.4 37.0 - 54.0 fl    MPV 10.4 6.0 - 12.0 fL    Platelets 174 140 - 450 10*3/mm3    Neutrophil % 83.3 (H) 42.7 - 76.0 %    Lymphocyte % 9.3 (L) 19.6 - 45.3 %    Monocyte % 5.6 5.0 - 12.0 %    Eosinophil % 0.9 0.3 - 6.2 %    Basophil % 0.2 0.0 - 1.5 %    Immature Grans % 0.7 (H) 0.0 - 0.5 %    Neutrophils, Absolute 9.15 (H) 1.70 - 7.00 10*3/mm3    Lymphocytes, Absolute 1.02 0.70 - 3.10 10*3/mm3    Monocytes, Absolute 0.61 0.10 - 0.90 10*3/mm3    Eosinophils, Absolute 0.10 0.00 - 0.40 10*3/mm3    Basophils, Absolute 0.02 0.00 - 0.20 10*3/mm3    Immature Grans, Absolute 0.08 (H) 0.00 - 0.05 10*3/mm3    nRBC 0.0 0.0 - 0.2 /100 WBC   Procalcitonin    Collection Time: 01/06/22  8:06 AM    Specimen: Blood   Result Value Ref Range    Procalcitonin 0.07 0.00 - 0.25 ng/mL   D-dimer, Quantitative    Collection Time: 01/06/22  8:06 AM    Specimen: Blood   Result Value Ref Range    D-Dimer, Quantitative >20.00 (H) 0.00 - 0.56 MCGFEU/mL   Lactic Acid, Plasma    Collection Time: 01/06/22  8:06 AM    Specimen: Blood   Result Value Ref Range    Lactate 3.0 (C) 0.5 - 2.0 mmol/L   Calcium, Ionized    Collection Time: 01/06/22  8:06 AM    Specimen: Blood   Result Value Ref Range    Ionized Calcium 1.31 1.12 - 1.32 mmol/L   ECG 12 Lead    Collection Time: 01/06/22  8:15 AM   Result Value Ref Range    QT Interval 348 ms    QTC Interval 457 ms   Respiratory Panel PCR w/COVID-19(SARS-CoV-2) CURTIS/KATIE/EVITA/PAD/COR/MAD/SELVIN In-House, NP Swab in [email or web address], 3-4 HR TAT - Swab, Nasopharynx    Collection Time: 01/06/22  8:55 AM    Specimen: Nasopharynx; Swab   Result Value Ref Range     ADENOVIRUS, PCR Not Detected Not Detected    Coronavirus 229E Not Detected Not Detected    Coronavirus HKU1 Not Detected Not Detected    Coronavirus NL63 Not Detected Not Detected    Coronavirus OC43 Not Detected Not Detected    COVID19 Not Detected Not Detected - Ref. Range    Human Metapneumovirus Not Detected Not Detected    Human Rhinovirus/Enterovirus Not Detected Not Detected    Influenza A PCR Not Detected Not Detected    Influenza B PCR Not Detected Not Detected    Parainfluenza Virus 1 Not Detected Not Detected    Parainfluenza Virus 2 Not Detected Not Detected    Parainfluenza Virus 3 Not Detected Not Detected    Parainfluenza Virus 4 Not Detected Not Detected    RSV, PCR Not Detected Not Detected    Bordetella pertussis pcr Not Detected Not Detected    Bordetella parapertussis PCR Not Detected Not Detected    Chlamydophila pneumoniae PCR Not Detected Not Detected    Mycoplasma pneumo by PCR Not Detected Not Detected   Urinalysis With Culture If Indicated - Urine, Catheter    Collection Time: 01/06/22  9:18 AM    Specimen: Urine, Catheter   Result Value Ref Range    Color, UA Yellow Yellow, Straw    Appearance, UA Turbid (A) Clear    pH, UA 8.5 (H) 5.0 - 8.0    Specific Gravity, UA 1.020 1.001 - 1.030    Glucose, UA Negative Negative    Ketones, UA Trace (A) Negative    Bilirubin, UA Negative Negative    Blood, UA Trace (A) Negative    Protein,  mg/dL (2+) (A) Negative    Leuk Esterase, UA Large (3+) (A) Negative    Nitrite, UA Positive (A) Negative    Urobilinogen, UA 1.0 E.U./dL 0.2 - 1.0 E.U./dL   Urinalysis, Microscopic Only - Urine, Catheter    Collection Time: 01/06/22  9:18 AM    Specimen: Urine, Catheter   Result Value Ref Range    RBC, UA 13-20 (A) None Seen, 0-2 /HPF    WBC, UA Too Numerous to Count (A) None Seen, 0-2 /HPF    Bacteria, UA 4+ (A) None Seen, Trace /HPF    Squamous Epithelial Cells, UA 7-12 (A) None Seen, 0-2 /HPF    Hyaline Casts, UA 7-12 0 - 6 /LPF    Amorphous Crystals, UA  Large/3+ None Seen /HPF    Methodology Manual Light Microscopy      Note: In addition to lab results from this visit, the labs listed above may include labs taken at another facility or during a different encounter within the last 24 hours. Please correlate lab times with ED admission and discharge times for further clarification of the services performed during this visit.    CT Angiogram Chest   Preliminary Result   1. No evidence of pulmonary embolic disease.   2. Healing posterior left lower rib fractures. No displaced fracture is   seen.   3. Mild chronic appearing lung changes as noted. No evidence of   pneumonia, edema, or effusion.       D:  01/06/2022   E:  01/06/2022          CT Head Without Contrast   Final Result   Stable head CT scan with age-appropriate generalized   cerebral atrophy, and small old cerebellar infarcts. No evidence of   acute intracranial disease.           This report was finalized on 1/6/2022 9:46 AM by Dr. Peewee Paredes MD.          XR Chest 1 View   Final Result   Subtle new interstitial changes in the right upper lobe, and   stable chronic appearing changes elsewhere. Ectatic aortic arch as on   multiple prior studies. No other new chest disease is seen.            This report was finalized on 1/6/2022 8:17 AM by Dr. Peewee Paredes MD.            Vitals:    01/06/22 0859 01/06/22 0900 01/06/22 1100 01/06/22 1130   BP:  147/89 148/91 142/87   Pulse: 100 98 97 99   Resp: 18 18 16    Temp:       TempSrc:       SpO2: 94% 93% 93% 95%   Weight:       Height:         Medications   sodium chloride 0.9 % flush 10 mL (has no administration in time range)   sodium chloride 0.9 % infusion (125 mL/hr Intravenous New Bag 1/6/22 1151)   ampicillin 1 g/100 mL 0.9% NS (MBP) (has no administration in time range)   albuterol (PROVENTIL) nebulizer solution 0.083% 2.5 mg/3mL (2.5 mg Nebulization Given 1/6/22 0859)   piperacillin-tazobactam (ZOSYN) 3.375 g in iso-osmotic dextrose 50 ml (premix) (0 g  Intravenous Stopped 1/6/22 1125)   sodium chloride 0.9 % bolus 1,000 mL (0 mL Intravenous Stopped 1/6/22 1145)   iopamidol (ISOVUE-370) 76 % injection 100 mL (56 mL Intravenous Given 1/6/22 1037)     ECG/EMG Results (last 24 hours)     Procedure Component Value Units Date/Time    ECG 12 Lead [[id number]] Collected: 01/06/22 0815     Updated: 01/06/22 0818        ECG 12 Lead   Final Result   Test Reason : Weak/Dizzy/AMS protocol   Blood Pressure :   */*   mmHG   Vent. Rate : 104 BPM     Atrial Rate : 104 BPM      P-R Int : 170 ms          QRS Dur :  68 ms       QT Int : 348 ms       P-R-T Axes :  60 -53  80 degrees      QTc Int : 457 ms      Sinus tachycardia baseline artifact makes interpretation difficult   Left axis deviation   Lateral infarct , age undetermined   Abnormal ECG   When compared with ECG of 12-AUG-2021 13:31,   Vent. rate has increased BY  34 BPM      Nonspecific T wave abnormality no longer evident in Inferior leads   T wave inversion no longer evident in Lateral leads   Confirmed by AGATA ALBERTS MD ([id number]) on 1/6/2022 9:03:25 AM      Referred By: EDMD           Confirmed By: AGATA ALBERTS MD                                                MDM  Number of Diagnoses or Management Options  Acute conjunctivitis of left eye, unspecified acute conjunctivitis type  Confusion  Hypercalcemia  Pneumonia of right upper lobe due to infectious organism  Positive D dimer  Respiratory failure with hypoxia, unspecified chronicity (HCC)  Senescence  Urinary tract infection with hematuria, site unspecified  Diagnosis management comments:       I have reviewed all available studies at the bedside with the patient's son who is now at the bedside.  He tells me his mother certainly has decline with her mental status this has been subacute happening over the past few days to weeks or so.  Can think of nothing that precipitated this.    At her baseline she lives in assisted living and due to her Parkinson's needs help with  any sort of ambulation or getting up and getting to the wheelchair but can feed herself.  It sounds like she has been having periods of hallucinations but other times her mind is quite clear and sharp and she is speaking clearly but certainly that is the case today.    It appears that she has pneumonia on her chest x-ray and I suspect she has a urinary tract infection as well but the microscopic portion of the UA is still pending when I admitted the patient.  Sounds like she has had all her COVID vaccines boosters and gets checked weekly at the nursing facility has been negative.    Unfortunately given her age and current comorbidities her prognosis is guarded.    We talked about aggressiveness of treatment and certainly if she has a decline he does not want anything heroic like for her to be on a ventilator or have CPR but he does want to treat what is potentially treatable.  Her D-dimer is elevated so we will CTA her chest to see if she has pulmonary embolus.  This is pending at the time of admission will be followed by the hospitalist team.  I am treating her for pneumonia and UTI.  If she does not seem to respond to treatment he is agreeable for a more palliative course.    Her calcium level is slightly elevated and ionized calcium is currently pending.    I spoke Dr. Dugan, on-call hospital medicine, and he and his colleagues will admit the patient.    All are agreeable with the plan      I spoke with Dr. Dhillon who is admitting the patient we discussed the patient's.  I forgot to mention it but she does have what I suspect is a conjunctivitis on the left eye.  She has under CT scan what may be lenticular ectasia but it is however bilateral but she has no evidence of anything in the orbit itself.  Also noted a probably mucous cyst on the right maxillary sinus.       Amount and/or Complexity of Data Reviewed  Clinical lab tests: reviewed  Tests in the radiology section of CPT®: reviewed  Tests in the medicine  section of CPT®: reviewed        Final diagnoses:   Respiratory failure with hypoxia, unspecified chronicity (HCC)   Pneumonia of right upper lobe due to infectious organism   Hypercalcemia   Positive D dimer   Senescence   Confusion   Urinary tract infection with hematuria, site unspecified   Acute conjunctivitis of left eye, unspecified acute conjunctivitis type       ED Disposition  ED Disposition     ED Disposition Condition Comment    Decision to Admit  Level of Care: Med/Surg [1]   Diagnosis: Respiratory failure with hypoxia, unspecified chronicity (HCC) [[id number]]   Admitting Physician: RENO ARANA [[id number]]   Attending Physician: RENO ARANA [[id number]]   Bed Request Comments: She is DNR/no heroic measures so could go to med-surg if tele beds are tight.   Certification: I Certify That Inpatient Hospital Services Are Medically Necessary For Greater Than 2 Midnights            No follow-up provider specified.       Medication List      ASK your doctor about these medications    ramipril 5 MG capsule  Commonly known as: ALTACE  Ask about: Which instructions should I use?             Eddy Reynaga MD  01/06/22 1100       Eddy Reynaga MD  01/06/22 1241

## 2022-01-06 NOTE — CASE MANAGEMENT/SOCIAL WORK
Discharge Planning Assessment  [facility]     Patient Name: Florinda Knapp  MRN: [id number]  Today's Date: 1/6/2022    Admit Date: 1/6/2022     Discharge Needs Assessment     Row Name 01/06/22 1423       Living Environment    Lives With facility resident    Name(s) of Who Lives With Patient Resides in personal care at [facility] in [facility]    Current Living Arrangements other (see comments)-personal care facility    Duration at Residence 2/4/2014    Primary Care Provided by self; other (see comments)-facility staff    Provides Primary Care For no one, unable/limited ability to care for self    Caregiving Concerns facility resident    Family Caregiver if Needed child(dre), adult    Family Caregiver Names Son, Yogesh Knapp @ [phone]    Quality of Family Relationships involved; helpful; supportive    Able to Return to Prior Arrangements yes    Living Arrangement Comments Resides in personal care at [facility]       Resource/Environmental Concerns    Resource/Environmental Concerns none    Transportation Concerns other (see comments)-[facility]       Transition Planning    Patient/Family Anticipates Transition to other (see comments)-Personal Care, TBD    Patient/Family Anticipated Services at Transition ; community agency-[facility]    Transportation Anticipated other (see comments)-ambulance       Discharge Needs Assessment    Readmission Within the Last 30 Days no previous admission in last 30 days    Current Outpatient/Agency/Support Group other (see comments)-personal care    Equipment Currently Used at Home wheelchair    Concerns to be Addressed discharge planning    Concerns Comments Case Management following for all needs/discharge planning    Anticipated Changes Related to Illness none    Outpatient/Agency/Support Group Needs other (see comments)-TBD    Discharge Facility/Level of Care Needs other (see comments)-TBD    Patient's Choice of  Community Agency(s) [facility]    Discharge Coordination/Progress Anticpate patient will return to her personal care facility when medically stable               Discharge Plan     Row Name 01/06/22 1429       Plan    Plan Personal Care @ [facility]    Plan Comments Planning hospital admission, spoke with RN at [facility] who reports patient resides in personal care and ambulates with wheelchair.  Anticipate patient will return to [facility] when medically stable, per notes if no improvement son may be interested in Palliative/comfort care.  Case Management following for all needs.    Final Discharge Disposition Code 30 - still a patient              Continued Care and Services - Admitted Since 1/6/2022    Coordination has not been started for this encounter.          Demographic Summary     Row Name 01/06/22 1422       General Information    Arrived From emergency department; other (see comments)    Referral Source admission list; emergency department    Reason for Consult discharge planning    Preferred Language English     Used During This Interaction no               Functional Status    No documentation.                Psychosocial    No documentation.                Abuse/Neglect    No documentation.                Legal    No documentation.                Substance Abuse    No documentation.                Patient Forms    No documentation.                   UNIQUE Betancur

## 2022-01-06 NOTE — H&P
[facility] Medicine Services  HISTORY AND PHYSICAL    Patient Name: Florinda Knapp  : 3/5/1931  MRN: [id number]  Primary Care Physician: Kieran Cottrell MD  Date of admission: 2022      Subjective   Subjective     Chief Complaint: resp distress at NH    HPI:  Florinda Knapp is a 89 y.o. female , a resident of a memory unit with a history of Parkinson's dementia, CVA.  She was sent to the emergency room today for problems breathing with oxygen sats in the 80s.  Could not get a clear history in ED from patient.      However, patient's son was at bedside earlier and spoke to ER doctor.  At baseline, she is helped into a wheelchair, can feed herself on good days, and can talk to family.  For the past 3 weeks she has been in decline, hallucinating and doing less than previous. Son would like to treat her (no heroic measures) and consider palliative focus if she does not improve.     Workup includes lactate 3.0, WBC 11, UA indicating UTI, CTA chest without acute findings.        COVID Details:    Symptoms:    [] NONE [] Fever []  Cough [] Shortness of breath [] Change in taste/smell      Review of Systems   UTO except as above.   All other systems reviewed and are negative.     Personal History     Past Medical History:   Diagnosis Date   • Anemia    • Anxiety    • Asthma    • Cerebral infarction (HCC)    • Depression    • Failure to thrive in adult    • Hyperlipidemia    • Hyperlipidemia    • Hypertension    • Hypothyroidism    • Osteoarthritis    • Osteoporosis    • Parkinson's disease (HCC)    • Stress incontinence    • Stroke (HCC) 2017    left MCA thromboembolism   • Urinary retention        Past Surgical History:   Procedure Laterality Date   • APPENDECTOMY     • BACK SURGERY     • CATARACT EXTRACTION     • ELBOW PROCEDURE     • KNEE SURGERY     • MO PRIM PRQ TRLUML MCHNL THRMBC N-COR N-ICRA 1ST Left 2017    Diagnostic Angiogram only, no thrombectomy   • TOTAL HIP  ARTHROPLASTY Left 12/2016       Family History: family history includes Alcohol abuse in an other family member; Heart disease in her son; Hypertension in an other family member; Stroke in an other family member. Otherwise pertinent FHx was reviewed and unremarkable.     Social History:  reports that she has never smoked. She has never used smokeless tobacco. She reports that she does not drink alcohol and does not use drugs.  Social History     Social History Narrative    Lives at [facility]       Medications:  Available home medication information reviewed.  (Not in a hospital admission)      Allergies   Allergen Reactions   • Abilify [Aripiprazole]    • Alendronate    • Celecoxib    • Fosamax Plus D [Alendronate-Cholecalciferol]    • Levaquin [Levofloxacin]    • Macrobid [Nitrofurantoin Monohyd Macro]    • Nitrofurantoin    • Procaine    • Sulfa Antibiotics Nausea And Vomiting and Rash       Objective   Objective     Vital Signs:   Temp:  [97.7 °F (36.5 °C)] 97.7 °F (36.5 °C)  Heart Rate:  [] 98  Resp:  [18-20] 18  BP: (146-155)/() 147/89  Flow (L/min):  [4] 4       Physical Exam   Constitutional:  Seen in ED, no family present, she is lying with eyes closed, does not respond to voice but pulls away from exam.    Eyes: sclerae anicteric.  Severe L eye conjunctival infection with some dark crusting.    HENT: NCAT, mucous membranes dry  Neck: Supple, , trachea midline  Respiratory: Clear to auscultation bilaterally, nonlabored respirations   Cardiovascular: RRR, no murmurs  Gastrointestinal: Positive bowel sounds, soft, nontender, nondistended  Musculoskeletal: No bilateral ankle edema, no clubbing or cyanosis to extremities  Psychiatric: won't open eyes, does not try to talk to me, does not follow commands.   Neurologic: pulls away from exam, resists when I open her eyes.  High tone arms/legs, no tremor.    Skin: No rashes    Result Review:  I have personally reviewed the results from  the time of this admission to 1/6/2022 10:59 EST and agree with these findings:  []  Laboratory  []  Microbiology  []  Radiology  []  EKG/Telemetry   []  Cardiology/Vascular   []  Pathology  []  Old records  []  Other:  Most notable findings include: head CT wihtout acute findings.  UA abnl.  WBC 11.  History of CDiff       LAB RESULTS:      Lab 01/06/22  0806   WBC 10.98*   HEMOGLOBIN 12.6   HEMATOCRIT 39.2   PLATELETS 174   NEUTROS ABS 9.15*   IMMATURE GRANS (ABS) 0.08*   LYMPHS ABS 1.02   MONOS ABS 0.61   EOS ABS 0.10   MCV 98.0*   PROCALCITONIN 0.07   LACTATE 3.0*   D DIMER QUANT >20.00*         Lab 01/06/22  0806   SODIUM 136   POTASSIUM 4.5   CHLORIDE 101   CO2 21.0*   ANION GAP 14.0   BUN 27*   CREATININE 0.84   GLUCOSE 118*   CALCIUM 9.8*   IONIZED CALCIUM 1.31   MAGNESIUM 2.3         Lab 01/06/22  0806   TOTAL PROTEIN 7.8   ALBUMIN 3.80   GLOBULIN 4.0   ALT (SGPT) 7   AST (SGOT) 26   BILIRUBIN 0.7   ALK PHOS 103         Lab 01/06/22  0806   TROPONIN T <0.010                 UA    Urinalysis 8/12/21 8/12/21 1/6/22 1/6/22    1356 1356 0918 0918   Squamous Epithelial Cells, UA  3-6 (A)  7-12 (A)   Specific Munger, UA 1.014  1.020    Ketones, UA Negative  Trace (A)    Blood, UA Negative  Trace (A)    Leukocytes, UA Large (3+) (A)  Large (3+) (A)    Nitrite, UA Negative  Positive (A)    RBC, UA  None Seen  13-20 (A)   WBC, UA  Too Numerous to Count (A)  Too Numerous to Count (A)   Bacteria, UA  4+ (A)  4+ (A)   (A) Abnormal value              Microbiology Results (last 10 days)     Procedure Component Value - Date/Time    Respiratory Panel PCR w/COVID-19(SARS-CoV-2) CURTIS/KATIE/EVITA/PAD/COR/MAD/SELVIN In-House, NP Swab in UTM/VTM, 3-4 HR TAT - Swab, Nasopharynx [[id number]]  (Normal) Collected: 01/06/22 0855    Lab Status: Final result Specimen: Swab from Nasopharynx Updated: 01/06/22 1010     ADENOVIRUS, PCR Not Detected     Coronavirus 229E Not Detected     Coronavirus HKU1 Not Detected     Coronavirus NL63 Not  Detected     Coronavirus OC43 Not Detected     COVID19 Not Detected     Human Metapneumovirus Not Detected     Human Rhinovirus/Enterovirus Not Detected     Influenza A PCR Not Detected     Influenza B PCR Not Detected     Parainfluenza Virus 1 Not Detected     Parainfluenza Virus 2 Not Detected     Parainfluenza Virus 3 Not Detected     Parainfluenza Virus 4 Not Detected     RSV, PCR Not Detected     Bordetella pertussis pcr Not Detected     Bordetella parapertussis PCR Not Detected     Chlamydophila pneumoniae PCR Not Detected     Mycoplasma pneumo by PCR Not Detected    Narrative:      In the setting of a positive respiratory panel with a viral infection PLUS a negative procalcitonin without other underlying concern for bacterial infection, consider observing off antibiotics or discontinuation of antibiotics and continue supportive care. If the respiratory panel is positive for atypical bacterial infection (Bordetella pertussis, Chlamydophila pneumoniae, or Mycoplasma pneumoniae), consider antibiotic de-escalation to target atypical bacterial infection.          CT Head Without Contrast    Result Date: 1/6/2022  EXAMINATION: CT HEAD WO CONTRAST-  INDICATION: ams  TECHNIQUE: 5 mm unenhanced images through the brain  The radiation dose reduction device was turned on for each scan per the ALARA (As Low as Reasonably Achievable) protocol.  COMPARISON: 11/30/2021  FINDINGS: Right maxillary sinus mucous cyst is again noted. Paranasal sinuses and mastoids otherwise remain clear. The calvarium appears intact. Soft tissue window images show expected degree of generalized cerebral atrophy for the patient's age. 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 There is no evidence of acute infarction, no evidence of cerebral edema, mass mass effect, hemorrhage, hydrocephalus, or abnormal extra-axial collection.        Impression: Stable head CT scan with age-appropriate generalized cerebral atrophy, and small  old cerebellar infarcts. No evidence of acute intracranial disease.   This report was finalized on 1/6/2022 9:46 AM by Dr. Peewee Paredes MD.      XR Chest 1 View    Result Date: 1/6/2022  EXAMINATION: XR CHEST 1 VW-  INDICATION: Weak/Dizzy/AMS triage protocol  COMPARISON: 8/12/2021  FINDINGS: Prominent aortic knob shadow is present as on multiple prior studies. The heart shadow itself is mildly enlarged. Vasculature appears normal. Lungs are well-expanded. There is mild hazy interstitial opacity of the right upper lobe not seen on prior studies, possibly early changes of pneumonia, or some mild generalized atelectasis. Small linear scar or atelectasis in the left lung base appears stable.       Impression: Subtle new interstitial changes in the right upper lobe, and stable chronic appearing changes elsewhere. Ectatic aortic arch as on multiple prior studies. No other new chest disease is seen.    This report was finalized on 1/6/2022 8:17 AM by Dr. Peewee Paredes MD.      CT Angiogram Chest    Result Date: 1/6/2022  EXAMINATION: CT ANGIOGRAM CHEST- 01/06/2022  INDICATION: carmen, + d dimer; J96.91-Respiratory failure, unspecified with hypoxia; J18.9-Pneumonia, unspecified organism; E83.52-Hypercalcemia; R79.89-Other specified abnormal findings of blood chemistry; R54-Age-related physical debility; R41.0-Disorientation, unspecified  TECHNIQUE: 3 mm post IV contrast images through the chest and upper abdomen with sagittal and coronal 2-D reconstructions.  The radiation dose reduction device was turned on for each scan per the ALARA (As Low as Reasonably Achievable) protocol.  COMPARISON: Portable chest radiograph 01/06/2022  FINDINGS: There is good contrast opacification of the pulmonary arteries. No filling defects are seen to suggest pulmonary embolic disease. There is no evidence of thoracic aortic dissection. There is mild ectasia of the ascending thoracic aorta to maximum of 3.9 cm. There is a trace amount of pericardial  fluid. No mediastinal adenopathy is seen. Lung window images show mild bibasilar bullous disease, mild bibasilar linear scarring and trace pleural thickening but no evidence of pneumonia or other active chest disease. There are multiple healing left posterior rib fractures involving at least the posterior 11th, 10th and ninth ribs.  Included images of the upper abdomen show mild fatty liver change. Included portion of the gallbladder is distended but otherwise unremarkable in appearance. Granulomatous calcifications are seen in the normal-sized spleen. There are large left upper pole renal cysts. A lower thoracic wedge compression deformity at what appears to be T11, is old, present in 2017. Milder wedge compression deformity of T8 appears to be old.      Impression: 1. No evidence of pulmonary embolic disease. 2. Healing posterior left lower rib fractures. No displaced fracture is seen. 3. Mild chronic appearing lung changes as noted. No evidence of pneumonia, edema, or effusion.  D:  01/06/2022 E:  01/06/2022        Results for orders placed during the hospital encounter of 02/19/17    Adult transthoracic echo complete    Interpretation Summary  · Left ventricular wall segments contract abnormally. Refer to wall scoring diagram for more information.  · Mild aortic valve regurgitation is present.  · Left ventricular function is mildly decreased. Estimated EF = 45%.  · Left ventricular wall thickness is consistent with mild concentric hypertrophy.  · There is a small (<1cm) pericardial effusion adjacent to the left ventricle.  · Saline test results are positive and indicate an atrial level shunt.      Assessment/Plan   Assessment & Plan     There are no active hospital problems to display for this patient.    89 year old woman with history of CVA and Parkinson's, history of CDiff in 2017, sent from her nursing home for acute resp distress.        A/p    UTI with elevated lactate  Sepsis POA (tachycardia, lactic acid  elev, tachycardia, confusion, source)   - note history of CDiff, history of amp-sensistive E faecalis in 2017  - got Zosyn in ED.  Will continue ampicillin for now   - blood and urine cx pending    L eye conjunctivitis  - per CT, hisotry of corneal surgery  - garamycin x 5 days     resp distress at NH  - CTA without PE or infiltrate  - current breathing is comfortalbe  - COVID negative    Neuro:  Confused, with 3 weeks of decline per her son.    - baseline deficits, now with sepsis  - head CT no acute findings  - continue home meds    - consider comfort-focused care if she does not improve.      DVT prophylaxis:  lovenox       CODE STATUS:  DNR, no heroic measures  Code Status and Medical Interventions:   Ordered at: 01/06/22 1003     Medical Intervention Limits:    NO intubation (DNI)    NO antiarrhythmic drugs    NO dialysis    NO cardioversion    NO vasopressors    NO artificial nutrition     Level Of Support Discussed With:    Health Care Surrogate    Next of Kin (If No Surrogate)     Code Status (Patient has no pulse and is not breathing):    No CPR (Do Not Attempt to Resuscitate)     Medical Interventions (Patient has pulse or is breathing):    Limited Support     Comments:    son at bedside       Admission Status:  I believe this patient meets INPATIENT status due to sepsis and advanced age.  I feel patient’s risk for adverse outcomes and need for care warrant INPATIENT evaluation and I predict the patient’s care encounter to likely last beyond 2 midnights.      Anuja Stanford MD  01/06/22

## 2022-01-06 NOTE — H&P (VIEW-ONLY)
[facility] Medicine Services  HISTORY AND PHYSICAL    Patient Name: Florinda Knapp  : 3/5/1931  MRN: [id number]  Primary Care Physician: Kieran Cottrell MD  Date of admission: 2022      Subjective   Subjective     Chief Complaint: resp distress at NH    HPI:  Florinda Knapp is a 89 y.o. female , a resident of a memory unit with a history of Parkinson's dementia, CVA.  She was sent to the emergency room today for problems breathing with oxygen sats in the 80s.  Could not get a clear history in ED from patient.      However, patient's son was at bedside earlier and spoke to ER doctor.  At baseline, she is helped into a wheelchair, can feed herself on good days, and can talk to family.  For the past 3 weeks she has been in decline, hallucinating and doing less than previous. Son would like to treat her (no heroic measures) and consider palliative focus if she does not improve.     Workup includes lactate 3.0, WBC 11, UA indicating UTI, CTA chest without acute findings.        COVID Details:    Symptoms:    [] NONE [] Fever []  Cough [] Shortness of breath [] Change in taste/smell      Review of Systems   UTO except as above.   All other systems reviewed and are negative.     Personal History     Past Medical History:   Diagnosis Date   • Anemia    • Anxiety    • Asthma    • Cerebral infarction (HCC)    • Depression    • Failure to thrive in adult    • Hyperlipidemia    • Hyperlipidemia    • Hypertension    • Hypothyroidism    • Osteoarthritis    • Osteoporosis    • Parkinson's disease (HCC)    • Stress incontinence    • Stroke (HCC) 2017    left MCA thromboembolism   • Urinary retention        Past Surgical History:   Procedure Laterality Date   • APPENDECTOMY     • BACK SURGERY     • CATARACT EXTRACTION     • ELBOW PROCEDURE     • KNEE SURGERY     • KS PRIM PRQ TRLUML MCHNL THRMBC N-COR N-ICRA 1ST Left 2017    Diagnostic Angiogram only, no thrombectomy   • TOTAL HIP  ARTHROPLASTY Left 12/2016       Family History: family history includes Alcohol abuse in an other family member; Heart disease in her son; Hypertension in an other family member; Stroke in an other family member. Otherwise pertinent FHx was reviewed and unremarkable.     Social History:  reports that she has never smoked. She has never used smokeless tobacco. She reports that she does not drink alcohol and does not use drugs.  Social History     Social History Narrative    Lives at [facility]       Medications:  Available home medication information reviewed.  (Not in a hospital admission)      Allergies   Allergen Reactions   • Abilify [Aripiprazole]    • Alendronate    • Celecoxib    • Fosamax Plus D [Alendronate-Cholecalciferol]    • Levaquin [Levofloxacin]    • Macrobid [Nitrofurantoin Monohyd Macro]    • Nitrofurantoin    • Procaine    • Sulfa Antibiotics Nausea And Vomiting and Rash       Objective   Objective     Vital Signs:   Temp:  [97.7 °F (36.5 °C)] 97.7 °F (36.5 °C)  Heart Rate:  [] 98  Resp:  [18-20] 18  BP: (146-155)/() 147/89  Flow (L/min):  [4] 4       Physical Exam   Constitutional:  Seen in ED, no family present, she is lying with eyes closed, does not respond to voice but pulls away from exam.    Eyes: sclerae anicteric.  Severe L eye conjunctival infection with some dark crusting.    HENT: NCAT, mucous membranes dry  Neck: Supple, , trachea midline  Respiratory: Clear to auscultation bilaterally, nonlabored respirations   Cardiovascular: RRR, no murmurs  Gastrointestinal: Positive bowel sounds, soft, nontender, nondistended  Musculoskeletal: No bilateral ankle edema, no clubbing or cyanosis to extremities  Psychiatric: won't open eyes, does not try to talk to me, does not follow commands.   Neurologic: pulls away from exam, resists when I open her eyes.  High tone arms/legs, no tremor.    Skin: No rashes    Result Review:  I have personally reviewed the results from  the time of this admission to 1/6/2022 10:59 EST and agree with these findings:  []  Laboratory  []  Microbiology  []  Radiology  []  EKG/Telemetry   []  Cardiology/Vascular   []  Pathology  []  Old records  []  Other:  Most notable findings include: head CT wihtout acute findings.  UA abnl.  WBC 11.  History of CDiff       LAB RESULTS:      Lab 01/06/22  0806   WBC 10.98*   HEMOGLOBIN 12.6   HEMATOCRIT 39.2   PLATELETS 174   NEUTROS ABS 9.15*   IMMATURE GRANS (ABS) 0.08*   LYMPHS ABS 1.02   MONOS ABS 0.61   EOS ABS 0.10   MCV 98.0*   PROCALCITONIN 0.07   LACTATE 3.0*   D DIMER QUANT >20.00*         Lab 01/06/22  0806   SODIUM 136   POTASSIUM 4.5   CHLORIDE 101   CO2 21.0*   ANION GAP 14.0   BUN 27*   CREATININE 0.84   GLUCOSE 118*   CALCIUM 9.8*   IONIZED CALCIUM 1.31   MAGNESIUM 2.3         Lab 01/06/22  0806   TOTAL PROTEIN 7.8   ALBUMIN 3.80   GLOBULIN 4.0   ALT (SGPT) 7   AST (SGOT) 26   BILIRUBIN 0.7   ALK PHOS 103         Lab 01/06/22  0806   TROPONIN T <0.010                 UA    Urinalysis 8/12/21 8/12/21 1/6/22 1/6/22    1356 1356 0918 0918   Squamous Epithelial Cells, UA  3-6 (A)  7-12 (A)   Specific [place], UA 1.014  1.020    Ketones, UA Negative  Trace (A)    Blood, UA Negative  Trace (A)    Leukocytes, UA Large (3+) (A)  Large (3+) (A)    Nitrite, UA Negative  Positive (A)    RBC, UA  None Seen  13-20 (A)   WBC, UA  Too Numerous to Count (A)  Too Numerous to Count (A)   Bacteria, UA  4+ (A)  4+ (A)   (A) Abnormal value              Microbiology Results (last 10 days)     Procedure Component Value - Date/Time    Respiratory Panel PCR w/COVID-19(SARS-CoV-2) CURTIS/KATIE/EVITA/PAD/COR/MAD/SELVIN In-House, NP Swab in UTM/VTM, 3-4 HR TAT - Swab, Nasopharynx [[id number]]  (Normal) Collected: 01/06/22 0855    Lab Status: Final result Specimen: Swab from Nasopharynx Updated: 01/06/22 1010     ADENOVIRUS, PCR Not Detected     Coronavirus 229E Not Detected     Coronavirus HKU1 Not Detected     Coronavirus NL63 Not  Detected     Coronavirus OC43 Not Detected     COVID19 Not Detected     Human Metapneumovirus Not Detected     Human Rhinovirus/Enterovirus Not Detected     Influenza A PCR Not Detected     Influenza B PCR Not Detected     Parainfluenza Virus 1 Not Detected     Parainfluenza Virus 2 Not Detected     Parainfluenza Virus 3 Not Detected     Parainfluenza Virus 4 Not Detected     RSV, PCR Not Detected     Bordetella pertussis pcr Not Detected     Bordetella parapertussis PCR Not Detected     Chlamydophila pneumoniae PCR Not Detected     Mycoplasma pneumo by PCR Not Detected    Narrative:      In the setting of a positive respiratory panel with a viral infection PLUS a negative procalcitonin without other underlying concern for bacterial infection, consider observing off antibiotics or discontinuation of antibiotics and continue supportive care. If the respiratory panel is positive for atypical bacterial infection (Bordetella pertussis, Chlamydophila pneumoniae, or Mycoplasma pneumoniae), consider antibiotic de-escalation to target atypical bacterial infection.          CT Head Without Contrast    Result Date: 1/6/2022  EXAMINATION: CT HEAD WO CONTRAST-  INDICATION: ams  TECHNIQUE: 5 mm unenhanced images through the brain  The radiation dose reduction device was turned on for each scan per the ALARA (As Low as Reasonably Achievable) protocol.  COMPARISON: 11/30/2021  FINDINGS: Right maxillary sinus mucous cyst is again noted. Paranasal sinuses and mastoids otherwise remain clear. The calvarium appears intact. Soft tissue window images show expected degree of generalized cerebral atrophy for the patient's age. 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 There is no evidence of acute infarction, no evidence of cerebral edema, mass mass effect, hemorrhage, hydrocephalus, or abnormal extra-axial collection.        Impression: Stable head CT scan with age-appropriate generalized cerebral atrophy, and small  old cerebellar infarcts. No evidence of acute intracranial disease.   This report was finalized on 1/6/2022 9:46 AM by Dr. Peewee Paredes MD.      XR Chest 1 View    Result Date: 1/6/2022  EXAMINATION: XR CHEST 1 VW-  INDICATION: Weak/Dizzy/AMS triage protocol  COMPARISON: 8/12/2021  FINDINGS: Prominent aortic knob shadow is present as on multiple prior studies. The heart shadow itself is mildly enlarged. Vasculature appears normal. Lungs are well-expanded. There is mild hazy interstitial opacity of the right upper lobe not seen on prior studies, possibly early changes of pneumonia, or some mild generalized atelectasis. Small linear scar or atelectasis in the left lung base appears stable.       Impression: Subtle new interstitial changes in the right upper lobe, and stable chronic appearing changes elsewhere. Ectatic aortic arch as on multiple prior studies. No other new chest disease is seen.    This report was finalized on 1/6/2022 8:17 AM by Dr. Peewee Paredes MD.      CT Angiogram Chest    Result Date: 1/6/2022  EXAMINATION: CT ANGIOGRAM CHEST- 01/06/2022  INDICATION: carmen, + d dimer; J96.91-Respiratory failure, unspecified with hypoxia; J18.9-Pneumonia, unspecified organism; E83.52-Hypercalcemia; R79.89-Other specified abnormal findings of blood chemistry; R54-Age-related physical debility; R41.0-Disorientation, unspecified  TECHNIQUE: 3 mm post IV contrast images through the chest and upper abdomen with sagittal and coronal 2-D reconstructions.  The radiation dose reduction device was turned on for each scan per the ALARA (As Low as Reasonably Achievable) protocol.  COMPARISON: Portable chest radiograph 01/06/2022  FINDINGS: There is good contrast opacification of the pulmonary arteries. No filling defects are seen to suggest pulmonary embolic disease. There is no evidence of thoracic aortic dissection. There is mild ectasia of the ascending thoracic aorta to maximum of 3.9 cm. There is a trace amount of pericardial  fluid. No mediastinal adenopathy is seen. Lung window images show mild bibasilar bullous disease, mild bibasilar linear scarring and trace pleural thickening but no evidence of pneumonia or other active chest disease. There are multiple healing left posterior rib fractures involving at least the posterior 11th, 10th and ninth ribs.  Included images of the upper abdomen show mild fatty liver change. Included portion of the gallbladder is distended but otherwise unremarkable in appearance. Granulomatous calcifications are seen in the normal-sized spleen. There are large left upper pole renal cysts. A lower thoracic wedge compression deformity at what appears to be T11, is old, present in 2017. Milder wedge compression deformity of T8 appears to be old.      Impression: 1. No evidence of pulmonary embolic disease. 2. Healing posterior left lower rib fractures. No displaced fracture is seen. 3. Mild chronic appearing lung changes as noted. No evidence of pneumonia, edema, or effusion.  D:  01/06/2022 E:  01/06/2022        Results for orders placed during the hospital encounter of 02/19/17    Adult transthoracic echo complete    Interpretation Summary  · Left ventricular wall segments contract abnormally. Refer to wall scoring diagram for more information.  · Mild aortic valve regurgitation is present.  · Left ventricular function is mildly decreased. Estimated EF = 45%.  · Left ventricular wall thickness is consistent with mild concentric hypertrophy.  · There is a small (<1cm) pericardial effusion adjacent to the left ventricle.  · Saline test results are positive and indicate an atrial level shunt.      Assessment/Plan   Assessment & Plan     There are no active hospital problems to display for this patient.    89 year old woman with history of CVA and Parkinson's, history of CDiff in 2017, sent from her nursing home for acute resp distress.        A/p    UTI with elevated lactate  Sepsis POA (tachycardia, lactic acid  elev, tachycardia, confusion, source)   - note history of CDiff, history of amp-sensistive E faecalis in 2017  - got Zosyn in ED.  Will continue ampicillin for now   - blood and urine cx pending    L eye conjunctivitis  - per CT, hisotry of corneal surgery  - garamycin x 5 days     resp distress at NH  - CTA without PE or infiltrate  - current breathing is comfortalbe  - COVID negative    Neuro:  Confused, with 3 weeks of decline per her son.    - baseline deficits, now with sepsis  - head CT no acute findings  - continue home meds    - consider comfort-focused care if she does not improve.      DVT prophylaxis:  lovenox       CODE STATUS:  DNR, no heroic measures  Code Status and Medical Interventions:   Ordered at: 01/06/22 1003     Medical Intervention Limits:    NO intubation (DNI)    NO antiarrhythmic drugs    NO dialysis    NO cardioversion    NO vasopressors    NO artificial nutrition     Level Of Support Discussed With:    Health Care Surrogate    Next of Kin (If No Surrogate)     Code Status (Patient has no pulse and is not breathing):    No CPR (Do Not Attempt to Resuscitate)     Medical Interventions (Patient has pulse or is breathing):    Limited Support     Comments:    son at bedside       Admission Status:  I believe this patient meets INPATIENT status due to sepsis and advanced age.  I feel patient’s risk for adverse outcomes and need for care warrant INPATIENT evaluation and I predict the patient’s care encounter to likely last beyond 2 midnights.      Anuja Stanford MD  01/06/22

## 2022-01-07 NOTE — CASE MANAGEMENT/SOCIAL WORK
Continued Stay Note  [facility]     Patient Name: Florinda Knapp  MRN: [id number]  Today's Date: 1/7/2022    Admit Date: 1/6/2022     Discharge Plan     Row Name 01/07/22 1306       Plan    Plan Inpatient Hospice    Patient/Family in Agreement with Plan yes    Plan Comments Met & spoke with Ms Knapp's sons Vignesh and Yogesh. After meeting with Dr Stanford, they have decided to forego antibiotics/fluids and proceed with inpatient Hospice and comfort care. A Hospice consult has been placed.    Final Discharge Disposition Code 30 - still a patient               Discharge Codes    No documentation.                     Teetee Schmidt RN

## 2022-01-07 NOTE — PLAN OF CARE
Patient has rested comfortably.  Responds to painful stimuli with grimace and moan; nonverbal.  Unable to take PO medication.  2L maintained.  Tylenol given for temperature, meropenem started, c diff precautions.  No BM on this shift.  Bath completed.

## 2022-01-07 NOTE — DISCHARGE PLACEMENT REQUEST
"Vaibhav Knapp P (89 y.o. Female)             Date of Birth Social Security Number Address Home Phone MRN    03/05/1931  [id number]  [facility] [address] [phone] [id number]    M[employer]on Marital Status             Roman Catholic        Admission Date Admission Type Admitting Provider Attending Provider Department, Room/Bed    1/6/22 Emergency Anuja Stanford MD Mini, Jocelyn, MD [facility] 5B, N545/1    Discharge Date Discharge Disposition Discharge Destination                         Attending Provider: Anuja Stanford MD    Allergies: Abilify [Aripiprazole], Alendronate, Celecoxib, Fosamax Plus D [Alendronate-cholecalciferol], Levaquin [Levofloxacin], Macrobid [Nitrofurantoin Monohyd Macro], Nitrofurantoin, Procaine, Sulfa Antibiotics    Isolation: Spore   Infection: None   Code Status: No CPR   Advance Care Planning Activity    Ht: 149.9 cm (59\")   Wt: 52.2 kg (115 lb)    Admission Cmt: None   Principal Problem: None                Active Insurance as of 1/6/2022     Primary Coverage     Payor Plan Insurance Group Employer/Plan Group    MEDICARE MEDICARE A & B      Payor Plan Address Payor Plan Phone Number Payor Plan Fax Number Effective Dates    [address] [phone]  3/1/1996 - None Entered    [facility] [id number]       Subscriber Name Subscriber Birth Date Member ID       VAIBHAV KNAPP P 3/5/1931 [id number]           Secondary Coverage     Payor Plan Insurance Group Employer/Plan Group    [facility] SUPP [id number]     Payor Plan Address Payor Plan Phone Number Payor Plan Fax Number Effective Dates    [address]   12/1/2016 - None Entered    [facility] [address]       Subscriber Name Subscriber Birth Date Member ID       VAIBHAV KNAPP P 3/5/1931 [id number]                 Emergency Contacts      (Rel.) Home Phone Work Phone Mobile Phone    Yogesh Knapp (Power of ) [phone] -- --            Emergency Contact Information "     Name Relation Home Work Mobile    Yogesh Knapp Power of  [phone]            Insurance Information                MEDICARE/MEDICARE A & B Phone: [phone]    Subscriber: Florinda Knapp Subscriber#: [id number]    Group#: -- Precert#: --        ANTHEM BLUE CROSS/ANTHEM BC  SUPP Phone: --    Subscriber: Florinda Knapp Subscriber#: [id number]    Group#: [id number] Precert#: --             History & Physical      Anuja Stanford MD at 22 1059              [facility] Medicine Services  HISTORY AND PHYSICAL    Patient Name: Florinda Knapp  : 3/5/1931  MRN: [id number]  Primary Care Physician: Kieran Cottrell MD  Date of admission: 2022      Subjective   Subjective     Chief Complaint: resp distress at NH    HPI:  Florinda Knpap is a 89 y.o. female , a resident of a memory unit with a history of Parkinson's dementia, CVA.  She was sent to the emergency room today for problems breathing with oxygen sats in the 80s.  Could not get a clear history in ED from patient.      However, patient's son was at bedside earlier and spoke to ER doctor.  At baseline, she is helped into a wheelchair, can feed herself on good days, and can talk to family.  For the past 3 weeks she has been in decline, hallucinating and doing less than previous. Son would like to treat her (no heroic measures) and consider palliative focus if she does not improve.     Workup includes lactate 3.0, WBC 11, UA indicating UTI, CTA chest without acute findings.        COVID Details:    Symptoms:    [] NONE [] Fever []  Cough [] Shortness of breath [] Change in taste/smell      Review of Systems   UTO except as above.   All other systems reviewed and are negative.     Personal History     Past Medical History:   Diagnosis Date   • Anemia    • Anxiety    • Asthma    • Cerebral infarction (HCC)    • Depression    • Failure to thrive in adult    • Hyperlipidemia    • Hyperlipidemia    • Hypertension     • Hypothyroidism    • Osteoarthritis    • Osteoporosis    • Parkinson's disease (HCC)    • Stress incontinence    • Stroke (HCC) 02/19/2017    left MCA thromboembolism   • Urinary retention        Past Surgical History:   Procedure Laterality Date   • APPENDECTOMY     • BACK SURGERY     • CATARACT EXTRACTION     • ELBOW PROCEDURE     • KNEE SURGERY     • NH PRIM PRQ TRLUML MCHNL THRMBC N-COR N-ICRA 1ST Left 2/19/2017    Diagnostic Angiogram only, no thrombectomy   • TOTAL HIP ARTHROPLASTY Left 12/2016       Family History: family history includes Alcohol abuse in an other family member; Heart disease in her son; Hypertension in an other family member; Stroke in an other family member. Otherwise pertinent FHx was reviewed and unremarkable.     Social History:  reports that she has never smoked. She has never used smokeless tobacco. She reports that she does not drink alcohol and does not use drugs.  Social History     Social History Narrative    [facility] at [facility]       Medications:  Available home medication information reviewed.  (Not in a hospital admission)      Allergies   Allergen Reactions   • Abilify [Aripiprazole]    • Alendronate    • Celecoxib    • Fosamax Plus D [Alendronate-Cholecalciferol]    • Levaquin [Levofloxacin]    • Macrobid [Nitrofurantoin Monohyd Macro]    • Nitrofurantoin    • Procaine    • Sulfa Antibiotics Nausea And Vomiting and Rash       Objective   Objective     Vital Signs:   Temp:  [97.7 °F (36.5 °C)] 97.7 °F (36.5 °C)  Heart Rate:  [] 98  Resp:  [18-20] 18  BP: (146-155)/() 147/89  Flow (L/min):  [4] 4       Physical Exam   Constitutional:  Seen in ED, no family present, she is lying with eyes closed, does not respond to voice but pulls away from exam.    Eyes: sclerae anicteric.  Severe L eye conjunctival infection with some dark crusting.    HENT: NCAT, mucous membranes dry  Neck: Supple, , trachea midline  Respiratory: Clear to auscultation  bilaterally, nonlabored respirations   Cardiovascular: RRR, no murmurs  Gastrointestinal: Positive bowel sounds, soft, nontender, nondistended  Musculoskeletal: No bilateral ankle edema, no clubbing or cyanosis to extremities  Psychiatric: won't open eyes, does not try to talk to me, does not follow commands.   Neurologic: pulls away from exam, resists when I open her eyes.  High tone arms/legs, no tremor.    Skin: No rashes    Result Review:  I have personally reviewed the results from the time of this admission to 1/6/2022 10:59 EST and agree with these findings:  []  Laboratory  []  Microbiology  []  Radiology  []  EKG/Telemetry   []  Cardiology/Vascular   []  Pathology  []  Old records  []  Other:  Most notable findings include: head CT wihtout acute findings.  UA abnl.  WBC 11.  History of CDiff       LAB RESULTS:      Lab 01/06/22  0806   WBC 10.98*   HEMOGLOBIN 12.6   HEMATOCRIT 39.2   PLATELETS 174   NEUTROS ABS 9.15*   IMMATURE GRANS (ABS) 0.08*   LYMPHS ABS 1.02   MONOS ABS 0.61   EOS ABS 0.10   MCV 98.0*   PROCALCITONIN 0.07   LACTATE 3.0*   D DIMER QUANT >20.00*         Lab 01/06/22  0806   SODIUM 136   POTASSIUM 4.5   CHLORIDE 101   CO2 21.0*   ANION GAP 14.0   BUN 27*   CREATININE 0.84   GLUCOSE 118*   CALCIUM 9.8*   IONIZED CALCIUM 1.31   MAGNESIUM 2.3         Lab 01/06/22  0806   TOTAL PROTEIN 7.8   ALBUMIN 3.80   GLOBULIN 4.0   ALT (SGPT) 7   AST (SGOT) 26   BILIRUBIN 0.7   ALK PHOS 103         Lab 01/06/22  0806   TROPONIN T <0.010                 UA    Urinalysis 8/12/21 8/12/21 1/6/22 1/6/22    1356 1356 0918 0918   Squamous Epithelial Cells, UA  3-6 (A)  7-12 (A)   Specific [place], UA 1.014  1.020    Ketones, UA Negative  Trace (A)    Blood, UA Negative  Trace (A)    Leukocytes, UA Large (3+) (A)  Large (3+) (A)    Nitrite, UA Negative  Positive (A)    RBC, UA  None Seen  13-20 (A)   WBC, UA  Too Numerous to Count (A)  Too Numerous to Count (A)   Bacteria, UA  4+ (A)  4+ (A)   (A) Abnormal  value              Microbiology Results (last 10 days)     Procedure Component Value - Date/Time    Respiratory Panel PCR w/COVID-19(SARS-CoV-2) CURTIS/KATIE/EVITA/PAD/COR/MAD/SELVIN In-House, NP Swab in UTM/VTM, 3-4 HR TAT - Swab, Nasopharynx [[id number]]  (Normal) Collected: 01/06/22 0855    Lab Status: Final result Specimen: Swab from Nasopharynx Updated: 01/06/22 1010     ADENOVIRUS, PCR Not Detected     Coronavirus 229E Not Detected     Coronavirus HKU1 Not Detected     Coronavirus NL63 Not Detected     Coronavirus OC43 Not Detected     COVID19 Not Detected     Human Metapneumovirus Not Detected     Human Rhinovirus/Enterovirus Not Detected     Influenza A PCR Not Detected     Influenza B PCR Not Detected     Parainfluenza Virus 1 Not Detected     Parainfluenza Virus 2 Not Detected     Parainfluenza Virus 3 Not Detected     Parainfluenza Virus 4 Not Detected     RSV, PCR Not Detected     Bordetella pertussis pcr Not Detected     Bordetella parapertussis PCR Not Detected     Chlamydophila pneumoniae PCR Not Detected     Mycoplasma pneumo by PCR Not Detected    Narrative:      In the setting of a positive respiratory panel with a viral infection PLUS a negative procalcitonin without other underlying concern for bacterial infection, consider observing off antibiotics or discontinuation of antibiotics and continue supportive care. If the respiratory panel is positive for atypical bacterial infection (Bordetella pertussis, Chlamydophila pneumoniae, or Mycoplasma pneumoniae), consider antibiotic de-escalation to target atypical bacterial infection.          CT Head Without Contrast    Result Date: 1/6/2022  EXAMINATION: CT HEAD WO CONTRAST-  INDICATION: ams  TECHNIQUE: 5 mm unenhanced images through the brain  The radiation dose reduction device was turned on for each scan per the ALARA (As Low as Reasonably Achievable) protocol.  COMPARISON: 11/30/2021  FINDINGS: Right maxillary sinus mucous cyst is again noted. Paranasal  sinuses and mastoids otherwise remain clear. The calvarium appears intact. Soft tissue window images show expected degree of generalized cerebral atrophy for the patient's age. 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 There is no evidence of acute infarction, no evidence of cerebral edema, mass mass effect, hemorrhage, hydrocephalus, or abnormal extra-axial collection.        Impression: Stable head CT scan with age-appropriate generalized cerebral atrophy, and small old cerebellar infarcts. No evidence of acute intracranial disease.   This report was finalized on 1/6/2022 9:46 AM by Dr. Peewee Paredes MD.      XR Chest 1 View    Result Date: 1/6/2022  EXAMINATION: XR CHEST 1 VW-  INDICATION: Weak/Dizzy/AMS triage protocol  COMPARISON: 8/12/2021  FINDINGS: Prominent aortic knob shadow is present as on multiple prior studies. The heart shadow itself is mildly enlarged. Vasculature appears normal. Lungs are well-expanded. There is mild hazy interstitial opacity of the right upper lobe not seen on prior studies, possibly early changes of pneumonia, or some mild generalized atelectasis. Small linear scar or atelectasis in the left lung base appears stable.       Impression: Subtle new interstitial changes in the right upper lobe, and stable chronic appearing changes elsewhere. Ectatic aortic arch as on multiple prior studies. No other new chest disease is seen.    This report was finalized on 1/6/2022 8:17 AM by Dr. Peewee Paredes MD.      CT Angiogram Chest    Result Date: 1/6/2022  EXAMINATION: CT ANGIOGRAM CHEST- 01/06/2022  INDICATION: carmen, + d dimer; J96.91-Respiratory failure, unspecified with hypoxia; J18.9-Pneumonia, unspecified organism; E83.52-Hypercalcemia; R79.89-Other specified abnormal findings of blood chemistry; R54-Age-related physical debility; R41.0-Disorientation, unspecified  TECHNIQUE: 3 mm post IV contrast images through the chest and upper abdomen with sagittal and coronal 2-D  reconstructions.  The radiation dose reduction device was turned on for each scan per the ALARA (As Low as Reasonably Achievable) protocol.  COMPARISON: Portable chest radiograph 01/06/2022  FINDINGS: There is good contrast opacification of the pulmonary arteries. No filling defects are seen to suggest pulmonary embolic disease. There is no evidence of thoracic aortic dissection. There is mild ectasia of the ascending thoracic aorta to maximum of 3.9 cm. There is a trace amount of pericardial fluid. No mediastinal adenopathy is seen. Lung window images show mild bibasilar bullous disease, mild bibasilar linear scarring and trace pleural thickening but no evidence of pneumonia or other active chest disease. There are multiple healing left posterior rib fractures involving at least the posterior 11th, 10th and ninth ribs.  Included images of the upper abdomen show mild fatty liver change. Included portion of the gallbladder is distended but otherwise unremarkable in appearance. Granulomatous calcifications are seen in the normal-sized spleen. There are large left upper pole renal cysts. A lower thoracic wedge compression deformity at what appears to be T11, is old, present in 2017. Milder wedge compression deformity of T8 appears to be old.      Impression: 1. No evidence of pulmonary embolic disease. 2. Healing posterior left lower rib fractures. No displaced fracture is seen. 3. Mild chronic appearing lung changes as noted. No evidence of pneumonia, edema, or effusion.  D:  01/06/2022 E:  01/06/2022        Results for orders placed during the hospital encounter of 02/19/17    Adult transthoracic echo complete    Interpretation Summary  · Left ventricular wall segments contract abnormally. Refer to wall scoring diagram for more information.  · Mild aortic valve regurgitation is present.  · Left ventricular function is mildly decreased. Estimated EF = 45%.  · Left ventricular wall thickness is consistent with mild  concentric hypertrophy.  · There is a small (<1cm) pericardial effusion adjacent to the left ventricle.  · Saline test results are positive and indicate an atrial level shunt.      Assessment/Plan   Assessment & Plan     There are no active hospital problems to display for this patient.    89 year old woman with history of CVA and Parkinson's, history of CDiff in 2017, sent from her nursing home for acute resp distress.        A/p    UTI with elevated lactate  Sepsis POA (tachycardia, lactic acid elev, tachycardia, confusion, source)   - note history of CDiff, history of amp-sensistive E faecalis in 2017  - got Zosyn in ED.  Will continue ampicillin for now   - blood and urine cx pending    L eye conjunctivitis  - per CT, hisotry of corneal surgery  - garamycin x 5 days     resp distress at NH  - CTA without PE or infiltrate  - current breathing is comfortalbe  - COVID negative    Neuro:  Confused, with 3 weeks of decline per her son.    - baseline deficits, now with sepsis  - head CT no acute findings  - continue home meds    - consider comfort-focused care if she does not improve.      DVT prophylaxis:  lovenox       CODE STATUS:  DNR, no heroic measures  Code Status and Medical Interventions:   Ordered at: 01/06/22 1003     Medical Intervention Limits:    NO intubation (DNI)    NO antiarrhythmic drugs    NO dialysis    NO cardioversion    NO vasopressors    NO artificial nutrition     Level Of Support Discussed With:    Health Care Surrogate    Next of Kin (If No Surrogate)     Code Status (Patient has no pulse and is not breathing):    No CPR (Do Not Attempt to Resuscitate)     Medical Interventions (Patient has pulse or is breathing):    Limited Support     Comments:    son at bedside       Admission Status:  I believe this patient meets INPATIENT status due to sepsis and advanced age.  I feel patient’s risk for adverse outcomes and need for care warrant INPATIENT evaluation and I predict the patient’s care  encounter to likely last beyond 2 midnights.      Anuja Stanford MD  01/06/22      Electronically signed by Anuja Stanford MD at 01/06/22 9958

## 2022-01-07 NOTE — PROGRESS NOTES
Continued Stay Note  [facility]     Patient Name: Florinda Knapp  MRN: [id number]  Today's Date: 1/7/2022    Admit Date: 1/6/2022     Discharge Plan     Row Name 01/07/22 1657       Plan    Plan Hospice meeting 1/8 1100    Plan Comments Hospice RN met with darya Beltre and discussed inpatient hospice.  Will meet with them on Saturday 1/8 at 1100 to admit to inpatient hospice.               Discharge Codes    No documentation.                     Laly England RN

## 2022-01-07 NOTE — PROGRESS NOTES
[facility] Medicine Services  PROGRESS NOTE    Patient Name: Florinda Knapp  : 3/5/1931  MRN: [id number]    Date of Admission: 2022  Primary Care Physician: Kieran Cottrell MD    Subjective   Subjective     CC:  Altered mental status    HPI:  Overnight she remained largely unresponsive except to pain.  Merrem x1 given last night to broaden coverage.  Several loose BMs - patient placed in spore/contact isolation with plan for CDiff check.      Today she is still not rousing to voice or touch.  Both her sons are at the bedside and relay that for a long time she has just wanted to go to sleep and not wake up.  They are requesting that comfort be the focus of care.  They would like their wives/family to be able to visit.     ROS:  UTO     Objective   Objective     Vital Signs:   Temp:  [98.6 °F (37 °C)-101.8 °F (38.8 °C)] 98.6 °F (37 °C)  Heart Rate:  [] 84  Resp:  [16-20] 16  BP: (131-167)/() 138/78  Flow (L/min):  [2-4] 2     Physical Exam:  Constitutional eyes closed, no response to voice or touch.  Sons at bedside.  NAD.   Eyes: L eye conjunctivitis unchanged with mild crusting  HENT: NCAT  Neck: Supple,  trachea midline  Respiratory: Clear to auscultation bilaterally, nonlabored respirations   Cardiovascular: RRR  Gastrointestinal: Positive bowel sounds, soft, nontender, nondistended  Musculoskeletal: No bilateral ankle edema, no clubbing or cyanosis to extremities  Psychiatric: cannot assess.  No grimacing or moaning  Neurologic: very stiff, does not respond to touch, does not pull away when I open her eyes.   Skin: No rashes      Results Reviewed:  LAB RESULTS:      Lab 22  0526 22  1523 22  0806   WBC 6.85  --  10.98*   HEMOGLOBIN 9.9*  --  12.6   HEMATOCRIT 31.6*  --  39.2   PLATELETS 137*  --  174   NEUTROS ABS  --   --  9.15*   IMMATURE GRANS (ABS)  --   --  0.08*   LYMPHS ABS  --   --  1.02   MONOS ABS  --   --  0.61   EOS ABS  --   --   0.10   .0*  --  98.0*   PROCALCITONIN  --   --  0.07   LACTATE  --  1.7 3.0*   D DIMER QUANT  --   --  >20.00*         Lab 01/07/22  0526 01/06/22  0806   SODIUM 132* 136   POTASSIUM 3.8 4.5   CHLORIDE 105 101   CO2 16.0* 21.0*   ANION GAP 11.0 14.0   BUN 20 27*   CREATININE 0.79 0.84   GLUCOSE 85 118*   CALCIUM 7.9* 9.8*   IONIZED CALCIUM  --  1.31   MAGNESIUM  --  2.3         Lab 01/06/22  0806   TOTAL PROTEIN 7.8   ALBUMIN 3.80   GLOBULIN 4.0   ALT (SGPT) 7   AST (SGOT) 26   BILIRUBIN 0.7   ALK PHOS 103         Lab 01/06/22  0806   TROPONIN T <0.010                 Brief Urine Lab Results  (Last result in the past 365 days)      Color   Clarity   Blood   Leuk Est   Nitrite   Protein   CREAT   Urine HCG        01/06/22 0918 Yellow   Turbid   Trace   Large (3+)   Positive   100 mg/dL (2+)                 Microbiology Results Abnormal     Procedure Component Value - Date/Time    Respiratory Panel PCR w/COVID-19(SARS-CoV-2) CURTIS/KATIE/EVITA/PAD/COR/MAD/SELVIN In-House, NP Swab in UTM/VTM, 3-4 HR TAT - Swab, Nasopharynx [[id number]]  (Normal) Collected: 01/06/22 0855    Lab Status: Final result Specimen: Swab from Nasopharynx Updated: 01/06/22 1010     ADENOVIRUS, PCR Not Detected     Coronavirus 229E Not Detected     Coronavirus HKU1 Not Detected     Coronavirus NL63 Not Detected     Coronavirus OC43 Not Detected     COVID19 Not Detected     Human Metapneumovirus Not Detected     Human Rhinovirus/Enterovirus Not Detected     Influenza A PCR Not Detected     Influenza B PCR Not Detected     Parainfluenza Virus 1 Not Detected     Parainfluenza Virus 2 Not Detected     Parainfluenza Virus 3 Not Detected     Parainfluenza Virus 4 Not Detected     RSV, PCR Not Detected     Bordetella pertussis pcr Not Detected     Bordetella parapertussis PCR Not Detected     Chlamydophila pneumoniae PCR Not Detected     Mycoplasma pneumo by PCR Not Detected    Narrative:      In the setting of a positive respiratory panel with a viral  infection PLUS a negative procalcitonin without other underlying concern for bacterial infection, consider observing off antibiotics or discontinuation of antibiotics and continue supportive care. If the respiratory panel is positive for atypical bacterial infection (Bordetella pertussis, Chlamydophila pneumoniae, or Mycoplasma pneumoniae), consider antibiotic de-escalation to target atypical bacterial infection.          CT Head Without Contrast    Result Date: 1/6/2022  EXAMINATION: CT HEAD WO CONTRAST-  INDICATION: ams  TECHNIQUE: 5 mm unenhanced images through the brain  The radiation dose reduction device was turned on for each scan per the ALARA (As Low as Reasonably Achievable) protocol.  COMPARISON: 11/30/2021  FINDINGS: Right maxillary sinus mucous cyst is again noted. Paranasal sinuses and mastoids otherwise remain clear. The calvarium appears intact. Soft tissue window images show expected degree of generalized cerebral atrophy for the patient's age. 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 There is no evidence of acute infarction, no evidence of cerebral edema, mass mass effect, hemorrhage, hydrocephalus, or abnormal extra-axial collection.        Impression: Stable head CT scan with age-appropriate generalized cerebral atrophy, and small old cerebellar infarcts. No evidence of acute intracranial disease.   This report was finalized on 1/6/2022 9:46 AM by Dr. Peewee Paredes MD.      XR Chest 1 View    Result Date: 1/6/2022  EXAMINATION: XR CHEST 1 VW-  INDICATION: Weak/Dizzy/AMS triage protocol  COMPARISON: 8/12/2021  FINDINGS: Prominent aortic knob shadow is present as on multiple prior studies. The heart shadow itself is mildly enlarged. Vasculature appears normal. Lungs are well-expanded. There is mild hazy interstitial opacity of the right upper lobe not seen on prior studies, possibly early changes of pneumonia, or some mild generalized atelectasis. Small linear scar or atelectasis  in the left lung base appears stable.       Impression: Subtle new interstitial changes in the right upper lobe, and stable chronic appearing changes elsewhere. Ectatic aortic arch as on multiple prior studies. No other new chest disease is seen.    This report was finalized on 1/6/2022 8:17 AM by Dr. Peewee Paredes MD.      CT Angiogram Chest    Result Date: 1/6/2022  EXAMINATION: CT ANGIOGRAM CHEST- 01/06/2022  INDICATION: carmen, + d dimer; J96.91-Respiratory failure, unspecified with hypoxia; J18.9-Pneumonia, unspecified organism; E83.52-Hypercalcemia; R79.89-Other specified abnormal findings of blood chemistry; R54-Age-related physical debility; R41.0-Disorientation, unspecified  TECHNIQUE: 3 mm post IV contrast images through the chest and upper abdomen with sagittal and coronal 2-D reconstructions.  The radiation dose reduction device was turned on for each scan per the ALARA (As Low as Reasonably Achievable) protocol.  COMPARISON: Portable chest radiograph 01/06/2022  FINDINGS: There is good contrast opacification of the pulmonary arteries. No filling defects are seen to suggest pulmonary embolic disease. There is no evidence of thoracic aortic dissection. There is mild ectasia of the ascending thoracic aorta to maximum of 3.9 cm. There is a trace amount of pericardial fluid. No mediastinal adenopathy is seen. Lung window images show mild bibasilar bullous disease, mild bibasilar linear scarring and trace pleural thickening but no evidence of pneumonia or other active chest disease. There are multiple healing left posterior rib fractures involving at least the posterior 11th, 10th and ninth ribs.  Included images of the upper abdomen show mild fatty liver change. Included portion of the gallbladder is distended but otherwise unremarkable in appearance. Granulomatous calcifications are seen in the normal-sized spleen. There are large left upper pole renal cysts. A lower thoracic wedge compression deformity at what  appears to be T11, is old, present in 2017. Milder wedge compression deformity of T8 appears to be old.      Impression: 1. No evidence of pulmonary embolic disease. 2. Healing posterior left lower rib fractures. No displaced fracture is seen. 3. Mild chronic appearing lung changes as noted. No evidence of pneumonia, edema, or effusion.  D:  01/06/2022 E:  01/06/2022        Results for orders placed during the hospital encounter of 02/19/17    Adult transthoracic echo complete    Interpretation Summary  · Left ventricular wall segments contract abnormally. Refer to wall scoring diagram for more information.  · Mild aortic valve regurgitation is present.  · Left ventricular function is mildly decreased. Estimated EF = 45%.  · Left ventricular wall thickness is consistent with mild concentric hypertrophy.  · There is a small (<1cm) pericardial effusion adjacent to the left ventricle.  · Saline test results are positive and indicate an atrial level shunt.      I have reviewed the medications:  Scheduled Meds:ampicillin, 1 g, Intravenous, Q6H  carbidopa-levodopa, 1 tablet, Oral, TID  clopidogrel, 75 mg, Oral, Daily  clotrimazole-betamethasone, 1 application, Topical, BID  DULoxetine, 60 mg, Oral, Daily  enoxaparin, 40 mg, Subcutaneous, Q24H  gentamicin, 1 drop, Both Eyes, 4x Daily  levothyroxine, 75 mcg, Oral, Q AM  magnesium oxide, 400 mg, Oral, Daily  ramipril, 5 mg, Oral, Daily  rosuvastatin, 10 mg, Oral, Nightly  saccharomyces boulardii, 250 mg, Oral, Daily  sodium chloride, 10 mL, Intravenous, Q12H      Continuous Infusions:sodium chloride, 100 mL/hr, Last Rate: 100 mL/hr (01/06/22 1608)      PRN Meds:.•  acetaminophen  •  acetaminophen  •  melatonin  •  phenazopyridine  •  polyvinyl alcohol  •  sodium chloride  •  sodium chloride    Assessment/Plan   Assessment & Plan     Active Hospital Problems    Diagnosis  POA   • Respiratory failure with hypoxia (HCC) [J96.91]  Yes      Resolved Hospital Problems   No  resolved problems to display.        Brief Hospital Course to date:  Florinda Knapp is a 89 y.o. female with history of CVA and Parkinson's, history of CDiff in 2017, sent from her nursing home for acute resp distress.  Her son notes she has a three-week decline recently          A/p     UTI with elevated lactate  Sepsis POA (tachycardia, lactic acid elev, tachycardia, confusion, source)   - got Zosyn in ED; changed to ampicillin due to history of CDiff and amp-sensisitve e faecalis.  But given ongoing signs of sepsis this was broadened to Merrem last night.   - stopping abx after discussion w sons     L eye conjunctivitis - consider continuing garamycin for comfort      Baseline Parkinson's, usually cognitively intact but recent spells of confusion per sons.     GOC - Per her sons, she has long been saying that she wishes she could just 'fall asleep and not wake up.'.  After discussion and with their approval, I am changing her care to comfort=focused, will stop abx and IV fluds and all noxious interventions, will consult Hospice.        DVT prophylaxis: - stopped due to comfort focus     AM-PAC 6 Clicks Score (PT): 6 (01/07/22 0200)    Disposition: I expect the patient to be discharged to inpatient hospice.      CODE STATUS:   Code Status and Medical Interventions:   Ordered at: 01/06/22 1003     Medical Intervention Limits:    NO intubation (DNI)    NO antiarrhythmic drugs    NO dialysis    NO cardioversion    NO vasopressors    NO artificial nutrition     Level Of Support Discussed With:    Health Care Surrogate    Next of Kin (If No Surrogate)     Code Status (Patient has no pulse and is not breathing):    No CPR (Do Not Attempt to Resuscitate)     Medical Interventions (Patient has pulse or is breathing):    Limited Support     Comments:    son at bedside       Anuja Stanford MD  01/07/22

## 2022-01-08 PROBLEM — G20 DEMENTIA ASSOCIATED WITH PARKINSON'S DISEASE (HCC): Status: ACTIVE | Noted: 2022-01-01

## 2022-01-08 PROBLEM — G20.A1 DEMENTIA ASSOCIATED WITH PARKINSON'S DISEASE: Status: ACTIVE | Noted: 2022-01-01

## 2022-01-08 PROBLEM — F02.80 DEMENTIA ASSOCIATED WITH PARKINSON'S DISEASE (HCC): Status: ACTIVE | Noted: 2022-01-01

## 2022-01-08 NOTE — PROGRESS NOTES
Continued Stay Note  [facility]     Patient Name: Florinda Knapp  MRN: [id number]  Today's Date: 1/8/2022    Admit Date: 1/8/2022     Discharge Plan     Row Name 01/08/22 1502       Plan    Plan Inpatient hospice    Plan Comments Hospice RN met with two darya Zuñiga and Vignesh at bedside and discussed inpatient hospice services . They are electing this benefit.  Admission paperwork signed.  Discharge/readmit order received from Dr. Stanford.                 Discharge Codes    No documentation.                     Laly England RN

## 2022-01-08 NOTE — H&P
Hospice History and Physical     Patient Name:  Florinda Knapp   : 3/5/1931   Sex: female    Patient Care Team:  Kieran Cottrell MD as PCP - General (Internal Medicine)    Code Status: Comfort Measures    Subjective     Per Hospitalist Discharge Summary:    Florinda Knapp is a 89 y.o. female with advanced Parkinson's syndrome.  For the past few weeks she has been less sharp than her baseline, occasionally hallucinating or getting confused when talking to her children.  She was then found in bed ith oxygen sats in the 80s and was sent to the ED.  CT chest showed no acute lung findings, but she had a UTI and was unresponsive in the ED.      She was started on antibiotics and fluids.  She remained minimally responsive.  Her sons came to the bedside nad related that she has been saying for some time that she wishes she could just go to sleep and not wake up.  After discussion, she was changed to comfort-focused care.  Antibiotics and fluids have been stopped.  Hospice was consulted.  She appears comfortable.      She is being discharged to inpatient hospice.    [end of copied of text]    Ms. Knapp was admitted to [facility] on 2022 for mgmt of acute dementia associated with Parkinson's Disease.      Review of Systems  Review of Systems   Unable to perform ROS: Acuity of condition       History  Past Medical History:   Diagnosis Date   • Anemia    • Anxiety    • Asthma    • Cerebral infarction (HCC)    • Depression    • Failure to thrive in adult    • Hyperlipidemia    • Hyperlipidemia    • Hypertension    • Hypothyroidism    • Osteoarthritis    • Osteoporosis    • Parkinson's disease (HCC)    • Stress incontinence    • Stroke (HCC) 2017    left MCA thromboembolism   • Urinary retention      Past Surgical History:   Procedure Laterality Date   • APPENDECTOMY     • BACK SURGERY     • CATARACT EXTRACTION     • ELBOW PROCEDURE     • KNEE SURGERY     • CO PRIM PRQ TRLUML MCHNL THRMBC N-COR N-ICRA 1ST  Left 2/19/2017    Diagnostic Angiogram only, no thrombectomy   • TOTAL HIP ARTHROPLASTY Left 12/2016     No current facility-administered medications for this encounter.     No current facility-administered medications for this encounter.      Allergies   Allergen Reactions   • Abilify [Aripiprazole]    • Alendronate    • Celecoxib    • Fosamax Plus D [Alendronate-Cholecalciferol]    • Levaquin [Levofloxacin]    • Macrobid [Nitrofurantoin Monohyd Macro]    • Nitrofurantoin    • Procaine    • Sulfa Antibiotics Nausea And Vomiting and Rash     Family History   Problem Relation Age of Onset   • Alcohol abuse Other    • Hypertension Other    • Stroke Other    • Heart disease Son      Social History     Socioeconomic History   • Marital status:    Tobacco Use   • Smoking status: Never Smoker   • Smokeless tobacco: Never Used   Substance and Sexual Activity   • Alcohol use: No   • Drug use: No   • Sexual activity: Defer       Objective     Vital Signs  Temp:  [97.9 °F (36.6 °C)] 97.9 °F (36.6 °C)  Heart Rate:  [96] 96  Resp:  [18] 18  BP: (139)/(83) 139/83    PPS: 10%    Physical Exam:  Constitutional:       Comments: Comfortable; undisturbed by exam  HENT:      Head: Atraumatic.      Mouth/Throat:      Mouth: Dry MM   Eyes:      Comments: Eyes remained closed   Cardiovascular:      Rate and Rhythm: RRR   Pulmonary:      Effort: Pulmonary effort is normal.      Breath sounds: CTA     Comments: respirations are non-labored  Abdominal:      General: BSx4     Palpations: Abdomen is soft.   Musculoskeletal:      Right lower leg: No edema.      Left lower leg: No edema.   Skin:     Coloration: Pale.   Neurological:      Comments: Does not follow commands   Psychiatric:      Comments:  No facial grimacing, no moaning       Results Review:   Lab Results   Component Value Date    HGBA1C 4.80 02/20/2017       Lab Results   Component Value Date    GLUCOSE 85 01/07/2022    BUN 20 01/07/2022    CREATININE 0.79 01/07/2022     EGFRIFNONA 68 01/07/2022    BCR 25.3 (H) 01/07/2022    K 3.8 01/07/2022    CO2 16.0 (L) 01/07/2022    CALCIUM 7.9 (L) 01/07/2022    ALBUMIN 3.80 01/06/2022    AST 26 01/06/2022    ALT 7 01/06/2022       WBC   Date Value Ref Range Status   01/07/2022 6.85 3.40 - 10.80 10*3/mm3 Final     RBC   Date Value Ref Range Status   01/07/2022 3.16 (L) 3.77 - 5.28 10*6/mm3 Final     Hemoglobin   Date Value Ref Range Status   01/07/2022 9.9 (L) 12.0 - 15.9 g/dL Final     Hematocrit   Date Value Ref Range Status   01/07/2022 31.6 (L) 34.0 - 46.6 % Final     MCV   Date Value Ref Range Status   01/07/2022 100.0 (H) 79.0 - 97.0 fL Final     MCH   Date Value Ref Range Status   01/07/2022 31.3 26.6 - 33.0 pg Final     MCHC   Date Value Ref Range Status   01/07/2022 31.3 (L) 31.5 - 35.7 g/dL Final     RDW   Date Value Ref Range Status   01/07/2022 14.5 12.3 - 15.4 % Final     RDW-SD   Date Value Ref Range Status   01/07/2022 53.6 37.0 - 54.0 fl Final     MPV   Date Value Ref Range Status   01/07/2022 10.4 6.0 - 12.0 fL Final     Platelets   Date Value Ref Range Status   01/07/2022 137 (L) 140 - 450 10*3/mm3 Final     Neutrophil %   Date Value Ref Range Status   01/06/2022 83.3 (H) 42.7 - 76.0 % Final     Lymphocyte %   Date Value Ref Range Status   01/06/2022 9.3 (L) 19.6 - 45.3 % Final     Monocyte %   Date Value Ref Range Status   01/06/2022 5.6 5.0 - 12.0 % Final     Eosinophil %   Date Value Ref Range Status   01/06/2022 0.9 0.3 - 6.2 % Final     Basophil %   Date Value Ref Range Status   01/06/2022 0.2 0.0 - 1.5 % Final     Immature Grans %   Date Value Ref Range Status   01/06/2022 0.7 (H) 0.0 - 0.5 % Final     Neutrophils, Absolute   Date Value Ref Range Status   01/06/2022 9.15 (H) 1.70 - 7.00 10*3/mm3 Final     Lymphocytes, Absolute   Date Value Ref Range Status   01/06/2022 1.02 0.70 - 3.10 10*3/mm3 Final     Monocytes, Absolute   Date Value Ref Range Status   01/06/2022 0.61 0.10 - 0.90 10*3/mm3 Final     Eosinophils,  Absolute   Date Value Ref Range Status   01/06/2022 0.10 0.00 - 0.40 10*3/mm3 Final     Basophils, Absolute   Date Value Ref Range Status   01/06/2022 0.02 0.00 - 0.20 10*3/mm3 Final     Immature Grans, Absolute   Date Value Ref Range Status   01/06/2022 0.08 (H) 0.00 - 0.05 10*3/mm3 Final     nRBC   Date Value Ref Range Status   01/06/2022 0.0 0.0 - 0.2 /100 WBC Final         Dementia associated with Parkinson's disease (HCC)      Assessment/Plan   Assessment/Plan:     89yoF admitted in Hospice 1/8 for dementia associated Parkinson's Disease.      Pain, nos  Agitation/Restlessness  Anxiety  Dyspnea  Congestion  Constipation  EOLC    Continue gentamicin eye gtts    Schedule morphine 2mg q6h    Prns for comfort    Palliative oral rinse scheduled and prn    Ativan 0.5mg BID    (no transfer order placed as pt is already on N5B)    Discussion at bedside with family; support provided; hospice contact information shared.     Coordinated care with Nursing and Hospice IDT.    Total Visit Time: 60min  Face to Face Time: 25min    Justification for care:  Patient meets criteria for acute in-patient care with required nursing assessment and interventions for symptoms with IV medications.      Amirah oLja, ARLET, MHA, APRN  [facility] Navigators  Hospice and Palliative Care Nurse Practitioner  01/08/22  11:53 EST

## 2022-01-08 NOTE — PLAN OF CARE
Problem: Fall Injury Risk  Goal: Absence of Fall and Fall-Related Injury  Intervention: Promote Injury-Free Environment  Recent Flowsheet Documentation  Taken 1/8/2022 1000 by Allyn Collins RN  Safety Promotion/Fall Prevention:   safety round/check completed   toileting scheduled  Taken 1/8/2022 0800 by Allyn Collins RN  Safety Promotion/Fall Prevention:   safety round/check completed   toileting scheduled     Problem: Skin Injury Risk Increased  Goal: Skin Health and Integrity  Intervention: Optimize Skin Protection  Recent Flowsheet Documentation  Taken 1/8/2022 0800 by Allyn Collins RN  Pressure Reduction Techniques:   frequent weight shift encouraged   weight shift assistance provided  Pressure Reduction Devices: pressure-redistributing mattress utilized     Problem: Adult Inpatient Plan of Care  Goal: Absence of Hospital-Acquired Illness or Injury  Intervention: Identify and Manage Fall Risk  Recent Flowsheet Documentation  Taken 1/8/2022 1000 by Allyn Collins RN  Safety Promotion/Fall Prevention:   safety round/check completed   toileting scheduled  Taken 1/8/2022 0800 by Allyn Collins RN  Safety Promotion/Fall Prevention:   safety round/check completed   toileting scheduled   Goal Outcome Evaluation:

## 2022-01-08 NOTE — INTERVAL H&P NOTE
Pt admitted to [facility] on 1/8/2022. Please see Hospice documentation for further information.

## 2022-01-08 NOTE — PLAN OF CARE
Patient has rested comfortably requiring no PRN medications.  Will open eyes occasionally, moans to painful stimuli.  Nonverbal.  Unable to safely take PO medications.  No temperature this shift.  Incontinent with adequate occurrences.  2L NC.  Skin integrity continues, oral care continues.    No family at bedside.  Anticipate transition to hospice services per MD note.

## 2022-01-09 NOTE — PLAN OF CARE
Problem: Adult Inpatient Plan of Care  Goal: Plan of Care Review  Outcome: Ongoing, Progressing  Flowsheets (Taken 1/9/2022 0720)  Progress: declining  Plan of Care Reviewed With: patient  Outcome Summary: Moans & grimaces with turns. Prn Morphine x1  & prn Ativan x1. Otherwise comfortable with scheduled meds. Bites toothette.

## 2022-01-09 NOTE — PROGRESS NOTES
Hospice Progress Note    Patient Name: Florinda Knapp   : 3/5/1931  Gender: female    Code Status: comfort measures    Date of Admission: 2022    Subjective:    89yoF admitted inpt Hospice  for dementia associated Parkinson's Disease.     Overnight notes reviewed: Moans & grimaces with turns. Prn Morphine x1  & prn Ativan x1. Otherwise comfortable with scheduled meds. Bites toothette. Moans & grimaces with turns. Prn Morphine x1  & prn Ativan x1. Otherwise comfortable with scheduled meds. Bites toothette.    At visit today, + facial grimacing with oral care and turning -- could be more comfortable - received scheduled medications 2 hours ago. Sons at bedside.     - PRNs: Ativan 0.5mg x1 ()    - Held: none    - Intake/Output  *PO: NPO  *Urine: 450ml  *LBM:       ROS:  Review of Systems   Unable to perform ROS: Acuity of condition       Reviewed current scheduled and prn medications for route, type, dose and frequency.     •  acetaminophen  •  bisacodyl  •  furosemide  •  glycopyrrolate  •  haloperidol lactate  •  LORazepam  •  Morphine  •  ondansetron  •  palliative care oral rinse  •  polyvinyl alcohol  •  Scopolamine    Objective:   There were no vitals taken for this visit.     PPS: 10%    Physical Exam:  Physical Exam  Constitutional:       Comments: Initially comfortable but responsive to light touch/turn (facial grimacing; jaw clenched).    HENT:      Head: Atraumatic.      Mouth/Throat:      Mouth: Mucous membranes are dry.   Eyes:      Comments: Eyes remained closed   Cardiovascular:      Rate and Rhythm: Normal rate and regular rhythm.   Pulmonary:      Effort: Pulmonary effort is normal.      Breath sounds: Normal breath sounds.      Comments: CTA; respirations are non-labored  Abdominal:      General: Bowel sounds are normal.      Palpations: Abdomen is soft.   Genitourinary:     Comments: Farris with straw colored output  Musculoskeletal:      Right lower leg: No edema.      Left lower  leg: No edema.   Skin:     Coloration: Skin is pale.   Neurological:      Comments: Does not follow commands   Psychiatric:      Comments: + facial grimacing, no moaning           Dementia associated with Parkinson's disease (HCC)      Assessment/Plan:     89yoF admitted in Hospice 1/8 for dementia associated Parkinson's Disease.     Pain, nos  Agitation/Restlessness  Anxiety  Dyspnea  Congestion  Constipation  EOLC    Dul supp x3 nights    Nursing Communication and Sticky Note: FYI: Pt has verbalized to darya that she does not want to die alone.    Change morphine 2mg to q4h    Change ativan 0.5mg q8h    Nursing Communication: When antibiotic eye gtts are complete, please let provider know via sticky note/chat so wetting drops can be ordered. Thank you!    Diet Message: Please send a lemonade to pt's room daily. Thank you!    Coordinated care with Nursing and Hospice    Discussion at bedside with darya regarding plan of care and symptom mgmt. Support provided. Hospice contact information shared.     Discharge Disposition: EOLC    Total Visit Time: 40min  Face to Face Time: 25min    Justification for care:  Patient meets criteria for acute in-patient care with required nursing assessment and interventions for symptoms with IV medications.      Amirah Loja, ARLET, MHA, APRN  [facility]  Hospice and Palliative Care Nurse Practitioner  01/09/22  08:10 EST

## 2022-01-09 NOTE — PROGRESS NOTES
Continued Stay Note  [facility]     Patient Name: Florinda Knapp  MRN: [id number]  Today's Date: 1/9/2022    Admit Date: 1/8/2022     Discharge Plan     Row Name 01/09/22 1108       Plan    Plan Inpatient hospice    Plan Comments Visit to room. Family at bedside supportive. Patient with relaxed face, jaw, body posture, respirations.  Warm to the touch. Pallor.  Rn discussed assessment, food and water, eol signs/symptoms, nonveral indicators of comfort/discomfort, medications, goals of care, permission to let go, and self care.  Family verablized appreciaiton and understanding.  Patient continues to meet gip criteria due to continues to require injectable medications for symptom managment, is actively dying, current level of care unable to be provided in alternate setting  Should condition remain on current trajectory she is unlikely so survive this hospitalization.    Final Discharge Disposition Code 51 - hospice medical facility                                    Faith Wright RN

## 2022-01-09 NOTE — PLAN OF CARE
Problem: Adult Inpatient Plan of Care  Goal: Plan of Care Review  Outcome: Ongoing, Not Progressing  Flowsheets (Taken 1/9/2022 1500)  Progress: no change  Plan of Care Reviewed With:   patient   family  Outcome Summary: Patient has rested comfortably throughtout shift. Mouth care provided as needed. Family at bedside. Patient turned with permission of family. Will continue to monitor patient status.   Goal Outcome Evaluation:  Plan of Care Reviewed With: patient, family        Progress: no change  Outcome Summary: Patient has rested comfortably throughtout shift. Mouth care provided as needed. Family at bedside. Patient turned with permission of family. Will continue to monitor patient status.

## 2022-01-10 NOTE — PLAN OF CARE
Problem: Adult Inpatient Plan of Care  Goal: Plan of Care Review  Outcome: Ongoing, Progressing  Flowsheets (Taken 1/10/2022 4056)  Progress: no change  Plan of Care Reviewed With:   patient   family  Outcome Summary: Patient is resting comfortably in bed. Family at bedside. Comfort promoted with scheduled and PRN medications throughout day. Lemonade swabs used in mouth care. Will continue to monitor patient status.   Goal Outcome Evaluation:  Plan of Care Reviewed With: patient, family        Progress: no change  Outcome Summary: Patient has rested comfortably throughtout shift. Mouth care provided as needed. Family at bedside. Patient turned with permission of family. Will continue to monitor patient status.

## 2022-01-10 NOTE — PROGRESS NOTES
Hospice Progress Note    Patient Name: Florinda Knapp   : 3/5/1931  Gender: female    Code Status: comfort measures    Date of Admission: 2022    Subjective:    89yoF admitted inpt Hospice  for dementia associated Parkinson's Disease.     Overnight notes reviewed: Slight grimaces with turns. Comfortable with scheduled meds. Bites toothette. Son Vignesh and wife visited.     Pt comfortable for visit; undisturbed by exam. Family at bedside.    - PRNs: none overnight/today    - Intake/Output  *LBM:       ROS:  Review of Systems   Unable to perform ROS: Acuity of condition       Reviewed current scheduled and prn medications for route, type, dose and frequency.     •  acetaminophen  •  bisacodyl  •  furosemide  •  glycopyrrolate  •  haloperidol lactate  •  LORazepam  •  Morphine  •  ondansetron  •  palliative care oral rinse  •  polyvinyl alcohol  •  Scopolamine    Objective:   Temp 98.8 °F (37.1 °C) (Axillary)   SpO2 91%      PPS: 10%    Physical Exam:  Constitutional:       Comments: Comfortable; undisturbed by exam  HENT:      Head: Atraumatic.      Mouth/Throat: kept moist with oral care     Mouth: Dry MM   Eyes:      Comments: Eyes remained closed   Cardiovascular:      Rate and Rhythm: RRR   Pulmonary:      Effort: Pulmonary effort is normal.      Breath sounds: CTA     Comments: respirations are non-labored  Abdominal:      General: BSx4     Palpations: Abdomen is soft.   Musculoskeletal:      Right lower leg: No edema.      Left lower leg: No edema.   Skin:     Coloration: Pale.   Neurological:      Comments: Does not follow commands   Psychiatric:      Comments:  No facial grimacing, no moaning         Dementia associated with Parkinson's disease ([facility])      Assessment/Plan:     89yoF admitted inpt Hospice  for dementia associated Parkinson's Disease.      Pain, nos  Agitation/Restlessness  Anxiety  Dyspnea  Congestion  Constipation  EOLC    Continue dul supp - no result yet    Continue current  plan of care    Monitor for changing needs    Discussion at bedside with family; support provided; family has hospice contact information     Coordinated care with Nursing and Hospice IDT.    Discharge Disposition: EOLC    Total Visit Time: 25min  Face to Face Time: 15min    Justification for care:  Patient meets criteria for acute in-patient care with required nursing assessment and interventions for symptoms with IV medications.      Amirah Loja DNP, MHA, APRN  [facility] Care Navigators  Hospice and Palliative Care Nurse Practitioner  01/10/22  14:14 EST

## 2022-01-10 NOTE — PROGRESS NOTES
Continued Stay Note  [facility]     Patient Name: Florinda Knapp  MRN: [id number]  Today's Date: 1/10/2022    Admit Date: 1/8/2022     Discharge Plan     Row Name 01/10/22 1700       Plan    Plan Inpatient hospice    Plan Comments Visit to bedside.  Patient, son, daughter in law present.  Patient actively dying.  Unresponsive. She is with relaxed face ,jaw, body posture, respirations.  (L) lower extremity cool, foot cool.  Discussed with family assess, condition, signs of decline, goals, poc, eol communications, what to possibly expect from this point forward, food and water, medications.  Verbalized understanding and appreciation.  The patient continues to require gip care due to she requires injectable medications for palliation of symtoms, she is actively dying, current level of care unable to be provided in alternate setting, should condition remain on current trajectory it is unlikely that she will survive this hospitalization.    Final Discharge Disposition Code 51 - hospice medical facility                                    Faith Wright RN

## 2022-01-10 NOTE — PLAN OF CARE
Problem: Adult Inpatient Plan of Care  Goal: Plan of Care Review  Flowsheets (Taken 1/10/2022 2297)  Progress: declining  Plan of Care Reviewed With: patient  Outcome Summary:  Slight grimaces with turns. Comfortable with scheduled meds. Bites toothette. Son Vignesh and wife visited.

## 2022-01-11 NOTE — DISCHARGE SUMMARY
[facility] Medicine Services  DISCHARGE SUMMARY    Patient Name: Florinda Knapp  : 3/5/1931  MRN: [id number]    Date of Admission: 2022  7:51 AM  Date of Discharge:  2022  Primary Care Physician: Kieran Cottrell MD    Consults     No orders found from 2021 to 2022.          Hospital Course     Presenting Problem:   Respiratory failure with hypoxia, unspecified chronicity (HCC) [J96.91]    Active Hospital Problems    Diagnosis  POA   • Respiratory failure with hypoxia (HCC) [J96.91]  Yes      Resolved Hospital Problems   No resolved problems to display.          Hospital Course:  Florinda Knapp is a 89 y.o. female with advanced Parkinson's syndrome.  For the past few weeks she has been less sharp than her baseline, occasionally hallucinating or getting confused when talking to her children.  She was then found in bed ith oxygen sats in the 80s and was sent to the ED.  CT chest showed no acute lung findings, but she had a UTI and was unresponsive in the ED.     She was started on antibiotics and fluids.  She remained minimally responsive.  Her sons came to the bedside nad related that she has been saying for some time that she wishes she could just go to sleep and not wake up.  After discussion, she was changed to comfort-focused care.  Antibiotics and fluids have been stopped.  Hospice was consulted.  She appears comfortable.     She is being discharged to inpatient hospice.        Day of Discharge     HPI:   Unresponsive.  Both sons are at bedside    Review of Systems  UTO    Vital Signs:   Temp:  [98.1 °F (36.7 °C)] 98.1 °F (36.7 °C)  Flow (L/min):  [1] 1      Physical Exam:  Gen:elderly woman in NAD but not responding to voice or touch, eyes closed  Neuro: no abnl movements. No tremor or grimacing  HEENT:  NC/AT  Heart RRR no murmur, rub, or gallop  Lungs nonlabored, no tachypnea  Abd:  Soft, nontender, no rebound  Extrem:  No c/c/e  Skin no diaphoresis.  Warm  and dry       Pertinent  and/or Most Recent Results     LAB RESULTS:      Lab 01/07/22  0526 01/06/22  1523 01/06/22  0806   WBC 6.85  --  10.98*   HEMOGLOBIN 9.9*  --  12.6   HEMATOCRIT 31.6*  --  39.2   PLATELETS 137*  --  174   NEUTROS ABS  --   --  9.15*   IMMATURE GRANS (ABS)  --   --  0.08*   LYMPHS ABS  --   --  1.02   MONOS ABS  --   --  0.61   EOS ABS  --   --  0.10   .0*  --  98.0*   PROCALCITONIN  --   --  0.07   LACTATE  --  1.7 3.0*   D DIMER QUANT  --   --  >20.00*         Lab 01/07/22  0526 01/06/22  0806   SODIUM 132* 136   POTASSIUM 3.8 4.5   CHLORIDE 105 101   CO2 16.0* 21.0*   ANION GAP 11.0 14.0   BUN 20 27*   CREATININE 0.79 0.84   GLUCOSE 85 118*   CALCIUM 7.9* 9.8*   IONIZED CALCIUM  --  1.31   MAGNESIUM  --  2.3         Lab 01/06/22  0806   TOTAL PROTEIN 7.8   ALBUMIN 3.80   GLOBULIN 4.0   ALT (SGPT) 7   AST (SGOT) 26   BILIRUBIN 0.7   ALK PHOS 103         Lab 01/06/22  0806   TROPONIN T <0.010                 Brief Urine Lab Results  (Last result in the past 365 days)      Color   Clarity   Blood   Leuk Est   Nitrite   Protein   CREAT   Urine HCG        01/06/22 0918 Yellow   Turbid   Trace   Large (3+)   Positive   100 mg/dL (2+)               Microbiology Results (last 10 days)     Procedure Component Value - Date/Time    Blood Culture - Blood, Arm, Right [[id number]]  (Normal) Collected: 01/06/22 1000    Lab Status: Final result Specimen: Blood from Arm, Right Updated: 01/11/22 1030     Blood Culture No growth at 5 days    Narrative:      Aerobic bottle only      Blood Culture - Blood, Arm, Right [[id number]]  (Normal) Collected: 01/06/22 0930    Lab Status: Final result Specimen: Blood from Arm, Right Updated: 01/11/22 1030     Blood Culture No growth at 5 days    Urine Culture - Urine, Urine, Catheter [[id number]]  (Normal) Collected: 01/06/22 0918    Lab Status: Final result Specimen: Urine, Catheter Updated: 01/07/22 1143     Urine Culture No growth    Respiratory Panel PCR  w/COVID-19(SARS-CoV-2) CURTIS/KATIE/EVITA/PAD/COR/MAD/SELVIN In-House, NP Swab in UTM/VTM, 3-4 HR TAT - Swab, Nasopharynx [[id number]]  (Normal) Collected: 01/06/22 0855    Lab Status: Final result Specimen: Swab from Nasopharynx Updated: 01/06/22 1010     ADENOVIRUS, PCR Not Detected     Coronavirus 229E Not Detected     Coronavirus HKU1 Not Detected     Coronavirus NL63 Not Detected     Coronavirus OC43 Not Detected     COVID19 Not Detected     Human Metapneumovirus Not Detected     Human Rhinovirus/Enterovirus Not Detected     Influenza A PCR Not Detected     Influenza B PCR Not Detected     Parainfluenza Virus 1 Not Detected     Parainfluenza Virus 2 Not Detected     Parainfluenza Virus 3 Not Detected     Parainfluenza Virus 4 Not Detected     RSV, PCR Not Detected     Bordetella pertussis pcr Not Detected     Bordetella parapertussis PCR Not Detected     Chlamydophila pneumoniae PCR Not Detected     Mycoplasma pneumo by PCR Not Detected    Narrative:      In the setting of a positive respiratory panel with a viral infection PLUS a negative procalcitonin without other underlying concern for bacterial infection, consider observing off antibiotics or discontinuation of antibiotics and continue supportive care. If the respiratory panel is positive for atypical bacterial infection (Bordetella pertussis, Chlamydophila pneumoniae, or Mycoplasma pneumoniae), consider antibiotic de-escalation to target atypical bacterial infection.          CT Head Without Contrast    Result Date: 1/6/2022  EXAMINATION: CT HEAD WO CONTRAST-  INDICATION: ams  TECHNIQUE: 5 mm unenhanced images through the brain  The radiation dose reduction device was turned on for each scan per the ALARA (As Low as Reasonably Achievable) protocol.  COMPARISON: 11/30/2021  FINDINGS: Right maxillary sinus mucous cyst is again noted. Paranasal sinuses and mastoids otherwise remain clear. The calvarium appears intact. Soft tissue window images show expected degree of  generalized cerebral atrophy for the patient's age. 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 There is no evidence of acute infarction, no evidence of cerebral edema, mass mass effect, hemorrhage, hydrocephalus, or abnormal extra-axial collection.        Stable head CT scan with age-appropriate generalized cerebral atrophy, and small old cerebellar infarcts. No evidence of acute intracranial disease.   This report was finalized on 1/6/2022 9:46 AM by Dr. Peewee Paredes MD.      XR Chest 1 View    Result Date: 1/6/2022  EXAMINATION: XR CHEST 1 VW-  INDICATION: Weak/Dizzy/AMS triage protocol  COMPARISON: 8/12/2021  FINDINGS: Prominent aortic knob shadow is present as on multiple prior studies. The heart shadow itself is mildly enlarged. Vasculature appears normal. Lungs are well-expanded. There is mild hazy interstitial opacity of the right upper lobe not seen on prior studies, possibly early changes of pneumonia, or some mild generalized atelectasis. Small linear scar or atelectasis in the left lung base appears stable.       Subtle new interstitial changes in the right upper lobe, and stable chronic appearing changes elsewhere. Ectatic aortic arch as on multiple prior studies. No other new chest disease is seen.    This report was finalized on 1/6/2022 8:17 AM by Dr. Peewee Paredes MD.      CT Angiogram Chest    Result Date: 1/7/2022  EXAMINATION: CT ANGIOGRAM CHEST- 01/06/2022  INDICATION: carmen, + d dimer; J96.91-Respiratory failure, unspecified with hypoxia; J18.9-Pneumonia, unspecified organism; E83.52-Hypercalcemia; R79.89-Other specified abnormal findings of blood chemistry; R54-Age-related physical debility; R41.0-Disorientation, unspecified  TECHNIQUE: 3 mm post IV contrast images through the chest and upper abdomen with sagittal and coronal 2-D reconstructions.  The radiation dose reduction device was turned on for each scan per the ALARA (As Low as Reasonably Achievable) protocol.  COMPARISON:  Portable chest radiograph 01/06/2022  FINDINGS: There is good contrast opacification of the pulmonary arteries. No filling defects are seen to suggest pulmonary embolic disease. There is no evidence of thoracic aortic dissection. There is mild ectasia of the ascending thoracic aorta to maximum of 3.9 cm. There is a trace amount of pericardial fluid. No mediastinal adenopathy is seen. Lung window images show mild bibasilar bullous disease, mild bibasilar linear scarring and trace pleural thickening but no evidence of pneumonia or other active chest disease. There are multiple healing left posterior rib fractures involving at least the posterior 11th, 10th and ninth ribs.  Included images of the upper abdomen show mild fatty liver change. Included portion of the gallbladder is distended but otherwise unremarkable in appearance. Granulomatous calcifications are seen in the normal-sized spleen. There are large left upper pole renal cysts. A lower thoracic wedge compression deformity at what appears to be T11, is old, present in 2017. Milder wedge compression deformity of T8 appears to be old.      1. No evidence of pulmonary embolic disease. 2. Healing posterior left lower rib fractures. No displaced fracture is seen. 3. Mild chronic appearing lung changes as noted. No evidence of pneumonia, edema, or effusion.  D:  01/06/2022 E:  01/06/2022  This report was finalized on 1/7/2022 10:16 PM by Dr. Peewee Paredes MD.        Results for orders placed during the hospital encounter of 02/19/17    Bilateral Carotid Duplex    Interpretation Summary  · Normal proximal right internal carotid artery.  · Left internal carotid artery stenosis of 0-49%.      Results for orders placed during the hospital encounter of 02/19/17    Bilateral Carotid Duplex    Interpretation Summary  · Normal proximal right internal carotid artery.  · Left internal carotid artery stenosis of 0-49%.      Results for orders placed during the hospital encounter  of 02/19/17    Adult transthoracic echo complete    Interpretation Summary  · Left ventricular wall segments contract abnormally. Refer to wall scoring diagram for more information.  · Mild aortic valve regurgitation is present.  · Left ventricular function is mildly decreased. Estimated EF = 45%.  · Left ventricular wall thickness is consistent with mild concentric hypertrophy.  · There is a small (<1cm) pericardial effusion adjacent to the left ventricle.  · Saline test results are positive and indicate an atrial level shunt.    Discharge Details        Discharge Medications      ASK your doctor about these medications      Instructions Start Date   acetaminophen 325 MG tablet  Commonly known as: TYLENOL   650 mg, Oral, Every 6 Hours PRN      bisacodyl 10 MG suppository  Commonly known as: DULCOLAX   bisacodyl 10 mg rectal suppository as needed      carbidopa-levodopa  MG per tablet  Commonly known as: SINEMET   Take 1 tablet 4 times a day      carbidopa-levodopa ER  MG per tablet  Commonly known as: SINEMET CR   TAKE ONE TABLET BY MOUTH EVERY NIGHT AT BEDTIME AS NEEDED FOR SYMPTOMS OF PARKINSONS      clopidogrel 75 MG tablet  Commonly known as: PLAVIX   75 mg, Oral, Daily      clotrimazole-betamethasone 1-0.05 % cream  Commonly known as: LOTRISONE   APPLY TO AFFECTED AND SURROUNDING AREAS EVERY MORNING AND EVERY EVENING      D3-1000 25 MCG (1000 UT) tablet  Generic drug: cholecalciferol   1,000 Units, Oral, Daily      DULoxetine 60 MG capsule  Commonly known as: CYMBALTA   60 mg, Oral, Daily      levothyroxine 75 MCG tablet  Commonly known as: SYNTHROID, LEVOTHROID   75 mcg, Oral, Every Early Morning      magnesium oxide 400 (241.3 Mg) MG tablet tablet  Commonly known as: MAGOX   400 mg, Oral, Daily      melatonin 5 MG tablet tablet   5 mg, Oral, Nightly PRN      multivitamin tablet tablet  Commonly known as: THERAGRAN   1 tablet, Oral, Daily, Centrum (brand new )      phenazopyridine 100 MG  tablet  Commonly known as: PYRIDIUM   100 mg, Oral, 3 Times Daily PRN      polyethylene glycol packet  Commonly known as: MIRALAX   17 g, Oral, Every two days      PRESERVISION AREDS 2 PO   1 tablet, Oral, 2 Times Daily      ramipril 5 MG capsule  Commonly known as: ALTACE  Ask about: Which instructions should I use?   5 mg, Oral, Daily      Refresh Relieva 0.5-0.9 % solution  Generic drug: Carboxymethylcellul-Glycerin   1 drop, Both Eyes, 2 Times Daily      rosuvastatin 10 MG tablet  Commonly known as: CRESTOR   10 mg, Oral, Nightly      saccharomyces boulardii 250 MG capsule  Commonly known as: FLORASTOR   250 mg, Oral, Daily      senna 8.6 MG tablet  Commonly known as: SENOKOT   1 tablet, Oral, As Needed             Allergies   Allergen Reactions   • Abilify [Aripiprazole]    • Alendronate    • Celecoxib    • Fosamax Plus D [Alendronate-Cholecalciferol]    • Levaquin [Levofloxacin]    • Macrobid [Nitrofurantoin Monohyd Macro]    • Nitrofurantoin    • Procaine    • Sulfa Antibiotics Nausea And Vomiting and Rash         Discharge Disposition:  Hospice/Medical Facility ([facility])    Diet:  Hospital:No active diet order        CODE STATUS:    Code Status and Medical Interventions:   Ordered at: 01/06/22 1003     Medical Intervention Limits:    NO intubation (DNI)    NO antiarrhythmic drugs    NO dialysis    NO cardioversion    NO vasopressors    NO artificial nutrition     Level Of Support Discussed With:    Health Care Surrogate    Next of Kin (If No Surrogate)     Code Status (Patient has no pulse and is not breathing):    No CPR (Do Not Attempt to Resuscitate)     Medical Interventions (Patient has pulse or is breathing):    Limited Support     Comments:    son at bedside       No future appointments.              Anuja Stanford MD  01/11/22      Time Spent on Discharge:  I spent  40  minutes on this discharge activity which included: face-to-face encounter with the patient, reviewing the data in the  system, coordination of the care with the nursing staff as well as consultants, documentation, and entering orders.

## 2022-01-11 NOTE — PLAN OF CARE
Goal Outcome Evaluation:      Patient actively declining, family aware and at patient's bedside at beginning of shift. Patient resting comfortably in bed, reposition every two hours. Comfort measures only per family and patient request. VSS. Will continue to monitor.

## 2022-01-11 NOTE — PLAN OF CARE
Problem: Adult Inpatient Plan of Care  Goal: Plan of Care Review  Outcome: Ongoing, Progressing  Flowsheets (Taken 1/10/2022 1848 by Nereida Malone RN)  Progress: no change  Plan of Care Reviewed With:   patient   family  Outcome Summary: Patient is resting comfortably in bed. Family at bedside. Comfort promoted with scheduled and PRN medications throughout day. Lemonade swabs used in mouth care. Will continue to monitor patient status.   Goal Outcome Evaluation:

## 2022-01-12 NOTE — SIGNIFICANT NOTE
Exam confirms with auscultation zero audible heart tones and zero audible respirations. Ms.Louise SOILA Knapp was pronounced dead at 0504 1/12/2022.  MD notified by Patient's RN.    Eddy Molina RN  Clinical House Supervisor  1/12/2022 06:19 EST

## 2022-01-12 NOTE — PLAN OF CARE
Goal Outcome Evaluation:  Plan of Care Reviewed With: patient        Progress: declining  Outcome Summary: Pt. unresponsive. Comfortable with scheduled meds. Family at bedside most of shift. Comfort care continued.

## 2022-01-12 NOTE — PROGRESS NOTES
Hospice Progress Note    Date of Admission: 1/8/2022    Subjective:                  Current Code Status       Date Active Code Status Order ID Comments User Context       1/8/2022 1157 No CPR (Do Not Attempt to Resuscitate) [id number]  Amirah Loja APRN Inpatient     Advance Care Planning Activity          Questions for Current Code Status       Question Answer    Code Status (Patient has no pulse and is not breathing) No CPR (Do Not Attempt to Resuscitate)    Medical Interventions (Patient has pulse or is breathing) Comfort Measures          Scheduled Meds:bisacodyl, 10 mg, Rectal, Nightly  gentamicin, 1 drop, Both Eyes, BID  LORazepam, 0.5 mg, Intravenous, Q8H  Morphine, 2 mg, Intravenous, Q4H  palliative care oral rinse, , Mouth/Throat, Q4H      Continuous Infusions:   PRN Meds:.  acetaminophen    bisacodyl    furosemide    glycopyrrolate    haloperidol lactate    LORazepam    Morphine    ondansetron    palliative care oral rinse    polyvinyl alcohol    Scopolamine      Objective: Temp 98.1 °F (36.7 °C) (Axillary)   SpO2 91%      Intake/Output Summary (Last 24 hours) at 1/11/2022 1941  Last data filed at 1/11/2022 0300  Gross per 24 hour   Intake --   Output 150 ml   Net -150 ml     Physical Exam:                   Results from last 7 days   Lab Units 01/07/22  0526   WBC 10*3/mm3 6.85   HEMOGLOBIN g/dL 9.9*   HEMATOCRIT % 31.6*   PLATELETS 10*3/mm3 137*     Results from last 7 days   Lab Units 01/07/22  0526 01/06/22  0806 01/06/22  0806   SODIUM mmol/L 132*   < > 136   POTASSIUM mmol/L 3.8   < > 4.5   CHLORIDE mmol/L 105   < > 101   CO2 mmol/L 16.0*   < > 21.0*   BUN mg/dL 20   < > 27*   CREATININE mg/dL 0.79   < > 0.84   CALCIUM mg/dL 7.9*   < > 9.8*   BILIRUBIN mg/dL  --   --  0.7   ALK PHOS U/L  --   --  103   ALT (SGPT) U/L  --   --  7   AST (SGOT) U/L  --   --  26   GLUCOSE mg/dL 85   < > 118*    < > = values in this interval not displayed.       Impression:          Plan:                 Stacy MOREJON  MD Singh  01/11/22  19:41 EST

## 2023-06-16 NOTE — TELEPHONE ENCOUNTER
Jazmin called stating pt's UA came back Negative and would we like her to remove the Carb/Levo at Night. Gave verbal permission to make this prn again or discontinue it until  is able to review or there are further issues.   
We will just see if her symptoms improve with being off the night time dose of carbidopa/levodopa for a few weeks. You may have already told them that...  Thanks   
No

## 2023-07-21 NOTE — PROGRESS NOTES
Acute Care - Occupational Therapy Initial Evaluation  [facility]     Patient Name: Florinda Knapp  : 3/5/1931  MRN: [id number]  Today's Date: 2017  Onset of Illness/Injury or Date of Surgery Date: 17  Date of Referral to OT: 17  Referring Physician: ARIEL Rodarte    Admit Date: 2017       ICD-10-CM ICD-9-CM   1. Symptomatic anemia D64.9 285.9   2. Weakness R53.1 780.79   3. Failure to thrive in adult R62.7 783.7   4. Dehydration E86.0 276.51   5. Impaired functional mobility, balance, gait, and endurance Z74.09 V49.89   6. Impaired mobility and ADLs Z74.09 799.89     Patient Active Problem List   Diagnosis   • Dysuria   • Elbow injury   • Fracture of left olecranon process   • Parkinson's disease   • Trauma left hip   • Acute Symptomatic on Chronic Normocytic Anemia   • Acute respiratory failure with hypoxia   • Generalized weakness   • Chest pain   • Symptomatic anemia   • Elevated troponin I level, possibly due to anemia   • Status post total replacement of left hip 2016 at [facility]   • H/O urinary retention   • History of recurrent UTIs   • Essential hypertension   • Anxiety   • CHF with cardiomyopathy   • Colitis     Past Medical History   Diagnosis Date   • Anemia    • Anxiety    • Depression    • Disease of thyroid gland    • Failure to thrive in adult    • Hyperlipidemia    • Hypertension    • Osteoarthritis    • Stress incontinence    • Urinary retention      Past Surgical History   Procedure Laterality Date   • Appendectomy     • Back surgery     • Cataract extraction     • Elbow procedure     • Knee surgery     • Joint replacement            OT ASSESSMENT FLOWSHEET (last 72 hours)      OT Evaluation       17 1055 17 1446 17 1444 17 1344 17 0850    Rehab Evaluation    Document Type evaluation  -TA    evaluation  -KM    Subjective Information agree to therapy;complains of;weakness  -TA    agree to therapy;complains of;weakness;fatigue   Needing  encouragement to get oob and participate in rx  -KM    Evaluation Not Performed    patient unavailable for evaluation   Pt undergoing echo, will check back as time allows.   -CL     Patient Effort, Rehab Treatment good  -TA    good  -KM    Symptoms Noted During/After Treatment other (see comments)   increased anxiety with movement  -TA    --   expresses anxiety re: falls  -KM    General Information    Patient Profile Review yes  -TA    yes  -KM    Onset of Illness/Injury or Date of Surgery Date 01/12/17  -TA    01/12/17  -KM    Referring Physician ARIEL Rodarte  -ARIEL Rodriguez  -PATEL    General Observations Pt supine, O2 per NC; sons present in room.  -TA        Pertinent History Of Current Problem Pt admitted from [facility] where she was engaging in rehab following THR 12/06/2016 and [facility] stay. Pt admitted with fatigue, weakness, and dyspnea; labs revealed low H&H. pt was transfused 2 days ago.  -TA    Patient admiltted from [facility] where she was undergoing rehab following THR 12/06/2016 and [facility] stay. Pt admitted with fatigue, weakness and S.O.A. , lab work revealed low H and H. pt is s/p transfusion yesterday.  -KM    Precautions/Limitations fall precautions;hip precautions- left  -TA    fall precautions;hip precautions- left  -KM    Prior Level of Function mod assist:;gait;transfer;bed mobility;ADL's  -TA    mod assist:;gait;transfer;bed mobility;ADL's  -KM    Equipment Currently Used at Home walker, rolling;wheelchair  -TA wheelchair;walker, rolling  -BG   walker, rolling;wheelchair  -KM    Plans/Goals Discussed With patient and family;agreed upon  -TA        Risks Reviewed patient and family:;LOB;dizziness;increased discomfort;change in vital signs  -TA    patient and family:;increased discomfort  -KM    Benefits Reviewed patient and family:;improve function;increase independence;increase strength;increase balance;decrease pain;increase knowledge  -TA    patient and family:;improve function  -KM    Barriers  to Rehab previous functional deficit  -TA        Living Environment    Lives With alone  -TA alone  -BG   facility resident   Previously lived in assisted living prior to fall  -KM    Living Arrangements independent living facility  -TA independent living facility  -BG   assisted living   anticipate return to snf for rehab  -KM    Home Accessibility no concerns  -TA    no concerns  -KM    Transportation Available  car;family or friend will provide  -BG       Clinical Impression    Date of Referral to OT 01/13/17  -TA        OT Diagnosis Impaired mobility and ADLs  -TA        Impairments Found (describe specific impairments) aerobic capacity/endurance;ergonomics and body mechanics;gait, locomotion, and balance;muscle performance  -TA        Patient/Family Goals Statement Get stronger; return for rehab  -TA        Criteria for Skilled Therapeutic Interventions Met yes;treatment indicated  -TA        Rehab Potential good, to achieve stated therapy goals  -TA        Therapy Frequency daily   Per priority policy  -TA        Anticipated Equipment Needs At Discharge --   AE TBD  -TA        Anticipated Discharge Disposition skilled nursing facility;other (see comments)   further rehab  -TA        Functional Level Prior    Ambulation   1-->assistive equipment  -BG      Transferring   1-->assistive equipment  -BG      Toileting   1-->assistive equipment  -BG      Bathing   1-->assistive equipment  -BG      Dressing   0-->independent  -BG      Eating   0-->independent  -BG      Communication   0-->understands/communicates without difficulty  -BG      Vital Signs    Pre Systolic BP Rehab --   Nurse cleared pt for tx, vitals stable.  -TA        Pre Patient Position Supine  -TA        Intra Patient Position Standing  -TA        Post Patient Position Sitting  -TA        Pain Assessment    Pain Assessment No/denies pain  -TA    No/denies pain  -KM    Vision Assessment/Intervention    Visual Impairment WFL with corrective lenses   - DVT ppx: heparin subq  - GI ppx: none   -Diet: regular - DVT ppx: heparin subq  - GI ppx: none   -Diet: regular -TA        Cognitive Assessment/Intervention    Current Cognitive/Communication Assessment functional  -TA    functional  -KM    Orientation Status oriented x 4  -TA        Follows Commands/Answers Questions 100% of the time;able to follow single-step instructions;needs cueing;needs repetition  -TA    able to follow single-step instructions;100% of the time  -KM    Personal Safety fully aware of deficits  -TA    fully aware of deficits;WNL/WFL  -KM    Personal Safety Interventions fall prevention program maintained;gait belt;nonskid shoes/slippers when out of bed;supervised activity  -TA    fall prevention program maintained;gait belt;nonskid shoes/slippers when out of bed  -KM    ROM (Range of Motion)    General ROM no range of motion deficits identified  -TA    lower extremity range of motion deficits identified   l ankle 10-45 degrees pf actively, passive df to 0 degrees  -KM    General ROM Detail BUEs WFLs, except L elbow ext -15 degrees  -TA        MMT (Manual Muscle Testing)    General MMT Assessment upper extremity strength deficits identified  -TA    lower extremity strength deficits identified  -KM    General MMT Assessment Detail BUEs 3+/5  -TA    --   R l/e grossly 4/5, L l/e hip 3-/5, knee 4-/5, l ankle df 1/5  -KM    Bed Mobility, Assessment/Treatment    Bed Mobility, Assistive Device bed rails;head of bed elevated;draw sheet  -TA    bed rails;head of bed elevated;draw sheet  -KM    Bed Mobility, Roll Right, [place]     moderate assist (50% patient effort);verbal cues required  -KM    Bed Mobility, Scoot/Bridge, [place] moderate assist (50% patient effort)  -TA    moderate assist (50% patient effort)  -KM    Bed Mob, Supine to Sit, [place] moderate assist (50% patient effort);verbal cues required  -TA    moderate assist (50% patient effort)  -KM    Bed Mobility, Safety Issues decreased use of arms for pushing/pulling;decreased use of legs for bridging/pushing;other (see comments)   fear  of falling  -TA        Bed Mobility, Impairments strength decreased;impaired balance;postural control impaired  -TA        Transfer Assessment/Treatment    Transfers, Sit-Stand [place] verbal cues required;maximum assist (25% patient effort)  -TA    moderate assist (50% patient effort);2 person assist required;verbal cues required   cues to stand erect  -KM    Transfers, Stand-Sit [place] verbal cues required;maximum assist (25% patient effort)  -TA        Transfers, Sit-Stand-Sit, Assist Device other (see comments)   gait belt, BUE support  -TA    rolling walker  -KM    Transfer, Safety Issues balance decreased during turns;sequencing ability decreased;step length decreased;weight-shifting ability decreased  -TA        Transfer, Impairments strength decreased;impaired balance  -TA        Lower Body Dressing Assessment/Training    LB Dressing Assess/Train, Clothing Type donning:;slipper socks  -TA        LB Dressing Assess/Train, Position supine  -TA        LB Dressing Assess/Train, [place] dependent (less than 25% patient effort)  -TA        LB Dressing Assess/Train, Impairments decreased flexibility;strength decreased  -TA        Grooming Assessment/Training    Grooming Assess/Train, Position edge of bed;sitting  -TA        Grooming Assess/Train, Indepen Level contact guard assist  -TA        Grooming Assess/Train, Impairments strength decreased;impaired balance  -TA        Grooming Assess/Train, Comment Pt combed hair  -TA        Motor Skills/Interventions    Additional Documentation Balance Skills Training (Group)  -TA        Balance Skills Training    Sitting-Level of Assistance Contact guard;Close supervision  -TA        Sitting-Balance Support Feet supported  -TA        Sitting-Balance Activities Forward lean;Reaching for objects;Reaching across midline;Trunk control activities  -TA        Sitting # of Minutes 6  -TA        Standing-Level of Assistance Maximum assistance  -TA        Static  Standing Balance Support Right upper extremity supported;Left upper extremity supported  -TA        Standing-Balance Activities Weight Shift A-P;Weight Shift R-L;Reaching for objects  -TA        Therapy Exercises    Bilateral Lower Extremities     AAROM:;10 reps;sitting;ankle pumps/circles;calf stretch;hip flexion;LAQ;heel slides;hip abduction/adduction;quad sets;glut sets  -KM    Left Upper Extremity AROM:;5 reps;sitting;elbow flexion/extension;hand pumps;shoulder extension/flexion;shoulder horizontal abd/add  -TA        Sensory Assessment/Intervention    Light Touch LUE;RUE  -TA        LUE Light Touch WNL  -TA        RUE Light Touch WNL  -TA        General Therapy Interventions    Planned Therapy Interventions activity intolerance;adaptive equipment training;ADL retraining;balance training;bed mobility training;home exercise program;ROM (Range of Motion);strengthening;transfer training  -TA        Positioning and Restraints    Pre-Treatment Position in bed  -TA    in bed  -KM    Post Treatment Position chair  -TA    chair  -KM    In Chair notified nsg;reclined;call light within reach;encouraged to call for assist;exit alarm on;RUE elevated;LUE elevated;waffle cushion;legs elevated  -TA    notified nsg;reclined;call light within reach;encouraged to call for assist;with family/caregiver  -KM      01/12/17 6445                General Information    Equipment Currently Used at Home wheelchair;bath bench;grab bar  -CB        Living Environment    Lives With facility resident  -CB        Living Arrangements assisted living  -CB        Home Accessibility no concerns  -CB        Stair Railings at Home none  -CB        Type of Financial/Environmental Concern none  -CB        Transportation Available family or friend will provide  -CB        Functional Level Prior    Ambulation 3-->assistive equipment and person  -CB        Transferring 3-->assistive equipment and person  -CB        Toileting 3-->assistive equipment and  person  -CB        Bathing 2-->assistive person  -CB        Dressing 2-->assistive person  -CB        Eating 2-->assistive person  -CB        Communication 0-->understands/communicates without difficulty  -CB        Swallowing 0-->swallows foods/liquids without difficulty  -CB        Prior Functional Level Comment see above  -CB          User Key  (r) = Recorded By, (t) = Taken By, (c) = Cosigned By    Initials Name Effective Dates    KM Mary CATHIE Howe, PT 06/19/15 -     TA Linwood Torres, OT 03/14/16 -     BG Linda Leigh, MSW 07/18/16 -     CB Abigail Mirza, RN 06/16/16 -     CL Elif Rossi, OT 06/08/16 -            Occupational Therapy Education     Title: PT OT SLP Therapies (Active)     Topic: Occupational Therapy (Active)     Point: Home exercise program (Done)    Description: Instruct learner(s) on appropriate technique for monitoring, assisting and/or progressing therapeutic exercises/activities.    Learning Progress Summary    Learner Readiness Method Response Comment Documented by Status   Patient Acceptance E,D VU,NR BUE HEP, body mechanics with bed mobility/fxl transfers, reinforced need for call for assist for OOB activities. TA 01/14/17 1523 Done               Point: Precautions (Done)    Description: Instruct learner(s) on prescribed precautions during self-care and functional transfers.    Learning Progress Summary    Learner Readiness Method Response Comment Documented by Status   Patient Acceptance E,D VU,NR BUE HEP, body mechanics with bed mobility/fxl transfers, reinforced need for call for assist for OOB activities. TA 01/14/17 1523 Done               Point: Body mechanics (Done)    Description: Instruct learner(s) on proper positioning and spine alignment during self-care, functional mobility activities and/or exercises.    Learning Progress Summary    Learner Readiness Method Response Comment Documented by Status   Patient Acceptance E,D VU,NR BUE HEP, body mechanics with bed  mobility/fxl transfers, reinforced need for call for assist for OOB activities. TA 01/14/17 1523 Done                      User Key     Initials Effective Dates Name Provider Type Discipline    TA 03/14/16 -  Linwood Torres OT Occupational Therapist OT                  OT Recommendation and Plan  Anticipated Discharge Disposition: skilled nursing facility, other (see comments) (further rehab)  Planned Therapy Interventions: activity intolerance, adaptive equipment training, ADL retraining, balance training, bed mobility training, home exercise program, ROM (Range of Motion), strengthening, transfer training  Therapy Frequency: daily (Per priority policy)  Plan of Care Review  Plan Of Care Reviewed With: patient, son  Progress: no change  Outcome Summary/Follow up Plan: Pt presents with fxl decline from PLOF, deficits in ADL performance, fxl mobility, occupational endurance; fear of falling; will benefit from skilled OT services to address deficits, facilitate increased fxl I, safe transition to next level of care. Recommend SNF for rehab.          OT Goals       01/14/17 1524          Bed Mobility OT LTG    Bed Mobility OT LTG, Date Established 01/14/17  -TA      Bed Mobility OT LTG, Time to Achieve 1 wk  -TA      Bed Mobility OT LTG, Activity Type supine to sit/sit to supine  -TA      Bed Mobility OT LTG, [place] Level supervision required;verbal cues required  -TA      Bed Mobility OT LTG, Outcome goal ongoing  -TA      Transfer Training OT LTG    Transfer Training OT LTG, Date Established 01/14/17  -TA      Transfer Training OT LTG, Time to Achieve 1 wk  -TA      Transfer Training OT LTG, Activity Type bed to chair /chair to bed;sit to stand/stand to sit;toilet  -TA      Transfer Training OT LTG, [place] Level supervision required;verbal cues required  -TA      Transfer Training OT LTG, Assist Device walker, rolling  -TA      Transfer Training OT LTG, Outcome goal ongoing  -TA      Strength OT LTG     Strength Goal OT LTG, Date Established 01/14/17  -TA      Strength Goal OT LTG, Time to Achieve 1 wk  -TA      Strength Goal OT LTG, Measure to Achieve --   Increase BUE strength by 1/2 MMG to support fxl I.  -TA      Strength Goal OT LTG, Outcome goal ongoing  -TA      Toileting OT LTG    Toileting Goal OT LTG, Date Established 01/14/17  -TA      Toileting Goal OT LTG, Time to Achieve 1 wk  -TA      Toileting Goal OT LTG, [place] Level minimum assist (75% patient effort)  -TA      Toileting Goal OT LTG, Assist Device toilet seat, raised  -TA      Toileting Goal OT LTG, Outcome goal ongoing  -TA      LB Dressing OT LTG    LB Dressing Goal OT LTG, Date Established 01/14/17  -TA      LB Dressing Goal OT LTG, Time to Achieve 1 wk  -TA      LB Dressing Goal OT LTG, [place] Level moderate assist (50% patient effort);verbal cues required  -TA      LB Dressing Goal OT LTG, Adaptive Equipment --   AE PRN  -TA      LB Dressing Goal OT LTG, Outcome goal ongoing  -TA        User Key  (r) = Recorded By, (t) = Taken By, (c) = Cosigned By    Initials Name Provider Type    STELLA Torres OT Occupational Therapist                Outcome Measures       01/14/17 1055 01/13/17 0850       How much help from another person do you currently need...    Turning from your back to your side while in flat bed without using bedrails?  2  -KM     Moving from lying on back to sitting on the side of a flat bed without bedrails?  2  -KM     Moving to and from a bed to a chair (including a wheelchair)?  2  -KM     Standing up from a chair using your arms (e.g., wheelchair, bedside chair)?  2  -KM     Climbing 3-5 steps with a railing?  1  -KM     To walk in hospital room?  2  -KM     AM-PAC 6 Clicks Score  11  -KM     How much help from another is currently needed...    Putting on and taking off regular lower body clothing? 1  -TA      Bathing (including washing, rinsing, and drying) 2  -TA      Toileting (which includes using  toilet bed pan or urinal) 1  -TA      Putting on and taking off regular upper body clothing 2  -TA      Taking care of personal grooming (such as brushing teeth) 3  -TA      Eating meals 3  -TA      Score 12  -TA      Functional Assessment    Outcome Measure Options AM-PAC 6 Clicks Daily Activity (OT)  -TA AM-PAC 6 Clicks Basic Mobility (PT)  -KM       User Key  (r) = Recorded By, (t) = Taken By, (c) = Cosigned By    Initials Name Provider Type    PATEL Howe, PT Physical Therapist    TA Linwood Torres OT Occupational Therapist          Time Calculation:   OT Start Time: 1055 (ttc 11 minutes)    Therapy Charges for Today     Code Description Service Date Service Provider Modifiers Qty    [id number]  OT EVAL HIGH COMPLEXITY 4 1/14/2017 Linwood Torres OT GO 1    [id number]  OT THERAPEUTIC ACT EA 15 MIN 1/14/2017 Linwood Torres OT GO 1               Linwood Torres OT  1/14/2017   - DVT ppx: heparin subq  - GI ppx: none   -Diet: regular - DVT ppx: heparin subq  - GI ppx: none   -Diet: regular

## (undated) DEVICE — CATH DEL MARKSMAN MICRO 2.8/3.2F 10X150

## (undated) DEVICE — CATH TEMPO 5F SIM 2 100CM: Brand: TEMPO

## (undated) DEVICE — BALLOON GUIDE CATHETER: Brand: FLOWGATE2

## (undated) DEVICE — CATH ANGIO TRCN NB ADV .038 5F 125CM VTK

## (undated) DEVICE — INTRO SHEATH ENGAGE W/50 GW .038 8F12

## (undated) DEVICE — ANGIO-SEAL VIP VASCULAR CLOSURE DEVICE: Brand: ANGIO-SEAL

## (undated) DEVICE — STPCK LP 1WY RA 200PSI

## (undated) DEVICE — ST INF PRI SMRTSTE 20DRP 2VLV 24ML 117

## (undated) DEVICE — DRSNG SURESITE123 4X4.8IN

## (undated) DEVICE — ROTATING HEMOSTATIC VALVE .096": Brand: RHV

## (undated) DEVICE — RADIFOCUS GLIDEWIRE: Brand: GLIDEWIRE

## (undated) DEVICE — RADIFOCUS TORQUE DEVICE MULTI-TORQUE VISE: Brand: RADIFOCUS TORQUE DEVICE

## (undated) DEVICE — GW TRANSEND.010 .010IN 2X205CM

## (undated) DEVICE — INTRO SHEATH FLX 7F80CM

## (undated) DEVICE — LIMB HOLDERS: Brand: DEROYAL

## (undated) DEVICE — INTRO SHEATH ART/FEM ENGAGE .038 5F12CM

## (undated) DEVICE — SOLUTION SET, MALE LUER LOCK ADAPTER

## (undated) DEVICE — ST EXT IV SMARTSITE 2VLV SP M LL 5ML IV1

## (undated) DEVICE — SOL NACL 0.9PCT 1000ML

## (undated) DEVICE — DISTAL ACCESS CATHETER: Brand: AXS CATALYST 6

## (undated) DEVICE — LEX NEURO ANGIOGRAPHY: Brand: MEDLINE INDUSTRIES, INC.

## (undated) DEVICE — Device

## (undated) DEVICE — CATH TEMPO 5F BER 100CM: Brand: TEMPO

## (undated) DEVICE — INTRO SHEATH FLX 8F80CM

## (undated) DEVICE — ST ACC MICROPUNCTURE .018 TRANSLSS/SS/TP 5F/10CM 21G/7CM

## (undated) DEVICE — STPCK 3/WY HP M/RA W/OFF/HNDL 1050PSI STRL